# Patient Record
Sex: MALE | Race: WHITE | Employment: FULL TIME | URBAN - METROPOLITAN AREA
[De-identification: names, ages, dates, MRNs, and addresses within clinical notes are randomized per-mention and may not be internally consistent; named-entity substitution may affect disease eponyms.]

---

## 2021-12-30 ENCOUNTER — HOSPITAL ENCOUNTER (EMERGENCY)
Age: 41
Discharge: HOME | End: 2021-12-30
Payer: COMMERCIAL

## 2021-12-30 VITALS
DIASTOLIC BLOOD PRESSURE: 81 MMHG | TEMPERATURE: 97.9 F | WEIGHT: 175 LBS | HEIGHT: 68 IN | OXYGEN SATURATION: 96 % | SYSTOLIC BLOOD PRESSURE: 120 MMHG | HEART RATE: 100 BPM | RESPIRATION RATE: 16 BRPM | BODY MASS INDEX: 26.52 KG/M2

## 2021-12-30 DIAGNOSIS — R22.0 FACIAL SWELLING: Primary | ICD-10-CM

## 2021-12-30 PROCEDURE — 99284 EMERGENCY DEPT VISIT MOD MDM: CPT

## 2021-12-30 RX ORDER — PREDNISONE 20 MG/1
40 TABLET ORAL DAILY
Qty: 10 TABLET | Refills: 0 | Status: SHIPPED | OUTPATIENT
Start: 2021-12-30 | End: 2022-01-04

## 2021-12-30 RX ORDER — AMOXICILLIN AND CLAVULANATE POTASSIUM 875; 125 MG/1; MG/1
1 TABLET, FILM COATED ORAL 2 TIMES DAILY
Qty: 14 TABLET | Refills: 0 | Status: SHIPPED | OUTPATIENT
Start: 2021-12-30 | End: 2022-01-06

## 2021-12-30 ASSESSMENT — ENCOUNTER SYMPTOMS
DIARRHEA: 0
ROS SKIN COMMENTS: +FACIAL SWELLING
CHEST TIGHTNESS: 0
EYE PAIN: 0
SORE THROAT: 0
BACK PAIN: 0
NAUSEA: 0
SHORTNESS OF BREATH: 0

## 2021-12-30 NOTE — ED TRIAGE NOTES
Pt c/o right side facial edema that started this AM, denies trauma, pain and SOB, Pt is A&OX3, calm, ambulatory, afebrile, breathes are equal and unlabored, 3+ edema to right cheek, no redness noted,

## 2021-12-30 NOTE — Clinical Note
Cruz Reina was seen and treated in our emergency department on 12/30/2021. He may return to work on 01/03/2022. If you have any questions or concerns, please don't hesitate to call.       Christine Perkins PA-C

## 2021-12-30 NOTE — ED PROVIDER NOTES
[facility] [facility] ED  eMERGENCYdEPARTMENT eNCOUnter      Pt Name: Eliseo Rodriguez  MRN: [id number]  [place] 1980  Date of evaluation: 12/30/2021  Provider:Saulo Marlow PA-C    CHIEF COMPLAINT           HPI  Eliseo Rodriguez is a 42 y.o. male presenting with right facial swelling. Pt reports 12 hours of worsening, non painful, gradual onset facial swelling. He states this has not happened before, and he does not endorse any tooth pain or history of cavities, although he does not get dental care currently. He denies trouble swallowing, SOB, fever, chills. ROS  Review of Systems   Constitutional: Negative for chills, fatigue and fever. HENT: Negative for ear pain, hearing loss and sore throat. Eyes: Negative for pain and visual disturbance. Respiratory: Negative for chest tightness and shortness of breath. Cardiovascular: Negative for chest pain. Gastrointestinal: Negative for diarrhea and nausea. Endocrine: Negative for cold intolerance. Genitourinary: Negative for hematuria. Musculoskeletal: Negative for back pain. Skin: Negative for rash and wound. +facial swelling   Neurological: Negative for dizziness and headaches. Psychiatric/Behavioral: Negative for behavioral problems and confusion. Except as noted above the remainder of the review of systems was reviewed and negative. PAST MEDICAL HISTORY     Past Medical History:   Diagnosis Date    Hyperlipidemia          SURGICAL HISTORY     History reviewed. No pertinent surgical history. [address]       Discharge Medication List as of 12/30/2021  2:16 PM      CONTINUE these medications which have NOT CHANGED    Details   simvastatin (ZOCOR) 20 MG tablet Take 1 tablet by mouth every evening, Disp-90 tablet, R-3             ALLERGIES     Patient has no known allergies. FAMILY HISTORY       Family History   Problem Relation Age of Onset    Heart Disease Mother     Cancer Father         ? SOCIAL HISTORY       Social History     Socioeconomic History    Marital status: Single     Spouse name: None    Number of children: None    Years of education: None    Highest education level: None   Occupational History    None   Tobacco Use    Smoking status: Current Every Day Smoker    Smokeless tobacco: Never Used   Substance and Sexual Activity    Alcohol use: Yes     Alcohol/week: 0.0 standard drinks     Comment: soc.  Drug use: No    Sexual activity: None   Other Topics Concern    None   Social History Narrative    None     Social Determinants of Health     Financial Resource Strain:     Difficulty of Paying Living Expenses: Not on file   Food Insecurity:     Worried About Running Out of Food in the Last Year: Not on file    Kenzie of Food in the Last Year: Not on file   Transportation Needs:     Lack of Transportation (Medical): Not on file    Lack of Transportation (Non-Medical):  Not on file   Physical Activity:     Days of Exercise per Week: Not on file    Minutes of Exercise per Session: Not on file   Stress:     Feeling of Stress : Not on file   Social Connections:     Frequency of Communication with Friends and Family: Not on file    Frequency of Social Gatherings with Friends and Family: Not on file    Attends Ten[employer] Services: Not on file    Active Member of [address] [place] [address] [employer] or Organizations: Not on file    Attends Club or Organization Meetings: Not on file    Marital Status: Not on file   Intimate Partner Violence:     Fear of Current or Ex-Partner: Not on file    Emotionally Abused: Not on file    Physically Abused: Not on file    Sexually Abused: Not on file   Housing Stability:     Unable to Pay for Housing in the Last Year: Not on file    Number of [place] in the Last Year: Not on file    Unstable Housing in the Last Year: Not on file         PHYSICAL EXAM       ED Triage Vitals [12/30/21 1345]   BP Temp Temp Source Pulse Resp SpO2 Height Weight   120/81 97.9 °F (36.6 °C) Oral 100 16 96 % 5' 8\" (1.727 m) 175 lb (79.4 kg)       Physical Exam  Constitutional:       Appearance: Normal appearance. HENT:      Head: Normocephalic and atraumatic. Nose: Nose normal.      Mouth/Throat:      Mouth: Mucous membranes are moist.      Tongue: Tongue does not deviate from midline. Pharynx: No oropharyngeal exudate or posterior oropharyngeal erythema. Tonsils: No tonsillar abscesses. Comments: Oropharynx is patent  Eyes:      Extraocular Movements: Extraocular movements intact. Conjunctiva/sclera: Conjunctivae normal.      Pupils: Pupils are equal, round, and reactive to light. Cardiovascular:      Rate and Rhythm: Normal rate and regular rhythm. Heart sounds: Normal heart sounds. Pulmonary:      Effort: Pulmonary effort is normal.      Breath sounds: Normal breath sounds. No wheezing or rhonchi. Abdominal:      General: Bowel sounds are normal.      Palpations: Abdomen is soft. Tenderness: There is no abdominal tenderness. There is no guarding. Musculoskeletal:         General: No deformity. Normal range of motion. Cervical back: Normal range of motion and neck supple. Skin:     General: Skin is warm and dry. Coloration: Skin is not jaundiced. Neurological:      General: No focal deficit present. Mental Status: He is alert and oriented to person, place, and time. Psychiatric:         Mood and Affect: Mood normal.         Behavior: Behavior normal.           MDM  This is a 42year old male presenting with cheek swelling. Pt is afebrile and HD stable. Likely buccal infection from tooth decay, no evidence of progression into the oropharynx or auricular areas at this time. the Pt is agreeable to discharge with abx and steroids for swelling. He states he will follow up with a Dentist asap. He will return if symptoms change or worsen. FINAL IMPRESSION      1.  Facial swelling          DISPOSITION/PLAN DISPOSITION Decision To Discharge 12/30/2021 02:06:08 PM        DISCHARGE MEDICATIONS:  [unfilled]         Yang Gomes PA-C(electronically signed)  Attending Emergency Physician           Yang Gomes PA-C  01/01/22 [id number]

## 2024-07-02 ENCOUNTER — HOSPITAL ENCOUNTER (EMERGENCY)
Age: 44
Discharge: HOME | End: 2024-07-02
Attending: EMERGENCY MEDICINE
Payer: COMMERCIAL

## 2024-07-02 VITALS
TEMPERATURE: 97 F | HEART RATE: 99 BPM | RESPIRATION RATE: 15 BRPM | SYSTOLIC BLOOD PRESSURE: 138 MMHG | WEIGHT: 170 LBS | OXYGEN SATURATION: 98 % | HEIGHT: 68 IN | DIASTOLIC BLOOD PRESSURE: 87 MMHG | BODY MASS INDEX: 25.76 KG/M2

## 2024-07-02 DIAGNOSIS — K02.9 DENTAL CARIES: ICD-10-CM

## 2024-07-02 DIAGNOSIS — K08.89 PAIN, DENTAL: Primary | ICD-10-CM

## 2024-07-02 PROCEDURE — 99283 EMERGENCY DEPT VISIT LOW MDM: CPT

## 2024-07-02 RX ORDER — AMOXICILLIN AND CLAVULANATE POTASSIUM 875; 125 MG/1; MG/1
1 TABLET, FILM COATED ORAL 2 TIMES DAILY
Qty: 20 TABLET | Refills: 0 | Status: SHIPPED | OUTPATIENT
Start: 2024-07-02 | End: 2024-07-12

## 2024-07-02 ASSESSMENT — LIFESTYLE VARIABLES
HOW MANY STANDARD DRINKS CONTAINING ALCOHOL DO YOU HAVE ON A TYPICAL DAY: 1 OR 2
HOW OFTEN DO YOU HAVE A DRINK CONTAINING ALCOHOL: MONTHLY OR LESS

## 2024-07-02 ASSESSMENT — PAIN DESCRIPTION - ORIENTATION: ORIENTATION: LEFT;LOWER

## 2024-07-02 ASSESSMENT — PAIN DESCRIPTION - DESCRIPTORS: DESCRIPTORS: ACHING

## 2024-07-02 ASSESSMENT — PAIN - FUNCTIONAL ASSESSMENT
PAIN_FUNCTIONAL_ASSESSMENT: NONE - DENIES PAIN
PAIN_FUNCTIONAL_ASSESSMENT: 0-10

## 2024-07-02 ASSESSMENT — PAIN DESCRIPTION - LOCATION: LOCATION: TEETH

## 2024-07-02 ASSESSMENT — PAIN SCALES - GENERAL: PAINLEVEL_OUTOF10: 5

## 2024-07-02 NOTE — ED PROVIDER NOTES
Physician - none    LABS:  Labs Reviewed - No data to display    All other labs were within normal range or not returned as of this dictation.    EMERGENCY DEPARTMENT COURSE and DIFFERENTIAL DIAGNOSIS/MDM:   Vitals:    Vitals:    07/02/24 1038   BP: 138/87   Pulse: 99   Resp: 15   Temp: 97 °F (36.1 °C)   TempSrc: Tympanic   SpO2: 98%   Weight: 77.1 kg (170 lb)   Height: 1.727 m (5' 8\")           MDM  Given the patient's situation no imaging or blood work needed.  My physical exam shows no sign of abscess at this time.  I will place the patient on antibiotics.  Patient advised to follow-up with soon as possible for dental care.  Patient advised to return for any worsening condition.  All were in agreement with the outpatient management and treatment plan.        CRITICAL CARE TIME   Total Critical Care time was  minutes, excluding separately reportableprocedures.  There was a high probability of clinicallysignificant/life threatening deterioration in the patient's condition which required my urgent intervention.     CONSULTS:  None    PROCEDURES:  Unless otherwise noted below, none     Procedures    FINAL IMPRESSION      1. Pain, dental    2. Dental caries          DISPOSITION/PLAN   DISPOSITION Decision To Discharge 07/02/2024 10:49:16 AM      PATIENT REFERRED TO:  [facility]  [address] [place] [id number] [phone]        [facility], [place]  [address] [id number] [phone]    In 1 day        DISCHARGE MEDICATIONS:  New Prescriptions    AMOXICILLIN-CLAVULANATE (AUGMENTIN) 875-125 MG PER TABLET    Take 1 tablet by mouth 2 times daily for 10 days          (Please note that portions of this note were completed with a voice recognition program.  Efforts were made to edit the dictations but occasionally words are mis-transcribed.)    Maureen Spain MD (electronically signed)  Attending Emergency Physician         Maureen Spain MD  07/02/24 4412

## 2024-07-02 NOTE — ED NOTES
D/C instructions and rx given along with time the next dose is due. Verbalized understanding. Denies any further complaints. Amb out with steady gait in no acute distress.

## 2024-07-02 NOTE — ED TRIAGE NOTES
A & Ox4. Skin pink warm and dry. Pt states he has been having left lower tooth pain on Sunday. States he just wants antibiotics and anti inflammatory medications. No obvious facial edema noted.

## 2024-07-07 ENCOUNTER — HOSPITAL ENCOUNTER (INPATIENT)
Facility: HOSPITAL | Age: 44
LOS: 2 days | Discharge: HOME | End: 2024-07-10
Attending: EMERGENCY MEDICINE | Admitting: DENTIST
Payer: COMMERCIAL

## 2024-07-07 ENCOUNTER — HOSPITAL ENCOUNTER (EMERGENCY)
Facility: HOSPITAL | Age: 44
Discharge: HOME | End: 2024-07-07
Attending: INTERNAL MEDICINE
Payer: COMMERCIAL

## 2024-07-07 ENCOUNTER — APPOINTMENT (OUTPATIENT)
Dept: RADIOLOGY | Facility: HOSPITAL | Age: 44
End: 2024-07-07
Payer: COMMERCIAL

## 2024-07-07 VITALS
BODY MASS INDEX: 25.76 KG/M2 | OXYGEN SATURATION: 100 % | HEIGHT: 68 IN | SYSTOLIC BLOOD PRESSURE: 138 MMHG | DIASTOLIC BLOOD PRESSURE: 95 MMHG | WEIGHT: 170 LBS | HEART RATE: 96 BPM | TEMPERATURE: 97.5 F | RESPIRATION RATE: 16 BRPM

## 2024-07-07 DIAGNOSIS — R25.2 TRISMUS: ICD-10-CM

## 2024-07-07 DIAGNOSIS — K12.2 SUBMANDIBULAR ABSCESS: Primary | ICD-10-CM

## 2024-07-07 DIAGNOSIS — K04.7 DENTAL ABSCESS: Primary | ICD-10-CM

## 2024-07-07 DIAGNOSIS — K12.2 SUBMANDIBULAR SPACE INFECTION: ICD-10-CM

## 2024-07-07 LAB
ANION GAP SERPL CALC-SCNC: 17 MMOL/L (ref 10–20)
APTT PPP: 31 SECONDS (ref 27–38)
BASOPHILS # BLD AUTO: 0.04 X10*3/UL (ref 0–0.1)
BASOPHILS NFR BLD AUTO: 0.3 %
BUN SERPL-MCNC: 11 MG/DL (ref 6–23)
CALCIUM SERPL-MCNC: 10.6 MG/DL (ref 8.6–10.3)
CHLORIDE SERPL-SCNC: 96 MMOL/L (ref 98–107)
CO2 SERPL-SCNC: 22 MMOL/L (ref 21–32)
CREAT SERPL-MCNC: 0.87 MG/DL (ref 0.5–1.3)
CRP SERPL-MCNC: 10.34 MG/DL
EGFRCR SERPLBLD CKD-EPI 2021: >90 ML/MIN/1.73M*2
EOSINOPHIL # BLD AUTO: 0.04 X10*3/UL (ref 0–0.7)
EOSINOPHIL NFR BLD AUTO: 0.3 %
ERYTHROCYTE [DISTWIDTH] IN BLOOD BY AUTOMATED COUNT: 11.8 % (ref 11.5–14.5)
GLUCOSE SERPL-MCNC: 109 MG/DL (ref 74–99)
HCT VFR BLD AUTO: 46.9 % (ref 41–52)
HGB BLD-MCNC: 16.6 G/DL (ref 13.5–17.5)
IMM GRANULOCYTES # BLD AUTO: 0.03 X10*3/UL (ref 0–0.7)
IMM GRANULOCYTES NFR BLD AUTO: 0.2 % (ref 0–0.9)
INR PPP: 1.2 (ref 0.9–1.1)
LACTATE SERPL-SCNC: 1.2 MMOL/L (ref 0.4–2)
LYMPHOCYTES # BLD AUTO: 1.9 X10*3/UL (ref 1.2–4.8)
LYMPHOCYTES NFR BLD AUTO: 14.4 %
MCH RBC QN AUTO: 30.9 PG (ref 26–34)
MCHC RBC AUTO-ENTMCNC: 35.4 G/DL (ref 32–36)
MCV RBC AUTO: 87 FL (ref 80–100)
MONOCYTES # BLD AUTO: 0.84 X10*3/UL (ref 0.1–1)
MONOCYTES NFR BLD AUTO: 6.3 %
NEUTROPHILS # BLD AUTO: 10.39 X10*3/UL (ref 1.2–7.7)
NEUTROPHILS NFR BLD AUTO: 78.5 %
NRBC BLD-RTO: 0 /100 WBCS (ref 0–0)
PLATELET # BLD AUTO: 354 X10*3/UL (ref 150–450)
POTASSIUM SERPL-SCNC: 3.9 MMOL/L (ref 3.5–5.3)
PROTHROMBIN TIME: 13.1 SECONDS (ref 9.8–12.8)
RBC # BLD AUTO: 5.37 X10*6/UL (ref 4.5–5.9)
SODIUM SERPL-SCNC: 131 MMOL/L (ref 136–145)
WBC # BLD AUTO: 13.2 X10*3/UL (ref 4.4–11.3)

## 2024-07-07 PROCEDURE — 96375 TX/PRO/DX INJ NEW DRUG ADDON: CPT | Mod: 59

## 2024-07-07 PROCEDURE — 80048 BASIC METABOLIC PNL TOTAL CA: CPT | Performed by: NURSE PRACTITIONER

## 2024-07-07 PROCEDURE — 2500000004 HC RX 250 GENERAL PHARMACY W/ HCPCS (ALT 636 FOR OP/ED)

## 2024-07-07 PROCEDURE — 2500000004 HC RX 250 GENERAL PHARMACY W/ HCPCS (ALT 636 FOR OP/ED): Performed by: NURSE PRACTITIONER

## 2024-07-07 PROCEDURE — 86140 C-REACTIVE PROTEIN: CPT | Performed by: NURSE PRACTITIONER

## 2024-07-07 PROCEDURE — 87040 BLOOD CULTURE FOR BACTERIA: CPT | Mod: ELYLAB | Performed by: PHYSICIAN ASSISTANT

## 2024-07-07 PROCEDURE — 36415 COLL VENOUS BLD VENIPUNCTURE: CPT | Performed by: NURSE PRACTITIONER

## 2024-07-07 PROCEDURE — 70491 CT SOFT TISSUE NECK W/DYE: CPT

## 2024-07-07 PROCEDURE — 96365 THER/PROPH/DIAG IV INF INIT: CPT

## 2024-07-07 PROCEDURE — 2550000001 HC RX 255 CONTRASTS: Performed by: NURSE PRACTITIONER

## 2024-07-07 PROCEDURE — 99284 EMERGENCY DEPT VISIT MOD MDM: CPT | Mod: 25

## 2024-07-07 PROCEDURE — 70491 CT SOFT TISSUE NECK W/DYE: CPT | Performed by: STUDENT IN AN ORGANIZED HEALTH CARE EDUCATION/TRAINING PROGRAM

## 2024-07-07 PROCEDURE — 86850 RBC ANTIBODY SCREEN: CPT

## 2024-07-07 PROCEDURE — 85025 COMPLETE CBC W/AUTO DIFF WBC: CPT | Performed by: NURSE PRACTITIONER

## 2024-07-07 PROCEDURE — 36415 COLL VENOUS BLD VENIPUNCTURE: CPT | Performed by: PHYSICIAN ASSISTANT

## 2024-07-07 PROCEDURE — 83605 ASSAY OF LACTIC ACID: CPT | Performed by: NURSE PRACTITIONER

## 2024-07-07 PROCEDURE — 99285 EMERGENCY DEPT VISIT HI MDM: CPT | Performed by: EMERGENCY MEDICINE

## 2024-07-07 PROCEDURE — 85730 THROMBOPLASTIN TIME PARTIAL: CPT

## 2024-07-07 PROCEDURE — 99285 EMERGENCY DEPT VISIT HI MDM: CPT

## 2024-07-07 PROCEDURE — 71045 X-RAY EXAM CHEST 1 VIEW: CPT

## 2024-07-07 PROCEDURE — 2500000004 HC RX 250 GENERAL PHARMACY W/ HCPCS (ALT 636 FOR OP/ED): Performed by: PHYSICIAN ASSISTANT

## 2024-07-07 RX ORDER — KETOROLAC TROMETHAMINE 30 MG/ML
15 INJECTION, SOLUTION INTRAMUSCULAR; INTRAVENOUS ONCE
Status: COMPLETED | OUTPATIENT
Start: 2024-07-07 | End: 2024-07-07

## 2024-07-07 RX ORDER — DEXAMETHASONE SODIUM PHOSPHATE 10 MG/ML
10 INJECTION INTRAMUSCULAR; INTRAVENOUS ONCE
Status: COMPLETED | OUTPATIENT
Start: 2024-07-07 | End: 2024-07-07

## 2024-07-07 ASSESSMENT — PAIN SCALES - GENERAL
PAINLEVEL_OUTOF10: 0 - NO PAIN

## 2024-07-07 ASSESSMENT — COLUMBIA-SUICIDE SEVERITY RATING SCALE - C-SSRS
1. IN THE PAST MONTH, HAVE YOU WISHED YOU WERE DEAD OR WISHED YOU COULD GO TO SLEEP AND NOT WAKE UP?: NO
1. IN THE PAST MONTH, HAVE YOU WISHED YOU WERE DEAD OR WISHED YOU COULD GO TO SLEEP AND NOT WAKE UP?: NO
2. HAVE YOU ACTUALLY HAD ANY THOUGHTS OF KILLING YOURSELF?: NO
6. HAVE YOU EVER DONE ANYTHING, STARTED TO DO ANYTHING, OR PREPARED TO DO ANYTHING TO END YOUR LIFE?: NO
6. HAVE YOU EVER DONE ANYTHING, STARTED TO DO ANYTHING, OR PREPARED TO DO ANYTHING TO END YOUR LIFE?: NO
2. HAVE YOU ACTUALLY HAD ANY THOUGHTS OF KILLING YOURSELF?: NO

## 2024-07-07 ASSESSMENT — PAIN DESCRIPTION - LOCATION: LOCATION: TEETH

## 2024-07-07 ASSESSMENT — PAIN DESCRIPTION - ORIENTATION: ORIENTATION: LEFT;LOWER

## 2024-07-07 ASSESSMENT — PAIN DESCRIPTION - DESCRIPTORS: DESCRIPTORS: ACHING

## 2024-07-07 ASSESSMENT — PAIN - FUNCTIONAL ASSESSMENT
PAIN_FUNCTIONAL_ASSESSMENT: 0-10

## 2024-07-07 ASSESSMENT — LIFESTYLE VARIABLES
HAVE PEOPLE ANNOYED YOU BY CRITICIZING YOUR DRINKING: NO
EVER HAD A DRINK FIRST THING IN THE MORNING TO STEADY YOUR NERVES TO GET RID OF A HANGOVER: NO
TOTAL SCORE: 0
HAVE YOU EVER FELT YOU SHOULD CUT DOWN ON YOUR DRINKING: NO
EVER FELT BAD OR GUILTY ABOUT YOUR DRINKING: NO

## 2024-07-07 ASSESSMENT — PAIN DESCRIPTION - FREQUENCY: FREQUENCY: CONSTANT/CONTINUOUS

## 2024-07-07 ASSESSMENT — PAIN DESCRIPTION - PROGRESSION: CLINICAL_PROGRESSION: NOT CHANGED

## 2024-07-07 ASSESSMENT — PAIN DESCRIPTION - ONSET: ONSET: ONGOING

## 2024-07-07 NOTE — ED PROVIDER NOTES
"HPI   Chief Complaint   Patient presents with    Dental Problem     \"Here for dental swelling with no pain.  Has been going on before Tuesday  Went to [facility] and they gave me antibiotic but not getting any better.\".\"       44-year-old male presents emergency department, patient states has been having dental pain and swelling left lower jaw for some time.  States Tuesday he went to [facility] and they placed him on 875 mg of amoxicillin twice daily.  Been taking it as prescribed.  Patient states despite this he is having worsening swelling and pain, now unable to open his jaw more than 1 cm.  Patient denies other medical history, nondiabetic.      History provided by:  Patient   used: No                        [place] Coma Scale Score: 15                     Patient History   History reviewed. No pertinent past medical history.  History reviewed. No pertinent surgical history.  No family history on file.  Social History     Tobacco Use    Smoking status: Every Day     Types: Cigarettes    Smokeless tobacco: Never   Vaping Use    Vaping status: Never Used   Substance Use Topics    Alcohol use: Yes    Drug use: Never       Physical Exam   ED Triage Vitals [07/07/24 1624]   Temperature Heart Rate Respirations BP   36.4 °C (97.5 °F) 92 18 149/89      Pulse Ox Temp Source Heart Rate Source Patient Position   97 % Temporal Monitor Sitting      BP Location FiO2 (%)     Right arm --       Physical Exam  Physical Exam:  Constitutional: Vitals noted, no distress. Afebrile.   EENT: Significant trismus noted.  Visible dentition is poor, diseased.  No significant anterior cervical lymphadenopathy, no evidence of Dusty's angina  Cardiovascular: Regular, rate, rhythm, no murmur.   Pulmonary: Lungs clear bilaterally with good aeration. No adventitious breath sounds.   Gastrointestinal: Soft, nonsurgical. Nontender. No peritoneal signs. Normoactive bowel sounds.   Musculoskeletal: No peripheral edema. Negative Homans " bilaterally, no cords.   Skin: No rash.   Neuro: No focal neurologic deficits, NIH score of 0.    ED Course & MDM   ED Course as of 07/17/24 1937   Alviso Jul 07, 2024 2015 HPI      Patient presented for evaluation of dental infection.  Patient states the start of an dental pain last week.  Patient was evaluated at [facility] and started on amoxicillin.  Patient had worsening pain.  Patient notes difficulty opening his mouth.         Physical Exam     Appearance: Awake and alert, not in distress.  Skin: No rash or lesions, warm and dry  Eyes: Pupils equal and reactive, EOMI  ENT: Diffuse dental caries.  Swelling to the left side of the mandible.  Trismus present.  No nuchal rigidity.  Floor the mouth is soft.  No elevation of the tongue.  Neck: Supple, no meningeal signs.  Pulmonary: CTA bilaterally with good air movement  Cardiac: Normal rate, regular rhythm.  Radial and DP Pulses equal BL  Abdomen: Soft and nontender, nondistended, no mass.  Genitourinary: No flank tenderness.  Musculoskeletal: No signs of trauma, strong pulses.  No tenderness along the venous system.  Neurological: Clear speech, NIH 0. CN II-XII grossly intact, no focal neuro deficit  Psychiatric: Appropriate mood and affect.        Medical Decision Making:     Diffuse dental caries with swelling to the left side of the mandible on exam.  CT scan showing concern for abscess into the musculature.  Patient does have trismus present.  No nuchal rigidity.  No elevation of the tongue and the floor the mouth is soft.  Patient does not have Ludewig's angina clinically at this time.  IV antibiotics started.  Patient transferred to [facility] for on my evaluation.      I personally saw the patient and made/approved the management plan and take responsibility for the patient management.    Disclaimer: This note was dictated by speech recognition. Minor errors in transcription may be present.  Please call if questions.    [JA]      ED Course User Index  [SOFIYA] Bernardino WARD  DO Ottoniel         Diagnoses as of 07/17/24 1937   Dental abscess   Trismus       Medical Decision Making    Patient is already on antibiotics, describes worsening swelling, now significant trismus.    Workup initiated, CBC ordered, does show an elevated white count to 13.2 with normal lactate level, CRP elevated to 10.  Metabolic panel is unremarkable.    CT imaging of the soft tissue neck ordered to evaluate likely dental abscess.    Ultimately signed out to DAE Peraza.  Procedure  Procedures     Shari Shearer, KEYANNA-CNP  07/17/24 1938

## 2024-07-08 ENCOUNTER — ANESTHESIA (OUTPATIENT)
Dept: OPERATING ROOM | Facility: HOSPITAL | Age: 44
End: 2024-07-08
Payer: COMMERCIAL

## 2024-07-08 ENCOUNTER — ANESTHESIA EVENT (OUTPATIENT)
Dept: OPERATING ROOM | Facility: HOSPITAL | Age: 44
End: 2024-07-08
Payer: COMMERCIAL

## 2024-07-08 PROBLEM — K12.2 SUBMANDIBULAR ABSCESS: Status: ACTIVE | Noted: 2024-07-08

## 2024-07-08 PROBLEM — K12.2 SUBMANDIBULAR SPACE INFECTION: Status: ACTIVE | Noted: 2024-07-07

## 2024-07-08 LAB
ABO GROUP (TYPE) IN BLOOD: NORMAL
ANION GAP SERPL CALC-SCNC: 14 MMOL/L (ref 10–20)
ANTIBODY SCREEN: NORMAL
BUN SERPL-MCNC: 9 MG/DL (ref 6–23)
CALCIUM SERPL-MCNC: 8 MG/DL (ref 8.6–10.6)
CHLORIDE SERPL-SCNC: 105 MMOL/L (ref 98–107)
CO2 SERPL-SCNC: 17 MMOL/L (ref 21–32)
CREAT SERPL-MCNC: 0.57 MG/DL (ref 0.5–1.3)
EGFRCR SERPLBLD CKD-EPI 2021: >90 ML/MIN/1.73M*2
ERYTHROCYTE [DISTWIDTH] IN BLOOD BY AUTOMATED COUNT: 11.6 % (ref 11.5–14.5)
GLUCOSE SERPL-MCNC: 134 MG/DL (ref 74–99)
HCT VFR BLD AUTO: 36.4 % (ref 41–52)
HGB BLD-MCNC: 13.2 G/DL (ref 13.5–17.5)
MCH RBC QN AUTO: 30.1 PG (ref 26–34)
MCHC RBC AUTO-ENTMCNC: 36.3 G/DL (ref 32–36)
MCV RBC AUTO: 83 FL (ref 80–100)
NRBC BLD-RTO: 0 /100 WBCS (ref 0–0)
PLATELET # BLD AUTO: 342 X10*3/UL (ref 150–450)
POTASSIUM SERPL-SCNC: 4.2 MMOL/L (ref 3.5–5.3)
RBC # BLD AUTO: 4.38 X10*6/UL (ref 4.5–5.9)
RH FACTOR (ANTIGEN D): NORMAL
SODIUM SERPL-SCNC: 132 MMOL/L (ref 136–145)
WBC # BLD AUTO: 11.3 X10*3/UL (ref 4.4–11.3)

## 2024-07-08 PROCEDURE — 2500000004 HC RX 250 GENERAL PHARMACY W/ HCPCS (ALT 636 FOR OP/ED): Performed by: STUDENT IN AN ORGANIZED HEALTH CARE EDUCATION/TRAINING PROGRAM

## 2024-07-08 PROCEDURE — 1100000001 HC PRIVATE ROOM DAILY

## 2024-07-08 PROCEDURE — 3700000001 HC GENERAL ANESTHESIA TIME - INITIAL BASE CHARGE: Performed by: DENTIST

## 2024-07-08 PROCEDURE — C1729 CATH, DRAINAGE: HCPCS | Performed by: DENTIST

## 2024-07-08 PROCEDURE — 96365 THER/PROPH/DIAG IV INF INIT: CPT

## 2024-07-08 PROCEDURE — 3600000007 HC OR TIME - EACH INCREMENTAL 1 MINUTE - PROCEDURE LEVEL TWO: Performed by: DENTIST

## 2024-07-08 PROCEDURE — 3600000002 HC OR TIME - INITIAL BASE CHARGE - PROCEDURE LEVEL TWO: Performed by: DENTIST

## 2024-07-08 PROCEDURE — 2500000004 HC RX 250 GENERAL PHARMACY W/ HCPCS (ALT 636 FOR OP/ED): Performed by: DENTIST

## 2024-07-08 PROCEDURE — 2500000001 HC RX 250 WO HCPCS SELF ADMINISTERED DRUGS (ALT 637 FOR MEDICARE OP)

## 2024-07-08 PROCEDURE — 2500000005 HC RX 250 GENERAL PHARMACY W/O HCPCS: Performed by: STUDENT IN AN ORGANIZED HEALTH CARE EDUCATION/TRAINING PROGRAM

## 2024-07-08 PROCEDURE — 80048 BASIC METABOLIC PNL TOTAL CA: CPT | Performed by: DENTIST

## 2024-07-08 PROCEDURE — 0W930ZZ DRAINAGE OF ORAL CAVITY AND THROAT, OPEN APPROACH: ICD-10-PCS | Performed by: DENTIST

## 2024-07-08 PROCEDURE — 36415 COLL VENOUS BLD VENIPUNCTURE: CPT | Performed by: DENTIST

## 2024-07-08 PROCEDURE — 7100000001 HC RECOVERY ROOM TIME - INITIAL BASE CHARGE: Performed by: DENTIST

## 2024-07-08 PROCEDURE — 0CDXXZ1 EXTRACTION OF LOWER TOOTH, MULTIPLE, EXTERNAL APPROACH: ICD-10-PCS | Performed by: DENTIST

## 2024-07-08 PROCEDURE — 87075 CULTR BACTERIA EXCEPT BLOOD: CPT | Performed by: EMERGENCY MEDICINE

## 2024-07-08 PROCEDURE — 96375 TX/PRO/DX INJ NEW DRUG ADDON: CPT

## 2024-07-08 PROCEDURE — 87102 FUNGUS ISOLATION CULTURE: CPT | Performed by: EMERGENCY MEDICINE

## 2024-07-08 PROCEDURE — 2720000007 HC OR 272 NO HCPCS: Performed by: DENTIST

## 2024-07-08 PROCEDURE — 7100000002 HC RECOVERY ROOM TIME - EACH INCREMENTAL 1 MINUTE: Performed by: DENTIST

## 2024-07-08 PROCEDURE — 2500000001 HC RX 250 WO HCPCS SELF ADMINISTERED DRUGS (ALT 637 FOR MEDICARE OP): Performed by: DENTIST

## 2024-07-08 PROCEDURE — 3700000002 HC GENERAL ANESTHESIA TIME - EACH INCREMENTAL 1 MINUTE: Performed by: DENTIST

## 2024-07-08 PROCEDURE — 2500000004 HC RX 250 GENERAL PHARMACY W/ HCPCS (ALT 636 FOR OP/ED)

## 2024-07-08 PROCEDURE — 96361 HYDRATE IV INFUSION ADD-ON: CPT

## 2024-07-08 PROCEDURE — 2500000005 HC RX 250 GENERAL PHARMACY W/O HCPCS: Performed by: DENTIST

## 2024-07-08 PROCEDURE — 85027 COMPLETE CBC AUTOMATED: CPT | Performed by: DENTIST

## 2024-07-08 RX ORDER — LIDOCAINE HYDROCHLORIDE AND EPINEPHRINE 10; 10 MG/ML; UG/ML
INJECTION, SOLUTION INFILTRATION; PERINEURAL AS NEEDED
Status: DISCONTINUED | OUTPATIENT
Start: 2024-07-08 | End: 2024-07-08 | Stop reason: HOSPADM

## 2024-07-08 RX ORDER — ACETAMINOPHEN 325 MG/1
650 TABLET ORAL EVERY 4 HOURS PRN
Status: DISCONTINUED | OUTPATIENT
Start: 2024-07-08 | End: 2024-07-08 | Stop reason: HOSPADM

## 2024-07-08 RX ORDER — OXYCODONE HYDROCHLORIDE 5 MG/1
10 TABLET ORAL EVERY 4 HOURS PRN
Status: DISCONTINUED | OUTPATIENT
Start: 2024-07-08 | End: 2024-07-08 | Stop reason: HOSPADM

## 2024-07-08 RX ORDER — LIDOCAINE HYDROCHLORIDE 10 MG/ML
0.1 INJECTION INFILTRATION; PERINEURAL ONCE
Status: DISCONTINUED | OUTPATIENT
Start: 2024-07-08 | End: 2024-07-08 | Stop reason: HOSPADM

## 2024-07-08 RX ORDER — ESMOLOL HYDROCHLORIDE 10 MG/ML
INJECTION INTRAVENOUS AS NEEDED
Status: DISCONTINUED | OUTPATIENT
Start: 2024-07-08 | End: 2024-07-08

## 2024-07-08 RX ORDER — SODIUM CHLORIDE, SODIUM LACTATE, POTASSIUM CHLORIDE, CALCIUM CHLORIDE 600; 310; 30; 20 MG/100ML; MG/100ML; MG/100ML; MG/100ML
100 INJECTION, SOLUTION INTRAVENOUS CONTINUOUS
Status: DISCONTINUED | OUTPATIENT
Start: 2024-07-08 | End: 2024-07-10 | Stop reason: HOSPADM

## 2024-07-08 RX ORDER — SODIUM CHLORIDE, SODIUM LACTATE, POTASSIUM CHLORIDE, CALCIUM CHLORIDE 600; 310; 30; 20 MG/100ML; MG/100ML; MG/100ML; MG/100ML
INJECTION, SOLUTION INTRAVENOUS CONTINUOUS PRN
Status: DISCONTINUED | OUTPATIENT
Start: 2024-07-08 | End: 2024-07-08

## 2024-07-08 RX ORDER — ENOXAPARIN SODIUM 100 MG/ML
40 INJECTION SUBCUTANEOUS DAILY
Status: DISCONTINUED | OUTPATIENT
Start: 2024-07-08 | End: 2024-07-10 | Stop reason: HOSPADM

## 2024-07-08 RX ORDER — ROCURONIUM BROMIDE 10 MG/ML
INJECTION, SOLUTION INTRAVENOUS AS NEEDED
Status: DISCONTINUED | OUTPATIENT
Start: 2024-07-08 | End: 2024-07-08

## 2024-07-08 RX ORDER — SODIUM CHLORIDE, SODIUM LACTATE, POTASSIUM CHLORIDE, CALCIUM CHLORIDE 600; 310; 30; 20 MG/100ML; MG/100ML; MG/100ML; MG/100ML
100 INJECTION, SOLUTION INTRAVENOUS CONTINUOUS
Status: DISCONTINUED | OUTPATIENT
Start: 2024-07-08 | End: 2024-07-08 | Stop reason: HOSPADM

## 2024-07-08 RX ORDER — TRIPROLIDINE/PSEUDOEPHEDRINE 2.5MG-60MG
600 TABLET ORAL 3 TIMES DAILY
Status: DISCONTINUED | OUTPATIENT
Start: 2024-07-08 | End: 2024-07-10 | Stop reason: HOSPADM

## 2024-07-08 RX ORDER — CHLORHEXIDINE GLUCONATE ORAL RINSE 1.2 MG/ML
15 SOLUTION DENTAL 2 TIMES DAILY
Status: DISCONTINUED | OUTPATIENT
Start: 2024-07-08 | End: 2024-07-10 | Stop reason: HOSPADM

## 2024-07-08 RX ORDER — HYDROMORPHONE HYDROCHLORIDE 1 MG/ML
0.5 INJECTION, SOLUTION INTRAMUSCULAR; INTRAVENOUS; SUBCUTANEOUS EVERY 5 MIN PRN
Status: DISCONTINUED | OUTPATIENT
Start: 2024-07-08 | End: 2024-07-08 | Stop reason: HOSPADM

## 2024-07-08 RX ORDER — DEXAMETHASONE SODIUM PHOSPHATE 10 MG/ML
10 INJECTION INTRAMUSCULAR; INTRAVENOUS EVERY 8 HOURS
Status: DISCONTINUED | OUTPATIENT
Start: 2024-07-08 | End: 2024-07-09

## 2024-07-08 RX ORDER — AMPICILLIN AND SULBACTAM 2; 1 G/1; G/1
INJECTION, POWDER, FOR SOLUTION INTRAMUSCULAR; INTRAVENOUS AS NEEDED
Status: DISCONTINUED | OUTPATIENT
Start: 2024-07-08 | End: 2024-07-08

## 2024-07-08 RX ORDER — OXYCODONE HYDROCHLORIDE 5 MG/1
5 TABLET ORAL EVERY 4 HOURS PRN
Status: DISCONTINUED | OUTPATIENT
Start: 2024-07-08 | End: 2024-07-10 | Stop reason: HOSPADM

## 2024-07-08 RX ORDER — OXYCODONE HYDROCHLORIDE 5 MG/1
5 TABLET ORAL EVERY 4 HOURS PRN
Status: DISCONTINUED | OUTPATIENT
Start: 2024-07-08 | End: 2024-07-08 | Stop reason: HOSPADM

## 2024-07-08 RX ORDER — ACETAMINOPHEN 160 MG/5ML
650 SOLUTION ORAL EVERY 6 HOURS
Status: DISCONTINUED | OUTPATIENT
Start: 2024-07-08 | End: 2024-07-10 | Stop reason: HOSPADM

## 2024-07-08 RX ORDER — IBUPROFEN 600 MG/1
600 TABLET ORAL 3 TIMES DAILY
Status: DISCONTINUED | OUTPATIENT
Start: 2024-07-08 | End: 2024-07-10 | Stop reason: HOSPADM

## 2024-07-08 RX ORDER — HYDROMORPHONE HYDROCHLORIDE 1 MG/ML
0.5 INJECTION, SOLUTION INTRAMUSCULAR; INTRAVENOUS; SUBCUTANEOUS EVERY 2 HOUR PRN
Status: DISCONTINUED | OUTPATIENT
Start: 2024-07-08 | End: 2024-07-10 | Stop reason: HOSPADM

## 2024-07-08 RX ORDER — HYDROMORPHONE HYDROCHLORIDE 1 MG/ML
0.2 INJECTION, SOLUTION INTRAMUSCULAR; INTRAVENOUS; SUBCUTANEOUS EVERY 5 MIN PRN
Status: DISCONTINUED | OUTPATIENT
Start: 2024-07-08 | End: 2024-07-08 | Stop reason: HOSPADM

## 2024-07-08 RX ORDER — HYDROMORPHONE HYDROCHLORIDE 1 MG/ML
0.1 INJECTION, SOLUTION INTRAMUSCULAR; INTRAVENOUS; SUBCUTANEOUS EVERY 5 MIN PRN
Status: DISCONTINUED | OUTPATIENT
Start: 2024-07-08 | End: 2024-07-08 | Stop reason: HOSPADM

## 2024-07-08 RX ORDER — HYDROMORPHONE HYDROCHLORIDE 1 MG/ML
INJECTION, SOLUTION INTRAMUSCULAR; INTRAVENOUS; SUBCUTANEOUS AS NEEDED
Status: DISCONTINUED | OUTPATIENT
Start: 2024-07-08 | End: 2024-07-08

## 2024-07-08 RX ORDER — ONDANSETRON HYDROCHLORIDE 2 MG/ML
INJECTION, SOLUTION INTRAVENOUS AS NEEDED
Status: DISCONTINUED | OUTPATIENT
Start: 2024-07-08 | End: 2024-07-08

## 2024-07-08 RX ORDER — LIDOCAINE HCL/PF 100 MG/5ML
SYRINGE (ML) INTRAVENOUS AS NEEDED
Status: DISCONTINUED | OUTPATIENT
Start: 2024-07-08 | End: 2024-07-08

## 2024-07-08 RX ORDER — PROPOFOL 10 MG/ML
INJECTION, EMULSION INTRAVENOUS AS NEEDED
Status: DISCONTINUED | OUTPATIENT
Start: 2024-07-08 | End: 2024-07-08

## 2024-07-08 RX ORDER — FENTANYL CITRATE 50 UG/ML
INJECTION, SOLUTION INTRAMUSCULAR; INTRAVENOUS AS NEEDED
Status: DISCONTINUED | OUTPATIENT
Start: 2024-07-08 | End: 2024-07-08

## 2024-07-08 RX ORDER — ACETAMINOPHEN 325 MG/1
650 TABLET ORAL EVERY 6 HOURS
Status: DISCONTINUED | OUTPATIENT
Start: 2024-07-08 | End: 2024-07-10 | Stop reason: HOSPADM

## 2024-07-08 RX ORDER — MIDAZOLAM HYDROCHLORIDE 1 MG/ML
INJECTION INTRAMUSCULAR; INTRAVENOUS AS NEEDED
Status: DISCONTINUED | OUTPATIENT
Start: 2024-07-08 | End: 2024-07-08

## 2024-07-08 RX ORDER — ONDANSETRON HYDROCHLORIDE 2 MG/ML
4 INJECTION, SOLUTION INTRAVENOUS ONCE AS NEEDED
Status: DISCONTINUED | OUTPATIENT
Start: 2024-07-08 | End: 2024-07-08 | Stop reason: HOSPADM

## 2024-07-08 SDOH — SOCIAL STABILITY: SOCIAL INSECURITY: DO YOU FEEL UNSAFE GOING BACK TO THE PLACE WHERE YOU ARE LIVING?: NO

## 2024-07-08 SDOH — SOCIAL STABILITY: SOCIAL INSECURITY: DOES ANYONE TRY TO KEEP YOU FROM HAVING/CONTACTING OTHER FRIENDS OR DOING THINGS OUTSIDE YOUR HOME?: NO

## 2024-07-08 SDOH — SOCIAL STABILITY: SOCIAL INSECURITY: ABUSE: ADULT

## 2024-07-08 SDOH — SOCIAL STABILITY: SOCIAL INSECURITY: HAVE YOU HAD ANY THOUGHTS OF HARMING ANYONE ELSE?: NO

## 2024-07-08 SDOH — HEALTH STABILITY: MENTAL HEALTH: CURRENT SMOKER: 1

## 2024-07-08 SDOH — SOCIAL STABILITY: SOCIAL INSECURITY: HAS ANYONE EVER THREATENED TO HURT YOUR FAMILY OR YOUR PETS?: NO

## 2024-07-08 SDOH — SOCIAL STABILITY: SOCIAL INSECURITY: ARE YOU OR HAVE YOU BEEN THREATENED OR ABUSED PHYSICALLY, EMOTIONALLY, OR SEXUALLY BY ANYONE?: NO

## 2024-07-08 SDOH — SOCIAL STABILITY: SOCIAL INSECURITY: WERE YOU ABLE TO COMPLETE ALL THE BEHAVIORAL HEALTH SCREENINGS?: YES

## 2024-07-08 SDOH — SOCIAL STABILITY: SOCIAL INSECURITY: ARE THERE ANY APPARENT SIGNS OF INJURIES/BEHAVIORS THAT COULD BE RELATED TO ABUSE/NEGLECT?: NO

## 2024-07-08 SDOH — SOCIAL STABILITY: SOCIAL INSECURITY: DO YOU FEEL ANYONE HAS EXPLOITED OR TAKEN ADVANTAGE OF YOU FINANCIALLY OR OF YOUR PERSONAL PROPERTY?: NO

## 2024-07-08 SDOH — SOCIAL STABILITY: SOCIAL INSECURITY: HAVE YOU HAD THOUGHTS OF HARMING ANYONE ELSE?: NO

## 2024-07-08 ASSESSMENT — COGNITIVE AND FUNCTIONAL STATUS - GENERAL
PATIENT BASELINE BEDBOUND: NO
DAILY ACTIVITIY SCORE: 24
MOBILITY SCORE: 24
DAILY ACTIVITIY SCORE: 24
DAILY ACTIVITIY SCORE: 24
MOBILITY SCORE: 24
MOBILITY SCORE: 24

## 2024-07-08 ASSESSMENT — PAIN SCALES - GENERAL
PAINLEVEL_OUTOF10: 0 - NO PAIN
PAINLEVEL_OUTOF10: 7
PAINLEVEL_OUTOF10: 1
PAINLEVEL_OUTOF10: 3
PAIN_LEVEL: 0
PAINLEVEL_OUTOF10: 0 - NO PAIN
PAINLEVEL_OUTOF10: 2
PAINLEVEL_OUTOF10: 0 - NO PAIN
PAINLEVEL_OUTOF10: 7
PAINLEVEL_OUTOF10: 0 - NO PAIN

## 2024-07-08 ASSESSMENT — COLUMBIA-SUICIDE SEVERITY RATING SCALE - C-SSRS
2. HAVE YOU ACTUALLY HAD ANY THOUGHTS OF KILLING YOURSELF?: NO
1. IN THE PAST MONTH, HAVE YOU WISHED YOU WERE DEAD OR WISHED YOU COULD GO TO SLEEP AND NOT WAKE UP?: NO
6. HAVE YOU EVER DONE ANYTHING, STARTED TO DO ANYTHING, OR PREPARED TO DO ANYTHING TO END YOUR LIFE?: NO

## 2024-07-08 ASSESSMENT — LIFESTYLE VARIABLES
HAVE PEOPLE ANNOYED YOU BY CRITICIZING YOUR DRINKING: NO
TOTAL SCORE: 0
SUBSTANCE_ABUSE_PAST_12_MONTHS: NO
PRESCIPTION_ABUSE_PAST_12_MONTHS: NO
EVER HAD A DRINK FIRST THING IN THE MORNING TO STEADY YOUR NERVES TO GET RID OF A HANGOVER: NO
SKIP TO QUESTIONS 9-10: 1
AUDIT-C TOTAL SCORE: 0
HOW OFTEN DO YOU HAVE 6 OR MORE DRINKS ON ONE OCCASION: NEVER
EVER FELT BAD OR GUILTY ABOUT YOUR DRINKING: NO
HOW MANY STANDARD DRINKS CONTAINING ALCOHOL DO YOU HAVE ON A TYPICAL DAY: PATIENT DOES NOT DRINK
HOW OFTEN DO YOU HAVE A DRINK CONTAINING ALCOHOL: NEVER
AUDIT-C TOTAL SCORE: 0
HAVE YOU EVER FELT YOU SHOULD CUT DOWN ON YOUR DRINKING: NO

## 2024-07-08 ASSESSMENT — ACTIVITIES OF DAILY LIVING (ADL)
PATIENT'S MEMORY ADEQUATE TO SAFELY COMPLETE DAILY ACTIVITIES?: YES
GROOMING: INDEPENDENT
BATHING: INDEPENDENT
LACK_OF_TRANSPORTATION: NO
ADEQUATE_TO_COMPLETE_ADL: YES
TOILETING: INDEPENDENT
HEARING - LEFT EAR: FUNCTIONAL
JUDGMENT_ADEQUATE_SAFELY_COMPLETE_DAILY_ACTIVITIES: YES
FEEDING YOURSELF: INDEPENDENT
WALKS IN HOME: INDEPENDENT
HEARING - RIGHT EAR: FUNCTIONAL
DRESSING YOURSELF: INDEPENDENT

## 2024-07-08 ASSESSMENT — PAIN DESCRIPTION - ORIENTATION
ORIENTATION: LEFT
ORIENTATION: INNER
ORIENTATION: INNER

## 2024-07-08 ASSESSMENT — PAIN DESCRIPTION - LOCATION
LOCATION: MOUTH
LOCATION: MOUTH
LOCATION: JAW

## 2024-07-08 ASSESSMENT — PATIENT HEALTH QUESTIONNAIRE - PHQ9
2. FEELING DOWN, DEPRESSED OR HOPELESS: NOT AT ALL
1. LITTLE INTEREST OR PLEASURE IN DOING THINGS: NOT AT ALL
SUM OF ALL RESPONSES TO PHQ9 QUESTIONS 1 & 2: 0

## 2024-07-08 NOTE — SIGNIFICANT EVENT
Post-op check:      Alpesh Elena is a 44 y.o. male presented to the [facility] ED as a transfer from [facility] due to dental abscess, patient reported that the pain and swelling started on Tuesday where he went to [facility] ED and started on Amoxicillin abx , on Friday patient started having limitation on opening his mouth which progressively got worse so patient went to [place] ED on Sunday and was transferred here afterward. Patient reports his pain is 4/10 , patient also denies difficulty swallowing or breathing. Was taken to OR on 7/8/24 for I&D of a left facial abscess and placement of a penrose drain.      Physical exam:   Occlusion is stable and reproducible  LENIN ~25mm, extraction sites in LL quadrant with minimal bleeding from inside the mouth  Incision on left neck is c/d/I with penrose secured with non-resorbable suture, covered with dressing  CN V and VII intact b/l  Area overlying the drain is indurated, likely from pre-existing infection s/p surgical intervention     OMFS plan:   Feed/Diet - Soft diet, advance as tolerated   Analgesia/sedation/anxiolytic - Scheduled Acetaminophen 650mg q6h, Ibuprofen 600mg q8h. Moderate Pain: Oxycodone 5mg q4h, Severe Pain: Oxycodone 10mg q4h, Breakthrough Pain: Hydromorphone 0.5 mg IV q2h PRN.  Volume - 100ml/hr LR   [facility]. Dressing changes as needed or until soaked. May brush teeth starting first AM after surgery, Peridex 0.12% (Chlorhexidine) TID rinse and spit after meals  Respiratory - encourage ambulation, continuous pulse ox, Page if SpO2 <92%  Infectious disease - Unsayn 3gm IV q6h   Embolic prophylaxis - SCDs and Heparin 5000U SubQ     - Pt has penrose drain in L neck wound with overlying dressing  - Dressing can be changed PRN or when soaked, use 4x4 gauze and tape to secure over drain  - No drinking through a straw, no spitting   - HOB 30 degrees      Agueda Brown DDS, PGY-3

## 2024-07-08 NOTE — H&P
History Of Present Illness  Alpesh Elena is a 44 y.o. male presented to the [facility] ED as a transfer from [facility] due to dental abscess, patient reported that the pain and swelling started on Tuesday where he went to [facility] ED and started on Amoxicillin abx , on Friday patient started having limitation on opening his mouth which progressively got worse so patient went to [place] ED on Sunday and was transferred here afterward.  Patient denies sever pain his pain is 4/10 , patient also denies difficulty swallowing or breathing       Past Medical History  No past medical history on file.    Surgical History  No past surgical history on file.     Social History  He reports that he has been smoking cigarettes. He has never used smokeless tobacco. He reports current alcohol use. He reports that he does not use drugs.    Family History  No family history on file.     Allergies  Patient has no known allergies.      Review of System:  .A 12-point review of systems was performed and noted be negative except for that which was mentioned in the history of present illness       .Physical Exam  Constitutional:       Comments: Resting in bed comfortably    HENT:      Head: Normocephalic and atraumatic.      Right Ear: External ear normal.      Left Ear: External ear normal.      Nose: Nose normal.      Mouth/Throat:      Mouth: Mucous membranes are moist.      Comments: LENIN=10mm  No intra oral swelling noted    Tooth #17 tender to palpation   Occlusion stable and reproducible  Generalized caries   Poor oral hygiene  Patient tolerating his own secretion   Eyes:      Extraocular Movements: Extraocular movements intact.      Conjunctiva/sclera: Conjunctivae normal.   Neck:      Comments: Lower border of mandible palpable b/l    Cardiovascular:      Rate and Rhythm: Tachycardia present.   Pulmonary:      Effort: Pulmonary effort is normal.   Musculoskeletal:         General: Normal range of motion.      Cervical back: Neck supple.  "  Skin:     General: Skin is warm and dry.   Neurological:      Mental Status: He is alert and oriented to person, place, and time.      Comments: CN V and VII intact b/l   Psychiatric:         Mood and Affect: Mood normal.         Behavior: Behavior normal.      Last Recorded Vitals  Blood pressure (!) 136/96, pulse 92, temperature 36.7 °C (98 °F), temperature source Temporal, resp. rate 18, height 1.727 m (5' 8\"), weight 77.1 kg (170 lb), SpO2 98%.    Relevant Results  .  Results for orders placed or performed during the hospital encounter of 07/07/24 (from the past 96 hour(s))   Coagulation Screen   Result Value Ref Range    Protime 13.1 (H) 9.8 - 12.8 seconds    INR 1.2 (H) 0.9 - 1.1    aPTT 31 27 - 38 seconds        XR chest 1 view    Result Date: 7/7/2024  Interpreted By:  Scot Carson, STUDY: XR CHEST 1 VIEW;  7/7/2024 11:06 pm   INDICATION: Signs/Symptoms:pre op.   COMPARISON: None.   ACCESSION NUMBER(S): [id number]   ORDERING CLINICIAN: GAVIOTA WORTHY   FINDINGS: AP radiograph of the chest was provided.   Leads overlie the chest, partially obscuring the field-of-view.   CARDIOMEDIASTINAL SILHOUETTE: Cardiomediastinal silhouette is normal in size and configuration.   LUNGS: Lungs are hyperinflated which may be due to exuberant inspiratory effort, air trapping related to bronchiolitis, or emphysema. No focal consolidation, pleural effusion, or pneumothorax.   ABDOMEN: No remarkable upper abdominal findings.   BONES: No acute osseous changes.       1.  Lungs are hyperinflated which may be due to exuberant inspiratory effort, air trapping related to bronchiolitis, or emphysema. No focal consolidation, pleural effusion, or pneumothorax.       MACRO: None   Signed by: Scot Carson 7/7/2024 11:30 PM Dictation workstation:   [id number]    CT soft tissue neck w IV contrast    Result Date: 7/7/2024  Interpreted By:  Yassine Fraga, STUDY: CT SOFT TISSUE NECK W IV CONTRAST;  7/7/2024 6:28 pm   INDICATION: " Signs/Symptoms:Dental disease, left.   COMPARISON: None.   ACCESSION NUMBER(S): [id number]   ORDERING CLINICIAN: ALTA MUNOZ   TECHNIQUE: Contiguous axial images of the neck were obtained after the intravenous administration of iodinated contrast. Coronal and sagittal reformatted images were reconstructed from the axial data.   FINDINGS: MUCOSAL SPACES, CERVICAL SOFT TISSUES: There is an irregular thick rim enhancing fluid collection marginating the inner cortex of the left mandible at the junction of the mandibular body and ramus consistent with an abscess (3.5 cm x 2.3 cm x 1 cm). This abscess is both subperiosteal in location and involves the left medial pterygoid muscle. The left medial pterygoid muscle is inflamed and enlarged and there is surrounding inflammatory fat stranding within the left submandibular space and  space. There is inflammatory fat stranding within the subcutaneous fat overlying the left mandible, thickening of the left has been a muscle, and these changes extend to the level of the thyroid cartilage. Inflammatory fat stranding and left parapharyngeal spaces also noted.   There is mild left-sided nasopharyngeal, oral pharyngeal, and hypopharyngeal mucosal space thickening and hyperenhancement at the level of the oropharynx that is likely reactive in nature. There is mild deviation of the oropharyngeal airway to the right due to edematous mass-effect.     LARYNX/VOCAL CORDS: No significant abnormality.   PAROTID and SUBMANDIBULAR GLANDS: The left submandibular gland is asymmetrically enlarged which is likely secondary to the aforementioned. The right submandibular gland and parotid glands appear normal.   LYMPH NODES: There are enlarged reactive left level 1B and 2A lymph nodes which measure up to 1.7 cm in the level 1B and 2.1 cm in level 2A.   THYROID GLAND: Within normal limits.   PARANASAL SINUSES and MASTOIDS: No air-fluid levels or hemorrhage in the paranasal sinuses.  The  mastoid air cells are clear.   VASCULATURE: No significant abnormality.   OSSEOUS STRUCTURES: No acute osseous abnormality.   SUBGLOTTIC AIRWAY and LUNG APICES: Patent central airways. The lung apices are clear.   VISUALIZED INTRACRANIAL STRUCTURES and ORBITS: No acute abnormality.   OTHER: There are numerous periapical lucency is that are most pronounced in the mandibular molars bilaterally. The posterior-most left mandibular molar demonstrates periapical lucency in dental caries which is likely the affected to causing the above described infection.       1. Infected posterior-most left mandibular molar (periapical erosion) resulting in a 3.5 cm x 2.2 cm x 1 cm abscess along the deep aspect of the mandibular cortex which is likely both subperiosteal and has a large intramuscular component within the medial pterygoid muscle. There is diffuse inflammation involving the left submandibular space, parapharyngeal space, the  space as well as the overlying subcutaneous tissues of the left lower cheek and left neck to the thyroid cartilage.   2. Reactive enlargement of left level 1 B and 2A lymph nodes.   3. Reactive thickening of the left pharyngeal mucosal space.   MACRO: None.   Signed by: Yassine Fraga 7/7/2024 6:49 PM Dictation workstation:   [id number]       Assessment/Plan   Principal Problem:    Submandibular abscess      Plan:  NEURO/PAIN/SEDATION:   #pain management:   -multimodal pain control:  Acetaminophen 650 q6h   Ibuprofen 600 mg q6h  Oxycodone 5 mg q4h prn for sever pain  Dilaudid 0.5 mg q2h prn for breakthrough pain     RESPIRATORY:   -on RA  -continuous pulse ox    CARDIOVASC:   # mild tachycardia 105-120  -possible related to pain/ infection   Will continuo to monitor     GI:   -no intervention indicated     :   -no intervention indicated     FEN:   -LR @100 ml/hr continuous  -replete electrolyte prn  -NPO for OR on 07/08    HEMATOLOGIC:   #leukocytosis   -CBC   -BMP    ENDOCRINE:   -no  intervention indicated     MUSCULOSKELETAL/SKIN:   -no intervention indicated    INFECTIOUS DISEASE:   #submandibular space infection   -Unasyn 3 g q6h  -decadron 8 mg q8h  -I&D and extraction of the indicated teeth in the OR in the AM     GI PROPHYLAXIS:   -no indication     DVT PROPHYLAXIS:   -Lovenox 40 mg daily  -SCDs    DISPOSITION:   -NEIL Agrawal DMD  Oral and Maxillofacial Surgery resident PGY3  Pager [phone]

## 2024-07-08 NOTE — ED PROVIDER NOTES
HPI   Chief Complaint   Patient presents with    Dental Pain       44-year-old male presents as a transfer from [facility] for evaluation by OMFS.  The patient was previously seen at [facility] due to concerns for left jaw pain and swelling given a dose of amoxicillin and discharge.  Brandt presented to [facility] with trismus and left lower mandibular swelling but protecting his airway.  No fevers but did have some subjective chills.  Covered with Unasyn and given Decadron and fluids.  CT there showed left mandibular soft tissue abscess.  Patient states no other complaints.  No preceding trauma.  Does not state he is having difficulty managing his secretions.  States his voice does sound muffled.  Feels no swelling of the back of his throat.                          [place] Coma Scale Score: 15                     Patient History   No past medical history on file.  No past surgical history on file.  No family history on file.  Social History     Tobacco Use    Smoking status: Every Day     Types: Cigarettes    Smokeless tobacco: Never   Vaping Use    Vaping status: Never Used   Substance Use Topics    Alcohol use: Yes    Drug use: Never       Physical Exam   ED Triage Vitals [07/07/24 2227]   Temperature Heart Rate Respirations BP   36.7 °C (98 °F) (!) 124 18 (!) 159/92      Pulse Ox Temp Source Heart Rate Source Patient Position   99 % Temporal Monitor Sitting      BP Location FiO2 (%)     Right arm --       Physical Exam  Constitutional:       Appearance: He is not ill-appearing.   HENT:      Head: Normocephalic and atraumatic.      Right Ear: External ear normal.      Left Ear: External ear normal.      Nose: Nose normal.      Mouth/Throat:      Mouth: Mucous membranes are moist.      Pharynx: Oropharynx is clear.      Comments: 2 finger trismus, no pooling of secretions no induration of the floor of the mouth, no posterior oropharynx swelling, no tonsillar or uvular hypertrophy/deviation, swallows without difficulty,  states some dysphonia  Eyes:      Extraocular Movements: Extraocular movements intact.      Pupils: Pupils are equal, round, and reactive to light.   Cardiovascular:      Rate and Rhythm: Regular rhythm. Tachycardia present.      Pulses: Normal pulses.      Heart sounds: Normal heart sounds.   Pulmonary:      Effort: Pulmonary effort is normal. No respiratory distress.   Abdominal:      General: Abdomen is flat.      Palpations: Abdomen is soft.      Tenderness: There is no abdominal tenderness.   Musculoskeletal:      Cervical back: Neck supple. No tenderness.      Right lower leg: No edema.      Left lower leg: No edema.   Skin:     General: Skin is warm and dry.      Capillary Refill: Capillary refill takes less than 2 seconds.   Neurological:      General: No focal deficit present.      Mental Status: He is alert and oriented to person, place, and time.         ED Course & MDM   ED Course as of 07/08/24 0611 Mon Jul 08, 2024   0025 ED Attending Documentation: This patient was seen by the resident physician.  I have seen and examined the patient, agree with the workup, evaluation, management and diagnosis. I reviewed and edited the above documentation where necessary. On my evaluation of the patient: 44-year-old male with left-sided facial abscess.  Looks like it is dental in origin.  Causing some trismus.  Otherwise airway is intact, OMFS to see    Sean Orr MD  EM Attending Physician   [RG]      ED Course User Index  [RG] Sean Orr MD         Diagnoses as of 07/08/24 0611   Submandibular abscess       Medical Decision Making  44-year-old tachycardic but otherwise hemodynamically stable male presents to the emergency department for evaluation of left-sided facial abscess with possible dental origin.  Airway is intact other than some trismus.  No concern for Dusty's on my examination.  No concern for posterior oropharynx involvement.  Patient had received Unasyn and Decadron at outside hospital.   These were reordered here.  Patient was evaluated by OMFS and admitted to their service for further evaluation and management.  Patient was reevaluated with no progressive airway concerns during my shift.  The patient was handed off to the oncoming provider in hemodynamically stable condition without a change in his airway status.  The patient was seen and discussed with the attending of record.        Procedure  Procedures     Saulo Llamas MD  Resident  07/08/24 0614

## 2024-07-08 NOTE — ANESTHESIA PREPROCEDURE EVALUATION
Patient: Alpesh Elena    Procedure Information       Anesthesia Start Date/Time: 07/08/24 0742    Procedure: Incision and Drainage Oral Cavity (Left)    Location: [facility] OR  / [facility] OR    Surgeons: Harrison Stahl DMD            Relevant Problems   Anesthesia (within normal limits)      Cardiac (within normal limits)      Pulmonary (within normal limits)      Neuro (within normal limits)      GI (within normal limits)      /Renal (within normal limits)      Liver (within normal limits)      Endocrine (within normal limits)      Hematology (within normal limits)      Musculoskeletal (within normal limits)      HEENT (within normal limits)      ID   (+) Submandibular space infection      Skin (within normal limits)       Clinical information reviewed:   Tobacco  Allergies  Meds   Med Hx  Surg Hx   Fam Hx  Soc Hx        NPO Detail:  NPO/Void Status  Carbohydrate Drink Given Prior to Surgery? : N  Date of Last Liquid: 07/07/24  Time of Last Liquid: 2300  Date of Last Solid: 07/07/24  Time of Last Solid: 2300  Last Intake Type: Clear fluids  Time of Last Void: 0707         Physical Exam    Airway  Mallampati: III  TM distance: >3 FB  Neck ROM: full     Cardiovascular - normal exam     Dental   Comments: Dental abscess in L lower molar; numerous chipped teeth   Pulmonary - normal exam     Abdominal            Anesthesia Plan    History of general anesthesia?: yes  History of complications of general anesthesia?: no    ASA 2     general     The patient is a current smoker.  Patient was previously instructed to abstain from smoking on day of procedure.  Patient did not smoke on day of procedure.    intravenous induction   Postoperative administration of opioids is intended.  Trial extubation is planned.  Anesthetic plan and risks discussed with patient.  Use of blood products discussed with patient who consented to blood products.    Plan discussed with attending.

## 2024-07-08 NOTE — PROGRESS NOTES
"Emergency Medicine Transition of Care Note.    I received Alpesh Elena in signout from Shari Shearer CNP.  Please see the previous ED provider note for all HPI, PE and MDM up to the time of signout at 1900. This is in addition to the primary record.    In brief Alpesh Elena is an 44 y.o. male presenting for   Chief Complaint   Patient presents with    Dental Problem     \"Here for dental swelling with no pain.  Has been going on before Tuesday  Went to [facility] and they gave me antibiotic but not getting any better.\".\"     At the time of signout we were awaiting: callback from transfer center    ED Course as of 07/07/24 2054   Sun Jul 07, 2024   2015 HPI      Patient presented for evaluation of dental infection.  Patient states the start of an dental pain last week.  Patient was evaluated at [facility] and started on amoxicillin.  Patient had worsening pain.  Patient notes difficulty opening his mouth.         Physical Exam     Appearance: Awake and alert, not in distress.  Skin: No rash or lesions, warm and dry  Eyes: Pupils equal and reactive, EOMI  ENT: Diffuse dental caries.  Swelling to the left side of the mandible.  Trismus present.  No nuchal rigidity.  Floor the mouth is soft.  No elevation of the tongue.  Neck: Supple, no meningeal signs.  Pulmonary: CTA bilaterally with good air movement  Cardiac: Normal rate, regular rhythm.  Radial and DP Pulses equal BL  Abdomen: Soft and nontender, nondistended, no mass.  Genitourinary: No flank tenderness.  Musculoskeletal: No signs of trauma, strong pulses.  No tenderness along the venous system.  Neurological: Clear speech, NIH 0. CN II-XII grossly intact, no focal neuro deficit  Psychiatric: Appropriate mood and affect.        Medical Decision Making:     Diffuse dental caries with swelling to the left side of the mandible on exam.  CT scan showing concern for abscess into the musculature.  Patient does have trismus present.  No nuchal rigidity.  No elevation of the " tongue and the floor the mouth is soft.  Patient does not have Ludewig's angina clinically at this time.  IV antibiotics started.  Patient transferred to [facility] for on my evaluation.      I personally saw the patient and made/approved the management plan and take responsibility for the patient management.    Disclaimer: This note was dictated by speech recognition. Minor errors in transcription may be present.  Please call if questions.    [JA]      ED Course User Index  [JA] Bernardino Alcazar, DO         Diagnoses as of 07/07/24 2054   Dental abscess   Trismus       Medical Decision Making    Patient was treated with Unasyn, Decadron, Toradol, IV normal saline.  Cultures were obtained since the patient's heart rate is above 90 and has a leukocytosis of 13.  On reevaluation his trismus is persistent.  He is tolerating secretions in his airway.  There is no induration of the submandibular or neck and there is no overlying warmth or erythema so I have low suspicion for Dusty's at this time.  I discussed the case with Dr. Stahl [facility] who accepted the patient for transfer as he may likely require CDU stay and drainage of the abscess.  Discussed with Dr. Castro ED attending who accepted the patient as well.  Patient is awaiting transport.  This visit was staffed with the attending physician Dr. Alcazar.    Final diagnoses:   [K04.7] Dental abscess   [R25.2] Trismus           Procedure  Procedures    Fuentes Russo PA-C

## 2024-07-08 NOTE — PERIOPERATIVE NURSING NOTE
0845 Pt arrived to PACU, alert and able to answer questions and follow commands. Pt denies pain and discomfort at this time, no distress noted. Will continue to monitor.     1045 Pt stable, resting comfortably.     1400 Pt stable.     1630 Report called to nurse on [facility] tower 9. Pt stable.     1650 [facility] belongings.       Bri Jimenez RN

## 2024-07-08 NOTE — CARE PLAN
The patient's goals for the shift include   patient will remain HDS and have adequate pain control this admission    The clinical goals for the shift include patient will remain HDS and have adequate pain control this admission

## 2024-07-08 NOTE — BRIEF OP NOTE
Date: 2024  OR Location: [facility] OR    Name: Alpesh Elena, : 1980, Age: 44 y.o., MRN: [id number], Sex: male    Diagnosis  Pre-op Diagnosis     * Submandibular abscess [K12.2]     * Submandibular space infection [K12.2] Post-op Diagnosis     * Submandibular abscess [K12.2]     * Submandibular space infection [K12.2]     Procedures  Incision and Drainage Oral Cavity  56226 - GA XTRORAL I&D ABSC CST/HMTMA FLOOR MOUTH SUBMNDB   - Simple extraction of teeth #17 and 18    Surgeons      * Harrison Stahl - Primary    Resident/Fellow/Other Assistant:  Surgeons and Role:     * Reno May DMD, MD - Resident - Assisting    Procedure Summary  Anesthesia: General  ASA: II  Anesthesia Staff: Anesthesiologist: Josette Sparks MD  Anesthesia Resident: Yahaira Lancaster DO  Estimated Blood Loss: <5mL  Intra-op Medications:   Administrations occurring from 0715 to 0810 on 24:   Medication Name Total Dose   lidocaine-epinephrine (Xylocaine W/EPI) 1 %-1:100,000 injection 8 mL              Anesthesia Record               Intraprocedure I/O Totals          Intake    LR infusion 800.00 mL    Total Intake 800 mL          Specimen:   ID Type Source Tests Collected by Time   A : LEFT NECK ABCESS Tissue ABSCESS FUNGAL CULTURE/SMEAR, TISSUE/WOUND CULTURE/SMEAR Harrison Stahl DMD 2024 0820        Staff:   Sethulator: Ney Ortiz Person: Faustino  Circulator: Myah          Findings: Left submandibular and submental space abscess. Class I mobile and grossly carious teeth #17 and 18    Complications:  None; patient tolerated the procedure well.     Disposition: PACU - hemodynamically stable.  Condition: stable  Specimens Collected:   ID Type Source Tests Collected by Time   A : LEFT NECK ABCESS Tissue ABSCESS FUNGAL CULTURE/SMEAR, TISSUE/WOUND CULTURE/SMEAR Harrison Stahl DMD 2024 08     Attending Attestation:     Harrison Stahl  Phone Number: [phone]

## 2024-07-08 NOTE — ANESTHESIA POSTPROCEDURE EVALUATION
Patient: Alpesh Elena    Procedure Summary       Date: 07/08/24 Room / Location: [facility] OR 12 / [facility] OR    Anesthesia Start: 0742 Anesthesia Stop:     Procedure: Incision and Drainage Oral Cavity (Left) Diagnosis:       Submandibular abscess      Submandibular space infection      (Submandibular abscess [K12.2])      (Submandibular space infection [K12.2])    Surgeons: Harrison Stahl DMD Responsible Provider: Josette Sparks MD    Anesthesia Type: general ASA Status: 2            Anesthesia Type: general    Vitals Value Taken Time   /90 07/08/24 0847   Temp 36 07/08/24 0853   Pulse 108 07/08/24 0848   Resp 4 07/08/24 0848   SpO2 99 % 07/08/24 0848   Vitals shown include unfiled device data.    Anesthesia Post Evaluation    Patient location during evaluation: PACU  Patient participation: complete - patient participated  Level of consciousness: awake and alert  Pain score: 0  Pain management: adequate  Multimodal analgesia pain management approach  Airway patency: patent  Two or more strategies used to mitigate risk of obstructive sleep apnea  Cardiovascular status: acceptable  Respiratory status: acceptable  Hydration status: acceptable  Postoperative Nausea and Vomiting: none        No notable events documented.

## 2024-07-08 NOTE — CONSULTS
Reason For Consult  Dental abscess     History Of Present Illness  Alpesh Elena is a 44 y.o. male presented to the [facility] ED as a transfer from [facility] due to dental abscess, patient reported that the pain and swelling started on Tuesday where he went to [facility] ED and started on Amoxicillin abx , on Friday patient started having limitation on opening his mouth which progressively got worse so patient went to [facility] ED on Sunday and was transferred here afterward.  Patient denies sever pain his pain is 4/10 , patient also denies difficulty swallowing or breathing    Past Medical History  He has no past medical history on file.    Surgical History  He has no past surgical history on file.     Social History  He reports that he has been smoking cigarettes. He has never used smokeless tobacco. He reports current alcohol use. He reports that he does not use drugs.    Family History  No family history on file.     Allergies  Patient has no known allergies.         Physical Exam  .Physical Exam  Constitutional:       Comments: Resting in bed comfortably    HENT:      Head: Normocephalic and atraumatic.      Right Ear: External ear normal.      Left Ear: External ear normal.      Nose: Nose normal.      Mouth/Throat:      Mouth: Mucous membranes are moist.      Comments: LENIN=10mm  No intra oral swelling noted    Tooth #17 tender to palpation   Occlusion stable and reproducible  Generalized caries   Poor oral hygiene  Patient tolerating his own secretion       Eyes:      Extraocular Movements: Extraocular movements intact.      Conjunctiva/sclera: Conjunctivae normal.   Neck:      Comments: Lower border of mandible palpable b/l    Cardiovascular:      Rate and Rhythm: Tachycardia present.   Pulmonary:      Effort: Pulmonary effort is normal.   Musculoskeletal:         General: Normal range of motion.      Cervical back: Neck supple.   Skin:     General: Skin is warm and dry.   Neurological:      Mental Status: He is  "alert and oriented to person, place, and time.      Comments: CN V and VII intact b/l   Psychiatric:         Mood and Affect: Mood normal.         Behavior: Behavior normal.           Last Recorded Vitals  Blood pressure (!) 144/98, pulse (!) 109, temperature 36.7 °C (98 °F), temperature source Temporal, resp. rate 16, height 1.727 m (5' 8\"), weight 77.1 kg (170 lb), SpO2 96%.    Relevant Results  CT neck with contrast shows fluid collection along the deep aspect of the left mandible and mild deviation of the airway to the right     Assessment/Plan     44 yom presented to the [facility] for dental infection patient have left submandibular space infection.    Plan:  -Admit to OMFS service  -NPO for I&D and extraction of indicated teeth in the OR in the AM          Jon Agrawal DMD  Oral and Maxillofacial Surgery resident PGY3      "

## 2024-07-08 NOTE — ANESTHESIA PROCEDURE NOTES
Airway  Date/Time: 7/8/2024 7:57 AM  Urgency: elective    Airway not difficult    Staffing  Performed: resident   Authorized by: Josette Sparks MD    Performed by: Yahaira Lancaster DO  Patient location during procedure: OR    Indications and Patient Condition  Indications for airway management: anesthesia and airway protection  Spontaneous Ventilation: absent  Sedation level: deep  Preoxygenated: yes  Patient position: sniffing  Mask difficulty assessment: 2 - vent by mask + OA or adjuvant +/- NMBA    Final Airway Details  Final airway type: endotracheal airway      Successful airway: ETT  Cuffed: yes   Successful intubation technique: video laryngoscopy  Facilitating devices/methods: intubating stylet  Endotracheal tube insertion site: oral  Blade: Mann  Blade size: #4  ETT size (mm): 7.5  Cormack-Lehane Classification: grade IIb - view of arytenoids or posterior of glottis only  Placement verified by: chest auscultation, bronchoscopy and capnometry   Measured from: teeth  ETT to teeth (cm): 21  Number of attempts at approach: 1  Number of other approaches attempted: 0

## 2024-07-08 NOTE — ED TRIAGE NOTES
Left lower tooth abscess for 1 week. Pt went to [facility] and was given antibiotics. The antibiotics are not helping

## 2024-07-09 LAB
ERYTHROCYTE [DISTWIDTH] IN BLOOD BY AUTOMATED COUNT: 11.9 % (ref 11.5–14.5)
HCT VFR BLD AUTO: 37.9 % (ref 41–52)
HGB BLD-MCNC: 12.8 G/DL (ref 13.5–17.5)
MCH RBC QN AUTO: 30.4 PG (ref 26–34)
MCHC RBC AUTO-ENTMCNC: 33.8 G/DL (ref 32–36)
MCV RBC AUTO: 90 FL (ref 80–100)
NRBC BLD-RTO: 0 /100 WBCS (ref 0–0)
PLATELET # BLD AUTO: 334 X10*3/UL (ref 150–450)
RBC # BLD AUTO: 4.21 X10*6/UL (ref 4.5–5.9)
WBC # BLD AUTO: 12.6 X10*3/UL (ref 4.4–11.3)

## 2024-07-09 PROCEDURE — 36415 COLL VENOUS BLD VENIPUNCTURE: CPT

## 2024-07-09 PROCEDURE — 85027 COMPLETE CBC AUTOMATED: CPT

## 2024-07-09 PROCEDURE — 2500000001 HC RX 250 WO HCPCS SELF ADMINISTERED DRUGS (ALT 637 FOR MEDICARE OP): Performed by: DENTIST

## 2024-07-09 PROCEDURE — 1100000001 HC PRIVATE ROOM DAILY

## 2024-07-09 PROCEDURE — 2500000001 HC RX 250 WO HCPCS SELF ADMINISTERED DRUGS (ALT 637 FOR MEDICARE OP)

## 2024-07-09 PROCEDURE — 2500000004 HC RX 250 GENERAL PHARMACY W/ HCPCS (ALT 636 FOR OP/ED): Performed by: DENTIST

## 2024-07-09 RX ORDER — IBUPROFEN 600 MG/1
600 TABLET ORAL EVERY 6 HOURS PRN
Qty: 28 TABLET | Refills: 0 | Status: SHIPPED | OUTPATIENT
Start: 2024-07-09 | End: 2024-07-16

## 2024-07-09 RX ORDER — CHLORHEXIDINE GLUCONATE ORAL RINSE 1.2 MG/ML
15 SOLUTION DENTAL AS NEEDED
Qty: 120 ML | Refills: 0 | Status: SHIPPED | OUTPATIENT
Start: 2024-07-09

## 2024-07-09 RX ORDER — ACETAMINOPHEN 500 MG
500 TABLET ORAL EVERY 6 HOURS PRN
Qty: 28 TABLET | Refills: 0 | Status: SHIPPED | OUTPATIENT
Start: 2024-07-09 | End: 2024-07-16

## 2024-07-09 RX ORDER — AMOXICILLIN AND CLAVULANATE POTASSIUM 875; 125 MG/1; MG/1
875 TABLET, FILM COATED ORAL 2 TIMES DAILY
Qty: 14 TABLET | Refills: 0 | Status: SHIPPED | OUTPATIENT
Start: 2024-07-09 | End: 2024-07-16

## 2024-07-09 ASSESSMENT — COGNITIVE AND FUNCTIONAL STATUS - GENERAL
MOBILITY SCORE: 24
DAILY ACTIVITIY SCORE: 24

## 2024-07-09 ASSESSMENT — PAIN SCALES - PAIN ASSESSMENT IN ADVANCED DEMENTIA (PAINAD): TOTALSCORE: MEDICATION (SEE MAR)

## 2024-07-09 ASSESSMENT — PAIN SCALES - GENERAL
PAINLEVEL_OUTOF10: 0 - NO PAIN
PAINLEVEL_OUTOF10: 3

## 2024-07-09 NOTE — CARE PLAN
The patient's goals for the shift include      The clinical goals for the shift include Patient will remain stable and pain control.    Over the shift, the patient did  make progress toward the following goals.   Problem: Pain  Goal: Takes deep breaths with improved pain control throughout the shift  Outcome: Progressing  Goal: Turns in bed with improved pain control throughout the shift  Outcome: Progressing  Goal: Walks with improved pain control throughout the shift  Outcome: Progressing  Goal: Performs ADL's with improved pain control throughout shift  Outcome: Progressing  Goal: Participates in PT with improved pain control throughout the shift  Outcome: Progressing  Goal: Free from opioid side effects throughout the shift  Outcome: Progressing  Goal: Free from acute confusion related to pain meds throughout the shift  Outcome: Progressing

## 2024-07-09 NOTE — OP NOTE
Incision and Drainage Oral Cavity (L) Operative Note     Date: 2024  OR Location: [facility] OR    Name: Alpesh Elena, : 1980, Age: 44 y.o., MRN: [id number], Sex: male    Diagnosis  Pre-op Diagnosis     * Submandibular abscess [K12.2]     * Submandibular space infection [K12.2] Post-op Diagnosis     * Submandibular abscess [K12.2]     * Submandibular space infection [K12.2]     Procedures  Incision and Drainage Oral Cavity  64605 - IA XTRORAL I&D ABSC CST/HMTMA FLOOR MOUTH SUBMNDB   - Extraction of teeth #17 and 18    Surgeons      * Harrison Stahl - Primary    Resident/Fellow/Other Assistant:  Surgeons and Role:     * Reno May DMD, MD - Resident - Assisting    Procedure Summary  Anesthesia: General  ASA: II  Anesthesia Staff: Anesthesiologist: Josette Sparks MD  Anesthesia Resident: Yahaira Lancaster DO  Estimated Blood Loss: 5mL  Intra-op Medications:   Administrations occurring from 0715 to 0810 on 24:   Medication Name Total Dose   lidocaine-epinephrine (Xylocaine W/EPI) 1 %-1:100,000 injection 8 mL              Anesthesia Record               Intraprocedure I/O Totals          Intake    LR infusion 200.00 mL    Total Intake 200 mL          Specimen:   ID Type Source Tests Collected by Time   A : LEFT NECK ABCESS Tissue ABSCESS FUNGAL CULTURE/SMEAR, TISSUE/WOUND CULTURE/SMEAR Harrison Stahl DMD 2024 0820        Staff:   Circulator: Ney Ortiz Person: Faustino  Circulator: Myah         Drains and/or Catheters: Left neck penrose drain, expected removal in ~24 hours    Findings: Left submandibular space abscess, 5-10cc of purulence drained.    Indications: Alpesh Elena is an 44 y.o. male who is having surgery for incision and drainage of a left submandibular space abscess and extraction of offending teeth. He began having pain and limited mouth opening and pain 4 days ago which slowly progressed. Eventually his symptoms prompted him to present to an outside  "ED where a CT scan was obtained and he was started on antibiotics. Eventually transferred to [facility] for management of submandibular space infection.    The patient was seen in the preoperative area. The risks, benefits, complications, treatment options, non-operative alternatives, expected recovery and outcomes were discussed with the patient. The possibilities of reaction to medication, pulmonary aspiration, injury to surrounding structures, bleeding, recurrent infection, the need for additional procedures, failure to diagnose a condition, and creating a complication requiring transfusion or operation were discussed with the patient. The patient concurred with the proposed plan, giving informed consent.  The site of surgery was properly noted/marked if necessary per policy. The patient has been actively warmed in preoperative area. Preoperative antibiotics have been ordered and given within 1 hours of incision. Venous thrombosis prophylaxis are not indicated.    Procedure Details:   The patient was greeted in the pre-op holding area, where all preoperative risks and complications were reviewed. Later, the patient was brought to the operating room by the anesthesia staff and was placed in the supine position. A \"huddle\" was performed to confirmed patient's identity and the procedure to be performed. The patient was then induced and intubated. An oral endotracheal tube was placed and secured by the surgical team. The patient was prepped and draped in standard oral maxillofacial surgery fashion.    A throat pack was placed deep within the oropharynx. Local anesthesia was administered via inferior alveolar nerve and long buccal nerve blocks, as well as in a subcutaneous plane in the left neck. Teeth #17 and 18 were both found to be class I mobile. A #9 periosteal elevator was used to create a full thickness flap adjacent to #17 and 18, and they were subsequently extracted using straight elevators and forceps. Purulence " "was encountered and a culture swab was used to obtain a sample. The extraction sockets were curretted to remove any granulation tissue and the gingiva was loosely closed with a 3-0 chromic gut suture.    Attention then turned to the left neck. A ~3cm incision was marked 2cm inferior to the inferior border of the mandible. A 15 blade was used to make an incision through skin and subcutaneous tissue. Monopolar electrocautery used to control subcutaneous bleeding vessels. The subcutaneous fat was then bluntly dissected using a 4x4 gauze and a gloved finger to expose the platysma muscle. The platysma was undermined with a mosquito and sharply divided. A Mohound hemostat was then used to bluntly dissect to the inferior border of the mandible and then superiorly along the lingual surface of the mandible. The pocket of purulence was encountered and copious purulence expressed. The tract was widened and blunt dissection continued with finger dissection sweeping anteriorly and posteriorly along the lingual surface of the mandible to break up further loculations. The wound was copiously irrigated with Irrisept and a 1/2\" penrose drain was placed deep into the wound and secured with a 2-0 silk suture in horizontal mattress fashion. Hemostasis was verified.     The oral cavity was suctioned and the throat pack was removed. Care of the patient was transferred back to the anesthesia team who awakened and extubated the patient without complication. The patient was taken to PACU in stable condition.       Dr. Stahl was present for all critical portions of the case.     Complications:  None; patient tolerated the procedure well.    Disposition: PACU - hemodynamically stable.  Condition: stable     Additional Details: N/A    Attending Attestation:     Harrison Stahl  Phone Number: [phone]      "

## 2024-07-09 NOTE — PROGRESS NOTES
"Assessment/Plan   Alpesh Elena is a 44 y.o. male POD1 for I&D of submandibular access drainage. With extraction of lower left molars and placement of a penrose drain     Plan:   #Submandibular Abscess  - Patient is tolerating diet well without issues  - Drain is clean and intact, still minimally draining  - Leukocytosis at 12.6 from 11.3 previous day  > Monitor overnight and plan for discharge tomorrow  > Continue Unasyn for abscess while inpatient  > Continue Lovenox, Peridex    Subjective   Patient did well overnight. Pain was well controlled with ibuprofen. Able to ambulate and tolerate a diet. Drain is not painful.     Temp:  [36.1 °C (97 °F)-37 °C (98.6 °F)] 36.2 °C (97.2 °F)  Heart Rate:  [68-97] 69  Resp:  [15-18] 18  BP: (118-167)/(79-99) 123/79  I/O last 3 completed shifts:  In: 3391.7 (44.1 mL/kg) [I.V.:3191.7 (41.5 mL/kg); IV Piggyback:200]  Out: 1255 (16.3 mL/kg) [Urine:1250 (0.5 mL/kg/hr); Blood:5]  Weight: 76.8 kg   I/O this shift:  In: 458.3 [I.V.:458.3]  Out: -     Physical Exam:  LENIN ~25mm  Occlusion is stable and reproducible   Induration along left neck at the site of the penrose, likely post-op cellulitis   Minimal to no drainage on palpation of the drain site   Intraoral extraction sites are hemostatic   No strikethrough on dressing     .phys    Objective   Objective:  Vital signs (most recent): Blood pressure 123/79, pulse 69, temperature 36.2 °C (97.2 °F), temperature source Temporal, resp. rate 18, height 1.727 m (5' 8\"), weight 76.8 kg (169 lb 6.4 oz), SpO2 93%.    Results for orders placed or performed during the hospital encounter of 07/07/24 (from the past 24 hour(s))   CBC   Result Value Ref Range    WBC 12.6 (H) 4.4 - 11.3 x10*3/uL    nRBC 0.0 0.0 - 0.0 /100 WBCs    RBC 4.21 (L) 4.50 - 5.90 x10*6/uL    Hemoglobin 12.8 (L) 13.5 - 17.5 g/dL    Hematocrit 37.9 (L) 41.0 - 52.0 %    MCV 90 80 - 100 fL    MCH 30.4 26.0 - 34.0 pg    MCHC 33.8 32.0 - 36.0 g/dL    RDW 11.9 11.5 - 14.5 %    " Platelets 334 150 - 450 x10*3/uL       Principal Problem:    Submandibular abscess  Active Problems:    Submandibular space infection    Sahil Box MD MSCI - PGY1  Oral & Maxillofacial Surgery   OMFS pager: [phone]

## 2024-07-09 NOTE — DISCHARGE INSTRUCTIONS
BLEEDING  ° Apply gauze dressing over the wound and secure with tape   ° It is normal to experience light bleeding or oozing for up to 72 hours. If there is severe bleeding follow instructions at the bottom of the form under ``Excessive Bleeding´´  ° Your drain will stay in for another 24-48 hours and be removed at your next follow up with [facility]     HYGIENE  ° Do not brush your teeth, rinse your mouth or spit for the first 24 hours  ° May brush your teeth as normal the next day  ° If prescribed Chlorhexidine rinse, gently rinse and spit the medication three times per day. Do not swallow the medication.    DIET  ° Do not drink through a straw.  ° Start first with clear liquids, and then gradually advance to soup, and soft foods.  ° Avoiding eating foods that are spicy, hot, tough, or chrunchy until the surgical sites have healed.    MEDICATIONS  ° Pain medication should be taken only during the time that you feel significant discomfort. If the pain is severe, the prescription medication can be used. However, if only mild discomfort is experienced, try to use a less potent, over the counter medication such as Advil (ibuprofen and/or Tylenol (Acetaminophen). These can be taken in crushed tablets or in liquid form.  ° Antibiotics: If prescribed please take antibiotics at the appropriate interval as described on the bottle. Be sure not to miss any doses and take the medicine until it is gone.    FIRST DAY AFTER SURGERY  ° Hygiene: Return to your normal brushing routine, being very careful around surgery site(s).  ° Avoid using full-strength mouthwashes for 2 weeks.  ° Begin using a chlorhexidine or warm salt-water rinse (1/4 teaspoon salt in a glass of warm water) after meals for the first week.  ° Pain and swelling is normal and expected, and may last for 10-14 days. Don´t be alarmed if the third day is the worst.  ° Continue eating soft foods. You may begin to gradually return to your normal diet as tolerated. Avoid  spicy foods and drinks for 2 weeks.  ° NO HEAVY LIFTING for at least 1 week after surgery    PLEASE REPORT ANY OF THE FOLLOWING TO OUR TEAM:  ° Sudden or excessive bleeding, swelling, or bruising.  ° Any itching, rash, or adverse reaction to medication.  ° Fever over 100 degrees Fahrenheit.  ° Drainage or discharge from the incision sites (other than blood).  ° Any injury to the face over the next 6 weeks.    If you have any questions or concerns please contact us during regular office hours ([phone]). For any emergency or after hours questions do not hesitate to contact the resident on call. You may call [phone] for the hospital  and ask for the ``Oral Surgeon On Call´´.

## 2024-07-10 ENCOUNTER — PHARMACY VISIT (OUTPATIENT)
Dept: PHARMACY | Facility: CLINIC | Age: 44
End: 2024-07-10
Payer: COMMERCIAL

## 2024-07-10 VITALS
WEIGHT: 169.4 LBS | OXYGEN SATURATION: 95 % | BODY MASS INDEX: 25.67 KG/M2 | RESPIRATION RATE: 18 BRPM | HEART RATE: 72 BPM | SYSTOLIC BLOOD PRESSURE: 158 MMHG | HEIGHT: 68 IN | DIASTOLIC BLOOD PRESSURE: 91 MMHG | TEMPERATURE: 97.5 F

## 2024-07-10 LAB
ERYTHROCYTE [DISTWIDTH] IN BLOOD BY AUTOMATED COUNT: 11.8 % (ref 11.5–14.5)
FUNGUS SPEC CULT: ABNORMAL
FUNGUS SPEC FUNGUS STN: ABNORMAL
HCT VFR BLD AUTO: 35.3 % (ref 41–52)
HGB BLD-MCNC: 12 G/DL (ref 13.5–17.5)
MCH RBC QN AUTO: 29.9 PG (ref 26–34)
MCHC RBC AUTO-ENTMCNC: 34 G/DL (ref 32–36)
MCV RBC AUTO: 88 FL (ref 80–100)
NRBC BLD-RTO: 0 /100 WBCS (ref 0–0)
PLATELET # BLD AUTO: 339 X10*3/UL (ref 150–450)
RBC # BLD AUTO: 4.02 X10*6/UL (ref 4.5–5.9)
WBC # BLD AUTO: 10.2 X10*3/UL (ref 4.4–11.3)

## 2024-07-10 PROCEDURE — 2500000001 HC RX 250 WO HCPCS SELF ADMINISTERED DRUGS (ALT 637 FOR MEDICARE OP): Performed by: DENTIST

## 2024-07-10 PROCEDURE — RXMED WILLOW AMBULATORY MEDICATION CHARGE

## 2024-07-10 PROCEDURE — 2500000004 HC RX 250 GENERAL PHARMACY W/ HCPCS (ALT 636 FOR OP/ED): Performed by: DENTIST

## 2024-07-10 PROCEDURE — 2500000001 HC RX 250 WO HCPCS SELF ADMINISTERED DRUGS (ALT 637 FOR MEDICARE OP)

## 2024-07-10 PROCEDURE — 36415 COLL VENOUS BLD VENIPUNCTURE: CPT | Performed by: STUDENT IN AN ORGANIZED HEALTH CARE EDUCATION/TRAINING PROGRAM

## 2024-07-10 PROCEDURE — 85027 COMPLETE CBC AUTOMATED: CPT | Performed by: STUDENT IN AN ORGANIZED HEALTH CARE EDUCATION/TRAINING PROGRAM

## 2024-07-10 RX ORDER — OXYCODONE HYDROCHLORIDE 5 MG/1
5 TABLET ORAL EVERY 6 HOURS PRN
Qty: 15 TABLET | Refills: 0 | Status: SHIPPED | OUTPATIENT
Start: 2024-07-10

## 2024-07-10 RX ORDER — BACITRACIN ZINC 500 UNIT/G
OINTMENT (GRAM) TOPICAL 2 TIMES DAILY
Qty: 28.35 G | Refills: 0 | Status: SHIPPED | OUTPATIENT
Start: 2024-07-10

## 2024-07-11 LAB
B-LACTAMASE ORGANISM ISLT: POSITIVE
BACTERIA SPEC CULT: ABNORMAL
BACTERIA SPEC CULT: ABNORMAL
GRAM STN SPEC: ABNORMAL
GRAM STN SPEC: ABNORMAL
LABORATORY COMMENT REPORT: ABNORMAL

## 2024-07-12 LAB
BACTERIA BLD CULT: NORMAL
BACTERIA BLD CULT: NORMAL

## 2025-01-01 ENCOUNTER — APPOINTMENT (OUTPATIENT)
Dept: RADIOLOGY | Facility: HOSPITAL | Age: 45
End: 2025-01-01
Payer: COMMERCIAL

## 2025-01-01 ENCOUNTER — APPOINTMENT (OUTPATIENT)
Dept: CARDIOLOGY | Facility: HOSPITAL | Age: 45
End: 2025-01-01
Payer: COMMERCIAL

## 2025-01-01 ENCOUNTER — APPOINTMENT (OUTPATIENT)
Dept: GASTROENTEROLOGY | Facility: HOSPITAL | Age: 45
End: 2025-01-01
Payer: COMMERCIAL

## 2025-01-01 PROCEDURE — 74018 RADEX ABDOMEN 1 VIEW: CPT

## 2025-01-01 PROCEDURE — 43235 EGD DIAGNOSTIC BRUSH WASH: CPT | Performed by: INTERNAL MEDICINE

## 2025-01-01 PROCEDURE — 85347 COAGULATION TIME ACTIVATED: CPT

## 2025-01-01 PROCEDURE — 71045 X-RAY EXAM CHEST 1 VIEW: CPT

## 2025-01-01 PROCEDURE — 93005 ELECTROCARDIOGRAM TRACING: CPT

## 2025-01-01 PROCEDURE — 93321 DOPPLER ECHO F-UP/LMTD STD: CPT | Performed by: INTERNAL MEDICINE

## 2025-01-01 PROCEDURE — 93308 TTE F-UP OR LMTD: CPT | Performed by: INTERNAL MEDICINE

## 2025-01-01 PROCEDURE — 93325 DOPPLER ECHO COLOR FLOW MAPG: CPT | Performed by: INTERNAL MEDICINE

## 2025-01-01 PROCEDURE — 76705 ECHO EXAM OF ABDOMEN: CPT

## 2025-01-01 PROCEDURE — 93325 DOPPLER ECHO COLOR FLOW MAPG: CPT

## 2025-02-18 ENCOUNTER — APPOINTMENT (OUTPATIENT)
Dept: RADIOLOGY | Facility: HOSPITAL | Age: 45
End: 2025-02-18
Payer: COMMERCIAL

## 2025-02-18 ENCOUNTER — HOSPITAL ENCOUNTER (EMERGENCY)
Facility: HOSPITAL | Age: 45
Discharge: HOME | End: 2025-02-18
Payer: COMMERCIAL

## 2025-02-18 VITALS
TEMPERATURE: 98.8 F | SYSTOLIC BLOOD PRESSURE: 132 MMHG | OXYGEN SATURATION: 96 % | WEIGHT: 180 LBS | BODY MASS INDEX: 27.28 KG/M2 | RESPIRATION RATE: 18 BRPM | HEART RATE: 84 BPM | HEIGHT: 68 IN | DIASTOLIC BLOOD PRESSURE: 89 MMHG

## 2025-02-18 DIAGNOSIS — N23 RENAL COLIC ON RIGHT SIDE: Primary | ICD-10-CM

## 2025-02-18 DIAGNOSIS — N20.0 RIGHT KIDNEY STONE: ICD-10-CM

## 2025-02-18 LAB
ALBUMIN SERPL BCP-MCNC: 4.6 G/DL (ref 3.4–5)
ALP SERPL-CCNC: 115 U/L (ref 33–120)
ALT SERPL W P-5'-P-CCNC: 20 U/L (ref 10–52)
ANION GAP SERPL CALC-SCNC: 15 MMOL/L (ref 10–20)
APPEARANCE UR: ABNORMAL
AST SERPL W P-5'-P-CCNC: 15 U/L (ref 9–39)
BACTERIA #/AREA URNS AUTO: ABNORMAL /HPF
BASOPHILS # BLD AUTO: 0.04 X10*3/UL (ref 0–0.1)
BASOPHILS NFR BLD AUTO: 0.3 %
BILIRUB SERPL-MCNC: 0.4 MG/DL (ref 0–1.2)
BILIRUB UR STRIP.AUTO-MCNC: NEGATIVE MG/DL
BUN SERPL-MCNC: 11 MG/DL (ref 6–23)
CALCIUM SERPL-MCNC: 9.6 MG/DL (ref 8.6–10.3)
CAOX CRY #/AREA UR COMP ASSIST: ABNORMAL /HPF
CHLORIDE SERPL-SCNC: 104 MMOL/L (ref 98–107)
CO2 SERPL-SCNC: 20 MMOL/L (ref 21–32)
COLOR UR: YELLOW
CREAT SERPL-MCNC: 0.94 MG/DL (ref 0.5–1.3)
EGFRCR SERPLBLD CKD-EPI 2021: >90 ML/MIN/1.73M*2
EOSINOPHIL # BLD AUTO: 0.19 X10*3/UL (ref 0–0.7)
EOSINOPHIL NFR BLD AUTO: 1.6 %
ERYTHROCYTE [DISTWIDTH] IN BLOOD BY AUTOMATED COUNT: 12.9 % (ref 11.5–14.5)
GLUCOSE SERPL-MCNC: 185 MG/DL (ref 74–99)
GLUCOSE UR STRIP.AUTO-MCNC: ABNORMAL MG/DL
HCT VFR BLD AUTO: 43.7 % (ref 41–52)
HGB BLD-MCNC: 15 G/DL (ref 13.5–17.5)
HOLD SPECIMEN: NORMAL
HYALINE CASTS #/AREA URNS AUTO: ABNORMAL /LPF
IMM GRANULOCYTES # BLD AUTO: 0.04 X10*3/UL (ref 0–0.7)
IMM GRANULOCYTES NFR BLD AUTO: 0.3 % (ref 0–0.9)
KETONES UR STRIP.AUTO-MCNC: ABNORMAL MG/DL
LEUKOCYTE ESTERASE UR QL STRIP.AUTO: NEGATIVE
LYMPHOCYTES # BLD AUTO: 4.65 X10*3/UL (ref 1.2–4.8)
LYMPHOCYTES NFR BLD AUTO: 39 %
MCH RBC QN AUTO: 30.4 PG (ref 26–34)
MCHC RBC AUTO-ENTMCNC: 34.3 G/DL (ref 32–36)
MCV RBC AUTO: 89 FL (ref 80–100)
MONOCYTES # BLD AUTO: 0.78 X10*3/UL (ref 0.1–1)
MONOCYTES NFR BLD AUTO: 6.5 %
MUCOUS THREADS #/AREA URNS AUTO: ABNORMAL /LPF
NEUTROPHILS # BLD AUTO: 6.22 X10*3/UL (ref 1.2–7.7)
NEUTROPHILS NFR BLD AUTO: 52.3 %
NITRITE UR QL STRIP.AUTO: NEGATIVE
NRBC BLD-RTO: 0 /100 WBCS (ref 0–0)
PH UR STRIP.AUTO: 5.5 [PH]
PLATELET # BLD AUTO: 384 X10*3/UL (ref 150–450)
POTASSIUM SERPL-SCNC: 3.4 MMOL/L (ref 3.5–5.3)
PROT SERPL-MCNC: 8.1 G/DL (ref 6.4–8.2)
PROT UR STRIP.AUTO-MCNC: ABNORMAL MG/DL
RBC # BLD AUTO: 4.94 X10*6/UL (ref 4.5–5.9)
RBC # UR STRIP.AUTO: ABNORMAL MG/DL
RBC #/AREA URNS AUTO: ABNORMAL /HPF
SODIUM SERPL-SCNC: 136 MMOL/L (ref 136–145)
SP GR UR STRIP.AUTO: 1.03
SQUAMOUS #/AREA URNS AUTO: ABNORMAL /HPF
UROBILINOGEN UR STRIP.AUTO-MCNC: ABNORMAL MG/DL
WBC # BLD AUTO: 11.9 X10*3/UL (ref 4.4–11.3)
WBC #/AREA URNS AUTO: ABNORMAL /HPF

## 2025-02-18 PROCEDURE — 96375 TX/PRO/DX INJ NEW DRUG ADDON: CPT

## 2025-02-18 PROCEDURE — 99284 EMERGENCY DEPT VISIT MOD MDM: CPT | Mod: 25

## 2025-02-18 PROCEDURE — 80053 COMPREHEN METABOLIC PANEL: CPT | Performed by: PHYSICIAN ASSISTANT

## 2025-02-18 PROCEDURE — 96361 HYDRATE IV INFUSION ADD-ON: CPT

## 2025-02-18 PROCEDURE — 74176 CT ABD & PELVIS W/O CONTRAST: CPT

## 2025-02-18 PROCEDURE — 85025 COMPLETE CBC W/AUTO DIFF WBC: CPT | Performed by: PHYSICIAN ASSISTANT

## 2025-02-18 PROCEDURE — 81001 URINALYSIS AUTO W/SCOPE: CPT | Performed by: PHYSICIAN ASSISTANT

## 2025-02-18 PROCEDURE — 2500000002 HC RX 250 W HCPCS SELF ADMINISTERED DRUGS (ALT 637 FOR MEDICARE OP, ALT 636 FOR OP/ED): Performed by: PHYSICIAN ASSISTANT

## 2025-02-18 PROCEDURE — 2500000004 HC RX 250 GENERAL PHARMACY W/ HCPCS (ALT 636 FOR OP/ED): Performed by: PHYSICIAN ASSISTANT

## 2025-02-18 PROCEDURE — 96374 THER/PROPH/DIAG INJ IV PUSH: CPT

## 2025-02-18 PROCEDURE — 36415 COLL VENOUS BLD VENIPUNCTURE: CPT | Performed by: PHYSICIAN ASSISTANT

## 2025-02-18 PROCEDURE — 74176 CT ABD & PELVIS W/O CONTRAST: CPT | Performed by: SURGERY

## 2025-02-18 RX ORDER — ONDANSETRON 4 MG/1
4 TABLET, ORALLY DISINTEGRATING ORAL EVERY 8 HOURS PRN
Qty: 20 TABLET | Refills: 0 | Status: SHIPPED | OUTPATIENT
Start: 2025-02-18 | End: 2025-02-25

## 2025-02-18 RX ORDER — OXYCODONE AND ACETAMINOPHEN 5; 325 MG/1; MG/1
1 TABLET ORAL EVERY 6 HOURS PRN
Qty: 9 TABLET | Refills: 0 | Status: SHIPPED | OUTPATIENT
Start: 2025-02-18 | End: 2025-02-21

## 2025-02-18 RX ORDER — KETOROLAC TROMETHAMINE 10 MG/1
10 TABLET, FILM COATED ORAL EVERY 6 HOURS PRN
Qty: 20 TABLET | Refills: 0 | Status: SHIPPED | OUTPATIENT
Start: 2025-02-18 | End: 2025-02-23

## 2025-02-18 RX ORDER — ONDANSETRON HYDROCHLORIDE 2 MG/ML
4 INJECTION, SOLUTION INTRAVENOUS ONCE
Status: COMPLETED | OUTPATIENT
Start: 2025-02-18 | End: 2025-02-18

## 2025-02-18 RX ORDER — KETOROLAC TROMETHAMINE 30 MG/ML
15 INJECTION, SOLUTION INTRAMUSCULAR; INTRAVENOUS ONCE
Status: COMPLETED | OUTPATIENT
Start: 2025-02-18 | End: 2025-02-18

## 2025-02-18 RX ORDER — TAMSULOSIN HYDROCHLORIDE 0.4 MG/1
0.4 CAPSULE ORAL ONCE
Status: COMPLETED | OUTPATIENT
Start: 2025-02-18 | End: 2025-02-18

## 2025-02-18 RX ORDER — TAMSULOSIN HYDROCHLORIDE 0.4 MG/1
0.4 CAPSULE ORAL DAILY
Qty: 7 CAPSULE | Refills: 0 | Status: SHIPPED | OUTPATIENT
Start: 2025-02-18 | End: 2025-02-25

## 2025-02-18 RX ORDER — HYDROMORPHONE HYDROCHLORIDE 1 MG/ML
1 INJECTION, SOLUTION INTRAMUSCULAR; INTRAVENOUS; SUBCUTANEOUS ONCE
Status: COMPLETED | OUTPATIENT
Start: 2025-02-18 | End: 2025-02-18

## 2025-02-18 RX ADMIN — KETOROLAC TROMETHAMINE 15 MG: 30 INJECTION, SOLUTION INTRAMUSCULAR at 03:54

## 2025-02-18 RX ADMIN — SODIUM CHLORIDE, POTASSIUM CHLORIDE, SODIUM LACTATE AND CALCIUM CHLORIDE 1000 ML: 600; 310; 30; 20 INJECTION, SOLUTION INTRAVENOUS at 03:55

## 2025-02-18 RX ADMIN — ONDANSETRON 4 MG: 2 INJECTION INTRAMUSCULAR; INTRAVENOUS at 03:54

## 2025-02-18 RX ADMIN — HYDROMORPHONE HYDROCHLORIDE 1 MG: 1 INJECTION, SOLUTION INTRAMUSCULAR; INTRAVENOUS; SUBCUTANEOUS at 03:55

## 2025-02-18 RX ADMIN — TAMSULOSIN HYDROCHLORIDE 0.4 MG: 0.4 CAPSULE ORAL at 04:49

## 2025-02-18 ASSESSMENT — LIFESTYLE VARIABLES
EVER FELT BAD OR GUILTY ABOUT YOUR DRINKING: NO
EVER HAD A DRINK FIRST THING IN THE MORNING TO STEADY YOUR NERVES TO GET RID OF A HANGOVER: NO
HAVE PEOPLE ANNOYED YOU BY CRITICIZING YOUR DRINKING: NO
TOTAL SCORE: 0
HAVE YOU EVER FELT YOU SHOULD CUT DOWN ON YOUR DRINKING: NO

## 2025-02-18 ASSESSMENT — COLUMBIA-SUICIDE SEVERITY RATING SCALE - C-SSRS
6. HAVE YOU EVER DONE ANYTHING, STARTED TO DO ANYTHING, OR PREPARED TO DO ANYTHING TO END YOUR LIFE?: NO
1. IN THE PAST MONTH, HAVE YOU WISHED YOU WERE DEAD OR WISHED YOU COULD GO TO SLEEP AND NOT WAKE UP?: NO
2. HAVE YOU ACTUALLY HAD ANY THOUGHTS OF KILLING YOURSELF?: NO

## 2025-02-18 ASSESSMENT — PAIN - FUNCTIONAL ASSESSMENT: PAIN_FUNCTIONAL_ASSESSMENT: 0-10

## 2025-02-18 ASSESSMENT — PAIN DESCRIPTION - FREQUENCY: FREQUENCY: CONSTANT/CONTINUOUS

## 2025-02-18 ASSESSMENT — PAIN DESCRIPTION - PAIN TYPE: TYPE: ACUTE PAIN

## 2025-02-18 ASSESSMENT — PAIN DESCRIPTION - LOCATION: LOCATION: BACK

## 2025-02-18 ASSESSMENT — PAIN DESCRIPTION - DESCRIPTORS: DESCRIPTORS: SHARP

## 2025-02-18 ASSESSMENT — PAIN SCALES - GENERAL
PAINLEVEL_OUTOF10: 10 - WORST POSSIBLE PAIN
PAINLEVEL_OUTOF10: 10 - WORST POSSIBLE PAIN

## 2025-02-18 NOTE — PROGRESS NOTES
Emergency Medicine Transition of Care Note.    I received Alpesh Elena in signout from  DAE Chacon. Please see the previous ED provider note for all HPI, PE and MDM up to the time of signout at 0600. This is in addition to the primary record.    In brief Alpesh Elena is an 44 y.o. male presenting for   Chief Complaint   Patient presents with    Back Pain     Back pain since tonight. Denies any recent injuries     At the time of signout we were awaiting: UA    Diagnoses as of 02/18/25 0651   Renal colic on right side   Right kidney stone       Medical Decision Making  44-year-old male presented to department complaints of back pain, been medicated for pain, CT imaging did show 3 mm obstructing stone left UVJ.  Laboratory workup was unremarkable.  At signout patient was pending urinalysis.    UA without nitrites or leukocyte esterase, 1-5 white cells noted, they do mention +1 bacteria, although given that there is no additional signs of urinary tract infection I will do not feel antibiotics are necessary at this time, though will send for culture out of caution.    Patient was scheduled with urology for follow-up, medications for pain and Flomax prescribed.  Discussed follow-up, should return with any worsening symptoms or additional concerns.        Final diagnoses:   [N23] Renal colic on right side   [N20.0] Right kidney stone     Labs Reviewed   CBC WITH AUTO DIFFERENTIAL - Abnormal       Result Value    WBC 11.9 (*)     nRBC 0.0      RBC 4.94      Hemoglobin 15.0      Hematocrit 43.7      MCV 89      MCH 30.4      MCHC 34.3      RDW 12.9      Platelets 384      Neutrophils % 52.3      Immature Granulocytes %, Automated 0.3      Lymphocytes % 39.0      Monocytes % 6.5      Eosinophils % 1.6      Basophils % 0.3      Neutrophils Absolute 6.22      Immature Granulocytes Absolute, Automated 0.04      Lymphocytes Absolute 4.65      Monocytes Absolute 0.78      Eosinophils Absolute 0.19      Basophils  Absolute 0.04     COMPREHENSIVE METABOLIC PANEL - Abnormal    Glucose 185 (*)     Sodium 136      Potassium 3.4 (*)     Chloride 104      Bicarbonate 20 (*)     Anion Gap 15      Urea Nitrogen 11      Creatinine 0.94      eGFR >90      Calcium 9.6      Albumin 4.6      Alkaline Phosphatase 115      Total Protein 8.1      AST 15      Bilirubin, Total 0.4      ALT 20     URINALYSIS WITH REFLEX CULTURE AND MICROSCOPIC - Abnormal    Color, Urine Yellow      Appearance, Urine Turbid (*)     Specific Gravity, Urine 1.034      pH, Urine 5.5      Protein, Urine 30 (1+) (*)     Glucose, Urine 300 (3+) (*)     Blood, Urine 0.03 (TRACE) (*)     Ketones, Urine 10 (1+) (*)     Bilirubin, Urine NEGATIVE      Urobilinogen, Urine 2 (1+) (*)     Nitrite, Urine NEGATIVE      Leukocyte Esterase, Urine NEGATIVE     URINALYSIS MICROSCOPIC WITH REFLEX CULTURE - Abnormal    WBC, Urine 1-5      RBC, Urine 1-2      Squamous Epithelial Cells, Urine 1-9 (SPARSE)      Bacteria, Urine 1+ (*)     Mucus, Urine 3+      Hyaline Casts, Urine 1+ (*)     Calcium Oxalate Crystals, Urine 4+ (*)    URINALYSIS WITH REFLEX CULTURE AND MICROSCOPIC    Narrative:     The following orders were created for panel order Urinalysis with Reflex Culture and Microscopic.  Procedure                               Abnormality         Status                     ---------                               -----------         ------                     Urinalysis with Reflex C...[[id number]]  Abnormal            Final result               Extra Urine Gray Tube[[id number]]                            In process                   Please view results for these tests on the individual orders.   EXTRA URINE GRAY TUBE        CT abdomen pelvis wo IV contrast   Final Result   Addendum (preliminary) 1 of 1   Interpreted By:  Scot Carson,    ADDENDUM:   The abnormality described in the findings and impression sections   regarding the kidneys and ureters should state that there is mild    RIGHT pelvicaliectasis and diffuse ureterectasis.        Signed by: Scot Carson 2/18/2025 6:37 AM        -------- ORIGINAL REPORT --------   Dictation workstation:   [id number]      Final   Mild left pelvicaliectasis and diffuse ureterectasis secondary to a 3   mm obstructing calculus just less than 1 cm proximal to the UVJ. Mild   asymmetric right perinephric edema.        Mild aortoiliac atherosclerosis.        Additional findings as discussed above.             MACRO:   None        Signed by: Scot Carson 2/18/2025 4:39 AM   Dictation workstation:   [id number]              Procedure  Procedures    Shari Shearer, APRN-CNP

## 2025-02-18 NOTE — ED PROVIDER NOTES
HPI   Chief Complaint   Patient presents with    Back Pain     Back pain since tonight. Denies any recent injuries       Patient is a pleasant 44-year-old gentleman who presents the emergency room with chief complaint of acute right flank pain.  The patient states the pain woke him up from sleep.  He describes the pain as sharp and stabbing is constant, nothing makes the pain worse or better.  He denies any history of kidney stones, denies any dysuria or urinary frequency.  He was driven to the hospital with family members.  No medications were taken for symptoms prior to arrival from home.      History provided by:  Patient and relative          Patient History   No past medical history on file.  No past surgical history on file.  No family history on file.  Social History     Tobacco Use    Smoking status: Every Day     Types: Cigarettes    Smokeless tobacco: Never   Vaping Use    Vaping status: Never Used   Substance Use Topics    Alcohol use: Yes    Drug use: Never       Physical Exam   ED Triage Vitals [02/18/25 0328]   Temp Heart Rate Respirations BP   -- 69 18 131/85      Pulse Ox Temp src Heart Rate Source Patient Position   98 % -- Monitor --      BP Location FiO2 (%)     -- --       Physical Exam  Vitals and nursing note reviewed.   Constitutional:       General: He is awake. He is in acute distress.      Appearance: Normal appearance. He is well-developed and well-groomed. He is ill-appearing.   HENT:      Head: Normocephalic and atraumatic.      Right Ear: Tympanic membrane, ear canal and external ear normal.      Left Ear: Tympanic membrane, ear canal and external ear normal.      Nose: Nose normal.      Mouth/Throat:      Mouth: Mucous membranes are moist.      Pharynx: Oropharynx is clear.   Eyes:      Extraocular Movements: Extraocular movements intact.      Conjunctiva/sclera: Conjunctivae normal.      Pupils: Pupils are equal, round, and reactive to light.   Cardiovascular:      Rate and Rhythm:  Normal rate and regular rhythm.      Pulses: Normal pulses.      Heart sounds: Normal heart sounds.   Pulmonary:      Effort: Pulmonary effort is normal.      Breath sounds: Normal breath sounds. No wheezing, rhonchi or rales.   Abdominal:      General: Abdomen is flat. Bowel sounds are normal.      Palpations: Abdomen is soft. There is no mass.      Tenderness: There is no abdominal tenderness. There is right CVA tenderness. There is no guarding.          Comments: Right-sided CVA tenderness   Musculoskeletal:         General: No swelling or tenderness. Normal range of motion.      Cervical back: Normal range of motion and neck supple.   Skin:     General: Skin is warm and dry.      Capillary Refill: Capillary refill takes less than 2 seconds.      Findings: No rash.   Neurological:      General: No focal deficit present.      Mental Status: He is alert and oriented to person, place, and time. Mental status is at baseline.   Psychiatric:         Mood and Affect: Mood normal.         Behavior: Behavior normal. Behavior is cooperative.         Thought Content: Thought content normal.         Judgment: Judgment normal.           ED Course & MDM   Diagnoses as of 02/18/25 0502   Renal colic on right side   Right kidney stone                 No data recorded     [place] Coma Scale Score: 15 (02/18/25 0331 : Bárbara Melendez RN)                           Medical Decision Making  Temperature is not listed heart rate 69 respirations 18 blood pressure 131/85, pulse ox was 98% on room air  This is a 44-year-old male who presents from home with acute onset of right flank pain with nausea.  Patient was given a liter of LR, Zofran 4 mg IV push, Toradol 15 mg IV push, Dilaudid 1 mg IV push.  CT report reads mild left pelvic or collecting system diffuse ureterectasis secondary to a 3 mm obstructing Was just less than 1 cm proximal to the UVJ with mild asymmetric right perinephric edema however the image shows that the stone is  actually in the right side.  I did reach out to rad ops to make an addendum.  CBC white count is 11.9, hemoglobin 15 hematocrit 43.7 platelet count was 384 metabolic glucose 195 potassium 3.4 bicarb of 20.  Urinalysis still pending.    Care turned over to Shari Shearer NP        Procedure  Procedures     Almas Lizarraga PA-C  02/18/25 0504

## 2025-02-19 ENCOUNTER — APPOINTMENT (OUTPATIENT)
Dept: UROLOGY | Facility: CLINIC | Age: 45
End: 2025-02-19
Payer: COMMERCIAL

## 2025-03-03 ENCOUNTER — APPOINTMENT (OUTPATIENT)
Dept: UROLOGY | Facility: CLINIC | Age: 45
End: 2025-03-03
Payer: COMMERCIAL

## 2025-03-03 VITALS
BODY MASS INDEX: 26.6 KG/M2 | HEART RATE: 77 BPM | RESPIRATION RATE: 16 BRPM | SYSTOLIC BLOOD PRESSURE: 124 MMHG | WEIGHT: 175.49 LBS | HEIGHT: 68 IN | DIASTOLIC BLOOD PRESSURE: 86 MMHG

## 2025-03-03 DIAGNOSIS — N13.2 HYDRONEPHROSIS CONCURRENT WITH AND DUE TO CALCULI OF KIDNEY AND URETER: Primary | ICD-10-CM

## 2025-03-03 DIAGNOSIS — N23 RENAL COLIC ON RIGHT SIDE: ICD-10-CM

## 2025-03-03 DIAGNOSIS — N20.1 CALCULUS OF URETER: ICD-10-CM

## 2025-03-03 PROCEDURE — 3008F BODY MASS INDEX DOCD: CPT

## 2025-03-03 PROCEDURE — 99203 OFFICE O/P NEW LOW 30 MIN: CPT

## 2025-03-03 ASSESSMENT — ENCOUNTER SYMPTOMS
DIFFICULTY URINATING: 0
FREQUENCY: 0
DYSURIA: 0
HEMATURIA: 0

## 2025-03-03 ASSESSMENT — PAIN SCALES - GENERAL: PAINLEVEL_OUTOF10: 0-NO PAIN

## 2025-03-03 NOTE — PROGRESS NOTES
Patient is here to establish care and treatment for nephrolithiasis referred by DAE Lizarraga with review of recent testing. Patient recently completed Flomax, did not noticed any changes in urine flow. CV     Review of Systems   Genitourinary:  Negative for difficulty urinating, dysuria, enuresis, frequency, hematuria, penile swelling, scrotal swelling, testicular pain and urgency.

## 2025-03-03 NOTE — PROGRESS NOTES
Subjective   Patient ID: Alpesh Elena is a 44 y.o. male who presents for Nephrolithiasis.    Patient is here after an ER visit for a 3mm right UVJ calculus on 02/18/2025.  Creatinine 0.94 and WBC 11.9, he was discharged home for MET.  This was his first kidney stone.  His mother and sister have a hx of stones.  No longer having pain, did not see stone pass but feeling well.  Denies dysuria or hematuria.  No frequency, urgency, incontinence, difficulties voiding.  He has a physical job and states he drinks a lot of water but unsure the amount b/c he drinks from a water fountain.  Otherwise juice or milk.  Occ soda or iced tea.  No coffee and energy drinks.  CT a/p did not show any renal stones.  No FMH of prostate cancer.  He has no other urological concerns at this time.      Review of Systems   All other systems reviewed and are negative.    Objective   Alert and oriented x3  Moist mucous membranes  Breathes easily on room air  No edema  No scleral icterus  No focal neurological deficits  No acute distress    Assessment/Plan   Problem List Items Addressed This Visit    None  Visit Diagnoses         Codes    Hydronephrosis concurrent with and due to calculi of kidney and ureter    -  Primary N13.2    Relevant Orders    US renal complete    Renal colic on right side     N23    Calculus of ureter     N20.1          Patient with a 3mm right UVJ calculus that he successfully passed with medical expulsive therapy.  We discussed stone prevention in detail specifically increasing his water intake and avoiding iced tea and dark kenrick.  We will obtain a renal US in 6-8 wks to ensure the hydronephrosis has resolved and there are no other developing calculi.  Otherwise he will follow-up in 1 year or sooner if he has recurrent stone episodes or a new concern.  Patient agreed with plan and all question answered.    Marianna Roa PA-C 03/03/25 10:54 AM

## 2025-05-18 ENCOUNTER — HOSPITAL ENCOUNTER (INPATIENT)
Facility: HOSPITAL | Age: 45
DRG: 250 | End: 2025-05-18
Attending: STUDENT IN AN ORGANIZED HEALTH CARE EDUCATION/TRAINING PROGRAM | Admitting: STUDENT IN AN ORGANIZED HEALTH CARE EDUCATION/TRAINING PROGRAM
Payer: COMMERCIAL

## 2025-05-18 DIAGNOSIS — I21.3 ST ELEVATION MYOCARDIAL INFARCTION (STEMI), UNSPECIFIED ARTERY (MULTI): Primary | ICD-10-CM

## 2025-05-18 DIAGNOSIS — I21.3 STEMI (ST ELEVATION MYOCARDIAL INFARCTION) (MULTI): ICD-10-CM

## 2025-05-18 DIAGNOSIS — I22.1 SUBSEQUENT ST ELEVATION (STEMI) MYOCARDIAL INFARCTION OF INFERIOR WALL: ICD-10-CM

## 2025-05-18 LAB
APTT PPP: 33 SECONDS (ref 26–36)
BASOPHILS # BLD AUTO: 0.03 X10*3/UL (ref 0–0.1)
BASOPHILS NFR BLD AUTO: 0.1 %
EOSINOPHIL # BLD AUTO: 0.04 X10*3/UL (ref 0–0.7)
EOSINOPHIL NFR BLD AUTO: 0.2 %
ERYTHROCYTE [DISTWIDTH] IN BLOOD BY AUTOMATED COUNT: 12.6 % (ref 11.5–14.5)
HCT VFR BLD AUTO: 46.7 % (ref 41–52)
HGB BLD-MCNC: 16.2 G/DL (ref 13.5–17.5)
IMM GRANULOCYTES # BLD AUTO: 0.09 X10*3/UL (ref 0–0.7)
IMM GRANULOCYTES NFR BLD AUTO: 0.4 % (ref 0–0.9)
INR PPP: 1.1 (ref 0.9–1.1)
LYMPHOCYTES # BLD AUTO: 2.59 X10*3/UL (ref 1.2–4.8)
LYMPHOCYTES NFR BLD AUTO: 12.6 %
MCH RBC QN AUTO: 30.9 PG (ref 26–34)
MCHC RBC AUTO-ENTMCNC: 34.7 G/DL (ref 32–36)
MCV RBC AUTO: 89 FL (ref 80–100)
MONOCYTES # BLD AUTO: 1.54 X10*3/UL (ref 0.1–1)
MONOCYTES NFR BLD AUTO: 7.5 %
NEUTROPHILS # BLD AUTO: 16.2 X10*3/UL (ref 1.2–7.7)
NEUTROPHILS NFR BLD AUTO: 79.2 %
NRBC BLD-RTO: 0 /100 WBCS (ref 0–0)
PLATELET # BLD AUTO: 294 X10*3/UL (ref 150–450)
PROTHROMBIN TIME: 11.6 SECONDS (ref 9.8–12.4)
RBC # BLD AUTO: 5.25 X10*6/UL (ref 4.5–5.9)
WBC # BLD AUTO: 20.5 X10*3/UL (ref 4.4–11.3)

## 2025-05-18 PROCEDURE — 99291 CRITICAL CARE FIRST HOUR: CPT | Performed by: STUDENT IN AN ORGANIZED HEALTH CARE EDUCATION/TRAINING PROGRAM

## 2025-05-18 PROCEDURE — 2500000001 HC RX 250 WO HCPCS SELF ADMINISTERED DRUGS (ALT 637 FOR MEDICARE OP): Performed by: STUDENT IN AN ORGANIZED HEALTH CARE EDUCATION/TRAINING PROGRAM

## 2025-05-18 PROCEDURE — C1887 CATHETER, GUIDING: HCPCS | Performed by: INTERNAL MEDICINE

## 2025-05-18 PROCEDURE — 93458 L HRT ARTERY/VENTRICLE ANGIO: CPT | Mod: 59 | Performed by: INTERNAL MEDICINE

## 2025-05-18 PROCEDURE — C1725 CATH, TRANSLUMIN NON-LASER: HCPCS | Performed by: INTERNAL MEDICINE

## 2025-05-18 PROCEDURE — 2720000007 HC OR 272 NO HCPCS: Performed by: INTERNAL MEDICINE

## 2025-05-18 PROCEDURE — 4A023N7 MEASUREMENT OF CARDIAC SAMPLING AND PRESSURE, LEFT HEART, PERCUTANEOUS APPROACH: ICD-10-PCS | Performed by: INTERNAL MEDICINE

## 2025-05-18 PROCEDURE — 85730 THROMBOPLASTIN TIME PARTIAL: CPT | Performed by: STUDENT IN AN ORGANIZED HEALTH CARE EDUCATION/TRAINING PROGRAM

## 2025-05-18 PROCEDURE — C1760 CLOSURE DEV, VASC: HCPCS | Performed by: INTERNAL MEDICINE

## 2025-05-18 PROCEDURE — 85025 COMPLETE CBC W/AUTO DIFF WBC: CPT | Performed by: STUDENT IN AN ORGANIZED HEALTH CARE EDUCATION/TRAINING PROGRAM

## 2025-05-18 PROCEDURE — 92941 PRQ TRLML REVSC TOT OCCL AMI: CPT | Performed by: INTERNAL MEDICINE

## 2025-05-18 PROCEDURE — C1769 GUIDE WIRE: HCPCS | Performed by: INTERNAL MEDICINE

## 2025-05-18 PROCEDURE — 83880 ASSAY OF NATRIURETIC PEPTIDE: CPT | Performed by: STUDENT IN AN ORGANIZED HEALTH CARE EDUCATION/TRAINING PROGRAM

## 2025-05-18 PROCEDURE — 99152 MOD SED SAME PHYS/QHP 5/>YRS: CPT | Performed by: INTERNAL MEDICINE

## 2025-05-18 PROCEDURE — 85610 PROTHROMBIN TIME: CPT | Performed by: STUDENT IN AN ORGANIZED HEALTH CARE EDUCATION/TRAINING PROGRAM

## 2025-05-18 PROCEDURE — 83718 ASSAY OF LIPOPROTEIN: CPT | Performed by: STUDENT IN AN ORGANIZED HEALTH CARE EDUCATION/TRAINING PROGRAM

## 2025-05-18 PROCEDURE — 2500000004 HC RX 250 GENERAL PHARMACY W/ HCPCS (ALT 636 FOR OP/ED): Performed by: STUDENT IN AN ORGANIZED HEALTH CARE EDUCATION/TRAINING PROGRAM

## 2025-05-18 PROCEDURE — 93458 L HRT ARTERY/VENTRICLE ANGIO: CPT | Performed by: INTERNAL MEDICINE

## 2025-05-18 PROCEDURE — 2500000002 HC RX 250 W HCPCS SELF ADMINISTERED DRUGS (ALT 637 FOR MEDICARE OP, ALT 636 FOR OP/ED): Performed by: STUDENT IN AN ORGANIZED HEALTH CARE EDUCATION/TRAINING PROGRAM

## 2025-05-18 PROCEDURE — 2500000004 HC RX 250 GENERAL PHARMACY W/ HCPCS (ALT 636 FOR OP/ED): Performed by: INTERNAL MEDICINE

## 2025-05-18 PROCEDURE — 2780000003 HC OR 278 NO HCPCS: Performed by: INTERNAL MEDICINE

## 2025-05-18 PROCEDURE — C9606 PERC D-E COR REVASC W AMI S: HCPCS | Performed by: INTERNAL MEDICINE

## 2025-05-18 PROCEDURE — B2111ZZ FLUOROSCOPY OF MULTIPLE CORONARY ARTERIES USING LOW OSMOLAR CONTRAST: ICD-10-PCS | Performed by: INTERNAL MEDICINE

## 2025-05-18 PROCEDURE — C1894 INTRO/SHEATH, NON-LASER: HCPCS | Performed by: INTERNAL MEDICINE

## 2025-05-18 PROCEDURE — B2151ZZ FLUOROSCOPY OF LEFT HEART USING LOW OSMOLAR CONTRAST: ICD-10-PCS | Performed by: INTERNAL MEDICINE

## 2025-05-18 PROCEDURE — 02703ZZ DILATION OF CORONARY ARTERY, ONE ARTERY, PERCUTANEOUS APPROACH: ICD-10-PCS | Performed by: INTERNAL MEDICINE

## 2025-05-18 PROCEDURE — 84484 ASSAY OF TROPONIN QUANT: CPT | Performed by: STUDENT IN AN ORGANIZED HEALTH CARE EDUCATION/TRAINING PROGRAM

## 2025-05-18 PROCEDURE — 2500000005 HC RX 250 GENERAL PHARMACY W/O HCPCS: Performed by: INTERNAL MEDICINE

## 2025-05-18 PROCEDURE — 36415 COLL VENOUS BLD VENIPUNCTURE: CPT | Performed by: STUDENT IN AN ORGANIZED HEALTH CARE EDUCATION/TRAINING PROGRAM

## 2025-05-18 PROCEDURE — 2020000001 HC ICU ROOM DAILY

## 2025-05-18 PROCEDURE — 80053 COMPREHEN METABOLIC PANEL: CPT | Performed by: STUDENT IN AN ORGANIZED HEALTH CARE EDUCATION/TRAINING PROGRAM

## 2025-05-18 PROCEDURE — 83036 HEMOGLOBIN GLYCOSYLATED A1C: CPT | Mod: ELYLAB | Performed by: STUDENT IN AN ORGANIZED HEALTH CARE EDUCATION/TRAINING PROGRAM

## 2025-05-18 PROCEDURE — 2550000001 HC RX 255 CONTRASTS: Performed by: INTERNAL MEDICINE

## 2025-05-18 PROCEDURE — 99153 MOD SED SAME PHYS/QHP EA: CPT | Performed by: INTERNAL MEDICINE

## 2025-05-18 PROCEDURE — 96374 THER/PROPH/DIAG INJ IV PUSH: CPT | Mod: 59

## 2025-05-18 PROCEDURE — 99291 CRITICAL CARE FIRST HOUR: CPT | Mod: 25 | Performed by: STUDENT IN AN ORGANIZED HEALTH CARE EDUCATION/TRAINING PROGRAM

## 2025-05-18 PROCEDURE — 84443 ASSAY THYROID STIM HORMONE: CPT | Performed by: STUDENT IN AN ORGANIZED HEALTH CARE EDUCATION/TRAINING PROGRAM

## 2025-05-18 RX ORDER — LIDOCAINE HYDROCHLORIDE 20 MG/ML
INJECTION, SOLUTION INFILTRATION; PERINEURAL AS NEEDED
Status: DISCONTINUED | OUTPATIENT
Start: 2025-05-18 | End: 2025-05-18 | Stop reason: HOSPADM

## 2025-05-18 RX ORDER — NICARDIPINE HYDROCHLORIDE 2.5 MG/ML
INJECTION INTRAVENOUS AS NEEDED
Status: DISCONTINUED | OUTPATIENT
Start: 2025-05-18 | End: 2025-05-18 | Stop reason: HOSPADM

## 2025-05-18 RX ORDER — TICAGRELOR 90 MG/1
180 TABLET, FILM COATED ORAL ONCE
Status: COMPLETED | OUTPATIENT
Start: 2025-05-18 | End: 2025-05-18

## 2025-05-18 RX ORDER — MIDAZOLAM HYDROCHLORIDE 1 MG/ML
INJECTION, SOLUTION INTRAMUSCULAR; INTRAVENOUS AS NEEDED
Status: DISCONTINUED | OUTPATIENT
Start: 2025-05-18 | End: 2025-05-18 | Stop reason: HOSPADM

## 2025-05-18 RX ORDER — NAPROXEN SODIUM 220 MG/1
324 TABLET, FILM COATED ORAL ONCE
Status: COMPLETED | OUTPATIENT
Start: 2025-05-18 | End: 2025-05-18

## 2025-05-18 RX ORDER — HEPARIN SODIUM 5000 [USP'U]/ML
4000 INJECTION, SOLUTION INTRAVENOUS; SUBCUTANEOUS ONCE
Status: COMPLETED | OUTPATIENT
Start: 2025-05-18 | End: 2025-05-18

## 2025-05-18 RX ORDER — TICAGRELOR 90 MG/1
90 TABLET, FILM COATED ORAL 2 TIMES DAILY
Status: DISCONTINUED | OUTPATIENT
Start: 2025-05-19 | End: 2025-05-20 | Stop reason: HOSPADM

## 2025-05-18 RX ORDER — ATORVASTATIN CALCIUM 20 MG/1
40 TABLET, FILM COATED ORAL NIGHTLY
Status: DISCONTINUED | OUTPATIENT
Start: 2025-05-19 | End: 2025-05-20 | Stop reason: HOSPADM

## 2025-05-18 RX ORDER — ASPIRIN 81 MG/1
81 TABLET ORAL DAILY
Status: DISCONTINUED | OUTPATIENT
Start: 2025-05-19 | End: 2025-05-20 | Stop reason: HOSPADM

## 2025-05-18 RX ORDER — HEPARIN SODIUM 1000 [USP'U]/ML
INJECTION, SOLUTION INTRAVENOUS; SUBCUTANEOUS AS NEEDED
Status: DISCONTINUED | OUTPATIENT
Start: 2025-05-18 | End: 2025-05-18 | Stop reason: HOSPADM

## 2025-05-18 RX ORDER — FENTANYL CITRATE 50 UG/ML
INJECTION, SOLUTION INTRAMUSCULAR; INTRAVENOUS AS NEEDED
Status: DISCONTINUED | OUTPATIENT
Start: 2025-05-18 | End: 2025-05-18 | Stop reason: HOSPADM

## 2025-05-18 RX ORDER — ENOXAPARIN SODIUM 100 MG/ML
40 INJECTION SUBCUTANEOUS EVERY 24 HOURS
Status: DISCONTINUED | OUTPATIENT
Start: 2025-05-19 | End: 2025-05-20 | Stop reason: HOSPADM

## 2025-05-18 RX ADMIN — TICAGRELOR 180 MG: 90 TABLET ORAL at 21:59

## 2025-05-18 RX ADMIN — ASPIRIN 81 MG CHEWABLE TABLET 324 MG: 81 TABLET CHEWABLE at 22:00

## 2025-05-18 RX ADMIN — HEPARIN SODIUM 4000 UNITS: 5000 INJECTION, SOLUTION INTRAVENOUS; SUBCUTANEOUS at 22:00

## 2025-05-18 SDOH — ECONOMIC STABILITY: INCOME INSECURITY: IN THE PAST 12 MONTHS HAS THE ELECTRIC, GAS, OIL, OR WATER COMPANY THREATENED TO SHUT OFF SERVICES IN YOUR HOME?: NO

## 2025-05-18 SDOH — SOCIAL STABILITY: SOCIAL INSECURITY
WITHIN THE LAST YEAR, HAVE YOU BEEN KICKED, HIT, SLAPPED, OR OTHERWISE PHYSICALLY HURT BY YOUR PARTNER OR EX-PARTNER?: NO

## 2025-05-18 SDOH — SOCIAL STABILITY: SOCIAL INSECURITY: DO YOU FEEL UNSAFE GOING BACK TO THE PLACE WHERE YOU ARE LIVING?: NO

## 2025-05-18 SDOH — SOCIAL STABILITY: SOCIAL INSECURITY: HAVE YOU HAD ANY THOUGHTS OF HARMING ANYONE ELSE?: NO

## 2025-05-18 SDOH — SOCIAL STABILITY: SOCIAL INSECURITY: WITHIN THE LAST YEAR, HAVE YOU BEEN AFRAID OF YOUR PARTNER OR EX-PARTNER?: NO

## 2025-05-18 SDOH — SOCIAL STABILITY: SOCIAL INSECURITY: WITHIN THE LAST YEAR, HAVE YOU BEEN HUMILIATED OR EMOTIONALLY ABUSED IN OTHER WAYS BY YOUR PARTNER OR EX-PARTNER?: NO

## 2025-05-18 SDOH — SOCIAL STABILITY: SOCIAL INSECURITY
WITHIN THE LAST YEAR, HAVE YOU BEEN RAPED OR FORCED TO HAVE ANY KIND OF SEXUAL ACTIVITY BY YOUR PARTNER OR EX-PARTNER?: NO

## 2025-05-18 SDOH — ECONOMIC STABILITY: FOOD INSECURITY: WITHIN THE PAST 12 MONTHS, THE FOOD YOU BOUGHT JUST DIDN'T LAST AND YOU DIDN'T HAVE MONEY TO GET MORE.: NEVER TRUE

## 2025-05-18 SDOH — SOCIAL STABILITY: SOCIAL INSECURITY: DO YOU FEEL ANYONE HAS EXPLOITED OR TAKEN ADVANTAGE OF YOU FINANCIALLY OR OF YOUR PERSONAL PROPERTY?: NO

## 2025-05-18 SDOH — ECONOMIC STABILITY: FOOD INSECURITY: WITHIN THE PAST 12 MONTHS, YOU WORRIED THAT YOUR FOOD WOULD RUN OUT BEFORE YOU GOT THE MONEY TO BUY MORE.: NEVER TRUE

## 2025-05-18 SDOH — SOCIAL STABILITY: SOCIAL INSECURITY: DOES ANYONE TRY TO KEEP YOU FROM HAVING/CONTACTING OTHER FRIENDS OR DOING THINGS OUTSIDE YOUR HOME?: NO

## 2025-05-18 SDOH — SOCIAL STABILITY: SOCIAL INSECURITY: ARE THERE ANY APPARENT SIGNS OF INJURIES/BEHAVIORS THAT COULD BE RELATED TO ABUSE/NEGLECT?: NO

## 2025-05-18 SDOH — SOCIAL STABILITY: SOCIAL INSECURITY: ABUSE: ADULT

## 2025-05-18 SDOH — SOCIAL STABILITY: SOCIAL INSECURITY: HAVE YOU HAD THOUGHTS OF HARMING ANYONE ELSE?: NO

## 2025-05-18 SDOH — SOCIAL STABILITY: SOCIAL INSECURITY: WERE YOU ABLE TO COMPLETE ALL THE BEHAVIORAL HEALTH SCREENINGS?: YES

## 2025-05-18 SDOH — SOCIAL STABILITY: SOCIAL INSECURITY: HAS ANYONE EVER THREATENED TO HURT YOUR FAMILY OR YOUR PETS?: NO

## 2025-05-18 SDOH — SOCIAL STABILITY: SOCIAL INSECURITY: ARE YOU OR HAVE YOU BEEN THREATENED OR ABUSED PHYSICALLY, EMOTIONALLY, OR SEXUALLY BY ANYONE?: NO

## 2025-05-18 ASSESSMENT — COGNITIVE AND FUNCTIONAL STATUS - GENERAL
MOBILITY SCORE: 24
PATIENT BASELINE BEDBOUND: NO
DAILY ACTIVITIY SCORE: 24

## 2025-05-18 ASSESSMENT — COLUMBIA-SUICIDE SEVERITY RATING SCALE - C-SSRS
1. IN THE PAST MONTH, HAVE YOU WISHED YOU WERE DEAD OR WISHED YOU COULD GO TO SLEEP AND NOT WAKE UP?: NO
6. HAVE YOU EVER DONE ANYTHING, STARTED TO DO ANYTHING, OR PREPARED TO DO ANYTHING TO END YOUR LIFE?: NO
2. HAVE YOU ACTUALLY HAD ANY THOUGHTS OF KILLING YOURSELF?: NO

## 2025-05-18 ASSESSMENT — ACTIVITIES OF DAILY LIVING (ADL)
JUDGMENT_ADEQUATE_SAFELY_COMPLETE_DAILY_ACTIVITIES: YES
PATIENT'S MEMORY ADEQUATE TO SAFELY COMPLETE DAILY ACTIVITIES?: YES
LACK_OF_TRANSPORTATION: NO
HEARING - LEFT EAR: FUNCTIONAL
ASSISTIVE_DEVICE: EYEGLASSES
WALKS IN HOME: INDEPENDENT
GROOMING: INDEPENDENT
HEARING - RIGHT EAR: FUNCTIONAL
DRESSING YOURSELF: INDEPENDENT
ADEQUATE_TO_COMPLETE_ADL: YES
TOILETING: INDEPENDENT
BATHING: INDEPENDENT
FEEDING YOURSELF: INDEPENDENT

## 2025-05-18 ASSESSMENT — PAIN DESCRIPTION - PAIN TYPE: TYPE: ACUTE PAIN

## 2025-05-18 ASSESSMENT — LIFESTYLE VARIABLES
SKIP TO QUESTIONS 9-10: 1
HAVE YOU EVER FELT YOU SHOULD CUT DOWN ON YOUR DRINKING: NO
HAVE PEOPLE ANNOYED YOU BY CRITICIZING YOUR DRINKING: NO
HOW OFTEN DO YOU HAVE 6 OR MORE DRINKS ON ONE OCCASION: NEVER
EVER FELT BAD OR GUILTY ABOUT YOUR DRINKING: NO
EVER HAD A DRINK FIRST THING IN THE MORNING TO STEADY YOUR NERVES TO GET RID OF A HANGOVER: NO
TOTAL SCORE: 0
AUDIT-C TOTAL SCORE: 0
HOW MANY STANDARD DRINKS CONTAINING ALCOHOL DO YOU HAVE ON A TYPICAL DAY: PATIENT DOES NOT DRINK
AUDIT-C TOTAL SCORE: 0
HOW OFTEN DO YOU HAVE A DRINK CONTAINING ALCOHOL: NEVER

## 2025-05-18 ASSESSMENT — PAIN DESCRIPTION - DESCRIPTORS: DESCRIPTORS: PRESSURE

## 2025-05-18 ASSESSMENT — PAIN DESCRIPTION - PROGRESSION: CLINICAL_PROGRESSION: NOT CHANGED

## 2025-05-18 ASSESSMENT — PAIN DESCRIPTION - ONSET: ONSET: GRADUAL

## 2025-05-18 ASSESSMENT — PAIN DESCRIPTION - LOCATION: LOCATION: CHEST

## 2025-05-18 ASSESSMENT — PAIN DESCRIPTION - ORIENTATION: ORIENTATION: UPPER;MID

## 2025-05-18 ASSESSMENT — PATIENT HEALTH QUESTIONNAIRE - PHQ9
1. LITTLE INTEREST OR PLEASURE IN DOING THINGS: NOT AT ALL
2. FEELING DOWN, DEPRESSED OR HOPELESS: NOT AT ALL
SUM OF ALL RESPONSES TO PHQ9 QUESTIONS 1 & 2: 0

## 2025-05-18 ASSESSMENT — PAIN SCALES - GENERAL
PAINLEVEL_OUTOF10: 0 - NO PAIN
PAINLEVEL_OUTOF10: 2

## 2025-05-18 ASSESSMENT — PAIN DESCRIPTION - FREQUENCY: FREQUENCY: CONSTANT/CONTINUOUS

## 2025-05-18 ASSESSMENT — PAIN - FUNCTIONAL ASSESSMENT
PAIN_FUNCTIONAL_ASSESSMENT: 0-10
PAIN_FUNCTIONAL_ASSESSMENT: 0-10

## 2025-05-18 NOTE — Clinical Note
Angioplasty of the distal right coronary artery lesion. Inflation 1: Pressure = 8 shelby; Duration = 8 sec. Inflation 2: Pressure = 8 shelby; Duration = 4 sec.

## 2025-05-18 NOTE — Clinical Note
Angioplasty of the distal right coronary artery lesion. Inflation 3: Pressure = 6 shelby; Duration = 12 sec.

## 2025-05-18 NOTE — Clinical Note
Vessel: RCA (distal). Guidewire inserted and used as the primary guidewire crossing the lesion. universal

## 2025-05-18 NOTE — Clinical Note
Closure device placed in the right radial artery. Site closed by radial compression system. Deployed By: Echo Loving RT

## 2025-05-19 ENCOUNTER — APPOINTMENT (OUTPATIENT)
Dept: RADIOLOGY | Facility: HOSPITAL | Age: 45
DRG: 250 | End: 2025-05-19
Payer: COMMERCIAL

## 2025-05-19 ENCOUNTER — APPOINTMENT (OUTPATIENT)
Dept: CARDIOLOGY | Facility: HOSPITAL | Age: 45
DRG: 250 | End: 2025-05-19
Payer: COMMERCIAL

## 2025-05-19 VITALS
TEMPERATURE: 97.9 F | BODY MASS INDEX: 25.39 KG/M2 | WEIGHT: 167.55 LBS | HEIGHT: 68 IN | RESPIRATION RATE: 32 BRPM | HEART RATE: 128 BPM | SYSTOLIC BLOOD PRESSURE: 119 MMHG | DIASTOLIC BLOOD PRESSURE: 78 MMHG | OXYGEN SATURATION: 93 %

## 2025-05-19 LAB
ALBUMIN SERPL BCP-MCNC: 4.4 G/DL (ref 3.4–5)
ALBUMIN SERPL BCP-MCNC: 4.5 G/DL (ref 3.4–5)
ALBUMIN SERPL BCP-MCNC: 4.6 G/DL (ref 3.4–5)
ALBUMIN SERPL BCP-MCNC: 4.6 G/DL (ref 3.4–5)
ALBUMIN SERPL BCP-MCNC: 4.7 G/DL (ref 3.4–5)
ALP SERPL-CCNC: 100 U/L (ref 33–120)
ALP SERPL-CCNC: 106 U/L (ref 33–120)
ALP SERPL-CCNC: 108 U/L (ref 33–120)
ALT SERPL W P-5'-P-CCNC: 249 U/L (ref 10–52)
ALT SERPL W P-5'-P-CCNC: 31 U/L (ref 10–52)
ALT SERPL W P-5'-P-CCNC: 34 U/L (ref 10–52)
AMPHETAMINES UR QL SCN: ABNORMAL
ANION GAP BLDA CALCULATED.4IONS-SCNC: 19 MMO/L (ref 10–25)
ANION GAP BLDV CALCULATED.4IONS-SCNC: 16 MMOL/L (ref 10–25)
ANION GAP BLDV CALCULATED.4IONS-SCNC: 17 MMOL/L (ref 10–25)
ANION GAP SERPL CALC-SCNC: 17 MMOL/L (ref 10–20)
ANION GAP SERPL CALC-SCNC: 17 MMOL/L (ref 10–20)
ANION GAP SERPL CALC-SCNC: 20 MMOL/L (ref 10–20)
ANION GAP SERPL CALC-SCNC: 21 MMOL/L (ref 10–20)
AORTIC VALVE MEAN GRADIENT: 3 MMHG
AORTIC VALVE PEAK VELOCITY: 1.16 M/S
ARTERIAL PATENCY WRIST A: ABNORMAL
AST SERPL W P-5'-P-CCNC: 236 U/L (ref 9–39)
AST SERPL W P-5'-P-CCNC: 72 U/L (ref 9–39)
AST SERPL W P-5'-P-CCNC: 88 U/L (ref 9–39)
AV PEAK GRADIENT: 5 MMHG
AVA (PEAK VEL): 2.87 CM2
AVA (VTI): 2.35 CM2
B-OH-BUTYR SERPL-SCNC: 0.16 MMOL/L (ref 0.02–0.27)
B-OH-BUTYR SERPL-SCNC: 0.19 MMOL/L (ref 0.02–0.27)
BARBITURATES UR QL SCN: ABNORMAL
BASE EXCESS BLDA CALC-SCNC: -1.8 MMOL/L (ref -2–3)
BASE EXCESS BLDV CALC-SCNC: -2.4 MMOL/L (ref -2–3)
BASE EXCESS BLDV CALC-SCNC: -3.1 MMOL/L (ref -2–3)
BASOPHILS # BLD AUTO: 0.03 X10*3/UL (ref 0–0.1)
BASOPHILS # BLD AUTO: 0.03 X10*3/UL (ref 0–0.1)
BASOPHILS NFR BLD AUTO: 0.1 %
BASOPHILS NFR BLD AUTO: 0.1 %
BENZODIAZ UR QL SCN: ABNORMAL
BILIRUB DIRECT SERPL-MCNC: 0.3 MG/DL (ref 0–0.3)
BILIRUB SERPL-MCNC: 1.1 MG/DL (ref 0–1.2)
BILIRUB SERPL-MCNC: 1.3 MG/DL (ref 0–1.2)
BILIRUB SERPL-MCNC: 1.5 MG/DL (ref 0–1.2)
BNP SERPL-MCNC: 236 PG/ML (ref 0–99)
BODY TEMPERATURE: ABNORMAL
BUN SERPL-MCNC: 11 MG/DL (ref 6–23)
BUN SERPL-MCNC: 20 MG/DL (ref 6–23)
BUN SERPL-MCNC: 28 MG/DL (ref 6–23)
BUN SERPL-MCNC: 7 MG/DL (ref 6–23)
BZE UR QL SCN: ABNORMAL
CA-I BLDA-SCNC: 1.14 MMOL/L (ref 1.1–1.33)
CA-I BLDV-SCNC: 1.15 MMOL/L (ref 1.1–1.33)
CA-I BLDV-SCNC: 1.19 MMOL/L (ref 1.1–1.33)
CALCIUM SERPL-MCNC: 9.2 MG/DL (ref 8.6–10.3)
CALCIUM SERPL-MCNC: 9.6 MG/DL (ref 8.6–10.3)
CALCIUM SERPL-MCNC: 9.8 MG/DL (ref 8.6–10.3)
CALCIUM SERPL-MCNC: 9.8 MG/DL (ref 8.6–10.3)
CANNABINOIDS UR QL SCN: ABNORMAL
CARDIAC TROPONIN I PNL SERPL HS: ABNORMAL NG/L (ref 0–20)
CHLORIDE BLDA-SCNC: 99 MMOL/L (ref 98–107)
CHLORIDE BLDV-SCNC: 100 MMOL/L (ref 98–107)
CHLORIDE BLDV-SCNC: 100 MMOL/L (ref 98–107)
CHLORIDE SERPL-SCNC: 100 MMOL/L (ref 98–107)
CHLORIDE SERPL-SCNC: 101 MMOL/L (ref 98–107)
CHLORIDE SERPL-SCNC: 99 MMOL/L (ref 98–107)
CHLORIDE SERPL-SCNC: 99 MMOL/L (ref 98–107)
CHOLEST SERPL-MCNC: 220 MG/DL (ref 0–199)
CHOLESTEROL/HDL RATIO: 6.5
CO2 SERPL-SCNC: 18 MMOL/L (ref 21–32)
CO2 SERPL-SCNC: 20 MMOL/L (ref 21–32)
CO2 SERPL-SCNC: 21 MMOL/L (ref 21–32)
CO2 SERPL-SCNC: 21 MMOL/L (ref 21–32)
CREAT SERPL-MCNC: 0.79 MG/DL (ref 0.5–1.3)
CREAT SERPL-MCNC: 0.94 MG/DL (ref 0.5–1.3)
CREAT SERPL-MCNC: 1.61 MG/DL (ref 0.5–1.3)
CREAT SERPL-MCNC: 1.76 MG/DL (ref 0.5–1.3)
EGFRCR SERPLBLD CKD-EPI 2021: 48 ML/MIN/1.73M*2
EGFRCR SERPLBLD CKD-EPI 2021: 53 ML/MIN/1.73M*2
EGFRCR SERPLBLD CKD-EPI 2021: >90 ML/MIN/1.73M*2
EGFRCR SERPLBLD CKD-EPI 2021: >90 ML/MIN/1.73M*2
EJECTION FRACTION APICAL 4 CHAMBER: 54.1
EJECTION FRACTION: 55 %
EOSINOPHIL # BLD AUTO: 0 X10*3/UL (ref 0–0.7)
EOSINOPHIL # BLD AUTO: 0 X10*3/UL (ref 0–0.7)
EOSINOPHIL NFR BLD AUTO: 0 %
EOSINOPHIL NFR BLD AUTO: 0 %
ERYTHROCYTE [DISTWIDTH] IN BLOOD BY AUTOMATED COUNT: 12.6 % (ref 11.5–14.5)
ERYTHROCYTE [DISTWIDTH] IN BLOOD BY AUTOMATED COUNT: 12.8 % (ref 11.5–14.5)
EST. AVERAGE GLUCOSE BLD GHB EST-MCNC: 166 MG/DL
FENTANYL+NORFENTANYL UR QL SCN: ABNORMAL
GLUCOSE BLD MANUAL STRIP-MCNC: 154 MG/DL (ref 74–99)
GLUCOSE BLD MANUAL STRIP-MCNC: 267 MG/DL (ref 74–99)
GLUCOSE BLD MANUAL STRIP-MCNC: 287 MG/DL (ref 74–99)
GLUCOSE BLD MANUAL STRIP-MCNC: 296 MG/DL (ref 74–99)
GLUCOSE BLDA-MCNC: 150 MG/DL (ref 74–99)
GLUCOSE BLDV-MCNC: 143 MG/DL (ref 74–99)
GLUCOSE BLDV-MCNC: 157 MG/DL (ref 74–99)
GLUCOSE SERPL-MCNC: 196 MG/DL (ref 74–99)
GLUCOSE SERPL-MCNC: 219 MG/DL (ref 74–99)
GLUCOSE SERPL-MCNC: 252 MG/DL (ref 74–99)
GLUCOSE SERPL-MCNC: 312 MG/DL (ref 74–99)
HBA1C MFR BLD: 7.4 % (ref ?–5.7)
HCO3 BLDA-SCNC: 18.8 MMOL/L (ref 22–26)
HCO3 BLDV-SCNC: 21.7 MMOL/L (ref 22–26)
HCO3 BLDV-SCNC: 22 MMOL/L (ref 22–26)
HCT VFR BLD AUTO: 44.3 % (ref 41–52)
HCT VFR BLD AUTO: 44.3 % (ref 41–52)
HCT VFR BLD AUTO: 44.5 % (ref 41–52)
HCT VFR BLD AUTO: 45.3 % (ref 41–52)
HCT VFR BLD EST: 48 % (ref 41–52)
HDLC SERPL-MCNC: 33.9 MG/DL
HGB BLD-MCNC: 15.5 G/DL (ref 13.5–17.5)
HGB BLD-MCNC: 15.5 G/DL (ref 13.5–17.5)
HGB BLD-MCNC: 15.9 G/DL (ref 13.5–17.5)
HGB BLD-MCNC: 16 G/DL (ref 13.5–17.5)
HGB BLDA-MCNC: 16.1 G/DL (ref 13.5–17.5)
HGB BLDV-MCNC: 16 G/DL (ref 13.5–17.5)
HGB BLDV-MCNC: 16.1 G/DL (ref 13.5–17.5)
HOLD SPECIMEN: 293
HOLD SPECIMEN: 293
IMM GRANULOCYTES # BLD AUTO: 0.12 X10*3/UL (ref 0–0.7)
IMM GRANULOCYTES # BLD AUTO: 0.18 X10*3/UL (ref 0–0.7)
IMM GRANULOCYTES NFR BLD AUTO: 0.5 % (ref 0–0.9)
IMM GRANULOCYTES NFR BLD AUTO: 0.7 % (ref 0–0.9)
INHALED O2 CONCENTRATION: 21 %
INHALED O2 CONCENTRATION: 50 %
INHALED O2 CONCENTRATION: 50 %
LACTATE BLDA-SCNC: 3.9 MMOL/L (ref 0.4–2)
LACTATE BLDV-SCNC: 2.5 MMOL/L (ref 0.4–2)
LACTATE SERPL-SCNC: 1.8 MMOL/L (ref 0.4–2)
LACTATE SERPL-SCNC: 2.2 MMOL/L (ref 0.4–2)
LACTATE SERPL-SCNC: 3.4 MMOL/L (ref 0.4–2)
LDLC SERPL CALC-MCNC: 175 MG/DL
LEFT ATRIUM VOLUME AREA LENGTH INDEX BSA: 16.1 ML/M2
LEFT VENTRICLE INTERNAL DIMENSION DIASTOLE: 4.02 CM (ref 3.5–6)
LEFT VENTRICULAR OUTFLOW TRACT DIAMETER: 2.03 CM
LV EJECTION FRACTION BIPLANE: 55 %
LYMPHOCYTES # BLD AUTO: 1.35 X10*3/UL (ref 1.2–4.8)
LYMPHOCYTES # BLD AUTO: 1.4 X10*3/UL (ref 1.2–4.8)
LYMPHOCYTES NFR BLD AUTO: 5.5 %
LYMPHOCYTES NFR BLD AUTO: 5.9 %
MAGNESIUM SERPL-MCNC: 1.98 MG/DL (ref 1.6–2.4)
MAGNESIUM SERPL-MCNC: 2.24 MG/DL (ref 1.6–2.4)
MCH RBC QN AUTO: 30.5 PG (ref 26–34)
MCH RBC QN AUTO: 30.9 PG (ref 26–34)
MCHC RBC AUTO-ENTMCNC: 35 G/DL (ref 32–36)
MCHC RBC AUTO-ENTMCNC: 35 G/DL (ref 32–36)
MCHC RBC AUTO-ENTMCNC: 35.3 G/DL (ref 32–36)
MCHC RBC AUTO-ENTMCNC: 35.7 G/DL (ref 32–36)
MCV RBC AUTO: 86 FL (ref 80–100)
MCV RBC AUTO: 87 FL (ref 80–100)
MCV RBC AUTO: 88 FL (ref 80–100)
MCV RBC AUTO: 88 FL (ref 80–100)
METHADONE UR QL SCN: ABNORMAL
MITRAL VALVE E/A RATIO: 1.11
MONOCYTES # BLD AUTO: 2.07 X10*3/UL (ref 0.1–1)
MONOCYTES # BLD AUTO: 2.84 X10*3/UL (ref 0.1–1)
MONOCYTES NFR BLD AUTO: 11.1 %
MONOCYTES NFR BLD AUTO: 9 %
NEUTROPHILS # BLD AUTO: 19.47 X10*3/UL (ref 1.2–7.7)
NEUTROPHILS # BLD AUTO: 21.13 X10*3/UL (ref 1.2–7.7)
NEUTROPHILS NFR BLD AUTO: 82.6 %
NEUTROPHILS NFR BLD AUTO: 84.5 %
NON HDL CHOLESTEROL: 186 MG/DL (ref 0–149)
NRBC BLD-RTO: 0 /100 WBCS (ref 0–0)
OPIATES UR QL SCN: ABNORMAL
OXYCODONE+OXYMORPHONE UR QL SCN: ABNORMAL
OXYHGB MFR BLDA: 88.7 % (ref 94–98)
OXYHGB MFR BLDV: 85.2 % (ref 45–75)
OXYHGB MFR BLDV: 93.1 % (ref 45–75)
PCO2 BLDA: 23 MM HG (ref 38–42)
PCO2 BLDV: 35 MM HG (ref 41–51)
PCO2 BLDV: 39 MM HG (ref 41–51)
PCP UR QL SCN: ABNORMAL
PH BLDA: 7.52 PH (ref 7.38–7.42)
PH BLDV: 7.36 PH (ref 7.33–7.43)
PH BLDV: 7.4 PH (ref 7.33–7.43)
PHOSPHATE SERPL-MCNC: 3.6 MG/DL (ref 2.5–4.9)
PHOSPHATE SERPL-MCNC: 5.2 MG/DL (ref 2.5–4.9)
PLATELET # BLD AUTO: 290 X10*3/UL (ref 150–450)
PLATELET # BLD AUTO: 319 X10*3/UL (ref 150–450)
PLATELET # BLD AUTO: 326 X10*3/UL (ref 150–450)
PLATELET # BLD AUTO: 326 X10*3/UL (ref 150–450)
PO2 BLDA: 59 MM HG (ref 85–95)
PO2 BLDV: 62 MM HG (ref 35–45)
PO2 BLDV: 82 MM HG (ref 35–45)
POTASSIUM BLDA-SCNC: 3.9 MMOL/L (ref 3.5–5.3)
POTASSIUM BLDV-SCNC: 3.7 MMOL/L (ref 3.5–5.3)
POTASSIUM BLDV-SCNC: 3.7 MMOL/L (ref 3.5–5.3)
POTASSIUM SERPL-SCNC: 3.8 MMOL/L (ref 3.5–5.3)
POTASSIUM SERPL-SCNC: 3.9 MMOL/L (ref 3.5–5.3)
POTASSIUM SERPL-SCNC: 4.4 MMOL/L (ref 3.5–5.3)
POTASSIUM SERPL-SCNC: 4.4 MMOL/L (ref 3.5–5.3)
PROT SERPL-MCNC: 7.5 G/DL (ref 6.4–8.2)
PROT SERPL-MCNC: 7.9 G/DL (ref 6.4–8.2)
PROT SERPL-MCNC: 7.9 G/DL (ref 6.4–8.2)
RBC # BLD AUTO: 5.02 X10*6/UL (ref 4.5–5.9)
RBC # BLD AUTO: 5.02 X10*6/UL (ref 4.5–5.9)
RBC # BLD AUTO: 5.15 X10*6/UL (ref 4.5–5.9)
RBC # BLD AUTO: 5.24 X10*6/UL (ref 4.5–5.9)
RIGHT VENTRICLE FREE WALL PEAK S': 13.3 CM/S
RIGHT VENTRICLE PEAK SYSTOLIC PRESSURE: 36.2 MMHG
SAO2 % BLDA: 91 % (ref 94–100)
SAO2 % BLDV: 87 % (ref 45–75)
SAO2 % BLDV: 95 % (ref 45–75)
SITE OF ARTERIAL PUNCTURE: ABNORMAL
SODIUM BLDA-SCNC: 133 MMOL/L (ref 136–145)
SODIUM BLDV-SCNC: 134 MMOL/L (ref 136–145)
SODIUM BLDV-SCNC: 135 MMOL/L (ref 136–145)
SODIUM SERPL-SCNC: 133 MMOL/L (ref 136–145)
SODIUM SERPL-SCNC: 135 MMOL/L (ref 136–145)
TRICUSPID ANNULAR PLANE SYSTOLIC EXCURSION: 2.2 CM
TRIGL SERPL-MCNC: 57 MG/DL (ref 0–149)
TSH SERPL-ACNC: 1.51 MIU/L (ref 0.44–3.98)
VLDL: 11 MG/DL (ref 0–40)
WBC # BLD AUTO: 23 X10*3/UL (ref 4.4–11.3)
WBC # BLD AUTO: 24.9 X10*3/UL (ref 4.4–11.3)
WBC # BLD AUTO: 24.9 X10*3/UL (ref 4.4–11.3)
WBC # BLD AUTO: 30.3 X10*3/UL (ref 4.4–11.3)

## 2025-05-19 PROCEDURE — 84100 ASSAY OF PHOSPHORUS: CPT | Performed by: INTERNAL MEDICINE

## 2025-05-19 PROCEDURE — 36556 INSERT NON-TUNNEL CV CATH: CPT

## 2025-05-19 PROCEDURE — 71045 X-RAY EXAM CHEST 1 VIEW: CPT

## 2025-05-19 PROCEDURE — 93503 INSERT/PLACE HEART CATHETER: CPT

## 2025-05-19 PROCEDURE — 80069 RENAL FUNCTION PANEL: CPT | Mod: CCI

## 2025-05-19 PROCEDURE — 71045 X-RAY EXAM CHEST 1 VIEW: CPT | Performed by: RADIOLOGY

## 2025-05-19 PROCEDURE — 2020000001 HC ICU ROOM DAILY

## 2025-05-19 PROCEDURE — 94002 VENT MGMT INPAT INIT DAY: CPT

## 2025-05-19 PROCEDURE — 93005 ELECTROCARDIOGRAM TRACING: CPT

## 2025-05-19 PROCEDURE — 94660 CPAP INITIATION&MGMT: CPT

## 2025-05-19 PROCEDURE — 37799 UNLISTED PX VASCULAR SURGERY: CPT

## 2025-05-19 PROCEDURE — 2500000004 HC RX 250 GENERAL PHARMACY W/ HCPCS (ALT 636 FOR OP/ED): Mod: JZ | Performed by: NURSE PRACTITIONER

## 2025-05-19 PROCEDURE — 84484 ASSAY OF TROPONIN QUANT: CPT

## 2025-05-19 PROCEDURE — 2500000001 HC RX 250 WO HCPCS SELF ADMINISTERED DRUGS (ALT 637 FOR MEDICARE OP): Performed by: NURSE PRACTITIONER

## 2025-05-19 PROCEDURE — 82947 ASSAY GLUCOSE BLOOD QUANT: CPT

## 2025-05-19 PROCEDURE — 2500000004 HC RX 250 GENERAL PHARMACY W/ HCPCS (ALT 636 FOR OP/ED): Mod: JZ

## 2025-05-19 PROCEDURE — 2500000005 HC RX 250 GENERAL PHARMACY W/O HCPCS

## 2025-05-19 PROCEDURE — 31500 INSERT EMERGENCY AIRWAY: CPT

## 2025-05-19 PROCEDURE — 82010 KETONE BODYS QUAN: CPT | Performed by: NURSE PRACTITIONER

## 2025-05-19 PROCEDURE — 82010 KETONE BODYS QUAN: CPT

## 2025-05-19 PROCEDURE — C8929 TTE W OR WO FOL WCON,DOPPLER: HCPCS

## 2025-05-19 PROCEDURE — 84132 ASSAY OF SERUM POTASSIUM: CPT

## 2025-05-19 PROCEDURE — 36600 WITHDRAWAL OF ARTERIAL BLOOD: CPT

## 2025-05-19 PROCEDURE — 83605 ASSAY OF LACTIC ACID: CPT

## 2025-05-19 PROCEDURE — 85027 COMPLETE CBC AUTOMATED: CPT

## 2025-05-19 PROCEDURE — 2500000001 HC RX 250 WO HCPCS SELF ADMINISTERED DRUGS (ALT 637 FOR MEDICARE OP): Performed by: STUDENT IN AN ORGANIZED HEALTH CARE EDUCATION/TRAINING PROGRAM

## 2025-05-19 PROCEDURE — 2500000004 HC RX 250 GENERAL PHARMACY W/ HCPCS (ALT 636 FOR OP/ED): Performed by: STUDENT IN AN ORGANIZED HEALTH CARE EDUCATION/TRAINING PROGRAM

## 2025-05-19 PROCEDURE — 83735 ASSAY OF MAGNESIUM: CPT | Performed by: INTERNAL MEDICINE

## 2025-05-19 PROCEDURE — P9045 ALBUMIN (HUMAN), 5%, 250 ML: HCPCS | Mod: JZ | Performed by: NURSE PRACTITIONER

## 2025-05-19 PROCEDURE — 2500000002 HC RX 250 W HCPCS SELF ADMINISTERED DRUGS (ALT 637 FOR MEDICARE OP, ALT 636 FOR OP/ED): Performed by: NURSE PRACTITIONER

## 2025-05-19 PROCEDURE — 83605 ASSAY OF LACTIC ACID: CPT | Performed by: STUDENT IN AN ORGANIZED HEALTH CARE EDUCATION/TRAINING PROGRAM

## 2025-05-19 PROCEDURE — 2500000004 HC RX 250 GENERAL PHARMACY W/ HCPCS (ALT 636 FOR OP/ED): Mod: JZ | Performed by: INTERNAL MEDICINE

## 2025-05-19 PROCEDURE — 2550000001 HC RX 255 CONTRASTS: Performed by: STUDENT IN AN ORGANIZED HEALTH CARE EDUCATION/TRAINING PROGRAM

## 2025-05-19 PROCEDURE — 83735 ASSAY OF MAGNESIUM: CPT | Performed by: NURSE PRACTITIONER

## 2025-05-19 PROCEDURE — 85025 COMPLETE CBC W/AUTO DIFF WBC: CPT | Performed by: INTERNAL MEDICINE

## 2025-05-19 PROCEDURE — 80053 COMPREHEN METABOLIC PANEL: CPT | Performed by: NURSE PRACTITIONER

## 2025-05-19 PROCEDURE — 84484 ASSAY OF TROPONIN QUANT: CPT | Performed by: STUDENT IN AN ORGANIZED HEALTH CARE EDUCATION/TRAINING PROGRAM

## 2025-05-19 PROCEDURE — 36620 INSERTION CATHETER ARTERY: CPT

## 2025-05-19 PROCEDURE — 0BH17EZ INSERTION OF ENDOTRACHEAL AIRWAY INTO TRACHEA, VIA NATURAL OR ARTIFICIAL OPENING: ICD-10-PCS

## 2025-05-19 PROCEDURE — 93306 TTE W/DOPPLER COMPLETE: CPT | Performed by: INTERNAL MEDICINE

## 2025-05-19 PROCEDURE — 2500000004 HC RX 250 GENERAL PHARMACY W/ HCPCS (ALT 636 FOR OP/ED): Mod: JZ | Performed by: STUDENT IN AN ORGANIZED HEALTH CARE EDUCATION/TRAINING PROGRAM

## 2025-05-19 PROCEDURE — 93308 TTE F-UP OR LMTD: CPT | Performed by: STUDENT IN AN ORGANIZED HEALTH CARE EDUCATION/TRAINING PROGRAM

## 2025-05-19 PROCEDURE — 80307 DRUG TEST PRSMV CHEM ANLYZR: CPT

## 2025-05-19 PROCEDURE — 87040 BLOOD CULTURE FOR BACTERIA: CPT | Mod: ELYLAB | Performed by: NURSE PRACTITIONER

## 2025-05-19 PROCEDURE — 36415 COLL VENOUS BLD VENIPUNCTURE: CPT | Performed by: STUDENT IN AN ORGANIZED HEALTH CARE EDUCATION/TRAINING PROGRAM

## 2025-05-19 PROCEDURE — 5A1935Z RESPIRATORY VENTILATION, LESS THAN 24 CONSECUTIVE HOURS: ICD-10-PCS | Performed by: INTERNAL MEDICINE

## 2025-05-19 PROCEDURE — 36415 COLL VENOUS BLD VENIPUNCTURE: CPT | Performed by: INTERNAL MEDICINE

## 2025-05-19 PROCEDURE — 71275 CT ANGIOGRAPHY CHEST: CPT

## 2025-05-19 PROCEDURE — 84484 ASSAY OF TROPONIN QUANT: CPT | Performed by: NURSE PRACTITIONER

## 2025-05-19 PROCEDURE — 85027 COMPLETE CBC AUTOMATED: CPT | Performed by: NURSE PRACTITIONER

## 2025-05-19 PROCEDURE — 2500000002 HC RX 250 W HCPCS SELF ADMINISTERED DRUGS (ALT 637 FOR MEDICARE OP, ALT 636 FOR OP/ED): Performed by: STUDENT IN AN ORGANIZED HEALTH CARE EDUCATION/TRAINING PROGRAM

## 2025-05-19 PROCEDURE — 99291 CRITICAL CARE FIRST HOUR: CPT | Performed by: NURSE PRACTITIONER

## 2025-05-19 PROCEDURE — 99223 1ST HOSP IP/OBS HIGH 75: CPT | Performed by: INTERNAL MEDICINE

## 2025-05-19 PROCEDURE — 82040 ASSAY OF SERUM ALBUMIN: CPT

## 2025-05-19 RX ORDER — ALBUMIN HUMAN 50 G/1000ML
12.5 SOLUTION INTRAVENOUS ONCE
Status: COMPLETED | OUTPATIENT
Start: 2025-05-19 | End: 2025-05-19

## 2025-05-19 RX ORDER — DEXTROSE 50 % IN WATER (D50W) INTRAVENOUS SYRINGE
12.5
Status: DISCONTINUED | OUTPATIENT
Start: 2025-05-19 | End: 2025-05-20 | Stop reason: HOSPADM

## 2025-05-19 RX ORDER — METOPROLOL TARTRATE 1 MG/ML
5 INJECTION, SOLUTION INTRAVENOUS ONCE
Status: COMPLETED | OUTPATIENT
Start: 2025-05-19 | End: 2025-05-19

## 2025-05-19 RX ORDER — DOBUTAMINE HYDROCHLORIDE 400 MG/100ML
5 INJECTION INTRAVENOUS CONTINUOUS
Status: DISCONTINUED | OUTPATIENT
Start: 2025-05-19 | End: 2025-05-19

## 2025-05-19 RX ORDER — ROCURONIUM BROMIDE 10 MG/ML
INJECTION, SOLUTION INTRAVENOUS
Status: DISCONTINUED
Start: 2025-05-19 | End: 2025-05-20 | Stop reason: HOSPADM

## 2025-05-19 RX ORDER — INSULIN LISPRO 100 [IU]/ML
0-15 INJECTION, SOLUTION INTRAVENOUS; SUBCUTANEOUS
Status: DISCONTINUED | OUTPATIENT
Start: 2025-05-19 | End: 2025-05-19

## 2025-05-19 RX ORDER — INSULIN LISPRO 100 [IU]/ML
0-5 INJECTION, SOLUTION INTRAVENOUS; SUBCUTANEOUS
Status: DISCONTINUED | OUTPATIENT
Start: 2025-05-19 | End: 2025-05-19

## 2025-05-19 RX ORDER — METOPROLOL TARTRATE 1 MG/ML
5 INJECTION, SOLUTION INTRAVENOUS EVERY 6 HOURS
Status: DISCONTINUED | OUTPATIENT
Start: 2025-05-19 | End: 2025-05-19

## 2025-05-19 RX ORDER — PROPOFOL 10 MG/ML
0-50 INJECTION, EMULSION INTRAVENOUS CONTINUOUS
Status: DISCONTINUED | OUTPATIENT
Start: 2025-05-19 | End: 2025-05-20 | Stop reason: HOSPADM

## 2025-05-19 RX ORDER — DOXYCYCLINE HYCLATE 100 MG
100 TABLET ORAL EVERY 12 HOURS SCHEDULED
Status: DISCONTINUED | OUTPATIENT
Start: 2025-05-19 | End: 2025-05-19

## 2025-05-19 RX ORDER — CEFTRIAXONE 2 G/50ML
2 INJECTION, SOLUTION INTRAVENOUS EVERY 24 HOURS
Status: DISCONTINUED | OUTPATIENT
Start: 2025-05-19 | End: 2025-05-19

## 2025-05-19 RX ORDER — CLOPIDOGREL BISULFATE 75 MG/1
75 TABLET ORAL DAILY
Status: DISCONTINUED | OUTPATIENT
Start: 2025-05-19 | End: 2025-05-19

## 2025-05-19 RX ORDER — INSULIN LISPRO 100 [IU]/ML
0-20 INJECTION, SOLUTION INTRAVENOUS; SUBCUTANEOUS
Status: DISCONTINUED | OUTPATIENT
Start: 2025-05-19 | End: 2025-05-20 | Stop reason: HOSPADM

## 2025-05-19 RX ORDER — FENTANYL CITRATE 50 UG/ML
50 INJECTION, SOLUTION INTRAMUSCULAR; INTRAVENOUS
Status: DISCONTINUED | OUTPATIENT
Start: 2025-05-19 | End: 2025-05-20 | Stop reason: HOSPADM

## 2025-05-19 RX ORDER — DEXTROSE 50 % IN WATER (D50W) INTRAVENOUS SYRINGE
25
Status: DISCONTINUED | OUTPATIENT
Start: 2025-05-19 | End: 2025-05-20 | Stop reason: HOSPADM

## 2025-05-19 RX ORDER — INSULIN LISPRO 100 [IU]/ML
0-10 INJECTION, SOLUTION INTRAVENOUS; SUBCUTANEOUS
Status: DISCONTINUED | OUTPATIENT
Start: 2025-05-19 | End: 2025-05-19

## 2025-05-19 RX ORDER — PANTOPRAZOLE SODIUM 40 MG/10ML
40 INJECTION, POWDER, LYOPHILIZED, FOR SOLUTION INTRAVENOUS DAILY
Status: DISCONTINUED | OUTPATIENT
Start: 2025-05-19 | End: 2025-05-20 | Stop reason: HOSPADM

## 2025-05-19 RX ORDER — METHOCARBAMOL 500 MG/1
1000 TABLET, FILM COATED ORAL EVERY 8 HOURS PRN
Status: DISCONTINUED | OUTPATIENT
Start: 2025-05-19 | End: 2025-05-19

## 2025-05-19 RX ORDER — PROPOFOL 10 MG/ML
INJECTION, EMULSION INTRAVENOUS
Status: DISCONTINUED
Start: 2025-05-19 | End: 2025-05-20 | Stop reason: HOSPADM

## 2025-05-19 RX ORDER — ONDANSETRON HYDROCHLORIDE 2 MG/ML
INJECTION, SOLUTION INTRAVENOUS
Status: COMPLETED
Start: 2025-05-19 | End: 2025-05-19

## 2025-05-19 RX ORDER — NOREPINEPHRINE BITARTRATE/D5W 8 MG/250ML
.01-1 PLASTIC BAG, INJECTION (ML) INTRAVENOUS CONTINUOUS
Status: DISCONTINUED | OUTPATIENT
Start: 2025-05-19 | End: 2025-05-20 | Stop reason: HOSPADM

## 2025-05-19 RX ORDER — PROCHLORPERAZINE EDISYLATE 5 MG/ML
10 INJECTION INTRAMUSCULAR; INTRAVENOUS EVERY 6 HOURS PRN
Status: COMPLETED | OUTPATIENT
Start: 2025-05-19 | End: 2025-05-19

## 2025-05-19 RX ORDER — HYDROMORPHONE HYDROCHLORIDE 0.2 MG/ML
0.2 INJECTION INTRAMUSCULAR; INTRAVENOUS; SUBCUTANEOUS EVERY 4 HOURS PRN
Status: DISCONTINUED | OUTPATIENT
Start: 2025-05-19 | End: 2025-05-19

## 2025-05-19 RX ORDER — ONDANSETRON HYDROCHLORIDE 2 MG/ML
4 INJECTION, SOLUTION INTRAVENOUS ONCE
Status: DISCONTINUED | OUTPATIENT
Start: 2025-05-19 | End: 2025-05-19

## 2025-05-19 RX ORDER — PANTOPRAZOLE SODIUM 40 MG/1
40 TABLET, DELAYED RELEASE ORAL
Status: DISCONTINUED | OUTPATIENT
Start: 2025-05-20 | End: 2025-05-19

## 2025-05-19 RX ORDER — METHOCARBAMOL 100 MG/ML
1000 INJECTION, SOLUTION INTRAMUSCULAR; INTRAVENOUS EVERY 8 HOURS PRN
Status: DISCONTINUED | OUTPATIENT
Start: 2025-05-19 | End: 2025-05-19

## 2025-05-19 RX ORDER — ROCURONIUM BROMIDE 10 MG/ML
100 INJECTION, SOLUTION INTRAVENOUS ONCE
Status: DISCONTINUED | OUTPATIENT
Start: 2025-05-19 | End: 2025-05-20 | Stop reason: HOSPADM

## 2025-05-19 RX ORDER — CLOPIDOGREL BISULFATE 75 MG/1
300 TABLET ORAL ONCE
Status: DISCONTINUED | OUTPATIENT
Start: 2025-05-19 | End: 2025-05-19

## 2025-05-19 RX ORDER — ACETAMINOPHEN 325 MG/1
650 TABLET ORAL EVERY 6 HOURS PRN
Status: DISCONTINUED | OUTPATIENT
Start: 2025-05-19 | End: 2025-05-20 | Stop reason: HOSPADM

## 2025-05-19 RX ORDER — ONDANSETRON 4 MG/1
4 TABLET, FILM COATED ORAL EVERY 8 HOURS PRN
Status: DISCONTINUED | OUTPATIENT
Start: 2025-05-19 | End: 2025-05-19

## 2025-05-19 RX ORDER — METOCLOPRAMIDE HYDROCHLORIDE 5 MG/ML
10 INJECTION INTRAMUSCULAR; INTRAVENOUS EVERY 8 HOURS PRN
Status: DISCONTINUED | OUTPATIENT
Start: 2025-05-19 | End: 2025-05-19

## 2025-05-19 RX ORDER — ONDANSETRON HYDROCHLORIDE 2 MG/ML
4 INJECTION, SOLUTION INTRAVENOUS EVERY 8 HOURS PRN
Status: DISCONTINUED | OUTPATIENT
Start: 2025-05-19 | End: 2025-05-19

## 2025-05-19 RX ORDER — PANTOPRAZOLE SODIUM 40 MG/10ML
40 INJECTION, POWDER, LYOPHILIZED, FOR SOLUTION INTRAVENOUS
Status: DISCONTINUED | OUTPATIENT
Start: 2025-05-20 | End: 2025-05-19

## 2025-05-19 RX ORDER — INSULIN LISPRO 100 [IU]/ML
0-15 INJECTION, SOLUTION INTRAVENOUS; SUBCUTANEOUS EVERY 4 HOURS
Status: DISCONTINUED | OUTPATIENT
Start: 2025-05-19 | End: 2025-05-19

## 2025-05-19 RX ORDER — INSULIN GLARGINE 100 [IU]/ML
15 INJECTION, SOLUTION SUBCUTANEOUS EVERY 24 HOURS
Status: DISCONTINUED | OUTPATIENT
Start: 2025-05-19 | End: 2025-05-20 | Stop reason: HOSPADM

## 2025-05-19 RX ORDER — FUROSEMIDE 10 MG/ML
40 INJECTION INTRAMUSCULAR; INTRAVENOUS ONCE
Status: COMPLETED | OUTPATIENT
Start: 2025-05-19 | End: 2025-05-19

## 2025-05-19 RX ORDER — SODIUM CHLORIDE, SODIUM LACTATE, POTASSIUM CHLORIDE, CALCIUM CHLORIDE 600; 310; 30; 20 MG/100ML; MG/100ML; MG/100ML; MG/100ML
75 INJECTION, SOLUTION INTRAVENOUS CONTINUOUS
Status: DISCONTINUED | OUTPATIENT
Start: 2025-05-19 | End: 2025-05-19

## 2025-05-19 RX ADMIN — PROPOFOL 15 MCG/KG/MIN: 10 INJECTION, EMULSION INTRAVENOUS at 23:15

## 2025-05-19 RX ADMIN — ALBUMIN HUMAN 12.5 G: 0.05 INJECTION, SOLUTION INTRAVENOUS at 09:08

## 2025-05-19 RX ADMIN — FUROSEMIDE 40 MG: 10 INJECTION, SOLUTION INTRAMUSCULAR; INTRAVENOUS at 21:39

## 2025-05-19 RX ADMIN — INSULIN LISPRO 9 UNITS: 100 INJECTION, SOLUTION INTRAVENOUS; SUBCUTANEOUS at 16:25

## 2025-05-19 RX ADMIN — ATORVASTATIN CALCIUM 40 MG: 20 TABLET, FILM COATED ORAL at 20:28

## 2025-05-19 RX ADMIN — ONDANSETRON 4 MG: 2 INJECTION INTRAMUSCULAR; INTRAVENOUS at 01:01

## 2025-05-19 RX ADMIN — METOPROLOL TARTRATE 5 MG: 5 INJECTION INTRAVENOUS at 08:04

## 2025-05-19 RX ADMIN — ONDANSETRON 4 MG: 2 INJECTION, SOLUTION INTRAMUSCULAR; INTRAVENOUS at 17:08

## 2025-05-19 RX ADMIN — ALBUMIN HUMAN 12.5 G: 0.05 INJECTION, SOLUTION INTRAVENOUS at 11:55

## 2025-05-19 RX ADMIN — ONDANSETRON 4 MG: 2 INJECTION, SOLUTION INTRAMUSCULAR; INTRAVENOUS at 11:45

## 2025-05-19 RX ADMIN — PANTOPRAZOLE SODIUM 40 MG: 40 INJECTION, POWDER, FOR SOLUTION INTRAVENOUS at 17:26

## 2025-05-19 RX ADMIN — ALBUMIN HUMAN 12.5 G: 0.05 INJECTION, SOLUTION INTRAVENOUS at 08:05

## 2025-05-19 RX ADMIN — HYDROMORPHONE HYDROCHLORIDE 0.2 MG: 0.2 INJECTION, SOLUTION INTRAMUSCULAR; INTRAVENOUS; SUBCUTANEOUS at 12:36

## 2025-05-19 RX ADMIN — ACETAMINOPHEN 650 MG: 325 TABLET ORAL at 08:14

## 2025-05-19 RX ADMIN — NOREPINEPHRINE BITARTRATE 0.05 MCG/KG/MIN: 8 INJECTION, SOLUTION INTRAVENOUS at 22:45

## 2025-05-19 RX ADMIN — ONDANSETRON 4 MG: 2 INJECTION, SOLUTION INTRAMUSCULAR; INTRAVENOUS at 01:01

## 2025-05-19 RX ADMIN — METOPROLOL TARTRATE 5 MG: 5 INJECTION INTRAVENOUS at 11:59

## 2025-05-19 RX ADMIN — Medication 150 MG: at 23:00

## 2025-05-19 RX ADMIN — ENOXAPARIN SODIUM 40 MG: 40 INJECTION SUBCUTANEOUS at 08:05

## 2025-05-19 RX ADMIN — CEFTRIAXONE 2 G: 2 INJECTION, SOLUTION INTRAVENOUS at 16:25

## 2025-05-19 RX ADMIN — PROCHLORPERAZINE EDISYLATE 10 MG: 5 INJECTION INTRAMUSCULAR; INTRAVENOUS at 18:30

## 2025-05-19 RX ADMIN — SODIUM CHLORIDE, SODIUM LACTATE, POTASSIUM CHLORIDE, AND CALCIUM CHLORIDE 75 ML/HR: .6; .31; .03; .02 INJECTION, SOLUTION INTRAVENOUS at 15:09

## 2025-05-19 RX ADMIN — ASPIRIN 81 MG: 81 TABLET, COATED ORAL at 08:05

## 2025-05-19 RX ADMIN — TICAGRELOR 90 MG: 90 TABLET ORAL at 20:28

## 2025-05-19 RX ADMIN — SODIUM CHLORIDE, POTASSIUM CHLORIDE, SODIUM LACTATE AND CALCIUM CHLORIDE 500 ML: 600; 310; 30; 20 INJECTION, SOLUTION INTRAVENOUS at 01:02

## 2025-05-19 RX ADMIN — METHOCARBAMOL TABLETS 1000 MG: 500 TABLET, COATED ORAL at 11:30

## 2025-05-19 RX ADMIN — INSULIN LISPRO 6 UNITS: 100 INJECTION, SOLUTION INTRAVENOUS; SUBCUTANEOUS at 06:00

## 2025-05-19 RX ADMIN — DOXYCYCLINE HYCLATE 100 MG: 100 TABLET, FILM COATED ORAL at 20:28

## 2025-05-19 RX ADMIN — METOPROLOL TARTRATE 5 MG: 5 INJECTION INTRAVENOUS at 17:26

## 2025-05-19 RX ADMIN — PERFLUTREN 2 ML OF DILUTION: 6.52 INJECTION, SUSPENSION INTRAVENOUS at 08:50

## 2025-05-19 RX ADMIN — ROCURONIUM 100 MG: 50 INJECTION, SOLUTION INTRAVENOUS at 23:05

## 2025-05-19 RX ADMIN — INSULIN GLARGINE 15 UNITS: 100 INJECTION, SOLUTION SUBCUTANEOUS at 18:39

## 2025-05-19 RX ADMIN — ROCURONIUM BROMIDE 100 MG: 10 INJECTION, SOLUTION INTRAVENOUS at 23:05

## 2025-05-19 RX ADMIN — IOHEXOL 100 ML: 350 INJECTION, SOLUTION INTRAVENOUS at 13:27

## 2025-05-19 RX ADMIN — HYDROMORPHONE HYDROCHLORIDE 0.2 MG: 0.2 INJECTION, SOLUTION INTRAMUSCULAR; INTRAVENOUS; SUBCUTANEOUS at 08:23

## 2025-05-19 RX ADMIN — INSULIN LISPRO 6 UNITS: 100 INJECTION, SOLUTION INTRAVENOUS; SUBCUTANEOUS at 11:55

## 2025-05-19 RX ADMIN — ALBUMIN HUMAN 12.5 G: 0.05 INJECTION, SOLUTION INTRAVENOUS at 15:09

## 2025-05-19 ASSESSMENT — COGNITIVE AND FUNCTIONAL STATUS - GENERAL
DAILY ACTIVITIY SCORE: 24
MOBILITY SCORE: 24

## 2025-05-19 ASSESSMENT — PAIN SCALES - GENERAL
PAINLEVEL_OUTOF10: 8
PAINLEVEL_OUTOF10: 8
PAINLEVEL_OUTOF10: 0 - NO PAIN
PAINLEVEL_OUTOF10: 3

## 2025-05-19 ASSESSMENT — PAIN - FUNCTIONAL ASSESSMENT
PAIN_FUNCTIONAL_ASSESSMENT: 0-10

## 2025-05-19 NOTE — PROCEDURES
Point of Care Ultrasound Note:    Indication: Tachycardia possible new HFrEF  (s): Shane Tabor MD   Exam Type(s): Limited Cardiac Point-of-Care Ultrasound    LV Function: Appears very hyperdynamic and EF appears to be 60-65% based on EPSS. VTI of 20. No gross appearance of MR or gross appearance of mitral stenosis. Difficult to interpret but slight appearance of turbulent flow across interventricular septum vs within RVOT but could not further characterize and no gross deformity indicating VSD or other structural abnormality that can explain murmur.   LA: Appears normal in size  RV Function: Appears grossly normal to hyperdynamic. Tase of 2.2-2.56cm. Unable to get TR jet to estimate RVSP   RA: Appears small  IVC:  Small and >50% collapsible   No pericardial effusion    Impression:  Based on Clinical history, Exam, lab evaluation and POCUS the patient likely with STEMI s/p POBA to PDA as possible culprit? CXR with what appears to be some interstitial changes but no gross pulmonary edema. Pt did vomit in room and state he has not eaten or drank anything all day. Also, discussed with cardiologist and there could have been possible coronary vasospasm.     Plan:  Check official Echo  Anti-emetics PRN  Will give 500cc of LR and monitor heart rate as patient clinically appears dry and has not eaten or drank much all day  C/W DAPT and statin  Can discuss with cardiology about Brilinta vs plavix treatment tomorrow but will continue with Brilinta over night for medical treatment  Should consider CT scan to r/o interstitial disease as patient without gross pulmonary vascular congestion or edema but given smoking history should consider other etiologies  Low suspicion for pericarditis given no prior infectious symptoms  Will check Utox to screen for cocaine use as this could have caused coronary vasospasm  Good pulses bilaterally and no back pain or lower extremity weakness or other neuro symptoms and so low  suspicion for dissection at this time.     Shane Tabor MD

## 2025-05-19 NOTE — CARE PLAN
Problem: ACS/CP/NSTEMI/STEMI  Goal: Chest pain managed (free from pain or at acceptable level)  Outcome: Progressing  Goal: Lab values return to normal range  Outcome: Progressing  Goal: Promote self management  Outcome: Progressing  Goal: Verbalize understanding of procedures/devices  Outcome: Progressing     Problem: Cardiac catheterization  Goal: Free from dysrhythmias  Outcome: Progressing  Goal: Free from pain  Outcome: Progressing  Goal: No evidence of post procedure complications  Outcome: Progressing  Goal: Promote self management  Outcome: Progressing  Goal: Verbalize understanding of procedure  Outcome: Progressing   The patient's goals for the shift include      The clinical goals for the shift include Patient will be hemodynamically stbale throughout this shift.

## 2025-05-19 NOTE — PROGRESS NOTES
05/19/25 1131   Discharge Planning   Living Arrangements Spouse/significant other;Children;Family members   Support Systems Spouse/significant other;Parent;Family members   Assistance Needed PTA - reports none at baseline, independent.   Type of Residence Private residence   Number of Stairs to Enter Residence 0  (ramp)   Number of Stairs Within Residence 0   Do you have animals or pets at home? Yes   Home or Post Acute Services None   Expected Discharge Disposition Home   Does the patient need discharge transport arranged? No   Intensity of Service   Intensity of Service 0-30 min     Presented for chest pain, found with STEMI. Taken to [facility] with PCI on 5/18. Ordered to start new Brilinta, ASA, Statin, and low dose BB. Will need RN Navigator consult if planned to dc home on Brilinta. Prefers HOME at dc.

## 2025-05-19 NOTE — SIGNIFICANT EVENT
The patient is post-PCI and continues to exhibit concerning signs and symptoms, namely persistent chest and back pain, and persistent tachycardia despite the administration of pain medication, intravenous (IV) fluid boluses, and beta-blocker therapy. These findings raise a concern for potential aortic dissection or PE. The combination of chest and back pain, along with ongoing tachycardia despite treatment, is atypical post PCI and concerning for a more serious etiology. Aortic dissection is a life-threatening condition with a high mortality rate if not diagnosed and treated promptly. Computed tomography angiography (CTA) is needed to rule out aortic dissection / PE.

## 2025-05-19 NOTE — PROGRESS NOTES
Alpesh Elena is a 45 y.o. male on day 1 of admission presenting with ST elevation myocardial infarction (STEMI), unspecified artery (Multi).    Past Medical History of a known cardiac murmur, Nephrolithiasis, current smoker of 30 pack years, and FMHx of CAD with stents in his mother who p/w chest tightness. Pt states that he woke up around 7am with chest tightness of about a 2-3 out of 10 without radiation, shortness of breath or nausea. It was intermittent all day but then prior to arrival th patient stated that the tightness got more intense and this time it was associated with severe shortness of breath, nausea, diaphoresis and one episode of vomiting. He denies recent fevers, chills, dysuria, abdominal pain, trauma, new Lower extremity edema, recent surgery, travel, history of DVT/PE, lower extremity weakness or numbness, rhinorrhea, and nasal congestion. Pt also denies recent ETOH use and other drug use such as cocaine.      Pt in the ER was afebrile, tachycardiac, and hypertensive by diastolic BP in the 100s, and normoxemic on room air. Pt had initial EKG which showed STEs in II,III,aVF with aVL depression. Code STEMI was called in ER and patient received ASA, Brilinta, and heparin push. Patient went to the cath lab where they found small PDA with flow. Interventionalist also found depressed LV maybe approx 40% on LV gram as well as LVEDP of 22mHg as well. Patient was sent to ICU for closer monitoring.     Subjective   5/18: Admit to ICU s/p STEMI. LMT normal.  LAD mild irregularity, 30% mid.  LCX mild iregularity.  RCA tortuous, dom.  % ostial.  LVEDP 20-25.  LVEF 40-45%, inferior basal AK.  Successful PTA R % GLENNA 0 reduced to 30%, GLENNA 3 after POBA, with no stent placed due to small caliber vessel and minimal runoff, not enough to warrant stenting. Concern for drug use considering patient very tachycardic and LVEDP elevated with very small PDA occlusion.         Objective     Physical  "Exam  HENT:      Mouth/Throat:      Mouth: Mucous membranes are moist.   Eyes:      General: No scleral icterus.     Extraocular Movements: Extraocular movements intact.      Pupils: Pupils are equal, round, and reactive to light.   Cardiovascular:      Rate and Rhythm: Regular rhythm. Tachycardia present.      Pulses: Normal pulses.      Heart sounds: Murmur heard.      Comments: Holosystolic murmur noted best heart at apex at mitral area  Pulmonary:      Effort: Pulmonary effort is normal. No respiratory distress.      Breath sounds: No wheezing or rales.   Abdominal:      General: Abdomen is flat. Bowel sounds are normal. There is no distension.      Palpations: Abdomen is soft.      Tenderness: There is no abdominal tenderness.   Musculoskeletal:      Right lower leg: No edema.      Left lower leg: No edema.   Skin:     General: Skin is warm and dry.      Capillary Refill: Capillary refill takes less than 2 seconds.      Coloration: Skin is not jaundiced.      Findings: No rash.   Neurological:      General: No focal deficit present.      Mental Status: He is alert and oriented to person, place, and time.      Cranial Nerves: No cranial nerve deficit.      Motor: No weakness.   Psychiatric:         Mood and Affect: Mood normal.         Behavior: Behavior normal.     Last Recorded Vitals  Blood pressure 104/70, pulse (!) 132, temperature 36.6 °C (97.9 °F), temperature source Temporal, resp. rate (!) 38, height 1.727 m (5' 8\"), weight 75.4 kg (166 lb 3.6 oz), SpO2 94%.  Intake/Output last 3 Shifts:  I/O last 3 completed shifts:  In: 500 (6.6 mL/kg) [IV Piggyback:500]  Out: 350 (4.6 mL/kg) [Urine:350 (0.1 mL/kg/hr)]  Weight: 75.4 kg     Objective:  I have reviewed all medications, laboratory results, and imaging pertinent for today's encounter     Assessment/Plan:  Pt is a 46 y/o M w/ a PMHx of cardiac murmur, Nephrolithiasis, current smoker of 30 pack years, and FMHx of CAD with stents in his mother who p/w chest " tightness In the Setting of a ST Elevation MI.      Neuro/Psych/Pain Ctrl/Sedation:  - No current active issues  - Pain control as needed with PRN Tylenol and PRN opioids as needed  - CAM-ICU & Delirium Precautions  - PT/OT evaluation once medically more stable      Respiratory/ENT:  #Current Smoker  -No current active issues  -Supplemental Oxygen as needed to maintain an SpO2>90%  -Incentive spirometry and Early mobilization for lung volume expansion   - Counseled on tobacco cessation     Cardiovascular:  #STEMI - Troponin peaked at 9[address]90, down-trending  #Cardiac murmur  #New HFrEF  #Possible new acute decompensated HFrEF  #Tachycardia  - Will contiue with DTaP (ASA / Brilinta) and statin for medical management  - Start low-dose betablocker  - Formal ECHO  - Continuous Telemetry Monitoring  - Appreciate cardiology recs     Renal/Volume Status (Intra & Extravascular):  #History of nephrolithiasis  - No current active issues  - Monitor UOP Q4H and Cr daily  - Avoid nephrotoxic drugs and renally dose all medications   - Replete Electrolytes to K+ of 4, Mg2+ of 2, Phos of 3     GI:  - No active issues  - Cardiac Diet  - No indication for PPI or GI Ppx at this time  - PRN miralax     Infectious Disease:  #Leukocytosis - likely reactive due to STEMI  - No current active infectious issues  - Will continue to monitor off Abx at this time and culture and start broad spectrum as clinically indicated     Heme/Onc:  - No current active issues  - Monitor Hgb Daily and transfuse for Hgb<8 or bleeding with hemodynamic instability  - Lovenox for VTE PPx     Endocrine  - TSH normal  - Glocose 200s  - Add ISS scale 2  - Finger sticks goals 100-180   - Check A1C  - Check Lipid panel     Ethics/Code Status:  - Full code     :  DVT Prophylaxis: Lovenox  GI Prophylaxis: None indicated  Bowel Regimen: PRN Miralax  Diet: Cardiac  CVC: None  Peyton: None  Dugan: None  Restraints: None  Dispo: ICU     Critical Care Time:  35  minutes  Critical Care Activities: Post STEMI care s/p PCI, Continuous close Telemetry monitoring, coordination of consultation services involved within the patient's care, and time spent with patient explaining current diagnosis and treatment plan and answering any questions.       Dwayne Pepe, APRN-CNP, DNP

## 2025-05-19 NOTE — ED PROVIDER NOTES
HPI   Chief Complaint   Patient presents with    Chest Pain       Patient is a 45-year-old male with past medical history of lipidemia and tobacco use who presents to the ED for chest pain.  Patient states this for started yesterday's been on and off since that time.  It is substernal and does not radiate.  He has associated shortness of breath as well as nausea and vomiting.  He has felt diaphoretic throughout the day.              Patient History   Medical History[1]  Surgical History[2]  Family History[3]  Social History[4]    Physical Exam   ED Triage Vitals [05/18/25 2149]   Temperature Heart Rate Respirations BP   36.5 °C (97.7 °F) (!) 116 18 (!) 125/104      Pulse Ox Temp Source Heart Rate Source Patient Position   99 % Temporal Monitor Lying      BP Location FiO2 (%)     Left arm --       Physical Exam  Vitals and nursing note reviewed.   Constitutional:       Appearance: He is ill-appearing and diaphoretic.   HENT:      Head: Normocephalic.   Cardiovascular:      Rate and Rhythm: Regular rhythm. Tachycardia present.      Heart sounds: Murmur heard.   Pulmonary:      Effort: Pulmonary effort is normal.      Breath sounds: Normal breath sounds. No decreased breath sounds or wheezing.   Skin:     General: Skin is warm.   Neurological:      Mental Status: He is alert.           ED Course & MDM   Diagnoses as of 05/18/25 2200   ST elevation myocardial infarction (STEMI), unspecified artery (Multi)                 No data recorded     Craig Coma Scale Score: 15 (05/18/25 2151 : Kirti Tanner, DIEUDONNE)                           Medical Decision Making  EKG is interpreted by me as sinus tachycardia with a rate of 113 bpm.  QTc of 458.  ST elevations in 2 3 and aVF with some reciprocal changes concerning for acute inferior STEMI.    During triage an EKG is performed and brought to me and it is concerning for ST elevations in leads II, III and aVF with reciprocal changes concerning for inferior MI.  Patient was brought  immediately back to the room and a STEMI was paged overhead.  I spoke to the cardiologist Dr. Baxter who agreed to take the patient to the Cath Lab for further evaluation.    Patient was given aspirin, Brilinta, and heparin.        Procedure  Critical Care    Performed by: Ollie Larsen DO  Authorized by: Ollie Larsen DO    Critical care provider statement:     Critical care time (minutes):  31    Critical care time was exclusive of:  Separately billable procedures and treating other patients    Critical care was time spent personally by me on the following activities:  Discussions with consultants and examination of patient    Care discussed with: admitting provider           [1] No past medical history on file.  [2] No past surgical history on file.  [3] No family history on file.  [4]   Social History  Tobacco Use    Smoking status: Every Day     Types: Cigarettes    Smokeless tobacco: Never   Vaping Use    Vaping status: Never Used   Substance Use Topics    Alcohol use: Yes    Drug use: Never        Ollie Larsen DO  05/18/25 2199

## 2025-05-19 NOTE — CONSULTS
Inpatient consult to Cardiology  Consult performed by: Faraz Ortega DO  Consult ordered by: Ben Baxter MD  Reason for consult: chest pain                                                          Date:   5/19/2025  Patient name:  Alpesh Elena  Date of admission:  5/18/2025  9:46 PM  MRN:   [id number]  YOB: 1980  Time of Consult:  9:41 AM    Consulting Cardiologist/SIGIFREDO: Dr. Faraz Barrett, APRN, CNP  Primary Cardiologist:  None  Referring Provider: Dr. Thompson      Admission Diagnosis:     ST elevation myocardial infarction (STEMI), unspecified artery (Multi)      History of Present Illness:      45-year-old gentleman with past medical history of known cardiac murmur, submandibular abscess, nephrolithiasis, nicotine abuse of 30 pack years and family history of coronary artery disease with stents in his mother and presented to [facility] yesterday with complaints of chest pain and shortness of breath.  The patient woke up yesterday morning around 7 AM with chest tightness without radiation, shortness of breath or nausea.  It was intermittent all day long but then prior to arrival the patient stated the tightness got more intense and this time it was associated with severe shortness of breath, nausea, diaphoresis and 1 episode of vomiting.  He states he has had a cough for a few days but denies any fevers or chills.  Denies any recreational drug abuse or EtOH abuse.  In the emergency department he was afebrile, tachycardic and hypertensive with diastolic BPs in the 100s.  He remained on room air with good oxygen saturation.  He does not routinely see a cardiologist.  EKG in the emergency department showed ST elevations in leads II, III and aVF with aVL depression.  Code STEMI was activated in the emergency department and the patient received aspirin, Brilinta and heparin.  Patient underwent left heart catheterization by Dr. Contreras  Donaldo and they found a small PDA with flow.  Interventionalists also found LV depressed at approximately 40% on LV gram as well as LVEDP of 22 mmHg as well.  LMT was normal, LAD with mild irregularity, 30% mid, wraps around the apex.  Left circumflex with mild irregularity.  RCA tortuous but dominant.  PDA is 100% ostial.  LVEDP was 2025.  Successful PTA RPDA 100% GLENNA 0 reduced to 30%, GLENNA-3 after probe with no stent placed due to small caliber vessel minimal runoff, not enough to warrant stenting.  Patient was sent to the ICU for closer monitoring.  This morning the patient still complains of minor chest pressure.  He does report that it hurts more when he coughs.  Lab work this morning shows significantly elevated glucose.  Troponin is 21,000 but trending down.  Lactate was 2.2 but is now normalized.  He does have a significant white count of 23,000 along with neutrophils.  EKG this morning still shows sinus tachycardia with ST elevations in leads II, III and aVF.  Patient resting in the intensive care unit.  General cardiology was consulted due to EKG changes, chest pain and recent PCI.     Previous Cardiac Testing:  I personally reviewed previous cardiac testing and agree with the current findings.              Allergies:     No Known Allergies      Past Medical History:     See above    Past Surgical History:     Submandibular abscess    Family History:     Medical History Relation Name Comments   Cancer Father   ?   Heart Disease Mother         Family History  Relation Name Status Comments   Brother   Alive     Father        Mother   Alive     Sister   Alive         Social History:     Current nicotine abuse  Denies EtOH abuse  Denies recreational drug abuse    CURRENT INPATIENT MEDICATIONS    aspirin, 81 mg, oral, Daily  atorvastatin, 40 mg, oral, Nightly  enoxaparin, 40 mg, subcutaneous, q24h  insulin lispro, 0-10 Units, subcutaneous, Before meals & nightly  metoprolol, 5 mg, intravenous,  q6h  oxygen, , inhalation, Continuous - 02/gases  ticagrelor, 90 mg, oral, BID         No current outpatient medications     Review of Systems:      12 point review of systems was obtained in detail and is negative other than that detailed above.    Vital Signs:     Vitals:    05/19/25 0700 05/19/25 0800 05/19/25 0815 05/19/25 0900   BP: 104/70 106/75 97/67 97/65   Pulse: (!) 132 (!) 130 (!) 122 (!) 128   Resp: (!) 38 (!) 39 (!) 28 (!) 37   Temp:       TempSrc:       SpO2: 94% 94% 94% 93%   Weight:       Height:           Intake/Output Summary (Last 24 hours) at 5/19/2025 0941  Last data filed at 5/19/2025 0815  Gross per 24 hour   Intake 990 ml   Output 350 ml   Net 640 ml       Wt Readings from Last 4 Encounters:   05/19/25 75.4 kg (166 lb 3.6 oz)   03/03/25 79.6 kg (175 lb 7.8 oz)   02/18/25 81.6 kg (180 lb)   07/08/24 76.8 kg (169 lb 6.4 oz)       Physical Examination:     Physical Exam  HENT:      Head: Normocephalic.      Mouth/Throat:      Mouth: Mucous membranes are moist.   Eyes:      Pupils: Pupils are equal, round, and reactive to light.   Cardiovascular:      Rate and Rhythm: Regular rhythm. Tachycardia present.      Heart sounds: Murmur heard.   Pulmonary:      Effort: Pulmonary effort is normal. Tachypnea present.      Breath sounds: Decreased air movement present. Examination of the right-upper field reveals wheezing. Examination of the left-upper field reveals wheezing. Wheezing present.   Abdominal:      General: Bowel sounds are normal.      Palpations: Abdomen is soft.   Musculoskeletal:         General: Normal range of motion.      Right lower leg: No edema.      Left lower leg: No edema.   Skin:     General: Skin is warm and dry.      Capillary Refill: Capillary refill takes less than 2 seconds.   Neurological:      Mental Status: He is alert and oriented to person, place, and time.   Psychiatric:         Mood and Affect: Mood normal.           Lab:     CBC:   Results from last 7 days   Lab  Units 05/19/25  0443 05/18/25  2159   WBC AUTO x10*3/uL 23.0* 20.5*   RBC AUTO x10*6/uL 5.15 5.25   HEMOGLOBIN g/dL 15.9 16.2   HEMATOCRIT % 44.5 46.7   MCV fL 86 89   MCH pg 30.9 30.9   MCHC g/dL 35.7 34.7   RDW % 12.6 12.6   PLATELETS AUTO x10*3/uL 319 294     CMP:    Results from last 7 days   Lab Units 05/19/25  0443 05/18/25  2328   SODIUM mmol/L 135* 133*   POTASSIUM mmol/L 4.4 3.9   CHLORIDE mmol/L 101 99   CO2 mmol/L 21 21   BUN mg/dL 11 7   CREATININE mg/dL 0.94 0.79   GLUCOSE mg/dL 252* 219*   PROTEIN TOTAL g/dL  --  7.9   CALCIUM mg/dL 9.8 9.2   BILIRUBIN TOTAL mg/dL  --  1.1   ALK PHOS U/L  --  106   AST U/L  --  88*   ALT U/L  --  31     BMP:    Results from last 7 days   Lab Units 05/19/25  0443 05/18/25  2328   SODIUM mmol/L 135* 133*   POTASSIUM mmol/L 4.4 3.9   CHLORIDE mmol/L 101 99   CO2 mmol/L 21 21   BUN mg/dL 11 7   CREATININE mg/dL 0.94 0.79   CALCIUM mg/dL 9.8 9.2   GLUCOSE mg/dL 252* 219*     Magnesium:  Results from last 7 days   Lab Units 05/19/25  0443   MAGNESIUM mg/dL 1.98     Troponin:    Results from last 7 days   Lab Units 05/19/25  0443 05/18/25  2328   TROPHS ng/L 21,701* 24,085*     BNP:   Results from last 7 days   Lab Units 05/18/25  2159   BNP pg/mL 236*     Lipid Panel:  Results from last 7 days   Lab Units 05/18/25  2328   HDL mg/dL 33.9   CHOLESTEROL/HDL RATIO  6.5   VLDL mg/dL 11   TRIGLYCERIDES mg/dL 57   NON HDL CHOL. mg/dL 186*        Diagnostic Studies:   Physician Transition of Care Summary  Invasive Cardiovascular Lab     Procedure Date: 5/18/2025  Attending:    * Ben Baxter - Primary  Resident/Fellow/Other Assistant: Surgeons and Role:  * No surgeons found with a matching role *     Indications:   Pre-op Diagnosis      * STEMI (ST elevation myocardial infarction) (Multi) [I21.3]     Post-procedure diagnosis:   Post-op Diagnosis     * STEMI (ST elevation myocardial infarction) (Multi) [I21.3]     Procedure(s):   LHC, No LV  41352 - [place] CATH PLACEMENT & NJX  CORONARY ART ANGIO IMG S&I     PCI  96616 - [place] PRQ TRLUML CORONARY ANGIOPLASTY ONE ART/BRANCH           Procedure Findings:   LMT normal.  LAD mild irregularity, 30% mid, wraps around the apex.  LCX mild iregularity.  RCA tortuous, dom.  PDA is 100% ostial.  LVEDP 20-25.  LVEF 40-45%, inferior basal AK.       Successful PTA R % GLENNA 0 reduced to 30%, GLENNA 3 after POBA, with no stent placed due to small caliber vessel and minimal runoff, not enough to warrant stenting.     Concern for drug use considering patient very tachycardic and LVEDP elevated with very small PDA occlusion.  Discussed with Dr. Tabor, who will check utox and bedside ultrasound.     Description of the Procedure:   R radial 5/6 slender, 3DRC guide, TIG, PIG     Complications:   None     Stents/Implants:   Implants         No implant documentation for this case.                Anticoagulation/Antiplatelet Plan:   ASA/plavix     Estimated Blood Loss:   0 mL     Anesthesia: Moderate Sedation         Anesthesia Staff: No anesthesia staff entered.     Any Specimen(s) Removed:   No specimens collected during this procedure.     Disposition:   To the ICU        Electronically signed by: Margaret Gaspar MD, 5/18/2025 11:13 PM        [place] [address]   Tel [phone] Fax [phone]     TRANSTHORACIC ECHOCARDIOGRAM REPORT     Patient Name:       AVRIL CORONAHERIBERTO Yoder Physician:    [id number] Faraz Ortega DO  Study Date:         5/19/2025            Ordering Provider:    [id number] MARGARET GASPAR  MRN/PID:            [id number]             Fellow:  Accession#:         [id number]         Nurse:  Date of Birth/Age:  1980 / 45 years Sonographer:          Koki Mendez                                                                 MIKE  Gender Assigned at  M                     Additional Staff:  Birth:  Height:             172.72 cm            Admit Date:           5/18/2025  Weight:             75.30 kg             Admission Status:     Inpatient - STAT  BSA / BMI:          1.89 m2 / 25.24      Department Location:  [facility]                      kg/m2  Blood Pressure: 97 /65 mmHg     Study Type:    TRANSTHORACIC ECHO (TTE) COMPLETE  Diagnosis/ICD: ST elevation (STEMI) myocardial infarction of unspecified                 site-I21.3  Indication:    STEMI  CPT Codes:     Echo Complete w Full Doppler-[id number]     Patient History:  PCI and Stent:     PCI performed on 5/18/2025.  Smoker:            Current.  Pertinent History: Chest Pain and Murmur.     Study Detail: The following Echo studies were performed: 2D, M-Mode, Doppler and                color flow. Definity used as a contrast agent for endocardial                border definition. Total contrast used for this procedure was 2 mL                via IV push.        PHYSICIAN INTERPRETATION:  Left Ventricle: The left ventricular systolic function is normal, with a visually estimated ejection fraction of 55%. There is mild concentric left ventricular hypertrophy. Wall motion is abnormal. The left ventricular cavity size is normal. There is mild increased septal and mildly increased posterior left ventricular wall thickness. Spectral Doppler shows a normal pattern of left ventricular diastolic filling.  LV Wall Scoring:  The basal inferoseptal segment, basal inferolateral segment, and basal inferior  segment are hypokinetic. All remaining scored segments are normal.     Left Atrium: The left atrial size is normal.  Right Ventricle: The right ventricle is normal in size. There is normal right ventricular global systolic function.  Right Atrium: The right atrial size is normal.  Aortic Valve: The aortic valve appears structurally normal. There is no evidence of aortic valve stenosis.  The aortic valve dimensionless index is  0.73. There is no evidence of aortic valve regurgitation. The peak instantaneous gradient of the aortic valve is 5 mmHg. The mean gradient of the aortic valve is 3 mmHg.  Mitral Valve: The mitral valve is normal in structure. There is no evidence of mitral valve stenosis. There is normal mitral valve leaflet mobility. There is no evidence of mitral valve regurgitation.  Tricuspid Valve: The tricuspid valve is structurally normal. There is normal tricuspid valve leaflet mobility. There is trace tricuspid regurgitation.  Pulmonic Valve: The pulmonic valve is structurally normal. There is no indication of pulmonic valve regurgitation.  Pericardium: No pericardial effusion noted.  Aorta: The aortic root is normal.  Pulmonary Artery: The main pulmonary artery is normal in size, and position, with normal bifurcation into the left and right pulmonary arteries. The tricuspid regurgitant velocity is 2.88 m/s, and with an estimated right atrial pressure of 3 mmHg, the estimated pulmonary artery pressure is mildly elevated with the RVSP at 36.2 mmHg.  Systemic Veins: The inferior vena cava appears normal in size.        CONCLUSIONS:   1. The left ventricular systolic function is normal, with a visually estimated ejection fraction of 55%.   2. Basal inferoseptal segment, basal inferolateral segment, and basal inferior segment are abnormal.   3. Abnormal wall motion.   4. There is normal right ventricular global systolic function.   5. Normal sized right ventricle.   6. There is no evidence of mitral valve stenosis.   7. No evidence of mitral valve regurgitation.   8. Trace tricuspid regurgitation is visualized.   9. Aortic valve stenosis is not present.  10. The main pulmonary artery is normal in size, and position, with normal bifurcation into the left and right pulmonary arteries.      Radiology:     Transthoracic Echo Complete   Final Result      Point of Care Ultrasound   Final Result      XR chest 1 view   Final Result    No acute cardiopulmonary process.        MACRO:   None        Signed by: Samia Young 5/19/2025 12:48 AM   Dictation workstation:   [id number]      Cardiac Catheterization Procedure    (Results Pending)       Problem List:     Patient Active Problem List   Diagnosis    Submandibular abscess    Submandibular space infection    ST elevation myocardial infarction (STEMI), unspecified artery (Multi)       Assessment:   45-year-old gentleman seen and evaluated bedside in the medical intensive care unit in conjunction with Richard WARD CNP.    Bedside examination evaluation interview performed by me.    Chart reviewed in detail including lab, EKG, x-rays, catheterization and echocardiogram and discussed with the patient and staff.    Impression:  Acute myocardial infarction, acute inferior wall STEMI  CAD as per catheterization note  Ischemic cardiomyopathy with an EF of 40 to 45% per [facility]  Tachycardia  Leukocytosis  Hyperglycemia  Remote history of submandibular abscess  Cardiac murmur      Plan:   Recommendation:  Admitted to ICU post [facility] with PCI.  Currently remains in sinus tachycardia with heart rates between 120 and 130.  EKG does show continued ST elevation in leads II, III and aVF.  Continue monitoring on telemetry  Supplemental O2, keep SpO2 greater than 92%.  Monitor electrolytes, keep potassium greater than 4 and magnesium greater than 2  Would continue with aspirin, brilinta, statin and low-dose beta-blocker for now  LVEDP was 20-25 on left heart catheterization.  Would attempt gentle hydration and monitor strict I's and O's and daily weights.  Echocardiogram pending, preliminarily appears to be relatively normal  Reviewed lipid panel, hemoglobin A1c pending  Significant leukocytosis with neutrophils, would rule out any infectious process  TSH unremarkable  Would consider differential for PE, defer to primary team  Nicotine patch for smoking cessation  Will optimize GDMT as BP and heart rate  allow  Continue monitoring on telemetry for now  Will follow along with you    I have reviewed all medications, laboratory results, and imaging pertinent for today's encounter         Discussion:  Is a 45-year-old gentleman with no prior cardiovascular history who presents to the hospital with acute ST elevation MI in the inferior wall.  He went to the Cath Lab and has small PDA vessel dilated with plain balloon angioplasty and no stent.  LV function currently appears to be overall normal with slight hypokinesis in the inferior wall.  Patient is a bit tachycardic and tachypneic.  He still has some atypical symptoms with pleuritic and positional discomfort lying on his side.  Echo does not show any signs of significant effusion or pericarditis.  At this time continued management will include additional fluid administration and analgesics along with his cardiac medications.  He will remain in the ICU for further observation.  We will continue to follow closely.  Discussed in detail with the intensive care director Dr. Thompson who will be assessing other medical matters at this time.      Faraz Ortega DO,[facility]      [facility]  Adult Gerontology Acute Care Nurse Practitioner  [facility]   [facility]  [phone]      Of note, this documentation is completed using the Dragon Dictation system (voice recognition software). There may be spelling and/or grammatical errors that were not corrected prior to final submission.      Electronically signed by Faraz Ortega DO, on 5/19/2025 at 9:41 AM

## 2025-05-19 NOTE — POST-PROCEDURE NOTE
Form faxed 8/7.    L/m for [facility] w/ info Physician Transition of Care Summary  Invasive Cardiovascular Lab    Procedure Date: 5/18/2025  Attending:    * Ben Baxter - Primary  Resident/Fellow/Other Assistant: Surgeons and Role:  * No surgeons found with a matching role *    Indications:   Pre-op Diagnosis      * STEMI (ST elevation myocardial infarction) (Multi) [I21.3]    Post-procedure diagnosis:   Post-op Diagnosis     * STEMI (ST elevation myocardial infarction) (Multi) [I21.3]    Procedure(s):   LHC, No LV  60019 - MD CATH PLACEMENT & NJX CORONARY ART ANGIO IMG S&I    PCI  41522 - [place] PRQ TRLUML CORONARY ANGIOPLASTY ONE ART/BRANCH        Procedure Findings:   LMT normal.  LAD mild irregularity, 30% mid, wraps around the apex.  LCX mild iregularity.  RCA tortuous, dom.  PDA is 100% ostial.  LVEDP 20-25.  LVEF 40-45%, inferior basal AK.      Successful PTA R % GLENNA 0 reduced to 30%, GLENNA 3 after POBA, with no stent placed due to small caliber vessel and minimal runoff, not enough to warrant stenting.    Concern for drug use considering patient very tachycardic and LVEDP elevated with very small PDA occlusion.  Discussed with Dr. Tabor, who will check utox and bedside ultrasound.    Description of the Procedure:   R radial 5/6 slender, 3DRC guide, TIG, PIG    Complications:   None    Stents/Implants:   Implants       No implant documentation for this case.            Anticoagulation/Antiplatelet Plan:   ASA/plavix    Estimated Blood Loss:   0 mL    Anesthesia: Moderate Sedation Anesthesia Staff: No anesthesia staff entered.    Any Specimen(s) Removed:   No specimens collected during this procedure.    Disposition:   To the ICU      Electronically signed by: Ben Baxter MD, 5/18/2025 11:13 PM

## 2025-05-19 NOTE — CARE PLAN
The patient's goals for the shift include      The clinical goals for the shift include Patient's pain will be maintained at acceptable level      Problem: ACS/CP/NSTEMI/STEMI  Goal: Chest pain managed (free from pain or at acceptable level)  Outcome: Progressing  Goal: Lab values return to normal range  Outcome: Progressing  Goal: Promote self management  Outcome: Progressing  Goal: Verbalize understanding of procedures/devices  Outcome: Progressing     Problem: Cardiac catheterization  Goal: Free from dysrhythmias  Outcome: Progressing  Goal: Free from pain  Outcome: Progressing  Goal: No evidence of post procedure complications  Outcome: Progressing  Goal: Promote self management  Outcome: Progressing  Goal: Verbalize understanding of procedure  Outcome: Progressing     Problem: Pain - Adult  Goal: Verbalizes/displays adequate comfort level or baseline comfort level  Outcome: Progressing     Problem: Safety - Adult  Goal: Free from fall injury  Outcome: Progressing     Problem: Discharge Planning  Goal: Discharge to home or other facility with appropriate resources  Outcome: Progressing     Problem: Chronic Conditions and Co-morbidities  Goal: Patient's chronic conditions and co-morbidity symptoms are monitored and maintained or improved  Outcome: Progressing     Problem: Nutrition  Goal: Nutrient intake appropriate for maintaining nutritional needs  Outcome: Progressing

## 2025-05-19 NOTE — H&P
[facility] Critical Care Medicine History & Physical    Date:  5/18/2025  Patient:  Alpesh Elena  YOB: 1980  MRN:  [id number]   Admit Date:  5/18/2025  ========================================================================================================    Chief Complaint   Patient presents with    Chest Pain     History of Present Illness:  Alpesh Elena is a 45 y.o. year old male patient with Past Medical History of a known cardiac murmur, Nephrolithiasis, current smoker of 30 pack years, and FMHx of CAD with stents in his mother who p/w chest tightness. Pt states that he woke up around 7am with chest tightness of about a 2-3 out of 10 without radiation, shortness of breath or nausea. It was intermittent all day but then prior to arrival th patient stated that the tightness got more intense and this time it was associated with severe shortness of breath, nausea, diaphoresis and one episode of vomiting. He denies recent fevers, chills, dysuria, abdominal pain, trauma, new Lower extremity edema, recent surgery, travel, history of DVT/PE, lower extremity weakness or numbness, rhinorrhea, and nasal congestion. Pt also denies recent ETOH use and other drug use such as cocaine.     Pt in the ER was afebrile, tachycardiac, and hypertensive by diastolic BP in the 100s, and normoxemic on room air. Pt had initial EKG which showed STEs in II,III,aVF with aVL depression. Code STEMI was called in ER and patient received ASA, Brilinta, and heparin push. Patient went to the cath lab where they found small PDA with flow. Interventionalist also found depressed LV maybe approx 40% on LV gram as well as LVEDP of 22mHg as well. Patient was sent to ICU for closer monitoring.     Medical History:  Medical History[1]  Surgical History[2]  Prescriptions Prior to Admission[3]  Patient has no known allergies.  Social History[4]  Family History[5]    Review of Systems:  14 point review of systems was completed and  "negative except for those specially mention in my HPI    Physical Exam:    Heart Rate:  [116-130]   Temperature:  [36.5 °C (97.7 °F)]   Respirations:  [18-19]   BP: (125-139)/()   Height:  [172.7 cm (5' 8\")]   Weight:  [80.7 kg (178 lb)]   Pulse Ox:  [99 %-100 %]     Physical Exam  Constitutional:       General: He is not in acute distress.     Appearance: Normal appearance. He is not ill-appearing or toxic-appearing.      Comments: Sitting in ER bed in no acute distress  No diaphoresis noted   HENT:      Mouth/Throat:      Mouth: Mucous membranes are moist.   Eyes:      General: No scleral icterus.     Extraocular Movements: Extraocular movements intact.      Pupils: Pupils are equal, round, and reactive to light.   Cardiovascular:      Rate and Rhythm: Regular rhythm. Tachycardia present.      Pulses: Normal pulses.      Heart sounds: Murmur heard.      Comments: Holosystolic murmur noted best heart at apex at mitral area  Pulmonary:      Effort: Pulmonary effort is normal. No respiratory distress.      Breath sounds: No wheezing or rales.   Abdominal:      General: Abdomen is flat. Bowel sounds are normal. There is no distension.      Palpations: Abdomen is soft.      Tenderness: There is no abdominal tenderness.   Musculoskeletal:      Right lower leg: No edema.      Left lower leg: No edema.   Skin:     General: Skin is warm and dry.      Capillary Refill: Capillary refill takes less than 2 seconds.      Coloration: Skin is not jaundiced.      Findings: No rash.   Neurological:      General: No focal deficit present.      Mental Status: He is alert and oriented to person, place, and time.      Cranial Nerves: No cranial nerve deficit.      Motor: No weakness.   Psychiatric:         Mood and Affect: Mood normal.         Behavior: Behavior normal.       Objective:  I have reviewed all medications, laboratory results, and imaging pertinent for today's encounter    Assessment/Plan:  Pt is a 44 y/o M w/ a PMHx " of cardiac murmur, Nephrolithiasis, current smoker of 30 pack years, and FMHx of CAD with stents in his mother who p/w chest tightness In the Setting of a ST Elevation MI.     Neuro/Psych/Pain Ctrl/Sedation:  - No current active issues  - Pain control as needed with PRN Tylenol and PRN opioids as needed  - CAM-ICU & Delirium Precautions  - PT/OT evaluation once medically more stable     Respiratory/ENT:  #Current Smoker  -No current active issues  -Supplemental Oxygen as needed to maintain an SpO2>90%  -Incentive spirometry and Early mobilization for lung volume expansion   - Counseled on tobacco cessation    Cardiovascular:  #STEMI  #Cardiac murmur  #New HFrEF  #Possible new acute decompensated HFrEF  - Will contiue with DTaP and statin for medical management  - Will discuss need for Beta-blocker after official Echo done  - Discussed with Interventionalist and will do bedside POCUS tonight and would consider Diuresis  - Continuous Telemetry Monitoring  - Check official TTE   - Appreciate cardiology recs  - Check BNP and trend trops Q8H for 2 more draws  - Check lactic acid level now    Renal/Volume Status (Intra & Extravascular):  #History of nephrolithiasis  - No current active issues  - Consider Diuresis over night   - Monitor UOP Q4H and Cr daily  - Avoid nephrotoxic drugs and renally dose all medications   - Replete Electrolytes to K+ of 4, Mg2+ of 2, Phos of 3    GI:  - No active issues  - Cardiac Diet  - No indication for PPI or GI Ppx at this time  - Will add PRN miralax    Infectious Disease:  #Leukocytosis - likely reactive due to STEMI  - No current active infectious issues  - Will continue to monitor off Abx at this time and culture and start broad spectrum as clinically indicated    Heme/Onc:  - No current active issues  - Monitor Hgb Daily and transfuse for Hgb<8 or bleeding with hemodynamic instability  - Lovenox for VTE PPx    Endocrine  - No current active issues  - No need for finger sticks at this  time as blood glucose <160 on BMP  - Will add finger sticks as needed for blood glucoses >160 on lab work   - Add ISS or dextrose in fluids as needed  - Finger sticks goals 100-180   - Check A1C  - Check Lipid panel  - Check TSH w/ reflex FT4    Ethics/Code Status:  - Full code    :  DVT Prophylaxis: Lovenox  GI Prophylaxis: None indicated  Bowel Regimen: PRN Miralax  Diet: Cardiac  CVC: None  Carmen: None  Dugan: None  Restraints: None  Dispo: ICU    Critical Care Time:  35 minutes  Critical Care Activities: Post STEMI care s/p PCI, Continuous close Telemetry monitoring, coordination of consultation services involved within the patient's care, and time spent with patient explaining current diagnosis and treatment plan and answering any questions.    Shane Tabor MD        [1] No past medical history on file.  [2] No past surgical history on file.  [3] (Not in a hospital admission)  [4]   Social History  Tobacco Use    Smoking status: Every Day     Types: Cigarettes    Smokeless tobacco: Never   Vaping Use    Vaping status: Never Used   Substance Use Topics    Alcohol use: Yes    Drug use: Never   [5] No family history on file.

## 2025-05-19 NOTE — H&P
"History Of Present Illness  Alpesh Elena is a 45 y.o. male presenting with ***.     Past Medical History  Medical History[1]    Surgical History  Surgical History[2]     Social History  He reports that he has been smoking cigarettes. He has never used smokeless tobacco. He reports current alcohol use. He reports that he does not use drugs.    Family History  Family History[3]     Allergies  Patient has no known allergies.    Review of Systems     Physical Exam     Last Recorded Vitals  Blood pressure 121/81, pulse (!) 130, temperature 36.5 °C (97.7 °F), temperature source Temporal, resp. rate 16, height 1.727 m (5' 8\"), weight 80.7 kg (178 lb), SpO2 95%.    Relevant Results  {If you would like to pull in Medications, type .meds     If you would like to pull in Lab results for the last 24 hours, type .[id number]    If you would like to pull in Imaging results, type .imgrslt :99}      ***     Assessment & Plan  ST elevation myocardial infarction (STEMI), unspecified artery (Multi)      ***    I spent *** minutes in the professional and overall care of this patient.      Ben Baxter MD         [1] No past medical history on file.  [2] No past surgical history on file.  [3] No family history on file.    "

## 2025-05-19 NOTE — PRE-SEDATION DOCUMENTATION
Sedation Plan    ASA 3 - emergent     Mallampati class: II.    Risks, benefits, and alternatives discussed with patient.

## 2025-05-20 ENCOUNTER — APPOINTMENT (OUTPATIENT)
Dept: RADIOLOGY | Facility: HOSPITAL | Age: 45
DRG: 250 | End: 2025-05-20
Payer: COMMERCIAL

## 2025-05-20 ENCOUNTER — HOSPITAL ENCOUNTER (INPATIENT)
Facility: HOSPITAL | Age: 45
End: 2025-05-20
Attending: HOSPITALIST | Admitting: HOSPITALIST
Payer: COMMERCIAL

## 2025-05-20 VITALS
DIASTOLIC BLOOD PRESSURE: 71 MMHG | TEMPERATURE: 99.1 F | HEART RATE: 136 BPM | HEIGHT: 68 IN | OXYGEN SATURATION: 99 % | RESPIRATION RATE: 20 BRPM | BODY MASS INDEX: 25.19 KG/M2 | WEIGHT: 166.23 LBS | SYSTOLIC BLOOD PRESSURE: 94 MMHG

## 2025-05-20 DIAGNOSIS — K92.1 MELENA: ICD-10-CM

## 2025-05-20 DIAGNOSIS — D64.9 ANEMIA, UNSPECIFIED TYPE: ICD-10-CM

## 2025-05-20 DIAGNOSIS — I23.2: ICD-10-CM

## 2025-05-20 DIAGNOSIS — Q21.0 VENTRICULAR SEPTAL DEFECT (HHS-HCC): ICD-10-CM

## 2025-05-20 DIAGNOSIS — R57.0 CARDIOGENIC SHOCK (MULTI): ICD-10-CM

## 2025-05-20 DIAGNOSIS — I21.3 ST ELEVATION MYOCARDIAL INFARCTION (STEMI), UNSPECIFIED ARTERY (MULTI): ICD-10-CM

## 2025-05-20 DIAGNOSIS — N17.9 AKI (ACUTE KIDNEY INJURY): ICD-10-CM

## 2025-05-20 DIAGNOSIS — Q21.0 VSD (VENTRICULAR SEPTAL DEFECT) (HHS-HCC): ICD-10-CM

## 2025-05-20 DIAGNOSIS — I21.3 STEMI (ST ELEVATION MYOCARDIAL INFARCTION) (MULTI): Primary | ICD-10-CM

## 2025-05-20 LAB
ABO GROUP (TYPE) IN BLOOD: NORMAL
ACT BLD: 316 SEC (ref 83–199)
ACT BLD: 326 SEC (ref 83–199)
ALBUMIN SERPL BCP-MCNC: 3.4 G/DL (ref 3.4–5)
ALBUMIN SERPL BCP-MCNC: 3.7 G/DL (ref 3.4–5)
ALBUMIN SERPL BCP-MCNC: 4.5 G/DL (ref 3.4–5)
ALP SERPL-CCNC: 116 U/L (ref 33–120)
ALP SERPL-CCNC: 95 U/L (ref 33–120)
ALT SERPL W P-5'-P-CCNC: 549 U/L (ref 10–52)
ALT SERPL W P-5'-P-CCNC: 732 U/L (ref 10–52)
ANION GAP BLDA CALCULATED.4IONS-SCNC: 14 MMO/L (ref 10–25)
ANION GAP BLDA CALCULATED.4IONS-SCNC: 15 MMO/L (ref 10–25)
ANION GAP BLDA CALCULATED.4IONS-SCNC: 15 MMO/L (ref 10–25)
ANION GAP BLDA CALCULATED.4IONS-SCNC: 16 MMO/L (ref 10–25)
ANION GAP BLDA CALCULATED.4IONS-SCNC: 16 MMO/L (ref 10–25)
ANION GAP BLDA CALCULATED.4IONS-SCNC: 17 MMO/L (ref 10–25)
ANION GAP BLDA CALCULATED.4IONS-SCNC: 19 MMO/L (ref 10–25)
ANION GAP BLDMV CALCULATED.4IONS-SCNC: 14 MMO/L (ref 10–25)
ANION GAP BLDMV CALCULATED.4IONS-SCNC: 16 MMO/L (ref 10–25)
ANION GAP BLDMV CALCULATED.4IONS-SCNC: 19 MMO/L (ref 10–25)
ANION GAP BLDMV CALCULATED.4IONS-SCNC: 19 MMO/L (ref 10–25)
ANION GAP BLDMV CALCULATED.4IONS-SCNC: 21 MMO/L (ref 10–25)
ANION GAP BLDMV CALCULATED.4IONS-SCNC: 22 MMO/L (ref 10–25)
ANION GAP BLDV CALCULATED.4IONS-SCNC: 12 MMOL/L (ref 10–25)
ANION GAP BLDV CALCULATED.4IONS-SCNC: 12 MMOL/L (ref 10–25)
ANION GAP BLDV CALCULATED.4IONS-SCNC: 15 MMOL/L (ref 10–25)
ANION GAP BLDV CALCULATED.4IONS-SCNC: 16 MMOL/L (ref 10–25)
ANION GAP BLDV CALCULATED.4IONS-SCNC: 17 MMOL/L (ref 10–25)
ANION GAP SERPL CALC-SCNC: 14 MMOL/L (ref 10–20)
ANION GAP SERPL CALC-SCNC: 22 MMOL/L (ref 10–20)
ANION GAP SERPL CALC-SCNC: 23 MMOL/L (ref 10–20)
ANTIBODY SCREEN: NORMAL
APPEARANCE UR: ABNORMAL
APTT PPP: 28 SECONDS (ref 26–36)
AST SERPL W P-5'-P-CCNC: 451 U/L (ref 9–39)
AST SERPL W P-5'-P-CCNC: 620 U/L (ref 9–39)
ATRIAL RATE: 105 BPM
ATRIAL RATE: 113 BPM
ATRIAL RATE: 123 BPM
ATRIAL RATE: 124 BPM
ATRIAL RATE: 128 BPM
ATRIAL RATE: 131 BPM
BACTERIA #/AREA URNS AUTO: ABNORMAL /HPF
BASE EXCESS BLDA CALC-SCNC: -3 MMOL/L (ref -2–3)
BASE EXCESS BLDA CALC-SCNC: -3.9 MMOL/L (ref -2–3)
BASE EXCESS BLDA CALC-SCNC: -3.9 MMOL/L (ref -2–3)
BASE EXCESS BLDA CALC-SCNC: -4 MMOL/L (ref -2–3)
BASE EXCESS BLDA CALC-SCNC: -5 MMOL/L (ref -2–3)
BASE EXCESS BLDA CALC-SCNC: -5.6 MMOL/L (ref -2–3)
BASE EXCESS BLDA CALC-SCNC: -6.1 MMOL/L (ref -2–3)
BASE EXCESS BLDMV CALC-SCNC: -1.5 MMOL/L (ref -2–3)
BASE EXCESS BLDMV CALC-SCNC: -3.1 MMOL/L (ref -2–3)
BASE EXCESS BLDMV CALC-SCNC: -3.1 MMOL/L (ref -2–3)
BASE EXCESS BLDMV CALC-SCNC: -4.2 MMOL/L (ref -2–3)
BASE EXCESS BLDMV CALC-SCNC: -4.4 MMOL/L (ref -2–3)
BASE EXCESS BLDMV CALC-SCNC: -5.7 MMOL/L (ref -2–3)
BASE EXCESS BLDMV CALC-SCNC: -5.9 MMOL/L (ref -2–3)
BASE EXCESS BLDV CALC-SCNC: -0.6 MMOL/L (ref -2–3)
BASE EXCESS BLDV CALC-SCNC: -2.4 MMOL/L (ref -2–3)
BASE EXCESS BLDV CALC-SCNC: -3.6 MMOL/L (ref -2–3)
BASE EXCESS BLDV CALC-SCNC: -4 MMOL/L (ref -2–3)
BASE EXCESS BLDV CALC-SCNC: -4.8 MMOL/L (ref -2–3)
BASOPHILS # BLD AUTO: 0.06 X10*3/UL (ref 0–0.1)
BASOPHILS # BLD AUTO: 0.07 X10*3/UL (ref 0–0.1)
BASOPHILS NFR BLD AUTO: 0.2 %
BASOPHILS NFR BLD AUTO: 0.2 %
BILIRUB DIRECT SERPL-MCNC: 0.5 MG/DL (ref 0–0.3)
BILIRUB SERPL-MCNC: 1.6 MG/DL (ref 0–1.2)
BILIRUB SERPL-MCNC: 2.2 MG/DL (ref 0–1.2)
BILIRUB UR STRIP.AUTO-MCNC: NEGATIVE MG/DL
BNP SERPL-MCNC: 760 PG/ML (ref 0–99)
BODY TEMPERATURE: 37 DEGREES CELSIUS
BODY TEMPERATURE: ABNORMAL
BUN SERPL-MCNC: 39 MG/DL (ref 6–23)
BUN SERPL-MCNC: 47 MG/DL (ref 6–23)
BUN SERPL-MCNC: 56 MG/DL (ref 6–23)
CA-I BLDA-SCNC: 1 MMOL/L (ref 1.1–1.33)
CA-I BLDA-SCNC: 1.01 MMOL/L (ref 1.1–1.33)
CA-I BLDA-SCNC: 1.02 MMOL/L (ref 1.1–1.33)
CA-I BLDA-SCNC: 1.03 MMOL/L (ref 1.1–1.33)
CA-I BLDA-SCNC: 1.04 MMOL/L (ref 1.1–1.33)
CA-I BLDA-SCNC: 1.05 MMOL/L (ref 1.1–1.33)
CA-I BLDA-SCNC: 1.14 MMOL/L (ref 1.1–1.33)
CA-I BLDMV-SCNC: 1.03 MMOL/L (ref 1.1–1.33)
CA-I BLDMV-SCNC: 1.04 MMOL/L (ref 1.1–1.33)
CA-I BLDMV-SCNC: 1.04 MMOL/L (ref 1.1–1.33)
CA-I BLDMV-SCNC: 1.09 MMOL/L (ref 1.1–1.33)
CA-I BLDMV-SCNC: 1.1 MMOL/L (ref 1.1–1.33)
CA-I BLDMV-SCNC: 1.12 MMOL/L (ref 1.1–1.33)
CA-I BLDV-SCNC: 1.02 MMOL/L (ref 1.1–1.33)
CA-I BLDV-SCNC: 1.02 MMOL/L (ref 1.1–1.33)
CA-I BLDV-SCNC: 1.04 MMOL/L (ref 1.1–1.33)
CA-I BLDV-SCNC: 1.06 MMOL/L (ref 1.1–1.33)
CA-I BLDV-SCNC: 1.07 MMOL/L (ref 1.1–1.33)
CALCIUM SERPL-MCNC: 8 MG/DL (ref 8.6–10.6)
CALCIUM SERPL-MCNC: 8.2 MG/DL (ref 8.6–10.6)
CALCIUM SERPL-MCNC: 9.6 MG/DL (ref 8.6–10.6)
CHLORIDE BLD-SCNC: 101 MMOL/L (ref 98–107)
CHLORIDE BLD-SCNC: 102 MMOL/L (ref 98–107)
CHLORIDE BLD-SCNC: 98 MMOL/L (ref 98–107)
CHLORIDE BLD-SCNC: 98 MMOL/L (ref 98–107)
CHLORIDE BLD-SCNC: 99 MMOL/L (ref 98–107)
CHLORIDE BLD-SCNC: 99 MMOL/L (ref 98–107)
CHLORIDE BLDA-SCNC: 102 MMOL/L (ref 98–107)
CHLORIDE BLDA-SCNC: 103 MMOL/L (ref 98–107)
CHLORIDE BLDA-SCNC: 103 MMOL/L (ref 98–107)
CHLORIDE BLDA-SCNC: 99 MMOL/L (ref 98–107)
CHLORIDE BLDA-SCNC: 99 MMOL/L (ref 98–107)
CHLORIDE BLDV-SCNC: 100 MMOL/L (ref 98–107)
CHLORIDE BLDV-SCNC: 101 MMOL/L (ref 98–107)
CHLORIDE BLDV-SCNC: 101 MMOL/L (ref 98–107)
CHLORIDE BLDV-SCNC: 102 MMOL/L (ref 98–107)
CHLORIDE BLDV-SCNC: 99 MMOL/L (ref 98–107)
CHLORIDE SERPL-SCNC: 103 MMOL/L (ref 98–107)
CHLORIDE SERPL-SCNC: 98 MMOL/L (ref 98–107)
CHLORIDE SERPL-SCNC: 98 MMOL/L (ref 98–107)
CO2 SERPL-SCNC: 20 MMOL/L (ref 21–32)
CO2 SERPL-SCNC: 20 MMOL/L (ref 21–32)
CO2 SERPL-SCNC: 25 MMOL/L (ref 21–32)
COLOR UR: YELLOW
CREAT SERPL-MCNC: 1.85 MG/DL (ref 0.5–1.3)
CREAT SERPL-MCNC: 2.19 MG/DL (ref 0.5–1.3)
CREAT SERPL-MCNC: 2.54 MG/DL (ref 0.5–1.3)
EGFRCR SERPLBLD CKD-EPI 2021: 31 ML/MIN/1.73M*2
EGFRCR SERPLBLD CKD-EPI 2021: 37 ML/MIN/1.73M*2
EGFRCR SERPLBLD CKD-EPI 2021: 45 ML/MIN/1.73M*2
EOSINOPHIL # BLD AUTO: 0.05 X10*3/UL (ref 0–0.7)
EOSINOPHIL # BLD AUTO: 0.14 X10*3/UL (ref 0–0.7)
EOSINOPHIL NFR BLD AUTO: 0.2 %
EOSINOPHIL NFR BLD AUTO: 0.4 %
ERYTHROCYTE [DISTWIDTH] IN BLOOD BY AUTOMATED COUNT: 13 % (ref 11.5–14.5)
ERYTHROCYTE [DISTWIDTH] IN BLOOD BY AUTOMATED COUNT: 13.1 % (ref 11.5–14.5)
FLUAV RNA RESP QL NAA+PROBE: NOT DETECTED
FLUBV RNA RESP QL NAA+PROBE: NOT DETECTED
GLUCOSE BLD MANUAL STRIP-MCNC: 173 MG/DL (ref 74–99)
GLUCOSE BLD MANUAL STRIP-MCNC: 198 MG/DL (ref 74–99)
GLUCOSE BLD MANUAL STRIP-MCNC: 210 MG/DL (ref 74–99)
GLUCOSE BLD MANUAL STRIP-MCNC: 256 MG/DL (ref 74–99)
GLUCOSE BLD-MCNC: 179 MG/DL (ref 74–99)
GLUCOSE BLD-MCNC: 198 MG/DL (ref 74–99)
GLUCOSE BLD-MCNC: 208 MG/DL (ref 74–99)
GLUCOSE BLD-MCNC: 219 MG/DL (ref 74–99)
GLUCOSE BLD-MCNC: 223 MG/DL (ref 74–99)
GLUCOSE BLD-MCNC: 302 MG/DL (ref 74–99)
GLUCOSE BLDA-MCNC: 165 MG/DL (ref 74–99)
GLUCOSE BLDA-MCNC: 175 MG/DL (ref 74–99)
GLUCOSE BLDA-MCNC: 191 MG/DL (ref 74–99)
GLUCOSE BLDA-MCNC: 195 MG/DL (ref 74–99)
GLUCOSE BLDA-MCNC: 210 MG/DL (ref 74–99)
GLUCOSE BLDA-MCNC: 224 MG/DL (ref 74–99)
GLUCOSE BLDA-MCNC: 308 MG/DL (ref 74–99)
GLUCOSE BLDV-MCNC: 172 MG/DL (ref 74–99)
GLUCOSE BLDV-MCNC: 184 MG/DL (ref 74–99)
GLUCOSE BLDV-MCNC: 196 MG/DL (ref 74–99)
GLUCOSE BLDV-MCNC: 221 MG/DL (ref 74–99)
GLUCOSE BLDV-MCNC: 299 MG/DL (ref 74–99)
GLUCOSE SERPL-MCNC: 170 MG/DL (ref 74–99)
GLUCOSE SERPL-MCNC: 176 MG/DL (ref 74–99)
GLUCOSE SERPL-MCNC: 275 MG/DL (ref 74–99)
GLUCOSE UR STRIP.AUTO-MCNC: NORMAL MG/DL
HADV DNA SPEC QL NAA+PROBE: NOT DETECTED
HAPTOGLOB SERPL NEPH-MCNC: 195 MG/DL (ref 30–200)
HCO3 BLDA-SCNC: 18.9 MMOL/L (ref 22–26)
HCO3 BLDA-SCNC: 19.3 MMOL/L (ref 22–26)
HCO3 BLDA-SCNC: 19.6 MMOL/L (ref 22–26)
HCO3 BLDA-SCNC: 19.8 MMOL/L (ref 22–26)
HCO3 BLDA-SCNC: 20 MMOL/L (ref 22–26)
HCO3 BLDA-SCNC: 20.8 MMOL/L (ref 22–26)
HCO3 BLDA-SCNC: 21.1 MMOL/L (ref 22–26)
HCO3 BLDMV-SCNC: 17.3 MMOL/L (ref 22–26)
HCO3 BLDMV-SCNC: 19 MMOL/L (ref 22–26)
HCO3 BLDMV-SCNC: 19.4 MMOL/L (ref 22–26)
HCO3 BLDMV-SCNC: 21 MMOL/L (ref 22–26)
HCO3 BLDMV-SCNC: 22 MMOL/L (ref 22–26)
HCO3 BLDMV-SCNC: 22 MMOL/L (ref 22–26)
HCO3 BLDMV-SCNC: 22.9 MMOL/L (ref 22–26)
HCO3 BLDV-SCNC: 21.6 MMOL/L (ref 22–26)
HCO3 BLDV-SCNC: 22.6 MMOL/L (ref 22–26)
HCO3 BLDV-SCNC: 22.7 MMOL/L (ref 22–26)
HCO3 BLDV-SCNC: 23.7 MMOL/L (ref 22–26)
HCO3 BLDV-SCNC: 24.9 MMOL/L (ref 22–26)
HCT VFR BLD AUTO: 40.3 % (ref 41–52)
HCT VFR BLD AUTO: 47.4 % (ref 41–52)
HCT VFR BLD EST: 37 % (ref 41–52)
HCT VFR BLD EST: 38 % (ref 41–52)
HCT VFR BLD EST: 39 % (ref 41–52)
HCT VFR BLD EST: 40 % (ref 41–52)
HCT VFR BLD EST: 41 % (ref 41–52)
HCT VFR BLD EST: 43 % (ref 41–52)
HCT VFR BLD EST: 44 % (ref 41–52)
HCT VFR BLD EST: 44 % (ref 41–52)
HCT VFR BLD EST: 47 % (ref 41–52)
HCT VFR BLD EST: 47 % (ref 41–52)
HCT VFR BLD EST: 49 % (ref 41–52)
HCT VFR BLD EST: 49 % (ref 41–52)
HGB BLD-MCNC: 14.1 G/DL (ref 13.5–17.5)
HGB BLD-MCNC: 16 G/DL (ref 13.5–17.5)
HGB BLDA-MCNC: 12.3 G/DL (ref 13.5–17.5)
HGB BLDA-MCNC: 12.7 G/DL (ref 13.5–17.5)
HGB BLDA-MCNC: 13.1 G/DL (ref 13.5–17.5)
HGB BLDA-MCNC: 13.2 G/DL (ref 13.5–17.5)
HGB BLDA-MCNC: 13.7 G/DL (ref 13.5–17.5)
HGB BLDA-MCNC: 14.7 G/DL (ref 13.5–17.5)
HGB BLDA-MCNC: 16.4 G/DL (ref 13.5–17.5)
HGB BLDMV-MCNC: 12.5 G/DL (ref 13.5–17.5)
HGB BLDMV-MCNC: 13.5 G/DL (ref 13.5–17.5)
HGB BLDMV-MCNC: 14.5 G/DL (ref 13.5–17.5)
HGB BLDMV-MCNC: 15.7 G/DL (ref 13.5–17.5)
HGB BLDMV-MCNC: 15.8 G/DL (ref 13.5–17.5)
HGB BLDMV-MCNC: 16.2 G/DL (ref 13.5–17.5)
HGB BLDV-MCNC: 12.6 G/DL (ref 13.5–17.5)
HGB BLDV-MCNC: 13.2 G/DL (ref 13.5–17.5)
HGB BLDV-MCNC: 13.3 G/DL (ref 13.5–17.5)
HGB BLDV-MCNC: 13.7 G/DL (ref 13.5–17.5)
HGB BLDV-MCNC: 14.3 G/DL (ref 13.5–17.5)
HMPV RNA SPEC QL NAA+PROBE: NOT DETECTED
HOLD SPECIMEN: 293
HPIV1 RNA SPEC QL NAA+PROBE: NOT DETECTED
HPIV2 RNA SPEC QL NAA+PROBE: NOT DETECTED
HPIV3 RNA SPEC QL NAA+PROBE: NOT DETECTED
HPIV4 RNA SPEC QL NAA+PROBE: NOT DETECTED
IMM GRANULOCYTES # BLD AUTO: 0.44 X10*3/UL (ref 0–0.7)
IMM GRANULOCYTES # BLD AUTO: 0.55 X10*3/UL (ref 0–0.7)
IMM GRANULOCYTES NFR BLD AUTO: 1.4 % (ref 0–0.9)
IMM GRANULOCYTES NFR BLD AUTO: 1.6 % (ref 0–0.9)
INHALED O2 CONCENTRATION: 40 %
INHALED O2 CONCENTRATION: 50 %
INR PPP: 1.4 (ref 0.9–1.1)
KETONES UR STRIP.AUTO-MCNC: ABNORMAL MG/DL
LACTATE BLDA-SCNC: 1.7 MMOL/L (ref 0.4–2)
LACTATE BLDA-SCNC: 1.9 MMOL/L (ref 0.4–2)
LACTATE BLDA-SCNC: 2 MMOL/L (ref 0.4–2)
LACTATE BLDA-SCNC: 2.1 MMOL/L (ref 0.4–2)
LACTATE BLDA-SCNC: 2.6 MMOL/L (ref 0.4–2)
LACTATE BLDA-SCNC: 3.1 MMOL/L (ref 0.4–2)
LACTATE BLDA-SCNC: 3.6 MMOL/L (ref 0.4–2)
LACTATE BLDMV-SCNC: 1.8 MMOL/L (ref 0.4–2)
LACTATE BLDMV-SCNC: 1.8 MMOL/L (ref 0.4–2)
LACTATE BLDMV-SCNC: 3.3 MMOL/L (ref 0.4–2)
LACTATE BLDMV-SCNC: 3.4 MMOL/L (ref 0.4–2)
LACTATE BLDMV-SCNC: 3.6 MMOL/L (ref 0.4–2)
LACTATE BLDMV-SCNC: 3.6 MMOL/L (ref 0.4–2)
LACTATE BLDV-SCNC: 1.8 MMOL/L (ref 0.4–2)
LACTATE BLDV-SCNC: 1.8 MMOL/L (ref 0.4–2)
LACTATE BLDV-SCNC: 1.9 MMOL/L (ref 0.4–2)
LACTATE BLDV-SCNC: 1.9 MMOL/L (ref 0.4–2)
LACTATE BLDV-SCNC: 2.5 MMOL/L (ref 0.4–2)
LACTATE BLDV-SCNC: 2.5 MMOL/L (ref 0.4–2)
LACTATE BLDV-SCNC: 3.4 MMOL/L (ref 0.4–2)
LACTATE BLDV-SCNC: 3.4 MMOL/L (ref 0.4–2)
LACTATE SERPL-SCNC: 2.7 MMOL/L (ref 0.4–2)
LACTATE SERPL-SCNC: 3.8 MMOL/L (ref 0.4–2)
LDH SERPL L TO P-CCNC: 1026 U/L (ref 84–246)
LEGIONELLA AG UR QL: NEGATIVE
LEUKOCYTE ESTERASE UR QL STRIP.AUTO: NEGATIVE
LYMPHOCYTES # BLD AUTO: 2.02 X10*3/UL (ref 1.2–4.8)
LYMPHOCYTES # BLD AUTO: 2.78 X10*3/UL (ref 1.2–4.8)
LYMPHOCYTES NFR BLD AUTO: 6.5 %
LYMPHOCYTES NFR BLD AUTO: 8.2 %
MAGNESIUM SERPL-MCNC: 2.12 MG/DL (ref 1.6–2.4)
MAGNESIUM SERPL-MCNC: 2.16 MG/DL (ref 1.6–2.4)
MAGNESIUM SERPL-MCNC: 2.2 MG/DL (ref 1.6–2.4)
MCH RBC QN AUTO: 30.3 PG (ref 26–34)
MCH RBC QN AUTO: 30.4 PG (ref 26–34)
MCHC RBC AUTO-ENTMCNC: 33.8 G/DL (ref 32–36)
MCHC RBC AUTO-ENTMCNC: 35 G/DL (ref 32–36)
MCV RBC AUTO: 87 FL (ref 80–100)
MCV RBC AUTO: 90 FL (ref 80–100)
MONOCYTES # BLD AUTO: 1.93 X10*3/UL (ref 0.1–1)
MONOCYTES # BLD AUTO: 2.7 X10*3/UL (ref 0.1–1)
MONOCYTES NFR BLD AUTO: 6.2 %
MONOCYTES NFR BLD AUTO: 7.9 %
MRSA DNA SPEC QL NAA+PROBE: NOT DETECTED
MUCOUS THREADS #/AREA URNS AUTO: ABNORMAL /LPF
NEUTROPHILS # BLD AUTO: 26.56 X10*3/UL (ref 1.2–7.7)
NEUTROPHILS # BLD AUTO: 27.85 X10*3/UL (ref 1.2–7.7)
NEUTROPHILS NFR BLD AUTO: 81.7 %
NEUTROPHILS NFR BLD AUTO: 85.5 %
NITRITE UR QL STRIP.AUTO: NEGATIVE
NRBC BLD-RTO: 0 /100 WBCS (ref 0–0)
NRBC BLD-RTO: 0 /100 WBCS (ref 0–0)
OXYHGB MFR BLDA: 94.8 % (ref 94–98)
OXYHGB MFR BLDA: 94.9 % (ref 94–98)
OXYHGB MFR BLDA: 95.4 % (ref 94–98)
OXYHGB MFR BLDA: 95.5 % (ref 94–98)
OXYHGB MFR BLDA: 95.8 % (ref 94–98)
OXYHGB MFR BLDA: 96.1 % (ref 94–98)
OXYHGB MFR BLDA: 96.3 % (ref 94–98)
OXYHGB MFR BLDMV: 57.7 % (ref 45–75)
OXYHGB MFR BLDMV: 77.4 % (ref 45–75)
OXYHGB MFR BLDMV: 85.3 % (ref 45–75)
OXYHGB MFR BLDMV: 87.9 % (ref 45–75)
OXYHGB MFR BLDMV: 88.4 % (ref 45–75)
OXYHGB MFR BLDMV: 89.1 % (ref 45–75)
OXYHGB MFR BLDMV: 90.3 % (ref 45–75)
OXYHGB MFR BLDV: 54.6 % (ref 45–75)
OXYHGB MFR BLDV: 55.9 % (ref 45–75)
OXYHGB MFR BLDV: 57.1 % (ref 45–75)
OXYHGB MFR BLDV: 59.1 % (ref 45–75)
OXYHGB MFR BLDV: 60.1 % (ref 45–75)
P AXIS: 24 DEGREES
P AXIS: 35 DEGREES
P AXIS: 37 DEGREES
P AXIS: 37 DEGREES
P AXIS: 40 DEGREES
P AXIS: 50 DEGREES
P OFFSET: 195 MS
P OFFSET: 200 MS
P OFFSET: 201 MS
P OFFSET: 202 MS
P OFFSET: 203 MS
P OFFSET: 205 MS
P ONSET: 149 MS
P ONSET: 152 MS
P ONSET: 153 MS
P ONSET: 157 MS
P ONSET: 159 MS
P ONSET: 162 MS
PCO2 BLDA: 31 MM HG (ref 38–42)
PCO2 BLDA: 33 MM HG (ref 38–42)
PCO2 BLDA: 34 MM HG (ref 38–42)
PCO2 BLDA: 35 MM HG (ref 38–42)
PCO2 BLDA: 36 MM HG (ref 38–42)
PCO2 BLDMV: 28 MM HG (ref 41–51)
PCO2 BLDMV: 30 MM HG (ref 41–51)
PCO2 BLDMV: 36 MM HG (ref 41–51)
PCO2 BLDMV: 37 MM HG (ref 41–51)
PCO2 BLDMV: 39 MM HG (ref 41–51)
PCO2 BLDV: 43 MM HG (ref 41–51)
PCO2 BLDV: 44 MM HG (ref 41–51)
PCO2 BLDV: 45 MM HG (ref 41–51)
PCO2 BLDV: 45 MM HG (ref 41–51)
PCO2 BLDV: 46 MM HG (ref 41–51)
PH BLDA: 7.34 PH (ref 7.38–7.42)
PH BLDA: 7.35 PH (ref 7.38–7.42)
PH BLDA: 7.36 PH (ref 7.38–7.42)
PH BLDA: 7.37 PH (ref 7.38–7.42)
PH BLDA: 7.39 PH (ref 7.38–7.42)
PH BLDA: 7.4 PH (ref 7.38–7.42)
PH BLDA: 7.41 PH (ref 7.38–7.42)
PH BLDMV: 7.34 PH (ref 7.33–7.43)
PH BLDMV: 7.34 PH (ref 7.33–7.43)
PH BLDMV: 7.36 PH (ref 7.33–7.43)
PH BLDMV: 7.36 PH (ref 7.33–7.43)
PH BLDMV: 7.4 PH (ref 7.33–7.43)
PH BLDMV: 7.4 PH (ref 7.33–7.43)
PH BLDMV: 7.41 PH (ref 7.33–7.43)
PH BLDV: 7.3 PH (ref 7.33–7.43)
PH BLDV: 7.3 PH (ref 7.33–7.43)
PH BLDV: 7.31 PH (ref 7.33–7.43)
PH BLDV: 7.33 PH (ref 7.33–7.43)
PH BLDV: 7.37 PH (ref 7.33–7.43)
PH UR STRIP.AUTO: 5 [PH]
PHOSPHATE SERPL-MCNC: 5.8 MG/DL (ref 2.5–4.9)
PHOSPHATE SERPL-MCNC: 7.3 MG/DL (ref 2.5–4.9)
PHOSPHATE SERPL-MCNC: 8.3 MG/DL (ref 2.5–4.9)
PLATELET # BLD AUTO: 279 X10*3/UL (ref 150–450)
PLATELET # BLD AUTO: 335 X10*3/UL (ref 150–450)
PO2 BLDA: 100 MM HG (ref 85–95)
PO2 BLDA: 102 MM HG (ref 85–95)
PO2 BLDA: 107 MM HG (ref 85–95)
PO2 BLDA: 87 MM HG (ref 85–95)
PO2 BLDA: 92 MM HG (ref 85–95)
PO2 BLDA: 94 MM HG (ref 85–95)
PO2 BLDA: 96 MM HG (ref 85–95)
PO2 BLDMV: 46 MM HG (ref 35–45)
PO2 BLDMV: 55 MM HG (ref 35–45)
PO2 BLDMV: 64 MM HG (ref 35–45)
PO2 BLDMV: 67 MM HG (ref 35–45)
PO2 BLDMV: 68 MM HG (ref 35–45)
PO2 BLDMV: 68 MM HG (ref 35–45)
PO2 BLDMV: 69 MM HG (ref 35–45)
PO2 BLDV: 44 MM HG (ref 35–45)
PO2 BLDV: 45 MM HG (ref 35–45)
PO2 BLDV: 45 MM HG (ref 35–45)
PO2 BLDV: 46 MM HG (ref 35–45)
PO2 BLDV: 48 MM HG (ref 35–45)
POTASSIUM BLDA-SCNC: 3.8 MMOL/L (ref 3.5–5.3)
POTASSIUM BLDA-SCNC: 3.9 MMOL/L (ref 3.5–5.3)
POTASSIUM BLDA-SCNC: 4 MMOL/L (ref 3.5–5.3)
POTASSIUM BLDA-SCNC: 4.1 MMOL/L (ref 3.5–5.3)
POTASSIUM BLDA-SCNC: 4.2 MMOL/L (ref 3.5–5.3)
POTASSIUM BLDA-SCNC: 4.2 MMOL/L (ref 3.5–5.3)
POTASSIUM BLDA-SCNC: 4.5 MMOL/L (ref 3.5–5.3)
POTASSIUM BLDMV-SCNC: 3.9 MMOL/L (ref 3.5–5.3)
POTASSIUM BLDMV-SCNC: 4 MMOL/L (ref 3.5–5.3)
POTASSIUM BLDMV-SCNC: 4.1 MMOL/L (ref 3.5–5.3)
POTASSIUM BLDMV-SCNC: 4.4 MMOL/L (ref 3.5–5.3)
POTASSIUM BLDV-SCNC: 4 MMOL/L (ref 3.5–5.3)
POTASSIUM BLDV-SCNC: 4.1 MMOL/L (ref 3.5–5.3)
POTASSIUM BLDV-SCNC: 4.5 MMOL/L (ref 3.5–5.3)
POTASSIUM SERPL-SCNC: 3.8 MMOL/L (ref 3.5–5.3)
POTASSIUM SERPL-SCNC: 4.1 MMOL/L (ref 3.5–5.3)
POTASSIUM SERPL-SCNC: 4.2 MMOL/L (ref 3.5–5.3)
PR INTERVAL: 118 MS
PR INTERVAL: 124 MS
PR INTERVAL: 126 MS
PR INTERVAL: 128 MS
PR INTERVAL: 130 MS
PR INTERVAL: 130 MS
PROCALCITONIN SERPL-MCNC: 2.92 NG/ML
PROT SERPL-MCNC: 6.2 G/DL (ref 6.4–8.2)
PROT SERPL-MCNC: 7.1 G/DL (ref 6.4–8.2)
PROT UR STRIP.AUTO-MCNC: ABNORMAL MG/DL
PROTHROMBIN TIME: 16 SECONDS (ref 9.8–12.4)
Q ONSET: 214 MS
Q ONSET: 215 MS
Q ONSET: 215 MS
Q ONSET: 216 MS
Q ONSET: 216 MS
Q ONSET: 224 MS
QRS COUNT: 17 BEATS
QRS COUNT: 19 BEATS
QRS COUNT: 21 BEATS
QRS COUNT: 22 BEATS
QRS DURATION: 106 MS
QRS DURATION: 108 MS
QRS DURATION: 86 MS
QRS DURATION: 90 MS
QRS DURATION: 92 MS
QRS DURATION: 94 MS
QT INTERVAL: 316 MS
QT INTERVAL: 316 MS
QT INTERVAL: 330 MS
QT INTERVAL: 334 MS
QT INTERVAL: 340 MS
QT INTERVAL: 396 MS
QTC CALCULATION(BAZETT): 452 MS
QTC CALCULATION(BAZETT): 458 MS
QTC CALCULATION(BAZETT): 466 MS
QTC CALCULATION(BAZETT): 474 MS
QTC CALCULATION(BAZETT): 496 MS
QTC CALCULATION(BAZETT): 523 MS
QTC FREDERICIA: 401 MS
QTC FREDERICIA: 410 MS
QTC FREDERICIA: 412 MS
QTC FREDERICIA: 420 MS
QTC FREDERICIA: 438 MS
QTC FREDERICIA: 477 MS
R AXIS: 70 DEGREES
R AXIS: 70 DEGREES
R AXIS: 81 DEGREES
R AXIS: 85 DEGREES
R AXIS: 90 DEGREES
R AXIS: 95 DEGREES
RBC # BLD AUTO: 4.65 X10*6/UL (ref 4.5–5.9)
RBC # BLD AUTO: 5.27 X10*6/UL (ref 4.5–5.9)
RBC # UR STRIP.AUTO: ABNORMAL MG/DL
RBC #/AREA URNS AUTO: >20 /HPF
RBC MORPH BLD: NORMAL
RH FACTOR (ANTIGEN D): NORMAL
RHINOVIRUS RNA UPPER RESP QL NAA+PROBE: NOT DETECTED
RIGHT VENTRICLE FREE WALL PEAK S': 11.1 CM/S
RSV RNA RESP QL NAA+PROBE: NOT DETECTED
S PNEUM AG UR QL: NEGATIVE
SAO2 % BLDA: 100 % (ref 94–100)
SAO2 % BLDA: 97 % (ref 94–100)
SAO2 % BLDA: 98 % (ref 94–100)
SAO2 % BLDA: 99 % (ref 94–100)
SAO2 % BLDA: 99 % (ref 94–100)
SAO2 % BLDMV: 59 % (ref 45–75)
SAO2 % BLDMV: 80 % (ref 45–75)
SAO2 % BLDMV: 89 % (ref 45–75)
SAO2 % BLDMV: 90 % (ref 45–75)
SAO2 % BLDMV: 91 % (ref 45–75)
SAO2 % BLDMV: 92 % (ref 45–75)
SAO2 % BLDMV: 92 % (ref 45–75)
SAO2 % BLDV: 56 % (ref 45–75)
SAO2 % BLDV: 57 % (ref 45–75)
SAO2 % BLDV: 59 % (ref 45–75)
SAO2 % BLDV: 61 % (ref 45–75)
SAO2 % BLDV: 62 % (ref 45–75)
SARS-COV-2 RNA RESP QL NAA+PROBE: NOT DETECTED
SODIUM BLDA-SCNC: 131 MMOL/L (ref 136–145)
SODIUM BLDA-SCNC: 133 MMOL/L (ref 136–145)
SODIUM BLDA-SCNC: 133 MMOL/L (ref 136–145)
SODIUM BLDA-SCNC: 134 MMOL/L (ref 136–145)
SODIUM BLDA-SCNC: 135 MMOL/L (ref 136–145)
SODIUM BLDMV-SCNC: 133 MMOL/L (ref 136–145)
SODIUM BLDMV-SCNC: 134 MMOL/L (ref 136–145)
SODIUM BLDMV-SCNC: 135 MMOL/L (ref 136–145)
SODIUM BLDV-SCNC: 133 MMOL/L (ref 136–145)
SODIUM BLDV-SCNC: 134 MMOL/L (ref 136–145)
SODIUM SERPL-SCNC: 136 MMOL/L (ref 136–145)
SODIUM SERPL-SCNC: 137 MMOL/L (ref 136–145)
SODIUM SERPL-SCNC: 138 MMOL/L (ref 136–145)
SP GR UR STRIP.AUTO: 1.05
SQUAMOUS #/AREA URNS AUTO: ABNORMAL /HPF
T AXIS: -15 DEGREES
T AXIS: -21 DEGREES
T AXIS: -24 DEGREES
T AXIS: -28 DEGREES
T AXIS: -63 DEGREES
T AXIS: 63 DEGREES
T OFFSET: 373 MS
T OFFSET: 374 MS
T OFFSET: 379 MS
T OFFSET: 382 MS
T OFFSET: 386 MS
T OFFSET: 422 MS
TRICUSPID ANNULAR PLANE SYSTOLIC EXCURSION: 1.1 CM
TSH SERPL-ACNC: 3.5 MIU/L (ref 0.44–3.98)
UFH PPP CHRO-ACNC: 0.2 IU/ML (ref ?–1.1)
UFH PPP CHRO-ACNC: 0.5 IU/ML (ref ?–1.1)
UFH PPP CHRO-ACNC: 0.5 IU/ML (ref ?–1.1)
UROBILINOGEN UR STRIP.AUTO-MCNC: NORMAL MG/DL
VENTRICULAR RATE: 105 BPM
VENTRICULAR RATE: 113 BPM
VENTRICULAR RATE: 123 BPM
VENTRICULAR RATE: 124 BPM
VENTRICULAR RATE: 128 BPM
VENTRICULAR RATE: 131 BPM
WBC # BLD AUTO: 31.1 X10*3/UL (ref 4.4–11.3)
WBC # BLD AUTO: 34.1 X10*3/UL (ref 4.4–11.3)
WBC #/AREA URNS AUTO: ABNORMAL /HPF

## 2025-05-20 PROCEDURE — 93010 ELECTROCARDIOGRAM REPORT: CPT | Performed by: INTERNAL MEDICINE

## 2025-05-20 PROCEDURE — 71045 X-RAY EXAM CHEST 1 VIEW: CPT

## 2025-05-20 PROCEDURE — 2500000005 HC RX 250 GENERAL PHARMACY W/O HCPCS

## 2025-05-20 PROCEDURE — 83615 LACTATE (LD) (LDH) ENZYME: CPT

## 2025-05-20 PROCEDURE — 2780000003 HC OR 278 NO HCPCS: Performed by: INTERNAL MEDICINE

## 2025-05-20 PROCEDURE — 84443 ASSAY THYROID STIM HORMONE: CPT

## 2025-05-20 PROCEDURE — 2500000004 HC RX 250 GENERAL PHARMACY W/ HCPCS (ALT 636 FOR OP/ED): Performed by: INTERNAL MEDICINE

## 2025-05-20 PROCEDURE — 85347 COAGULATION TIME ACTIVATED: CPT | Performed by: INTERNAL MEDICINE

## 2025-05-20 PROCEDURE — 84132 ASSAY OF SERUM POTASSIUM: CPT

## 2025-05-20 PROCEDURE — 85347 COAGULATION TIME ACTIVATED: CPT

## 2025-05-20 PROCEDURE — 83605 ASSAY OF LACTIC ACID: CPT

## 2025-05-20 PROCEDURE — 02HA3RZ INSERTION OF SHORT-TERM EXTERNAL HEART ASSIST SYSTEM INTO HEART, PERCUTANEOUS APPROACH: ICD-10-PCS | Performed by: INTERNAL MEDICINE

## 2025-05-20 PROCEDURE — 5A1955Z RESPIRATORY VENTILATION, GREATER THAN 96 CONSECUTIVE HOURS: ICD-10-PCS | Performed by: HOSPITALIST

## 2025-05-20 PROCEDURE — 84100 ASSAY OF PHOSPHORUS: CPT

## 2025-05-20 PROCEDURE — 85610 PROTHROMBIN TIME: CPT

## 2025-05-20 PROCEDURE — 86901 BLOOD TYPING SEROLOGIC RH(D): CPT

## 2025-05-20 PROCEDURE — 33990 INSJ PERQ VAD L HRT ARTERIAL: CPT | Performed by: INTERNAL MEDICINE

## 2025-05-20 PROCEDURE — 2500000004 HC RX 250 GENERAL PHARMACY W/ HCPCS (ALT 636 FOR OP/ED): Mod: JZ

## 2025-05-20 PROCEDURE — 87631 RESP VIRUS 3-5 TARGETS: CPT

## 2025-05-20 PROCEDURE — 82805 BLOOD GASES W/O2 SATURATION: CPT

## 2025-05-20 PROCEDURE — 82947 ASSAY GLUCOSE BLOOD QUANT: CPT

## 2025-05-20 PROCEDURE — G0269 OCCLUSIVE DEVICE IN VEIN ART: HCPCS | Performed by: INTERNAL MEDICINE

## 2025-05-20 PROCEDURE — 87449 NOS EACH ORGANISM AG IA: CPT

## 2025-05-20 PROCEDURE — 2550000001 HC RX 255 CONTRASTS: Performed by: INTERNAL MEDICINE

## 2025-05-20 PROCEDURE — 85520 HEPARIN ASSAY: CPT

## 2025-05-20 PROCEDURE — C1894 INTRO/SHEATH, NON-LASER: HCPCS | Performed by: INTERNAL MEDICINE

## 2025-05-20 PROCEDURE — C1769 GUIDE WIRE: HCPCS | Performed by: INTERNAL MEDICINE

## 2025-05-20 PROCEDURE — 85025 COMPLETE CBC W/AUTO DIFF WBC: CPT

## 2025-05-20 PROCEDURE — 71045 X-RAY EXAM CHEST 1 VIEW: CPT | Performed by: RADIOLOGY

## 2025-05-20 PROCEDURE — 87637 SARSCOV2&INF A&B&RSV AMP PRB: CPT

## 2025-05-20 PROCEDURE — 2720000007 HC OR 272 NO HCPCS: Performed by: INTERNAL MEDICINE

## 2025-05-20 PROCEDURE — C1889 IMPLANT/INSERT DEVICE, NOC: HCPCS | Performed by: INTERNAL MEDICINE

## 2025-05-20 PROCEDURE — 82248 BILIRUBIN DIRECT: CPT

## 2025-05-20 PROCEDURE — 99291 CRITICAL CARE FIRST HOUR: CPT

## 2025-05-20 PROCEDURE — 2500000001 HC RX 250 WO HCPCS SELF ADMINISTERED DRUGS (ALT 637 FOR MEDICARE OP)

## 2025-05-20 PROCEDURE — 83735 ASSAY OF MAGNESIUM: CPT

## 2025-05-20 PROCEDURE — 37799 UNLISTED PX VASCULAR SURGERY: CPT

## 2025-05-20 PROCEDURE — 87641 MR-STAPH DNA AMP PROBE: CPT

## 2025-05-20 PROCEDURE — C1760 CLOSURE DEV, VASC: HCPCS | Performed by: INTERNAL MEDICINE

## 2025-05-20 PROCEDURE — 86708 HEPATITIS A ANTIBODY: CPT

## 2025-05-20 PROCEDURE — 86704 HEP B CORE ANTIBODY TOTAL: CPT

## 2025-05-20 PROCEDURE — 81001 URINALYSIS AUTO W/SCOPE: CPT

## 2025-05-20 PROCEDURE — 51702 INSERT TEMP BLADDER CATH: CPT

## 2025-05-20 PROCEDURE — 83010 ASSAY OF HAPTOGLOBIN QUANT: CPT

## 2025-05-20 PROCEDURE — 84075 ASSAY ALKALINE PHOSPHATASE: CPT

## 2025-05-20 PROCEDURE — 87340 HEPATITIS B SURFACE AG IA: CPT

## 2025-05-20 PROCEDURE — 94799 UNLISTED PULMONARY SVC/PX: CPT

## 2025-05-20 PROCEDURE — 87899 AGENT NOS ASSAY W/OPTIC: CPT

## 2025-05-20 PROCEDURE — 99223 1ST HOSP IP/OBS HIGH 75: CPT | Performed by: PHYSICIAN ASSISTANT

## 2025-05-20 PROCEDURE — 83880 ASSAY OF NATRIURETIC PEPTIDE: CPT

## 2025-05-20 PROCEDURE — 84145 PROCALCITONIN (PCT): CPT

## 2025-05-20 PROCEDURE — 87070 CULTURE OTHR SPECIMN AEROBIC: CPT

## 2025-05-20 PROCEDURE — 94002 VENT MGMT INPAT INIT DAY: CPT

## 2025-05-20 PROCEDURE — 2500000002 HC RX 250 W HCPCS SELF ADMINISTERED DRUGS (ALT 637 FOR MEDICARE OP, ALT 636 FOR OP/ED)

## 2025-05-20 PROCEDURE — 2020000001 HC ICU ROOM DAILY

## 2025-05-20 PROCEDURE — 75716 ARTERY X-RAYS ARMS/LEGS: CPT | Performed by: INTERNAL MEDICINE

## 2025-05-20 PROCEDURE — 87798 DETECT AGENT NOS DNA AMP: CPT

## 2025-05-20 DEVICE — IMPELLA CP PUMP 371 SET, US ( IMPELLA CP WITH SMART ASSIST)
Type: IMPLANTABLE DEVICE | Site: HEART | Status: FUNCTIONAL
Brand: IMPELLA

## 2025-05-20 RX ORDER — VANCOMYCIN 2 GRAM/500 ML IN 0.9 % SODIUM CHLORIDE INTRAVENOUS
2000 ONCE
Status: DISCONTINUED | OUTPATIENT
Start: 2025-05-20 | End: 2025-05-20

## 2025-05-20 RX ORDER — POLYETHYLENE GLYCOL 3350 17 G/17G
17 POWDER, FOR SOLUTION ORAL DAILY
Status: DISCONTINUED | OUTPATIENT
Start: 2025-05-20 | End: 2025-05-23

## 2025-05-20 RX ORDER — MIDAZOLAM HYDROCHLORIDE 1 MG/ML
.5-2 INJECTION, SOLUTION INTRAVENOUS CONTINUOUS
Status: DISCONTINUED | OUTPATIENT
Start: 2025-05-20 | End: 2025-06-03

## 2025-05-20 RX ORDER — NOREPINEPHRINE BITARTRATE/D5W 8 MG/250ML
.01-1 PLASTIC BAG, INJECTION (ML) INTRAVENOUS CONTINUOUS
Status: DISCONTINUED | OUTPATIENT
Start: 2025-05-20 | End: 2025-05-22

## 2025-05-20 RX ORDER — VANCOMYCIN HYDROCHLORIDE 1 G/20ML
INJECTION, POWDER, LYOPHILIZED, FOR SOLUTION INTRAVENOUS DAILY PRN
Status: DISCONTINUED | OUTPATIENT
Start: 2025-05-20 | End: 2025-05-20

## 2025-05-20 RX ORDER — NAPROXEN SODIUM 220 MG/1
81 TABLET, FILM COATED ORAL DAILY
Status: DISCONTINUED | OUTPATIENT
Start: 2025-05-20 | End: 2025-06-04

## 2025-05-20 RX ORDER — NOREPINEPHRINE BITARTRATE/D5W 8 MG/250ML
.01-1 PLASTIC BAG, INJECTION (ML) INTRAVENOUS CONTINUOUS
Status: DISCONTINUED | OUTPATIENT
Start: 2025-05-20 | End: 2025-05-20

## 2025-05-20 RX ORDER — HEPARIN SODIUM 10000 [USP'U]/100ML
INJECTION, SOLUTION INTRAVENOUS
Status: COMPLETED
Start: 2025-05-20 | End: 2025-05-20

## 2025-05-20 RX ORDER — PHENYLEPHRINE HYDROCHLORIDE 10 MG/ML
INJECTION INTRAVENOUS
Status: COMPLETED
Start: 2025-05-20 | End: 2025-05-20

## 2025-05-20 RX ORDER — ROSUVASTATIN CALCIUM 40 MG/1
40 TABLET, COATED ORAL NIGHTLY
Status: DISCONTINUED | OUTPATIENT
Start: 2025-05-20 | End: 2025-05-21

## 2025-05-20 RX ORDER — INSULIN LISPRO 100 [IU]/ML
0-5 INJECTION, SOLUTION INTRAVENOUS; SUBCUTANEOUS EVERY 4 HOURS
Status: DISPENSED | OUTPATIENT
Start: 2025-05-20 | End: 2025-05-25

## 2025-05-20 RX ORDER — MIDAZOLAM HYDROCHLORIDE 1 MG/ML
.5-2 INJECTION, SOLUTION INTRAVENOUS CONTINUOUS
Status: DISCONTINUED | OUTPATIENT
Start: 2025-05-20 | End: 2025-05-20

## 2025-05-20 RX ORDER — LIDOCAINE HYDROCHLORIDE 10 MG/ML
INJECTION, SOLUTION EPIDURAL; INFILTRATION; INTRACAUDAL; PERINEURAL AS NEEDED
Status: DISCONTINUED | OUTPATIENT
Start: 2025-05-20 | End: 2025-05-20 | Stop reason: HOSPADM

## 2025-05-20 RX ORDER — DEXMEDETOMIDINE HYDROCHLORIDE 4 UG/ML
.1-1.5 INJECTION, SOLUTION INTRAVENOUS CONTINUOUS
Status: DISCONTINUED | OUTPATIENT
Start: 2025-05-20 | End: 2025-05-20

## 2025-05-20 RX ORDER — FENTANYL CITRATE-0.9 % NACL/PF 10 MCG/ML
25-200 PLASTIC BAG, INJECTION (ML) INTRAVENOUS CONTINUOUS
Status: DISCONTINUED | OUTPATIENT
Start: 2025-05-20 | End: 2025-05-20

## 2025-05-20 RX ORDER — PANTOPRAZOLE SODIUM 40 MG/10ML
40 INJECTION, POWDER, LYOPHILIZED, FOR SOLUTION INTRAVENOUS DAILY
Status: DISCONTINUED | OUTPATIENT
Start: 2025-05-20 | End: 2025-05-21

## 2025-05-20 RX ORDER — HEPARIN SODIUM 10000 [USP'U]/100ML
0-4000 INJECTION, SOLUTION INTRAVENOUS CONTINUOUS
Status: DISCONTINUED | OUTPATIENT
Start: 2025-05-20 | End: 2025-05-21

## 2025-05-20 RX ORDER — NOREPINEPHRINE BITARTRATE/D5W 8 MG/250ML
PLASTIC BAG, INJECTION (ML) INTRAVENOUS
Status: COMPLETED
Start: 2025-05-20 | End: 2025-05-20

## 2025-05-20 RX ORDER — HEPARIN SODIUM 1000 [USP'U]/ML
INJECTION, SOLUTION INTRAVENOUS; SUBCUTANEOUS AS NEEDED
Status: DISCONTINUED | OUTPATIENT
Start: 2025-05-20 | End: 2025-05-20 | Stop reason: HOSPADM

## 2025-05-20 RX ORDER — PHENYLEPHRINE HCL IN 0.9% NACL 0.4MG/10ML
SYRINGE (ML) INTRAVENOUS
Status: COMPLETED
Start: 2025-05-20 | End: 2025-05-20

## 2025-05-20 RX ORDER — ROSUVASTATIN CALCIUM 40 MG/1
40 TABLET, COATED ORAL NIGHTLY
Status: DISCONTINUED | OUTPATIENT
Start: 2025-05-20 | End: 2025-05-20

## 2025-05-20 RX ORDER — PHENYLEPHRINE 10 MG/250 ML(40 MCG/ML)IN 0.9 % SOD.CHLORIDE INTRAVENOUS
0-2 CONTINUOUS
Status: DISCONTINUED | OUTPATIENT
Start: 2025-05-20 | End: 2025-05-20

## 2025-05-20 RX ORDER — TICAGRELOR 90 MG/1
90 TABLET, FILM COATED ORAL 2 TIMES DAILY
Status: DISCONTINUED | OUTPATIENT
Start: 2025-05-20 | End: 2025-05-20

## 2025-05-20 RX ORDER — DEXTROSE 50 % IN WATER (D50W) INTRAVENOUS SYRINGE
12.5
Status: DISCONTINUED | OUTPATIENT
Start: 2025-05-20 | End: 2025-06-04

## 2025-05-20 RX ORDER — DEXTROSE 50 % IN WATER (D50W) INTRAVENOUS SYRINGE
25
Status: DISCONTINUED | OUTPATIENT
Start: 2025-05-20 | End: 2025-06-04

## 2025-05-20 RX ORDER — INSULIN LISPRO 100 [IU]/ML
0-5 INJECTION, SOLUTION INTRAVENOUS; SUBCUTANEOUS
Status: DISCONTINUED | OUTPATIENT
Start: 2025-05-20 | End: 2025-05-20

## 2025-05-20 RX ORDER — DEXMEDETOMIDINE HYDROCHLORIDE 4 UG/ML
INJECTION, SOLUTION INTRAVENOUS
Status: COMPLETED
Start: 2025-05-20 | End: 2025-05-20

## 2025-05-20 RX ORDER — FENTANYL CITRATE-0.9 % NACL/PF 10 MCG/ML
PLASTIC BAG, INJECTION (ML) INTRAVENOUS
Status: COMPLETED
Start: 2025-05-20 | End: 2025-05-20

## 2025-05-20 RX ORDER — FENTANYL CITRATE-0.9 % NACL/PF 10 MCG/ML
25-200 PLASTIC BAG, INJECTION (ML) INTRAVENOUS CONTINUOUS
Status: DISCONTINUED | OUTPATIENT
Start: 2025-05-20 | End: 2025-06-03

## 2025-05-20 RX ORDER — MIDAZOLAM HYDROCHLORIDE 1 MG/ML
INJECTION, SOLUTION INTRAVENOUS
Status: COMPLETED
Start: 2025-05-20 | End: 2025-05-20

## 2025-05-20 RX ORDER — ENOXAPARIN SODIUM 100 MG/ML
40 INJECTION SUBCUTANEOUS EVERY 24 HOURS
Status: DISCONTINUED | OUTPATIENT
Start: 2025-05-20 | End: 2025-05-20

## 2025-05-20 RX ADMIN — HYDROCORTISONE SODIUM SUCCINATE 50 MG: 100 INJECTION, POWDER, FOR SOLUTION INTRAMUSCULAR; INTRAVENOUS at 18:32

## 2025-05-20 RX ADMIN — VASOPRESSIN 0.03 UNITS/MIN: 0.2 INJECTION INTRAVENOUS at 11:31

## 2025-05-20 RX ADMIN — HYDROCORTISONE SODIUM SUCCINATE 50 MG: 100 INJECTION, POWDER, FOR SOLUTION INTRAMUSCULAR; INTRAVENOUS at 03:09

## 2025-05-20 RX ADMIN — NOREPINEPHRINE BITARTRATE 0.28 MCG/KG/MIN: 8 INJECTION, SOLUTION INTRAVENOUS at 07:03

## 2025-05-20 RX ADMIN — HUMAN ALBUMIN MICROSPHERES AND PERFLUTREN 0.5 ML: 10; .22 INJECTION, SOLUTION INTRAVENOUS at 10:38

## 2025-05-20 RX ADMIN — Medication 40 PERCENT: at 20:26

## 2025-05-20 RX ADMIN — Medication 150 MCG/HR: at 13:29

## 2025-05-20 RX ADMIN — PHENYLEPHRINE-NACL IV SOLUTION 10 MG/250ML-0.9% 2 MCG/KG/MIN: 10-0.9/25 SOLUTION at 07:46

## 2025-05-20 RX ADMIN — Medication 150 MCG/HR: at 07:02

## 2025-05-20 RX ADMIN — Medication 40 PERCENT: at 10:50

## 2025-05-20 RX ADMIN — PHENYLEPHRINE-NACL IV SOLUTION 10 MG/250ML-0.9% 2 MCG/KG/MIN: 10-0.9/25 SOLUTION at 07:03

## 2025-05-20 RX ADMIN — INSULIN LISPRO 1 UNITS: 100 INJECTION, SOLUTION INTRAVENOUS; SUBCUTANEOUS at 18:04

## 2025-05-20 RX ADMIN — PHENYLEPHRINE-NACL IV SOLUTION 10 MG/250ML-0.9% 1 MCG/KG/MIN: 10-0.9/25 SOLUTION at 02:50

## 2025-05-20 RX ADMIN — PIPERACILLIN SODIUM AND TAZOBACTAM SODIUM 3.38 G: 3; .375 INJECTION, SOLUTION INTRAVENOUS at 00:30

## 2025-05-20 RX ADMIN — HEPARIN SODIUM 10 ML/HR: 10000 INJECTION, SOLUTION INTRAVENOUS; SUBCUTANEOUS at 11:52

## 2025-05-20 RX ADMIN — AZITHROMYCIN 500 MG: 500 INJECTION, POWDER, LYOPHILIZED, FOR SOLUTION INTRAVENOUS at 02:59

## 2025-05-20 RX ADMIN — HEPARIN SODIUM 900 UNITS/HR: 10000 INJECTION, SOLUTION INTRAVENOUS at 13:50

## 2025-05-20 RX ADMIN — HYDROCORTISONE SODIUM SUCCINATE 50 MG: 100 INJECTION, POWDER, FOR SOLUTION INTRAMUSCULAR; INTRAVENOUS at 10:43

## 2025-05-20 RX ADMIN — PIPERACILLIN SODIUM AND TAZOBACTAM SODIUM 4.5 G: 4; .5 INJECTION, SOLUTION INTRAVENOUS at 02:52

## 2025-05-20 RX ADMIN — ROSUVASTATIN CALCIUM 40 MG: 40 TABLET, FILM COATED ORAL at 21:19

## 2025-05-20 RX ADMIN — Medication 75 MCG/HR: at 02:06

## 2025-05-20 RX ADMIN — TICAGRELOR 90 MG: 90 TABLET ORAL at 09:30

## 2025-05-20 RX ADMIN — SODIUM CHLORIDE, POTASSIUM CHLORIDE, SODIUM LACTATE AND CALCIUM CHLORIDE 250 ML: 600; 310; 30; 20 INJECTION, SOLUTION INTRAVENOUS at 11:23

## 2025-05-20 RX ADMIN — NOREPINEPHRINE BITARTRATE 0.12 MCG/KG/MIN: 8 INJECTION, SOLUTION INTRAVENOUS at 21:03

## 2025-05-20 RX ADMIN — Medication 40 PERCENT: at 07:45

## 2025-05-20 RX ADMIN — PHENYLEPHRINE-NACL IV SOLUTION 10 MG/250ML-0.9% 2 MCG/KG/MIN: 10-0.9/25 SOLUTION at 05:38

## 2025-05-20 RX ADMIN — POLYETHYLENE GLYCOL 3350 17 G: 17 POWDER, FOR SOLUTION ORAL at 09:34

## 2025-05-20 RX ADMIN — NOREPINEPHRINE BITARTRATE 0.28 MCG/KG/MIN: 8 INJECTION, SOLUTION INTRAVENOUS at 08:07

## 2025-05-20 RX ADMIN — ASPIRIN 81 MG: 81 TABLET, CHEWABLE ORAL at 09:30

## 2025-05-20 RX ADMIN — INSULIN LISPRO 2 UNITS: 100 INJECTION, SOLUTION INTRAVENOUS; SUBCUTANEOUS at 12:59

## 2025-05-20 RX ADMIN — SODIUM CHLORIDE, POTASSIUM CHLORIDE, SODIUM LACTATE AND CALCIUM CHLORIDE 500 ML: 600; 310; 30; 20 INJECTION, SOLUTION INTRAVENOUS at 13:31

## 2025-05-20 RX ADMIN — VASOPRESSIN 0.03 UNITS/MIN: 0.2 INJECTION INTRAVENOUS at 22:00

## 2025-05-20 RX ADMIN — PANTOPRAZOLE SODIUM 40 MG: 40 INJECTION, POWDER, FOR SOLUTION INTRAVENOUS at 09:11

## 2025-05-20 RX ADMIN — NOREPINEPHRINE BITARTRATE 0.12 MCG/KG/MIN: 8 INJECTION, SOLUTION INTRAVENOUS at 10:01

## 2025-05-20 RX ADMIN — NOREPINEPHRINE BITARTRATE 0.1 MCG/KG/MIN: 8 INJECTION, SOLUTION INTRAVENOUS at 02:08

## 2025-05-20 RX ADMIN — MIDAZOLAM HYDROCHLORIDE 2 MG/HR: 1 INJECTION, SOLUTION INTRAVENOUS at 02:07

## 2025-05-20 RX ADMIN — Medication 100 MCG/HR: at 23:10

## 2025-05-20 RX ADMIN — DEXMEDETOMIDINE HYDROCHLORIDE 0.2 MCG/KG/HR: 4 INJECTION, SOLUTION INTRAVENOUS at 04:14

## 2025-05-20 RX ADMIN — SODIUM CHLORIDE, POTASSIUM CHLORIDE, SODIUM LACTATE AND CALCIUM CHLORIDE 500 ML: 600; 310; 30; 20 INJECTION, SOLUTION INTRAVENOUS at 18:50

## 2025-05-20 RX ADMIN — Medication 40 PERCENT: at 02:06

## 2025-05-20 RX ADMIN — MIDAZOLAM IN SODIUM CHLORIDE 2 MG/HR: 1 INJECTION INTRAVENOUS at 02:07

## 2025-05-20 RX ADMIN — INSULIN LISPRO 1 UNITS: 100 INJECTION, SOLUTION INTRAVENOUS; SUBCUTANEOUS at 20:20

## 2025-05-20 RX ADMIN — PIPERACILLIN SODIUM AND TAZOBACTAM SODIUM 4.5 G: 4; .5 INJECTION, SOLUTION INTRAVENOUS at 08:08

## 2025-05-20 RX ADMIN — Medication 150 MCG/HR: at 04:17

## 2025-05-20 RX ADMIN — ENOXAPARIN SODIUM 40 MG: 100 INJECTION SUBCUTANEOUS at 02:54

## 2025-05-20 RX ADMIN — Medication 0.4 MG: at 02:30

## 2025-05-20 RX ADMIN — VASOPRESSIN 0.03 UNITS/MIN: 0.2 INJECTION INTRAVENOUS at 02:35

## 2025-05-20 RX ADMIN — PHENYLEPHRINE-NACL IV SOLUTION 10 MG/250ML-0.9% 2 MCG/KG/MIN: 10-0.9/25 SOLUTION at 08:57

## 2025-05-20 RX ADMIN — PHENYLEPHRINE HYDROCHLORIDE: 10 INJECTION INTRAVENOUS at 02:45

## 2025-05-20 RX ADMIN — PHENYLEPHRINE HYDROCHLORIDE 1.5 MCG/KG/MIN: 10 INJECTION INTRAVENOUS at 10:18

## 2025-05-20 RX ADMIN — MIDAZOLAM IN SODIUM CHLORIDE 3 MG/HR: 1 INJECTION INTRAVENOUS at 23:10

## 2025-05-20 RX ADMIN — PIPERACILLIN SODIUM AND TAZOBACTAM SODIUM 3.38 G: 3; .375 INJECTION, SOLUTION INTRAVENOUS at 18:51

## 2025-05-20 SDOH — SOCIAL STABILITY: SOCIAL INSECURITY: HAVE YOU HAD ANY THOUGHTS OF HARMING ANYONE ELSE?: UNABLE TO ASSESS

## 2025-05-20 SDOH — SOCIAL STABILITY: SOCIAL INSECURITY: ARE YOU OR HAVE YOU BEEN THREATENED OR ABUSED PHYSICALLY, EMOTIONALLY, OR SEXUALLY BY ANYONE?: UNABLE TO ASSESS

## 2025-05-20 SDOH — SOCIAL STABILITY: SOCIAL INSECURITY: HAS ANYONE EVER THREATENED TO HURT YOUR FAMILY OR YOUR PETS?: UNABLE TO ASSESS

## 2025-05-20 SDOH — HEALTH STABILITY: PHYSICAL HEALTH: ON AVERAGE, HOW MANY MINUTES DO YOU ENGAGE IN EXERCISE AT THIS LEVEL?: PATIENT UNABLE TO ANSWER

## 2025-05-20 SDOH — SOCIAL STABILITY: SOCIAL INSECURITY
WITHIN THE LAST YEAR, HAVE YOU BEEN RAPED OR FORCED TO HAVE ANY KIND OF SEXUAL ACTIVITY BY YOUR PARTNER OR EX-PARTNER?: PATIENT UNABLE TO ANSWER

## 2025-05-20 SDOH — SOCIAL STABILITY: SOCIAL INSECURITY: ABUSE: ADULT

## 2025-05-20 SDOH — SOCIAL STABILITY: SOCIAL INSECURITY: DO YOU FEEL UNSAFE GOING BACK TO THE PLACE WHERE YOU ARE LIVING?: UNABLE TO ASSESS

## 2025-05-20 SDOH — SOCIAL STABILITY: SOCIAL INSECURITY: WITHIN THE LAST YEAR, HAVE YOU BEEN AFRAID OF YOUR PARTNER OR EX-PARTNER?: PATIENT UNABLE TO ANSWER

## 2025-05-20 SDOH — HEALTH STABILITY: PHYSICAL HEALTH
ON AVERAGE, HOW MANY DAYS PER WEEK DO YOU ENGAGE IN MODERATE TO STRENUOUS EXERCISE (LIKE A BRISK WALK)?: PATIENT UNABLE TO ANSWER

## 2025-05-20 SDOH — SOCIAL STABILITY: SOCIAL INSECURITY: HAVE YOU HAD THOUGHTS OF HARMING ANYONE ELSE?: UNABLE TO ASSESS

## 2025-05-20 SDOH — SOCIAL STABILITY: SOCIAL INSECURITY
WITHIN THE LAST YEAR, HAVE YOU BEEN HUMILIATED OR EMOTIONALLY ABUSED IN OTHER WAYS BY YOUR PARTNER OR EX-PARTNER?: PATIENT UNABLE TO ANSWER

## 2025-05-20 SDOH — SOCIAL STABILITY: SOCIAL INSECURITY
WITHIN THE LAST YEAR, HAVE YOU BEEN KICKED, HIT, SLAPPED, OR OTHERWISE PHYSICALLY HURT BY YOUR PARTNER OR EX-PARTNER?: PATIENT UNABLE TO ANSWER

## 2025-05-20 SDOH — SOCIAL STABILITY: SOCIAL INSECURITY: WERE YOU ABLE TO COMPLETE ALL THE BEHAVIORAL HEALTH SCREENINGS?: NO

## 2025-05-20 SDOH — SOCIAL STABILITY: SOCIAL INSECURITY: DOES ANYONE TRY TO KEEP YOU FROM HAVING/CONTACTING OTHER FRIENDS OR DOING THINGS OUTSIDE YOUR HOME?: UNABLE TO ASSESS

## 2025-05-20 SDOH — SOCIAL STABILITY: SOCIAL INSECURITY: ARE THERE ANY APPARENT SIGNS OF INJURIES/BEHAVIORS THAT COULD BE RELATED TO ABUSE/NEGLECT?: UNABLE TO ASSESS

## 2025-05-20 SDOH — SOCIAL STABILITY: SOCIAL INSECURITY: DO YOU FEEL ANYONE HAS EXPLOITED OR TAKEN ADVANTAGE OF YOU FINANCIALLY OR OF YOUR PERSONAL PROPERTY?: UNABLE TO ASSESS

## 2025-05-20 ASSESSMENT — ACTIVITIES OF DAILY LIVING (ADL)
HEARING - RIGHT EAR: UNABLE TO ASSESS
ADEQUATE_TO_COMPLETE_ADL: UNABLE TO ASSESS
GROOMING: UNABLE TO ASSESS
LACK_OF_TRANSPORTATION: PATIENT UNABLE TO ANSWER
DRESSING YOURSELF: UNABLE TO ASSESS
TOILETING: UNABLE TO ASSESS
WALKS IN HOME: UNABLE TO ASSESS
HEARING - LEFT EAR: UNABLE TO ASSESS
BATHING: UNABLE TO ASSESS
PATIENT'S MEMORY ADEQUATE TO SAFELY COMPLETE DAILY ACTIVITIES?: UNABLE TO ASSESS
JUDGMENT_ADEQUATE_SAFELY_COMPLETE_DAILY_ACTIVITIES: UNABLE TO ASSESS
FEEDING YOURSELF: UNABLE TO ASSESS

## 2025-05-20 ASSESSMENT — COLUMBIA-SUICIDE SEVERITY RATING SCALE - C-SSRS
6. HAVE YOU EVER DONE ANYTHING, STARTED TO DO ANYTHING, OR PREPARED TO DO ANYTHING TO END YOUR LIFE?: NO
2. HAVE YOU ACTUALLY HAD ANY THOUGHTS OF KILLING YOURSELF?: NO
1. IN THE PAST MONTH, HAVE YOU WISHED YOU WERE DEAD OR WISHED YOU COULD GO TO SLEEP AND NOT WAKE UP?: NO

## 2025-05-20 ASSESSMENT — LIFESTYLE VARIABLES
HOW OFTEN DO YOU HAVE 6 OR MORE DRINKS ON ONE OCCASION: PATIENT UNABLE TO ANSWER
HOW MANY STANDARD DRINKS CONTAINING ALCOHOL DO YOU HAVE ON A TYPICAL DAY: PATIENT UNABLE TO ANSWER
SKIP TO QUESTIONS 9-10: 0
HOW OFTEN DO YOU HAVE A DRINK CONTAINING ALCOHOL: PATIENT UNABLE TO ANSWER
AUDIT-C TOTAL SCORE: -1
AUDIT-C TOTAL SCORE: -1

## 2025-05-20 ASSESSMENT — COGNITIVE AND FUNCTIONAL STATUS - GENERAL
MOVING TO AND FROM BED TO CHAIR: TOTAL
HELP NEEDED FOR BATHING: TOTAL
PERSONAL GROOMING: TOTAL
EATING MEALS: TOTAL
DRESSING REGULAR UPPER BODY CLOTHING: TOTAL
PATIENT BASELINE BEDBOUND: UNABLE TO ASSESS AT THIS TIME
MOVING FROM LYING ON BACK TO SITTING ON SIDE OF FLAT BED WITH BEDRAILS: TOTAL
DAILY ACTIVITIY SCORE: 6
TOILETING: TOTAL
MOBILITY SCORE: 6
WALKING IN HOSPITAL ROOM: TOTAL
CLIMB 3 TO 5 STEPS WITH RAILING: TOTAL
STANDING UP FROM CHAIR USING ARMS: TOTAL
TURNING FROM BACK TO SIDE WHILE IN FLAT BAD: TOTAL
DRESSING REGULAR LOWER BODY CLOTHING: TOTAL

## 2025-05-20 ASSESSMENT — PAIN - FUNCTIONAL ASSESSMENT
PAIN_FUNCTIONAL_ASSESSMENT: CPOT (CRITICAL CARE PAIN OBSERVATION TOOL)

## 2025-05-20 ASSESSMENT — PAIN SCALES - GENERAL: PAINLEVEL_OUTOF10: 0 - NO PAIN

## 2025-05-20 NOTE — HOSPITAL COURSE
This is a 45 year old male with a past medical history of tobacco abuse (30 pack years), submandibular abscess, and nephrolithiasis who presented to [facility] with a chief complaint of chest pain, found to have an inferior STEMI, now s/p coronary angiogram with balloon angioplasty to the PDA (right dominant system). His course was complicated by hemodynamic instability (tachycardia, hypotension) requiring vasopressor support and laboratory evidnce of end organ dysfunction. Due to concern for cardiogenic vs mixed shock, a shock call was intervened and recommended placement of  PA catheter for adjudication of hemodynamics and transfer to [facility] CICU for a higher level of care. He is now admitted to the CICU for further management.      On arrival to the CICU he is hypotensive with escalating pressor requirements. Bedside examination reveals a holosystolic murmur and serial mixed venous are revealing of a significant step up in SVO2 from the RA to PA concerning for the presence of a ischemic VSD, perhaps secondardy to a late presenting STEMI given troponin elevation to 24K on presentation. Will re-conference with Shock team for consideration of MCS given continued hemodynamic instability and now suspected VSD with cardiogenic shock. Due to concern for mixed shock will start stress dose steroids (as currently on 3 pressors), broaden abx, and send full infectious workup. Continue with CAD/STEMI GDMT. Shock team determined impella CP placement to offload LV given concern for STEMI-induced VSD/ Inferior LV wall rupture.      Patient's lactate has normalized while on Impella CP.  However, still large difference between CVP and PA mixed venous, indicating ongoing shunting.  Infectious disease and nephrology following.  Monitoring the patient will need Impella 5.0.     Starting SLED 5/23 for volume control. Plan to wean sedation for future SBT. GI following for ileus and feeding.       To Do:  [ ] Follow up Nephro and ID   [  ] Monitor Hg for signs of Gi bleed  [ ] Monitor hemolysis/swan numbers daily

## 2025-05-20 NOTE — DISCHARGE SUMMARY
Discharge Diagnosis  Undifferentiated shock, cardiogenic or septic  Lactic acidosis  Metabolic acidosis  Acute kidney injury   Transaminitis  Acute hypoxic respiratory failure requiring mechanical ventilation  STEMI s/p PTA to the PDA  Leukocytosis  Bilateral pulmonary infiltrates, pulmonary edema versus multifocal pneumonia    Issues Requiring Follow-Up  Transfer to [facility] CICU for further management of undifferentiated shock    Test Results Pending At Discharge  Pending Labs       Order Current Status    Blood Culture In process    Blood Culture In process            Hospital Course   Alpesh Elena is a 45 y.o. year old male patient with Past Medical History of a known cardiac murmur, Nephrolithiasis, current smoker of 30 pack years, and FMHx of CAD with stents in his mother.  Patient presented to [facility] emergency department 5/19 with complaints of chest tightness that began around 7 AM, had associated shortness of breath and nausea.  In ED patient was afebrile, tachycardic, hypertensive, normoxic on room air.  EKG showed inferior ST elevations with reciprocal depressions.  He was given aspirin, Brilinta, and IV heparin and was taken emergently to the Cath Lab as a STEMI alert.    LHC 5/19 showed normal left main, LAD mild irregularity, 30% mid LAD occlusion, wraps around apex.  Left circumflex mildly irregular.  RCA torturous.  % ostial occlusion.  LVEDP 2025.  LVEF 40-45.  Inferior basal akinesis.  Patient underwent successful PTA to PDA.  He was admitted to the ICU postintervention for close care and monitoring.    Throughout the day on 5/19 patient was noted to be increasingly tachycardic.  He was given 2 L IV fluid without significant response.  Echocardiogram showed LVEF 55%.  On reassessment of patient at 8 PM he was noted to be in mild distress.  Tachycardic with  (normal sinus rhythm).  EKG continue to show inferior elevations, slightly worsened from prior.  He was mildly hypotensive with a  BP 85/52, MAP 64.  Oxygen saturation 92% on room air, however patient significantly tachypneic with RR 50.  He was having productive cough with pink-tinged sputum.  Warm upper extremities but cool/pale lower extremities.  Labs showed worsening renal function with creatinine 1.76, HCO3 18, anion gap 21.  Transaminitis with , .  Lactate was elevated at 3.9.  ABG showed pH 7.52, pCO2 23, pO2 59, sO2 91.  CXR showed bilateral pulmonary infiltrates concerning for pulmonary edema versus multifocal pneumonia.  He had been being treated with ceftriaxone and doxycycline.    Discussion had with [facility] cardiologist and interventional list.  Given that he had normal LVEF on echocardiogram and distal PDA occlusion, cardiology team did not feel strongly about taking patient back to the Cath Lab, recommended consult with shock team.  After consult with shock team they recommended intubation mechanical ventilation to decrease sympathetic drive as well as PA catheter placement to further assess hemodynamics and differentiate shock.    Patient was intubated without incident and placed on mechanical ventilation 50% FiO2, PEEP 5.  He was sedated with propofol.  He remained on norepinephrine at 0.02 mcg/kg/min.  PA catheter was placed which showed hemodynamics: RA 14, RV 55/18, PA 51/30 (40), PCWP 32, svO2 87, [place] CO 13.9, [place] CI 7.3.  Elevated filling pressures possibly consistent with some LV dysfunction, however high svO2 not indicative of overt cardiogenic shock, possible septic component is considered.  Antibiotics were broadened to Zosyn.    Patient has been accepted to [facility] under Dr. Tariq and will be transferred for further management.      Pertinent Physical Exam At Time of Discharge  Physical Exam    Home Medications     Medication List      You have not been prescribed any medications.       Outpatient Follow-Up  Future Appointments   Date Time Provider Department Center   5/20/2025 12:10 AM [facility] X-RAY  PORT 3 ELLI Perrin, APRN-CNP

## 2025-05-20 NOTE — SIGNIFICANT EVENT
SHOCK CONFERENCE UPDATE.       See prior notes for details.       Patient arrived to [facility] CICU on Levophed 0.1 mcg/kg/min. He continued to deteriorate and have worsening hemodynamics with minimal UOP and raising lactate.   Levophed was increased to 0.2 mcg/kg/min and vasopressin/phenylephrine were added (given -140s). [place] # recheck  RA: 12  PA: 49/24 (32)  SVR: 570  YAAKOV: 2  CHAIM CO: 13.9  CHAIM CI: 7.3  SVO2 sat 92%, Another sat was obtained from proximal PA port was 58%.     Limited TTE showed hyperdynamic LV, mild RV dysfunction, possible VSD.  Additionally, he has holosystolic murmur on exam.      Discussion on escalation to MCS therapies and decision made to proceed with Impella CP.   Discussed with family (son) and he's agreeable.   Pt will be re revaluated following impella insertion and if needed will re conference.

## 2025-05-20 NOTE — PROGRESS NOTES
"Pharmacy Medication History Review    Alpesh Elena is a 45 y.o. male admitted for STEMI (ST elevation myocardial infarction) (Multi). Pharmacy reviewed the patient's [id number] medications and allergies for accuracy.    Medications ADDED:  None  Medications CHANGED:  None  Medications REMOVED:   None     The list below reflects the updated PTA list.   None        The list below reflects the updated allergy list. Please review each documented allergy for additional clarification and justification.  Allergies  Indicated as Unable to Assess by Yobany Lindsay MD on 5/20/2025 (Patient unable to communicate)   No Known Allergies         Patient was unable to be assessed for M2B at discharge.     Sources:   [facility]  Pharmacy dispense history  Child , son, Alpesh (POA) --- Moderate historian  Chart Review  Discharge summary from 7/10/24  Urology note from 3/3  Care Everywhere    Additional Comments:  Son mentions that patient use to be on a cholesterol medication, but that was about 2 years ago  Denies taking any prescription or OTC medication at this time  Son is not sure if pt has any allergies-- with chart review, there is NKDA within the last few years      Ernesto MonsalveD  Transitions of Care Pharmacist  05/20/25     Secure Chat preferred   If no response call [phone] or [phone] the [phone] \"Med Rec\"      "

## 2025-05-20 NOTE — CARE PLAN
The patient's goals for the shift include to remain hemodynamically stable       Over the shift, the patient did not make progress toward the following goals.  Recommendations to address these barriers include   Problem: Safety - Medical Restraint  Goal: Remains free of injury from restraints (Restraint for Interference with Medical Device)  Outcome: Progressing  Goal: Free from restraint(s) (Restraint for Interference with Medical Device)  Outcome: Progressing     Problem: ACS/CP/NSTEMI/STEMI  Goal: Chest pain managed (free from pain or at acceptable level)  Outcome: Progressing  Goal: Lab values return to normal range  Outcome: Progressing  Goal: Promote self management  Outcome: Progressing  Goal: Serial ECG will return to baseline  Outcome: Progressing  Goal: Verbalize understanding of procedures/devices  Outcome: Progressing  Goal: Wean vasopressors/achieve hemodynamic stability  Outcome: Progressing     Problem: Cardiac catheterization  Goal: Free from dysrhythmias  Outcome: Progressing  Goal: Free from pain  Outcome: Progressing  Goal: No evidence of post procedure complications  Outcome: Progressing  Goal: Promote self management  Outcome: Progressing  Goal: Verbalize understanding of procedure  Outcome: Progressing  Goal: Care and maintenance of device (specify)  Outcome: Progressing     Problem: Respiratory  Goal: Clear secretions with interventions this shift  Outcome: Progressing  Goal: Minimize anxiety/maximize coping throughout shift  Outcome: Progressing  Goal: Minimal/no exertional discomfort or dyspnea this shift  Outcome: Progressing  Goal: No signs of respiratory distress (eg. Use of accessory muscles. Peds grunting)  Outcome: Progressing  Goal: Patent airway maintained this shift  Outcome: Progressing  Goal: Tolerate mechanical ventilation evidenced by VS/agitation level this shift  Outcome: Progressing  Goal: Tolerate pulmonary toileting this shift  Outcome: Progressing  Goal: Verbalize  decreased shortness of breath this shift  Outcome: Progressing  Goal: Wean oxygen to maintain O2 saturation per order/standard this shift  Outcome: Progressing  Goal: Increase self care and/or family involvement in next 24 hours  Outcome: Progressing

## 2025-05-20 NOTE — PROCEDURES
Pulmonary Artery Catheter Insertion    Indications: Cardiogenic shock    Catheter: 8Fr, 110cm [place] Pulmonary Artery Catheter placed through the right Internal Jugular 8F Introducer      Procedure  Catheter contamination shield thread over PA catheter and twist-locked in place at ~80 cm. All ports of PA catheter flushed and balloon checked with supplied syringe. Distal port attached to electronic transducer for visualization of pressure waveform, and zeroed accordingly. Balloon inflated and catheter advanced into position by visualization of right atrial, right ventricular, and pulmonary arterial, and pulmonary artery wedge waveforms, in that order. Balloon was then deflated and accurate PA waveform was identified.     PA catheter secured at 50 cm.    Contamination shield advanced over exposed catheter to the level of the introducer and twist-locked in place. Transparent film dressing with CHG applied to introducer insertion site.  Sterility maintained throughout procedure. No complications. Patient tolerated well.    Findings:  1.  Right atrial pressure mean 14 mmHg.  2.  Right ventricular pressure 55/18 mmHg.  3.  Pulmonary artery pressure 51/30 (40) mmHg.  4.  Pulmonary capillary wedge pressure 32 mmHg.  5.  Cardiac output = 20 LPM  6.  Cardiac Index = 10.2 LPM/m2      Complications: None    Chest x-ray:  Catheters in acceptable central venous and pulmonary arterial positions. No PTX. (My read).      Estefania Trujillo, KEYANNA-CNP  Pulmonary & Critical Care Medicine  [facility]

## 2025-05-20 NOTE — PROCEDURES
Percutaneous Sheath Introducer Insertion    Indication: Cardiogenic shock    Catheter: 9Fr, 10cm radiopaque polyurethane    Location: RIJ     Procedure  The patient was prepped and draped in the normal sterile fashion.  The ultrasound probe was draped with a sterile probe cover. Using ultrasound guidance, the right internal jugular vein was identified and cannulated.  There was good back flow through the needle.  A wire was then introduced into the vein and its position was confirmed with ultrasound.  The needle was removed and a skin nick was made using a scalpel.  Using the seldinger technique, the introducer and catheter were advanced over the wire. The rigid introducer was removed and the catheter was left in place. Blood was aspirated from the side port and was flushed with normal saline. The catheter was then secured with sutures.  A sterile CHG dressing was applied.      Complications: None    A post procedure Xray was obtained and the catheter was in good position and no pneumothorax was appreciated.  The patient tolerated the procedure well.    Estefania Trujillo, APRN-CNP  Pulmonary & Critical Care Medicine  [facility]

## 2025-05-20 NOTE — PROGRESS NOTES
"Cardiac Intensive Care - Daily Progress Note   Subjective    Alpesh Elena is a 45 y.o. year old male patient admitted on 5/20/2025 with following ICU needs: Cardiogenic shock, impella, VSD/ Inferior LV wall rupture    Interval History:  Transferred from OSH, s/p PDA STEMI c/b cardiogenic shock and c/f VSD given large difference in PA and CVP SvO2. Impella CP placed 5/20 AM. Repeat lactate still 3.4, but making better urine. Impella CP position adjusted 5/20 Am under US guide. Seeing resting comfortably on Vent and sedation this AM. Then after, Lactate improved to 2.6.     Meds    Scheduled medications  Scheduled Medications[1]  Continuous medications  Continuous Medications[2]  PRN medications  PRN Medications[3]     Objective    Blood pressure (!) 102/92, pulse 100, temperature 36.1 °C (97 °F), temperature source Temporal, resp. rate 16, height 1.727 m (5' 8\"), weight 78.8 kg (173 lb 11.6 oz), SpO2 97%.     Physical Exam  Constitutional:       Comments: Intubated, Sedate   Cardiovascular:      Rate and Rhythm: Regular rhythm. Tachycardia present.      Heart sounds: Murmur heard.      Comments: Holosystolic murmer, best heard at 4th intercostal rib. Very weak vs absent b/l PT and pedal.   Pulmonary:      Effort: Pulmonary effort is normal.      Breath sounds: Normal breath sounds. No wheezing or rales.   Abdominal:      General: Abdomen is flat. Bowel sounds are normal. There is no distension.      Palpations: Abdomen is soft.   Musculoskeletal:      Right lower leg: No edema.      Left lower leg: No edema.   Skin:     General: Skin is warm.   Neurological:      Comments: Sedated             Intake/Output Summary (Last 24 hours) at 5/20/2025 1347  Last data filed at 5/20/2025 1200  Gross per 24 hour   Intake --   Output 295 ml   Net -295 ml     Labs:   Results from last 72 hours   Lab Units 05/20/25  0739 05/20/25  0133 05/19/25  1929   SODIUM mmol/L 136 137 135*   POTASSIUM mmol/L 3.8 4.2 3.8   CHLORIDE mmol/L " 98 98 100   CO2 mmol/L 20* 20* 18*   BUN mg/dL 47* 39* 28*   CREATININE mg/dL 2.19* 1.85* 1.76*   GLUCOSE mg/dL 275* 176* 196*   CALCIUM mg/dL 8.2* 9.6 9.6   ANION GAP mmol/L 22* 23* 21*   EGFR mL/min/1.73m*2 37* 45* 48*   PHOSPHORUS mg/dL 7.3* 8.3* 5.2*      Results from last 72 hours   Lab Units 05/20/25  0739 05/20/25  0133 05/19/25  2106 05/19/25  1211   WBC AUTO x10*3/uL 31.1* 34.1* 30.3* 24.9*  24.9*   HEMOGLOBIN g/dL 14.1 16.0 16.0 15.5  15.5   HEMATOCRIT % 40.3* 47.4 45.3 44.3  44.3   PLATELETS AUTO x10*3/uL 279 335 290 326  326   NEUTROS PCT AUTO % 85.5 81.7  --  82.6   LYMPHS PCT AUTO % 6.5 8.2  --  5.5   MONOS PCT AUTO % 6.2 7.9  --  11.1   EOS PCT AUTO % 0.2 0.4  --  0.0      Results from last 72 hours   Lab Units 05/20/25  1255 05/20/25  1045 05/20/25  0736   POCT PH, ARTERIAL pH 7.41 7.40 7.34*   POCT PCO2, ARTERIAL mm Hg 31* 34* 35*   POCT PO2, ARTERIAL mm Hg 94 87 102*   POCT SO2, ARTERIAL % 98 97 99     Results from last 72 hours   Lab Units 05/20/25  1211 05/20/25  0803 05/19/25  2342   POCT PH, VENOUS pH 7.37 7.30* 7.36   POCT PCO2, VENOUS mm Hg 43 44 39*   POCT PO2, VENOUS mm Hg 44 45 62*        Micro/ID:     Lab Results   Component Value Date    BLOODCULT Loaded on Instrument - Culture in progress 05/19/2025    BLOODCULT Loaded on Instrument - Culture in progress 05/19/2025       EKG Trend:    EKG 5/20: Sinus Tachycardia, T wave inversions inferior leads. Prolonged QTC     Echo Data:  Results for orders placed during the hospital encounter of 05/18/25    Transthoracic Echo Complete    Narrative  [place] [address]  Tel [phone] Fax [phone]    TRANSTHORACIC ECHOCARDIOGRAM REPORT    Patient Name:       AVRIL Yoder Physician:    [id number] Faraz Ortega DO  Study Date:         5/19/2025            Ordering Provider:    [id number] MARGARET GASPAR  MRN/PID:            [id number]             Fellow:  Accession#:         [id number]          Nurse:  Date of Birth/Age:  1980 / 45 years Sonographer:          Koki Mendez  [facility]  Gender Assigned at  M                    Additional Staff:  Birth:  Height:             172.72 cm            Admit Date:           5/18/2025  Weight:             75.30 kg             Admission Status:     Inpatient - STAT  BSA / BMI:          1.89 m2 / 25.24      Department Location:  [facility]  kg/m2  Blood Pressure: 97 /65 mmHg    Study Type:    TRANSTHORACIC ECHO (TTE) COMPLETE  Diagnosis/ICD: ST elevation (STEMI) myocardial infarction of unspecified  site-I21.3  Indication:    STEMI  CPT Codes:     Echo Complete w Full [id number]    Patient History:  PCI and Stent:     PCI performed on 5/18/2025.  Smoker:            Current.  Pertinent History: Chest Pain and Murmur.    Study Detail: The following Echo studies were performed: 2D, M-Mode, Doppler and  color flow. Definity used as a contrast agent for endocardial  border definition. Total contrast used for this procedure was 2 mL  via IV push.      PHYSICIAN INTERPRETATION:  Left Ventricle: The left ventricular systolic function is normal, with a visually estimated ejection fraction of 55%. There is mild concentric left ventricular hypertrophy. Wall motion is abnormal. The left ventricular cavity size is normal. There is mild increased septal and mildly increased posterior left ventricular wall thickness. Spectral Doppler shows a normal pattern of left ventricular diastolic filling.  LV Wall Scoring:  The basal inferoseptal segment, basal inferolateral segment, and basal inferior  segment are hypokinetic. All remaining scored segments are normal.    Left Atrium: The left atrial size is normal.  Right Ventricle: The right ventricle is normal in size. There is normal right ventricular global systolic function.  Right Atrium: The right atrial size is normal.  Aortic Valve: The aortic valve appears structurally normal. There is no evidence of aortic valve  stenosis.  The aortic valve dimensionless index is 0.73. There is no evidence of aortic valve regurgitation. The peak instantaneous gradient of the aortic valve is 5 mmHg. The mean gradient of the aortic valve is 3 mmHg.  Mitral Valve: The mitral valve is normal in structure. There is no evidence of mitral valve stenosis. There is normal mitral valve leaflet mobility. There is no evidence of mitral valve regurgitation.  Tricuspid Valve: The tricuspid valve is structurally normal. There is normal tricuspid valve leaflet mobility. There is trace tricuspid regurgitation.  Pulmonic Valve: The pulmonic valve is structurally normal. There is no indication of pulmonic valve regurgitation.  Pericardium: No pericardial effusion noted.  Aorta: The aortic root is normal.  Pulmonary Artery: The main pulmonary artery is normal in size, and position, with normal bifurcation into the left and right pulmonary arteries. The tricuspid regurgitant velocity is 2.88 m/s, and with an estimated right atrial pressure of 3 mmHg, the estimated pulmonary artery pressure is mildly elevated with the RVSP at 36.2 mmHg.  Systemic Veins: The inferior vena cava appears normal in size.      CONCLUSIONS:  1. The left ventricular systolic function is normal, with a visually estimated ejection fraction of 55%.  2. Basal inferoseptal segment, basal inferolateral segment, and basal inferior segment are abnormal.  3. Abnormal wall motion.  4. There is normal right ventricular global systolic function.  5. Normal sized right ventricle.  6. There is no evidence of mitral valve stenosis.  7. No evidence of mitral valve regurgitation.  8. Trace tricuspid regurgitation is visualized.  9. Aortic valve stenosis is not present.  10. The main pulmonary artery is normal in size, and position, with normal bifurcation into the left and right pulmonary arteries.    QUANTITATIVE DATA SUMMARY:    2D MEASUREMENTS:             Normal Ranges:  Ao Root d:       2.86 cm      (2.0-3.7cm)  LAs:             3.88 cm     (2.7-4.0cm)  IVSd:            1.12 cm     (0.6-1.1cm)  LVPWd:           1.08 cm     (0.6-1.1cm)  LVIDd:           4.02 cm     (3.9-5.9cm)  LVIDs:           2.94 cm  LV Mass Index:   77.7 g/m2  LVEDV Index:     59.87 ml/m2  LV % FS          26.9 %      LEFT ATRIUM:                  Normal Ranges:  LA Vol A4C:        29.5 ml    (22+/-6mL/m2)  LA Vol A2C:        25.4 ml  LA Vol BP:         30.5 ml  LA Vol Index A4C:  15.6ml/m2  LA Vol Index A2C:  13.4 ml/m2  LA Vol Index BP:   16.1 ml/m2  LA Area A4C:       13.8 cm2  LA Area A2C:       11.5 cm2  LA Major Axis A4C: 5.5 cm  LA Major Axis A2C: 4.4 cm  LA Volume Index:   14.9 ml/m2      RIGHT ATRIUM:                 Normal Ranges:  RA Vol A4C:        24.1 ml    (8.3-19.5ml)  RA Vol Index A4C:  12.7 ml/m2  RA Area A4C:       11.1 cm2  RA Major Axis A4C: 4.4 cm      AORTA MEASUREMENTS:         Normal Ranges:  Asc Ao, d:          2.48 cm (2.1-3.4cm)      LV SYSTOLIC FUNCTION:  Normal Ranges:  EF-A4C View:    54 % (>=55%)  EF-A2C View:    54 %  EF-Biplane:     55 %  EF-Visual:      55 %  LV EF Reported: 55 %      LV DIASTOLIC FUNCTION:           Normal Ranges:  MV Peak E:             1.20 m/s  (0.7-1.2 m/s)  MV Peak A:             1.08 m/s  (0.42-0.7 m/s)  E/A Ratio:             1.11      (1.0-2.2)  MV e'                  0.090 m/s (>8.0)  MV lateral e'          0.08 m/s  MV medial e'           0.10 m/s  E/e' Ratio:            13.29     (<8.0)      MITRAL VALVE:          Normal Ranges:  MV DT:        126 msec (150-240msec)      AORTIC VALVE:                     Normal Ranges:  AoV Vmax:                1.16 m/s (<=1.7m/s)  AoV Peak P.4 mmHg (<20mmHg)  AoV Mean PG:             3.0 mmHg (1.7-11.5mmHg)  LVOT Max Aime:            1.03 m/s (<=1.1m/s)  AoV VTI:                 22.30 cm (18-25cm)  LVOT VTI:                16.20 cm  LVOT Diameter:           2.03 cm  (1.8-2.4cm)  AoV Area, VTI:           2.35 cm2 (2.5-5.5cm2)  AoV  Area,Vmax:           2.87 cm2 (2.5-4.5cm2)  AoV Dimensionless Index: 0.73      RIGHT VENTRICLE:  RV Basal 3.49 cm  RV Mid   2.65 cm  RV Major 7.3 cm  TAPSE:   22.4 mm  RV s'    0.13 m/s      TRICUSPID VALVE/RVSP:          Normal Ranges:  Peak TR Velocity:     2.88 m/s  RV Syst Pressure:     36 mmHg  (< 30mmHg)  IVC Diam:             1.74 cm      PULMONIC VALVE:          Normal Ranges:  PV Accel Time:  116 msec (>120ms)  PV Max Aime:     1.5 m/s  (0.6-0.9m/s)  PV Max P.8 mmHg      [id number] Faraz Ortega   Electronically signed on 2025 at 9:34:06 AM      Wall Scoring        ** Final **      Cath Data:  [facility]     LMT normal.  LAD mild irregularity, 30% mid, wraps around the apex.  LCX mild iregularity.  RCA tortuous, dom.  PDA is 100% ostial.  LVEDP 20-25.  LVEF 40-45%, inferior basal AK.       Successful PTA R % GLENNA 0 reduced to 30%, GLENNA 3 after POBA, with no stent placed due to small caliber vessel and minimal runoff, not enough to warrant stenting.     Concern for drug use considering patient very tachycardic and LVEDP elevated with very small PDA occlusion    Summary of key imaging results from the last 24 hours     See above    Assessment and Plan     Assessment: This is a 45 year old male with a past medical history of tobacco abuse (30 pack years), submandibular abscess, and nephrolithiasis who presented to [facility] with a chief complaint of chest pain, found to have an inferior STEMI, now s/p coronary angiogram with balloon angioplasty to the PDA (right dominant system). His course was complicated by hemodynamic instability (tachycardia, hypotension) requiring vasopressor support and laboratory evidnce of end organ dysfunction. Due to concern for cardiogenic vs mixed shock, a shock call was intervened and recommended placement of  PA catheter for adjudication of hemodynamics and transfer to [facility] CICU for a higher level of care. He is now admitted to the CICU for further management.       On arrival to the CICU he is hypotensive with escalating pressor requirements. Bedside examination reveals a holosystolic murmur and serial mixed venous are revealing of a significant step up in SVO2 from the RA to PA concerning for the presence of a ischemic VSD, perhaps secondardy to a late presenting STEMI given troponin elevation to 24K on presentation. Will re-conference with Shock team for consideration of MCS given continued hemodynamic instability and now suspected VSD with cardiogenic shock. Due to concern for mixed shock will start stress dose steroids (as currently on 3 pressors), broaden abx, and send full infectious workup. Continue with CAD/STEMI GDMT. Shock team determined impella CP placement to offload LV given concern for STEMI-induced VSD/ Inferior LV wall rupture.     Will monitor lacate to determine if improvement in shock while continue ABX for possible CAP.     Mechanical Ventilation: < 4 days  Sedation/Analgesia:  Fentanyl, Precedex, and Versed  Restraints: no    Summary for 05/20/25  :  Shock Goal:  MAP 6--65  SBP 80-85  Trend lactate and UO Q2h for signs of Improvement   ABX: c/w Zosyn, DC vanco (MRSA -) and Azithr (QTC > 500)  Impella placement adjusted with guidance of TTE and Cath lab nursing team  C/w CP P-9     NEUROLOGIC  #Sedation  - Vent/Versed/Precedex for RASS -2 - -3      CARDIOVASCULAR  #RPDA STEMI s/p POBA  #C/f Cardiogenic vs mixed shock   #C/f VSD  ::  ECG on admit to OSH with ST elevations in the inferior leads with reciprocal depressions  :: Troponin:  24,085>21,701,19,289,[id number]  :: Lipid panel (05/2025)- cholesterol 220, HDL 33.9, , TG 57  :: TSH 1.51 (05/2025), A1C 7.4  (05/2025)  :: coronary angiogram (05/2025)  which revealed multi vessel CAD [LAD/Lcx/RCA mildly/diffusely diseased,100% RPDA culprit lesion] with PTCA to RPDA with 50% residual stenosis [GLENNA 0->3] (too small to stent)   :: LVEF 45% and LVED 20-25 mmHg via LV Gram   :: TTE  (05/2025) LVEF 55%,  wall motion abnormalities, and no significant valvular disease   :: SVO2 59 from CVP port, 92 from PA port (prior to impella)  :: SVO2 57 from CVP port, 80 from PA port (after impella)  Plan:  - Levo/Vaso/Phenyl/hydrocortisone 50 mg q8H for MAP >60, SBP > 80  - Trend Lactate of blood gas Q2H  - Aspirin 81 mg every day, DC Brilinita given no stent / possible future surgery   - Statin: Crestor 40 mg every day   - Beta blocker: hold given shock requiring MCS  - RAAS Inhibition: hold given shock requiring MCS  - Limited TTE repeat: Compared with the prior exam from 5/19/2025 ([facility]) there has been interval development of a large VSD throuh the inferoseptum with the RV now becoming large with significantly reduced function.      PULMONARY  #AHRF   :: Prior to intubation satting 92% on RA  :: CXR with pulmonary edema vs PNA  :: CTA CAP- mild interstitial pulmonary edema   :: , WBC 34.1  Plan:  - Infectious workup/abx as below   - [place] guided therapy   - Wean Ventilator when more hemodynamically stable      RENAL/  #BENY   :: Baseline Cr 0.8-1.0, 1.61 on admit, 1.85 on transport   Plan:  - Likely hemodynamic mediated , vanco, LV gram   - Follow up UA/Ulytes   - Avoid hypotension, rapid fluctuations in BP  - [place] guided volume resuscitation/ diuresis   - Daily RFP      GASTROINTESTINAL  #GI PPX  - IV PPI every day while intubated  - Miralax every day      ENDOCRINE  #TIIDM   :: A1C 7.4  (05/2025)  Plan:  - NPO   - Q4H POC glucose, SSI while NPO  - Titrate -180  - Will need diabetes education prior to discharge      HEME/ONC  SWAPNIL  - Monitor for signs of hemolysis while on impella      INFECTIOUS DISEASE  #C/f Mixed shock   #Leukocytosis   :: S/p Doxy/CTX 05/19  :: blood cultures sent 05/19 x 2 sets   Work up:  - MRSA probe: -  - Bcx2: pending  - UA: non-infectious   Plan:  - c/w Zosyn, DC vanco (MRSA -) and Azithr (QTC > 500) (5/20-*)  - Levo/Vaso/Phenyl/hydrocortisone 50 mg q8H for MAP >60  -  Follow infectious labs  - narrow abx as able      MSK/DERM  SWAPNIL         ICU Check List       ICU Liberation: Intervention:   Assess, Prevent, Manage Pain 0 - No pain fentanyl gtt   Both SAT and SBT [] SAT  [] SBT 30-60 min [] Extubate to NC  [] Extubate to NIV  [] Discuss Trach   Choice of analgesia and sedation Romero Agitation Sedation Scale (RASS): Deep sedation Fentanyl, Precedex, and Versed  -1 to -2   Delirium: Assess, prevent and manage  CAM ICU Positive Assess Qshift   Early Mobility and Exercise   [] PT /OT consult   Family Engagement and Empowerment [x]Family updated []SW consult     F: PRN for euvolemia; swan guided therapy   E: PRN K>4, Mg>2, P>3   N: NPO  A: PIV, RIJ swan, left radial A line      Oxygen: MV 40%/500/16/5  Abx: Zosyn  Gtts: Levo, Vaso, Phenyl, fent, versed      DVT ppx: Lovenox  GI ppx: IV PPI     Code Status: full (confirmed on admit)  Surrogate Medical Decision-maker:    Ashley Hogan (Mother) [phone]         Introducer 05/19/25 Internal jugular Right (Active)   Placement Date/Time: 05/19/25 2330   Hand Hygiene Completed: Yes  Location: Internal jugular  Orientation: Right  Placed by: CG/AL  Placement Verified: X-ray;Pressure tracing changes;Transduced waveform   Number of days: 0       Arterial Line 05/19/25 Left Radial (Active)   Placement Date/Time: 05/19/25 2200   Orientation: Left  Location: Radial  Local Anesthetic: Injectable  Technique: Anatomical landmarks  Insertion attempts: 1  Securement Method: Sutured  Patient Tolerance: Fair   Number of days: 0       Arterial Sheath 05/20/25 0529 6 Fr. Right Femoral (Active)   Placement Date/Time: 05/20/25 0529   Sheath Size: (c) 6 Fr.  Line Orientation: Right  Sheath Insertion Site: Femoral   Number of days: 0       Arterial Sheath 05/20/25 0532 Other (Comment) Right Femoral (Active)   Placement Date/Time: 05/20/25 0532   Sheath Size: (c) Other (Comment)  Line Orientation: Right  Sheath Insertion Site: Femoral   Number of  days: 0       Arterial Sheath 05/20/25 0559 6 Fr. Left Femoral (Active)   Placement Date/Time: 05/20/25 0559   Sheath Size: 6 Fr.  Line Orientation: Left  Sheath Insertion Site: Femoral   Number of days: 0       Pulmonary Artery Catheter Internal jugular (Active)   Placement Date/Time: 05/19/25 2330   Hand Hygiene Performed Prior to CVC Insertion: Yes  Site Prep: Alcohol;Chlorhexidine ;Usual sterile procedure followed  Site Prep Agent has Completely Dried Before Insertion: Yes  All 5 Sterile Barriers Used (Glove...   Number of days: 0       ETT  7.5 mm (Active)   Placement Date/Time: 05/19/25 2305   Hand Hygiene Completed: Yes  ETT Type: ETT - single  Single Lumen Tube Size: 7.5 mm  Cuffed: Yes  Location: Oral  Airway Insertion Attempts: 1  Placement Verification: Auscultation;Capnometry;Fiberoptic visualizati...   Number of days: 0       Urethral Catheter 16 Fr. (Active)   Placement Date/Time: 05/20/25 0200   Hand Hygiene Completed: Yes  Tube Size (Fr.): 16 Fr.  Catheter Balloon Size: 10 mL  Urine Returned: Yes   Number of days: 0       NG/OG/Feeding Tube OG - Walling sump 16 Fr Center mouth (Active)   Placement Date/Time: 05/19/25 2310   Placed by: MARLENA  Hand Hygiene Completed: Yes  Type of Tube: NG/OG Tube  Tube Length: 55 cm  Tube Type: OG - Walling sump  NG/OG Tube Size: 16 Fr  Tube Location: Center mouth   Number of days: 0       Social:  Code: Full Code    Disposition: NABOR             Kevin Leong DO   05/20/25 at 1:47 PM     Disclaimer: Documentation completed with the information available at the time of input. The times in the chart may not be reflective of actual patient care times, interventions, or procedures. Documentation occurs after the physical care of the patient.           [1] aspirin, 81 mg, oral, Daily  heparin (porcine), , ,   hydrocortisone sodium succinate, 50 mg, intravenous, q8h  insulin lispro, 0-5 Units, subcutaneous, q4h  lactated Ringer's, 500 mL, intravenous, Once  oxygen, ,  inhalation, Continuous - Inhalation  pantoprazole, 40 mg, intravenous, Daily  piperacillin-tazobactam, 4.5 g, intravenous, q6h  polyethylene glycol, 17 g, oral, Daily  rosuvastatin, 40 mg, oral, Nightly     [2] fentaNYL,  mcg/hr, Last Rate: 150 mcg/hr (05/20/25 1329)  heparin, 0-4,000 Units/hr  heparin Impella Purge 25 units/mL in 500 mL D5W, 10 mL/hr, Last Rate: 10 mL/hr (05/20/25 1152)  midazolam, 0.5-20 mg/hr, Last Rate: 4 mg/hr (05/20/25 1300)  norepinephrine, 0.01-1 mcg/kg/min, Last Rate: 0.13 mcg/kg/min (05/20/25 1300)  phenylephrine, 0-2 mcg/kg/min, Last Rate: 0.5 mcg/kg/min (05/20/25 1300)  vasopressin, 0.03 Units/min, Last Rate: 0.03 Units/min (05/20/25 1300)     [3] PRN medications: dextrose, dextrose, glucagon, glucagon, heparin (porcine)

## 2025-05-20 NOTE — PROGRESS NOTES
Vancomycin Dosing by Pharmacy- Cessation of Therapy    Consult to pharmacy for vancomycin dosing has been discontinued by the prescriber, pharmacy will sign off at this time.    Please call pharmacy if there are further questions or re-enter a consult if vancomycin is resumed.     Kwasi Betancur, PharmD

## 2025-05-20 NOTE — CONSULTS
Vancomycin Dosing by Pharmacy- INITIAL    Alpesh Elena is a 45 y.o. year old male who Pharmacy has been consulted for vancomycin dosing for pneumonia. Based on the patient's indication and renal status this patient will be dosed based on a goal trough/random level of 15-20.     Renal function is currently declining, BENY, dose by level.    Visit Vitals  /77   Pulse (!) 130   Temp 37.4 °C (99.3 °F) (Esophageal)   Resp 23        Lab Results   Component Value Date    CREATININE 1.76 (H) 2025    CREATININE 1.61 (H) 2025    CREATININE 0.94 2025    CREATININE 0.79 2025        Patient weight is as follows:   Vitals:    25 0156   Weight: 78.8 kg (173 lb 11.6 oz)       Cultures:  No results found for the encounter in last 14 days.        No intake/output data recorded.  I/O during current shift:  No intake/output data recorded.    Temp (24hrs), Av.5 °C (97.7 °F), Min:35.8 °C (96.4 °F), Max:37.4 °C (99.3 °F)         Assessment/Plan     Patient will be given a loading dose of 2000 mg.  Follow-up level will be ordered on  at AM draw unless clinically indicated sooner.  Will continue to monitor renal function daily while on vancomycin and order serum creatinine at least every 48 hours if not already ordered.  Follow for continued vancomycin needs, clinical response, and signs/symptoms of toxicity.       Pricila Newton, PharmD

## 2025-05-20 NOTE — CONSULTS
Reason for Consult: VSD post STEMI    CARDIAC SURGERY CONSULT NOTE    HISTORY OF PRESENT ILLNESS  Information obtained via chart review as patient is intubated and sedated.     Mr. Elena is a 45 year old male with a past medical history of tobacco abuse (30 pack years), submandibular abscess, and nephrolithiasis who presented to [facility] with a chief complaint of chest pain, found to have an inferior STEMI, now s/p coronary angiogram with balloon angioplasty to the PDA (right dominant system). His course was complicated by hemodynamic instability (tachycardia, hypotension) requiring vasopressor support and laboratory evidnce of end organ dysfunction. Due to concern for cardiogenic vs mixed shock, a shock call was intervened and recommended transfer to [facility] CICU. TTE at [facility] revealed large VSD. He underwent Impella CP placement 5/20.     Cardiac surgery is consulted for a VSD repair post STEMI.     Hospital course:   Per chart review, presented to [facility] 05/18 PM for the evaluation of chest tightness associated with SOB/nausea. On arrival he was afebrile, tachycardic, and hypertensive. ED workup remarkable for initial WBC 20.5, Cr 0.79, troponin ~24K,  and ECG with ST elevations in the inferior leads with reciprocal depressions. He was loaded with aspirin and brilinta and started on heparin gtt. He was taken for emergent coronary angiogram which revealed multi vessel CAD [LAD/Lcx/RCA mildly/diffusely diseased,100% RPDA culprit lesion] with PTCA to RPDA with 50% residual stenosis [GLENNA 0->3] (too small to stent). Notably, LVEF 45% and LVED 20-25 mmHg.     He then developed on 5/19 persistent tachycardia despite IVF and IV metoprolol. TTE with LVEF 55%, wall motion abnormalities, and no significant valvular disease. Due to concern for acute aortic pathology vs PE, he underwent a CTA CAP revealing of mild pulmonary edema. Due to concern for PNA, blood cultures were obtained and he was started on  CTX/Doxycycline. Later that evening was in respiratory distress (tachypneic to RR 50) with hypotension (BP 85/52 ) and satting 92% on RA with labs revealing of end organ dysfunction and CXR with bilateral pulmonary infiltrates [pulmoinary edema vs multifocal PNA]. A shock call was convened and recommended intubation and PA catheter placement for adjudication of hemodynamics. He was intubated without issue and opening [place] numbers were revealing of RA 14, RV 55/18, PA 51/30 (40), PCWP 32, svO2 87, [place] CO 13.9, [place] CI 7.3.  He was then transferred to [facility].     On arrival to the CICU he was hypotensive with escalating pressor requirements. Bedside examination reveals a holosystolic murmur and serial mixed venous are revealing of a significant step up in SVO2 from the RA to PA concerning for the presence of a ischemic VSD, perhaps secondardy to a late presenting STEMI given troponin elevation to 24K on presentation. Shock call was done that recommended an impella CP placement to offload LV given concern for STEMI-induced VSD/ Inferior LV wall rupture.     Cardiac/Pertinent Imaging  [facility]: 5/18/25:  CONCLUSIONS:   1. Left Main Coronary Artery: This artery is normal.   2. Left Anterior Descending Artery: is mildly diseased, is diffusely diseased and is ectatic.   3. Circumflex Coronary Artery: diffusely dieased, mildly diseased and ectatic.   4. Right Coronary Artery: is tortuous, is diffusely diseased and mildly diseased.   5. PDA RCA Lesion: The percent stenosis is 100%.   6. PDA RCA Lesion: PTCA: 50% residual stenosis. RCA: pre-procedure GLENNA flow was 0(no perfusion) and post-procedure GLENNA flow was 3(complete perfusion).   7. The Left Ventricular Ejection Fraction is 45%.   8. Left Ventricular End Diastolic Pressure: 20-25 mm Hg.   9. Aortic Stenosis: None.  10. Left Ventricular End Diastolic Pressure Dysfunction: Moderate.  11. LV: inferior akinesis.  12. LV: mild left ventricular dysfunction with regional wall  motion abnormalities.    Echo: 5/20/25:  CONCLUSIONS:   1. Poorly visualized anatomical structures due to suboptimal image quality.   2. The left ventricle was not well visualized. The left ventricular ejection fraction could not be measured.   3. Limited views of the LV appear show a hyderdynamic LV with a large color flow from LV to RV through the inferoseptum with maximal velocity of 3.3m/sec, Unable to adequatly assess LVEF, though appears to have baal septal and inferior wall motion abnormality.   4. There is reduced right ventricular systolic function.   5. The RV appears dilated with significantly reduced function with reduced TAPSE and fractional area change.   6. Primary service notified of findings at the time of reporting.   7. Compared with the prior exam from 5/19/2025 ([facility]) there has been interval development of a large VSD throuh the inferoseptum with the RV now becoming large with significantly reduced function. Prior echo with reported LVEF of 55% with basal inferospetal and baal inferior hypokinesis in the setting of STEMI. RV size and function were nornal at that time. ( Note that the septum was more thoroughly interrogated with color Doppler today).      Subjective   Medical History[1]  Surgical History[2]  Social History[3]  Family History[4]    Patient has no known allergies.    Prior to Admission medications    Medication Sig Start Date End Date Taking? Authorizing Provider   bacitracin 500 unit/gram ointment Apply topically 2 times a day. 7/10/24 5/18/25  Jordana Montez DDS   chlorhexidine (Peridex) 0.12 % solution Use 15 mL in the mouth or throat if needed for wound care. 7/9/24 5/18/25  Harrison Stahl, GEMA   oxyCODONE (Roxicodone) 5 mg immediate release tablet Take 1 tablet (5 mg) by mouth every 6 hours if needed for severe pain (7 - 10). 7/10/24 5/18/25  Jordana Montez DDS       Review of Systems  Review of Systems   Unable to perform ROS: Intubated        "    Objective   BP (!) 102/92   Pulse 100   Temp 36.1 °C (97 °F) (Temporal)   Resp 16   Ht 1.727 m (5' 8\")   Wt 78.8 kg (173 lb 11.6 oz)   SpO2 97%   BMI 26.41 kg/m²   0-10 (Numeric) Pain Score: 0 - No pain  Critical-Care Pain Observation Score:  [0]    Vitals:    05/20/25 0156   Weight: 78.8 kg (173 lb 11.6 oz)          Intake/Output Summary (Last 24 hours) at 5/20/2025 1352  Last data filed at 5/20/2025 1200  Gross per 24 hour   Intake --   Output 295 ml   Net -295 ml       Physical Exam  Physical Exam  Constitutional:       Appearance: He is ill-appearing.      Comments: Intubated and sedated    HENT:      Mouth/Throat:      Comments: ETT in place  Cardiovascular:      Rate and Rhythm: Regular rhythm. Tachycardia present.      Comments: Impella CP in place  Skin:     General: Skin is warm.   Neurological:      Comments: Intubated and sedated         Medications  Scheduled medications  Scheduled Medications[5]Continuous medications  Continuous Medications[6]PRN medications  PRN Medications[7]    Labs  Results for orders placed or performed during the hospital encounter of 05/20/25 (from the past 24 hours)   Blood Gas Arterial Full Panel   Result Value Ref Range    POCT pH, Arterial 7.39 7.38 - 7.42 pH    POCT pCO2, Arterial 33 (L) 38 - 42 mm Hg    POCT pO2, Arterial 100 (H) 85 - 95 mm Hg    POCT SO2, Arterial 99 94 - 100 %    POCT Oxy Hemoglobin, Arterial 96.3 94.0 - 98.0 %    POCT Hematocrit Calculated, Arterial 49.0 41.0 - 52.0 %    POCT Sodium, Arterial 134 (L) 136 - 145 mmol/L    POCT Potassium, Arterial 4.2 3.5 - 5.3 mmol/L    POCT Chloride, Arterial 99 98 - 107 mmol/L    POCT Ionized Calcium, Arterial 1.14 1.10 - 1.33 mmol/L    POCT Glucose, Arterial 191 (H) 74 - 99 mg/dL    POCT Lactate, Arterial 3.1 (H) 0.4 - 2.0 mmol/L    POCT Base Excess, Arterial -3.9 (L) -2.0 - 3.0 mmol/L    POCT HCO3 Calculated, Arterial 20.0 (L) 22.0 - 26.0 mmol/L    POCT Hemoglobin, Arterial 16.4 13.5 - 17.5 g/dL    POCT " Anion Gap, Arterial 19 10 - 25 mmo/L    Patient Temperature 37.0 degrees Celsius    FiO2 50 %   TSH with reflex to Free T4 if abnormal   Result Value Ref Range    Thyroid Stimulating Hormone 3.50 0.44 - 3.98 mIU/L   Type And Screen   Result Value Ref Range    ABO TYPE A     Rh TYPE POS     ANTIBODY SCREEN NEG    Coagulation Screen   Result Value Ref Range    Protime 16.0 (H) 9.8 - 12.4 seconds    INR 1.4 (H) 0.9 - 1.1    aPTT 28 26 - 36 seconds   Phosphorus   Result Value Ref Range    Phosphorus 8.3 (H) 2.5 - 4.9 mg/dL   Magnesium   Result Value Ref Range    Magnesium 2.20 1.60 - 2.40 mg/dL   Comprehensive Metabolic Panel   Result Value Ref Range    Glucose 176 (H) 74 - 99 mg/dL    Sodium 137 136 - 145 mmol/L    Potassium 4.2 3.5 - 5.3 mmol/L    Chloride 98 98 - 107 mmol/L    Bicarbonate 20 (L) 21 - 32 mmol/L    Anion Gap 23 (H) 10 - 20 mmol/L    Urea Nitrogen 39 (H) 6 - 23 mg/dL    Creatinine 1.85 (H) 0.50 - 1.30 mg/dL    eGFR 45 (L) >60 mL/min/1.73m*2    Calcium 9.6 8.6 - 10.6 mg/dL    Albumin 4.5 3.4 - 5.0 g/dL    Alkaline Phosphatase 116 33 - 120 U/L    Total Protein 7.1 6.4 - 8.2 g/dL     (H) 9 - 39 U/L    Bilirubin, Total 1.6 (H) 0.0 - 1.2 mg/dL     (H) 10 - 52 U/L   CBC and Auto Differential   Result Value Ref Range    WBC 34.1 (H) 4.4 - 11.3 x10*3/uL    nRBC 0.0 0.0 - 0.0 /100 WBCs    RBC 5.27 4.50 - 5.90 x10*6/uL    Hemoglobin 16.0 13.5 - 17.5 g/dL    Hematocrit 47.4 41.0 - 52.0 %    MCV 90 80 - 100 fL    MCH 30.4 26.0 - 34.0 pg    MCHC 33.8 32.0 - 36.0 g/dL    RDW 13.1 11.5 - 14.5 %    Platelets 335 150 - 450 x10*3/uL    Neutrophils % 81.7 40.0 - 80.0 %    Immature Granulocytes %, Automated 1.6 (H) 0.0 - 0.9 %    Lymphocytes % 8.2 13.0 - 44.0 %    Monocytes % 7.9 2.0 - 10.0 %    Eosinophils % 0.4 0.0 - 6.0 %    Basophils % 0.2 0.0 - 2.0 %    Neutrophils Absolute 27.85 (H) 1.20 - 7.70 x10*3/uL    Immature Granulocytes Absolute, Automated 0.55 0.00 - 0.70 x10*3/uL    Lymphocytes Absolute 2.78  1.20 - 4.80 x10*3/uL    Monocytes Absolute 2.70 (H) 0.10 - 1.00 x10*3/uL    Eosinophils Absolute 0.14 0.00 - 0.70 x10*3/uL    Basophils Absolute 0.07 0.00 - 0.10 x10*3/uL   Procalcitonin   Result Value Ref Range    Procalcitonin 2.92 (H) <=0.07 ng/mL   B-Type Natriuretic Peptide   Result Value Ref Range     (H) 0 - 99 pg/mL   Morphology   Result Value Ref Range    RBC Morphology No significant RBC morphology present    Lactate dehydrogenase   Result Value Ref Range    LDH 1,026 (H) 84 - 246 U/L   Haptoglobin   Result Value Ref Range    Haptoglobin 195 30 - 200 mg/dL   Sars-CoV-2 and Influenza A/B PCR   Result Value Ref Range    Flu A Result Not Detected Not Detected    Flu B Result Not Detected Not Detected    Coronavirus 2019, PCR Not Detected Not Detected   Adenovirus PCR Qual For Respiratory Samples   Result Value Ref Range    Adenovirus PCR, Qual Not Detected Not detected   Rhinovirus PCR, Respiratory Specimens   Result Value Ref Range    Rhinovirus PCR, Respiratory Spec Not Detected Not Detected   Parainfluenza PCR   Result Value Ref Range    Parainfluenza 1, PCR Not Detected Not Detected, Invalid    Parainfluenza 2, PCR Not Detected Not Detected, Invalid    Parainfluenza 3, PCR Not Detected Not Detected, Invalid    Parainfluenza 4, PCR Not Detected Not Detected, Invalid   RSV PCR   Result Value Ref Range    RSV PCR Not Detected Not Detected   Metapneumovirus PCR   Result Value Ref Range    Metapneumovirus (Human), PCR Not Detected Not detected   MRSA Surveillance for Vancomycin De-escalation, PCR    Specimen: Anterior Nares; Swab   Result Value Ref Range    MRSA PCR Not Detected Not Detected   Blood Gas Mixed Venous Full Panel   Result Value Ref Range    POCT pH, Mixed 7.41 7.33 - 7.43 pH    POCT pCO2, Mixed 30 (L) 41 - 51 mm Hg    POCT pO2, Mixed 68 (H) 35 - 45 mm Hg    POCT SO2, Mixed 92 (H) 45 - 75 %    POCT Oxy Hemoglobin, Mixed 90.3 (H) 45.0 - 75.0 %    POCT Hematocrit Calculated, Mixed 49.0 41.0 -  52.0 %    POCT Sodium, Mixed 134 (L) 136 - 145 mmol/L    POCT Potassium, Mixed 3.9 3.5 - 5.3 mmol/L    POCT Chloride, Mixed 98 98 - 107 mmol/L    POCT Ionized Calcium, Mixed 1.12 1.10 - 1.33 mmol/L    POCT Glucose, Mixed 198 (H) 74 - 99 mg/dL    POCT Lactate, Mixed 3.6 (H) 0.4 - 2.0 mmol/L    POCT Base Excess, Mixed -4.2 (L) -2.0 - 3.0 mmol/L    POCT HCO3 Calculated, Mixed 19.0 (L) 22.0 - 26.0 mmol/L    POCT Hemoglobin, Mixed 16.2 13.5 - 17.5 g/dL    POCT Anion Gap, Mixed 21 10 - 25 mmo/L    Patient Temperature 37.0 degrees Celsius    FiO2 40 %   Blood Gas Mixed Venous Full Panel   Result Value Ref Range    POCT pH, Mixed 7.36 7.33 - 7.43 pH    POCT pCO2, Mixed 39 (L) 41 - 51 mm Hg    POCT pO2, Mixed 46 (H) 35 - 45 mm Hg    POCT SO2, Mixed 59 45 - 75 %    POCT Oxy Hemoglobin, Mixed 57.7 45.0 - 75.0 %    POCT Hematocrit Calculated, Mixed 47.0 41.0 - 52.0 %    POCT Sodium, Mixed 135 (L) 136 - 145 mmol/L    POCT Potassium, Mixed 4.1 3.5 - 5.3 mmol/L    POCT Chloride, Mixed 98 98 - 107 mmol/L    POCT Ionized Calcium, Mixed 1.10 1.10 - 1.33 mmol/L    POCT Glucose, Mixed 208 (H) 74 - 99 mg/dL    POCT Lactate, Mixed 3.6 (H) 0.4 - 2.0 mmol/L    POCT Base Excess, Mixed -3.1 (L) -2.0 - 3.0 mmol/L    POCT HCO3 Calculated, Mixed 22.0 22.0 - 26.0 mmol/L    POCT Hemoglobin, Mixed 15.8 13.5 - 17.5 g/dL    POCT Anion Gap, Mixed 19 10 - 25 mmo/L    Patient Temperature 37.0 degrees Celsius    FiO2 40 %   BLOOD GAS MIXED VENOUS FULL PANEL   Result Value Ref Range    POCT pH, Mixed 7.40 7.33 - 7.43 pH    POCT pCO2, Mixed 28 (L) 41 - 51 mm Hg    POCT pO2, Mixed 68 (H) 35 - 45 mm Hg    POCT SO2, Mixed 92 (H) 45 - 75 %    POCT Oxy Hemoglobin, Mixed 89.1 (H) 45.0 - 75.0 %    POCT Hematocrit Calculated, Mixed 47.0 41.0 - 52.0 %    POCT Sodium, Mixed 134 (L) 136 - 145 mmol/L    POCT Potassium, Mixed 4.1 3.5 - 5.3 mmol/L    POCT Chloride, Mixed 99 98 - 107 mmol/L    POCT Ionized Calcium, Mixed 1.09 (L) 1.10 - 1.33 mmol/L    POCT Glucose,  Mixed 223 (H) 74 - 99 mg/dL    POCT Lactate, Mixed 3.4 (H) 0.4 - 2.0 mmol/L    POCT Base Excess, Mixed -5.9 (L) -2.0 - 3.0 mmol/L    POCT HCO3 Calculated, Mixed 17.3 (L) 22.0 - 26.0 mmol/L    POCT Hemoglobin, Mixed 15.7 13.5 - 17.5 g/dL    POCT Anion Gap, Mixed 22 10 - 25 mmo/L    Patient Temperature 37.0 degrees Celsius    FiO2 40 %   Respiratory Culture/Smear    Specimen: Tracheal Aspirate; Fluid   Result Value Ref Range    Gram Stain (1+) Rare Polymorphonuclear leukocytes (A)     Gram Stain (1+) Rare Gram positive cocci (A)    Streptococcus pneumoniae Antigen, Urine    Specimen: Urine   Result Value Ref Range    Streptococcus pneumoniae Ag, Urine Negative Negative   Legionella Antigen, Urine    Specimen: Urine   Result Value Ref Range    L. pneumophila Urine Ag Negative Negative   Urinalysis with Reflex Culture and Microscopic   Result Value Ref Range    Color, Urine Yellow Light-Yellow, Yellow, Dark-Yellow    Appearance, Urine Turbid (N) Clear    Specific Gravity, Urine 1.049 (N) 1.005 - 1.035    pH, Urine 5.0 5.0, 5.5, 6.0, 6.5, 7.0, 7.5, 8.0    Protein, Urine 20 (TRACE) NEGATIVE, 10 (TRACE), 20 (TRACE) mg/dL    Glucose, Urine Normal Normal mg/dL    Blood, Urine 0.5 (2+) (A) NEGATIVE mg/dL    Ketones, Urine 10 (1+) (A) NEGATIVE mg/dL    Bilirubin, Urine NEGATIVE NEGATIVE mg/dL    Urobilinogen, Urine Normal Normal mg/dL    Nitrite, Urine NEGATIVE NEGATIVE    Leukocyte Esterase, Urine NEGATIVE NEGATIVE   Extra Urine Gray Tube   Result Value Ref Range    Extra Tube 293    Urinalysis Microscopic   Result Value Ref Range    WBC, Urine 1-5 1-5, NONE /HPF    RBC, Urine >20 (A) NONE, 1-2, 3-5 /HPF    Squamous Epithelial Cells, Urine 1-9 (SPARSE) Reference range not established. /HPF    Bacteria, Urine 1+ (A) NONE SEEN /HPF    Mucus, Urine FEW Reference range not established. /LPF   ACTIVATED CLOTTING TIME LOW   Result Value Ref Range    POCT Activated Clotting Time Low Range 326 (H) 83 - 199 sec   ACTIVATED CLOTTING  TIME LOW   Result Value Ref Range    POCT Activated Clotting Time Low Range 316 (H) 83 - 199 sec   Electrocardiogram, 12-lead PRN ACS symptoms   Result Value Ref Range    Ventricular Rate 105 BPM    Atrial Rate 105 BPM    GA Interval 130 ms    QRS Duration 106 ms    QT Interval 396 ms    QTC Calculation(Bazett) 523 ms    P Axis 35 degrees    R Axis 90 degrees    T Axis -15 degrees    QRS Count 17 beats    Q Onset 224 ms    P Onset 159 ms    P Offset 205 ms    T Offset 422 ms    QTC Fredericia 477 ms   Blood Gas Arterial Full Panel   Result Value Ref Range    POCT pH, Arterial 7.34 (L) 7.38 - 7.42 pH    POCT pCO2, Arterial 35 (L) 38 - 42 mm Hg    POCT pO2, Arterial 102 (H) 85 - 95 mm Hg    POCT SO2, Arterial 99 94 - 100 %    POCT Oxy Hemoglobin, Arterial 96.1 94.0 - 98.0 %    POCT Hematocrit Calculated, Arterial 44.0 41.0 - 52.0 %    POCT Sodium, Arterial 131 (L) 136 - 145 mmol/L    POCT Potassium, Arterial 4.0 3.5 - 5.3 mmol/L    POCT Chloride, Arterial 99 98 - 107 mmol/L    POCT Ionized Calcium, Arterial 1.04 (L) 1.10 - 1.33 mmol/L    POCT Glucose, Arterial 308 (H) 74 - 99 mg/dL    POCT Lactate, Arterial 3.6 (H) 0.4 - 2.0 mmol/L    POCT Base Excess, Arterial -6.1 (L) -2.0 - 3.0 mmol/L    POCT HCO3 Calculated, Arterial 18.9 (L) 22.0 - 26.0 mmol/L    POCT Hemoglobin, Arterial 14.7 13.5 - 17.5 g/dL    POCT Anion Gap, Arterial 17 10 - 25 mmo/L    Patient Temperature 37.0 degrees Celsius    FiO2 50 %   CBC and Auto Differential   Result Value Ref Range    WBC 31.1 (H) 4.4 - 11.3 x10*3/uL    nRBC 0.0 0.0 - 0.0 /100 WBCs    RBC 4.65 4.50 - 5.90 x10*6/uL    Hemoglobin 14.1 13.5 - 17.5 g/dL    Hematocrit 40.3 (L) 41.0 - 52.0 %    MCV 87 80 - 100 fL    MCH 30.3 26.0 - 34.0 pg    MCHC 35.0 32.0 - 36.0 g/dL    RDW 13.0 11.5 - 14.5 %    Platelets 279 150 - 450 x10*3/uL    Neutrophils % 85.5 40.0 - 80.0 %    Immature Granulocytes %, Automated 1.4 (H) 0.0 - 0.9 %    Lymphocytes % 6.5 13.0 - 44.0 %    Monocytes % 6.2 2.0 - 10.0 %     Eosinophils % 0.2 0.0 - 6.0 %    Basophils % 0.2 0.0 - 2.0 %    Neutrophils Absolute 26.56 (H) 1.20 - 7.70 x10*3/uL    Immature Granulocytes Absolute, Automated 0.44 0.00 - 0.70 x10*3/uL    Lymphocytes Absolute 2.02 1.20 - 4.80 x10*3/uL    Monocytes Absolute 1.93 (H) 0.10 - 1.00 x10*3/uL    Eosinophils Absolute 0.05 0.00 - 0.70 x10*3/uL    Basophils Absolute 0.06 0.00 - 0.10 x10*3/uL   Hepatic function panel   Result Value Ref Range    Albumin 3.7 3.4 - 5.0 g/dL    Bilirubin, Total 2.2 (H) 0.0 - 1.2 mg/dL    Bilirubin, Direct 0.5 (H) 0.0 - 0.3 mg/dL    Alkaline Phosphatase 95 33 - 120 U/L     (H) 10 - 52 U/L     (H) 9 - 39 U/L    Total Protein 6.2 (L) 6.4 - 8.2 g/dL   Magnesium   Result Value Ref Range    Magnesium 2.16 1.60 - 2.40 mg/dL   Phosphorus   Result Value Ref Range    Phosphorus 7.3 (H) 2.5 - 4.9 mg/dL   Basic Metabolic Panel   Result Value Ref Range    Glucose 275 (H) 74 - 99 mg/dL    Sodium 136 136 - 145 mmol/L    Potassium 3.8 3.5 - 5.3 mmol/L    Chloride 98 98 - 107 mmol/L    Bicarbonate 20 (L) 21 - 32 mmol/L    Anion Gap 22 (H) 10 - 20 mmol/L    Urea Nitrogen 47 (H) 6 - 23 mg/dL    Creatinine 2.19 (H) 0.50 - 1.30 mg/dL    eGFR 37 (L) >60 mL/min/1.73m*2    Calcium 8.2 (L) 8.6 - 10.6 mg/dL   Lactate   Result Value Ref Range    Lactate 3.8 (H) 0.4 - 2.0 mmol/L   BLOOD GAS MIXED VENOUS FULL PANEL   Result Value Ref Range    POCT pH, Mixed 7.34 7.33 - 7.43 pH    POCT pCO2, Mixed 36 (L) 41 - 51 mm Hg    POCT pO2, Mixed 69 (H) 35 - 45 mm Hg    POCT SO2, Mixed 90 (H) 45 - 75 %    POCT Oxy Hemoglobin, Mixed 88.4 (H) 45.0 - 75.0 %    POCT Hematocrit Calculated, Mixed 44.0 41.0 - 52.0 %    POCT Sodium, Mixed 133 (L) 136 - 145 mmol/L    POCT Potassium, Mixed 4.0 3.5 - 5.3 mmol/L    POCT Chloride, Mixed 99 98 - 107 mmol/L    POCT Ionized Calcium, Mixed 1.03 (L) 1.10 - 1.33 mmol/L    POCT Glucose, Mixed 302 (H) 74 - 99 mg/dL    POCT Lactate, Mixed 3.3 (H) 0.4 - 2.0 mmol/L    POCT Base Excess, Mixed  -5.7 (L) -2.0 - 3.0 mmol/L    POCT HCO3 Calculated, Mixed 19.4 (L) 22.0 - 26.0 mmol/L    POCT Hemoglobin, Mixed 14.5 13.5 - 17.5 g/dL    POCT Anion Gap, Mixed 19 10 - 25 mmo/L    Patient Temperature 37.0 degrees Celsius    FiO2 50 %   POCT GLUCOSE   Result Value Ref Range    POCT Glucose 256 (H) 74 - 99 mg/dL   Blood Gas Lactic Acid, Venous   Result Value Ref Range    POCT Lactate, Venous 3.4 (H) 0.4 - 2.0 mmol/L   Blood Gas Venous Full Panel   Result Value Ref Range    POCT pH, Venous 7.30 (L) 7.33 - 7.43 pH    POCT pCO2, Venous 44 41 - 51 mm Hg    POCT pO2, Venous 45 35 - 45 mm Hg    POCT SO2, Venous 56 45 - 75 %    POCT Oxy Hemoglobin, Venous 54.6 45.0 - 75.0 %    POCT Hematocrit Calculated, Venous 43.0 41.0 - 52.0 %    POCT Sodium, Venous 133 (L) 136 - 145 mmol/L    POCT Potassium, Venous 4.1 3.5 - 5.3 mmol/L    POCT Chloride, Venous 99 98 - 107 mmol/L    POCT Ionized Calicum, Venous 1.02 (L) 1.10 - 1.33 mmol/L    POCT Glucose, Venous 299 (H) 74 - 99 mg/dL    POCT Lactate, Venous 3.4 (H) 0.4 - 2.0 mmol/L    POCT Base Excess, Venous -4.8 (L) -2.0 - 3.0 mmol/L    POCT HCO3 Calculated, Venous 21.6 (L) 22.0 - 26.0 mmol/L    POCT Hemoglobin, Venous 14.3 13.5 - 17.5 g/dL    POCT Anion Gap, Venous 17.0 10.0 - 25.0 mmol/L    Patient Temperature 37.0 degrees Celsius    FiO2 50 %   Blood Gas Arterial Full Panel   Result Value Ref Range    POCT pH, Arterial 7.40 7.38 - 7.42 pH    POCT pCO2, Arterial 34 (L) 38 - 42 mm Hg    POCT pO2, Arterial 87 85 - 95 mm Hg    POCT SO2, Arterial 97 94 - 100 %    POCT Oxy Hemoglobin, Arterial 94.9 94.0 - 98.0 %    POCT Hematocrit Calculated, Arterial 41.0 41.0 - 52.0 %    POCT Sodium, Arterial 134 (L) 136 - 145 mmol/L    POCT Potassium, Arterial 3.8 3.5 - 5.3 mmol/L    POCT Chloride, Arterial 102 98 - 107 mmol/L    POCT Ionized Calcium, Arterial 1.02 (L) 1.10 - 1.33 mmol/L    POCT Glucose, Arterial 224 (H) 74 - 99 mg/dL    POCT Lactate, Arterial 2.6 (H) 0.4 - 2.0 mmol/L    POCT Base  Excess, Arterial -3.0 (L) -2.0 - 3.0 mmol/L    POCT HCO3 Calculated, Arterial 21.1 (L) 22.0 - 26.0 mmol/L    POCT Hemoglobin, Arterial 13.7 13.5 - 17.5 g/dL    POCT Anion Gap, Arterial 15 10 - 25 mmo/L    Patient Temperature 37.0 degrees Celsius    FiO2 40 %   Lactate   Result Value Ref Range    Lactate 2.7 (H) 0.4 - 2.0 mmol/L   Transthoracic Echo (TTE) Limited   Result Value Ref Range    Tricuspid annular plane systolic excursion 1.1 cm    RV free wall pk S' 11.10 cm/s   POCT GLUCOSE   Result Value Ref Range    POCT Glucose 210 (H) 74 - 99 mg/dL   Heparin Assay   Result Value Ref Range    Heparin Unfractionated 0.2 See Comment Below for Therapeutic Ranges IU/mL   Blood Gas Lactic Acid, Venous   Result Value Ref Range    POCT Lactate, Venous 2.5 (H) 0.4 - 2.0 mmol/L   Blood Gas Venous Full Panel   Result Value Ref Range    POCT pH, Venous 7.37 7.33 - 7.43 pH    POCT pCO2, Venous 43 41 - 51 mm Hg    POCT pO2, Venous 44 35 - 45 mm Hg    POCT SO2, Venous 57 45 - 75 %    POCT Oxy Hemoglobin, Venous 55.9 45.0 - 75.0 %    POCT Hematocrit Calculated, Venous 41.0 41.0 - 52.0 %    POCT Sodium, Venous 134 (L) 136 - 145 mmol/L    POCT Potassium, Venous 4.0 3.5 - 5.3 mmol/L    POCT Chloride, Venous 101 98 - 107 mmol/L    POCT Ionized Calicum, Venous 1.04 (L) 1.10 - 1.33 mmol/L    POCT Glucose, Venous 184 (H) 74 - 99 mg/dL    POCT Lactate, Venous 2.5 (H) 0.4 - 2.0 mmol/L    POCT Base Excess, Venous -0.6 -2.0 - 3.0 mmol/L    POCT HCO3 Calculated, Venous 24.9 22.0 - 26.0 mmol/L    POCT Hemoglobin, Venous 13.7 13.5 - 17.5 g/dL    POCT Anion Gap, Venous 12.0 10.0 - 25.0 mmol/L    Patient Temperature 37.0 degrees Celsius    FiO2 40 %   BLOOD GAS MIXED VENOUS   Result Value Ref Range    POCT pH, Mixed 7.40 7.33 - 7.43 pH    POCT pCO2, Mixed 37 (L) 41 - 51 mm Hg    POCT pO2, Mixed 55 (H) 35 - 45 mm Hg    POCT SO2, Mixed 80 (H) 45 - 75 %    POCT Oxy Hemoglobin, Mixed 77.4 (H) 45.0 - 75.0 %    POCT Base Excess, Mixed -1.5 -2.0 - 3.0  mmol/L    POCT HCO3 Calculated, Mixed 22.9 22.0 - 26.0 mmol/L    Patient Temperature      FiO2 40 %   Blood Gas Arterial Full Panel   Result Value Ref Range    POCT pH, Arterial 7.41 7.38 - 7.42 pH    POCT pCO2, Arterial 31 (L) 38 - 42 mm Hg    POCT pO2, Arterial 94 85 - 95 mm Hg    POCT SO2, Arterial 98 94 - 100 %    POCT Oxy Hemoglobin, Arterial 95.5 94.0 - 98.0 %    POCT Hematocrit Calculated, Arterial 40.0 (L) 41.0 - 52.0 %    POCT Sodium, Arterial 135 (L) 136 - 145 mmol/L    POCT Potassium, Arterial 3.9 3.5 - 5.3 mmol/L    POCT Chloride, Arterial 103 98 - 107 mmol/L    POCT Ionized Calcium, Arterial 1.00 (L) 1.10 - 1.33 mmol/L    POCT Glucose, Arterial 165 (H) 74 - 99 mg/dL    POCT Lactate, Arterial 2.0 0.4 - 2.0 mmol/L    POCT Base Excess, Arterial -4.0 (L) -2.0 - 3.0 mmol/L    POCT HCO3 Calculated, Arterial 19.6 (L) 22.0 - 26.0 mmol/L    POCT Hemoglobin, Arterial 13.2 (L) 13.5 - 17.5 g/dL    POCT Anion Gap, Arterial 16 10 - 25 mmo/L    Patient Temperature 37.0 degrees Celsius    FiO2 40 %               ASSESSMENT & PLAN  Mr. Elena is a 45 year old male with a past medical history of tobacco abuse (30 pack years), submandibular abscess, and nephrolithiasis who presented to [facility] with a chief complaint of chest pain, found to have an inferior STEMI, now s/p coronary angiogram with balloon angioplasty to the PDA (right dominant system). His course was complicated by hemodynamic instability (tachycardia, hypotension) requiring vasopressor support and laboratory evidnce of end organ dysfunction. Due to concern for cardiogenic vs mixed shock, a shock call was intervened and recommended transfer to [facility] CICU. TTE at [facility] revealed large VSD. He underwent Impella CP placement 5/20.     Cardiac surgery is consulted for a VSD repair post STEMI.       RECOMMENDATIONS/PLAN  Dr. Omer aware of patient and reviewing case and available imaging.   - Emergent cardiac surgery not indicated at this time.   - Recommend  medical optimization per primary team  - Early surgery for VSD repairs post STEMI have significantly high mortality rates, ideally would prefer to wait a couple weeks to decrease surgical risk. Currently patient is not a surgical candidate.  - Will continue to follow along.     Thank you for the consultation.   Please page the cardiac surgery consult pager [phone] with any questions or changes in patient condition.    Holly Diez PA-C  Cardiac Surgery Consult [facility]  [facility]  Cardiac Surgery Consult Pager [phone]     5/20/2025  1:52 PM             [1] No past medical history on file.  [2]   Past Surgical History:  Procedure Laterality Date    CARDIAC CATHETERIZATION N/A 5/20/2025    Procedure: Impella Insertion;  Surgeon: Fanny Mcduffie MD;  Location: [place] [address] Cardiac Cath Lab;  Service: Cardiovascular;  Laterality: N/A;   [3]   Social History  Tobacco Use    Smoking status: Every Day     Average packs/day: 1 pack/day for 30.0 years (30.0 ttl pk-yrs)     Types: Cigarettes     Start date: 1995    Smokeless tobacco: Never   Vaping Use    Vaping status: Never Used   Substance Use Topics    Alcohol use: Yes    Drug use: Never   [4] No family history on file.  [5] aspirin, 81 mg, oral, Daily  hydrocortisone sodium succinate, 50 mg, intravenous, q8h  insulin lispro, 0-5 Units, subcutaneous, q4h  lactated Ringer's, 500 mL, intravenous, Once  oxygen, , inhalation, Continuous - Inhalation  pantoprazole, 40 mg, intravenous, Daily  piperacillin-tazobactam, 4.5 g, intravenous, q6h  polyethylene glycol, 17 g, oral, Daily  rosuvastatin, 40 mg, oral, Nightly  [6] fentaNYL,  mcg/hr, Last Rate: 150 mcg/hr (05/20/25 1329)  heparin, 0-4,000 Units/hr  heparin Impella Purge 25 units/mL in 500 mL D5W, 10 mL/hr, Last Rate: 10 mL/hr (05/20/25 1152)  midazolam, 0.5-20 mg/hr, Last Rate: 4 mg/hr (05/20/25 1300)  norepinephrine, 0.01-1 mcg/kg/min, Last Rate: 0.13 mcg/kg/min (05/20/25 1300)  phenylephrine, 0-2  mcg/kg/min, Last Rate: 0.5 mcg/kg/min (05/20/25 1300)  vasopressin, 0.03 Units/min, Last Rate: 0.03 Units/min (05/20/25 1300)  [7] PRN medications: dextrose, dextrose, glucagon, glucagon

## 2025-05-20 NOTE — POST-PROCEDURE NOTE
Physician Transition of Care Summary  Invasive Cardiovascular Lab    Procedure Date: 5/20/2025  Attending:    * Fanny Mcduffie - Primary  Resident/Fellow/Other Assistant: Surgeons and Role:     * Froilan Miranda MD - Fellow    Indications:   Pre-op Diagnosis      * Cardiogenic shock (Multi) [R57.0]    Post-procedure diagnosis:   Post-op Diagnosis     * Cardiogenic shock (Multi) [R57.0]    Procedure(s):   Impella Insertion  96760 - FL INSJ PERQ VAD W/RS&I L HRT ARTERIAL ACCESS ONLY        Procedure Findings:   LVEDP 23mmHg  RCFA two precloses, 9F top 14F to 12F sheath. Precloses secured  RSFA 6F sheath  LCFA 6F sheath    Impella placed via preclosed RCFA arteriotomy  Connection between LCFA and RSFA sheaths for reperfusion.      Description of the Procedure:   Impella CP insertion    Complications:   None    Stents/Implants:   Implants          Other Cardiac Implant          Pump Set, Heart, Impella Cp - [id number] - Implanted        Inventory item: PUMP SET, HEART, IMPELLA CP Model/Cat number: [id number]    Serial number: [id number] : [employer]    Lot number: [id number] Device identifier: [id number]      GUDID Information       Request status Successful        Brand name: Branch2ella Version/Model: [id number]    Company name: [employer] MRI safety info as of 5/20/25: Labeling does not contain MRI Safety Information    Contains dry or latex rubber: No      GMDN P.T. name: Intracardiac circulatory assist axial-pump catheter                As of 5/20/2025       Status: Implanted                              Anticoagulation/Antiplatelet Plan:   Heparin gtt per Impella protocol    Estimated Blood Loss:   * No values recorded between 5/20/2025  4:50 AM and 5/20/2025  6:16 AM *    Anesthesia: Moderate Sedation Anesthesia Staff: No anesthesia staff entered.    Any Specimen(s) Removed:   No specimens collected during this procedure.    Disposition:   CICU      Electronically signed by: Fanny Mcduffie MD, 5/20/2025  6:16 AM

## 2025-05-20 NOTE — PROGRESS NOTES
Social Work Progress Note   - ICU TREATMENT PLAN: Patient presented to [facility] with a chief complaint of chest pain, found to have an inferior STEMI. Concern for cardiogenic shock. Transferred to [facility] CICU for higher level of care.    - Payer: [employer]   -Support System: mother, son, sister  - Planned Disposition: Pending medical outcome and rehab recommendations.  - Additional Information: SDOH and Social Work Discharge Planning assessments could not be completed at this time as patient is intubated. SW will complete assessments when medically appropriate.   - Barriers to discharge: None at this time. SW will continue to follow.  MEGAN VERMA

## 2025-05-20 NOTE — CARE PLAN
"The patient's goals for the shift include : \"to breathe easier\".     The clinical goals for the shift include: Patient's SpO2 will remain >92% throughout this shift.      Problem: ACS/CP/NSTEMI/STEMI  Goal: Chest pain managed (free from pain or at acceptable level)  Outcome: Progressing  Goal: Lab values return to normal range  Outcome: Progressing  Goal: Promote self management  Outcome: Progressing  Goal: Verbalize understanding of procedures/devices  Outcome: Progressing     Problem: Cardiac catheterization  Goal: Free from dysrhythmias  Outcome: Progressing  Goal: Free from pain  Outcome: Progressing  Goal: No evidence of post procedure complications  Outcome: Progressing  Goal: Promote self management  Outcome: Progressing  Goal: Verbalize understanding of procedure  Outcome: Progressing     Problem: Pain - Adult  Goal: Verbalizes/displays adequate comfort level or baseline comfort level  Outcome: Progressing     Problem: Safety - Adult  Goal: Free from fall injury  Outcome: Progressing     Problem: Discharge Planning  Goal: Discharge to home or other facility with appropriate resources  Outcome: Progressing     Problem: Chronic Conditions and Co-morbidities  Goal: Patient's chronic conditions and co-morbidity symptoms are monitored and maintained or improved  Outcome: Progressing     Problem: Nutrition  Goal: Nutrient intake appropriate for maintaining nutritional needs  Outcome: Progressing     Problem: Pain  Goal: Takes deep breaths with improved pain control throughout the shift  Outcome: Progressing  Goal: Turns in bed with improved pain control throughout the shift  Outcome: Progressing  Goal: Walks with improved pain control throughout the shift  Outcome: Progressing  Goal: Performs ADL's with improved pain control throughout shift  Outcome: Progressing  Goal: Participates in PT with improved pain control throughout the shift  Outcome: Progressing  Goal: Free from opioid side effects throughout the " shift  Outcome: Progressing  Goal: Free from acute confusion related to pain meds throughout the shift  Outcome: Progressing     Problem: Safety - Medical Restraint  Goal: Remains free of injury from restraints (Restraint for Interference with Medical Device)  Outcome: Progressing  Flowsheets (Taken 5/20/2025 0000)  Remains free of injury from restraints (restraint for interference with medical device):   Determine that other, less restrictive measures have been tried or would not be effective before applying the restraint   Evaluate the patient's condition at the time of restraint application   Inform patient/family regarding the reason for restraint   Every 2 hours: Monitor safety, psychosocial status, comfort, nutrition and hydration  Goal: Free from restraint(s) (Restraint for Interference with Medical Device)  Outcome: Progressing

## 2025-05-20 NOTE — H&P
History of Present Illness:  This is a 45 year old male with a past medical history of tobacco abuse (30 pack years), submandibular abscess, and nephrolithiasis who presented to [facility] with a chief complaint of chest pain, found to have an inferior STEMI, now s/p coronary angiogram with balloon angioplasty to the PDA (right dominant system). His course was complicated by hemodynamic instability (tachycardia, hypotension) requiring vasopressor support and laboratory evidnce of end organ dysfunction. Due to concern for cardiogenic vs mixed shock, a shock call was intervened and recommended placement of  PA catheter for adjudication of hemodynamics and transfer to [facility] CICU for a higher level of care. He is now admitted to the CICU for further management.     History obtained via chart review as patient is intubated and sedated on arrival.     Per chart review, presented to [facility] 05/18 PM for the evaluation of chest tightness associated with SOB/nausea. On arrival he was afebrile, tachycardic, and hypertensive. ED workup remarkable for initial WBC 20.5, Cr 0.79, troponin ~24K,  and ECG with ST elevations in the inferior leads with reciprocal depressions. He was loaded with aspirin and brilinta and started on heparin gtt. He was taken for emergent coronary angiogram which revealed multi vessel CAD [LAD/Lcx/RCA mildly/diffusely diseased,100% RPDA culprit lesion] with PTCA to RPDA with 50% residual stenosis [GLENNA 0->3] (too small to stent). Notably, LVEF 45% and LVED 20-25 mmHg.     He was subsequently admitted to the ICU for post proceudre monitoring. Throughout the day on 05/19 he was noted to be persistently tachycardiac despite IVF and IV metoprolol. He underwent formal echocardiogram revealing of LVEF 55%, wall motion abnormalities, and no significant valvular disease. Due to concern for acute aortic pathology vs PE, he underwent a CTA CAP revealing of mild pulmonary edema. Due to concern for PNA,  blood cultures were obtained and he was started on CTX/Doxycycline. Around 20:00 he was noted to be in respiratory distress (tachypneic to RR 50) with hypotension (BP 85/52 ) and satting 92% on RA with labs revealing of end organ dysfunction [ creatinine 1.76, HCO3 18, anion gap 21.  Transaminitis with , .  Lactate  3.9.  ABG pH 7.52, pCO2 23, pO2 59, sO2 91 ] and CXR with bilateral pulmonary infiltrates [pulmoinary edema vs multifocal PNA]. Due to concern for cardiogenic shock, Cardiology at [place] was re-engaged who felt that given preserved LVEF on echocardiogram and only distal PDA occulusion that his clinical instability was less likely to reflect cardiogenic etiology and recommended consultation with shock team.     Shock call convened and recommended intubation and PA catheter placement for adjudication of hemodynamics. He was intubated without issue and opening Ulman numbers were revealing of RA 14, RV 55/18, PA 51/30 (40), PCWP 32, svO2 87, [place] CO 13.9, [place] CI 7.3.  He was diuresed with IV lasix 40 mg and his Abx were broadened to Zosyn. He was transferred to [facility] for further management.     Upon arrival to the unit:  - Vitals:  T 37.3 C, , /77,  RR 23, SpO2 97% on 40/500/16 PEEP 5 and levo 0.1    - Labs:  CBC: WBC  34.1, Hgb 16, plt 335  BMP: Na 137, K 4.2, Cl 98, HCO3 20, BUN 39, Cr 1.85, glu 176  LFT: Ca 9.6, tprot 7.1, alb 4.5, alkphos 116, , , tbili 1.6  Electrolytes: PO4 8.3, Mg 2.20  Heme: PT 16, INR 1.4, aPTT 28,    AB.39/33/100/99% L- 3.1    - Imaging:  CXR:  IMPRESSION:  1. Increased bilateral perihilar and left lower lung opacities are  favored to represent worsening multifocal pneumonia in the setting of  rising leukocytosis.  2. Similar findings of pulmonary edema with new small left pleural  effusion.  3. Medical devices as detailed above.        - ECG:  Sinus Tachycardia, ST elevations in inferior leads, depression in I, aVL     PMH:   As  above      SurgHx:   Surgical History[1]      Allergies:   RX Allergies[2]    Home Medications:  No current outpatient medications     Objective:    24 Hour Vitals  Temp:  [35.8 °C (96.4 °F)-36 °C (96.8 °F)] 36 °C (96.8 °F)  Heart Rate:  [116-136] 130  Resp:  [20-58] 20  BP: ()/(52-81) 94/71  Arterial Line BP 1: ()/(66-88) 104/78    Temp (24hrs), Av.9 °C (96.7 °F), Min:35.8 °C (96.4 °F), Max:36 °C (96.8 °F)     24 hour Intake/Output    Intake/Output Summary (Last 24 hours) at 2025 0021  Last data filed at 2025 2140  Gross per 24 hour   Intake 1968.75 ml   Output 350 ml   Net 1618.75 ml        Exam:  General: intubated and sedated, diaphoretic, appears older than stated age.   HEENT: Normocephalic and atraumatic. EOMI, sclera non-icteric, MMM, RIJ swan  Cardiovascular: regular, tachy, holosystolic murmur   Pulmonary: mechanical ventilatory sounds appreciated   GI: +BS, soft, non-tender, non-distended  : No suprapubic/ flank pain  Extremities: No LE edema. Legs cool to touch to just above knees   Skin: warm and dry. No rashes or lesions   Neurologic: Intubated and sedated. Withdrawals to pain, intermittently follows commands     Labs:  CBC  Results from last 72 hours   Lab Units 25  1211 25  0443   WBC AUTO x10*3/uL 30.3* 24.9*  24.9* 23.0*   HEMOGLOBIN g/dL 16.0 15.5  15.5 15.9   HEMATOCRIT % 45.3 44.3  44.3 44.5   PLATELETS AUTO x10*3/uL 290 326  326 319        BMP  Results from last 72 hours   Lab Units 25  19225  1211 25  0443   SODIUM mmol/L 135* 135* 135*   POTASSIUM mmol/L 3.8 4.4 4.4   CHLORIDE mmol/L 100 99 101   BUN mg/dL 28* 20 11   CREATININE mg/dL 1.76* 1.61* 0.94   MAGNESIUM mg/dL  --  2.24 1.98   PHOSPHORUS mg/dL 5.2*  --  3.6       Imaging:  [unfilled]    Medications   Scheduled Medications  Scheduled Medications[3] Continuous Medications  Continuous Medications[4] PRN Medications  PRN Medications[5]        Assessment/Plan:  This is a 45 year old male with a past medical history of tobacco abuse (30 pack years), submandibular abscess, and nephrolithiasis who presented to [facility] with a chief complaint of chest pain, found to have an inferior STEMI, now s/p coronary angiogram with balloon angioplasty to the PDA (right dominant system). His course was complicated by hemodynamic instability (tachycardia, hypotension) requiring vasopressor support and laboratory evidnce of end organ dysfunction. Due to concern for cardiogenic vs mixed shock, a shock call was intervened and recommended placement of  PA catheter for adjudication of hemodynamics and transfer to [facility] CICU for a higher level of care. He is now admitted to the CICU for further management.     On arrival to the CICU he is hypotensive with escalating pressor requirements. Bedside examination reveals a holosystolic murmur and serial mixed venous are revealing of a significant step up in SVO2 from the RA to PA concerning for the presence of a ischemic VSD, perhaps secondardy to a late presenting STEMI given troponin elevation to 24K on presentation. Will re-conference with Shock team for consideration of MCS given continued hemodynamic instability and now suspected VSD with cardiogenic shock. Due to concern for mixed shock will start stress dose steroids (as currently on 3 pressors), broaden abx, and send full infectious workup. Continue with CAD/STEMI GDMT and consider MYNOR vs cMR in the AM for further evaluation of possible VSD. Management of chronic issues as outlined below.     NEUROLOGIC  #Sedation  - Vent/Versed/Precedex for RASS -2 - -3     CARDIOVASCULAR  #RPDA STEMI s/p POBA  #C/f Cardiogenic vs mixed shock   #C/f VSD  ::  ECG on admit to OSH with ST elevations in the inferior leads with reciprocal depressions  :: Troponin:  24,085>21,701,19,289,60513  :: Lipid panel (05/2025)- cholesterol 220, HDL 33.9, , TG 57  :: TSH 1.51 (05/2025), A1C  7.4  (05/2025)  :: coronary angiogram (05/2025)  which revealed multi vessel CAD [LAD/Lcx/RCA mildly/diffusely diseased,100% RPDA culprit lesion] with PTCA to RPDA with 50% residual stenosis [GLENNA 0->3] (too small to stent)   :: LVEF 45% and LVED 20-25 mmHg via LV Gram   :: TTE  (05/2025) LVEF 55%, wall motion abnormalities, and no significant valvular disease   :: SVO2 59 from CVP port, 92 from PA port   Plan:  - Levo/Vaso/Phenyl/hydrocortisone 50 mg q8H for MAP >65  - Shock call for consideration of MCS in setting of suspected VSD and cardiogenic shock   - DAPT: Aspirin 81 mg every day, Brilinita 90 mg BID   - Statin: Crestor 40 mg every day   - Beta blocker: hold given shock requiring MCS  - RAAS Inhibition: hold given shock requiring MCS  - Limited TTE ordered, follow up [specifically for VSD evaluation]  - Consider MYNOR vs cMR when more hemodynamically stable     PULMONARY  #AHRF   :: Prior to intubation satting 92% on RA  :: CXR with pulmonary edema vs PNA  :: CTA CAP- mild interstitial pulmonary edema   :: , WBC 34.1  Plan:  - Infectious workup/abx as below   - [place] guided therapy   - Wean Ventilator when more hemodynamically stable     RENAL/  #BENY   :: Baseline Cr 0.8-1.0, 1.61 on admit, 1.85 on transport   Plan:  - Likely hemodynamic mediated   - Follow up UA/Ulytes   - Avoid hypotension, rapid fluctuations in BP  - [place] guided volume resuscitation/ diuresis   - Daily RFP     GASTROINTESTINAL  #GI PPX  - IV PPI every day while intubated  - Miralax every day     ENDOCRINE  #TIIDM   :: A1C 7.4  (05/2025)  Plan:  - NPO   - Q4H POC glucose, SSI while NPO  - Titrate -180  - Will need diabetes education prior to discharge     HEME/ONC  SWAPNIL    INFECTIOUS DISEASE  #C/f Mixed shock   #Leukocytosis   :: S/p Doxy/CTX 05/19  :: blood cultures sent 05/19 x 2 sets   Plan:  - [place] to Vanc/Zosyn/Azithro (05/20-**)  - Levo/Vaso/Phenyl/hydrocortisone 50 mg q8H for MAP >65  - obtain: UA, sputum cx, urine  antigens, extended spectrum respiratory panel, procal, MRSA nares   - follow up blood cultures   - narrow abx as able     MSK/DERM  SWAPNIL    F: PRN for euvolemia; swan guided therapy   E: PRN K>4, Mg>2, P>3   N: NPO  A: PIV, RIJ swan, left radial A line     Oxygen: MV 40%/500/16/5  Abx: Vanc/zosyn/azithro (05/20-**), CTX/Doxy (05/19)  Gtts: Levo 0.2, Vaso 0.03, Phenyl- 1, fent 100, versed 2    DVT ppx: Lovenox  GI ppx: IV PPI     Code Status: full (confirmed on admit)  Surrogate Medical Decision-maker:    Alpesh Elena (son) [phone]         [1] No past surgical history on file.  [2] No Known Allergies  [3] [4] [5]

## 2025-05-20 NOTE — PROCEDURES
ENDOTRACHEAL INTUBATION     Indication: Acute Hypoxic Respiratory Failure    Provider: LINNETTE Alejandre    Assistants: LINNETTE Cai    RSI Medications:  150 mg Ketamine  100 mg Rocuronium    Hemodynamic Medications:  Norepinephrine infusion    Position: Sniffing    Pre-Oxygenation: 100% NIMV    Using a MAC 4 blade with the assistance of videolaryngoscopy, the vocal cords were able to be visualized.  A Grade I view was obtained.  A 7.5mm endotracheal tube was then advanced past the vocal cords without resistance. Sylet removed.  The tube was then connected to EtCO2 monitoring with positive color change.  Fogging of the tube was noted with bagging.  Bilateral breath sounds noted.  The tube was then secured at 24 cm at the lip.  Patient then connected to the ventilator without incident.     Complications: none    Post Intubation CXR:  Appropriate position of ETT.    LINNETTE Alejandre  Pulmonary & Critical Care Medicine  [facility]

## 2025-05-20 NOTE — Clinical Note
04/10/20 0930   Maternal Assessment   Breast Shape Bilateral:;round   Breast Density Bilateral:;soft   Areola Bilateral:;elastic   Nipples Bilateral:;everted   Maternal Infant Feeding   Maternal Emotional State assist needed   Infant Positioning clutch/football;cross-cradle   Signs of Milk Transfer audible swallow;infant jaw motion present   Pain with Feeding yes   Pain Location nipples, bilateral   Pain Description soreness;other (see comments)  (tender, improved with feeding)   Nipple Shape After Feeding, Left compressed tip   Nipple Shape After Feeding, Right compressed tip   Latch Assistance yes   Breast Pumping   Breast Pumping Interventions post-feed pumping encouraged   Breast Pumping other (see comments)  (encouraged to pump 2-4x/day extra stim post feed)   Lactation Referrals   Lactation Referrals other (see comments)  (lactation warmline)   Discharge lactation education reviewed with breastfeeding guide. Assisted with position and latch, baby sleepy with quivering chin, encouraged lots of compression to keep baby active. Baby chompy at first, then improved suck and decrease nipple pain for mom. Encouraged chin support for baby. Discussed pumping 2-4x/day 10min after nursing for extra stim, baby appears to sucking better with more flow. Reviewed first alert questionaire, stressed monitoring output/ weight and when to call ped. Pt has lactation warmline.    The DP pulses are detected w/ doppler bilaterally.

## 2025-05-20 NOTE — PROCEDURES
Arterial Line Insertion    Indication: Shock, Respiratory Failure    Catheter: 20g, 5cm    Location: Left Radial    Procedure  The patient was prepped and draped in the normal sterile fashion. Using palpation, the Left radial artery was identified and cannulated.  There was good pulsatile flow through the needle.  A wire was then introduced into the artery.  The needle was removed and using the seldinger technique, the catheter was advanced over the wire.  The catheter was then secured with sutures and connected to the monitor with a good arterial waveform noted.  A dressing was applied.      Complications: None      The patient tolerated the procedure well.    Ramirez Perrin, APRN-CNP  Pulmonary & Critical Care Medicine  [facility]

## 2025-05-21 ENCOUNTER — APPOINTMENT (OUTPATIENT)
Dept: RADIOLOGY | Facility: HOSPITAL | Age: 45
End: 2025-05-21
Payer: COMMERCIAL

## 2025-05-21 LAB
ACT BLD: 168 SEC (ref 83–199)
ACT BLD: 173 SEC (ref 83–199)
ACT BLD: 182 SEC (ref 83–199)
ACT BLD: 186 SEC (ref 83–199)
ACT BLD: 187 SEC (ref 83–199)
ACT BLD: 190 SEC (ref 83–199)
ACT BLD: 199 SEC (ref 83–199)
ALBUMIN SERPL BCP-MCNC: 2.9 G/DL (ref 3.4–5)
ALBUMIN SERPL BCP-MCNC: 3.2 G/DL (ref 3.4–5)
ALBUMIN SERPL BCP-MCNC: 3.3 G/DL (ref 3.4–5)
ALBUMIN SERPL BCP-MCNC: 3.4 G/DL (ref 3.4–5)
ALP SERPL-CCNC: 75 U/L (ref 33–120)
ALP SERPL-CCNC: 77 U/L (ref 33–120)
ALT SERPL W P-5'-P-CCNC: 1258 U/L (ref 10–52)
ALT SERPL W P-5'-P-CCNC: 1459 U/L (ref 10–52)
ANION GAP BLDA CALCULATED.4IONS-SCNC: 16 MMO/L (ref 10–25)
ANION GAP BLDA CALCULATED.4IONS-SCNC: 16 MMO/L (ref 10–25)
ANION GAP BLDA CALCULATED.4IONS-SCNC: 17 MMO/L (ref 10–25)
ANION GAP BLDMV CALCULATED.4IONS-SCNC: 15 MMO/L (ref 10–25)
ANION GAP BLDMV CALCULATED.4IONS-SCNC: 16 MMO/L (ref 10–25)
ANION GAP BLDMV CALCULATED.4IONS-SCNC: 17 MMO/L (ref 10–25)
ANION GAP BLDMV CALCULATED.4IONS-SCNC: 18 MMO/L (ref 10–25)
ANION GAP BLDV CALCULATED.4IONS-SCNC: 14 MMOL/L (ref 10–25)
ANION GAP BLDV CALCULATED.4IONS-SCNC: 15 MMOL/L (ref 10–25)
ANION GAP BLDV CALCULATED.4IONS-SCNC: 18 MMOL/L (ref 10–25)
ANION GAP SERPL CALC-SCNC: 17 MMOL/L (ref 10–20)
ANION GAP SERPL CALC-SCNC: 20 MMOL/L (ref 10–20)
AST SERPL W P-5'-P-CCNC: 1466 U/L (ref 9–39)
AST SERPL W P-5'-P-CCNC: 1888 U/L (ref 9–39)
BASE EXCESS BLDA CALC-SCNC: -3.2 MMOL/L (ref -2–3)
BASE EXCESS BLDA CALC-SCNC: -4.1 MMOL/L (ref -2–3)
BASE EXCESS BLDA CALC-SCNC: -4.2 MMOL/L (ref -2–3)
BASE EXCESS BLDA CALC-SCNC: -4.2 MMOL/L (ref -2–3)
BASE EXCESS BLDA CALC-SCNC: -4.6 MMOL/L (ref -2–3)
BASE EXCESS BLDA CALC-SCNC: -4.7 MMOL/L (ref -2–3)
BASE EXCESS BLDA CALC-SCNC: -5.1 MMOL/L (ref -2–3)
BASE EXCESS BLDA CALC-SCNC: -5.2 MMOL/L (ref -2–3)
BASE EXCESS BLDMV CALC-SCNC: -4.1 MMOL/L (ref -2–3)
BASE EXCESS BLDMV CALC-SCNC: -4.4 MMOL/L (ref -2–3)
BASE EXCESS BLDMV CALC-SCNC: -4.6 MMOL/L (ref -2–3)
BASE EXCESS BLDMV CALC-SCNC: -4.9 MMOL/L (ref -2–3)
BASE EXCESS BLDV CALC-SCNC: -2.5 MMOL/L (ref -2–3)
BASE EXCESS BLDV CALC-SCNC: -2.5 MMOL/L (ref -2–3)
BASE EXCESS BLDV CALC-SCNC: -3 MMOL/L (ref -2–3)
BASE EXCESS BLDV CALC-SCNC: -3.4 MMOL/L (ref -2–3)
BASE EXCESS BLDV CALC-SCNC: -3.4 MMOL/L (ref -2–3)
BASE EXCESS BLDV CALC-SCNC: -3.5 MMOL/L (ref -2–3)
BASOPHILS # BLD AUTO: 0.03 X10*3/UL (ref 0–0.1)
BASOPHILS # BLD AUTO: 0.03 X10*3/UL (ref 0–0.1)
BASOPHILS NFR BLD AUTO: 0.1 %
BASOPHILS NFR BLD AUTO: 0.1 %
BILIRUB DIRECT SERPL-MCNC: 1.3 MG/DL (ref 0–0.3)
BILIRUB DIRECT SERPL-MCNC: 2.8 MG/DL (ref 0–0.3)
BILIRUB SERPL-MCNC: 3.8 MG/DL (ref 0–1.2)
BILIRUB SERPL-MCNC: 5 MG/DL (ref 0–1.2)
BODY TEMPERATURE: 37 DEGREES CELSIUS
BUN SERPL-MCNC: 102 MG/DL (ref 6–23)
BUN SERPL-MCNC: 77 MG/DL (ref 6–23)
CA-I BLDA-SCNC: 0.93 MMOL/L (ref 1.1–1.33)
CA-I BLDA-SCNC: 0.94 MMOL/L (ref 1.1–1.33)
CA-I BLDA-SCNC: 0.96 MMOL/L (ref 1.1–1.33)
CA-I BLDA-SCNC: 0.99 MMOL/L (ref 1.1–1.33)
CA-I BLDA-SCNC: 1.01 MMOL/L (ref 1.1–1.33)
CA-I BLDA-SCNC: 1.02 MMOL/L (ref 1.1–1.33)
CA-I BLDA-SCNC: 1.03 MMOL/L (ref 1.1–1.33)
CA-I BLDA-SCNC: 1.04 MMOL/L (ref 1.1–1.33)
CA-I BLDMV-SCNC: 0.94 MMOL/L (ref 1.1–1.33)
CA-I BLDMV-SCNC: 0.96 MMOL/L (ref 1.1–1.33)
CA-I BLDMV-SCNC: 0.98 MMOL/L (ref 1.1–1.33)
CA-I BLDMV-SCNC: 0.99 MMOL/L (ref 1.1–1.33)
CA-I BLDMV-SCNC: 1.01 MMOL/L (ref 1.1–1.33)
CA-I BLDMV-SCNC: 1.03 MMOL/L (ref 1.1–1.33)
CA-I BLDV-SCNC: 0.93 MMOL/L (ref 1.1–1.33)
CA-I BLDV-SCNC: 0.96 MMOL/L (ref 1.1–1.33)
CA-I BLDV-SCNC: 0.99 MMOL/L (ref 1.1–1.33)
CA-I BLDV-SCNC: 1.01 MMOL/L (ref 1.1–1.33)
CA-I BLDV-SCNC: 1.02 MMOL/L (ref 1.1–1.33)
CA-I BLDV-SCNC: 1.05 MMOL/L (ref 1.1–1.33)
CALCIUM SERPL-MCNC: 7.3 MG/DL (ref 8.6–10.6)
CALCIUM SERPL-MCNC: 7.9 MG/DL (ref 8.6–10.6)
CHLORIDE BLD-SCNC: 100 MMOL/L (ref 98–107)
CHLORIDE BLD-SCNC: 96 MMOL/L (ref 98–107)
CHLORIDE BLD-SCNC: 98 MMOL/L (ref 98–107)
CHLORIDE BLD-SCNC: 99 MMOL/L (ref 98–107)
CHLORIDE BLDA-SCNC: 100 MMOL/L (ref 98–107)
CHLORIDE BLDA-SCNC: 101 MMOL/L (ref 98–107)
CHLORIDE BLDA-SCNC: 96 MMOL/L (ref 98–107)
CHLORIDE BLDA-SCNC: 98 MMOL/L (ref 98–107)
CHLORIDE BLDA-SCNC: 99 MMOL/L (ref 98–107)
CHLORIDE BLDV-SCNC: 100 MMOL/L (ref 98–107)
CHLORIDE BLDV-SCNC: 97 MMOL/L (ref 98–107)
CHLORIDE BLDV-SCNC: 98 MMOL/L (ref 98–107)
CHLORIDE BLDV-SCNC: 99 MMOL/L (ref 98–107)
CHLORIDE SERPL-SCNC: 100 MMOL/L (ref 98–107)
CHLORIDE SERPL-SCNC: 98 MMOL/L (ref 98–107)
CO2 SERPL-SCNC: 21 MMOL/L (ref 21–32)
CO2 SERPL-SCNC: 25 MMOL/L (ref 21–32)
CREAT SERPL-MCNC: 4.52 MG/DL (ref 0.5–1.3)
CREAT SERPL-MCNC: 6.83 MG/DL (ref 0.5–1.3)
EGFRCR SERPLBLD CKD-EPI 2021: 15 ML/MIN/1.73M*2
EGFRCR SERPLBLD CKD-EPI 2021: 9 ML/MIN/1.73M*2
EOSINOPHIL # BLD AUTO: 0 X10*3/UL (ref 0–0.7)
EOSINOPHIL # BLD AUTO: 0 X10*3/UL (ref 0–0.7)
EOSINOPHIL NFR BLD AUTO: 0 %
EOSINOPHIL NFR BLD AUTO: 0 %
ERYTHROCYTE [DISTWIDTH] IN BLOOD BY AUTOMATED COUNT: 13 % (ref 11.5–14.5)
ERYTHROCYTE [DISTWIDTH] IN BLOOD BY AUTOMATED COUNT: 13 % (ref 11.5–14.5)
GLUCOSE BLD MANUAL STRIP-MCNC: 129 MG/DL (ref 74–99)
GLUCOSE BLD MANUAL STRIP-MCNC: 173 MG/DL (ref 74–99)
GLUCOSE BLD MANUAL STRIP-MCNC: 174 MG/DL (ref 74–99)
GLUCOSE BLD MANUAL STRIP-MCNC: 193 MG/DL (ref 74–99)
GLUCOSE BLD MANUAL STRIP-MCNC: 208 MG/DL (ref 74–99)
GLUCOSE BLD MANUAL STRIP-MCNC: 209 MG/DL (ref 74–99)
GLUCOSE BLD MANUAL STRIP-MCNC: 212 MG/DL (ref 74–99)
GLUCOSE BLD MANUAL STRIP-MCNC: 222 MG/DL (ref 74–99)
GLUCOSE BLD MANUAL STRIP-MCNC: 230 MG/DL (ref 74–99)
GLUCOSE BLD MANUAL STRIP-MCNC: 244 MG/DL (ref 74–99)
GLUCOSE BLD-MCNC: 165 MG/DL (ref 74–99)
GLUCOSE BLD-MCNC: 202 MG/DL (ref 74–99)
GLUCOSE BLD-MCNC: 209 MG/DL (ref 74–99)
GLUCOSE BLD-MCNC: 215 MG/DL (ref 74–99)
GLUCOSE BLD-MCNC: 218 MG/DL (ref 74–99)
GLUCOSE BLD-MCNC: 219 MG/DL (ref 74–99)
GLUCOSE BLDA-MCNC: 162 MG/DL (ref 74–99)
GLUCOSE BLDA-MCNC: 201 MG/DL (ref 74–99)
GLUCOSE BLDA-MCNC: 203 MG/DL (ref 74–99)
GLUCOSE BLDA-MCNC: 206 MG/DL (ref 74–99)
GLUCOSE BLDA-MCNC: 206 MG/DL (ref 74–99)
GLUCOSE BLDA-MCNC: 215 MG/DL (ref 74–99)
GLUCOSE BLDA-MCNC: 216 MG/DL (ref 74–99)
GLUCOSE BLDA-MCNC: 225 MG/DL (ref 74–99)
GLUCOSE BLDV-MCNC: 173 MG/DL (ref 74–99)
GLUCOSE BLDV-MCNC: 202 MG/DL (ref 74–99)
GLUCOSE BLDV-MCNC: 209 MG/DL (ref 74–99)
GLUCOSE BLDV-MCNC: 212 MG/DL (ref 74–99)
GLUCOSE BLDV-MCNC: 227 MG/DL (ref 74–99)
GLUCOSE BLDV-MCNC: 230 MG/DL (ref 74–99)
GLUCOSE SERPL-MCNC: 221 MG/DL (ref 74–99)
GLUCOSE SERPL-MCNC: 232 MG/DL (ref 74–99)
HAPTOGLOB SERPL NEPH-MCNC: <30 MG/DL (ref 30–200)
HAV IGM SER QL: NONREACTIVE
HBV CORE AB SER QL: NONREACTIVE
HBV CORE IGM SER QL: NONREACTIVE
HBV SURFACE AG SERPL QL IA: NONREACTIVE
HCO3 BLDA-SCNC: 20 MMOL/L (ref 22–26)
HCO3 BLDA-SCNC: 20.3 MMOL/L (ref 22–26)
HCO3 BLDA-SCNC: 20.4 MMOL/L (ref 22–26)
HCO3 BLDA-SCNC: 20.6 MMOL/L (ref 22–26)
HCO3 BLDA-SCNC: 20.9 MMOL/L (ref 22–26)
HCO3 BLDA-SCNC: 21 MMOL/L (ref 22–26)
HCO3 BLDA-SCNC: 21 MMOL/L (ref 22–26)
HCO3 BLDA-SCNC: 22 MMOL/L (ref 22–26)
HCO3 BLDMV-SCNC: 20.6 MMOL/L (ref 22–26)
HCO3 BLDMV-SCNC: 21 MMOL/L (ref 22–26)
HCO3 BLDMV-SCNC: 21.1 MMOL/L (ref 22–26)
HCO3 BLDMV-SCNC: 21.6 MMOL/L (ref 22–26)
HCO3 BLDV-SCNC: 22.6 MMOL/L (ref 22–26)
HCO3 BLDV-SCNC: 22.7 MMOL/L (ref 22–26)
HCO3 BLDV-SCNC: 22.7 MMOL/L (ref 22–26)
HCO3 BLDV-SCNC: 23.2 MMOL/L (ref 22–26)
HCO3 BLDV-SCNC: 23.7 MMOL/L (ref 22–26)
HCO3 BLDV-SCNC: 23.7 MMOL/L (ref 22–26)
HCT VFR BLD AUTO: 30.5 % (ref 41–52)
HCT VFR BLD AUTO: 34 % (ref 41–52)
HCT VFR BLD EST: 15 % (ref 41–52)
HCT VFR BLD EST: 22 % (ref 41–52)
HCT VFR BLD EST: 29 % (ref 41–52)
HCT VFR BLD EST: 31 % (ref 41–52)
HCT VFR BLD EST: 32 % (ref 41–52)
HCT VFR BLD EST: 34 % (ref 41–52)
HCT VFR BLD EST: 35 % (ref 41–52)
HCT VFR BLD EST: 36 % (ref 41–52)
HCT VFR BLD EST: 37 % (ref 41–52)
HCT VFR BLD EST: 42 % (ref 41–52)
HCT VFR BLD EST: 67 % (ref 41–52)
HCV AB SER QL: NONREACTIVE
HGB BLD-MCNC: 10 G/DL (ref 13.5–17.5)
HGB BLD-MCNC: 11.5 G/DL (ref 13.5–17.5)
HGB BLDA-MCNC: 10.2 G/DL (ref 13.5–17.5)
HGB BLDA-MCNC: 10.7 G/DL (ref 13.5–17.5)
HGB BLDA-MCNC: 10.8 G/DL (ref 13.5–17.5)
HGB BLDA-MCNC: 11.3 G/DL (ref 13.5–17.5)
HGB BLDA-MCNC: 11.5 G/DL (ref 13.5–17.5)
HGB BLDA-MCNC: 11.8 G/DL (ref 13.5–17.5)
HGB BLDA-MCNC: 12.3 G/DL (ref 13.5–17.5)
HGB BLDA-MCNC: 22.2 G/DL (ref 13.5–17.5)
HGB BLDMV-MCNC: 11.3 G/DL (ref 13.5–17.5)
HGB BLDMV-MCNC: 11.5 G/DL (ref 13.5–17.5)
HGB BLDMV-MCNC: 12 G/DL (ref 13.5–17.5)
HGB BLDMV-MCNC: 14 G/DL (ref 13.5–17.5)
HGB BLDMV-MCNC: 7.3 G/DL (ref 13.5–17.5)
HGB BLDMV-MCNC: 9.8 G/DL (ref 13.5–17.5)
HGB BLDV-MCNC: 10.7 G/DL (ref 13.5–17.5)
HGB BLDV-MCNC: 11.3 G/DL (ref 13.5–17.5)
HGB BLDV-MCNC: 11.4 G/DL (ref 13.5–17.5)
HGB BLDV-MCNC: 12 G/DL (ref 13.5–17.5)
HGB BLDV-MCNC: 12 G/DL (ref 13.5–17.5)
HGB BLDV-MCNC: 4.9 G/DL (ref 13.5–17.5)
HIV 1+2 AB+HIV1 P24 AG SERPL QL IA: NONREACTIVE
IMM GRANULOCYTES # BLD AUTO: 0.29 X10*3/UL (ref 0–0.7)
IMM GRANULOCYTES # BLD AUTO: 0.35 X10*3/UL (ref 0–0.7)
IMM GRANULOCYTES NFR BLD AUTO: 1.2 % (ref 0–0.9)
IMM GRANULOCYTES NFR BLD AUTO: 1.6 % (ref 0–0.9)
INHALED O2 CONCENTRATION: 40 %
LACTATE BLDA-SCNC: 1.1 MMOL/L (ref 0.4–2)
LACTATE BLDA-SCNC: 1.3 MMOL/L (ref 0.4–2)
LACTATE BLDA-SCNC: 1.5 MMOL/L (ref 0.4–2)
LACTATE BLDA-SCNC: 1.7 MMOL/L (ref 0.4–2)
LACTATE BLDA-SCNC: 2 MMOL/L (ref 0.4–2)
LACTATE BLDA-SCNC: 2 MMOL/L (ref 0.4–2)
LACTATE BLDA-SCNC: 2.1 MMOL/L (ref 0.4–2)
LACTATE BLDA-SCNC: 2.4 MMOL/L (ref 0.4–2)
LACTATE BLDMV-SCNC: 1.1 MMOL/L (ref 0.4–2)
LACTATE BLDMV-SCNC: 1.5 MMOL/L (ref 0.4–2)
LACTATE BLDMV-SCNC: 1.6 MMOL/L (ref 0.4–2)
LACTATE BLDMV-SCNC: 1.9 MMOL/L (ref 0.4–2)
LACTATE BLDV-SCNC: 1.2 MMOL/L (ref 0.4–2)
LACTATE BLDV-SCNC: 1.2 MMOL/L (ref 0.4–2)
LACTATE BLDV-SCNC: 1.4 MMOL/L (ref 0.4–2)
LACTATE BLDV-SCNC: 1.4 MMOL/L (ref 0.4–2)
LACTATE BLDV-SCNC: 1.5 MMOL/L (ref 0.4–2)
LACTATE BLDV-SCNC: 1.6 MMOL/L (ref 0.4–2)
LACTATE BLDV-SCNC: 1.8 MMOL/L (ref 0.4–2)
LDH SERPL L TO P-CCNC: 3284 U/L (ref 84–246)
LDH SERPL L TO P-CCNC: 4527 U/L (ref 84–246)
LYMPHOCYTES # BLD AUTO: 1.77 X10*3/UL (ref 1.2–4.8)
LYMPHOCYTES # BLD AUTO: 2.39 X10*3/UL (ref 1.2–4.8)
LYMPHOCYTES NFR BLD AUTO: 10 %
LYMPHOCYTES NFR BLD AUTO: 8.2 %
MAGNESIUM SERPL-MCNC: 2.24 MG/DL (ref 1.6–2.4)
MAGNESIUM SERPL-MCNC: 2.32 MG/DL (ref 1.6–2.4)
MCH RBC QN AUTO: 29.3 PG (ref 26–34)
MCH RBC QN AUTO: 29.7 PG (ref 26–34)
MCHC RBC AUTO-ENTMCNC: 32.8 G/DL (ref 32–36)
MCHC RBC AUTO-ENTMCNC: 33.8 G/DL (ref 32–36)
MCV RBC AUTO: 87 FL (ref 80–100)
MCV RBC AUTO: 91 FL (ref 80–100)
MONOCYTES # BLD AUTO: 1.26 X10*3/UL (ref 0.1–1)
MONOCYTES # BLD AUTO: 1.78 X10*3/UL (ref 0.1–1)
MONOCYTES NFR BLD AUTO: 5.9 %
MONOCYTES NFR BLD AUTO: 7.5 %
MRSA DNA SPEC QL NAA+PROBE: NOT DETECTED
NEUTROPHILS # BLD AUTO: 18.07 X10*3/UL (ref 1.2–7.7)
NEUTROPHILS # BLD AUTO: 19.32 X10*3/UL (ref 1.2–7.7)
NEUTROPHILS NFR BLD AUTO: 81.2 %
NEUTROPHILS NFR BLD AUTO: 84.2 %
NRBC BLD-RTO: 0.1 /100 WBCS (ref 0–0)
NRBC BLD-RTO: 0.2 /100 WBCS (ref 0–0)
OXYHGB MFR BLDA: 92.8 % (ref 94–98)
OXYHGB MFR BLDA: 94.6 % (ref 94–98)
OXYHGB MFR BLDA: 94.7 % (ref 94–98)
OXYHGB MFR BLDA: 94.8 % (ref 94–98)
OXYHGB MFR BLDA: 94.8 % (ref 94–98)
OXYHGB MFR BLDA: 95 % (ref 94–98)
OXYHGB MFR BLDA: 95.5 % (ref 94–98)
OXYHGB MFR BLDA: 96.1 % (ref 94–98)
OXYHGB MFR BLDMV: 83.8 % (ref 45–75)
OXYHGB MFR BLDMV: 84.5 % (ref 45–75)
OXYHGB MFR BLDMV: 84.7 % (ref 45–75)
OXYHGB MFR BLDMV: 85.2 % (ref 45–75)
OXYHGB MFR BLDMV: 86.9 % (ref 45–75)
OXYHGB MFR BLDMV: 88.7 % (ref 45–75)
OXYHGB MFR BLDV: 52.6 % (ref 45–75)
OXYHGB MFR BLDV: 54.8 % (ref 45–75)
OXYHGB MFR BLDV: 55.4 % (ref 45–75)
OXYHGB MFR BLDV: 55.9 % (ref 45–75)
OXYHGB MFR BLDV: 60 % (ref 45–75)
OXYHGB MFR BLDV: 62.2 % (ref 45–75)
PCO2 BLDA: 36 MM HG (ref 38–42)
PCO2 BLDA: 37 MM HG (ref 38–42)
PCO2 BLDA: 38 MM HG (ref 38–42)
PCO2 BLDA: 38 MM HG (ref 38–42)
PCO2 BLDA: 39 MM HG (ref 38–42)
PCO2 BLDA: 40 MM HG (ref 38–42)
PCO2 BLDMV: 39 MM HG (ref 41–51)
PCO2 BLDMV: 41 MM HG (ref 41–51)
PCO2 BLDMV: 41 MM HG (ref 41–51)
PCO2 BLDV: 44 MM HG (ref 41–51)
PCO2 BLDV: 45 MM HG (ref 41–51)
PCO2 BLDV: 45 MM HG (ref 41–51)
PCO2 BLDV: 46 MM HG (ref 41–51)
PH BLDA: 7.32 PH (ref 7.38–7.42)
PH BLDA: 7.34 PH (ref 7.38–7.42)
PH BLDA: 7.35 PH (ref 7.38–7.42)
PH BLDA: 7.36 PH (ref 7.38–7.42)
PH BLDMV: 7.32 PH (ref 7.33–7.43)
PH BLDMV: 7.33 PH (ref 7.33–7.43)
PH BLDMV: 7.34 PH (ref 7.33–7.43)
PH BLDV: 7.3 PH (ref 7.33–7.43)
PH BLDV: 7.31 PH (ref 7.33–7.43)
PH BLDV: 7.32 PH (ref 7.33–7.43)
PHOSPHATE SERPL-MCNC: 7.5 MG/DL (ref 2.5–4.9)
PHOSPHATE SERPL-MCNC: 8 MG/DL (ref 2.5–4.9)
PLATELET # BLD AUTO: 133 X10*3/UL (ref 150–450)
PLATELET # BLD AUTO: 170 X10*3/UL (ref 150–450)
PO2 BLDA: 87 MM HG (ref 85–95)
PO2 BLDA: 87 MM HG (ref 85–95)
PO2 BLDA: 89 MM HG (ref 85–95)
PO2 BLDA: 91 MM HG (ref 85–95)
PO2 BLDA: 91 MM HG (ref 85–95)
PO2 BLDA: 92 MM HG (ref 85–95)
PO2 BLDA: 97 MM HG (ref 85–95)
PO2 BLDA: 97 MM HG (ref 85–95)
PO2 BLDMV: 62 MM HG (ref 35–45)
PO2 BLDMV: 62 MM HG (ref 35–45)
PO2 BLDMV: 63 MM HG (ref 35–45)
PO2 BLDMV: 63 MM HG (ref 35–45)
PO2 BLDMV: 65 MM HG (ref 35–45)
PO2 BLDMV: 67 MM HG (ref 35–45)
PO2 BLDV: 42 MM HG (ref 35–45)
PO2 BLDV: 42 MM HG (ref 35–45)
PO2 BLDV: 43 MM HG (ref 35–45)
PO2 BLDV: 44 MM HG (ref 35–45)
PO2 BLDV: 45 MM HG (ref 35–45)
PO2 BLDV: 45 MM HG (ref 35–45)
POTASSIUM BLDA-SCNC: 3.9 MMOL/L (ref 3.5–5.3)
POTASSIUM BLDA-SCNC: 4.2 MMOL/L (ref 3.5–5.3)
POTASSIUM BLDA-SCNC: 4.4 MMOL/L (ref 3.5–5.3)
POTASSIUM BLDA-SCNC: 4.4 MMOL/L (ref 3.5–5.3)
POTASSIUM BLDA-SCNC: 4.6 MMOL/L (ref 3.5–5.3)
POTASSIUM BLDMV-SCNC: 4.2 MMOL/L (ref 3.5–5.3)
POTASSIUM BLDMV-SCNC: 4.3 MMOL/L (ref 3.5–5.3)
POTASSIUM BLDMV-SCNC: 4.4 MMOL/L (ref 3.5–5.3)
POTASSIUM BLDMV-SCNC: 4.5 MMOL/L (ref 3.5–5.3)
POTASSIUM BLDV-SCNC: 4.3 MMOL/L (ref 3.5–5.3)
POTASSIUM BLDV-SCNC: 4.4 MMOL/L (ref 3.5–5.3)
POTASSIUM BLDV-SCNC: 4.5 MMOL/L (ref 3.5–5.3)
POTASSIUM BLDV-SCNC: 4.6 MMOL/L (ref 3.5–5.3)
POTASSIUM SERPL-SCNC: 4.1 MMOL/L (ref 3.5–5.3)
POTASSIUM SERPL-SCNC: 4.5 MMOL/L (ref 3.5–5.3)
PROT SERPL-MCNC: 5.4 G/DL (ref 6.4–8.2)
PROT SERPL-MCNC: 5.4 G/DL (ref 6.4–8.2)
RBC # BLD AUTO: 3.37 X10*6/UL (ref 4.5–5.9)
RBC # BLD AUTO: 3.92 X10*6/UL (ref 4.5–5.9)
SAO2 % BLDA: 100 % (ref 94–100)
SAO2 % BLDA: 96 % (ref 94–100)
SAO2 % BLDA: 98 % (ref 94–100)
SAO2 % BLDA: 99 % (ref 94–100)
SAO2 % BLDMV: 87 % (ref 45–75)
SAO2 % BLDMV: 88 % (ref 45–75)
SAO2 % BLDMV: 90 % (ref 45–75)
SAO2 % BLDMV: 92 % (ref 45–75)
SAO2 % BLDV: 54 % (ref 45–75)
SAO2 % BLDV: 57 % (ref 45–75)
SAO2 % BLDV: 57 % (ref 45–75)
SAO2 % BLDV: 58 % (ref 45–75)
SAO2 % BLDV: 62 % (ref 45–75)
SAO2 % BLDV: 65 % (ref 45–75)
SODIUM BLDA-SCNC: 129 MMOL/L (ref 136–145)
SODIUM BLDA-SCNC: 131 MMOL/L (ref 136–145)
SODIUM BLDA-SCNC: 132 MMOL/L (ref 136–145)
SODIUM BLDA-SCNC: 133 MMOL/L (ref 136–145)
SODIUM BLDA-SCNC: 134 MMOL/L (ref 136–145)
SODIUM BLDMV-SCNC: 130 MMOL/L (ref 136–145)
SODIUM BLDMV-SCNC: 131 MMOL/L (ref 136–145)
SODIUM BLDMV-SCNC: 131 MMOL/L (ref 136–145)
SODIUM BLDMV-SCNC: 132 MMOL/L (ref 136–145)
SODIUM BLDMV-SCNC: 133 MMOL/L (ref 136–145)
SODIUM BLDMV-SCNC: 134 MMOL/L (ref 136–145)
SODIUM BLDV-SCNC: 131 MMOL/L (ref 136–145)
SODIUM BLDV-SCNC: 131 MMOL/L (ref 136–145)
SODIUM BLDV-SCNC: 132 MMOL/L (ref 136–145)
SODIUM BLDV-SCNC: 133 MMOL/L (ref 136–145)
SODIUM BLDV-SCNC: 133 MMOL/L (ref 136–145)
SODIUM BLDV-SCNC: 135 MMOL/L (ref 136–145)
SODIUM SERPL-SCNC: 135 MMOL/L (ref 136–145)
SODIUM SERPL-SCNC: 137 MMOL/L (ref 136–145)
UFH PPP CHRO-ACNC: 0.6 IU/ML (ref ?–1.1)
UFH PPP CHRO-ACNC: 0.6 IU/ML (ref ?–1.1)
UFH PPP CHRO-ACNC: 0.7 IU/ML (ref ?–1.1)
WBC # BLD AUTO: 21.5 X10*3/UL (ref 4.4–11.3)
WBC # BLD AUTO: 23.8 X10*3/UL (ref 4.4–11.3)

## 2025-05-21 PROCEDURE — 82947 ASSAY GLUCOSE BLOOD QUANT: CPT

## 2025-05-21 PROCEDURE — 85025 COMPLETE CBC W/AUTO DIFF WBC: CPT

## 2025-05-21 PROCEDURE — 84132 ASSAY OF SERUM POTASSIUM: CPT

## 2025-05-21 PROCEDURE — 99222 1ST HOSP IP/OBS MODERATE 55: CPT | Performed by: STUDENT IN AN ORGANIZED HEALTH CARE EDUCATION/TRAINING PROGRAM

## 2025-05-21 PROCEDURE — 37799 UNLISTED PX VASCULAR SURGERY: CPT

## 2025-05-21 PROCEDURE — 2500000004 HC RX 250 GENERAL PHARMACY W/ HCPCS (ALT 636 FOR OP/ED): Mod: JZ

## 2025-05-21 PROCEDURE — 2500000005 HC RX 250 GENERAL PHARMACY W/O HCPCS

## 2025-05-21 PROCEDURE — 83010 ASSAY OF HAPTOGLOBIN QUANT: CPT

## 2025-05-21 PROCEDURE — 87641 MR-STAPH DNA AMP PROBE: CPT

## 2025-05-21 PROCEDURE — 87040 BLOOD CULTURE FOR BACTERIA: CPT

## 2025-05-21 PROCEDURE — 85347 COAGULATION TIME ACTIVATED: CPT

## 2025-05-21 PROCEDURE — 2020000001 HC ICU ROOM DAILY

## 2025-05-21 PROCEDURE — 74018 RADEX ABDOMEN 1 VIEW: CPT | Performed by: RADIOLOGY

## 2025-05-21 PROCEDURE — 71045 X-RAY EXAM CHEST 1 VIEW: CPT

## 2025-05-21 PROCEDURE — 99291 CRITICAL CARE FIRST HOUR: CPT

## 2025-05-21 PROCEDURE — 2500000002 HC RX 250 W HCPCS SELF ADMINISTERED DRUGS (ALT 637 FOR MEDICARE OP, ALT 636 FOR OP/ED)

## 2025-05-21 PROCEDURE — 36415 COLL VENOUS BLD VENIPUNCTURE: CPT

## 2025-05-21 PROCEDURE — 94003 VENT MGMT INPAT SUBQ DAY: CPT

## 2025-05-21 PROCEDURE — 83735 ASSAY OF MAGNESIUM: CPT

## 2025-05-21 PROCEDURE — 2500000001 HC RX 250 WO HCPCS SELF ADMINISTERED DRUGS (ALT 637 FOR MEDICARE OP)

## 2025-05-21 PROCEDURE — 85520 HEPARIN ASSAY: CPT

## 2025-05-21 PROCEDURE — 84075 ASSAY ALKALINE PHOSPHATASE: CPT

## 2025-05-21 PROCEDURE — 87389 HIV-1 AG W/HIV-1&-2 AB AG IA: CPT

## 2025-05-21 PROCEDURE — 99222 1ST HOSP IP/OBS MODERATE 55: CPT | Performed by: INTERNAL MEDICINE

## 2025-05-21 PROCEDURE — 71045 X-RAY EXAM CHEST 1 VIEW: CPT | Performed by: RADIOLOGY

## 2025-05-21 PROCEDURE — 83615 LACTATE (LD) (LDH) ENZYME: CPT

## 2025-05-21 PROCEDURE — 83605 ASSAY OF LACTIC ACID: CPT

## 2025-05-21 RX ORDER — PANTOPRAZOLE SODIUM 40 MG/10ML
40 INJECTION, POWDER, LYOPHILIZED, FOR SOLUTION INTRAVENOUS 2 TIMES DAILY
Status: DISCONTINUED | OUTPATIENT
Start: 2025-05-21 | End: 2025-06-04

## 2025-05-21 RX ORDER — VANCOMYCIN HYDROCHLORIDE 1 G/20ML
INJECTION, POWDER, LYOPHILIZED, FOR SOLUTION INTRAVENOUS DAILY PRN
Status: DISCONTINUED | OUTPATIENT
Start: 2025-05-21 | End: 2025-05-24

## 2025-05-21 RX ORDER — HEPARIN SODIUM 10000 [USP'U]/100ML
0-2000 INJECTION, SOLUTION INTRAVENOUS CONTINUOUS
Status: DISCONTINUED | OUTPATIENT
Start: 2025-05-21 | End: 2025-05-23

## 2025-05-21 RX ORDER — VANCOMYCIN HYDROCHLORIDE 1 G/200ML
2000 INJECTION, SOLUTION INTRAVENOUS ONCE
Status: COMPLETED | OUTPATIENT
Start: 2025-05-21 | End: 2025-05-21

## 2025-05-21 RX ADMIN — PIPERACILLIN SODIUM AND TAZOBACTAM SODIUM 3.38 G: 3; .375 INJECTION, SOLUTION INTRAVENOUS at 14:48

## 2025-05-21 RX ADMIN — PANTOPRAZOLE SODIUM 40 MG: 40 INJECTION, POWDER, FOR SOLUTION INTRAVENOUS at 08:31

## 2025-05-21 RX ADMIN — Medication 100 MCG/HR: at 09:44

## 2025-05-21 RX ADMIN — Medication 40 PERCENT: at 16:16

## 2025-05-21 RX ADMIN — INSULIN LISPRO 2 UNITS: 100 INJECTION, SOLUTION INTRAVENOUS; SUBCUTANEOUS at 04:02

## 2025-05-21 RX ADMIN — Medication 100 MCG/HR: at 19:55

## 2025-05-21 RX ADMIN — INSULIN LISPRO 2 UNITS: 100 INJECTION, SOLUTION INTRAVENOUS; SUBCUTANEOUS at 08:32

## 2025-05-21 RX ADMIN — INSULIN LISPRO 1 UNITS: 100 INJECTION, SOLUTION INTRAVENOUS; SUBCUTANEOUS at 20:58

## 2025-05-21 RX ADMIN — PANTOPRAZOLE SODIUM 40 MG: 40 INJECTION, POWDER, FOR SOLUTION INTRAVENOUS at 21:24

## 2025-05-21 RX ADMIN — INSULIN LISPRO 1 UNITS: 100 INJECTION, SOLUTION INTRAVENOUS; SUBCUTANEOUS at 11:47

## 2025-05-21 RX ADMIN — PIPERACILLIN SODIUM AND TAZOBACTAM SODIUM 3.38 G: 3; .375 INJECTION, SOLUTION INTRAVENOUS at 08:31

## 2025-05-21 RX ADMIN — HEPARIN SODIUM 10 ML/HR: 10000 INJECTION, SOLUTION INTRAVENOUS; SUBCUTANEOUS at 14:57

## 2025-05-21 RX ADMIN — HEPARIN SODIUM 400 UNITS/HR: 10000 INJECTION, SOLUTION INTRAVENOUS at 15:05

## 2025-05-21 RX ADMIN — INSULIN LISPRO 2 UNITS: 100 INJECTION, SOLUTION INTRAVENOUS; SUBCUTANEOUS at 00:12

## 2025-05-21 RX ADMIN — HYDROCORTISONE SODIUM SUCCINATE 50 MG: 100 INJECTION, POWDER, FOR SOLUTION INTRAMUSCULAR; INTRAVENOUS at 11:39

## 2025-05-21 RX ADMIN — HEPARIN SODIUM 10 ML/HR: 10000 INJECTION, SOLUTION INTRAVENOUS; SUBCUTANEOUS at 11:35

## 2025-05-21 RX ADMIN — ASPIRIN 81 MG: 81 TABLET, CHEWABLE ORAL at 08:31

## 2025-05-21 RX ADMIN — PIPERACILLIN SODIUM AND TAZOBACTAM SODIUM 3.38 G: 3; .375 INJECTION, SOLUTION INTRAVENOUS at 19:42

## 2025-05-21 RX ADMIN — Medication 40 PERCENT: at 08:00

## 2025-05-21 RX ADMIN — VANCOMYCIN HYDROCHLORIDE 2000 MG: 1 INJECTION, SOLUTION INTRAVENOUS at 12:34

## 2025-05-21 RX ADMIN — SODIUM CHLORIDE, POTASSIUM CHLORIDE, SODIUM LACTATE AND CALCIUM CHLORIDE 500 ML: 600; 310; 30; 20 INJECTION, SOLUTION INTRAVENOUS at 10:58

## 2025-05-21 RX ADMIN — POLYETHYLENE GLYCOL 3350 17 G: 17 POWDER, FOR SOLUTION ORAL at 08:31

## 2025-05-21 RX ADMIN — Medication 40 L/MIN: at 20:59

## 2025-05-21 RX ADMIN — INSULIN LISPRO 2 UNITS: 100 INJECTION, SOLUTION INTRAVENOUS; SUBCUTANEOUS at 16:04

## 2025-05-21 RX ADMIN — PIPERACILLIN SODIUM AND TAZOBACTAM SODIUM 3.38 G: 3; .375 INJECTION, SOLUTION INTRAVENOUS at 01:32

## 2025-05-21 RX ADMIN — HYDROCORTISONE SODIUM SUCCINATE 50 MG: 100 INJECTION, POWDER, FOR SOLUTION INTRAMUSCULAR; INTRAVENOUS at 03:10

## 2025-05-21 RX ADMIN — VASOPRESSIN 0.03 UNITS/MIN: 0.2 INJECTION INTRAVENOUS at 08:31

## 2025-05-21 SDOH — ECONOMIC STABILITY: HOUSING INSECURITY: AT ANY TIME IN THE PAST 12 MONTHS, WERE YOU HOMELESS OR LIVING IN A SHELTER (INCLUDING NOW)?: NO

## 2025-05-21 SDOH — ECONOMIC STABILITY: HOUSING INSECURITY: IN THE LAST 12 MONTHS, WAS THERE A TIME WHEN YOU WERE NOT ABLE TO PAY THE MORTGAGE OR RENT ON TIME?: NO

## 2025-05-21 SDOH — ECONOMIC STABILITY: FOOD INSECURITY: WITHIN THE PAST 12 MONTHS, THE FOOD YOU BOUGHT JUST DIDN'T LAST AND YOU DIDN'T HAVE MONEY TO GET MORE.: NEVER TRUE

## 2025-05-21 SDOH — ECONOMIC STABILITY: TRANSPORTATION INSECURITY: IN THE PAST 12 MONTHS, HAS LACK OF TRANSPORTATION KEPT YOU FROM MEDICAL APPOINTMENTS OR FROM GETTING MEDICATIONS?: NO

## 2025-05-21 SDOH — ECONOMIC STABILITY: FOOD INSECURITY: HOW HARD IS IT FOR YOU TO PAY FOR THE VERY BASICS LIKE FOOD, HOUSING, MEDICAL CARE, AND HEATING?: NOT VERY HARD

## 2025-05-21 SDOH — ECONOMIC STABILITY: FOOD INSECURITY: WITHIN THE PAST 12 MONTHS, YOU WORRIED THAT YOUR FOOD WOULD RUN OUT BEFORE YOU GOT THE MONEY TO BUY MORE.: NEVER TRUE

## 2025-05-21 SDOH — ECONOMIC STABILITY: INCOME INSECURITY: IN THE PAST 12 MONTHS HAS THE ELECTRIC, GAS, OIL, OR WATER COMPANY THREATENED TO SHUT OFF SERVICES IN YOUR HOME?: NO

## 2025-05-21 SDOH — ECONOMIC STABILITY: HOUSING INSECURITY: IN THE PAST 12 MONTHS, HOW MANY TIMES HAVE YOU MOVED WHERE YOU WERE LIVING?: 0

## 2025-05-21 ASSESSMENT — ACTIVITIES OF DAILY LIVING (ADL)
LACK_OF_TRANSPORTATION: NO
LACK_OF_TRANSPORTATION: NO

## 2025-05-21 ASSESSMENT — PAIN - FUNCTIONAL ASSESSMENT
PAIN_FUNCTIONAL_ASSESSMENT: CPOT (CRITICAL CARE PAIN OBSERVATION TOOL)

## 2025-05-21 NOTE — CONSULTS
Reason For Consult  BENY    History Of Present Illness  Mr. Elena is a 45 year old male with a past medical history of tobacco abuse (30 pack years), submandibular abscess, and nephrolithiasis who presented to [facility] with a chief complaint of chest pain, found to have an inferior STEMI, now s/p coronary angiogram with balloon angioplasty to the PDA (right dominant system). His course was complicated by hemodynamic instability (tachycardia, hypotension) requiring vasopressor support and laboratory evidnce of end organ dysfunction. Due to concern for cardiogenic vs mixed shock, a shock call was intervened and recommended transfer to [facility] CICU. TTE at [facility] revealed large VSD. He underwent Impella CP placement 5/20. Had a VSD post STEMI which is not repairable at this moment.His baseline cr is around 1 and trending up since his admission. Nephrology is taken on board for BENY.     Past Medical History  He has no past medical history on file.    Surgical History  He has a past surgical history that includes Cardiac catheterization (N/A, 5/20/2025); Cardiac catheterization (N/A, 5/18/2025); and Cardiac catheterization (N/A, 5/18/2025).     Social History  He reports that he has been smoking cigarettes. He started smoking about 30 years ago. He has a 30 pack-year smoking history. He has never used smokeless tobacco. He reports current alcohol use. He reports that he does not use drugs.    Family History  Family History[1]     Allergies  Patient has no known allergies.      Physical Exam  Intubated and sedate  BL air entry  2+edema  S1S2            I&O 24HR    Intake/Output Summary (Last 24 hours) at 5/21/2025 1446  Last data filed at 5/21/2025 1445  Gross per 24 hour   Intake 4674.32 ml   Output 766 ml   Net 3908.32 ml       Vitals 24HR  Heart Rate:  [109-127]   Temp:  [35.9 °C (96.6 °F)-38.4 °C (101.1 °F)]   Resp:  [14-19]   Weight:  [83 kg (182 lb 15.7 oz)]   SpO2:  [92 %-99 %]         Relevant Results  Echo  05/20   1. Poorly visualized anatomical structures due to suboptimal image quality.   2. The left ventricle was not well visualized. The left ventricular ejection fraction could not be measured.   3. Limited views of the LV appear show a hyderdynamic LV with a large color flow from LV to RV through the inferoseptum with maximal velocity of 3.3m/sec, Unable to adequatly assess LVEF, though appears to have baal septal and inferior wall motion abnormality.   4. There is reduced right ventricular systolic function.   5. The RV appears dilated with significantly reduced function with reduced TAPSE and fractional area change.   6. Primary service notified of findings at the time of reporting.   7. Compared with the prior exam from 5/19/20[facility] ([facility]) there has been interval development of a large VSD throuh the inferoseptum with the RV now becoming large with significantly reduced function. Prior echo with reported LVEF of 55% with basal inferospetal and baal inferior hypokinesis in the setting of STEMI. RV size and function were nornal at that time. ( Note that the septum was more thoroughly interrogated with color Doppler today).     CT CAP:  IMPRESSION:  No thoracic or abdominal aortic aneurysm or acute aortic pathology.      Findings of mild interstitial pulmonary edema. And bibasilar  atelectasis.     Assessment & Plan  STEMI (ST elevation myocardial infarction) (Multi)    Cardiogenic shock (Multi)    #BENY  -No baseline CKD, baseline cr 0.9-1  -Electrolytes-stable  -Acidbase are acceptable, mild lactic acidosis, NAGMA  -Hemodynamics labile but not on pressors.  -UA- RBCs, 2+ blood, trace proteins  -CT abd- no hydro or obstructions  -UOP-anuric since morning.  -Etio: likely ATN from contrast, septic shock vs cardiogenic shock    #HFrEF  #lactic acidosis-improving  #sepsis - on zosyn and vanco    -Recommendations:  -No RRT indicated at this time, but discussed with son may potentially need in coming  days.  He agrees with the plan.  -Dose all treatment to GFR.  -Avoid hypotension, further contrast and nephrotoxins if possible.  -Strict I/Os.  -Repeat UA, urin lytes, UMACR, UPCR and renal US to rule out any obstruction.  -Will follow.      Hailee Parrish MD  Nephrology Fellow         [1] No family history on file.

## 2025-05-21 NOTE — CONSULTS
"Nutrition Initial Assessment:   Nutrition Assessment    Reason for Assessment: Admission nursing screening    Patient is a 45 y.o. male who was admitted to [facility] on 5/18 for chest pain and found to have an inferior STEMI, now s/p coronary angiogram with balloon angioplasty to the PDA. While admitted, he had tachycardia and hypotension and needed vasopressor support with lab evidence of end organ dysfunction. It was recommended to intubate and place a PA catheter for adjudication of hemodynamics He was transferred to  CICU on 5/20 for a higher level of care.     PMHx of tobacco abuse (30 pack years), submandibular abscess, and nephrolithiasis    5/20: Impella CP placement    Nutrition History:  Energy Intake:  (unknown as pt is intubated and sedated)  Food and Nutrient History: Pt is intubated and sedated with OG to suction. Per chart review, he was sedated with propofol on 5/19 prior to transfer to [facility].       Anthropometrics:  Height: 172.7 cm (5' 8\")   Weight: 83 kg (182 lb 15.7 oz)   BMI (Calculated): 27.83  IBW/kg (Dietitian Calculated): 70.91 kg  Percent of IBW: 117 %    Weight History:   Wt Readings from Last 5 Encounters:   05/21/25 83 kg (182 lb 15.7 oz) *questionable   5/20/25 78.8 kg (173 lb 11.6 oz) *most accurate   05/19/25 75.4 kg (166 lb 3.6 oz)    03/03/25 79.6 kg (175 lb 7.8 oz)   02/18/25 81.6 kg (180 lb)   07/08/24 76.8 kg (169 lb 6.4 oz)     Weight Change %:  Weight History / % Weight Change: Per chart review, pt appears to have gained 8kg x 3 days, however, this is most likely r/t a difference in bedscales between facilities.  Significant Weight Loss: No    Nutrition Focused Physical Exam Findings:  Subcutaneous Fat Loss:   Orbital Fat Pads: Well nourished (slightly bulging fat pads)  Buccal Fat Pads: Defer (ETT facial mask in place)  Triceps: Well nourished (ample fat tissue)  Ribs: Well nourished (chest is full, ribs do not show, slight to no protrusion of the iliac crest)  Muscle " Wasting:  Temporalis: Well nourished (well-defined muscle)  Pectoralis (Clavicular Region): Well nourished (clavicle not visible)  Deltoid/Trapezius: Well nourished (rounded appearance at arm, shoulder, neck)  Interosseous: Well nourished (muscle bulges)  Trapezius/Infraspinatus/Supraspinatus (Scapular Region): Defer (pt sedated and laying supine in bed)  Quadriceps: Well nourished (well developed, well rounded)  Gastrocnemius: Defer (pt with leg cuffs in place)  Edema:   none  Physical Findings:  Hair: Negative  Eyes:  (pt has eyes closed during exam)  Nails: Negative  Skin: Negative  Respiratory : Positive (mechanical vent)    Nutrition Significant Labs:  CBC Trend:   Results from last 7 days   Lab Units 05/21/25  0245 05/20/25  0739 05/20/25  0133 05/19/25  2106   WBC AUTO x10*3/uL 23.8* 31.1* 34.1* 30.3*   RBC AUTO x10*6/uL 3.92* 4.65 5.27 5.24   HEMOGLOBIN g/dL 11.5* 14.1 16.0 16.0   HEMATOCRIT % 34.0* 40.3* 47.4 45.3   MCV fL 87 87 90 87   PLATELETS AUTO x10*3/uL 170 279 335 290    , BMP Trend:   Results from last 7 days   Lab Units 05/21/25  0245 05/20/25  1414 05/20/25  0739 05/20/25  0133   GLUCOSE mg/dL 221* 170* 275* 176*   CALCIUM mg/dL 7.9* 8.0* 8.2* 9.6   SODIUM mmol/L 137 138 136 137   POTASSIUM mmol/L 4.1 4.1 3.8 4.2   CO2 mmol/L 21 25 20* 20*   CHLORIDE mmol/L 100 103 98 98   BUN mg/dL 77* 56* 47* 39*   CREATININE mg/dL 4.52* 2.54* 2.19* 1.85*    , A1C:  Lab Results   Component Value Date    HGBA1C 7.4 (H) 05/18/2025   , BG POCT trend:   Results from last 7 days   Lab Units 05/21/25  1144 05/21/25  0753 05/21/25  0401 05/21/25  0349 05/21/25  0011   POCT GLUCOSE mg/dL 193* 209* 212* 208* 230*     Nutrition Specific Medications:  Scheduled medications  Scheduled Medications[1]  Continuous medications  Continuous Medications[2]  PRN medications  PRN Medications[3]    I/O:   Last BM Date:  (PTA); Stool Appearance: Unable to assess (05/20/25 0800)    Dietary Orders (From admission, onward)       Start      Ordered    05/20/25 0324  May Not Participate in Room Service  ( ROOM SERVICE MAY NOT PARTICIPATE)  Once        Question:  .  Answer:  Yes    05/20/25 0323    05/20/25 0119  NPO Diet; Effective now  Diet effective now         05/20/25 0118                  Estimated Needs:   Total Energy Estimated Needs in 24 hours (kCal): 1681 kCal  Method for Estimating Needs: [facility]  Total Protein Estimated Needs in 24 Hours (g):  (85 - 99)  Method for Estimating 24 Hour Protein Needs: 1.2 - 1.4g protein * Ideal BW (70.9kg)  Total Fluid Estimated Needs in 24 Hours (mL):  (per team)    Nutrition Diagnosis   Malnutrition Diagnosis  Patient has Malnutrition Diagnosis: No    Nutrition Diagnosis  Patient has Nutrition Diagnosis: Yes  Diagnosis Status (1): New  Nutrition Diagnosis 1: Inadequate oral intake  Related to (1): sedated and intubated status  As Evidenced by (1): need for enteral nutrition       Nutrition Interventions/Recommendations   Nutrition prescription for enteral nutrition    Nutrition Recommendations:  Inappropriate to start enteral feeds currently as OG is to suction. When OG is available for enteral feeds, initiate Vital AF 1.2 @ 10mL/hr. Further recommendations on TF goal pending clinical course.    Nutrition Interventions/Goals:   Enteral Intake: Management of delivery rate of enteral nutrition  Goal: When enteral access available, initiate trickle feeds of Vital AF 1.2 @ 10mL/hr.    Nutrition Monitoring and Evaluation   Food/Nutrient Related History Monitoring  Monitoring and Evaluation Plan: Enteral and parenteral nutrition intake determination  Additional Plans: Enteral access cleared for feeds, stable vasopressors    Anthropometric Measurements  Monitoring and Evaluation Plan: Body weight  Body Weight: Body weight - Maintain stable weight    Biochemical Data, Medical Tests and Procedures  Monitoring and Evaluation Plan: Electrolyte/renal panel, Glucose/endocrine profile  Electrolyte and Renal Panel:  Electrolytes within normal limits, Phosphorus, Potassium, Magnesium  Glucose/Endocrine Profile: Glucose within normal limits - ICU (140-180 mg/dL)         Goal Status: New goal(s) identified    Time Spent (min): 60 minutes              [1] aspirin, 81 mg, oral, Daily  hydrocortisone sodium succinate, 50 mg, intravenous, q8h  insulin lispro, 0-5 Units, subcutaneous, q4h  oxygen, , inhalation, Continuous - Inhalation  pantoprazole, 40 mg, intravenous, Daily  piperacillin-tazobactam, 3.375 g, intravenous, q6h  polyethylene glycol, 17 g, oral, Daily  vancomycin, 2,000 mg, intravenous, Once     [2] fentaNYL,  mcg/hr, Last Rate: 100 mcg/hr (05/21/25 1200)  heparin, 0-4,000 Units/hr, Last Rate: 700 Units/hr (05/21/25 1200)  heparin Impella Purge 25 units/mL in 500 mL D5W, 10 mL/hr, Last Rate: 10 mL/hr (05/21/25 1200)  midazolam, 0.5-20 mg/hr, Last Rate: 3 mg/hr (05/21/25 1200)  norepinephrine, 0.01-1 mcg/kg/min, Last Rate: 0.04 mcg/kg/min (05/21/25 1200)  phenylephrine, 0-2 mcg/kg/min, Last Rate: Stopped (05/20/25 2128)  vasopressin, 0.03 Units/min, Last Rate: 0.03 Units/min (05/21/25 1200)     [3] PRN medications: dextrose, dextrose, glucagon, glucagon, vancomycin

## 2025-05-21 NOTE — CONSULTS
Vancomycin Dosing by Pharmacy- INITIAL    Alpesh Elena is a 45 y.o. year old male who Pharmacy has been consulted for vancomycin dosing for pneumonia. Based on the patient's indication and renal status this patient will be dosed based on a goal trough/random level of 15-20.     Renal function is currently declining.    Visit Vitals  BP (!) 102/92   Pulse (!) 120   Temp (!) 38 °C (100.4 °F) (Core)   Resp 14        Lab Results   Component Value Date    CREATININE 4.52 (H) 2025    CREATININE 2.54 (H) 2025    CREATININE 2.19 (H) 2025    CREATININE 1.85 (H) 2025        Patient weight is as follows:   Vitals:    25 0745   Weight: 83 kg (182 lb 15.7 oz)       Cultures:  Susceptibility data for the encounter in last 14 days.  Collected Specimen Info Organism   25 Fluid from Tracheal Aspirate Normal throat margaret         I/O last 3 completed shifts:  In: 3302.1 (39.8 mL/kg) [I.V.:2702.1 (32.6 mL/kg); IV Piggyback:600]  Out: 480 (5.8 mL/kg) [Urine:470 (0.2 mL/kg/hr); Blood:10]  Weight: 83 kg   I/O during current shift:  I/O this shift:  In: 360.7 [I.V.:190.7; NG/GT:120; IV Piggyback:50]  Out: 633 [Urine:33; Emesis/NG output:600]    Temp (24hrs), Av °C (98.6 °F), Min:35.9 °C (96.6 °F), Max:38.4 °C (101.1 °F)         Assessment/Plan     Patient will be given a loading dose of 2000 mg x1 on .   Will initiate vancomycin dose by levels due to poor renal function.  Follow-up level will be ordered on  with AM labs unless clinically indicated sooner.  Will continue to monitor renal function daily while on vancomycin and order serum creatinine at least every 48 hours if not already ordered.  Follow for continued vancomycin needs, clinical response, and signs/symptoms of toxicity.       Kwasi Betancur, PharmD

## 2025-05-21 NOTE — CONSULTS
[facility]   [facility]  INITIAL CONSULT NOTE       Reason For Consult  C/f GIB    SUBJECTIVE     History Of Present Illness  Alpesh Elena is a 45 y.o. male with a past medical history of tobacco use, transferred from [facility] on 5/20/2025 with cardiogenic shock.  The patient presented to [facility] on  5/18/2025 with CP and was diagnosed with a STEMI which was complicated by cardiogenic shock 2/2 VSD/inferior LV wall rupture. He was transferred to [facility] for higher level of care. He is now s/p Impella placement on 5/20/2025. His hospital course has additionally been complicated by ischemic hepatitis, AHRF and anuric BEYN.    GI is consulted for c/f GIB.    Per primary team, the patient was w/o BMs since [facility] presentation and OG was placed for stomach decompression. Output was initially green, then blood tinged, and then dark red. Since presentation to the  CICU he's had a total of ~800-900 ml's stomach output, ~100 ml's concerning for blood. At the time of this note he's still hasn't had a BM.     Review of Systems  12-point ROS has not been reviewed due to patient being sedated and intubated.    Past Medical History:  Medical History[1]    Home Medications  Prescriptions Prior to Admission[2]    Surgical History:  Surgical History[3]    Allergies:  Allergies[4]    Social History:    Social History     Socioeconomic History    Marital status: Single     Spouse name: Not on file    Number of children: Not on file    Years of education: Not on file    Highest education level: Not on file   Occupational History    Not on file   Tobacco Use    Smoking status: Every Day     Average packs/day: 1 pack/day for 30.0 years (30.0 ttl pk-yrs)     Types: Cigarettes     Start date: 1995    Smokeless tobacco: Never   Vaping Use    Vaping status: Never Used   Substance and Sexual Activity    Alcohol use: Yes    Drug use: Never    Sexual activity: Not on file   Other  Topics Concern    Not on file   Social History Narrative    Not on file     Social Drivers of Health     Financial Resource Strain: Low Risk  (5/21/2025)    Overall Financial Resource Strain (CARDIA)     Difficulty of Paying Living Expenses: Not very hard   Food Insecurity: No Food Insecurity (5/21/2025)    Hunger Vital Sign     Worried About Running Out of Food in the Last Year: Never true     Ran Out of Food in the Last Year: Never true   Transportation Needs: No Transportation Needs (5/21/2025)    PRAPARE - Transportation     Lack of Transportation (Medical): No     Lack of Transportation (Non-Medical): No   Physical Activity: Patient Unable To Answer (5/20/2025)    Exercise Vital Sign     Days of Exercise per Week: Patient unable to answer     Minutes of Exercise per Session: Patient unable to answer   Stress: Not on file   Social Connections: Not on file   Intimate Partner Violence: Patient Unable To Answer (5/20/2025)    Humiliation, Afraid, Rape, and Kick questionnaire     Fear of Current or Ex-Partner: Patient unable to answer     Emotionally Abused: Patient unable to answer     Physically Abused: Patient unable to answer     Sexually Abused: Patient unable to answer   Housing Stability: Low Risk  (5/21/2025)    Housing Stability Vital Sign     Unable to Pay for Housing in the Last Year: No     Number of Times Moved in the Last Year: 0     Homeless in the Last Year: No     Family History:  Family History[5]    EXAM     Vitals:    Vitals:    05/21/25 1715 05/21/25 1730 05/21/25 1745 05/21/25 1800   BP:       Pulse: (!) 123 (!) 124 (!) 124 (!) 124   Resp: 14 14 14 14   Temp:       TempSrc:       SpO2: 96% 96% 96% 96%   Weight:       Height:         Failed to redirect to the Timeline version of the vMoboFS SmartLink.    Intake/Output Summary (Last 24 hours) at 5/21/2025 1826  Last data filed at 5/21/2025 1819  Gross per 24 hour   Intake 4890.2 ml   Output 996 ml   Net 3894.2 ml     Physical Exam   GENERAL:  Acutely/critically ill.  NEUROLOGIC: Sedated and intubated.  HEENT: Pupils are equal, round, and reactive to light. Scleral icterus is present.  MOUTH: OG in place.  CARDIAC: S1 + S2, RRR, murmur present.    LUNGS: Mechanical breath sounds BL.  ABDOMEN: Soft, non-tender to palpation. No organomegaly. Hypoactive BS in 4Q. No rebound or guarding.  SKIN: Jaundice.   RECTAL: No external bleeding or masses. Sphincter tone intact. MITCHELL reveals no masses, melena, or hematochezia.  PSYCH: Sedated and intubated.    OBJECTIVE                                                                              Medications     Current Medications[6]                                                                            Labs     Results for orders placed or performed during the hospital encounter of 05/20/25 (from the past 24 hours)   POCT GLUCOSE   Result Value Ref Range    POCT Glucose 198 (H) 74 - 99 mg/dL   Blood Gas Arterial Full Panel   Result Value Ref Range    POCT pH, Arterial 7.36 (L) 7.38 - 7.42 pH    POCT pCO2, Arterial 35 (L) 38 - 42 mm Hg    POCT pO2, Arterial 96 (H) 85 - 95 mm Hg    POCT SO2, Arterial 98 94 - 100 %    POCT Oxy Hemoglobin, Arterial 94.8 94.0 - 98.0 %    POCT Hematocrit Calculated, Arterial 37.0 (L) 41.0 - 52.0 %    POCT Sodium, Arterial 134 (L) 136 - 145 mmol/L    POCT Potassium, Arterial 4.2 3.5 - 5.3 mmol/L    POCT Chloride, Arterial 103 98 - 107 mmol/L    POCT Ionized Calcium, Arterial 1.01 (L) 1.10 - 1.33 mmol/L    POCT Glucose, Arterial 195 (H) 74 - 99 mg/dL    POCT Lactate, Arterial 1.9 0.4 - 2.0 mmol/L    POCT Base Excess, Arterial -5.0 (L) -2.0 - 3.0 mmol/L    POCT HCO3 Calculated, Arterial 19.8 (L) 22.0 - 26.0 mmol/L    POCT Hemoglobin, Arterial 12.3 (L) 13.5 - 17.5 g/dL    POCT Anion Gap, Arterial 15 10 - 25 mmo/L    Patient Temperature 37.0 degrees Celsius    FiO2 40 %   Blood Gas Arterial Full Panel   Result Value Ref Range    POCT pH, Arterial 7.35 (L) 7.38 - 7.42 pH    POCT pCO2, Arterial  35 (L) 38 - 42 mm Hg    POCT pO2, Arterial 107 (H) 85 - 95 mm Hg    POCT SO2, Arterial 100 94 - 100 %    POCT Oxy Hemoglobin, Arterial 95.8 94.0 - 98.0 %    POCT Hematocrit Calculated, Arterial 38.0 (L) 41.0 - 52.0 %    POCT Sodium, Arterial 133 (L) 136 - 145 mmol/L    POCT Potassium, Arterial 4.5 3.5 - 5.3 mmol/L    POCT Chloride, Arterial 102 98 - 107 mmol/L    POCT Ionized Calcium, Arterial 1.03 (L) 1.10 - 1.33 mmol/L    POCT Glucose, Arterial 210 (H) 74 - 99 mg/dL    POCT Lactate, Arterial 2.1 (H) 0.4 - 2.0 mmol/L    POCT Base Excess, Arterial -5.6 (L) -2.0 - 3.0 mmol/L    POCT HCO3 Calculated, Arterial 19.3 (L) 22.0 - 26.0 mmol/L    POCT Hemoglobin, Arterial 12.7 (L) 13.5 - 17.5 g/dL    POCT Anion Gap, Arterial 16 10 - 25 mmo/L    Patient Temperature 37.0 degrees Celsius    FiO2 40 %   Heparin Assay   Result Value Ref Range    Heparin Unfractionated 0.5 See Comment Below for Therapeutic Ranges IU/mL   Blood Gas Mixed Venous Full Panel   Result Value Ref Range    POCT pH, Mixed 7.34 7.33 - 7.43 pH    POCT pCO2, Mixed 39 (L) 41 - 51 mm Hg    POCT pO2, Mixed 67 (H) 35 - 45 mm Hg    POCT SO2, Mixed 91 (H) 45 - 75 %    POCT Oxy Hemoglobin, Mixed 87.9 (H) 45.0 - 75.0 %    POCT Hematocrit Calculated, Mixed 38.0 (L) 41.0 - 52.0 %    POCT Sodium, Mixed 134 (L) 136 - 145 mmol/L    POCT Potassium, Mixed 4.4 3.5 - 5.3 mmol/L    POCT Chloride, Mixed 101 98 - 107 mmol/L    POCT Ionized Calcium, Mixed 1.04 (L) 1.10 - 1.33 mmol/L    POCT Glucose, Mixed 219 (H) 74 - 99 mg/dL    POCT Lactate, Mixed 1.8 0.4 - 2.0 mmol/L    POCT Base Excess, Mixed -4.4 (L) -2.0 - 3.0 mmol/L    POCT HCO3 Calculated, Mixed 21.0 (L) 22.0 - 26.0 mmol/L    POCT Hemoglobin, Mixed 12.5 (L) 13.5 - 17.5 g/dL    POCT Anion Gap, Mixed 16 10 - 25 mmo/L    Patient Temperature 37.0 degrees Celsius    FiO2 40 %   Blood Gas Venous Full Panel   Result Value Ref Range    POCT pH, Venous 7.31 (L) 7.33 - 7.43 pH    POCT pCO2, Venous 45 41 - 51 mm Hg    POCT pO2,  Venous 45 35 - 45 mm Hg    POCT SO2, Venous 59 45 - 75 %    POCT Oxy Hemoglobin, Venous 57.1 45.0 - 75.0 %    POCT Hematocrit Calculated, Venous 38.0 (L) 41.0 - 52.0 %    POCT Sodium, Venous 134 (L) 136 - 145 mmol/L    POCT Potassium, Venous 4.5 3.5 - 5.3 mmol/L    POCT Chloride, Venous 100 98 - 107 mmol/L    POCT Ionized Calicum, Venous 1.07 (L) 1.10 - 1.33 mmol/L    POCT Glucose, Venous 221 (H) 74 - 99 mg/dL    POCT Lactate, Venous 1.9 0.4 - 2.0 mmol/L    POCT Base Excess, Venous -3.6 (L) -2.0 - 3.0 mmol/L    POCT HCO3 Calculated, Venous 22.7 22.0 - 26.0 mmol/L    POCT Hemoglobin, Venous 12.6 (L) 13.5 - 17.5 g/dL    POCT Anion Gap, Venous 16.0 10.0 - 25.0 mmol/L    Patient Temperature 37.0 degrees Celsius    FiO2 40 %   POCT GLUCOSE   Result Value Ref Range    POCT Glucose 222 (H) 74 - 99 mg/dL   Blood Gas Arterial Full Panel   Result Value Ref Range    POCT pH, Arterial 7.36 (L) 7.38 - 7.42 pH    POCT pCO2, Arterial 36 (L) 38 - 42 mm Hg    POCT pO2, Arterial 92 85 - 95 mm Hg    POCT SO2, Arterial 99 94 - 100 %    POCT Oxy Hemoglobin, Arterial 95.0 94.0 - 98.0 %    POCT Hematocrit Calculated, Arterial 37.0 (L) 41.0 - 52.0 %    POCT Sodium, Arterial 134 (L) 136 - 145 mmol/L    POCT Potassium, Arterial 4.4 3.5 - 5.3 mmol/L    POCT Chloride, Arterial 101 98 - 107 mmol/L    POCT Ionized Calcium, Arterial 1.04 (L) 1.10 - 1.33 mmol/L    POCT Glucose, Arterial 225 (H) 74 - 99 mg/dL    POCT Lactate, Arterial 2.0 0.4 - 2.0 mmol/L    POCT Base Excess, Arterial -4.6 (L) -2.0 - 3.0 mmol/L    POCT HCO3 Calculated, Arterial 20.3 (L) 22.0 - 26.0 mmol/L    POCT Hemoglobin, Arterial 12.3 (L) 13.5 - 17.5 g/dL    POCT Anion Gap, Arterial 17 10 - 25 mmo/L    Patient Temperature 37.0 degrees Celsius    FiO2 40 %   POCT GLUCOSE   Result Value Ref Range    POCT Glucose 230 (H) 74 - 99 mg/dL   Blood Gas Arterial Full Panel   Result Value Ref Range    POCT pH, Arterial 7.34 (L) 7.38 - 7.42 pH    POCT pCO2, Arterial 37 (L) 38 - 42 mm Hg     POCT pO2, Arterial 91 85 - 95 mm Hg    POCT SO2, Arterial 98 94 - 100 %    POCT Oxy Hemoglobin, Arterial 94.6 94.0 - 98.0 %    POCT Hematocrit Calculated, Arterial 34.0 (L) 41.0 - 52.0 %    POCT Sodium, Arterial 133 (L) 136 - 145 mmol/L    POCT Potassium, Arterial 4.2 3.5 - 5.3 mmol/L    POCT Chloride, Arterial 100 98 - 107 mmol/L    POCT Ionized Calcium, Arterial 1.02 (L) 1.10 - 1.33 mmol/L    POCT Glucose, Arterial 206 (H) 74 - 99 mg/dL    POCT Lactate, Arterial 2.1 (H) 0.4 - 2.0 mmol/L    POCT Base Excess, Arterial -5.2 (L) -2.0 - 3.0 mmol/L    POCT HCO3 Calculated, Arterial 20.0 (L) 22.0 - 26.0 mmol/L    POCT Hemoglobin, Arterial 11.3 (L) 13.5 - 17.5 g/dL    POCT Anion Gap, Arterial 17 10 - 25 mmo/L    Patient Temperature 37.0 degrees Celsius    FiO2 40 %   Blood Gas Mixed Venous Full Panel   Result Value Ref Range    POCT pH, Mixed 7.33 7.33 - 7.43 pH    POCT pCO2, Mixed 39 (L) 41 - 51 mm Hg    POCT pO2, Mixed 62 (H) 35 - 45 mm Hg    POCT SO2, Mixed 88 (H) 45 - 75 %    POCT Oxy Hemoglobin, Mixed 85.2 (H) 45.0 - 75.0 %    POCT Hematocrit Calculated, Mixed 36.0 (L) 41.0 - 52.0 %    POCT Sodium, Mixed 134 (L) 136 - 145 mmol/L    POCT Potassium, Mixed 4.2 3.5 - 5.3 mmol/L    POCT Chloride, Mixed 100 98 - 107 mmol/L    POCT Ionized Calcium, Mixed 1.03 (L) 1.10 - 1.33 mmol/L    POCT Glucose, Mixed 202 (H) 74 - 99 mg/dL    POCT Lactate, Mixed 1.9 0.4 - 2.0 mmol/L    POCT Base Excess, Mixed -4.9 (L) -2.0 - 3.0 mmol/L    POCT HCO3 Calculated, Mixed 20.6 (L) 22.0 - 26.0 mmol/L    POCT Hemoglobin, Mixed 12.0 (L) 13.5 - 17.5 g/dL    POCT Anion Gap, Mixed 18 10 - 25 mmo/L    Patient Temperature 37.0 degrees Celsius    FiO2 40 %   Blood Gas Venous Full Panel   Result Value Ref Range    POCT pH, Venous 7.32 (L) 7.33 - 7.43 pH    POCT pCO2, Venous 44 41 - 51 mm Hg    POCT pO2, Venous 44 35 - 45 mm Hg    POCT SO2, Venous 57 45 - 75 %    POCT Oxy Hemoglobin, Venous 55.4 45.0 - 75.0 %    POCT Hematocrit Calculated, Venous 36.0  (L) 41.0 - 52.0 %    POCT Sodium, Venous 135 (L) 136 - 145 mmol/L    POCT Potassium, Venous 4.3 3.5 - 5.3 mmol/L    POCT Chloride, Venous 99 98 - 107 mmol/L    POCT Ionized Calicum, Venous 1.05 (L) 1.10 - 1.33 mmol/L    POCT Glucose, Venous 209 (H) 74 - 99 mg/dL    POCT Lactate, Venous 1.8 0.4 - 2.0 mmol/L    POCT Base Excess, Venous -3.4 (L) -2.0 - 3.0 mmol/L    POCT HCO3 Calculated, Venous 22.7 22.0 - 26.0 mmol/L    POCT Hemoglobin, Venous 12.0 (L) 13.5 - 17.5 g/dL    POCT Anion Gap, Venous 18.0 10.0 - 25.0 mmol/L    Patient Temperature 37.0 degrees Celsius    FiO2 40 %   Magnesium   Result Value Ref Range    Magnesium 2.24 1.60 - 2.40 mg/dL   Renal Function Panel   Result Value Ref Range    Glucose 221 (H) 74 - 99 mg/dL    Sodium 137 136 - 145 mmol/L    Potassium 4.1 3.5 - 5.3 mmol/L    Chloride 100 98 - 107 mmol/L    Bicarbonate 21 21 - 32 mmol/L    Anion Gap 20 10 - 20 mmol/L    Urea Nitrogen 77 (H) 6 - 23 mg/dL    Creatinine 4.52 (H) 0.50 - 1.30 mg/dL    eGFR 15 (L) >60 mL/min/1.73m*2    Calcium 7.9 (L) 8.6 - 10.6 mg/dL    Phosphorus 7.5 (H) 2.5 - 4.9 mg/dL    Albumin 3.3 (L) 3.4 - 5.0 g/dL   CBC and Auto Differential   Result Value Ref Range    WBC 23.8 (H) 4.4 - 11.3 x10*3/uL    nRBC 0.1 (H) 0.0 - 0.0 /100 WBCs    RBC 3.92 (L) 4.50 - 5.90 x10*6/uL    Hemoglobin 11.5 (L) 13.5 - 17.5 g/dL    Hematocrit 34.0 (L) 41.0 - 52.0 %    MCV 87 80 - 100 fL    MCH 29.3 26.0 - 34.0 pg    MCHC 33.8 32.0 - 36.0 g/dL    RDW 13.0 11.5 - 14.5 %    Platelets 170 150 - 450 x10*3/uL    Neutrophils % 81.2 40.0 - 80.0 %    Immature Granulocytes %, Automated 1.2 (H) 0.0 - 0.9 %    Lymphocytes % 10.0 13.0 - 44.0 %    Monocytes % 7.5 2.0 - 10.0 %    Eosinophils % 0.0 0.0 - 6.0 %    Basophils % 0.1 0.0 - 2.0 %    Neutrophils Absolute 19.32 (H) 1.20 - 7.70 x10*3/uL    Immature Granulocytes Absolute, Automated 0.29 0.00 - 0.70 x10*3/uL    Lymphocytes Absolute 2.39 1.20 - 4.80 x10*3/uL    Monocytes Absolute 1.78 (H) 0.10 - 1.00 x10*3/uL     Eosinophils Absolute 0.00 0.00 - 0.70 x10*3/uL    Basophils Absolute 0.03 0.00 - 0.10 x10*3/uL   Heparin Assay   Result Value Ref Range    Heparin Unfractionated 0.6 See Comment Below for Therapeutic Ranges IU/mL   POCT GLUCOSE   Result Value Ref Range    POCT Glucose 208 (H) 74 - 99 mg/dL   POCT GLUCOSE   Result Value Ref Range    POCT Glucose 212 (H) 74 - 99 mg/dL   Blood Gas Arterial Full Panel   Result Value Ref Range    POCT pH, Arterial 7.32 (L) 7.38 - 7.42 pH    POCT pCO2, Arterial 40 38 - 42 mm Hg    POCT pO2, Arterial 87 85 - 95 mm Hg    POCT SO2, Arterial 96 94 - 100 %    POCT Oxy Hemoglobin, Arterial 92.8 (L) 94.0 - 98.0 %    POCT Hematocrit Calculated, Arterial 67.0 (H) 41.0 - 52.0 %    POCT Sodium, Arterial 129 (L) 136 - 145 mmol/L    POCT Potassium, Arterial 3.9 3.5 - 5.3 mmol/L    POCT Chloride, Arterial 96 (L) 98 - 107 mmol/L    POCT Ionized Calcium, Arterial 0.94 (L) 1.10 - 1.33 mmol/L    POCT Glucose, Arterial 206 (H) 74 - 99 mg/dL    POCT Lactate, Arterial 2.4 (H) 0.4 - 2.0 mmol/L    POCT Base Excess, Arterial -5.1 (L) -2.0 - 3.0 mmol/L    POCT HCO3 Calculated, Arterial 20.6 (L) 22.0 - 26.0 mmol/L    POCT Hemoglobin, Arterial 22.2 (H) 13.5 - 17.5 g/dL    POCT Anion Gap, Arterial 16 10 - 25 mmo/L    Patient Temperature 37.0 degrees Celsius    FiO2 40 %   Blood Gas Venous Full Panel   Result Value Ref Range    POCT pH, Venous 7.32 (L) 7.33 - 7.43 pH    POCT pCO2, Venous 45 41 - 51 mm Hg    POCT pO2, Venous 42 35 - 45 mm Hg    POCT SO2, Venous 54 45 - 75 %    POCT Oxy Hemoglobin, Venous 52.6 45.0 - 75.0 %    POCT Hematocrit Calculated, Venous 36.0 (L) 41.0 - 52.0 %    POCT Sodium, Venous 133 (L) 136 - 145 mmol/L    POCT Potassium, Venous 4.6 3.5 - 5.3 mmol/L    POCT Chloride, Venous 99 98 - 107 mmol/L    POCT Ionized Calicum, Venous 1.02 (L) 1.10 - 1.33 mmol/L    POCT Glucose, Venous 202 (H) 74 - 99 mg/dL    POCT Lactate, Venous 1.4 0.4 - 2.0 mmol/L    POCT Base Excess, Venous -3.0 (L) -2.0 - 3.0  mmol/L    POCT HCO3 Calculated, Venous 23.2 22.0 - 26.0 mmol/L    POCT Hemoglobin, Venous 12.0 (L) 13.5 - 17.5 g/dL    POCT Anion Gap, Venous 15.0 10.0 - 25.0 mmol/L    Patient Temperature 37.0 degrees Celsius    FiO2 40 %   Blood Gas Mixed Venous Full Panel   Result Value Ref Range    POCT pH, Mixed 7.33 7.33 - 7.43 pH    POCT pCO2, Mixed 41 41 - 51 mm Hg    POCT pO2, Mixed 63 (H) 35 - 45 mm Hg    POCT SO2, Mixed 87 (H) 45 - 75 %    POCT Oxy Hemoglobin, Mixed 83.8 (H) 45.0 - 75.0 %    POCT Hematocrit Calculated, Mixed 42.0 41.0 - 52.0 %    POCT Sodium, Mixed 131 (L) 136 - 145 mmol/L    POCT Potassium, Mixed 4.5 3.5 - 5.3 mmol/L    POCT Chloride, Mixed 99 98 - 107 mmol/L    POCT Ionized Calcium, Mixed 1.01 (L) 1.10 - 1.33 mmol/L    POCT Glucose, Mixed 209 (H) 74 - 99 mg/dL    POCT Lactate, Mixed 1.5 0.4 - 2.0 mmol/L    POCT Base Excess, Mixed -4.1 (L) -2.0 - 3.0 mmol/L    POCT HCO3 Calculated, Mixed 21.6 (L) 22.0 - 26.0 mmol/L    POCT Hemoglobin, Mixed 14.0 13.5 - 17.5 g/dL    POCT Anion Gap, Mixed 15 10 - 25 mmo/L    Patient Temperature 37.0 degrees Celsius    FiO2 40 %   Heparin Assay   Result Value Ref Range    Heparin Unfractionated 0.7 See Comment Below for Therapeutic Ranges IU/mL   Blood Gas Arterial Full Panel   Result Value Ref Range    POCT pH, Arterial 7.35 (L) 7.38 - 7.42 pH    POCT pCO2, Arterial 37 (L) 38 - 42 mm Hg    POCT pO2, Arterial 89 85 - 95 mm Hg    POCT SO2, Arterial 98 94 - 100 %    POCT Oxy Hemoglobin, Arterial 94.7 94.0 - 98.0 %    POCT Hematocrit Calculated, Arterial 35.0 (L) 41.0 - 52.0 %    POCT Sodium, Arterial 133 (L) 136 - 145 mmol/L    POCT Potassium, Arterial 4.6 3.5 - 5.3 mmol/L    POCT Chloride, Arterial 100 98 - 107 mmol/L    POCT Ionized Calcium, Arterial 1.01 (L) 1.10 - 1.33 mmol/L    POCT Glucose, Arterial 201 (H) 74 - 99 mg/dL    POCT Lactate, Arterial 1.5 0.4 - 2.0 mmol/L    POCT Base Excess, Arterial -4.7 (L) -2.0 - 3.0 mmol/L    POCT HCO3 Calculated, Arterial 20.4 (L) 22.0  - 26.0 mmol/L    POCT Hemoglobin, Arterial 11.5 (L) 13.5 - 17.5 g/dL    POCT Anion Gap, Arterial 17 10 - 25 mmo/L    Patient Temperature 37.0 degrees Celsius    FiO2 40 %   Blood Gas Arterial Full Panel   Result Value Ref Range    POCT pH, Arterial 7.35 (L) 7.38 - 7.42 pH    POCT pCO2, Arterial 38 38 - 42 mm Hg    POCT pO2, Arterial 97 (H) 85 - 95 mm Hg    POCT SO2, Arterial 99 94 - 100 %    POCT Oxy Hemoglobin, Arterial 95.5 94.0 - 98.0 %    POCT Hematocrit Calculated, Arterial 35.0 (L) 41.0 - 52.0 %    POCT Sodium, Arterial 133 (L) 136 - 145 mmol/L    POCT Potassium, Arterial 4.6 3.5 - 5.3 mmol/L    POCT Chloride, Arterial 100 98 - 107 mmol/L    POCT Ionized Calcium, Arterial 1.03 (L) 1.10 - 1.33 mmol/L    POCT Glucose, Arterial 215 (H) 74 - 99 mg/dL    POCT Lactate, Arterial 2.0 0.4 - 2.0 mmol/L    POCT Base Excess, Arterial -4.2 (L) -2.0 - 3.0 mmol/L    POCT HCO3 Calculated, Arterial 21.0 (L) 22.0 - 26.0 mmol/L    POCT Hemoglobin, Arterial 11.8 (L) 13.5 - 17.5 g/dL    POCT Anion Gap, Arterial 17 10 - 25 mmo/L    Patient Temperature 37.0 degrees Celsius    FiO2 40 %   POCT GLUCOSE   Result Value Ref Range    POCT Glucose 209 (H) 74 - 99 mg/dL   Haptoglobin   Result Value Ref Range    Haptoglobin <30 (L) 30 - 200 mg/dL   Blood Gas Mixed Venous Full Panel   Result Value Ref Range    POCT pH, Mixed 7.33 7.33 - 7.43 pH    POCT pCO2, Mixed 39 (L) 41 - 51 mm Hg    POCT pO2, Mixed 63 (H) 35 - 45 mm Hg    POCT SO2, Mixed 88 (H) 45 - 75 %    POCT Oxy Hemoglobin, Mixed 84.7 (H) 45.0 - 75.0 %    POCT Hematocrit Calculated, Mixed 35.0 (L) 41.0 - 52.0 %    POCT Sodium, Mixed 133 (L) 136 - 145 mmol/L    POCT Potassium, Mixed 4.3 3.5 - 5.3 mmol/L    POCT Chloride, Mixed 100 98 - 107 mmol/L    POCT Ionized Calcium, Mixed 0.99 (L) 1.10 - 1.33 mmol/L    POCT Glucose, Mixed 215 (H) 74 - 99 mg/dL    POCT Lactate, Mixed 1.6 0.4 - 2.0 mmol/L    POCT Base Excess, Mixed -4.9 (L) -2.0 - 3.0 mmol/L    POCT HCO3 Calculated, Mixed 20.6 (L)  22.0 - 26.0 mmol/L    POCT Hemoglobin, Mixed 11.5 (L) 13.5 - 17.5 g/dL    POCT Anion Gap, Mixed 17 10 - 25 mmo/L    Patient Temperature 37.0 degrees Celsius    FiO2 40 %   Hepatic function panel   Result Value Ref Range    Albumin 3.2 (L) 3.4 - 5.0 g/dL    Bilirubin, Total 5.0 (H) 0.0 - 1.2 mg/dL    Bilirubin, Direct 2.8 (H) 0.0 - 0.3 mg/dL    Alkaline Phosphatase 75 33 - 120 U/L    ALT 1,459 (H) 10 - 52 U/L    AST 1,888 (H) 9 - 39 U/L    Total Protein 5.4 (L) 6.4 - 8.2 g/dL   Lactate dehydrogenase   Result Value Ref Range    LDH 4,527 (H) 84 - 246 U/L   Blood Gas Venous Full Panel   Result Value Ref Range    POCT pH, Venous 7.32 (L) 7.33 - 7.43 pH    POCT pCO2, Venous 46 41 - 51 mm Hg    POCT pO2, Venous 43 35 - 45 mm Hg    POCT SO2, Venous 58 45 - 75 %    POCT Oxy Hemoglobin, Venous 55.9 45.0 - 75.0 %    POCT Hematocrit Calculated, Venous 34.0 (L) 41.0 - 52.0 %    POCT Sodium, Venous 133 (L) 136 - 145 mmol/L    POCT Potassium, Venous 4.6 3.5 - 5.3 mmol/L    POCT Chloride, Venous 100 98 - 107 mmol/L    POCT Ionized Calicum, Venous 1.01 (L) 1.10 - 1.33 mmol/L    POCT Glucose, Venous 227 (H) 74 - 99 mg/dL    POCT Lactate, Venous 1.6 0.4 - 2.0 mmol/L    POCT Base Excess, Venous -2.5 (L) -2.0 - 3.0 mmol/L    POCT HCO3 Calculated, Venous 23.7 22.0 - 26.0 mmol/L    POCT Hemoglobin, Venous 11.3 (L) 13.5 - 17.5 g/dL    POCT Anion Gap, Venous 14.0 10.0 - 25.0 mmol/L    Patient Temperature 37.0 degrees Celsius    FiO2 40 %   HIV 1/2 Antigen/Antibody Screen with Reflex to Confirmation   Result Value Ref Range    HIV 1/2 Antigen/Antibody Screen with Reflex to Confirmation Nonreactive Nonreactive   Heparin Assay   Result Value Ref Range    Heparin Unfractionated 0.6 See Comment Below for Therapeutic Ranges IU/mL   MRSA Surveillance for Vancomycin De-escalation, PCR    Specimen: Anterior Nares; Swab   Result Value Ref Range    MRSA PCR Not Detected Not Detected   Blood Culture    Specimen: Peripheral Venipuncture; Blood culture    Result Value Ref Range    Blood Culture Loaded on Instrument - Culture in progress    Blood Culture    Specimen: Peripheral Venipuncture; Blood culture   Result Value Ref Range    Blood Culture Loaded on Instrument - Culture in progress    POCT GLUCOSE   Result Value Ref Range    POCT Glucose 193 (H) 74 - 99 mg/dL   Blood Gas Arterial Full Panel   Result Value Ref Range    POCT pH, Arterial 7.36 (L) 7.38 - 7.42 pH    POCT pCO2, Arterial 37 (L) 38 - 42 mm Hg    POCT pO2, Arterial 91 85 - 95 mm Hg    POCT SO2, Arterial 99 94 - 100 %    POCT Oxy Hemoglobin, Arterial 94.8 94.0 - 98.0 %    POCT Hematocrit Calculated, Arterial 32.0 (L) 41.0 - 52.0 %    POCT Sodium, Arterial 133 (L) 136 - 145 mmol/L    POCT Potassium, Arterial 4.4 3.5 - 5.3 mmol/L    POCT Chloride, Arterial 100 98 - 107 mmol/L    POCT Ionized Calcium, Arterial 0.99 (L) 1.10 - 1.33 mmol/L    POCT Glucose, Arterial 203 (H) 74 - 99 mg/dL    POCT Lactate, Arterial 1.7 0.4 - 2.0 mmol/L    POCT Base Excess, Arterial -4.1 (L) -2.0 - 3.0 mmol/L    POCT HCO3 Calculated, Arterial 20.9 (L) 22.0 - 26.0 mmol/L    POCT Hemoglobin, Arterial 10.7 (L) 13.5 - 17.5 g/dL    POCT Anion Gap, Arterial 17 10 - 25 mmo/L    Patient Temperature 37.0 degrees Celsius    FiO2 40 %   ACTIVATED CLOTTING TIME LOW   Result Value Ref Range    POCT Activated Clotting Time Low Range 190 83 - 199 sec   Blood Gas Mixed Venous Full Panel   Result Value Ref Range    POCT pH, Mixed 7.33 7.33 - 7.43 pH    POCT pCO2, Mixed 39 (L) 41 - 51 mm Hg    POCT pO2, Mixed 62 (H) 35 - 45 mm Hg    POCT SO2, Mixed 88 (H) 45 - 75 %    POCT Oxy Hemoglobin, Mixed 84.5 (H) 45.0 - 75.0 %    POCT Hematocrit Calculated, Mixed 34.0 (L) 41.0 - 52.0 %    POCT Sodium, Mixed 131 (L) 136 - 145 mmol/L    POCT Potassium, Mixed 4.5 3.5 - 5.3 mmol/L    POCT Chloride, Mixed 98 98 - 107 mmol/L    POCT Ionized Calcium, Mixed 0.98 (L) 1.10 - 1.33 mmol/L    POCT Glucose, Mixed 218 (H) 74 - 99 mg/dL    POCT Lactate, Mixed 1.5 0.4 -  2.0 mmol/L    POCT Base Excess, Mixed -4.9 (L) -2.0 - 3.0 mmol/L    POCT HCO3 Calculated, Mixed 20.6 (L) 22.0 - 26.0 mmol/L    POCT Hemoglobin, Mixed 11.3 (L) 13.5 - 17.5 g/dL    POCT Anion Gap, Mixed 17 10 - 25 mmo/L    Patient Temperature 37.0 degrees Celsius    FiO2 40 %   Blood Gas Venous Full Panel   Result Value Ref Range    POCT pH, Venous 7.31 (L) 7.33 - 7.43 pH    POCT pCO2, Venous 45 41 - 51 mm Hg    POCT pO2, Venous 42 35 - 45 mm Hg    POCT SO2, Venous 57 45 - 75 %    POCT Oxy Hemoglobin, Venous 54.8 45.0 - 75.0 %    POCT Hematocrit Calculated, Venous 32.0 (L) 41.0 - 52.0 %    POCT Sodium, Venous 132 (L) 136 - 145 mmol/L    POCT Potassium, Venous 4.6 3.5 - 5.3 mmol/L    POCT Chloride, Venous 99 98 - 107 mmol/L    POCT Ionized Calicum, Venous 0.99 (L) 1.10 - 1.33 mmol/L    POCT Glucose, Venous 230 (H) 74 - 99 mg/dL    POCT Lactate, Venous 1.5 0.4 - 2.0 mmol/L    POCT Base Excess, Venous -3.5 (L) -2.0 - 3.0 mmol/L    POCT HCO3 Calculated, Venous 22.7 22.0 - 26.0 mmol/L    POCT Hemoglobin, Venous 10.7 (L) 13.5 - 17.5 g/dL    POCT Anion Gap, Venous 15.0 10.0 - 25.0 mmol/L    Patient Temperature 37.0 degrees Celsius    FiO2 40 %   ACTIVATED CLOTTING TIME LOW   Result Value Ref Range    POCT Activated Clotting Time Low Range 199 83 - 199 sec   ACTIVATED CLOTTING TIME LOW   Result Value Ref Range    POCT Activated Clotting Time Low Range 182 83 - 199 sec   POCT GLUCOSE   Result Value Ref Range    POCT Glucose 244 (H) 74 - 99 mg/dL   ACTIVATED CLOTTING TIME LOW   Result Value Ref Range    POCT Activated Clotting Time Low Range 182 83 - 199 sec   CBC and Auto Differential   Result Value Ref Range    WBC 21.5 (H) 4.4 - 11.3 x10*3/uL    nRBC 0.2 (H) 0.0 - 0.0 /100 WBCs    RBC 3.37 (L) 4.50 - 5.90 x10*6/uL    Hemoglobin 10.0 (L) 13.5 - 17.5 g/dL    Hematocrit 30.5 (L) 41.0 - 52.0 %    MCV 91 80 - 100 fL    MCH 29.7 26.0 - 34.0 pg    MCHC 32.8 32.0 - 36.0 g/dL    RDW 13.0 11.5 - 14.5 %    Platelets 133 (L) 150 - 450  x10*3/uL    Neutrophils % 84.2 40.0 - 80.0 %    Immature Granulocytes %, Automated 1.6 (H) 0.0 - 0.9 %    Lymphocytes % 8.2 13.0 - 44.0 %    Monocytes % 5.9 2.0 - 10.0 %    Eosinophils % 0.0 0.0 - 6.0 %    Basophils % 0.1 0.0 - 2.0 %    Neutrophils Absolute 18.07 (H) 1.20 - 7.70 x10*3/uL    Immature Granulocytes Absolute, Automated 0.35 0.00 - 0.70 x10*3/uL    Lymphocytes Absolute 1.77 1.20 - 4.80 x10*3/uL    Monocytes Absolute 1.26 (H) 0.10 - 1.00 x10*3/uL    Eosinophils Absolute 0.00 0.00 - 0.70 x10*3/uL    Basophils Absolute 0.03 0.00 - 0.10 x10*3/uL   Renal function panel   Result Value Ref Range    Glucose 232 (H) 74 - 99 mg/dL    Sodium 135 (L) 136 - 145 mmol/L    Potassium 4.5 3.5 - 5.3 mmol/L    Chloride 98 98 - 107 mmol/L    Bicarbonate 25 21 - 32 mmol/L    Anion Gap 17 10 - 20 mmol/L    Urea Nitrogen 102 (HH) 6 - 23 mg/dL    Creatinine 6.83 (H) 0.50 - 1.30 mg/dL    eGFR 9 (L) >60 mL/min/1.73m*2    Calcium 7.3 (L) 8.6 - 10.6 mg/dL    Phosphorus 8.0 (H) 2.5 - 4.9 mg/dL    Albumin 2.9 (L) 3.4 - 5.0 g/dL   Magnesium   Result Value Ref Range    Magnesium 2.32 1.60 - 2.40 mg/dL   Blood Gas Mixed Venous Full Panel   Result Value Ref Range    POCT pH, Mixed 7.32 (L) 7.33 - 7.43 pH    POCT pCO2, Mixed 41 41 - 51 mm Hg    POCT pO2, Mixed 65 (H) 35 - 45 mm Hg    POCT SO2, Mixed 90 (H) 45 - 75 %    POCT Oxy Hemoglobin, Mixed 86.9 (H) 45.0 - 75.0 %    POCT Hematocrit Calculated, Mixed 22.0 (L) 41.0 - 52.0 %    POCT Sodium, Mixed 130 (L) 136 - 145 mmol/L    POCT Potassium, Mixed 4.4 3.5 - 5.3 mmol/L    POCT Chloride, Mixed 96 (L) 98 - 107 mmol/L    POCT Ionized Calcium, Mixed 0.96 (L) 1.10 - 1.33 mmol/L    POCT Glucose, Mixed 219 (H) 74 - 99 mg/dL    POCT Lactate, Mixed 1.5 0.4 - 2.0 mmol/L    POCT Base Excess, Mixed -4.6 (L) -2.0 - 3.0 mmol/L    POCT HCO3 Calculated, Mixed 21.1 (L) 22.0 - 26.0 mmol/L    POCT Hemoglobin, Mixed 7.3 (L) 13.5 - 17.5 g/dL    POCT Anion Gap, Mixed 17 10 - 25 mmo/L    Patient Temperature 37.0  degrees Celsius    FiO2 40 %   Blood Gas Venous Full Panel   Result Value Ref Range    POCT pH, Venous 7.30 (L) 7.33 - 7.43 pH    POCT pCO2, Venous 46 41 - 51 mm Hg    POCT pO2, Venous 45 35 - 45 mm Hg    POCT SO2, Venous 65 45 - 75 %    POCT Oxy Hemoglobin, Venous 62.2 45.0 - 75.0 %    POCT Hematocrit Calculated, Venous 15.0 (L) 41.0 - 52.0 %    POCT Sodium, Venous 131 (L) 136 - 145 mmol/L    POCT Potassium, Venous 4.4 3.5 - 5.3 mmol/L    POCT Chloride, Venous 98 98 - 107 mmol/L    POCT Ionized Calicum, Venous 0.96 (L) 1.10 - 1.33 mmol/L    POCT Glucose, Venous 212 (H) 74 - 99 mg/dL    POCT Lactate, Venous 1.4 0.4 - 2.0 mmol/L    POCT Base Excess, Venous -3.4 (L) -2.0 - 3.0 mmol/L    POCT HCO3 Calculated, Venous 22.6 22.0 - 26.0 mmol/L    POCT Hemoglobin, Venous 4.9 (LL) 13.5 - 17.5 g/dL    POCT Anion Gap, Venous 15.0 10.0 - 25.0 mmol/L    Patient Temperature 37.0 degrees Celsius    FiO2 40 %   Blood Gas Arterial Full Panel   Result Value Ref Range    POCT pH, Arterial 7.36 (L) 7.38 - 7.42 pH    POCT pCO2, Arterial 39 38 - 42 mm Hg    POCT pO2, Arterial 87 85 - 95 mm Hg    POCT SO2, Arterial 98 94 - 100 %    POCT Oxy Hemoglobin, Arterial 94.8 94.0 - 98.0 %    POCT Hematocrit Calculated, Arterial 32.0 (L) 41.0 - 52.0 %    POCT Sodium, Arterial 131 (L) 136 - 145 mmol/L    POCT Potassium, Arterial 4.6 3.5 - 5.3 mmol/L    POCT Chloride, Arterial 98 98 - 107 mmol/L    POCT Ionized Calcium, Arterial 0.96 (L) 1.10 - 1.33 mmol/L    POCT Glucose, Arterial 216 (H) 74 - 99 mg/dL    POCT Lactate, Arterial 1.3 0.4 - 2.0 mmol/L    POCT Base Excess, Arterial -3.2 (L) -2.0 - 3.0 mmol/L    POCT HCO3 Calculated, Arterial 22.0 22.0 - 26.0 mmol/L    POCT Hemoglobin, Arterial 10.8 (L) 13.5 - 17.5 g/dL    POCT Anion Gap, Arterial 16 10 - 25 mmo/L    Patient Temperature 37.0 degrees Celsius    FiO2 40 %   ACTIVATED CLOTTING TIME LOW   Result Value Ref Range    POCT Activated Clotting Time Low Range 186 83 - 199 sec   ACTIVATED CLOTTING  TIME LOW   Result Value Ref Range    POCT Activated Clotting Time Low Range 187 83 - 199 sec                                                                              Imaging           5/21/2025 KUB:  IMPRESSION:  1. The small bowel and colon pattern is predominantly fluid-filled  2. Abnormal renal function    5/19/2025 CTA CAP:  IMPRESSION:  No thoracic or abdominal aortic aneurysm or acute aortic pathology.      Findings of mild interstitial pulmonary edema. And bibasilar  atelectasis.                                                                         GI Procedures          ASSESSMENT / PLAN                  ASSESSMENT/PLAN:    Alpesh Elena is a 45 y.o. male with a past medical history of tobacco use, transferred from [facility] on 5/20/2025 with cardiogenic shock.  The patient presented to [facility] on  5/18/2025 with CP and was diagnosed with a STEMI which was complicated by cardiogenic shock 2/2 VSD/inferior LV wall rupture. He was transferred to [facility] for higher level of care. He is now s/p Impella placement on 5/20/2025. His hospital course has additionally been complicated by ischemic hepatitis, AHRF and anuric BENY.    GI is consulted for c/f GIB.    Of note, the patient is on a heparin gtt and ASA 81 mg.    The patient is critically ill w/ lab findings suggestive of hemolysis. After OG suction for +24 hours stomach output turned bloody, this is most likely secondary to trauma, although other etiologies like stress ulcer, PUD, hemorrhagic gastritis etc. are on the differential. The patient hasn't had a BM yet and there was no increased vasopressor requirement since the bloody stomach output. At this time supportive care is recommended. If the patient develops s/s of active GIB please reach out to our team (information as below).    Recommendations:  - Continue to trend Hgb (goal > 7), platelets (goal > 50).  - Ensure adequate IV access, ideally 2 large bore IVs.  - Additional supportive care per  primary team.  - Remain NPO.  - Stop NG/OG suction.  - PPI IV BID.  - Consider repeating a KUB tomorrow in the setting of critical illness, no BMs for at least 4 days, and hypoactive bowel sounds.    The above recommendations were communicated to Kevin Leong DO.    Patient was discussed with Dr. Delgadillo (on call attending), patient to be seen by service attending in the morning.    Gastroenterology will continue to follow.  During weekday hours of 7am-5pm please do not hesitate to contact me on "[employer]" Chat or page [phone], if there are any further questions between the weekday hours of 7am-5pm.   After hours, on weekends, and on holidays, please [place] [phone]. Thank you.    Soheila Milligan MD  PGY-5 Gastroenterology and Hepatology Fellow  [facility]          [1] No past medical history on file.  [2]   No medications prior to admission.   [3]   Past Surgical History:  Procedure Laterality Date    CARDIAC CATHETERIZATION N/A 5/20/2025    Procedure: Impella Insertion;  Surgeon: Fanny Mcduffie MD;  Location: [place] [address] Cardiac Cath Lab;  Service: Cardiovascular;  Laterality: N/A;    CARDIAC CATHETERIZATION N/A 5/18/2025    Procedure: LHC, With LV;  Surgeon: Ben Baxter MD;  Location: [facility];  Service: Cardiovascular;  Laterality: N/A;    CARDIAC CATHETERIZATION N/A 5/18/2025    Procedure: PCI;  Surgeon: Ben Baxter MD;  Location: [facility]iac [facility];  Service: Cardiovascular;  Laterality: N/A;   [4] No Known Allergies  [5] No family history on file.  [6]   Current Facility-Administered Medications:     alteplase (Cathflo Activase) injection 2 mg, 2 mg, intra-catheter, PRN, Kevin Leong DO    aspirin chewable tablet 81 mg, 81 mg, oral, Daily, Ney Moreno MD, 81 mg at 05/21/25 0831    dextrose 50 % injection 12.5 g, 12.5 g, intravenous, q15 min PRN, Ney Moreno MD    dextrose 50 % injection 25 g, 25 g, intravenous, q15 min PRN,  Ney Moreno MD    fentanyl (Sublimaze) 10 mcg/mL in sodium chloride 0.9% infusion,  mcg/hr, intravenous, Continuous, Ney Moreno MD, Last Rate: 10 mL/hr at 05/21/25 1800, 100 mcg/hr at 05/21/25 1800    glucagon (Glucagen) injection 1 mg, 1 mg, intramuscular, q15 min PRN, Ney Moreno MD    glucagon (Glucagen) injection 1 mg, 1 mg, intramuscular, q15 min PRN, Ney Moreno MD    heparin 25,000 Units in dextrose 5% 250 mL (100 Units/mL) infusion (premix), 0-2,000 Units/hr, intravenous, Continuous, Kevin Leong DO, Stopped at 05/21/25 1819    heparin Impella Purge 25 units/mL in 500 mL D5W, 10 mL/hr, intra-catheter, Continuous, Kevin Leong DO, Last Rate: 10 mL/hr at 05/21/25 1800, 10 mL/hr at 05/21/25 1800    insulin lispro injection 0-5 Units, 0-5 Units, subcutaneous, q4h, Ney Moreno MD, 2 Units at 05/21/25 1604    midazolam in NS (Versed) 1 mg/mL infusion solution, 0.5-20 mg/hr, intravenous, Continuous, Ney Moreno MD, Last Rate: 2 mL/hr at 05/21/25 1800, 2 mg/hr at 05/21/25 1800    norepinephrine (Levophed) 8 mg in dextrose 5% 250 mL (0.032 mg/mL) infusion (premix), 0.01-1 mcg/kg/min, intravenous, Continuous, Ney Moreno MD, Stopped at 05/21/25 1330    oxygen (O2) therapy, , inhalation, Continuous - Inhalation, Ney Moreno MD, 40 percent at 05/21/25 1616    pantoprazole (Protonix) injection 40 mg, 40 mg, intravenous, BID, Kevin Leong DO    phenylephrine (Saji-Synephrine) 80 mg in sodium chloride 0.9% 250 mL (0.32 mg/mL) infusion, 0-2 mcg/kg/min, intravenous, Continuous, Ney Moreno MD, Stopped at 05/20/25 2128    piperacillin-tazobactam (Zosyn) 3.375 g in dextrose (iso) IV 50 mL, 3.375 g, intravenous, q6h, Kevin Leong DO, Stopped at 05/21/25 1530    polyethylene glycol (Glycolax, Miralax) packet 17 g, 17 g, oral, Daily, Ney Moreno MD, 17 g at 05/21/25 0831    vancomycin (Vancocin) pharmacy to dose - pharmacy monitoring, , miscellaneous, Daily PRN, Dina Joseph,  MD    vasopressin (Vasostrict) 0.2 unit/mL in 5% dextrose 100 mL IV, 0.03 Units/min, intravenous, Continuous, Ney Moreno MD, Stopped at 05/21/25 3195

## 2025-05-21 NOTE — PROGRESS NOTES
05/21/25 1045   Discharge Planning   Living Arrangements Parent;Family members   Support Systems Parent;Children;Family members   Assistance Needed Patient was independent with ADLs prior to admission.   Type of Residence Private residence   Who is requesting discharge planning? Provider   Home or Post Acute Services   (TBD)   Does the patient need discharge transport arranged?   (TBD)   Financial Resource Strain   How hard is it for you to pay for the very basics like food, housing, medical care, and heating? Not very   Housing Stability   In the last 12 months, was there a time when you were not able to pay the mortgage or rent on time? N   In the past 12 months, how many times have you moved where you were living? 0   At any time in the past 12 months, were you homeless or living in a shelter (including now)? N   Transportation Needs   In the past 12 months, has lack of transportation kept you from medical appointments or from getting medications? no   In the past 12 months, has lack of transportation kept you from meetings, work, or from getting things needed for daily living? No     - ICU TREATMENT PLAN: Patient presented to [facility] with a chief complaint of chest pain, found to have an inferior STEMI. Concern for cardiogenic shock. Transferred to [facility] CICU for higher level of care.    - Payer: [employer]   -Support System: son, mother, sister  - Planned Disposition: Pending medical outcome and rehab recommendations.  - Additional Information: SDOH and Social Work Discharge Planning assessments were completed with patient's sister Kelsey. There were no SDOH issues identified.   -ADLs: Patient was independent with ADLs prior to admission.   -Home Care:  n/a  -DME: n/a  -HD: n/a  - Barriers to discharge: None at this time. SW will continue to follow.

## 2025-05-21 NOTE — PROGRESS NOTES
"Cardiac Intensive Care - Daily Progress Note   Subjective    Alpesh Elena is a 45 y.o. year old male patient admitted on 5/20/2025 with following ICU needs: Cardiogenic shock, impella, VSD/ Inferior LV wall rupture    Interval History:  Transferred from OSH, s/p PDA STEMI c/b cardiogenic shock and c/f VSD given large difference in PA and CVP SvO2. Impella CP placed 5/20 AM. Repeat lactate still 3.4, but making better urine. Impella CP position adjusted 5/20 Am under US guide. Seeing resting comfortably on Vent and sedation this AM. Then after, Lactate improved to 2.6--> 1.5.    Patient still on Impella CP.     Meds    Scheduled medications  Scheduled Medications[1]  Continuous medications  Continuous Medications[2]  PRN medications  PRN Medications[3]     Objective    Blood pressure (!) 102/92, pulse (!) 124, temperature 37.8 °C (100 °F), temperature source Core, resp. rate 14, height 1.727 m (5' 8\"), weight 83 kg (182 lb 15.7 oz), SpO2 96%.     Physical Exam  Constitutional:       Comments: Intubated, Sedate   Cardiovascular:      Rate and Rhythm: Regular rhythm. Tachycardia present.      Heart sounds: Murmur heard.      Comments: Holosystolic murmer, best heard at 4th intercostal rib. Very weak vs absent b/l PT and pedal.   Pulmonary:      Effort: Pulmonary effort is normal.      Breath sounds: Normal breath sounds. No wheezing or rales.   Abdominal:      General: Abdomen is flat. Bowel sounds are normal. There is no distension.      Palpations: Abdomen is soft.   Musculoskeletal:      Right lower leg: No edema.      Left lower leg: No edema.   Skin:     General: Skin is warm.   Neurological:      Comments: Sedated             Intake/Output Summary (Last 24 hours) at 5/21/2025 1612  Last data filed at 5/21/2025 1530  Gross per 24 hour   Intake 4783.57 ml   Output 746 ml   Net 4037.57 ml     Labs:   Results from last 72 hours   Lab Units 05/21/25  0245 05/20/25  1414 05/20/25  0739   SODIUM mmol/L 137 138 136 "   POTASSIUM mmol/L 4.1 4.1 3.8   CHLORIDE mmol/L 100 103 98   CO2 mmol/L 21 25 20*   BUN mg/dL 77* 56* 47*   CREATININE mg/dL 4.52* 2.54* 2.19*   GLUCOSE mg/dL 221* 170* 275*   CALCIUM mg/dL 7.9* 8.0* 8.2*   ANION GAP mmol/L 20 14 22*   EGFR mL/min/1.73m*2 15* 31* 37*   PHOSPHORUS mg/dL 7.5* 5.8* 7.3*      Results from last 72 hours   Lab Units 05/21/25  0245 05/20/25  0739 05/20/25  0133   WBC AUTO x10*3/uL 23.8* 31.1* 34.1*   HEMOGLOBIN g/dL 11.5* 14.1 16.0   HEMATOCRIT % 34.0* 40.3* 47.4   PLATELETS AUTO x10*3/uL 170 279 335   NEUTROS PCT AUTO % 81.2 85.5 81.7   LYMPHS PCT AUTO % 10.0 6.5 8.2   MONOS PCT AUTO % 7.5 6.2 7.9   EOS PCT AUTO % 0.0 0.2 0.4      Results from last 72 hours   Lab Units 05/21/25  1145 05/21/25  0751 05/21/25  0609   POCT PH, ARTERIAL pH 7.36* 7.35* 7.35*   POCT PCO2, ARTERIAL mm Hg 37* 38 37*   POCT PO2, ARTERIAL mm Hg 91 97* 89   POCT SO2, ARTERIAL % 99 99 98     Results from last 72 hours   Lab Units 05/21/25  1257 05/21/25  0916 05/21/25  0533   POCT PH, VENOUS pH 7.31* 7.32* 7.32*   POCT PCO2, VENOUS mm Hg 45 46 45   POCT PO2, VENOUS mm Hg 42 43 42        Micro/ID:     Lab Results   Component Value Date    BLOODCULT Loaded on Instrument - Culture in progress 05/21/2025    BLOODCULT Loaded on Instrument - Culture in progress 05/21/2025       EKG Trend:    EKG 5/20: Sinus Tachycardia, T wave inversions inferior leads. Prolonged QTC     Echo Data:  Results for orders placed during the hospital encounter of 05/18/25    Transthoracic Echo Complete    Narrative  [place] [id number]  Tel [phone] Fax [phone]    TRANSTHORACIC ECHOCARDIOGRAM REPORT    Patient Name:       AVRIL Yoder Physician:    [id number] Faraz Ortega DO  Study Date:         5/19/2025            Ordering Provider:    [id number] MARGARET GASPAR  MRN/PID:            [id number]             Fellow:  Accession#:         [id number]         Nurse:  Date of  Birth/Age:  1980 / 45 years Sonographer:          Koki Mendez  [facility]  Gender Assigned at  M                    Additional Staff:  Birth:  Height:             172.72 cm            Admit Date:           5/18/2025  Weight:             75.30 kg             Admission Status:     Inpatient - STAT  BSA / BMI:          1.89 m2 / 25.24      Department Location:  [facility]  kg/m2  Blood Pressure: 97 /65 mmHg    Study Type:    TRANSTHORACIC ECHO (TTE) COMPLETE  Diagnosis/ICD: ST elevation (STEMI) myocardial infarction of unspecified  site-I21.3  Indication:    STEMI  CPT Codes:     Echo Complete w Full [id number]    Patient History:  PCI and Stent:     PCI performed on 5/18/2025.  Smoker:            Current.  Pertinent History: Chest Pain and Murmur.    Study Detail: The following Echo studies were performed: 2D, M-Mode, Doppler and  color flow. Definity used as a contrast agent for endocardial  border definition. Total contrast used for this procedure was 2 mL  via IV push.      PHYSICIAN INTERPRETATION:  Left Ventricle: The left ventricular systolic function is normal, with a visually estimated ejection fraction of 55%. There is mild concentric left ventricular hypertrophy. Wall motion is abnormal. The left ventricular cavity size is normal. There is mild increased septal and mildly increased posterior left ventricular wall thickness. Spectral Doppler shows a normal pattern of left ventricular diastolic filling.  LV Wall Scoring:  The basal inferoseptal segment, basal inferolateral segment, and basal inferior  segment are hypokinetic. All remaining scored segments are normal.    Left Atrium: The left atrial size is normal.  Right Ventricle: The right ventricle is normal in size. There is normal right ventricular global systolic function.  Right Atrium: The right atrial size is normal.  Aortic Valve: The aortic valve appears structurally normal. There is no evidence of aortic valve stenosis.  The aortic valve  dimensionless index is 0.73. There is no evidence of aortic valve regurgitation. The peak instantaneous gradient of the aortic valve is 5 mmHg. The mean gradient of the aortic valve is 3 mmHg.  Mitral Valve: The mitral valve is normal in structure. There is no evidence of mitral valve stenosis. There is normal mitral valve leaflet mobility. There is no evidence of mitral valve regurgitation.  Tricuspid Valve: The tricuspid valve is structurally normal. There is normal tricuspid valve leaflet mobility. There is trace tricuspid regurgitation.  Pulmonic Valve: The pulmonic valve is structurally normal. There is no indication of pulmonic valve regurgitation.  Pericardium: No pericardial effusion noted.  Aorta: The aortic root is normal.  Pulmonary Artery: The main pulmonary artery is normal in size, and position, with normal bifurcation into the left and right pulmonary arteries. The tricuspid regurgitant velocity is 2.88 m/s, and with an estimated right atrial pressure of 3 mmHg, the estimated pulmonary artery pressure is mildly elevated with the RVSP at 36.2 mmHg.  Systemic Veins: The inferior vena cava appears normal in size.      CONCLUSIONS:  1. The left ventricular systolic function is normal, with a visually estimated ejection fraction of 55%.  2. Basal inferoseptal segment, basal inferolateral segment, and basal inferior segment are abnormal.  3. Abnormal wall motion.  4. There is normal right ventricular global systolic function.  5. Normal sized right ventricle.  6. There is no evidence of mitral valve stenosis.  7. No evidence of mitral valve regurgitation.  8. Trace tricuspid regurgitation is visualized.  9. Aortic valve stenosis is not present.  10. The main pulmonary artery is normal in size, and position, with normal bifurcation into the left and right pulmonary arteries.    QUANTITATIVE DATA SUMMARY:    2D MEASUREMENTS:             Normal Ranges:  Ao Root d:       2.86 cm     (2.0-3.7cm)  LAs:              3.88 cm     (2.7-4.0cm)  IVSd:            1.12 cm     (0.6-1.1cm)  LVPWd:           1.08 cm     (0.6-1.1cm)  LVIDd:           4.02 cm     (3.9-5.9cm)  LVIDs:           2.94 cm  LV Mass Index:   77.7 g/m2  LVEDV Index:     59.87 ml/m2  LV % FS          26.9 %      LEFT ATRIUM:                  Normal Ranges:  LA Vol A4C:        29.5 ml    (22+/-6mL/m2)  LA Vol A2C:        25.4 ml  LA Vol BP:         30.5 ml  LA Vol Index A4C:  15.6ml/m2  LA Vol Index A2C:  13.4 ml/m2  LA Vol Index BP:   16.1 ml/m2  LA Area A4C:       13.8 cm2  LA Area A2C:       11.5 cm2  LA Major Axis A4C: 5.5 cm  LA Major Axis A2C: 4.4 cm  LA Volume Index:   14.9 ml/m2      RIGHT ATRIUM:                 Normal Ranges:  RA Vol A4C:        24.1 ml    (8.3-19.5ml)  RA Vol Index A4C:  12.7 ml/m2  RA Area A4C:       11.1 cm2  RA Major Axis A4C: 4.4 cm      AORTA MEASUREMENTS:         Normal Ranges:  Asc Ao, d:          2.48 cm (2.1-3.4cm)      LV SYSTOLIC FUNCTION:  Normal Ranges:  EF-A4C View:    54 % (>=55%)  EF-A2C View:    54 %  EF-Biplane:     55 %  EF-Visual:      55 %  LV EF Reported: 55 %      LV DIASTOLIC FUNCTION:           Normal Ranges:  MV Peak E:             1.20 m/s  (0.7-1.2 m/s)  MV Peak A:             1.08 m/s  (0.42-0.7 m/s)  E/A Ratio:             1.11      (1.0-2.2)  MV e'                  0.090 m/s (>8.0)  MV lateral e'          0.08 m/s  MV medial e'           0.10 m/s  E/e' Ratio:            13.29     (<8.0)      MITRAL VALVE:          Normal Ranges:  MV DT:        126 msec (150-240msec)      AORTIC VALVE:                     Normal Ranges:  AoV Vmax:                1.16 m/s (<=1.7m/s)  AoV Peak P.4 mmHg (<20mmHg)  AoV Mean PG:             3.0 mmHg (1.7-11.5mmHg)  LVOT Max Aime:            1.03 m/s (<=1.1m/s)  AoV VTI:                 22.30 cm (18-25cm)  LVOT VTI:                16.20 cm  LVOT Diameter:           2.03 cm  (1.8-2.4cm)  AoV Area, VTI:           2.35 cm2 (2.5-5.5cm2)  AoV Area,Vmax:           2.87  cm2 (2.5-4.5cm2)  AoV Dimensionless Index: 0.73      RIGHT VENTRICLE:  RV Basal 3.49 cm  RV Mid   2.65 cm  RV Major 7.3 cm  TAPSE:   22.4 mm  RV s'    0.13 m/s      TRICUSPID VALVE/RVSP:          Normal Ranges:  Peak TR Velocity:     2.88 m/s  RV Syst Pressure:     36 mmHg  (< 30mmHg)  IVC Diam:             1.74 cm      PULMONIC VALVE:          Normal Ranges:  PV Accel Time:  116 msec (>120ms)  PV Max Aime:     1.5 m/s  (0.6-0.9m/s)  PV Max P.8 mmHg      [id number] Faraz Ortega DO  Electronically signed on 2025 at 9:34:06 AM      Wall Scoring        ** Final **      Cath Data:  [facility]     LMT normal.  LAD mild irregularity, 30% mid, wraps around the apex.  LCX mild iregularity.  RCA tortuous, dom.  PDA is 100% ostial.  LVEDP 20-25.  LVEF 40-45%, inferior basal AK.       Successful PTA R % GLENNA 0 reduced to 30%, GLENNA 3 after POBA, with no stent placed due to small caliber vessel and minimal runoff, not enough to warrant stenting.     Concern for drug use considering patient very tachycardic and LVEDP elevated with very small PDA occlusion    Summary of key imaging results from the last 24 hours     See above    Assessment and Plan     Assessment: This is a 45 year old male with a past medical history of tobacco abuse (30 pack years), submandibular abscess, and nephrolithiasis who presented to [facility] with a chief complaint of chest pain, found to have an inferior STEMI, now s/p coronary angiogram with balloon angioplasty to the PDA (right dominant system). His course was complicated by hemodynamic instability (tachycardia, hypotension) requiring vasopressor support and laboratory evidnce of end organ dysfunction. Due to concern for cardiogenic vs mixed shock, a shock call was intervened and recommended placement of  PA catheter for adjudication of hemodynamics and transfer to [facility] CICU for a higher level of care. He is now admitted to the CICU for further management.      On arrival to the CICU  he is hypotensive with escalating pressor requirements. Bedside examination reveals a holosystolic murmur and serial mixed venous are revealing of a significant step up in SVO2 from the RA to PA concerning for the presence of a ischemic VSD, perhaps secondardy to a late presenting STEMI given troponin elevation to 24K on presentation. Will re-conference with Shock team for consideration of MCS given continued hemodynamic instability and now suspected VSD with cardiogenic shock. Due to concern for mixed shock will start stress dose steroids (as currently on 3 pressors), broaden abx, and send full infectious workup. Continue with CAD/STEMI GDMT. Shock team determined impella CP placement to offload LV given concern for STEMI-induced VSD/ Inferior LV wall rupture.     Patient's lactate has normalized while on Impella CP.  However, still large difference between CVP and PA mixed venous, indicating ongoing shunting.  Infectious disease and nephrology following.  Monitoring the patient will need Impella 5.0      Mechanical Ventilation: < 4 days  Sedation/Analgesia:  Fentanyl, Precedex, and Versed  Restraints: no    Summary for 05/21/25  :  Shock Goal:  MAP 60-65  SBP 80-85  Trend lactate and UO Q2h for signs of Improvement   Lactate Now < 2   Off all pressors 5/21  Core Fever (> 38C) noticed after measured with Herminie  ABX: c/w Zosyn, Restarted vanco 5/21   ID Follwoing  Impella placement adjusted with guidance of TTE and Cath lab nursing team  C/w CP P-9   Heparin purge and system via ACT protocol   Anuric BENY  Nephrology following   Hemoglobin drop: 16->14->11.5  Trend BID     NEUROLOGIC  #Sedation  - Vent/Versed/Precedex for RASS -2 - -3      CARDIOVASCULAR  #RPDA STEMI s/p POBA  #C/f Cardiogenic vs mixed shock   #C/f VSD  ::  ECG on admit to OSH with ST elevations in the inferior leads with reciprocal depressions  :: Troponin:  24,085>21,701,19,289,49384  :: Lipid panel (05/2025)- cholesterol 220, HDL 33.9, , TG  57  :: TSH 1.51 (05/2025), A1C 7.4  (05/2025)  :: coronary angiogram (05/2025)  which revealed multi vessel CAD [LAD/Lcx/RCA mildly/diffusely diseased,100% RPDA culprit lesion] with PTCA to RPDA with 50% residual stenosis [GLENNA 0->3] (too small to stent)   :: LVEF 45% and LVED 20-25 mmHg via LV Gram   :: TTE  (05/2025) LVEF 55%, wall motion abnormalities, and no significant valvular disease   :: SVO2 59 from CVP port, 92 from PA port (prior to impella)  :: SVO2 57 from CVP port, 80 from PA port (after impella)  :: Off all pressors 5/21  Plan:  - Trend Lactate, CVP mix venous (w/VBG) and PA mix venous Q2H  - Aspirin 81 mg every day, DC Brilinita given no stent / possible future surgery   - Statin: HELD Crestor 40 mg given c/f shock liver  - Beta blocker: hold given shock requiring MCS  - RAAS Inhibition: hold given shock requiring MCS  - Limited TTE repeat: Compared with the prior exam from 5/19/2025 ([facility]) there has been interval development of a large VSD throuh the inferoseptum with the RV now becoming large with significantly reduced function.   - CT surgery following      PULMONARY  #AHRF   :: Prior to intubation satting 92% on RA  :: CXR with pulmonary edema vs PNA  :: CTA CAP- mild interstitial pulmonary edema   :: , WBC 34.1 on admission   Plan:  - Infectious workup/abx as below   - Seymour guided therapy   - Wean Ventilator when more hemodynamically stable      RENAL/  #BENY, Anuric    :: Baseline Cr 0.8-1.0, 1.61 on admit, 1.85 on transport   Plan:  - Likely hemodynamic mediated , vanco, LV gram   - Follow up UA/Ulytes if sample is available   - Avoid hypotension, rapid fluctuations in BP  - Seymour guided volume resuscitation/ diuresis   - Daily RFP   - Nephrology following      GASTROINTESTINAL  #GI PPX  - IV PPI every day while intubated  - Miralax every day      ENDOCRINE  #TIIDM   :: A1C 7.4  (05/2025)  Plan:  - NPO   - Q4H POC glucose, SSI while NPO  - Titrate -180  - Will  need diabetes education prior to discharge      HEME/ONC  SWAPNIL  - Monitor for signs of hemolysis while on impella      INFECTIOUS DISEASE  #C/f Mixed shock   #Leukocytosis  # Fever  Unclear if Fever is from infection or general cardiogenic shock at this time   :: S/p Doxy/CTX 05/19  :: blood cultures sent 05/19 x 2 sets   Work up:  - MRSA probe: -  - Bcx2: pending  - UA: non-infectious   Plan:  - c/w Zosyn, Restarted vanco 5/21  - Follow infectious labs  - narrow abx as able   - ID following      MSK/DERM  SWAPNIL         ICU Check List       ICU Liberation: Intervention:   Assess, Prevent, Manage Pain 0 - No pain fentanyl gtt   Both SAT and SBT [] SAT  [] SBT 30-60 min [] Extubate to NC  [] Extubate to NIV  [] Discuss Trach   Choice of analgesia and sedation Romero Agitation Sedation Scale (RASS): Moderate sedation Fentanyl, Precedex, and Versed  -1 to -2   Delirium: Assess, prevent and manage  CAM ICU Positive Assess Qshift   Early Mobility and Exercise Receiving therapies that restrict mobility, New myocardial ischemia (Active), New or increase in vasopressor dose in last 2 hours [] PT /OT consult   Family Engagement and Empowerment [x]Family updated []SW consult     F: PRN for euvolemia; swan guided therapy   E: PRN K>4, Mg>2, P>3   N: NPO  A: PIV, RIJ swan, left radial A line      Oxygen: MV 40%/500/16/5  Abx: Zosyn  Gtts: Levo, Vaso, Phenyl, fent, versed      DVT ppx: Lovenox  GI ppx: IV PPI     Code Status: full (confirmed on admit)  Surrogate Medical Decision-maker:    Ashley Hogan (Mother) [phone]         Introducer 05/19/25 Internal jugular Right (Active)   Placement Date/Time: 05/19/25 2330   Hand Hygiene Completed: Yes  Location: Internal jugular  Orientation: Right  Placed by: CG/AL  Placement Verified: X-ray;Pressure tracing changes;Transduced waveform   Number of days: 0       Arterial Line 05/19/25 Left Radial (Active)   Placement Date/Time: 05/19/25 2200   Orientation: Left  Location: Radial   Local Anesthetic: Injectable  Technique: Anatomical landmarks  Insertion attempts: 1  Securement Method: Sutured  Patient Tolerance: Fair   Number of days: 0       Arterial Sheath 05/20/25 0529 6 Fr. Right Femoral (Active)   Placement Date/Time: 05/20/25 0529   Sheath Size: (c) 6 Fr.  Line Orientation: Right  Sheath Insertion Site: Femoral   Number of days: 0       Arterial Sheath 05/20/25 0532 Other (Comment) Right Femoral (Active)   Placement Date/Time: 05/20/25 0532   Sheath Size: (c) Other (Comment)  Line Orientation: Right  Sheath Insertion Site: Femoral   Number of days: 0       Arterial Sheath 05/20/25 0559 6 Fr. Left Femoral (Active)   Placement Date/Time: 05/20/25 0559   Sheath Size: 6 Fr.  Line Orientation: Left  Sheath Insertion Site: Femoral   Number of days: 0       Pulmonary Artery Catheter Internal jugular (Active)   Placement Date/Time: 05/19/25 2330   Hand Hygiene Performed Prior to CVC Insertion: Yes  Site Prep: Alcohol;Chlorhexidine ;Usual sterile procedure followed  Site Prep Agent has Completely Dried Before Insertion: Yes  All 5 Sterile Barriers Used (Glove...   Number of days: 0       ETT  7.5 mm (Active)   Placement Date/Time: 05/19/25 2305   Hand Hygiene Completed: Yes  ETT Type: ETT - single  Single Lumen Tube Size: 7.5 mm  Cuffed: Yes  Location: Oral  Airway Insertion Attempts: 1  Placement Verification: Auscultation;Capnometry;Fiberoptic visualizati...   Number of days: 0       Urethral Catheter 16 Fr. (Active)   Placement Date/Time: 05/20/25 0200   Hand Hygiene Completed: Yes  Tube Size (Fr.): 16 Fr.  Catheter Balloon Size: 10 mL  Urine Returned: Yes   Number of days: 0       NG/OG/Feeding Tube OG - [place] sump 16 Fr Center mouth (Active)   Placement Date/Time: 05/19/25 2310   Placed by: DM  Hand Hygiene Completed: Yes  Type of Tube: NG/OG Tube  Tube Length: 55 cm  Tube Type: OG - [place] sump  NG/OG Tube Size: 16 Fr  Tube Location: Center mouth   Number of days: 0       Social:  Code: Full  Code    Disposition: NABOR Leong DO   05/21/25 at 4:12 PM     Disclaimer: Documentation completed with the information available at the time of input. The times in the chart may not be reflective of actual patient care times, interventions, or procedures. Documentation occurs after the physical care of the patient.           [1] aspirin, 81 mg, oral, Daily  hydrocortisone sodium succinate, 50 mg, intravenous, q8h  insulin lispro, 0-5 Units, subcutaneous, q4h  oxygen, , inhalation, Continuous - Inhalation  pantoprazole, 40 mg, intravenous, Daily  piperacillin-tazobactam, 3.375 g, intravenous, q6h  polyethylene glycol, 17 g, oral, Daily     [2] fentaNYL,  mcg/hr, Last Rate: 100 mcg/hr (05/21/25 1200)  heparin, 0-2,000 Units/hr, Last Rate: 300 Units/hr (05/21/25 1607)  heparin Impella Purge 25 units/mL in 500 mL D5W, 10 mL/hr, Last Rate: 10 mL/hr (05/21/25 1457)  midazolam, 0.5-20 mg/hr, Last Rate: 2 mg/hr (05/21/25 1609)  norepinephrine, 0.01-1 mcg/kg/min, Last Rate: Stopped (05/21/25 1330)  phenylephrine, 0-2 mcg/kg/min, Last Rate: Stopped (05/20/25 2128)  vasopressin, 0.03 Units/min, Last Rate: Stopped (05/21/25 1415)     [3] PRN medications: alteplase, dextrose, dextrose, glucagon, glucagon, vancomycin

## 2025-05-21 NOTE — CONSULTS
Inpatient consult to Infectious Diseases  Consult performed by: Christopher Valladares MD  Consult ordered by: Casey Tariq MD        Referred by Dr. Tariq    Primary MD: Scot Guerra DO    Reason For Consult  ABx and inection workup. Fever, septic shock compenent to the cardiogenic shock (with impella) . going back on vanco this AM    History Of Present Illness  Alpesh Elena is a 45 y.o. male Past Medical History of a known cardiac murmur, Nephrolithiasis, current smoker of 30 pack years,  presenting with chest pain. ID consulted for concern for septic shock.     Patient initially presented around 5/18 for chest pain. EKG showed stemi. Patient went for catheterization with small PDA noted and low EF as well. ON 5/19 patient was intubated. Patient was transferred to [facility] on 5/20. On admission here he was noted to have holosystolic murmer and seen to have VSD on echo.     Patient did have a fever today of 101 with high leukocytosis on 23-30s. Blood cultures have been negative and resp cultures showing normal margaret.      Past Medical History  He has no past medical history on file.    Surgical History  He has a past surgical history that includes Cardiac catheterization (N/A, 5/20/2025); Cardiac catheterization (N/A, 5/18/2025); and Cardiac catheterization (N/A, 5/18/2025).     Social History     Occupational History    Not on file   Tobacco Use    Smoking status: Every Day     Average packs/day: 1 pack/day for 30.0 years (30.0 ttl pk-yrs)     Types: Cigarettes     Start date: 1995    Smokeless tobacco: Never   Vaping Use    Vaping status: Never Used   Substance and Sexual Activity    Alcohol use: Yes    Drug use: Never    Sexual activity: Not on file     Travel History   Travel since 04/21/25    No documented travel since 04/21/25            Family History  Family History[1]  Allergies  Patient has no known allergies.       There is no immunization history on file for this patient.  Medications  Home  medications:  Prescriptions Prior to Admission[2]  Current medications:  Scheduled medications  Scheduled Medications[3]  Continuous medications  Continuous Medications[4]  PRN medications  PRN Medications[5]    Review of Systems   Review of systems negative due to intubated status    Objective  Range of Vitals (last 24 hours)  Heart Rate:  [100-127]   Temp:  [35.9 °C (96.6 °F)-38.4 °C (101.1 °F)]   Resp:  [14-19]   Weight:  [83 kg (182 lb 15.7 oz)]   SpO2:  [92 %-97 %]   Daily Weight  05/21/25 : 83 kg (182 lb 15.7 oz)    Body mass index is 27.82 kg/m².     Physical Exam   General: intubated and sedated  HEENT: eyes closed  CVS: RRR. Normal S1 and S2. No m/r/g.   RESP: intubated, vent sounds  Abd:  Soft and lax. ND.   Ext: No swelling of the LE b/l.   Neuro: intubated and sedated  Integumentary: no obvious lesions   Rheumatologic: No joint swelling or edema    Relevant Results    Labs  Results from last 72 hours   Lab Units 05/21/25  0245 05/20/25  0739 05/20/25  0133   WBC AUTO x10*3/uL 23.8* 31.1* 34.1*   HEMOGLOBIN g/dL 11.5* 14.1 16.0   HEMATOCRIT % 34.0* 40.3* 47.4   PLATELETS AUTO x10*3/uL 170 279 335   NEUTROS PCT AUTO % 81.2 85.5 81.7   LYMPHS PCT AUTO % 10.0 6.5 8.2   MONOS PCT AUTO % 7.5 6.2 7.9   EOS PCT AUTO % 0.0 0.2 0.4     Results from last 72 hours   Lab Units 05/21/25  0245 05/20/25  1414 05/20/25  0739   SODIUM mmol/L 137 138 136   POTASSIUM mmol/L 4.1 4.1 3.8   CHLORIDE mmol/L 100 103 98   CO2 mmol/L 21 25 20*   BUN mg/dL 77* 56* 47*   CREATININE mg/dL 4.52* 2.54* 2.19*   GLUCOSE mg/dL 221* 170* 275*   CALCIUM mg/dL 7.9* 8.0* 8.2*   ANION GAP mmol/L 20 14 22*   EGFR mL/min/1.73m*2 15* 31* 37*   PHOSPHORUS mg/dL 7.5* 5.8* 7.3*     Results from last 72 hours   Lab Units 05/21/25  0916 05/21/25  0245 05/20/25  1414 05/20/25  0739   ALK PHOS U/L 75  --  77 95   BILIRUBIN TOTAL mg/dL 5.0*  --  3.8* 2.2*   BILIRUBIN DIRECT mg/dL 2.8*  --  1.3* 0.5*   PROTEIN TOTAL g/dL 5.4*  --  5.4* 6.2*   ALT U/L  "1,459*  --  1,258* 732*   AST U/L 1,888*  --  1,466* 620*   ALBUMIN g/dL 3.2* 3.3* 3.4  3.4 3.7     Estimated Creatinine Clearance: 21.4 mL/min (A) (by C-G formula based on SCr of 4.52 mg/dL (H)).  C-Reactive Protein   Date Value Ref Range Status   07/07/2024 10.34 (H) <1.00 mg/dL Final     No results found for: \"HIV1X2\", \"HIVCONF\", \"ONSNJQ8EI\"  No results found for: \"HEPCABINIT\", \"HEPCAB\", \"HCVPCRQUANT\"  Microbiology  Susceptibility data from last 90 days.  Collected Specimen Info Organism   05/20/25 Fluid from Tracheal Aspirate Normal throat margaret     5/20 Urine strep negative  5/20 urine legionella negative    Imaging    CXR  IMPRESSION:  1. Increased bilateral perihilar congestion with worsening pulmonary  edema.  2. Right internal jugular approach [place] catheter tip projects  over the distal right pulmonary artery on latest radiograph time  stamped 9:11 p.m..  3. Remaining medical devices as detailed above.         Assessment/Plan     #Shock, likely cardiogenic vs septic  #STEMI    Patient with initial presentation for chest pain ultimately dx with STEMI, s/p cath, impella, intubation, and c/b VSD rupture. A large component of patient's shock is likely cardiogenic in nature. Given fevers we will obtain broad work up. Patient does have high LFT likely in the setting of shock but we will obtain acute hepatitis panel and HIV to be sure that this is from shock. UA with negative nitrites or leuks.     -Continue vancomycin (pharm to dose)  -Continue zosyn 3.375 q 6 hours  -Please obtain blood cultures if any more fevers  -Please obtain HIV  -Please obtain hepatitis A ab, B surface ag, B core ab, C ab  -We will continue to follow blood cultures, sputum cx    ID will continue to follow. If any questions regarding this patient please israel Valladares  Infectious Diseases  Team A         [1] No family history on file.  [2]   No medications prior to admission.   [3] aspirin, 81 mg, oral, Daily  hydrocortisone " sodium succinate, 50 mg, intravenous, q8h  insulin lispro, 0-5 Units, subcutaneous, q4h  lactated Ringer's, 500 mL, intravenous, Once  oxygen, , inhalation, Continuous - Inhalation  pantoprazole, 40 mg, intravenous, Daily  piperacillin-tazobactam, 3.375 g, intravenous, q6h  polyethylene glycol, 17 g, oral, Daily  vancomycin, 2,000 mg, intravenous, Once    [4] fentaNYL,  mcg/hr, Last Rate: 100 mcg/hr (05/21/25 1000)  heparin, 0-4,000 Units/hr, Last Rate: 700 Units/hr (05/21/25 1000)  heparin Impella Purge 25 units/mL in 500 mL D5W, 10 mL/hr, Last Rate: 10 mL/hr (05/21/25 1135)  midazolam, 0.5-20 mg/hr, Last Rate: 3 mg/hr (05/21/25 1000)  norepinephrine, 0.01-1 mcg/kg/min, Last Rate: 0.04 mcg/kg/min (05/21/25 1000)  phenylephrine, 0-2 mcg/kg/min, Last Rate: Stopped (05/20/25 2128)  vasopressin, 0.03 Units/min, Last Rate: 0.03 Units/min (05/21/25 1000)    [5] PRN medications: dextrose, dextrose, glucagon, glucagon, vancomycin

## 2025-05-21 NOTE — CARE PLAN
The patient's goals for the shift include  TAYO    The clinical goals for the shift include HDS.    Over the shift, the patient patient remained restrained for protection of vital medical equipment.

## 2025-05-22 ENCOUNTER — APPOINTMENT (OUTPATIENT)
Dept: CARDIOLOGY | Facility: HOSPITAL | Age: 45
End: 2025-05-22
Payer: COMMERCIAL

## 2025-05-22 LAB
ACT BLD: 166 SEC (ref 83–199)
ACT BLD: 168 SEC (ref 83–199)
ACT BLD: 168 SEC (ref 83–199)
ALBUMIN SERPL BCP-MCNC: 2.9 G/DL (ref 3.4–5)
ALBUMIN SERPL BCP-MCNC: 2.9 G/DL (ref 3.4–5)
ALP SERPL-CCNC: 68 U/L (ref 33–120)
ALP SERPL-CCNC: 70 U/L (ref 33–120)
ALT SERPL W P-5'-P-CCNC: 1127 U/L (ref 10–52)
ALT SERPL W P-5'-P-CCNC: 1326 U/L (ref 10–52)
ANION GAP BLDA CALCULATED.4IONS-SCNC: 18 MMO/L (ref 10–25)
ANION GAP BLDA CALCULATED.4IONS-SCNC: 18 MMO/L (ref 10–25)
ANION GAP BLDA CALCULATED.4IONS-SCNC: 19 MMO/L (ref 10–25)
ANION GAP BLDA CALCULATED.4IONS-SCNC: 21 MMO/L (ref 10–25)
ANION GAP BLDA CALCULATED.4IONS-SCNC: 22 MMO/L (ref 10–25)
ANION GAP BLDMV CALCULATED.4IONS-SCNC: 16 MMO/L (ref 10–25)
ANION GAP BLDMV CALCULATED.4IONS-SCNC: 19 MMO/L (ref 10–25)
ANION GAP BLDMV CALCULATED.4IONS-SCNC: 20 MMO/L (ref 10–25)
ANION GAP BLDV CALCULATED.4IONS-SCNC: 17 MMOL/L (ref 10–25)
ANION GAP BLDV CALCULATED.4IONS-SCNC: 18 MMOL/L (ref 10–25)
ANION GAP BLDV CALCULATED.4IONS-SCNC: 18 MMOL/L (ref 10–25)
ANION GAP BLDV CALCULATED.4IONS-SCNC: 19 MMOL/L (ref 10–25)
ANION GAP BLDV CALCULATED.4IONS-SCNC: 20 MMOL/L (ref 10–25)
ANION GAP BLDV CALCULATED.4IONS-SCNC: 20 MMOL/L (ref 10–25)
ANION GAP SERPL CALC-SCNC: 21 MMOL/L (ref 10–20)
ANION GAP SERPL CALC-SCNC: 25 MMOL/L (ref 10–20)
AST SERPL W P-5'-P-CCNC: 627 U/L (ref 9–39)
AST SERPL W P-5'-P-CCNC: 891 U/L (ref 9–39)
BACTERIA SPEC RESP CULT: ABNORMAL
BASE EXCESS BLDA CALC-SCNC: -4.8 MMOL/L (ref -2–3)
BASE EXCESS BLDA CALC-SCNC: -6.3 MMOL/L (ref -2–3)
BASE EXCESS BLDA CALC-SCNC: -7.5 MMOL/L (ref -2–3)
BASE EXCESS BLDA CALC-SCNC: -7.5 MMOL/L (ref -2–3)
BASE EXCESS BLDA CALC-SCNC: -7.6 MMOL/L (ref -2–3)
BASE EXCESS BLDMV CALC-SCNC: -3.9 MMOL/L (ref -2–3)
BASE EXCESS BLDMV CALC-SCNC: -7.2 MMOL/L (ref -2–3)
BASE EXCESS BLDMV CALC-SCNC: -7.4 MMOL/L (ref -2–3)
BASE EXCESS BLDMV CALC-SCNC: -7.6 MMOL/L (ref -2–3)
BASE EXCESS BLDMV CALC-SCNC: -7.6 MMOL/L (ref -2–3)
BASE EXCESS BLDMV CALC-SCNC: -7.7 MMOL/L (ref -2–3)
BASE EXCESS BLDV CALC-SCNC: -3.9 MMOL/L (ref -2–3)
BASE EXCESS BLDV CALC-SCNC: -5.4 MMOL/L (ref -2–3)
BASE EXCESS BLDV CALC-SCNC: -6 MMOL/L (ref -2–3)
BASE EXCESS BLDV CALC-SCNC: -6.2 MMOL/L (ref -2–3)
BASE EXCESS BLDV CALC-SCNC: -6.9 MMOL/L (ref -2–3)
BASE EXCESS BLDV CALC-SCNC: -7 MMOL/L (ref -2–3)
BASOPHILS # BLD AUTO: 0.02 X10*3/UL (ref 0–0.1)
BASOPHILS # BLD AUTO: 0.04 X10*3/UL (ref 0–0.1)
BASOPHILS NFR BLD AUTO: 0.1 %
BASOPHILS NFR BLD AUTO: 0.2 %
BILIRUB DIRECT SERPL-MCNC: 2.8 MG/DL (ref 0–0.3)
BILIRUB SERPL-MCNC: 4.3 MG/DL (ref 0–1.2)
BILIRUB SERPL-MCNC: 4.3 MG/DL (ref 0–1.2)
BODY TEMPERATURE: 37 DEGREES CELSIUS
BUN SERPL-MCNC: 118 MG/DL (ref 6–23)
BUN SERPL-MCNC: 125 MG/DL (ref 6–23)
CA-I BLDA-SCNC: 0.9 MMOL/L (ref 1.1–1.33)
CA-I BLDA-SCNC: 0.92 MMOL/L (ref 1.1–1.33)
CA-I BLDA-SCNC: 0.93 MMOL/L (ref 1.1–1.33)
CA-I BLDMV-SCNC: 0.87 MMOL/L (ref 1.1–1.33)
CA-I BLDMV-SCNC: 0.88 MMOL/L (ref 1.1–1.33)
CA-I BLDMV-SCNC: 0.89 MMOL/L (ref 1.1–1.33)
CA-I BLDMV-SCNC: 0.89 MMOL/L (ref 1.1–1.33)
CA-I BLDMV-SCNC: 0.9 MMOL/L (ref 1.1–1.33)
CA-I BLDMV-SCNC: 0.9 MMOL/L (ref 1.1–1.33)
CA-I BLDV-SCNC: 0.89 MMOL/L (ref 1.1–1.33)
CA-I BLDV-SCNC: 0.9 MMOL/L (ref 1.1–1.33)
CA-I BLDV-SCNC: 0.91 MMOL/L (ref 1.1–1.33)
CA-I BLDV-SCNC: 0.92 MMOL/L (ref 1.1–1.33)
CALCIUM SERPL-MCNC: 7 MG/DL (ref 8.6–10.6)
CALCIUM SERPL-MCNC: 7.1 MG/DL (ref 8.6–10.6)
CHLORIDE BLD-SCNC: 100 MMOL/L (ref 98–107)
CHLORIDE BLD-SCNC: 100 MMOL/L (ref 98–107)
CHLORIDE BLD-SCNC: 98 MMOL/L (ref 98–107)
CHLORIDE BLD-SCNC: 99 MMOL/L (ref 98–107)
CHLORIDE BLDA-SCNC: 100 MMOL/L (ref 98–107)
CHLORIDE BLDA-SCNC: 95 MMOL/L (ref 98–107)
CHLORIDE BLDA-SCNC: 97 MMOL/L (ref 98–107)
CHLORIDE BLDA-SCNC: 98 MMOL/L (ref 98–107)
CHLORIDE BLDA-SCNC: 99 MMOL/L (ref 98–107)
CHLORIDE BLDV-SCNC: 97 MMOL/L (ref 98–107)
CHLORIDE BLDV-SCNC: 97 MMOL/L (ref 98–107)
CHLORIDE BLDV-SCNC: 98 MMOL/L (ref 98–107)
CHLORIDE BLDV-SCNC: 99 MMOL/L (ref 98–107)
CHLORIDE SERPL-SCNC: 96 MMOL/L (ref 98–107)
CHLORIDE SERPL-SCNC: 97 MMOL/L (ref 98–107)
CO2 SERPL-SCNC: 22 MMOL/L (ref 21–32)
CO2 SERPL-SCNC: 23 MMOL/L (ref 21–32)
CREAT SERPL-MCNC: 10.3 MG/DL (ref 0.5–1.3)
CREAT SERPL-MCNC: 8.35 MG/DL (ref 0.5–1.3)
EGFRCR SERPLBLD CKD-EPI 2021: 6 ML/MIN/1.73M*2
EGFRCR SERPLBLD CKD-EPI 2021: 7 ML/MIN/1.73M*2
EOSINOPHIL # BLD AUTO: 0.01 X10*3/UL (ref 0–0.7)
EOSINOPHIL # BLD AUTO: 0.03 X10*3/UL (ref 0–0.7)
EOSINOPHIL NFR BLD AUTO: 0 %
EOSINOPHIL NFR BLD AUTO: 0.2 %
ERYTHROCYTE [DISTWIDTH] IN BLOOD BY AUTOMATED COUNT: 12.9 % (ref 11.5–14.5)
ERYTHROCYTE [DISTWIDTH] IN BLOOD BY AUTOMATED COUNT: 13 % (ref 11.5–14.5)
ERYTHROCYTE [DISTWIDTH] IN BLOOD BY AUTOMATED COUNT: 13.1 % (ref 11.5–14.5)
GLUCOSE BLD MANUAL STRIP-MCNC: 145 MG/DL (ref 74–99)
GLUCOSE BLD MANUAL STRIP-MCNC: 151 MG/DL (ref 74–99)
GLUCOSE BLD MANUAL STRIP-MCNC: 163 MG/DL (ref 74–99)
GLUCOSE BLD MANUAL STRIP-MCNC: 169 MG/DL (ref 74–99)
GLUCOSE BLD MANUAL STRIP-MCNC: 186 MG/DL (ref 74–99)
GLUCOSE BLD MANUAL STRIP-MCNC: 200 MG/DL (ref 74–99)
GLUCOSE BLD MANUAL STRIP-MCNC: 204 MG/DL (ref 74–99)
GLUCOSE BLD-MCNC: 142 MG/DL (ref 74–99)
GLUCOSE BLD-MCNC: 157 MG/DL (ref 74–99)
GLUCOSE BLD-MCNC: 177 MG/DL (ref 74–99)
GLUCOSE BLD-MCNC: 193 MG/DL (ref 74–99)
GLUCOSE BLD-MCNC: 193 MG/DL (ref 74–99)
GLUCOSE BLD-MCNC: 196 MG/DL (ref 74–99)
GLUCOSE BLDA-MCNC: 144 MG/DL (ref 74–99)
GLUCOSE BLDA-MCNC: 168 MG/DL (ref 74–99)
GLUCOSE BLDA-MCNC: 175 MG/DL (ref 74–99)
GLUCOSE BLDA-MCNC: 186 MG/DL (ref 74–99)
GLUCOSE BLDA-MCNC: 199 MG/DL (ref 74–99)
GLUCOSE BLDV-MCNC: 144 MG/DL (ref 74–99)
GLUCOSE BLDV-MCNC: 159 MG/DL (ref 74–99)
GLUCOSE BLDV-MCNC: 183 MG/DL (ref 74–99)
GLUCOSE BLDV-MCNC: 192 MG/DL (ref 74–99)
GLUCOSE BLDV-MCNC: 197 MG/DL (ref 74–99)
GLUCOSE BLDV-MCNC: 201 MG/DL (ref 74–99)
GLUCOSE SERPL-MCNC: 167 MG/DL (ref 74–99)
GLUCOSE SERPL-MCNC: 190 MG/DL (ref 74–99)
GRAM STN SPEC: ABNORMAL
GRAM STN SPEC: ABNORMAL
HAPTOGLOB SERPL NEPH-MCNC: <30 MG/DL (ref 30–200)
HAV AB SER QL IA: NONREACTIVE
HCO3 BLDA-SCNC: 18.1 MMOL/L (ref 22–26)
HCO3 BLDA-SCNC: 18.1 MMOL/L (ref 22–26)
HCO3 BLDA-SCNC: 18.2 MMOL/L (ref 22–26)
HCO3 BLDA-SCNC: 19 MMOL/L (ref 22–26)
HCO3 BLDA-SCNC: 19.5 MMOL/L (ref 22–26)
HCO3 BLDMV-SCNC: 18 MMOL/L (ref 22–26)
HCO3 BLDMV-SCNC: 18.2 MMOL/L (ref 22–26)
HCO3 BLDMV-SCNC: 18.2 MMOL/L (ref 22–26)
HCO3 BLDMV-SCNC: 18.3 MMOL/L (ref 22–26)
HCO3 BLDMV-SCNC: 18.8 MMOL/L (ref 22–26)
HCO3 BLDMV-SCNC: 20.7 MMOL/L (ref 22–26)
HCO3 BLDV-SCNC: 19.7 MMOL/L (ref 22–26)
HCO3 BLDV-SCNC: 19.7 MMOL/L (ref 22–26)
HCO3 BLDV-SCNC: 20.2 MMOL/L (ref 22–26)
HCO3 BLDV-SCNC: 20.2 MMOL/L (ref 22–26)
HCO3 BLDV-SCNC: 20.7 MMOL/L (ref 22–26)
HCO3 BLDV-SCNC: 21.6 MMOL/L (ref 22–26)
HCT VFR BLD AUTO: 26.2 % (ref 41–52)
HCT VFR BLD AUTO: 27.9 % (ref 41–52)
HCT VFR BLD AUTO: 28.8 % (ref 41–52)
HCT VFR BLD EST: 28 % (ref 41–52)
HCT VFR BLD EST: 28 % (ref 41–52)
HCT VFR BLD EST: 29 % (ref 41–52)
HCT VFR BLD EST: 30 % (ref 41–52)
HCT VFR BLD EST: 31 % (ref 41–52)
HCT VFR BLD EST: 36 % (ref 41–52)
HCT VFR BLD EST: 38 % (ref 41–52)
HGB BLD-MCNC: 9.1 G/DL (ref 13.5–17.5)
HGB BLD-MCNC: 9.5 G/DL (ref 13.5–17.5)
HGB BLD-MCNC: 9.5 G/DL (ref 13.5–17.5)
HGB BLDA-MCNC: 10.1 G/DL (ref 13.5–17.5)
HGB BLDA-MCNC: 11.9 G/DL (ref 13.5–17.5)
HGB BLDA-MCNC: 12.8 G/DL (ref 13.5–17.5)
HGB BLDA-MCNC: 9.6 G/DL (ref 13.5–17.5)
HGB BLDA-MCNC: 9.7 G/DL (ref 13.5–17.5)
HGB BLDMV-MCNC: 10 G/DL (ref 13.5–17.5)
HGB BLDMV-MCNC: 10 G/DL (ref 13.5–17.5)
HGB BLDMV-MCNC: 10.1 G/DL (ref 13.5–17.5)
HGB BLDMV-MCNC: 9.3 G/DL (ref 13.5–17.5)
HGB BLDMV-MCNC: 9.5 G/DL (ref 13.5–17.5)
HGB BLDMV-MCNC: 9.7 G/DL (ref 13.5–17.5)
HGB BLDV-MCNC: 10 G/DL (ref 13.5–17.5)
HGB BLDV-MCNC: 10.2 G/DL (ref 13.5–17.5)
HGB BLDV-MCNC: 9.4 G/DL (ref 13.5–17.5)
HGB BLDV-MCNC: 9.5 G/DL (ref 13.5–17.5)
HGB BLDV-MCNC: 9.7 G/DL (ref 13.5–17.5)
HGB BLDV-MCNC: 9.9 G/DL (ref 13.5–17.5)
IMM GRANULOCYTES # BLD AUTO: 0.23 X10*3/UL (ref 0–0.7)
IMM GRANULOCYTES # BLD AUTO: 0.29 X10*3/UL (ref 0–0.7)
IMM GRANULOCYTES NFR BLD AUTO: 1.3 % (ref 0–0.9)
IMM GRANULOCYTES NFR BLD AUTO: 1.4 % (ref 0–0.9)
INHALED O2 CONCENTRATION: 30 %
INHALED O2 CONCENTRATION: 40 %
LACTATE BLDA-SCNC: 1.2 MMOL/L (ref 0.4–2)
LACTATE BLDA-SCNC: 1.3 MMOL/L (ref 0.4–2)
LACTATE BLDA-SCNC: 1.5 MMOL/L (ref 0.4–2)
LACTATE BLDMV-SCNC: 1.2 MMOL/L (ref 0.4–2)
LACTATE BLDMV-SCNC: 1.7 MMOL/L (ref 0.4–2)
LACTATE BLDV-SCNC: 1.1 MMOL/L (ref 0.4–2)
LACTATE BLDV-SCNC: 1.2 MMOL/L (ref 0.4–2)
LACTATE BLDV-SCNC: 1.7 MMOL/L (ref 0.4–2)
LDH SERPL L TO P-CCNC: 3160 U/L (ref 84–246)
LYMPHOCYTES # BLD AUTO: 1.75 X10*3/UL (ref 1.2–4.8)
LYMPHOCYTES # BLD AUTO: 1.76 X10*3/UL (ref 1.2–4.8)
LYMPHOCYTES NFR BLD AUTO: 10 %
LYMPHOCYTES NFR BLD AUTO: 8.2 %
MAGNESIUM SERPL-MCNC: 2.31 MG/DL (ref 1.6–2.4)
MAGNESIUM SERPL-MCNC: 2.61 MG/DL (ref 1.6–2.4)
MCH RBC QN AUTO: 29.9 PG (ref 26–34)
MCH RBC QN AUTO: 29.9 PG (ref 26–34)
MCH RBC QN AUTO: 30.3 PG (ref 26–34)
MCHC RBC AUTO-ENTMCNC: 33 G/DL (ref 32–36)
MCHC RBC AUTO-ENTMCNC: 34.1 G/DL (ref 32–36)
MCHC RBC AUTO-ENTMCNC: 34.7 G/DL (ref 32–36)
MCV RBC AUTO: 86 FL (ref 80–100)
MCV RBC AUTO: 89 FL (ref 80–100)
MCV RBC AUTO: 91 FL (ref 80–100)
MONOCYTES # BLD AUTO: 1.33 X10*3/UL (ref 0.1–1)
MONOCYTES # BLD AUTO: 1.47 X10*3/UL (ref 0.1–1)
MONOCYTES NFR BLD AUTO: 6.9 %
MONOCYTES NFR BLD AUTO: 7.6 %
NEUTROPHILS # BLD AUTO: 14.22 X10*3/UL (ref 1.2–7.7)
NEUTROPHILS # BLD AUTO: 17.73 X10*3/UL (ref 1.2–7.7)
NEUTROPHILS NFR BLD AUTO: 80.8 %
NEUTROPHILS NFR BLD AUTO: 83.3 %
NRBC BLD-RTO: 0.6 /100 WBCS (ref 0–0)
NRBC BLD-RTO: 0.6 /100 WBCS (ref 0–0)
NRBC BLD-RTO: 1 /100 WBCS (ref 0–0)
OXYHGB MFR BLDA: 95.8 % (ref 94–98)
OXYHGB MFR BLDA: 95.9 % (ref 94–98)
OXYHGB MFR BLDA: 96.2 % (ref 94–98)
OXYHGB MFR BLDA: 96.9 % (ref 94–98)
OXYHGB MFR BLDA: 97 % (ref 94–98)
OXYHGB MFR BLDMV: 88.2 % (ref 45–75)
OXYHGB MFR BLDMV: 88.5 % (ref 45–75)
OXYHGB MFR BLDMV: 88.6 % (ref 45–75)
OXYHGB MFR BLDMV: 89.7 % (ref 45–75)
OXYHGB MFR BLDMV: 89.8 % (ref 45–75)
OXYHGB MFR BLDMV: 90.2 % (ref 45–75)
OXYHGB MFR BLDV: 58.4 % (ref 45–75)
OXYHGB MFR BLDV: 59.2 % (ref 45–75)
OXYHGB MFR BLDV: 59.6 % (ref 45–75)
OXYHGB MFR BLDV: 61 % (ref 45–75)
OXYHGB MFR BLDV: 61.4 % (ref 45–75)
OXYHGB MFR BLDV: 61.5 % (ref 45–75)
PCO2 BLDA: 33 MM HG (ref 38–42)
PCO2 BLDA: 36 MM HG (ref 38–42)
PCO2 BLDA: 37 MM HG (ref 38–42)
PCO2 BLDMV: 35 MM HG (ref 41–51)
PCO2 BLDMV: 35 MM HG (ref 41–51)
PCO2 BLDMV: 37 MM HG (ref 41–51)
PCO2 BLDMV: 37 MM HG (ref 41–51)
PCO2 BLDMV: 38 MM HG (ref 41–51)
PCO2 BLDMV: 39 MM HG (ref 41–51)
PCO2 BLDV: 40 MM HG (ref 41–51)
PCO2 BLDV: 42 MM HG (ref 41–51)
PCO2 BLDV: 42 MM HG (ref 41–51)
PCO2 BLDV: 43 MM HG (ref 41–51)
PCO2 BLDV: 43 MM HG (ref 41–51)
PCO2 BLDV: 44 MM HG (ref 41–51)
PH BLDA: 7.3 PH (ref 7.38–7.42)
PH BLDA: 7.31 PH (ref 7.38–7.42)
PH BLDA: 7.31 PH (ref 7.38–7.42)
PH BLDA: 7.33 PH (ref 7.38–7.42)
PH BLDA: 7.38 PH (ref 7.38–7.42)
PH BLDMV: 7.29 PH (ref 7.33–7.43)
PH BLDMV: 7.29 PH (ref 7.33–7.43)
PH BLDMV: 7.3 PH (ref 7.33–7.43)
PH BLDMV: 7.3 PH (ref 7.33–7.43)
PH BLDMV: 7.32 PH (ref 7.33–7.43)
PH BLDMV: 7.38 PH (ref 7.33–7.43)
PH BLDV: 7.26 PH (ref 7.33–7.43)
PH BLDV: 7.27 PH (ref 7.33–7.43)
PH BLDV: 7.28 PH (ref 7.33–7.43)
PH BLDV: 7.29 PH (ref 7.33–7.43)
PH BLDV: 7.3 PH (ref 7.33–7.43)
PH BLDV: 7.34 PH (ref 7.33–7.43)
PHOSPHATE SERPL-MCNC: 8.4 MG/DL (ref 2.5–4.9)
PLATELET # BLD AUTO: 108 X10*3/UL (ref 150–450)
PLATELET # BLD AUTO: 120 X10*3/UL (ref 150–450)
PLATELET # BLD AUTO: 123 X10*3/UL (ref 150–450)
PO2 BLDA: 105 MM HG (ref 85–95)
PO2 BLDA: 122 MM HG (ref 85–95)
PO2 BLDA: 124 MM HG (ref 85–95)
PO2 BLDA: 98 MM HG (ref 85–95)
PO2 BLDA: 99 MM HG (ref 85–95)
PO2 BLDMV: 64 MM HG (ref 35–45)
PO2 BLDMV: 67 MM HG (ref 35–45)
PO2 BLDMV: 68 MM HG (ref 35–45)
PO2 BLDMV: 69 MM HG (ref 35–45)
PO2 BLDMV: 70 MM HG (ref 35–45)
PO2 BLDMV: 71 MM HG (ref 35–45)
PO2 BLDV: 44 MM HG (ref 35–45)
PO2 BLDV: 46 MM HG (ref 35–45)
PO2 BLDV: 47 MM HG (ref 35–45)
POTASSIUM BLDA-SCNC: 4.4 MMOL/L (ref 3.5–5.3)
POTASSIUM BLDA-SCNC: 4.4 MMOL/L (ref 3.5–5.3)
POTASSIUM BLDA-SCNC: 4.5 MMOL/L (ref 3.5–5.3)
POTASSIUM BLDA-SCNC: 4.6 MMOL/L (ref 3.5–5.3)
POTASSIUM BLDA-SCNC: 4.7 MMOL/L (ref 3.5–5.3)
POTASSIUM BLDMV-SCNC: 4.2 MMOL/L (ref 3.5–5.3)
POTASSIUM BLDMV-SCNC: 4.3 MMOL/L (ref 3.5–5.3)
POTASSIUM BLDMV-SCNC: 4.4 MMOL/L (ref 3.5–5.3)
POTASSIUM BLDMV-SCNC: 4.5 MMOL/L (ref 3.5–5.3)
POTASSIUM BLDMV-SCNC: 4.7 MMOL/L (ref 3.5–5.3)
POTASSIUM BLDMV-SCNC: 4.7 MMOL/L (ref 3.5–5.3)
POTASSIUM BLDV-SCNC: 4.3 MMOL/L (ref 3.5–5.3)
POTASSIUM BLDV-SCNC: 4.4 MMOL/L (ref 3.5–5.3)
POTASSIUM BLDV-SCNC: 4.5 MMOL/L (ref 3.5–5.3)
POTASSIUM BLDV-SCNC: 4.7 MMOL/L (ref 3.5–5.3)
POTASSIUM BLDV-SCNC: 4.7 MMOL/L (ref 3.5–5.3)
POTASSIUM BLDV-SCNC: 4.9 MMOL/L (ref 3.5–5.3)
POTASSIUM SERPL-SCNC: 4.3 MMOL/L (ref 3.5–5.3)
POTASSIUM SERPL-SCNC: 4.8 MMOL/L (ref 3.5–5.3)
PROT SERPL-MCNC: 4.8 G/DL (ref 6.4–8.2)
PROT SERPL-MCNC: 5.1 G/DL (ref 6.4–8.2)
RBC # BLD AUTO: 3.04 X10*6/UL (ref 4.5–5.9)
RBC # BLD AUTO: 3.14 X10*6/UL (ref 4.5–5.9)
RBC # BLD AUTO: 3.18 X10*6/UL (ref 4.5–5.9)
SAO2 % BLDA: 100 % (ref 94–100)
SAO2 % BLDA: 100 % (ref 94–100)
SAO2 % BLDA: 99 % (ref 94–100)
SAO2 % BLDMV: 91 % (ref 45–75)
SAO2 % BLDMV: 93 % (ref 45–75)
SAO2 % BLDV: 60 % (ref 45–75)
SAO2 % BLDV: 61 % (ref 45–75)
SAO2 % BLDV: 61 % (ref 45–75)
SAO2 % BLDV: 63 % (ref 45–75)
SODIUM BLDA-SCNC: 130 MMOL/L (ref 136–145)
SODIUM BLDA-SCNC: 131 MMOL/L (ref 136–145)
SODIUM BLDA-SCNC: 132 MMOL/L (ref 136–145)
SODIUM BLDMV-SCNC: 131 MMOL/L (ref 136–145)
SODIUM BLDMV-SCNC: 132 MMOL/L (ref 136–145)
SODIUM BLDMV-SCNC: 133 MMOL/L (ref 136–145)
SODIUM BLDV-SCNC: 132 MMOL/L (ref 136–145)
SODIUM BLDV-SCNC: 133 MMOL/L (ref 136–145)
SODIUM BLDV-SCNC: 133 MMOL/L (ref 136–145)
SODIUM SERPL-SCNC: 137 MMOL/L (ref 136–145)
SODIUM SERPL-SCNC: 138 MMOL/L (ref 136–145)
VANCOMYCIN SERPL-MCNC: 28.5 UG/ML (ref 5–20)
WBC # BLD AUTO: 17.6 X10*3/UL (ref 4.4–11.3)
WBC # BLD AUTO: 21.3 X10*3/UL (ref 4.4–11.3)
WBC # BLD AUTO: 22.5 X10*3/UL (ref 4.4–11.3)

## 2025-05-22 PROCEDURE — 83735 ASSAY OF MAGNESIUM: CPT

## 2025-05-22 PROCEDURE — 84132 ASSAY OF SERUM POTASSIUM: CPT

## 2025-05-22 PROCEDURE — 2020000001 HC ICU ROOM DAILY

## 2025-05-22 PROCEDURE — 80076 HEPATIC FUNCTION PANEL: CPT

## 2025-05-22 PROCEDURE — 74018 RADEX ABDOMEN 1 VIEW: CPT | Performed by: RADIOLOGY

## 2025-05-22 PROCEDURE — 94003 VENT MGMT INPAT SUBQ DAY: CPT

## 2025-05-22 PROCEDURE — 99232 SBSQ HOSP IP/OBS MODERATE 35: CPT | Performed by: STUDENT IN AN ORGANIZED HEALTH CARE EDUCATION/TRAINING PROGRAM

## 2025-05-22 PROCEDURE — 84100 ASSAY OF PHOSPHORUS: CPT

## 2025-05-22 PROCEDURE — 99222 1ST HOSP IP/OBS MODERATE 55: CPT | Performed by: STUDENT IN AN ORGANIZED HEALTH CARE EDUCATION/TRAINING PROGRAM

## 2025-05-22 PROCEDURE — 82248 BILIRUBIN DIRECT: CPT

## 2025-05-22 PROCEDURE — 85027 COMPLETE CBC AUTOMATED: CPT

## 2025-05-22 PROCEDURE — 2500000004 HC RX 250 GENERAL PHARMACY W/ HCPCS (ALT 636 FOR OP/ED)

## 2025-05-22 PROCEDURE — 83615 LACTATE (LD) (LDH) ENZYME: CPT

## 2025-05-22 PROCEDURE — 99291 CRITICAL CARE FIRST HOUR: CPT

## 2025-05-22 PROCEDURE — 2500000004 HC RX 250 GENERAL PHARMACY W/ HCPCS (ALT 636 FOR OP/ED): Mod: TB

## 2025-05-22 PROCEDURE — 83010 ASSAY OF HAPTOGLOBIN QUANT: CPT

## 2025-05-22 PROCEDURE — 71045 X-RAY EXAM CHEST 1 VIEW: CPT | Performed by: RADIOLOGY

## 2025-05-22 PROCEDURE — 84295 ASSAY OF SERUM SODIUM: CPT

## 2025-05-22 PROCEDURE — 2500000004 HC RX 250 GENERAL PHARMACY W/ HCPCS (ALT 636 FOR OP/ED): Mod: JZ

## 2025-05-22 PROCEDURE — 80202 ASSAY OF VANCOMYCIN: CPT

## 2025-05-22 PROCEDURE — 85347 COAGULATION TIME ACTIVATED: CPT

## 2025-05-22 PROCEDURE — 82947 ASSAY GLUCOSE BLOOD QUANT: CPT

## 2025-05-22 PROCEDURE — 85025 COMPLETE CBC W/AUTO DIFF WBC: CPT

## 2025-05-22 PROCEDURE — 93308 TTE F-UP OR LMTD: CPT

## 2025-05-22 PROCEDURE — 2500000002 HC RX 250 W HCPCS SELF ADMINISTERED DRUGS (ALT 637 FOR MEDICARE OP, ALT 636 FOR OP/ED)

## 2025-05-22 PROCEDURE — 2500000005 HC RX 250 GENERAL PHARMACY W/O HCPCS

## 2025-05-22 PROCEDURE — 37799 UNLISTED PX VASCULAR SURGERY: CPT

## 2025-05-22 PROCEDURE — 93308 TTE F-UP OR LMTD: CPT | Performed by: SPECIALIST

## 2025-05-22 PROCEDURE — 2500000001 HC RX 250 WO HCPCS SELF ADMINISTERED DRUGS (ALT 637 FOR MEDICARE OP)

## 2025-05-22 RX ORDER — CHLOROTHIAZIDE SODIUM 500 MG/1
1000 INJECTION INTRAVENOUS ONCE
Status: COMPLETED | OUTPATIENT
Start: 2025-05-22 | End: 2025-05-22

## 2025-05-22 RX ORDER — BUMETANIDE 0.25 MG/ML
8 INJECTION, SOLUTION INTRAMUSCULAR; INTRAVENOUS ONCE
Status: COMPLETED | OUTPATIENT
Start: 2025-05-22 | End: 2025-05-22

## 2025-05-22 RX ORDER — SODIUM BICARBONATE 650 MG/1
650 TABLET ORAL ONCE
Status: COMPLETED | OUTPATIENT
Start: 2025-05-22 | End: 2025-05-22

## 2025-05-22 RX ADMIN — MIDAZOLAM IN SODIUM CHLORIDE 2 MG/HR: 1 INJECTION INTRAVENOUS at 15:55

## 2025-05-22 RX ADMIN — BUMETANIDE 8 MG: 0.25 INJECTION INTRAMUSCULAR; INTRAVENOUS at 22:47

## 2025-05-22 RX ADMIN — PANTOPRAZOLE SODIUM 40 MG: 40 INJECTION, POWDER, FOR SOLUTION INTRAVENOUS at 21:00

## 2025-05-22 RX ADMIN — PIPERACILLIN SODIUM AND TAZOBACTAM SODIUM 2.25 G: 2; .25 INJECTION, SOLUTION INTRAVENOUS at 14:51

## 2025-05-22 RX ADMIN — INSULIN LISPRO 1 UNITS: 100 INJECTION, SOLUTION INTRAVENOUS; SUBCUTANEOUS at 04:48

## 2025-05-22 RX ADMIN — CHLOROTHIAZIDE SODIUM 1000 MG: 500 INJECTION, POWDER, LYOPHILIZED, FOR SOLUTION INTRAVENOUS at 22:07

## 2025-05-22 RX ADMIN — Medication 75 MCG/HR: at 15:56

## 2025-05-22 RX ADMIN — POLYETHYLENE GLYCOL 3350 17 G: 17 POWDER, FOR SOLUTION ORAL at 11:05

## 2025-05-22 RX ADMIN — Medication 30 PERCENT: at 20:53

## 2025-05-22 RX ADMIN — SODIUM BICARBONATE 650 MG: 650 TABLET ORAL at 12:46

## 2025-05-22 RX ADMIN — BUMETANIDE 8 MG: 0.25 INJECTION INTRAMUSCULAR; INTRAVENOUS at 11:10

## 2025-05-22 RX ADMIN — PIPERACILLIN SODIUM AND TAZOBACTAM SODIUM 3.38 G: 3; .375 INJECTION, SOLUTION INTRAVENOUS at 01:26

## 2025-05-22 RX ADMIN — Medication 40 PERCENT: at 08:00

## 2025-05-22 RX ADMIN — Medication 30 PERCENT: at 15:56

## 2025-05-22 RX ADMIN — CHLOROTHIAZIDE SODIUM 1000 MG: 500 INJECTION, POWDER, LYOPHILIZED, FOR SOLUTION INTRAVENOUS at 12:07

## 2025-05-22 RX ADMIN — PIPERACILLIN SODIUM AND TAZOBACTAM SODIUM 3.38 G: 3; .375 INJECTION, SOLUTION INTRAVENOUS at 08:53

## 2025-05-22 RX ADMIN — Medication 100 MCG/HR: at 05:24

## 2025-05-22 RX ADMIN — INSULIN LISPRO 1 UNITS: 100 INJECTION, SOLUTION INTRAVENOUS; SUBCUTANEOUS at 09:15

## 2025-05-22 RX ADMIN — HEPARIN SODIUM 10 ML/HR: 10000 INJECTION, SOLUTION INTRAVENOUS; SUBCUTANEOUS at 12:01

## 2025-05-22 RX ADMIN — INSULIN LISPRO 1 UNITS: 100 INJECTION, SOLUTION INTRAVENOUS; SUBCUTANEOUS at 20:37

## 2025-05-22 RX ADMIN — BUMETANIDE 2 MG/HR: 0.25 INJECTION INTRAMUSCULAR; INTRAVENOUS at 12:50

## 2025-05-22 RX ADMIN — PIPERACILLIN SODIUM AND TAZOBACTAM SODIUM 2.25 G: 2; .25 INJECTION, SOLUTION INTRAVENOUS at 21:00

## 2025-05-22 RX ADMIN — INSULIN LISPRO 1 UNITS: 100 INJECTION, SOLUTION INTRAVENOUS; SUBCUTANEOUS at 17:46

## 2025-05-22 RX ADMIN — PANTOPRAZOLE SODIUM 40 MG: 40 INJECTION, POWDER, FOR SOLUTION INTRAVENOUS at 09:15

## 2025-05-22 RX ADMIN — ASPIRIN 81 MG: 81 TABLET, CHEWABLE ORAL at 11:04

## 2025-05-22 RX ADMIN — INSULIN LISPRO 2 UNITS: 100 INJECTION, SOLUTION INTRAVENOUS; SUBCUTANEOUS at 12:17

## 2025-05-22 ASSESSMENT — PAIN - FUNCTIONAL ASSESSMENT
PAIN_FUNCTIONAL_ASSESSMENT: CPOT (CRITICAL CARE PAIN OBSERVATION TOOL)

## 2025-05-22 ASSESSMENT — PAIN SCALES - GENERAL: PAINLEVEL_OUTOF10: 0 - NO PAIN

## 2025-05-22 NOTE — PROGRESS NOTES
Alpesh Elena is a 45 y.o. male on day 2 of admission presenting with STEMI (ST elevation myocardial infarction) (Multi).    Subjective   Interval History:   Spiking fever 38.4   Wbc 21 (23)(31)  Crea 8.35 (4.52_  Pm zosyn day 4 , vancomycin day 2     5/19 blood c/s NGTD  5/20 sputum c/s gm +Ve cocci  Legionella ,strep -ve  5/21 blood c/s P     HIV -ve  Hepatitis A, B, C -ve        Review of Systems   Unable to perform ROS: Intubated        -Continue vancomycin (pharm to dose)  -Continue zosyn 3.375 q 6 hours  -Please obtain blood cultures if any more fevers  -Please obtain HIV  -Please obtain hepatitis A ab, B surface ag, B core ab, C ab  -We will continue to follow blood cultures, sputum cx  Objective   Range of Vitals (last 24 hours)  Heart Rate:  [118-138]   Temp:  [36.8 °C (98.2 °F)-38.5 °C (101.3 °F)]   Resp:  [13-16]   Weight:  [86.9 kg (191 lb 9.3 oz)]   SpO2:  [94 %-99 %]   Daily Weight  05/22/25 : 86.9 kg (191 lb 9.3 oz)    Body mass index is 29.13 kg/m².    Physical Exam  Constitutional:       Comments: Intubated and sedated   Cardiovascular:      Heart sounds: Murmur heard.   Pulmonary:      Comments: MV sounds  Abdominal:      Palpations: Abdomen is soft.      Tenderness: There is no abdominal tenderness.           Antibiotics  piperacillin-tazobactam - 3.375 gram/50 mL  vancomycin    Relevant Results  Labs  Results from last 72 hours   Lab Units 05/22/25  0438 05/22/25  0022 05/21/25  1646 05/21/25  0245   WBC AUTO x10*3/uL 21.3* 22.5* 21.5* 23.8*   HEMOGLOBIN g/dL 9.5* 9.5* 10.0* 11.5*   HEMATOCRIT % 27.9* 28.8* 30.5* 34.0*   PLATELETS AUTO x10*3/uL 120* 123* 133* 170   NEUTROS PCT AUTO % 83.3  --  84.2 81.2   LYMPHS PCT AUTO % 8.2  --  8.2 10.0   MONOS PCT AUTO % 6.9  --  5.9 7.5   EOS PCT AUTO % 0.0  --  0.0 0.0     Results from last 72 hours   Lab Units 05/22/25  0438 05/21/25  1646 05/21/25  0245   SODIUM mmol/L 137 135* 137   POTASSIUM mmol/L 4.3 4.5 4.1   CHLORIDE mmol/L 97* 98 100   CO2  mmol/L 23 25 21   BUN mg/dL 118* 102* 77*   CREATININE mg/dL 8.35* 6.83* 4.52*   GLUCOSE mg/dL 167* 232* 221*   CALCIUM mg/dL 7.0* 7.3* 7.9*   ANION GAP mmol/L 21* 17 20   EGFR mL/min/1.73m*2 7* 9* 15*   PHOSPHORUS mg/dL 8.4* 8.0* 7.5*     Results from last 72 hours   Lab Units 05/22/25  0438 05/21/25  1646 05/21/25  0916 05/21/25  0245 05/20/25  1414   ALK PHOS U/L 70  --  75  --  77   BILIRUBIN TOTAL mg/dL 4.3*  --  5.0*  --  3.8*   BILIRUBIN DIRECT mg/dL 2.8*  --  2.8*  --  1.3*   PROTEIN TOTAL g/dL 4.8*  --  5.4*  --  5.4*   ALT U/L 1,326*  --  1,459*  --  1,258*   AST U/L 891*  --  1,888*  --  1,466*   ALBUMIN g/dL 2.9* 2.9* 3.2*   < > 3.4  3.4    < > = values in this interval not displayed.     Estimated Creatinine Clearance: 11.9 mL/min (A) (by C-G formula based on SCr of 8.35 mg/dL (H)).  C-Reactive Protein   Date Value Ref Range Status   07/07/2024 10.34 (H) <1.00 mg/dL Final     Microbiology  Susceptibility data from last 14 days.  Collected Specimen Info Organism   05/20/25 Fluid from Tracheal Aspirate Normal throat margaret     Imaging  CXR  Pulmonary edema is slightly improved in comparison to previous study.    zosyn day 4 , vancomycin day 2     5/19 blood c/s NGTD  5/20 sputum c/s gm +Ve cocci  Legionella ,strep -ve  5/21 blood c/s P     HIV -ve  Hepatitis A, B, C -ve        Assessment/Plan   Patient with initial presentation for chest pain ultimately dx with STEMI, s/p cath, impella, intubation, and c/b VSD rupture. A large component of patient's shock is likely cardiogenic in nature.     #Shock, likely cardiogenic vs septic  # STEMI, VSD  # fluid overload vs HAP   # persistent leukocytosis,thrombocytopenia and dropping hb  # deranged LFT , hepatitis -ve. Ischemic hepatitis    Fever and leukocytosis of unclear source. Would do additional respiratory viral panel. Check blood film for schistocyte to r/o TMA.     Recommendations:  - continue with vancomycin and zosyn pending c/s results              Jennifer Islas MD

## 2025-05-22 NOTE — PROGRESS NOTES
"[facility]  [facility]  CONSULT FOLLOW-UP     Reason For Consult  C/f GIB     History Of Present Illness  Alpesh Elena is a 45 y.o. male presenting with  a past medical history of tobacco use, transferred from [facility] on 5/20/2025 with cardiogenic shock.  The patient presented to [facility] on  5/18/2025 with CP and was diagnosed with a STEMI which was complicated by cardiogenic shock 2/2 VSD/inferior LV wall rupture. He was transferred to [facility] for higher level of care. He is now s/p Impella placement on 5/20/2025. His hospital course has additionally been complicated by ischemic hepatitis, AHRF and anuric BENY. GI is consulted for anemia and c/f GIB.     SUBJECTIVE     Patient remains intubated and sedated this AM   OG instilled with 120cc of water and then aspirated back black gastric contents   Hgb this AM 9.5 from 10.0 yesterday afternoon   NG has been off suction overnight     EXAM     Last Recorded Vitals  Blood pressure (!) 102/92, pulse (!) 122, temperature 36.8 °C (98.2 °F), temperature source Core, resp. rate 14, height 1.727 m (5' 8\"), weight 86.9 kg (191 lb 9.3 oz), SpO2 97%.      Intake/Output Summary (Last 24 hours) at 5/22/2025 0914  Last data filed at 5/22/2025 0600  Gross per 24 hour   Intake 1782.07 ml   Output 940 ml   Net 842.07 ml          Physical Exam  General: intubated & sedated   HEENT: scleral icterus   Respiratory: mechanical breath sounds  Cardiovascular: tachycardic   Abdomen: Soft  Neuro: sedated       OBJECTIVE                                                                              Medications           Current Medications[1]                                                                            Labs     CBC RFP   Lab Results   Component Value Date    WBC 21.3 (H) 05/22/2025    HGB 9.5 (L) 05/22/2025    HCT 27.9 (L) 05/22/2025    MCV 89 05/22/2025     (L) 05/22/2025    NEUTROABS 17.73 (H) 05/22/2025    Lab " Results   Component Value Date     05/22/2025    K 4.3 05/22/2025    CL 97 (L) 05/22/2025    CO2 23 05/22/2025     (HH) 05/22/2025    CREATININE 8.35 (H) 05/22/2025     Lab Results   Component Value Date    MG 2.31 05/22/2025    PHOS 8.4 (H) 05/22/2025    CALCIUM 7.0 (L) 05/22/2025    ALBUMIN 2.9 (L) 05/22/2025         Hepatic Function ABG/VBG   Lab Results   Component Value Date    ALT 1,326 (H) 05/22/2025     (H) 05/22/2025    ALKPHOS 70 05/22/2025     Lab Results   Component Value Date    BILIDIR 2.8 (H) 05/22/2025      Lab Results   Component Value Date    PROTIME 16.0 (H) 05/20/2025    APTT 28 05/20/2025    INR 1.4 (H) 05/20/2025    Lab Results   Component Value Date    LACTATE 2.7 (H) 05/20/2025                                                                               Imaging     === 05/18/25 ===    CT ANGIO CHEST ABDOMEN PELVIS    - Impression -  No thoracic or abdominal aortic aneurysm or acute aortic pathology.      === 05/20/25 ===    XR ABDOMEN 1 VIEW    - Impression -  1. The small bowel and colon pattern is predominantly fluid-filled  2. Abnormal renal function                                                                             GI Procedures     None       ASSESSMENT / PLAN     ASSESSMENT/PLAN:  Alpesh Elena is a 45 y.o. male with a past medical history of tobacco use, transferred from [facility] on 5/20/2025 with cardiogenic shock.  The patient presented to [facility] on  5/18/2025 with CP and was diagnosed with a STEMI which was complicated by cardiogenic shock 2/2 VSD/inferior LV wall rupture. He was transferred to [facility] for higher level of care. He is now s/p Impella placement on 5/20/2025. His hospital course has additionally been complicated by ischemic hepatitis, AHRF and anuric BENY.     GI is consulted for c/f GIB.     Of note, the patient is on a heparin gtt and ASA 81 mg.     The patient is critically ill w/ lab findings suggestive of hemolysis. After OG  suction for +24 hours stomach output turned bloody, this is most likely secondary to trauma, although other etiologies like stress ulcer, PUD, hemorrhagic gastritis etc. are on the differential. The patient hasn't had a BM yet and there was no increased vasopressor requirement since the bloody stomach output. At this time supportive care is recommended. If the patient develops s/s of active GIB please reach out to our team (information as below).    Recommendations:  - No plan for EGD at this time will continue to monitor Hgb and for active s/s of GIB  - Anticoagulation per primary team   - Continue to trend Hgb (goal > 7), platelets (goal > 50).  - Ensure adequate IV access, ideally 2 large bore IVs.  - Additional supportive care per primary team.  - Ok for NG/OG to gravity if needed advise against active wall suction  - PPI IV BID.    Patient was seen and discussed with Dr. Hubbard    Thank you for this interesting consult. Gastroenterology will continue to follow.  -During weekday hours of 7am-5pm please do not hesitate to contact me on [employer] Chat or page [phone] if there are any further questions between the weekday hours of 7 AM - 5 PM.   -After hours, on weekends, and on holidays, please page [place] [phone]. Thank you.        Janie Mera MD         [1]   Current Facility-Administered Medications:     alteplase (Cathflo Activase) injection 2 mg, 2 mg, intra-catheter, PRN, Kevin Leong,     aspirin chewable tablet 81 mg, 81 mg, oral, Daily, Ney Moreno MD, 81 mg at 05/21/25 0831    dextrose 50 % injection 12.5 g, 12.5 g, intravenous, q15 min PRN, Ney Moreno MD    dextrose 50 % injection 25 g, 25 g, intravenous, q15 min PRN, Ney Moreno MD    fentanyl (Sublimaze) 10 mcg/mL in sodium chloride 0.9% infusion,  mcg/hr, intravenous, Continuous, Ney Moreno MD, Last Rate: 10 mL/hr at 05/22/25 0600, 100 mcg/hr at 05/22/25 0600    glucagon (Glucagen) injection 1 mg, 1 mg,  intramuscular, q15 min PRN, Ney Moreno MD    glucagon (Glucagen) injection 1 mg, 1 mg, intramuscular, q15 min PRN, Ney Moreno MD    heparin 25,000 Units in dextrose 5% 250 mL (100 Units/mL) infusion (premix), 0-2,000 Units/hr, intravenous, Continuous, Kevin Leong DO, Stopped at 05/21/25 1819    heparin Impella Purge 25 units/mL in 500 mL D5W, 10 mL/hr, intra-catheter, Continuous, Kevin Leong DO, Last Rate: 10 mL/hr at 05/22/25 0600, 10 mL/hr at 05/22/25 0600    insulin lispro injection 0-5 Units, 0-5 Units, subcutaneous, q4h, Ney Moreno MD, 1 Units at 05/22/25 0448    midazolam in NS (Versed) 1 mg/mL infusion solution, 0.5-20 mg/hr, intravenous, Continuous, Ney Moreno MD, Last Rate: 2 mL/hr at 05/22/25 0600, 2 mg/hr at 05/22/25 0600    norepinephrine (Levophed) 8 mg in dextrose 5% 250 mL (0.032 mg/mL) infusion (premix), 0.01-1 mcg/kg/min, intravenous, Continuous, Ney Moreno MD, Stopped at 05/21/25 1330    oxygen (O2) therapy, , inhalation, Continuous - Inhalation, Ney Moreno MD, 40 percent at 05/22/25 0800    pantoprazole (Protonix) injection 40 mg, 40 mg, intravenous, BID, Kevin Leong DO, 40 mg at 05/21/25 2124    phenylephrine (Saji-Synephrine) 80 mg in sodium chloride 0.9% 250 mL (0.32 mg/mL) infusion, 0-2 mcg/kg/min, intravenous, Continuous, Ney Moreno MD, Stopped at 05/20/25 2128    piperacillin-tazobactam (Zosyn) 3.375 g in dextrose (iso) IV 50 mL, 3.375 g, intravenous, q6h, Kevin Leong DO, Last Rate: 0 mL/hr at 05/22/25 0210, 3.375 g at 05/22/25 0853    polyethylene glycol (Glycolax, Miralax) packet 17 g, 17 g, oral, Daily, Ney Moreno MD, 17 g at 05/21/25 0831    vancomycin (Vancocin) pharmacy to dose - pharmacy monitoring, , miscellaneous, Daily PRN, Dina Joseph MD    vasopressin (Vasostrict) 0.2 unit/mL in 5% dextrose 100 mL IV, 0.03 Units/min, intravenous, Continuous, Ney Moreno MD, Stopped at 05/21/25 1034

## 2025-05-22 NOTE — PROGRESS NOTES
"Vancomycin Dosing by Pharmacy    Alpesh Elena is a 45 y.o. year old male who Pharmacy has been consulted for vancomycin dosing for pneumonia. Based on the patient's indication and renal status this patient is being dosed based on a goal trough/random level of 15-20.     Renal function is currently declining.      Current vancomycin dose: 2000 mg x1 dose on 5/21/25  Most recent trough level: 28.5 mcg/mL    Visit Vitals  BP (!) 102/92   Pulse (!) 118   Temp 36.8 °C (98.2 °F) (Core)   Resp 15   Ht 1.727 m (5' 8\")   Wt 86.9 kg (191 lb 9.3 oz)   SpO2 98%   BMI 29.13 kg/m²   Smoking Status Every Day   BSA 2.04 m²        Lab Results   Component Value Date    CREATININE 8.35 (H) 05/22/2025    CREATININE 6.83 (H) 05/21/2025    CREATININE 4.52 (H) 05/21/2025    CREATININE 2.54 (H) 05/20/2025        Lab Results   Component Value Date    VANCORANDOM 28.5 (H) 05/22/2025        I/O last 3 completed shifts:  In: 5254.2 (60.5 mL/kg) [I.V.:3434.2 (39.5 mL/kg); NG/GT:120; IV Piggyback:1700]  Out: 1050 (12.1 mL/kg) [Urine:200 (0.1 mL/kg/hr); Emesis/NG output:850]  Weight: 86.9 kg       Blood Culture   Date/Time Value Ref Range Status   05/21/2025 11:41 AM Loaded on Instrument - Culture in progress  Preliminary   05/21/2025 11:41 AM Loaded on Instrument - Culture in progress  Preliminary     Tissue/Wound Culture/Smear   Date/Time Value Ref Range Status   07/08/2024 08:20 AM (4+) Abundant Mixed Gram-Positive Bacteria  Final   07/08/2024 08:20 AM (4+) Abundant Mixed Anaerobic Bacteria  Final     Gram Stain   Date/Time Value Ref Range Status   05/20/2025 04:19 AM (1+) Rare Polymorphonuclear leukocytes (A)  Preliminary   05/20/2025 04:19 AM (1+) Rare Gram positive cocci (A)  Preliminary          Assessment/Plan    Will hold doses today and recheck level in 24 hours      The next level will be obtained on 5/23/25 at 1st AM labs. May be obtained sooner if clinically indicated.   Will continue to monitor renal function daily while on " vancomycin and order serum creatinine at least every 48 hours if not already ordered.  Follow for continued vancomycin needs, clinical response, and signs/symptoms of toxicity.     Barak Lan, PharmD

## 2025-05-22 NOTE — CARE PLAN
Problem: Safety - Medical Restraint  Goal: Remains free of injury from restraints (Restraint for Interference with Medical Device)  5/22/2025 0617 by Mitul Crain RN  Outcome: Progressing  Flowsheets (Taken 5/22/2025 0617)  Remains free of injury from restraints (restraint for interference with medical device): Every 2 hours: Monitor safety, psychosocial status, comfort, nutrition and hydration  5/22/2025 0616 by Mitul Crain RN  Outcome: Progressing  Flowsheets (Taken 5/22/2025 0616)  Remains free of injury from restraints (restraint for interference with medical device): Every 2 hours: Monitor safety, psychosocial status, comfort, nutrition and hydration  Goal: Free from restraint(s) (Restraint for Interference with Medical Device)  5/22/2025 0617 by Mitul Crain RN  Outcome: Progressing  Flowsheets (Taken 5/22/2025 0617)  Free from restraint(s) (restraint for interference with medical device): ONCE/SHIFT or MINIMUM Every 12 hours: Assess and document the continuing need for restraints  5/22/2025 0616 by Mitul Crain RN  Outcome: Progressing   The patient's goals for the shift include      The clinical goals for the shift include Pt will remain hemodynamically stable throughout shift.

## 2025-05-22 NOTE — PROGRESS NOTES
"Cardiac Intensive Care - Daily Progress Note   Subjective    Alpesh Elena is a 45 y.o. year old male patient admitted on 5/20/2025 with following ICU needs: Cardiogenic shock, impella, VSD/ Inferior LV wall rupture    Interval History:    Overnight, patient continued to spike fevers with Tmax 101.3. He was starting on cooling with arctic sun. He remains on Impella P8 this AM.     Meds    Scheduled medications  Scheduled Medications[1]  Continuous medications  Continuous Medications[2]  PRN medications  PRN Medications[3]     Objective    Blood pressure (!) 102/92, pulse (!) 126, temperature 36.8 °C (98.2 °F), temperature source Core, resp. rate 15, height 1.727 m (5' 8\"), weight 86.9 kg (191 lb 9.3 oz), SpO2 98%.     Physical Exam  Constitutional:       Comments: Intubated, Sedate   Cardiovascular:      Rate and Rhythm: Regular rhythm. Tachycardia present.      Heart sounds: Murmur heard.      Comments: Holosystolic murmer, best heard at 4th intercostal rib. Very weak vs absent b/l PT and pedal.   Pulmonary:      Effort: Pulmonary effort is normal.      Breath sounds: Normal breath sounds. No wheezing or rales.   Abdominal:      General: Abdomen is flat. Bowel sounds are normal. There is no distension.      Palpations: Abdomen is soft.   Musculoskeletal:      Right lower leg: No edema.      Left lower leg: No edema.   Skin:     General: Skin is warm.   Neurological:      Comments: Sedated             Intake/Output Summary (Last 24 hours) at 5/22/2025 0825  Last data filed at 5/22/2025 0600  Gross per 24 hour   Intake 1832.07 ml   Output 945 ml   Net 887.07 ml     Labs:   Results from last 72 hours   Lab Units 05/22/25  0438 05/21/25  1646 05/21/25  0245   SODIUM mmol/L 137 135* 137   POTASSIUM mmol/L 4.3 4.5 4.1   CHLORIDE mmol/L 97* 98 100   CO2 mmol/L 23 25 21   BUN mg/dL 118* 102* 77*   CREATININE mg/dL 8.35* 6.83* 4.52*   GLUCOSE mg/dL 167* 232* 221*   CALCIUM mg/dL 7.0* 7.3* 7.9*   ANION GAP mmol/L 21* 17 " 20   EGFR mL/min/1.73m*2 7* 9* 15*   PHOSPHORUS mg/dL 8.4* 8.0* 7.5*      Results from last 72 hours   Lab Units 05/22/25  0438 05/22/25  0022 05/21/25  1646 05/21/25  0245   WBC AUTO x10*3/uL 21.3* 22.5* 21.5* 23.8*   HEMOGLOBIN g/dL 9.5* 9.5* 10.0* 11.5*   HEMATOCRIT % 27.9* 28.8* 30.5* 34.0*   PLATELETS AUTO x10*3/uL 120* 123* 133* 170   NEUTROS PCT AUTO % 83.3  --  84.2 81.2   LYMPHS PCT AUTO % 8.2  --  8.2 10.0   MONOS PCT AUTO % 6.9  --  5.9 7.5   EOS PCT AUTO % 0.0  --  0.0 0.0      Results from last 72 hours   Lab Units 05/22/25  0438 05/22/25  0019 05/21/25 2023   POCT PH, ARTERIAL pH 7.33* 7.38 7.35*   POCT PCO2, ARTERIAL mm Hg 36* 33* 38   POCT PO2, ARTERIAL mm Hg 124* 99* 97*   POCT SO2, ARTERIAL % 100 99 100     Results from last 72 hours   Lab Units 05/22/25  0438 05/22/25  0020 05/21/25 2022   POCT PH, VENOUS pH 7.30* 7.34 7.32*   POCT PCO2, VENOUS mm Hg 42 40* 46   POCT PO2, VENOUS mm Hg 46* 44 45        Micro/ID:     Lab Results   Component Value Date    BLOODCULT Loaded on Instrument - Culture in progress 05/21/2025    BLOODCULT Loaded on Instrument - Culture in progress 05/21/2025       EKG Trend:    EKG 5/20: Sinus Tachycardia, T wave inversions inferior leads. Prolonged QTC     Echo Data:  Results for orders placed during the hospital encounter of 05/18/25    Transthoracic Echo Complete    [place] [id number]  Tel [phone] Fax [phone]    TRANSTHORACIC ECHOCARDIOGRAM REPORT    Patient Name:       AVRIL Yoder Physician:    [id number] Ortega DO  Study Date:         5/19/2025            Ordering Provider:    [id number] MARGARET GASPAR  MRN/PID:            [id number]             Fellow:  Accession#:         [id number]         Nurse:  Date of Birth/Age:  1980 / 45 years Sonographer:          Koki Mendez  MIKE  Gender Assigned at  M                    Additional Staff:  Birth:  Height:             172.72 cm             Admit Date:           5/18/2025  Weight:             75.30 kg             Admission Status:     Inpatient - STAT  BSA / BMI:          1.89 m2 / 25.24      Department Location:  [facility]  kg/m2  Blood Pressure: 97 /65 mmHg    Study Type:    TRANSTHORACIC ECHO (TTE) COMPLETE  Diagnosis/ICD: ST elevation (STEMI) myocardial infarction of unspecified  site-I21.3  Indication:    STEMI  CPT Codes:     Echo Complete w Full Doppler-[id number]    Patient History:  PCI and Stent:     PCI performed on 5/18/2025.  Smoker:            Current.  Pertinent History: Chest Pain and Murmur.    Study Detail: The following Echo studies were performed: 2D, M-Mode, Doppler and  color flow. Definity used as a contrast agent for endocardial  border definition. Total contrast used for this procedure was 2 mL  via IV push.      PHYSICIAN INTERPRETATION:  Left Ventricle: The left ventricular systolic function is normal, with a visually estimated ejection fraction of 55%. There is mild concentric left ventricular hypertrophy. Wall motion is abnormal. The left ventricular cavity size is normal. There is mild increased septal and mildly increased posterior left ventricular wall thickness. Spectral Doppler shows a normal pattern of left ventricular diastolic filling.  LV Wall Scoring:  The basal inferoseptal segment, basal inferolateral segment, and basal inferior  segment are hypokinetic. All remaining scored segments are normal.    Left Atrium: The left atrial size is normal.  Right Ventricle: The right ventricle is normal in size. There is normal right ventricular global systolic function.  Right Atrium: The right atrial size is normal.  Aortic Valve: The aortic valve appears structurally normal. There is no evidence of aortic valve stenosis.  The aortic valve dimensionless index is 0.73. There is no evidence of aortic valve regurgitation. The peak instantaneous gradient of the aortic valve is 5 mmHg. The mean gradient of the aortic  valve is 3 mmHg.  Mitral Valve: The mitral valve is normal in structure. There is no evidence of mitral valve stenosis. There is normal mitral valve leaflet mobility. There is no evidence of mitral valve regurgitation.  Tricuspid Valve: The tricuspid valve is structurally normal. There is normal tricuspid valve leaflet mobility. There is trace tricuspid regurgitation.  Pulmonic Valve: The pulmonic valve is structurally normal. There is no indication of pulmonic valve regurgitation.  Pericardium: No pericardial effusion noted.  Aorta: The aortic root is normal.  Pulmonary Artery: The main pulmonary artery is normal in size, and position, with normal bifurcation into the left and right pulmonary arteries. The tricuspid regurgitant velocity is 2.88 m/s, and with an estimated right atrial pressure of 3 mmHg, the estimated pulmonary artery pressure is mildly elevated with the RVSP at 36.2 mmHg.  Systemic Veins: The inferior vena cava appears normal in size.      CONCLUSIONS:  1. The left ventricular systolic function is normal, with a visually estimated ejection fraction of 55%.  2. Basal inferoseptal segment, basal inferolateral segment, and basal inferior segment are abnormal.  3. Abnormal wall motion.  4. There is normal right ventricular global systolic function.  5. Normal sized right ventricle.  6. There is no evidence of mitral valve stenosis.  7. No evidence of mitral valve regurgitation.  8. Trace tricuspid regurgitation is visualized.  9. Aortic valve stenosis is not present.  10. The main pulmonary artery is normal in size, and position, with normal bifurcation into the left and right pulmonary arteries.    QUANTITATIVE DATA SUMMARY:    2D MEASUREMENTS:             Normal Ranges:  Ao Root d:       2.86 cm     (2.0-3.7cm)  LAs:             3.88 cm     (2.7-4.0cm)  IVSd:            1.12 cm     (0.6-1.1cm)  LVPWd:           1.08 cm     (0.6-1.1cm)  LVIDd:           4.02 cm     (3.9-5.9cm)  LVIDs:           2.94  cm  LV Mass Index:   77.7 g/m2  LVEDV Index:     59.87 ml/m2  LV % FS          26.9 %      LEFT ATRIUM:                  Normal Ranges:  LA Vol A4C:        29.5 ml    (22+/-6mL/m2)  LA Vol A2C:        25.4 ml  LA Vol BP:         30.5 ml  LA Vol Index A4C:  15.6ml/m2  LA Vol Index A2C:  13.4 ml/m2  LA Vol Index BP:   16.1 ml/m2  LA Area A4C:       13.8 cm2  LA Area A2C:       11.5 cm2  LA Major Axis A4C: 5.5 cm  LA Major Axis A2C: 4.4 cm  LA Volume Index:   14.9 ml/m2      RIGHT ATRIUM:                 Normal Ranges:  RA Vol A4C:        24.1 ml    (8.3-19.5ml)  RA Vol Index A4C:  12.7 ml/m2  RA Area A4C:       11.1 cm2  RA Major Axis A4C: 4.4 cm      AORTA MEASUREMENTS:         Normal Ranges:  Asc Ao, d:          2.48 cm (2.1-3.4cm)      LV SYSTOLIC FUNCTION:  Normal Ranges:  EF-A4C View:    54 % (>=55%)  EF-A2C View:    54 %  EF-Biplane:     55 %  EF-Visual:      55 %  LV EF Reported: 55 %      LV DIASTOLIC FUNCTION:           Normal Ranges:  MV Peak E:             1.20 m/s  (0.7-1.2 m/s)  MV Peak A:             1.08 m/s  (0.42-0.7 m/s)  E/A Ratio:             1.11      (1.0-2.2)  MV e'                  0.090 m/s (>8.0)  MV lateral e'          0.08 m/s  MV medial e'           0.10 m/s  E/e' Ratio:            13.29     (<8.0)      MITRAL VALVE:          Normal Ranges:  MV DT:        126 msec (150-240msec)      AORTIC VALVE:                     Normal Ranges:  AoV Vmax:                1.16 m/s (<=1.7m/s)  AoV Peak P.4 mmHg (<20mmHg)  AoV Mean PG:             3.0 mmHg (1.7-11.5mmHg)  LVOT Max Aime:            1.03 m/s (<=1.1m/s)  AoV VTI:                 22.30 cm (18-25cm)  LVOT VTI:                16.20 cm  LVOT Diameter:           2.03 cm  (1.8-2.4cm)  AoV Area, VTI:           2.35 cm2 (2.5-5.5cm2)  AoV Area,Vmax:           2.87 cm2 (2.5-4.5cm2)  AoV Dimensionless Index: 0.73      RIGHT VENTRICLE:  RV Basal 3.49 cm  RV Mid   2.65 cm  RV Major 7.3 cm  TAPSE:   22.4 mm  RV s'    0.13 m/s      TRICUSPID  VALVE/RVSP:          Normal Ranges:  Peak TR Velocity:     2.88 m/s  RV Syst Pressure:     36 mmHg  (< 30mmHg)  IVC Diam:             1.74 cm      PULMONIC VALVE:          Normal Ranges:  PV Accel Time:  116 msec (>120ms)  PV Max Aime:     1.5 m/s  (0.6-0.9m/s)  PV Max P.8 mmHg      [id number] Faraz Ortega   Electronically signed on 2025 at 9:34:06 AM      Wall Scoring        ** Final **      Cath Data:  [facility]     LMT normal.  LAD mild irregularity, 30% mid, wraps around the apex.  LCX mild iregularity.  RCA tortuous, dom.  PDA is 100% ostial.  LVEDP 20-25.  LVEF 40-45%, inferior basal AK.       Successful PTA R % GLENNA 0 reduced to 30%, GLENNA 3 after POBA, with no stent placed due to small caliber vessel and minimal runoff, not enough to warrant stenting.     Concern for drug use considering patient very tachycardic and LVEDP elevated with very small PDA occlusion    Summary of key imaging results from the last 24 hours     See above    Assessment and Plan     Assessment: This is a 45 year old male with a past medical history of tobacco abuse (30 pack years), submandibular abscess, and nephrolithiasis who presented to [facility] with a chief complaint of chest pain, found to have an inferior STEMI, now s/p coronary angiogram with balloon angioplasty to the PDA (right dominant system). His course was complicated by hemodynamic instability (tachycardia, hypotension) requiring vasopressor support and laboratory evidnce of end organ dysfunction. Due to concern for cardiogenic vs mixed shock, a shock call was intervened and recommended placement of  PA catheter for adjudication of hemodynamics and transfer to [facility] CICU for a higher level of care. He is now admitted to the CICU for further management.      On arrival to the CICU he is hypotensive with escalating pressor requirements. Bedside examination reveals a holosystolic murmur and serial mixed venous are revealing of a significant step up in  SVO2 from the RA to PA concerning for the presence of a ischemic VSD, perhaps secondardy to a late presenting STEMI given troponin elevation to 24K on presentation. Will re-conference with Shock team for consideration of MCS given continued hemodynamic instability and now suspected VSD with cardiogenic shock. Due to concern for mixed shock will start stress dose steroids (as currently on 3 pressors), broaden abx, and send full infectious workup. Continue with CAD/STEMI GDMT. Shock team determined impella CP placement to offload LV given concern for STEMI-induced VSD/ Inferior LV wall rupture.     Patient's lactate has normalized while on Impella CP.  However, still large difference between CVP and PA mixed venous, indicating ongoing shunting.  Infectious disease and nephrology following.  Monitoring the patient will need Impella 5.0      Mechanical Ventilation: < 4 days  Sedation/Analgesia:  Fentanyl, Precedex, and Versed  Restraints: no    Summary for 05/22/25  :  Will attempt diuresis with Bumex 8 mg x 1, bumex gtt at 2, 1 g diuril   Per GI, will defer EGD for now. Bleeding from NG thought to be due to trauma from NGT trauma  Continue heparin gtt and heparin purge solution  If does not respond to diuresis, will consider placing trialysis line for RRT     NEUROLOGIC  #Sedation  - Vent/Versed/Precedex for RASS -2 - -3      CARDIOVASCULAR  #RPDA STEMI s/p POBA  #C/f Cardiogenic vs mixed shock   #C/f VSD  ::  ECG on admit to OSH with ST elevations in the inferior leads with reciprocal depressions  :: Troponin:  24,085>21,701,19,289,24606  :: Lipid panel (05/2025)- cholesterol 220, HDL 33.9, , TG 57  :: TSH 1.51 (05/2025), A1C 7.4  (05/2025)  :: coronary angiogram (05/2025)  which revealed multi vessel CAD [LAD/Lcx/RCA mildly/diffusely diseased,100% RPDA culprit lesion] with PTCA to RPDA with 50% residual stenosis [GLENNA 0->3] (too small to stent)   :: LVEF 45% and LVED 20-25 mmHg via LV Gram   :: TTE  (05/2025)  LVEF 55%, wall motion abnormalities, and no significant valvular disease   :: SVO2 59 from CVP port, 92 from PA port (prior to impella)  :: SVO2 57 from CVP port, 80 from PA port (after impella)  :: Off all pressors 5/21  Plan:  - Trend Lactate, CVP mix venous (w/VBG) and PA mix venous Q2H  - Aspirin 81 mg every day, DC Bril[place]a given no stent / possible future surgery   - Statin: HELD Crestor 40 mg given c/f shock liver  - Beta blocker: hold given shock requiring MCS  - RAAS Inhibition: hold given shock requiring MCS  - Limited TTE repeat: Compared with the prior exam from 5/19/2025 ([facility]) there has been interval development of a large VSD throuh the inferoseptum with the RV now becoming large with significantly reduced function.   - CT surgery following      PULMONARY  #AHRF   :: Prior to intubation satting 92% on RA  :: CXR with pulmonary edema vs PNA  :: CTA CAP- mild interstitial pulmonary edema   :: , WBC 34.1 on admission   Plan:  - Infectious workup/abx as below   - [place] guided therapy   - Wean Ventilator when more hemodynamically stable      RENAL/  #BENY, Anuric    :: Baseline Cr 0.8-1.0, 1.61 on admit, 1.85 on transport   Plan:  - Likely hemodynamic mediated , vanco, LV gram   - Follow up UA/Ulytes if sample is available   - Avoid hypotension, rapid fluctuations in BP  - [place] guided volume resuscitation/ diuresis   - Daily RFP   - Nephrology following      GASTROINTESTINAL  #GI PPX  - IV PPI every day while intubated  - Miralax every day      ENDOCRINE  #TIIDM   :: A1C 7.4  (05/2025)  Plan:  - NPO   - Q4H POC glucose, SSI while NPO  - Titrate -180  - Will need diabetes education prior to discharge      HEME/ONC  SWAPNIL  - Monitor for signs of hemolysis while on impella      INFECTIOUS DISEASE  #C/f Mixed shock   #Leukocytosis  # Fever  Unclear if Fever is from infection or general cardiogenic shock at this time   :: S/p Doxy/CTX 05/19  :: blood cultures sent 05/19 x 2 sets    Work up:  - MRSA probe: -  - Bcx2: pending  - UA: non-infectious   Plan:  - c/w Zosyn, Restarted vanco 5/21  - Follow infectious labs  - narrow abx as able   - ID following      MSK/DERM  SWAPNIL         ICU Check List       ICU Liberation: Intervention:   Assess, Prevent, Manage Pain 0 - No pain fentanyl gtt   Both SAT and SBT [] SAT  [] SBT 30-60 min [] Extubate to NC  [] Extubate to NIV  [] Discuss Trach   Choice of analgesia and sedation Romero Agitation Sedation Scale (RASS): Light sedation Fentanyl, Precedex, and Versed  -1 to -2   Delirium: Assess, prevent and manage  CAM ICU Positive Assess Qshift   Early Mobility and Exercise Receiving therapies that restrict mobility [] PT /OT consult   Family Engagement and Empowerment [x]Family updated []SW consult     F: PRN for euvolemia; swan guided therapy   E: PRN K>4, Mg>2, P>3   N: NPO  A: PIV, RIJ swan, left radial A line      Oxygen: MV 40%/500/16/5  Abx: Zosyn  Gtts: Levo, Vaso, Phenyl, fent, versed      DVT ppx: Lovenox  GI ppx: IV PPI     Code Status: full (confirmed on admit)  Surrogate Medical Decision-maker:    Ashley Hogan (Mother) [phone]         Introducer 05/19/25 Internal jugular Right (Active)   Placement Date/Time: 05/19/25 2330   Hand Hygiene Completed: Yes  Location: Internal jugular  Orientation: Right  Placed by: CG/AL  Placement Verified: X-ray;Pressure tracing changes;Transduced waveform   Number of days: 0       Arterial Line 05/19/25 Left Radial (Active)   Placement Date/Time: 05/19/25 2200   Orientation: Left  Location: Radial  Local Anesthetic: Injectable  Technique: Anatomical landmarks  Insertion attempts: 1  Securement Method: Sutured  Patient Tolerance: Fair   Number of days: 0       Arterial Sheath 05/20/25 0529 6 Fr. Right Femoral (Active)   Placement Date/Time: 05/20/25 0529   Sheath Size: (c) 6 Fr.  Line Orientation: Right  Sheath Insertion Site: Femoral   Number of days: 0       Arterial Sheath 05/20/25 0532 Other  (Comment) Right Femoral (Active)   Placement Date/Time: 05/20/25 0532   Sheath Size: (c) Other (Comment)  Line Orientation: Right  Sheath Insertion Site: Femoral   Number of days: 0       Arterial Sheath 05/20/25 0559 6 Fr. Left Femoral (Active)   Placement Date/Time: 05/20/25 0559   Sheath Size: 6 Fr.  Line Orientation: Left  Sheath Insertion Site: Femoral   Number of days: 0       Pulmonary Artery Catheter Internal jugular (Active)   Placement Date/Time: 05/19/25 2330   Hand Hygiene Performed Prior to CVC Insertion: Yes  Site Prep: Alcohol;Chlorhexidine ;Usual sterile procedure followed  Site Prep Agent has Completely Dried Before Insertion: Yes  All 5 Sterile Barriers Used (Glove...   Number of days: 0       ETT  7.5 mm (Active)   Placement Date/Time: 05/19/25 2305   Hand Hygiene Completed: Yes  ETT Type: ETT - single  Single Lumen Tube Size: 7.5 mm  Cuffed: Yes  Location: Oral  Airway Insertion Attempts: 1  Placement Verification: Auscultation;Capnometry;Fiberoptic visualizati...   Number of days: 0       Urethral Catheter 16 Fr. (Active)   Placement Date/Time: 05/20/25 0200   Hand Hygiene Completed: Yes  Tube Size (Fr.): 16 Fr.  Catheter Balloon Size: 10 mL  Urine Returned: Yes   Number of days: 0       NG/OG/Feeding Tube OG - [place] sump 16 Fr Center mouth (Active)   Placement Date/Time: 05/19/25 2310   Placed by: DM  Hand Hygiene Completed: Yes  Type of Tube: NG/OG Tube  Tube Length: 55 cm  Tube Type: OG - [place] sump  NG/OG Tube Size: 16 Fr  Tube Location: Center mouth   Number of days: 0       Social:  Code: Full Code    Disposition: NABOR Joseph MD   05/22/25 at 8:25 AM     Disclaimer: Documentation completed with the information available at the time of input. The times in the chart may not be reflective of actual patient care times, interventions, or procedures. Documentation occurs after the physical care of the patient.           [1] aspirin, 81 mg, oral, Daily  insulin lispro, 0-5  Units, subcutaneous, q4h  oxygen, , inhalation, Continuous - Inhalation  pantoprazole, 40 mg, intravenous, BID  piperacillin-tazobactam, 3.375 g, intravenous, q6h  polyethylene glycol, 17 g, oral, Daily     [2] fentaNYL,  mcg/hr, Last Rate: 100 mcg/hr (05/22/25 0600)  heparin, 0-2,000 Units/hr, Last Rate: Stopped (05/21/25 1819)  heparin Impella Purge 25 units/mL in 500 mL D5W, 10 mL/hr, Last Rate: 10 mL/hr (05/22/25 0600)  midazolam, 0.5-20 mg/hr, Last Rate: 2 mg/hr (05/22/25 0600)  norepinephrine, 0.01-1 mcg/kg/min, Last Rate: Stopped (05/21/25 1330)  phenylephrine, 0-2 mcg/kg/min, Last Rate: Stopped (05/20/25 2128)  vasopressin, 0.03 Units/min, Last Rate: Stopped (05/21/25 1415)     [3] PRN medications: alteplase, dextrose, dextrose, glucagon, glucagon, vancomycin

## 2025-05-23 ENCOUNTER — APPOINTMENT (OUTPATIENT)
Dept: RADIOLOGY | Facility: HOSPITAL | Age: 45
End: 2025-05-23
Payer: COMMERCIAL

## 2025-05-23 LAB
ACT BLD: 173 SEC (ref 83–199)
ACT BLD: 173 SEC (ref 83–199)
ACT BLD: 176 SEC (ref 83–199)
ACT BLD: 184 SEC (ref 83–199)
ACT BLD: 192 SEC (ref 83–199)
ALBUMIN SERPL BCP-MCNC: 2.8 G/DL (ref 3.4–5)
ALBUMIN SERPL BCP-MCNC: 2.9 G/DL (ref 3.4–5)
ALBUMIN SERPL BCP-MCNC: 3.4 G/DL (ref 3.4–5)
ALP SERPL-CCNC: 71 U/L (ref 33–120)
ALT SERPL W P-5'-P-CCNC: 1048 U/L (ref 10–52)
ANION GAP BLDA CALCULATED.4IONS-SCNC: 18 MMO/L (ref 10–25)
ANION GAP BLDA CALCULATED.4IONS-SCNC: 21 MMO/L (ref 10–25)
ANION GAP BLDA CALCULATED.4IONS-SCNC: 23 MMO/L (ref 10–25)
ANION GAP BLDA CALCULATED.4IONS-SCNC: 24 MMO/L (ref 10–25)
ANION GAP BLDMV CALCULATED.4IONS-SCNC: 17 MMO/L (ref 10–25)
ANION GAP BLDMV CALCULATED.4IONS-SCNC: 22 MMO/L (ref 10–25)
ANION GAP BLDMV CALCULATED.4IONS-SCNC: 23 MMO/L (ref 10–25)
ANION GAP BLDMV CALCULATED.4IONS-SCNC: 24 MMO/L (ref 10–25)
ANION GAP BLDMV CALCULATED.4IONS-SCNC: 24 MMO/L (ref 10–25)
ANION GAP BLDV CALCULATED.4IONS-SCNC: 16 MMOL/L (ref 10–25)
ANION GAP BLDV CALCULATED.4IONS-SCNC: 21 MMOL/L (ref 10–25)
ANION GAP BLDV CALCULATED.4IONS-SCNC: 22 MMOL/L (ref 10–25)
ANION GAP BLDV CALCULATED.4IONS-SCNC: 22 MMOL/L (ref 10–25)
ANION GAP BLDV CALCULATED.4IONS-SCNC: 23 MMOL/L (ref 10–25)
ANION GAP SERPL CALC-SCNC: 21 MMOL/L (ref 10–20)
ANION GAP SERPL CALC-SCNC: 27 MMOL/L (ref 10–20)
ANION GAP SERPL CALC-SCNC: 29 MMOL/L (ref 10–20)
APTT PPP: 34 SECONDS (ref 26–36)
APTT PPP: 35 SECONDS (ref 26–36)
APTT PPP: 36 SECONDS (ref 26–36)
AST SERPL W P-5'-P-CCNC: 459 U/L (ref 9–39)
BASE EXCESS BLDA CALC-SCNC: -2.5 MMOL/L (ref -2–3)
BASE EXCESS BLDA CALC-SCNC: -7.7 MMOL/L (ref -2–3)
BASE EXCESS BLDA CALC-SCNC: -8.9 MMOL/L (ref -2–3)
BASE EXCESS BLDA CALC-SCNC: -9.7 MMOL/L (ref -2–3)
BASE EXCESS BLDMV CALC-SCNC: -2 MMOL/L (ref -2–3)
BASE EXCESS BLDMV CALC-SCNC: -6 MMOL/L (ref -2–3)
BASE EXCESS BLDMV CALC-SCNC: -8.2 MMOL/L (ref -2–3)
BASE EXCESS BLDMV CALC-SCNC: -8.8 MMOL/L (ref -2–3)
BASE EXCESS BLDMV CALC-SCNC: -9.1 MMOL/L (ref -2–3)
BASE EXCESS BLDV CALC-SCNC: -1.5 MMOL/L (ref -2–3)
BASE EXCESS BLDV CALC-SCNC: -6.2 MMOL/L (ref -2–3)
BASE EXCESS BLDV CALC-SCNC: -7.7 MMOL/L (ref -2–3)
BASE EXCESS BLDV CALC-SCNC: -8.2 MMOL/L (ref -2–3)
BASE EXCESS BLDV CALC-SCNC: -8.4 MMOL/L (ref -2–3)
BASOPHILS # BLD AUTO: 0.02 X10*3/UL (ref 0–0.1)
BASOPHILS NFR BLD AUTO: 0.1 %
BILIRUB DIRECT SERPL-MCNC: 3.3 MG/DL (ref 0–0.3)
BILIRUB SERPL-MCNC: 4.7 MG/DL (ref 0–1.2)
BODY TEMPERATURE: 37 DEGREES CELSIUS
BUN SERPL-MCNC: 128 MG/DL (ref 6–23)
BUN SERPL-MCNC: 136 MG/DL (ref 6–23)
BUN SERPL-MCNC: 71 MG/DL (ref 6–23)
CA-I BLDA-SCNC: 0.86 MMOL/L (ref 1.1–1.33)
CA-I BLDA-SCNC: 0.88 MMOL/L (ref 1.1–1.33)
CA-I BLDA-SCNC: 0.91 MMOL/L (ref 1.1–1.33)
CA-I BLDA-SCNC: 0.97 MMOL/L (ref 1.1–1.33)
CA-I BLDMV-SCNC: 0.81 MMOL/L (ref 1.1–1.33)
CA-I BLDMV-SCNC: 0.86 MMOL/L (ref 1.1–1.33)
CA-I BLDMV-SCNC: 0.88 MMOL/L (ref 1.1–1.33)
CA-I BLDMV-SCNC: 0.89 MMOL/L (ref 1.1–1.33)
CA-I BLDMV-SCNC: 0.94 MMOL/L (ref 1.1–1.33)
CA-I BLDV-SCNC: 0.83 MMOL/L (ref 1.1–1.33)
CA-I BLDV-SCNC: 0.86 MMOL/L (ref 1.1–1.33)
CA-I BLDV-SCNC: 0.88 MMOL/L (ref 1.1–1.33)
CA-I BLDV-SCNC: 0.89 MMOL/L (ref 1.1–1.33)
CA-I BLDV-SCNC: 0.96 MMOL/L (ref 1.1–1.33)
CALCIUM SERPL-MCNC: 6.9 MG/DL (ref 8.6–10.6)
CALCIUM SERPL-MCNC: 7 MG/DL (ref 8.6–10.6)
CALCIUM SERPL-MCNC: 7.8 MG/DL (ref 8.6–10.6)
CHLORIDE BLD-SCNC: 94 MMOL/L (ref 98–107)
CHLORIDE BLD-SCNC: 96 MMOL/L (ref 98–107)
CHLORIDE BLD-SCNC: 99 MMOL/L (ref 98–107)
CHLORIDE BLDA-SCNC: 95 MMOL/L (ref 98–107)
CHLORIDE BLDA-SCNC: 96 MMOL/L (ref 98–107)
CHLORIDE BLDA-SCNC: 97 MMOL/L (ref 98–107)
CHLORIDE BLDA-SCNC: 97 MMOL/L (ref 98–107)
CHLORIDE BLDV-SCNC: 96 MMOL/L (ref 98–107)
CHLORIDE BLDV-SCNC: 97 MMOL/L (ref 98–107)
CHLORIDE BLDV-SCNC: 97 MMOL/L (ref 98–107)
CHLORIDE SERPL-SCNC: 94 MMOL/L (ref 98–107)
CHLORIDE SERPL-SCNC: 95 MMOL/L (ref 98–107)
CHLORIDE SERPL-SCNC: 95 MMOL/L (ref 98–107)
CO2 SERPL-SCNC: 18 MMOL/L (ref 21–32)
CO2 SERPL-SCNC: 19 MMOL/L (ref 21–32)
CO2 SERPL-SCNC: 25 MMOL/L (ref 21–32)
CREAT SERPL-MCNC: 10.68 MG/DL (ref 0.5–1.3)
CREAT SERPL-MCNC: 10.71 MG/DL (ref 0.5–1.3)
CREAT SERPL-MCNC: 6.05 MG/DL (ref 0.5–1.3)
EGFRCR SERPLBLD CKD-EPI 2021: 11 ML/MIN/1.73M*2
EGFRCR SERPLBLD CKD-EPI 2021: 5 ML/MIN/1.73M*2
EGFRCR SERPLBLD CKD-EPI 2021: 5 ML/MIN/1.73M*2
EOSINOPHIL # BLD AUTO: 0.08 X10*3/UL (ref 0–0.7)
EOSINOPHIL NFR BLD AUTO: 0.5 %
ERYTHROCYTE [DISTWIDTH] IN BLOOD BY AUTOMATED COUNT: 12.9 % (ref 11.5–14.5)
ERYTHROCYTE [DISTWIDTH] IN BLOOD BY AUTOMATED COUNT: 13.1 % (ref 11.5–14.5)
GLUCOSE BLD MANUAL STRIP-MCNC: 104 MG/DL (ref 74–99)
GLUCOSE BLD MANUAL STRIP-MCNC: 165 MG/DL (ref 74–99)
GLUCOSE BLD MANUAL STRIP-MCNC: 171 MG/DL (ref 74–99)
GLUCOSE BLD MANUAL STRIP-MCNC: 180 MG/DL (ref 74–99)
GLUCOSE BLD MANUAL STRIP-MCNC: 182 MG/DL (ref 74–99)
GLUCOSE BLD-MCNC: 118 MG/DL (ref 74–99)
GLUCOSE BLD-MCNC: 165 MG/DL (ref 74–99)
GLUCOSE BLD-MCNC: 178 MG/DL (ref 74–99)
GLUCOSE BLD-MCNC: 178 MG/DL (ref 74–99)
GLUCOSE BLD-MCNC: 180 MG/DL (ref 74–99)
GLUCOSE BLDA-MCNC: 116 MG/DL (ref 74–99)
GLUCOSE BLDA-MCNC: 173 MG/DL (ref 74–99)
GLUCOSE BLDA-MCNC: 177 MG/DL (ref 74–99)
GLUCOSE BLDA-MCNC: 180 MG/DL (ref 74–99)
GLUCOSE BLDV-MCNC: 116 MG/DL (ref 74–99)
GLUCOSE BLDV-MCNC: 167 MG/DL (ref 74–99)
GLUCOSE BLDV-MCNC: 173 MG/DL (ref 74–99)
GLUCOSE BLDV-MCNC: 178 MG/DL (ref 74–99)
GLUCOSE BLDV-MCNC: 181 MG/DL (ref 74–99)
GLUCOSE SERPL-MCNC: 121 MG/DL (ref 74–99)
GLUCOSE SERPL-MCNC: 174 MG/DL (ref 74–99)
GLUCOSE SERPL-MCNC: 175 MG/DL (ref 74–99)
HAPTOGLOB SERPL NEPH-MCNC: <30 MG/DL (ref 30–200)
HCO3 BLDA-SCNC: 15.6 MMOL/L (ref 22–26)
HCO3 BLDA-SCNC: 16.7 MMOL/L (ref 22–26)
HCO3 BLDA-SCNC: 17.4 MMOL/L (ref 22–26)
HCO3 BLDA-SCNC: 21.6 MMOL/L (ref 22–26)
HCO3 BLDMV-SCNC: 16.6 MMOL/L (ref 22–26)
HCO3 BLDMV-SCNC: 16.9 MMOL/L (ref 22–26)
HCO3 BLDMV-SCNC: 17 MMOL/L (ref 22–26)
HCO3 BLDMV-SCNC: 19.6 MMOL/L (ref 22–26)
HCO3 BLDMV-SCNC: 22.2 MMOL/L (ref 22–26)
HCO3 BLDV-SCNC: 17.9 MMOL/L (ref 22–26)
HCO3 BLDV-SCNC: 17.9 MMOL/L (ref 22–26)
HCO3 BLDV-SCNC: 18.3 MMOL/L (ref 22–26)
HCO3 BLDV-SCNC: 20.2 MMOL/L (ref 22–26)
HCO3 BLDV-SCNC: 23.7 MMOL/L (ref 22–26)
HCT VFR BLD AUTO: 25.6 % (ref 41–52)
HCT VFR BLD AUTO: 26.8 % (ref 41–52)
HCT VFR BLD EST: 25 % (ref 41–52)
HCT VFR BLD EST: 27 % (ref 41–52)
HCT VFR BLD EST: 28 % (ref 41–52)
HCT VFR BLD EST: 29 % (ref 41–52)
HCT VFR BLD EST: 31 % (ref 41–52)
HCT VFR BLD EST: 31 % (ref 41–52)
HCT VFR BLD EST: 32 % (ref 41–52)
HGB BLD-MCNC: 8.9 G/DL (ref 13.5–17.5)
HGB BLD-MCNC: 8.9 G/DL (ref 13.5–17.5)
HGB BLDA-MCNC: 10.4 G/DL (ref 13.5–17.5)
HGB BLDA-MCNC: 9.1 G/DL (ref 13.5–17.5)
HGB BLDA-MCNC: 9.6 G/DL (ref 13.5–17.5)
HGB BLDA-MCNC: 9.6 G/DL (ref 13.5–17.5)
HGB BLDMV-MCNC: 10.3 G/DL (ref 13.5–17.5)
HGB BLDMV-MCNC: 8.2 G/DL (ref 13.5–17.5)
HGB BLDMV-MCNC: 9.4 G/DL (ref 13.5–17.5)
HGB BLDMV-MCNC: 9.7 G/DL (ref 13.5–17.5)
HGB BLDMV-MCNC: 9.7 G/DL (ref 13.5–17.5)
HGB BLDV-MCNC: 10.5 G/DL (ref 13.5–17.5)
HGB BLDV-MCNC: 9.2 G/DL (ref 13.5–17.5)
HGB BLDV-MCNC: 9.2 G/DL (ref 13.5–17.5)
HGB BLDV-MCNC: 9.3 G/DL (ref 13.5–17.5)
HGB BLDV-MCNC: 9.3 G/DL (ref 13.5–17.5)
IMM GRANULOCYTES # BLD AUTO: 0.28 X10*3/UL (ref 0–0.7)
IMM GRANULOCYTES NFR BLD AUTO: 1.7 % (ref 0–0.9)
INHALED O2 CONCENTRATION: 30 %
LACTATE BLDA-SCNC: 1.3 MMOL/L (ref 0.4–2)
LACTATE BLDA-SCNC: 1.4 MMOL/L (ref 0.4–2)
LACTATE BLDMV-SCNC: 1.2 MMOL/L (ref 0.4–2)
LACTATE BLDMV-SCNC: 1.3 MMOL/L (ref 0.4–2)
LACTATE BLDMV-SCNC: 1.3 MMOL/L (ref 0.4–2)
LACTATE BLDMV-SCNC: 1.4 MMOL/L (ref 0.4–2)
LACTATE BLDMV-SCNC: 1.8 MMOL/L (ref 0.4–2)
LACTATE BLDV-SCNC: 1.2 MMOL/L (ref 0.4–2)
LACTATE BLDV-SCNC: 1.2 MMOL/L (ref 0.4–2)
LACTATE BLDV-SCNC: 1.3 MMOL/L (ref 0.4–2)
LACTATE BLDV-SCNC: 1.4 MMOL/L (ref 0.4–2)
LACTATE BLDV-SCNC: 1.7 MMOL/L (ref 0.4–2)
LDH SERPL L TO P-CCNC: 2089 U/L (ref 84–246)
LYMPHOCYTES # BLD AUTO: 1.37 X10*3/UL (ref 1.2–4.8)
LYMPHOCYTES NFR BLD AUTO: 8.2 %
MAGNESIUM SERPL-MCNC: 2.33 MG/DL (ref 1.6–2.4)
MAGNESIUM SERPL-MCNC: 2.62 MG/DL (ref 1.6–2.4)
MAGNESIUM SERPL-MCNC: 2.63 MG/DL (ref 1.6–2.4)
MCH RBC QN AUTO: 29.9 PG (ref 26–34)
MCH RBC QN AUTO: 29.9 PG (ref 26–34)
MCHC RBC AUTO-ENTMCNC: 33.2 G/DL (ref 32–36)
MCHC RBC AUTO-ENTMCNC: 34.8 G/DL (ref 32–36)
MCV RBC AUTO: 86 FL (ref 80–100)
MCV RBC AUTO: 90 FL (ref 80–100)
MONOCYTES # BLD AUTO: 1.43 X10*3/UL (ref 0.1–1)
MONOCYTES NFR BLD AUTO: 8.6 %
NEUTROPHILS # BLD AUTO: 13.46 X10*3/UL (ref 1.2–7.7)
NEUTROPHILS NFR BLD AUTO: 80.9 %
NRBC BLD-RTO: 1.4 /100 WBCS (ref 0–0)
NRBC BLD-RTO: 2.2 /100 WBCS (ref 0–0)
OXYHGB MFR BLDA: 95.3 % (ref 94–98)
OXYHGB MFR BLDA: 95.3 % (ref 94–98)
OXYHGB MFR BLDA: 95.9 % (ref 94–98)
OXYHGB MFR BLDA: 96.9 % (ref 94–98)
OXYHGB MFR BLDMV: 83.9 % (ref 45–75)
OXYHGB MFR BLDMV: 86.6 % (ref 45–75)
OXYHGB MFR BLDMV: 87.3 % (ref 45–75)
OXYHGB MFR BLDMV: 87.7 % (ref 45–75)
OXYHGB MFR BLDMV: 88 % (ref 45–75)
OXYHGB MFR BLDV: 52.8 % (ref 45–75)
OXYHGB MFR BLDV: 55.6 % (ref 45–75)
OXYHGB MFR BLDV: 56.3 % (ref 45–75)
OXYHGB MFR BLDV: 58.7 % (ref 45–75)
OXYHGB MFR BLDV: 63.7 % (ref 45–75)
PCO2 BLDA: 31 MM HG (ref 38–42)
PCO2 BLDA: 33 MM HG (ref 38–42)
PCO2 BLDA: 34 MM HG (ref 38–42)
PCO2 BLDA: 34 MM HG (ref 38–42)
PCO2 BLDMV: 33 MM HG (ref 41–51)
PCO2 BLDMV: 33 MM HG (ref 41–51)
PCO2 BLDMV: 35 MM HG (ref 41–51)
PCO2 BLDMV: 36 MM HG (ref 41–51)
PCO2 BLDMV: 38 MM HG (ref 41–51)
PCO2 BLDV: 38 MM HG (ref 41–51)
PCO2 BLDV: 38 MM HG (ref 41–51)
PCO2 BLDV: 39 MM HG (ref 41–51)
PCO2 BLDV: 41 MM HG (ref 41–51)
PCO2 BLDV: 43 MM HG (ref 41–51)
PH BLDA: 7.3 PH (ref 7.38–7.42)
PH BLDA: 7.31 PH (ref 7.38–7.42)
PH BLDA: 7.33 PH (ref 7.38–7.42)
PH BLDA: 7.41 PH (ref 7.38–7.42)
PH BLDMV: 7.28 PH (ref 7.33–7.43)
PH BLDMV: 7.31 PH (ref 7.33–7.43)
PH BLDMV: 7.32 PH (ref 7.33–7.43)
PH BLDMV: 7.32 PH (ref 7.33–7.43)
PH BLDMV: 7.41 PH (ref 7.33–7.43)
PH BLDV: 7.27 PH (ref 7.33–7.43)
PH BLDV: 7.28 PH (ref 7.33–7.43)
PH BLDV: 7.28 PH (ref 7.33–7.43)
PH BLDV: 7.29 PH (ref 7.33–7.43)
PH BLDV: 7.37 PH (ref 7.33–7.43)
PHOSPHATE SERPL-MCNC: 4.5 MG/DL (ref 2.5–4.9)
PHOSPHATE SERPL-MCNC: 8.9 MG/DL (ref 2.5–4.9)
PHOSPHATE SERPL-MCNC: 9.2 MG/DL (ref 2.5–4.9)
PLATELET # BLD AUTO: 94 X10*3/UL (ref 150–450)
PLATELET # BLD AUTO: 96 X10*3/UL (ref 150–450)
PO2 BLDA: 100 MM HG (ref 85–95)
PO2 BLDA: 126 MM HG (ref 85–95)
PO2 BLDA: 94 MM HG (ref 85–95)
PO2 BLDA: 95 MM HG (ref 85–95)
PO2 BLDMV: 61 MM HG (ref 35–45)
PO2 BLDMV: 62 MM HG (ref 35–45)
PO2 BLDMV: 64 MM HG (ref 35–45)
PO2 BLDMV: 65 MM HG (ref 35–45)
PO2 BLDMV: 66 MM HG (ref 35–45)
PO2 BLDV: 40 MM HG (ref 35–45)
PO2 BLDV: 43 MM HG (ref 35–45)
PO2 BLDV: 44 MM HG (ref 35–45)
PO2 BLDV: 44 MM HG (ref 35–45)
PO2 BLDV: 46 MM HG (ref 35–45)
POTASSIUM BLDA-SCNC: 3.7 MMOL/L (ref 3.5–5.3)
POTASSIUM BLDA-SCNC: 4.8 MMOL/L (ref 3.5–5.3)
POTASSIUM BLDMV-SCNC: 3.7 MMOL/L (ref 3.5–5.3)
POTASSIUM BLDMV-SCNC: 4.7 MMOL/L (ref 3.5–5.3)
POTASSIUM BLDMV-SCNC: 4.7 MMOL/L (ref 3.5–5.3)
POTASSIUM BLDMV-SCNC: 4.8 MMOL/L (ref 3.5–5.3)
POTASSIUM BLDMV-SCNC: 4.8 MMOL/L (ref 3.5–5.3)
POTASSIUM BLDV-SCNC: 3.7 MMOL/L (ref 3.5–5.3)
POTASSIUM BLDV-SCNC: 4.7 MMOL/L (ref 3.5–5.3)
POTASSIUM BLDV-SCNC: 4.8 MMOL/L (ref 3.5–5.3)
POTASSIUM BLDV-SCNC: 4.9 MMOL/L (ref 3.5–5.3)
POTASSIUM BLDV-SCNC: 4.9 MMOL/L (ref 3.5–5.3)
POTASSIUM SERPL-SCNC: 3.7 MMOL/L (ref 3.5–5.3)
POTASSIUM SERPL-SCNC: 4.5 MMOL/L (ref 3.5–5.3)
POTASSIUM SERPL-SCNC: 4.6 MMOL/L (ref 3.5–5.3)
PROT SERPL-MCNC: 4.8 G/DL (ref 6.4–8.2)
RBC # BLD AUTO: 2.98 X10*6/UL (ref 4.5–5.9)
RBC # BLD AUTO: 2.98 X10*6/UL (ref 4.5–5.9)
SAO2 % BLDA: 100 % (ref 94–100)
SAO2 % BLDA: 98 % (ref 94–100)
SAO2 % BLDA: 98 % (ref 94–100)
SAO2 % BLDA: 99 % (ref 94–100)
SAO2 % BLDMV: 87 % (ref 45–75)
SAO2 % BLDMV: 89 % (ref 45–75)
SAO2 % BLDMV: 90 % (ref 45–75)
SAO2 % BLDMV: 90 % (ref 45–75)
SAO2 % BLDMV: 91 % (ref 45–75)
SAO2 % BLDV: 54 % (ref 45–75)
SAO2 % BLDV: 57 % (ref 45–75)
SAO2 % BLDV: 58 % (ref 45–75)
SAO2 % BLDV: 60 % (ref 45–75)
SAO2 % BLDV: 65 % (ref 45–75)
SODIUM BLDA-SCNC: 130 MMOL/L (ref 136–145)
SODIUM BLDA-SCNC: 131 MMOL/L (ref 136–145)
SODIUM BLDA-SCNC: 131 MMOL/L (ref 136–145)
SODIUM BLDA-SCNC: 133 MMOL/L (ref 136–145)
SODIUM BLDMV-SCNC: 131 MMOL/L (ref 136–145)
SODIUM BLDMV-SCNC: 131 MMOL/L (ref 136–145)
SODIUM BLDMV-SCNC: 132 MMOL/L (ref 136–145)
SODIUM BLDMV-SCNC: 132 MMOL/L (ref 136–145)
SODIUM BLDMV-SCNC: 134 MMOL/L (ref 136–145)
SODIUM BLDV-SCNC: 131 MMOL/L (ref 136–145)
SODIUM BLDV-SCNC: 132 MMOL/L (ref 136–145)
SODIUM BLDV-SCNC: 133 MMOL/L (ref 136–145)
SODIUM SERPL-SCNC: 136 MMOL/L (ref 136–145)
SODIUM SERPL-SCNC: 136 MMOL/L (ref 136–145)
SODIUM SERPL-SCNC: 137 MMOL/L (ref 136–145)
VANCOMYCIN SERPL-MCNC: 22.7 UG/ML (ref 5–20)
WBC # BLD AUTO: 16.6 X10*3/UL (ref 4.4–11.3)
WBC # BLD AUTO: 17.3 X10*3/UL (ref 4.4–11.3)

## 2025-05-23 PROCEDURE — 83010 ASSAY OF HAPTOGLOBIN QUANT: CPT

## 2025-05-23 PROCEDURE — 83615 LACTATE (LD) (LDH) ENZYME: CPT

## 2025-05-23 PROCEDURE — 85025 COMPLETE CBC W/AUTO DIFF WBC: CPT

## 2025-05-23 PROCEDURE — 36556 INSERT NON-TUNNEL CV CATH: CPT | Performed by: STUDENT IN AN ORGANIZED HEALTH CARE EDUCATION/TRAINING PROGRAM

## 2025-05-23 PROCEDURE — 82947 ASSAY GLUCOSE BLOOD QUANT: CPT

## 2025-05-23 PROCEDURE — 99233 SBSQ HOSP IP/OBS HIGH 50: CPT | Performed by: STUDENT IN AN ORGANIZED HEALTH CARE EDUCATION/TRAINING PROGRAM

## 2025-05-23 PROCEDURE — 99232 SBSQ HOSP IP/OBS MODERATE 35: CPT | Performed by: INTERNAL MEDICINE

## 2025-05-23 PROCEDURE — 84132 ASSAY OF SERUM POTASSIUM: CPT

## 2025-05-23 PROCEDURE — 83735 ASSAY OF MAGNESIUM: CPT

## 2025-05-23 PROCEDURE — 85027 COMPLETE CBC AUTOMATED: CPT

## 2025-05-23 PROCEDURE — 99232 SBSQ HOSP IP/OBS MODERATE 35: CPT | Performed by: PHYSICIAN ASSISTANT

## 2025-05-23 PROCEDURE — 84100 ASSAY OF PHOSPHORUS: CPT

## 2025-05-23 PROCEDURE — 90937 HEMODIALYSIS REPEATED EVAL: CPT

## 2025-05-23 PROCEDURE — 99291 CRITICAL CARE FIRST HOUR: CPT

## 2025-05-23 PROCEDURE — 2500000002 HC RX 250 W HCPCS SELF ADMINISTERED DRUGS (ALT 637 FOR MEDICARE OP, ALT 636 FOR OP/ED)

## 2025-05-23 PROCEDURE — 2500000004 HC RX 250 GENERAL PHARMACY W/ HCPCS (ALT 636 FOR OP/ED): Mod: TB

## 2025-05-23 PROCEDURE — 02HV33Z INSERTION OF INFUSION DEVICE INTO SUPERIOR VENA CAVA, PERCUTANEOUS APPROACH: ICD-10-PCS | Performed by: HOSPITALIST

## 2025-05-23 PROCEDURE — 82248 BILIRUBIN DIRECT: CPT

## 2025-05-23 PROCEDURE — 94003 VENT MGMT INPAT SUBQ DAY: CPT

## 2025-05-23 PROCEDURE — 80202 ASSAY OF VANCOMYCIN: CPT | Performed by: PSYCHOLOGIST

## 2025-05-23 PROCEDURE — 85347 COAGULATION TIME ACTIVATED: CPT

## 2025-05-23 PROCEDURE — 2500000005 HC RX 250 GENERAL PHARMACY W/O HCPCS

## 2025-05-23 PROCEDURE — 86022 PLATELET ANTIBODIES: CPT

## 2025-05-23 PROCEDURE — 2020000001 HC ICU ROOM DAILY

## 2025-05-23 PROCEDURE — 2500000004 HC RX 250 GENERAL PHARMACY W/ HCPCS (ALT 636 FOR OP/ED)

## 2025-05-23 PROCEDURE — 2500000004 HC RX 250 GENERAL PHARMACY W/ HCPCS (ALT 636 FOR OP/ED): Mod: JZ

## 2025-05-23 PROCEDURE — 37799 UNLISTED PX VASCULAR SURGERY: CPT

## 2025-05-23 PROCEDURE — 71045 X-RAY EXAM CHEST 1 VIEW: CPT | Performed by: RADIOLOGY

## 2025-05-23 PROCEDURE — 2500000001 HC RX 250 WO HCPCS SELF ADMINISTERED DRUGS (ALT 637 FOR MEDICARE OP)

## 2025-05-23 PROCEDURE — 85730 THROMBOPLASTIN TIME PARTIAL: CPT

## 2025-05-23 PROCEDURE — 71045 X-RAY EXAM CHEST 1 VIEW: CPT

## 2025-05-23 RX ORDER — POLYETHYLENE GLYCOL 3350 17 G/17G
17 POWDER, FOR SOLUTION ORAL 2 TIMES DAILY
Status: DISCONTINUED | OUTPATIENT
Start: 2025-05-23 | End: 2025-05-25

## 2025-05-23 RX ORDER — BIVALIRUDIN 250 MG/5ML
0.05 INJECTION, POWDER, LYOPHILIZED, FOR SOLUTION INTRAVENOUS CONTINUOUS
Status: DISCONTINUED | OUTPATIENT
Start: 2025-05-23 | End: 2025-05-23

## 2025-05-23 RX ORDER — SODIUM BICARBONATE 1 MEQ/ML
50 SYRINGE (ML) INTRAVENOUS ONCE
Status: COMPLETED | OUTPATIENT
Start: 2025-05-23 | End: 2025-05-23

## 2025-05-23 RX ORDER — HEPARIN 100 UNIT/ML
1 SYRINGE INTRAVENOUS AS NEEDED
Status: DISCONTINUED | OUTPATIENT
Start: 2025-05-23 | End: 2025-05-24

## 2025-05-23 RX ORDER — SODIUM BICARBONATE 1 MEQ/ML
25 SYRINGE (ML) INTRAVENOUS ONCE
Status: DISCONTINUED | OUTPATIENT
Start: 2025-05-23 | End: 2025-05-23

## 2025-05-23 RX ORDER — HEPARIN SODIUM 1000 [USP'U]/ML
INJECTION, SOLUTION INTRAVENOUS; SUBCUTANEOUS
Status: COMPLETED
Start: 2025-05-23 | End: 2025-05-24

## 2025-05-23 RX ORDER — BIVALIRUDIN 250 MG/5ML
0-.49 INJECTION, POWDER, LYOPHILIZED, FOR SOLUTION INTRAVENOUS CONTINUOUS
Status: DISCONTINUED | OUTPATIENT
Start: 2025-05-23 | End: 2025-05-26

## 2025-05-23 RX ADMIN — Medication 30 PERCENT: at 16:40

## 2025-05-23 RX ADMIN — Medication 30 PERCENT: at 20:00

## 2025-05-23 RX ADMIN — PIPERACILLIN SODIUM AND TAZOBACTAM SODIUM 2.25 G: 2; .25 INJECTION, SOLUTION INTRAVENOUS at 03:20

## 2025-05-23 RX ADMIN — INSULIN LISPRO 1 UNITS: 100 INJECTION, SOLUTION INTRAVENOUS; SUBCUTANEOUS at 08:13

## 2025-05-23 RX ADMIN — INSULIN LISPRO 1 UNITS: 100 INJECTION, SOLUTION INTRAVENOUS; SUBCUTANEOUS at 04:10

## 2025-05-23 RX ADMIN — SODIUM BICARBONATE 50 MEQ: 84 INJECTION INTRAVENOUS at 12:14

## 2025-05-23 RX ADMIN — ASPIRIN 81 MG: 81 TABLET, CHEWABLE ORAL at 08:35

## 2025-05-23 RX ADMIN — BIVALIRUDIN 0.08 MG/KG/HR: 250 INJECTION INTRACAVERNOUS at 18:03

## 2025-05-23 RX ADMIN — PIPERACILLIN SODIUM AND TAZOBACTAM SODIUM 2.25 G: 2; .25 INJECTION, SOLUTION INTRAVENOUS at 16:13

## 2025-05-23 RX ADMIN — PIPERACILLIN SODIUM AND TAZOBACTAM SODIUM 2.25 G: 2; .25 INJECTION, SOLUTION INTRAVENOUS at 08:35

## 2025-05-23 RX ADMIN — PIPERACILLIN SODIUM AND TAZOBACTAM SODIUM 2.25 G: 2; .25 INJECTION, SOLUTION INTRAVENOUS at 21:14

## 2025-05-23 RX ADMIN — SODIUM BICARBONATE 50 MEQ: 84 INJECTION INTRAVENOUS at 12:13

## 2025-05-23 RX ADMIN — PANTOPRAZOLE SODIUM 40 MG: 40 INJECTION, POWDER, FOR SOLUTION INTRAVENOUS at 20:18

## 2025-05-23 RX ADMIN — POLYETHYLENE GLYCOL 3350 17 G: 17 POWDER, FOR SOLUTION ORAL at 20:18

## 2025-05-23 RX ADMIN — BIVALIRUDIN 0.05 MG/KG/HR: 250 INJECTION INTRACAVERNOUS at 14:04

## 2025-05-23 RX ADMIN — Medication 30 PERCENT: at 08:00

## 2025-05-23 RX ADMIN — Medication 75 MCG/HR: at 19:19

## 2025-05-23 RX ADMIN — INSULIN LISPRO 1 UNITS: 100 INJECTION, SOLUTION INTRAVENOUS; SUBCUTANEOUS at 00:36

## 2025-05-23 RX ADMIN — PANTOPRAZOLE SODIUM 40 MG: 40 INJECTION, POWDER, FOR SOLUTION INTRAVENOUS at 08:35

## 2025-05-23 RX ADMIN — BUMETANIDE 2 MG/HR: 0.25 INJECTION INTRAMUSCULAR; INTRAVENOUS at 05:04

## 2025-05-23 RX ADMIN — INSULIN LISPRO 1 UNITS: 100 INJECTION, SOLUTION INTRAVENOUS; SUBCUTANEOUS at 17:19

## 2025-05-23 RX ADMIN — Medication 75 MCG/HR: at 06:11

## 2025-05-23 RX ADMIN — POLYETHYLENE GLYCOL 3350 17 G: 17 POWDER, FOR SOLUTION ORAL at 08:35

## 2025-05-23 RX ADMIN — INSULIN LISPRO 1 UNITS: 100 INJECTION, SOLUTION INTRAVENOUS; SUBCUTANEOUS at 12:12

## 2025-05-23 RX ADMIN — SODIUM BICARBONATE 10 ML/HR: 84 INJECTION, SOLUTION INTRAVENOUS at 14:09

## 2025-05-23 RX ADMIN — BIVALIRUDIN 0.11 MG/KG/HR: 250 INJECTION INTRACAVERNOUS at 18:48

## 2025-05-23 RX ADMIN — HEPARIN SODIUM 10 ML/HR: 10000 INJECTION, SOLUTION INTRAVENOUS; SUBCUTANEOUS at 11:00

## 2025-05-23 ASSESSMENT — PAIN - FUNCTIONAL ASSESSMENT
PAIN_FUNCTIONAL_ASSESSMENT: CPOT (CRITICAL CARE PAIN OBSERVATION TOOL)

## 2025-05-23 ASSESSMENT — PAIN SCALES - GENERAL
PAINLEVEL_OUTOF10: 0 - NO PAIN
PAINLEVEL_OUTOF10: 0 - NO PAIN

## 2025-05-23 NOTE — PROCEDURES
Central Line    Date/Time: 5/23/2025 2:27 PM    Performed by: Vonda Gillis MD  Authorized by: Vonda Gillsi MD    Consent:     Consent obtained:  Written    Consent given by:  Guardian    Risks, benefits, and alternatives were discussed: yes      Risks discussed:  Arterial puncture, incorrect placement, nerve damage, pneumothorax, infection and bleeding  Universal protocol:     Patient identity confirmed:  Arm band  Pre-procedure details:     Indication(s): central venous access      Indication(s) comment:  Dialysis    Hand hygiene: Hand hygiene performed prior to insertion      Sterile barrier technique: All elements of maximal sterile technique followed      Skin preparation:  Chlorhexidine    Skin preparation agent: Skin preparation agent completely dried prior to procedure    Sedation:     Sedation type:  Deep  Anesthesia:     Anesthesia method:  None  Procedure details:     Location:  L internal jugular    Patient position:  Supine    Catheter size:  13 Fr    Ultrasound guidance: yes      Ultrasound guidance timing: real time      Sterile ultrasound techniques: Sterile gel and sterile probe covers were used      Number of attempts:  1    Successful placement: yes    Post-procedure details:     Post-procedure:  Line sutured    Assessment:  Blood return through all ports    Procedure completion:  Tolerated well, no immediate complications  Comments:      A time-out was performed. The patient's right neck region was prepped and draped in sterile fashion using chlorhexidine scrub. Anesthesia was achieved with 3cc 1% lidocaine. The right internal jugular vein was accessed used a micropuncture needle. Venous blood was withdrawn, the syringe was disconnected and the micropuncture wire was advanced through the needle, the needle was removed while maintaining the micropuncture wire within the vessel, then the micropuncture sheath was advanced into the vein over the wire. Venous position was confirmed with ultrasound  visualization of guidewire and manometry. A larger guidewire was advanced through the sheath. A small incision was made with a 10 blade scalpel and the sheath was exchanged for a dilator. Blood return through all the ports. Line was sutured.

## 2025-05-23 NOTE — CARE PLAN
Problem: Safety - Medical Restraint  Goal: Remains free of injury from restraints (Restraint for Interference with Medical Device)  Outcome: Progressing  Flowsheets (Taken 5/23/2025 0623)  Remains free of injury from restraints (restraint for interference with medical device): Every 2 hours: Monitor safety, psychosocial status, comfort, nutrition and hydration  Goal: Free from restraint(s) (Restraint for Interference with Medical Device)  Outcome: Progressing  Flowsheets (Taken 5/23/2025 0623)  Free from restraint(s) (restraint for interference with medical device): ONCE/SHIFT or MINIMUM Every 12 hours: Assess and document the continuing need for restraints     Problem: Skin  Goal: Prevent/manage excess moisture  Outcome: Progressing  Flowsheets (Taken 5/23/2025 0623)  Prevent/manage excess moisture: Moisturize dry skin  Goal: Prevent/minimize sheer/friction injuries  Outcome: Progressing  Flowsheets (Taken 5/23/2025 0623)  Prevent/minimize sheer/friction injuries: Turn/reposition every 2 hours/use positioning/transfer devices   The patient's goals for the shift include      The clinical goals for the shift include Pt will remain hemodynamically stable througout shift.

## 2025-05-23 NOTE — PROGRESS NOTES
Alpesh Elena is a 45 y.o. male on day 3 of admission presenting with STEMI (ST elevation myocardial infarction) (Multi).      Subjective   No acute event overnight.  No sign of renal recovery.  Failed diuretic trial yesterday, had UOP 414ml  x24hr  I/O: net even  CVP 14  No New oxygen requirements.  Renal function pleatu-ing       Objective          Vitals 24HR  Heart Rate:  [119-124]   Temp:  [36.7 °C (98.1 °F)-37.2 °C (99 °F)]   Resp:  [12-18]   Weight:  [88.1 kg (194 lb 3.6 oz)]   SpO2:  [90 %-98 %]         Intake/Output last 3 Shifts:    Intake/Output Summary (Last 24 hours) at 5/23/2025 1250  Last data filed at 5/23/2025 1100  Gross per 24 hour   Intake 753.9 ml   Output 484 ml   Net 269.9 ml     On exam:  Sedated and intubated  CTAB  2+ edema  S1S2, holosystolic murmer       Assessment & Plan  STEMI (ST elevation myocardial infarction) (Multi)    Cardiogenic shock (Multi)    Alpesh Elena is a 45 y.o. year old male patient admitted on 5/20/2025 with following ICU needs: Cardiogenic shock, impella, VSD/ Inferior LV wall rupture . Nephrology following for BENY.    #BENY  -No baseline CKD, baseline cr 0.9-1  -Electrolytes-stable  -Acidbase are acceptable, mild lactic acidosis, NAGMA  -Hemodynamics labile but not on pressors.  -UA- RBCs, 2+ blood, trace proteins  -CT abd- no hydro or obstructions  -UOP-anuric since morning.  -Etio: likely ATN from contrast, septic shock vs cardiogenic shock   #HAGMA and metabolic alkalosis  #HFrEF  #lactic acidosis-improving  #sepsis - on zosyn and vanco     -Recommendations:  -SLED tonight as per submitted orders.  -Dose all treatment to GFR.  -Avoid hypotension, further contrast and nephrotoxins if possible.  -Strict I/Os.  -Daily BMP, please do labs 2 hours after finishing SLED  -Will follow.              Hailee Parrish MD    Nephrology fellow.

## 2025-05-23 NOTE — PROGRESS NOTES
Alpesh Elena is a 45 y.o. male on day 3 of admission presenting with STEMI (ST elevation myocardial infarction) (Multi).    Subjective   Interval History:   Afebrile, last spike 5/21     Wbc 16 (21)(23)  Creati 10 (8), SLED will be started today  ALT 1048    Fred 4.7  Hapto low  LDH 2089  Hb 8 (15 days ago  PLT 94     RVP 5/19 -ve  5/19 Urinalysis wbc 1-5  Echo repeated today    Echo 5/20  1. Poorly visualized anatomical structures due to suboptimal image quality.   2. The left ventricle was not well visualized. The left ventricular ejection fraction could not be measured.   3. Limited views of the LV appear show a hyderdynamic LV with a large color flow from LV to RV through the inferoseptum with maximal velocity of 3.3m/sec, Unable to adequatly assess LVEF, though appears to have baal septal and inferior wall motion abnormality.   4. There is reduced right ventricular systolic function.   5. The RV appears dilated with significantly reduced function with reduced TAPSE and fractional area change.   6. Primary service notified of findings at the time of reporting.   7. Compared with the prior exam from 5/19/2025 ([facility]) there has been interval development of a large VSD throuh the inferoseptum with the RV now becoming large with significantly reduced function. Prior echo with reported LVEF of 55% with basal inferospetal and baal inferior hypokinesis in the setting of STEMI. RV size and function were nornal at that time. ( Note that the septum was more thoroughly interrogated with color Doppler today).    Objective   Range of Vitals (last 24 hours)  Heart Rate:  [119-124]   Temp:  [36.4 °C (97.5 °F)-37.2 °C (99 °F)]   Resp:  [14-18]   Weight:  [88.1 kg (194 lb 3.6 oz)]   SpO2:  [94 %-98 %]   Daily Weight  05/23/25 : 88.1 kg (194 lb 3.6 oz)    Body mass index is 29.53 kg/m².    Physical Exam  Constitutional:       Comments: Intubated and sedated   Cardiovascular:      Heart sounds: Murmur  heard.   Pulmonary:      Comments: MV sounds  Abdominal:      Palpations: Abdomen is soft.      Tenderness: There is no abdominal tenderness.     Antibiotics  piperacillin-tazobactam - 2.25 gram/50 mL  vancomycin    Relevant Results  Labs  Results from last 72 hours   Lab Units 05/23/25 0409 05/23/25 0014 05/22/25 1748 05/22/25  0438   WBC AUTO x10*3/uL 16.6* 17.3* 17.6* 21.3*   HEMOGLOBIN g/dL 8.9* 8.9* 9.1* 9.5*   HEMATOCRIT % 26.8* 25.6* 26.2* 27.9*   PLATELETS AUTO x10*3/uL 94* 96* 108* 120*   NEUTROS PCT AUTO % 80.9  --  80.8 83.3   LYMPHS PCT AUTO % 8.2  --  10.0 8.2   MONOS PCT AUTO % 8.6  --  7.6 6.9   EOS PCT AUTO % 0.5  --  0.2 0.0     Results from last 72 hours   Lab Units 05/23/25 0409 05/23/25 0014 05/22/25 1748 05/22/25  0438   SODIUM mmol/L 137 136 138 137   POTASSIUM mmol/L 4.5 4.6 4.8 4.3   CHLORIDE mmol/L 95* 95* 96* 97*   CO2 mmol/L 18* 19* 22 23   BUN mg/dL 136* 128* 125* 118*   CREATININE mg/dL 10.71* 10.68* 10.30* 8.35*   GLUCOSE mg/dL 175* 174* 190* 167*   CALCIUM mg/dL 6.9* 7.0* 7.1* 7.0*   ANION GAP mmol/L 29* 27* 25* 21*   EGFR mL/min/1.73m*2 5* 5* 6* 7*   PHOSPHORUS mg/dL 8.9* 9.2*  --  8.4*     Results from last 72 hours   Lab Units 05/23/25 0409 05/23/25 0014 05/22/25 1748 05/22/25 0438 05/21/25  1646 05/21/25  0916   ALK PHOS U/L 71  --  68 70  --  75   BILIRUBIN TOTAL mg/dL 4.7*  --  4.3* 4.3*  --  5.0*   BILIRUBIN DIRECT mg/dL 3.3*  --   --  2.8*  --  2.8*   PROTEIN TOTAL g/dL 4.8*  --  5.1* 4.8*  --  5.4*   ALT U/L 1,048*  --  1,127* 1,326*  --  1,459*   AST U/L 459*  --  627* 891*  --  1,888*   ALBUMIN g/dL 2.8* 2.9* 2.9* 2.9*   < > 3.2*    < > = values in this interval not displayed.     Estimated Creatinine Clearance: 9.3 mL/min (A) (by C-G formula based on SCr of 10.71 mg/dL (H)).  C-Reactive Protein   Date Value Ref Range Status   07/07/2024 10.34 (H) <1.00 mg/dL Final     Microbiology  Susceptibility data from last 14 days.  Collected Specimen Info Organism   05/20/25  Fluid from Tracheal Aspirate Normal throat margaret       Imaging  Pulmonary edema is slightly improved in comparison to previous study.     zosyn day 5, vancomycin day 3     5/19 blood c/s NGTD  5/20 sputum c/s gm +Ve cocci  Legionella ,strep -ve  5/21 blood c/s NGTD      HIV -ve  Hepatitis A, B, C -ve     Assessment/Plan   Patient with initial presentation for chest pain ultimately dx with STEMI, s/p cath, impella, intubation, and c/b VSD rupture. A large component of patient's shock is likely cardiogenic in nature.      #Shock, likely cardiogenic vs septic,   # STEMI, VSD  # fluid overload vs HAP   # BENY, SLED started  # persistent leukocytosis,thrombocytopenia and dropping hb, low hapro and high LDH suggestive of hemolysis  # deranged LFT , hepatitis -ve. Ischemic hepatitis   # Fever and leukocytosis of unclear source , afebrile, wbc decreasing, other labs suggestive of hemolysis, would continue same antibiotics pending c/s results    Recommendations:  - continue vancomycin and zosyn, appreciate pharmacy dosing            Jennifer Islas MD

## 2025-05-23 NOTE — PROGRESS NOTES
Vancomycin Dosing by Pharmacy- FOLLOW UP    Alpesh Elena is a 45 y.o. year old male who Pharmacy has been consulted for vancomycin dosing for pneumonia. Based on the patient's indication and renal status this patient is being dosed based on a goal trough/random level of 15-20.      Renal function is currently declining.    Current vancomycin dose: dosing off levels    Most recent random level: 22.7 mcg/mL    Visit Vitals  BP (!) 102/92   Pulse (!) 121   Temp 37 °C (98.6 °F) (Core)   Resp 16        Lab Results   Component Value Date    CREATININE 10.71 (H) 2025    CREATININE 10.68 (H) 2025    CREATININE 10.30 (H) 2025    CREATININE 8.35 (H) 2025        Patient weight is as follows:   Vitals:    25 0600   Weight: 88.1 kg (194 lb 3.6 oz)       Cultures:  Susceptibility data for the encounter in last 14 days.  Collected Specimen Info Organism   25 Fluid from Tracheal Aspirate Normal throat margaret        I/O last 3 completed shifts:  In: 1952.1 (22.5 mL/kg) [I.V.:732.1 (8.4 mL/kg); NG/GT:120; IV Piggyback:1100]  Out: 1120 (12.9 mL/kg) [Urine:270 (0.1 mL/kg/hr); Emesis/NG output:850]  Weight: 86.9 kg   I/O during current shift:  I/O this shift:  In: 753.9 [I.V.:603.9; IV Piggyback:150]  Out: 259 [Urine:259]    Temp (24hrs), Av.8 °C (98.3 °F), Min:36.4 °C (97.5 °F), Max:37 °C (98.6 °F)      Assessment/Plan    Level is still above goal. Dose was held yesterday and will continue to hold today.    The next level will be obtained on  at AM labs. May be obtained sooner if clinically indicated.   Will continue to monitor renal function daily while on vancomycin and order serum creatinine at least every 48 hours if not already ordered.  Follow for continued vancomycin needs, clinical response, and signs/symptoms of toxicity.       Vivienne Oleary, PharmD

## 2025-05-23 NOTE — PROGRESS NOTES
CARDIAC SURGERY CONSULT PROGRESS NOTE    SUBJECTIVE  Mr. Elena is a 45 year old male with a past medical history of tobacco abuse (30 pack years), submandibular abscess, and nephrolithiasis who presented to [facility] with a chief complaint of chest pain, found to have an inferior STEMI, now s/p coronary angiogram with balloon angioplasty to the PDA (right dominant system). His course was complicated by hemodynamic instability (tachycardia, hypotension) requiring vasopressor support and laboratory evidnce of end organ dysfunction. Due to concern for cardiogenic vs mixed shock, a shock call was intervened and recommended transfer to [facility] CICU. TTE at [facility] revealed large VSD. He underwent Impella CP placement 5/20.      Cardiac surgery is consulted for a VSD repair post STEMI.      Hospital course:   Per chart review, presented to [facility] 05/18 PM for the evaluation of chest tightness associated with SOB/nausea. On arrival he was afebrile, tachycardic, and hypertensive. ED workup remarkable for initial WBC 20.5, Cr 0.79, troponin ~24K,  and ECG with ST elevations in the inferior leads with reciprocal depressions. He was loaded with aspirin and brilinta and started on heparin gtt. He was taken for emergent coronary angiogram which revealed multi vessel CAD [LAD/Lcx/RCA mildly/diffusely diseased,100% RPDA culprit lesion] with PTCA to RPDA with 50% residual stenosis [GLENNA 0->3] (too small to stent). Notably, LVEF 45% and LVED 20-25 mmHg.      He then developed on 5/19 persistent tachycardia despite IVF and IV metoprolol. TTE with LVEF 55%, wall motion abnormalities, and no significant valvular disease. Due to concern for acute aortic pathology vs PE, he underwent a CTA CAP revealing of mild pulmonary edema. Due to concern for PNA, blood cultures were obtained and he was started on CTX/Doxycycline. Later that evening was in respiratory distress (tachypneic to RR 50) with hypotension (BP 85/52 ) and satting  92% on RA with labs revealing of end organ dysfunction and CXR with bilateral pulmonary infiltrates [pulmoinary edema vs multifocal PNA]. A shock call was convened and recommended intubation and PA catheter placement for adjudication of hemodynamics. He was intubated without issue and opening [place] numbers were revealing of RA 14, RV 55/18, PA 51/30 (40), PCWP 32, svO2 87, [place] CO 13.9, [place] CI 7.3.  He was then transferred to [facility].      On arrival to the CICU he was hypotensive with escalating pressor requirements. Bedside examination reveals a holosystolic murmur and serial mixed venous are revealing of a significant step up in SVO2 from the RA to PA concerning for the presence of a ischemic VSD, perhaps secondardy to a late presenting STEMI given troponin elevation to 24K on presentation. Shock call was done that recommended an impella CP placement to offload LV given concern for STEMI-induced VSD/ Inferior LV wall rupture.      Cardiac/Pertinent Imaging  C: 5/18/25:  CONCLUSIONS:   1. Left Main Coronary Artery: This artery is normal.   2. Left Anterior Descending Artery: is mildly diseased, is diffusely diseased and is ectatic.   3. Circumflex Coronary Artery: diffusely dieased, mildly diseased and ectatic.   4. Right Coronary Artery: is tortuous, is diffusely diseased and mildly diseased.   5. PDA RCA Lesion: The percent stenosis is 100%.   6. PDA RCA Lesion: PTCA: 50% residual stenosis. RCA: pre-procedure GLENNA flow was 0(no perfusion) and post-procedure GLENNA flow was 3(complete perfusion).   7. The Left Ventricular Ejection Fraction is 45%.   8. Left Ventricular End Diastolic Pressure: 20-25 mm Hg.   9. Aortic Stenosis: None.  10. Left Ventricular End Diastolic Pressure Dysfunction: Moderate.  11. LV: inferior akinesis.  12. LV: mild left ventricular dysfunction with regional wall motion abnormalities.     Echo: 5/20/25:  CONCLUSIONS:   1. Poorly visualized anatomical structures due to suboptimal image  "quality.   2. The left ventricle was not well visualized. The left ventricular ejection fraction could not be measured.   3. Limited views of the LV appear show a hyderdynamic LV with a large color flow from LV to RV through the inferoseptum with maximal velocity of 3.3m/sec, Unable to adequatly assess LVEF, though appears to have baal septal and inferior wall motion abnormality.   4. There is reduced right ventricular systolic function.   5. The RV appears dilated with significantly reduced function with reduced TAPSE and fractional area change.   6. Primary service notified of findings at the time of reporting.   7. Compared with the prior exam from 5/19/2025 ([facility]) there has been interval development of a large VSD throuh the inferoseptum with the RV now becoming large with significantly reduced function. Prior echo with reported LVEF of 55% with basal inferospetal and baal inferior hypokinesis in the setting of STEMI. RV size and function were nornal at that time. ( Note that the septum was more thoroughly interrogated with color Doppler today).    Objective   BP (!) 102/92   Pulse (!) 126   Temp 36.9 °C (98.4 °F) (Core)   Resp 13   Ht 1.727 m (5' 8\")   Wt 88.2 kg (194 lb 8 oz)   SpO2 96%   BMI 29.57 kg/m²   0-10 (Numeric) Pain Score: 0 - No pain  Critical-Care Pain Observation Score:  [0]    Vitals:    05/23/25 1229   Weight: 88.2 kg (194 lb 8 oz)      Physical Exam  Constitutional:       Comments: Intubated and sedated   HENT:      Head: Normocephalic.      Mouth/Throat:      Mouth: Mucous membranes are moist.   Cardiovascular:      Rate and Rhythm: Regular rhythm. Tachycardia present.      Heart sounds: Murmur heard.      Comments: Impella in place  Pulmonary:      Comments: Mechanical breath sounds  Musculoskeletal:         General: Normal range of motion.      Cervical back: Neck supple.      Right lower leg: No edema.      Left lower leg: No edema.   Skin:     General: Skin is warm. "   Neurological:      Comments: Intubated and sedated          Intake/Output Summary (Last 24 hours) at 5/23/2025 1440  Last data filed at 5/23/2025 1300  Gross per 24 hour   Intake 753.9 ml   Output 464 ml   Net 289.9 ml         Medications  Scheduled medications  Scheduled Medications[1]Continuous medications  Continuous Medications[2]PRN medications  PRN Medications[3]    Labs  Results for orders placed or performed during the hospital encounter of 05/20/25 (from the past 24 hours)   Blood Gas Arterial Full Panel   Result Value Ref Range    POCT pH, Arterial 7.31 (L) 7.38 - 7.42 pH    POCT pCO2, Arterial 36 (L) 38 - 42 mm Hg    POCT pO2, Arterial 98 (H) 85 - 95 mm Hg    POCT SO2, Arterial 99 94 - 100 %    POCT Oxy Hemoglobin, Arterial 95.9 94.0 - 98.0 %    POCT Hematocrit Calculated, Arterial 29.0 (L) 41.0 - 52.0 %    POCT Sodium, Arterial 132 (L) 136 - 145 mmol/L    POCT Potassium, Arterial 4.7 3.5 - 5.3 mmol/L    POCT Chloride, Arterial 97 (L) 98 - 107 mmol/L    POCT Ionized Calcium, Arterial 0.90 (L) 1.10 - 1.33 mmol/L    POCT Glucose, Arterial 186 (H) 74 - 99 mg/dL    POCT Lactate, Arterial 1.2 0.4 - 2.0 mmol/L    POCT Base Excess, Arterial -7.5 (L) -2.0 - 3.0 mmol/L    POCT HCO3 Calculated, Arterial 18.1 (L) 22.0 - 26.0 mmol/L    POCT Hemoglobin, Arterial 9.6 (L) 13.5 - 17.5 g/dL    POCT Anion Gap, Arterial 22 10 - 25 mmo/L    Patient Temperature 37.0 degrees Celsius    FiO2 30 %   Blood Gas Mixed Venous Full Panel   Result Value Ref Range    POCT pH, Mixed 7.30 (L) 7.33 - 7.43 pH    POCT pCO2, Mixed 37 (L) 41 - 51 mm Hg    POCT pO2, Mixed 67 (H) 35 - 45 mm Hg    POCT SO2, Mixed 91 (H) 45 - 75 %    POCT Oxy Hemoglobin, Mixed 88.2 (H) 45.0 - 75.0 %    POCT Hematocrit Calculated, Mixed 28.0 (L) 41.0 - 52.0 %    POCT Sodium, Mixed 131 (L) 136 - 145 mmol/L    POCT Potassium, Mixed 4.7 3.5 - 5.3 mmol/L    POCT Chloride, Mixed 99 98 - 107 mmol/L    POCT Ionized Calcium, Mixed 0.88 (L) 1.10 - 1.33 mmol/L    POCT  Glucose, Mixed 193 (H) 74 - 99 mg/dL    POCT Lactate, Mixed 1.2 0.4 - 2.0 mmol/L    POCT Base Excess, Mixed -7.6 (L) -2.0 - 3.0 mmol/L    POCT HCO3 Calculated, Mixed 18.2 (L) 22.0 - 26.0 mmol/L    POCT Hemoglobin, Mixed 9.3 (L) 13.5 - 17.5 g/dL    POCT Anion Gap, Mixed 19 10 - 25 mmo/L    Patient Temperature 37.0 degrees Celsius    FiO2 30 %   Blood Gas Venous Full Panel   Result Value Ref Range    POCT pH, Venous 7.28 (L) 7.33 - 7.43 pH    POCT pCO2, Venous 43 41 - 51 mm Hg    POCT pO2, Venous 46 (H) 35 - 45 mm Hg    POCT SO2, Venous 61 45 - 75 %    POCT Oxy Hemoglobin, Venous 59.2 45.0 - 75.0 %    POCT Hematocrit Calculated, Venous 28.0 (L) 41.0 - 52.0 %    POCT Sodium, Venous 132 (L) 136 - 145 mmol/L    POCT Potassium, Venous 4.7 3.5 - 5.3 mmol/L    POCT Chloride, Venous 97 (L) 98 - 107 mmol/L    POCT Ionized Calicum, Venous 0.89 (L) 1.10 - 1.33 mmol/L    POCT Glucose, Venous 192 (H) 74 - 99 mg/dL    POCT Lactate, Venous 1.2 0.4 - 2.0 mmol/L    POCT Base Excess, Venous -6.2 (L) -2.0 - 3.0 mmol/L    POCT HCO3 Calculated, Venous 20.2 (L) 22.0 - 26.0 mmol/L    POCT Hemoglobin, Venous 9.4 (L) 13.5 - 17.5 g/dL    POCT Anion Gap, Venous 20.0 10.0 - 25.0 mmol/L    Patient Temperature 37.0 degrees Celsius    FiO2 30 %   ACTIVATED CLOTTING TIME LOW   Result Value Ref Range    POCT Activated Clotting Time Low Range 168 83 - 199 sec   POCT GLUCOSE   Result Value Ref Range    POCT Glucose 186 (H) 74 - 99 mg/dL   CBC and Auto Differential   Result Value Ref Range    WBC 17.6 (H) 4.4 - 11.3 x10*3/uL    nRBC 1.0 (H) 0.0 - 0.0 /100 WBCs    RBC 3.04 (L) 4.50 - 5.90 x10*6/uL    Hemoglobin 9.1 (L) 13.5 - 17.5 g/dL    Hematocrit 26.2 (L) 41.0 - 52.0 %    MCV 86 80 - 100 fL    MCH 29.9 26.0 - 34.0 pg    MCHC 34.7 32.0 - 36.0 g/dL    RDW 13.0 11.5 - 14.5 %    Platelets 108 (L) 150 - 450 x10*3/uL    Neutrophils % 80.8 40.0 - 80.0 %    Immature Granulocytes %, Automated 1.3 (H) 0.0 - 0.9 %    Lymphocytes % 10.0 13.0 - 44.0 %     Monocytes % 7.6 2.0 - 10.0 %    Eosinophils % 0.2 0.0 - 6.0 %    Basophils % 0.1 0.0 - 2.0 %    Neutrophils Absolute 14.22 (H) 1.20 - 7.70 x10*3/uL    Immature Granulocytes Absolute, Automated 0.23 0.00 - 0.70 x10*3/uL    Lymphocytes Absolute 1.76 1.20 - 4.80 x10*3/uL    Monocytes Absolute 1.33 (H) 0.10 - 1.00 x10*3/uL    Eosinophils Absolute 0.03 0.00 - 0.70 x10*3/uL    Basophils Absolute 0.02 0.00 - 0.10 x10*3/uL   Comprehensive metabolic panel   Result Value Ref Range    Glucose 190 (H) 74 - 99 mg/dL    Sodium 138 136 - 145 mmol/L    Potassium 4.8 3.5 - 5.3 mmol/L    Chloride 96 (L) 98 - 107 mmol/L    Bicarbonate 22 21 - 32 mmol/L    Anion Gap 25 (H) 10 - 20 mmol/L    Urea Nitrogen 125 (HH) 6 - 23 mg/dL    Creatinine 10.30 (H) 0.50 - 1.30 mg/dL    eGFR 6 (L) >60 mL/min/1.73m*2    Calcium 7.1 (L) 8.6 - 10.6 mg/dL    Albumin 2.9 (L) 3.4 - 5.0 g/dL    Alkaline Phosphatase 68 33 - 120 U/L    Total Protein 5.1 (L) 6.4 - 8.2 g/dL     (H) 9 - 39 U/L    Bilirubin, Total 4.3 (H) 0.0 - 1.2 mg/dL    ALT 1,127 (H) 10 - 52 U/L   Magnesium   Result Value Ref Range    Magnesium 2.61 (H) 1.60 - 2.40 mg/dL   POCT GLUCOSE   Result Value Ref Range    POCT Glucose 200 (H) 74 - 99 mg/dL   Blood Gas Venous Full Panel   Result Value Ref Range    POCT pH, Venous 7.26 (L) 7.33 - 7.43 pH    POCT pCO2, Venous 44 41 - 51 mm Hg    POCT pO2, Venous 46 (H) 35 - 45 mm Hg    POCT SO2, Venous 63 45 - 75 %    POCT Oxy Hemoglobin, Venous 61.5 45.0 - 75.0 %    POCT Hematocrit Calculated, Venous 29.0 (L) 41.0 - 52.0 %    POCT Sodium, Venous 132 (L) 136 - 145 mmol/L    POCT Potassium, Venous 4.9 3.5 - 5.3 mmol/L    POCT Chloride, Venous 97 (L) 98 - 107 mmol/L    POCT Ionized Calicum, Venous 0.90 (L) 1.10 - 1.33 mmol/L    POCT Glucose, Venous 183 (H) 74 - 99 mg/dL    POCT Lactate, Venous 1.1 0.4 - 2.0 mmol/L    POCT Base Excess, Venous -7.0 (L) -2.0 - 3.0 mmol/L    POCT HCO3 Calculated, Venous 19.7 (L) 22.0 - 26.0 mmol/L    POCT Hemoglobin,  Venous 9.5 (L) 13.5 - 17.5 g/dL    POCT Anion Gap, Venous 20.0 10.0 - 25.0 mmol/L    Patient Temperature 37.0 degrees Celsius    FiO2 30 %   Blood Gas Mixed Venous Full Panel   Result Value Ref Range    POCT pH, Mixed 7.29 (L) 7.33 - 7.43 pH    POCT pCO2, Mixed 38 (L) 41 - 51 mm Hg    POCT pO2, Mixed 70 (H) 35 - 45 mm Hg    POCT SO2, Mixed 93 (H) 45 - 75 %    POCT Oxy Hemoglobin, Mixed 89.8 (H) 45.0 - 75.0 %    POCT Hematocrit Calculated, Mixed 29.0 (L) 41.0 - 52.0 %    POCT Sodium, Mixed 132 (L) 136 - 145 mmol/L    POCT Potassium, Mixed 4.7 3.5 - 5.3 mmol/L    POCT Chloride, Mixed 98 98 - 107 mmol/L    POCT Ionized Calcium, Mixed 0.90 (L) 1.10 - 1.33 mmol/L    POCT Glucose, Mixed 177 (H) 74 - 99 mg/dL    POCT Lactate, Mixed 1.2 0.4 - 2.0 mmol/L    POCT Base Excess, Mixed -7.7 (L) -2.0 - 3.0 mmol/L    POCT HCO3 Calculated, Mixed 18.3 (L) 22.0 - 26.0 mmol/L    POCT Hemoglobin, Mixed 9.5 (L) 13.5 - 17.5 g/dL    POCT Anion Gap, Mixed 20 10 - 25 mmo/L    Patient Temperature 37.0 degrees Celsius    FiO2 30 %   Blood Gas Arterial Full Panel   Result Value Ref Range    POCT pH, Arterial 7.30 (L) 7.38 - 7.42 pH    POCT pCO2, Arterial 37 (L) 38 - 42 mm Hg    POCT pO2, Arterial 105 (H) 85 - 95 mm Hg    POCT SO2, Arterial 99 94 - 100 %    POCT Oxy Hemoglobin, Arterial 96.2 94.0 - 98.0 %    POCT Hematocrit Calculated, Arterial 38.0 (L) 41.0 - 52.0 %    POCT Sodium, Arterial 130 (L) 136 - 145 mmol/L    POCT Potassium, Arterial 4.6 3.5 - 5.3 mmol/L    POCT Chloride, Arterial 95 (L) 98 - 107 mmol/L    POCT Ionized Calcium, Arterial 0.90 (L) 1.10 - 1.33 mmol/L    POCT Glucose, Arterial 175 (H) 74 - 99 mg/dL    POCT Lactate, Arterial 1.3 0.4 - 2.0 mmol/L    POCT Base Excess, Arterial -7.6 (L) -2.0 - 3.0 mmol/L    POCT HCO3 Calculated, Arterial 18.2 (L) 22.0 - 26.0 mmol/L    POCT Hemoglobin, Arterial 12.8 (L) 13.5 - 17.5 g/dL    POCT Anion Gap, Arterial 21 10 - 25 mmo/L    Patient Temperature 37.0 degrees Celsius    FiO2 30 %    ACTIVATED CLOTTING TIME LOW   Result Value Ref Range    POCT Activated Clotting Time Low Range 166 83 - 199 sec   POCT GLUCOSE   Result Value Ref Range    POCT Glucose 163 (H) 74 - 99 mg/dL   CBC   Result Value Ref Range    WBC 17.3 (H) 4.4 - 11.3 x10*3/uL    nRBC 1.4 (H) 0.0 - 0.0 /100 WBCs    RBC 2.98 (L) 4.50 - 5.90 x10*6/uL    Hemoglobin 8.9 (L) 13.5 - 17.5 g/dL    Hematocrit 25.6 (L) 41.0 - 52.0 %    MCV 86 80 - 100 fL    MCH 29.9 26.0 - 34.0 pg    MCHC 34.8 32.0 - 36.0 g/dL    RDW 12.9 11.5 - 14.5 %    Platelets 96 (L) 150 - 450 x10*3/uL   Renal function panel   Result Value Ref Range    Glucose 174 (H) 74 - 99 mg/dL    Sodium 136 136 - 145 mmol/L    Potassium 4.6 3.5 - 5.3 mmol/L    Chloride 95 (L) 98 - 107 mmol/L    Bicarbonate 19 (L) 21 - 32 mmol/L    Anion Gap 27 (H) 10 - 20 mmol/L    Urea Nitrogen 128 (HH) 6 - 23 mg/dL    Creatinine 10.68 (H) 0.50 - 1.30 mg/dL    eGFR 5 (L) >60 mL/min/1.73m*2    Calcium 7.0 (L) 8.6 - 10.6 mg/dL    Phosphorus 9.2 (H) 2.5 - 4.9 mg/dL    Albumin 2.9 (L) 3.4 - 5.0 g/dL   Magnesium   Result Value Ref Range    Magnesium 2.63 (H) 1.60 - 2.40 mg/dL   Blood Gas Venous Full Panel   Result Value Ref Range    POCT pH, Venous 7.29 (L) 7.33 - 7.43 pH    POCT pCO2, Venous 38 (L) 41 - 51 mm Hg    POCT pO2, Venous 44 35 - 45 mm Hg    POCT SO2, Venous 58 45 - 75 %    POCT Oxy Hemoglobin, Venous 56.3 45.0 - 75.0 %    POCT Hematocrit Calculated, Venous 28.0 (L) 41.0 - 52.0 %    POCT Sodium, Venous 131 (L) 136 - 145 mmol/L    POCT Potassium, Venous 4.8 3.5 - 5.3 mmol/L    POCT Chloride, Venous 96 (L) 98 - 107 mmol/L    POCT Ionized Calicum, Venous 0.89 (L) 1.10 - 1.33 mmol/L    POCT Glucose, Venous 173 (H) 74 - 99 mg/dL    POCT Lactate, Venous 1.2 0.4 - 2.0 mmol/L    POCT Base Excess, Venous -7.7 (L) -2.0 - 3.0 mmol/L    POCT HCO3 Calculated, Venous 18.3 (L) 22.0 - 26.0 mmol/L    POCT Hemoglobin, Venous 9.3 (L) 13.5 - 17.5 g/dL    POCT Anion Gap, Venous 22.0 10.0 - 25.0 mmol/L    Patient  Temperature 37.0 degrees Celsius    FiO2 30 %   Blood Gas Mixed Venous Full Panel   Result Value Ref Range    POCT pH, Mixed 7.32 (L) 7.33 - 7.43 pH    POCT pCO2, Mixed 33 (L) 41 - 51 mm Hg    POCT pO2, Mixed 65 (H) 35 - 45 mm Hg    POCT SO2, Mixed 90 (H) 45 - 75 %    POCT Oxy Hemoglobin, Mixed 87.3 (H) 45.0 - 75.0 %    POCT Hematocrit Calculated, Mixed 29.0 (L) 41.0 - 52.0 %    POCT Sodium, Mixed 132 (L) 136 - 145 mmol/L    POCT Potassium, Mixed 4.7 3.5 - 5.3 mmol/L    POCT Chloride, Mixed 96 (L) 98 - 107 mmol/L    POCT Ionized Calcium, Mixed 0.88 (L) 1.10 - 1.33 mmol/L    POCT Glucose, Mixed 165 (H) 74 - 99 mg/dL    POCT Lactate, Mixed 1.2 0.4 - 2.0 mmol/L    POCT Base Excess, Mixed -8.2 (L) -2.0 - 3.0 mmol/L    POCT HCO3 Calculated, Mixed 17.0 (L) 22.0 - 26.0 mmol/L    POCT Hemoglobin, Mixed 9.7 (L) 13.5 - 17.5 g/dL    POCT Anion Gap, Mixed 24 10 - 25 mmo/L    Patient Temperature 37.0 degrees Celsius    FiO2 30 %   Blood Gas Arterial Full Panel   Result Value Ref Range    POCT pH, Arterial 7.33 (L) 7.38 - 7.42 pH    POCT pCO2, Arterial 33 (L) 38 - 42 mm Hg    POCT pO2, Arterial 95 85 - 95 mm Hg    POCT SO2, Arterial 98 94 - 100 %    POCT Oxy Hemoglobin, Arterial 95.3 94.0 - 98.0 %    POCT Hematocrit Calculated, Arterial 29.0 (L) 41.0 - 52.0 %    POCT Sodium, Arterial 130 (L) 136 - 145 mmol/L    POCT Potassium, Arterial 4.8 3.5 - 5.3 mmol/L    POCT Chloride, Arterial 96 (L) 98 - 107 mmol/L    POCT Ionized Calcium, Arterial 0.91 (L) 1.10 - 1.33 mmol/L    POCT Glucose, Arterial 180 (H) 74 - 99 mg/dL    POCT Lactate, Arterial 1.3 0.4 - 2.0 mmol/L    POCT Base Excess, Arterial -7.7 (L) -2.0 - 3.0 mmol/L    POCT HCO3 Calculated, Arterial 17.4 (L) 22.0 - 26.0 mmol/L    POCT Hemoglobin, Arterial 9.6 (L) 13.5 - 17.5 g/dL    POCT Anion Gap, Arterial 21 10 - 25 mmo/L    Patient Temperature 37.0 degrees Celsius    FiO2 30 %   ACTIVATED CLOTTING TIME LOW   Result Value Ref Range    POCT Activated Clotting Time Low Range 192  83 - 199 sec   ACTIVATED CLOTTING TIME LOW   Result Value Ref Range    POCT Activated Clotting Time Low Range 173 83 - 199 sec   POCT GLUCOSE   Result Value Ref Range    POCT Glucose 180 (H) 74 - 99 mg/dL   Magnesium   Result Value Ref Range    Magnesium 2.62 (H) 1.60 - 2.40 mg/dL   CBC and Auto Differential   Result Value Ref Range    WBC 16.6 (H) 4.4 - 11.3 x10*3/uL    nRBC 2.2 (H) 0.0 - 0.0 /100 WBCs    RBC 2.98 (L) 4.50 - 5.90 x10*6/uL    Hemoglobin 8.9 (L) 13.5 - 17.5 g/dL    Hematocrit 26.8 (L) 41.0 - 52.0 %    MCV 90 80 - 100 fL    MCH 29.9 26.0 - 34.0 pg    MCHC 33.2 32.0 - 36.0 g/dL    RDW 13.1 11.5 - 14.5 %    Platelets 94 (L) 150 - 450 x10*3/uL    Neutrophils % 80.9 40.0 - 80.0 %    Immature Granulocytes %, Automated 1.7 (H) 0.0 - 0.9 %    Lymphocytes % 8.2 13.0 - 44.0 %    Monocytes % 8.6 2.0 - 10.0 %    Eosinophils % 0.5 0.0 - 6.0 %    Basophils % 0.1 0.0 - 2.0 %    Neutrophils Absolute 13.46 (H) 1.20 - 7.70 x10*3/uL    Immature Granulocytes Absolute, Automated 0.28 0.00 - 0.70 x10*3/uL    Lymphocytes Absolute 1.37 1.20 - 4.80 x10*3/uL    Monocytes Absolute 1.43 (H) 0.10 - 1.00 x10*3/uL    Eosinophils Absolute 0.08 0.00 - 0.70 x10*3/uL    Basophils Absolute 0.02 0.00 - 0.10 x10*3/uL   Hepatic function panel   Result Value Ref Range    Albumin 2.8 (L) 3.4 - 5.0 g/dL    Bilirubin, Total 4.7 (H) 0.0 - 1.2 mg/dL    Bilirubin, Direct 3.3 (H) 0.0 - 0.3 mg/dL    Alkaline Phosphatase 71 33 - 120 U/L    ALT 1,048 (H) 10 - 52 U/L     (H) 9 - 39 U/L    Total Protein 4.8 (L) 6.4 - 8.2 g/dL   Lactate dehydrogenase   Result Value Ref Range    LDH 2,089 (H) 84 - 246 U/L   Haptoglobin   Result Value Ref Range    Haptoglobin <30 (L) 30 - 200 mg/dL   Vancomycin   Result Value Ref Range    Vancomycin 22.7 (H) 5.0 - 20.0 ug/mL   Phosphorus   Result Value Ref Range    Phosphorus 8.9 (H) 2.5 - 4.9 mg/dL   Basic Metabolic Panel   Result Value Ref Range    Glucose 175 (H) 74 - 99 mg/dL    Sodium 137 136 - 145 mmol/L     Potassium 4.5 3.5 - 5.3 mmol/L    Chloride 95 (L) 98 - 107 mmol/L    Bicarbonate 18 (L) 21 - 32 mmol/L    Anion Gap 29 (H) 10 - 20 mmol/L    Urea Nitrogen 136 (HH) 6 - 23 mg/dL    Creatinine 10.71 (H) 0.50 - 1.30 mg/dL    eGFR 5 (L) >60 mL/min/1.73m*2    Calcium 6.9 (L) 8.6 - 10.6 mg/dL   Blood Gas Venous Full Panel   Result Value Ref Range    POCT pH, Venous 7.27 (L) 7.33 - 7.43 pH    POCT pCO2, Venous 39 (L) 41 - 51 mm Hg    POCT pO2, Venous 40 35 - 45 mm Hg    POCT SO2, Venous 60 45 - 75 %    POCT Oxy Hemoglobin, Venous 58.7 45.0 - 75.0 %    POCT Hematocrit Calculated, Venous 28.0 (L) 41.0 - 52.0 %    POCT Sodium, Venous 132 (L) 136 - 145 mmol/L    POCT Potassium, Venous 4.7 3.5 - 5.3 mmol/L    POCT Chloride, Venous 97 (L) 98 - 107 mmol/L    POCT Ionized Calicum, Venous 0.88 (L) 1.10 - 1.33 mmol/L    POCT Glucose, Venous 167 (H) 74 - 99 mg/dL    POCT Lactate, Venous 1.2 0.4 - 2.0 mmol/L    POCT Base Excess, Venous -8.4 (L) -2.0 - 3.0 mmol/L    POCT HCO3 Calculated, Venous 17.9 (L) 22.0 - 26.0 mmol/L    POCT Hemoglobin, Venous 9.3 (L) 13.5 - 17.5 g/dL    POCT Anion Gap, Venous 22.0 10.0 - 25.0 mmol/L    Patient Temperature 37.0 degrees Celsius    FiO2 30 %   Blood Gas Mixed Venous Full Panel   Result Value Ref Range    POCT pH, Mixed 7.28 (L) 7.33 - 7.43 pH    POCT pCO2, Mixed 36 (L) 41 - 51 mm Hg    POCT pO2, Mixed 61 (H) 35 - 45 mm Hg    POCT SO2, Mixed 89 (H) 45 - 75 %    POCT Oxy Hemoglobin, Mixed 86.6 (H) 45.0 - 75.0 %    POCT Hematocrit Calculated, Mixed 29.0 (L) 41.0 - 52.0 %    POCT Sodium, Mixed 132 (L) 136 - 145 mmol/L    POCT Potassium, Mixed 4.7 3.5 - 5.3 mmol/L    POCT Chloride, Mixed 96 (L) 98 - 107 mmol/L    POCT Ionized Calcium, Mixed 0.89 (L) 1.10 - 1.33 mmol/L    POCT Glucose, Mixed 178 (H) 74 - 99 mg/dL    POCT Lactate, Mixed 1.3 0.4 - 2.0 mmol/L    POCT Base Excess, Mixed -9.1 (L) -2.0 - 3.0 mmol/L    POCT HCO3 Calculated, Mixed 16.9 (L) 22.0 - 26.0 mmol/L    POCT Hemoglobin, Mixed 9.7 (L) 13.5  - 17.5 g/dL    POCT Anion Gap, Mixed 24 10 - 25 mmo/L    Patient Temperature 37.0 degrees Celsius    FiO2 30 %   Blood Gas Arterial Full Panel   Result Value Ref Range    POCT pH, Arterial 7.30 (L) 7.38 - 7.42 pH    POCT pCO2, Arterial 34 (L) 38 - 42 mm Hg    POCT pO2, Arterial 94 85 - 95 mm Hg    POCT SO2, Arterial 98 94 - 100 %    POCT Oxy Hemoglobin, Arterial 95.3 94.0 - 98.0 %    POCT Hematocrit Calculated, Arterial 29.0 (L) 41.0 - 52.0 %    POCT Sodium, Arterial 131 (L) 136 - 145 mmol/L    POCT Potassium, Arterial 4.8 3.5 - 5.3 mmol/L    POCT Chloride, Arterial 95 (L) 98 - 107 mmol/L    POCT Ionized Calcium, Arterial 0.88 (L) 1.10 - 1.33 mmol/L    POCT Glucose, Arterial 173 (H) 74 - 99 mg/dL    POCT Lactate, Arterial 1.3 0.4 - 2.0 mmol/L    POCT Base Excess, Arterial -8.9 (L) -2.0 - 3.0 mmol/L    POCT HCO3 Calculated, Arterial 16.7 (L) 22.0 - 26.0 mmol/L    POCT Hemoglobin, Arterial 9.6 (L) 13.5 - 17.5 g/dL    POCT Anion Gap, Arterial 24 10 - 25 mmo/L    Patient Temperature 37.0 degrees Celsius    FiO2 30 %   ACTIVATED CLOTTING TIME LOW   Result Value Ref Range    POCT Activated Clotting Time Low Range 184 83 - 199 sec   ACTIVATED CLOTTING TIME LOW   Result Value Ref Range    POCT Activated Clotting Time Low Range 173 83 - 199 sec   POCT GLUCOSE   Result Value Ref Range    POCT Glucose 165 (H) 74 - 99 mg/dL   Blood Gas Mixed Venous Full Panel   Result Value Ref Range    POCT pH, Mixed 7.31 (L) 7.33 - 7.43 pH    POCT pCO2, Mixed 33 (L) 41 - 51 mm Hg    POCT pO2, Mixed 66 (H) 35 - 45 mm Hg    POCT SO2, Mixed 90 (H) 45 - 75 %    POCT Oxy Hemoglobin, Mixed 88.0 (H) 45.0 - 75.0 %    POCT Hematocrit Calculated, Mixed 28.0 (L) 41.0 - 52.0 %    POCT Sodium, Mixed 131 (L) 136 - 145 mmol/L    POCT Potassium, Mixed 4.8 3.5 - 5.3 mmol/L    POCT Chloride, Mixed 96 (L) 98 - 107 mmol/L    POCT Ionized Calcium, Mixed 0.86 (L) 1.10 - 1.33 mmol/L    POCT Glucose, Mixed 180 (H) 74 - 99 mg/dL    POCT Lactate, Mixed 1.3 0.4 - 2.0  mmol/L    POCT Base Excess, Mixed -8.8 (L) -2.0 - 3.0 mmol/L    POCT HCO3 Calculated, Mixed 16.6 (L) 22.0 - 26.0 mmol/L    POCT Hemoglobin, Mixed 9.4 (L) 13.5 - 17.5 g/dL    POCT Anion Gap, Mixed 23 10 - 25 mmo/L    Patient Temperature 37.0 degrees Celsius    FiO2 30 %   Blood Gas Venous Full Panel   Result Value Ref Range    POCT pH, Venous 7.28 (L) 7.33 - 7.43 pH    POCT pCO2, Venous 38 (L) 41 - 51 mm Hg    POCT pO2, Venous 44 35 - 45 mm Hg    POCT SO2, Venous 57 45 - 75 %    POCT Oxy Hemoglobin, Venous 55.6 45.0 - 75.0 %    POCT Hematocrit Calculated, Venous 28.0 (L) 41.0 - 52.0 %    POCT Sodium, Venous 132 (L) 136 - 145 mmol/L    POCT Potassium, Venous 4.9 3.5 - 5.3 mmol/L    POCT Chloride, Venous 96 (L) 98 - 107 mmol/L    POCT Ionized Calicum, Venous 0.86 (L) 1.10 - 1.33 mmol/L    POCT Glucose, Venous 181 (H) 74 - 99 mg/dL    POCT Lactate, Venous 1.3 0.4 - 2.0 mmol/L    POCT Base Excess, Venous -8.2 (L) -2.0 - 3.0 mmol/L    POCT HCO3 Calculated, Venous 17.9 (L) 22.0 - 26.0 mmol/L    POCT Hemoglobin, Venous 9.2 (L) 13.5 - 17.5 g/dL    POCT Anion Gap, Venous 23.0 10.0 - 25.0 mmol/L    Patient Temperature 37.0 degrees Celsius    FiO2 30 %   ACTIVATED CLOTTING TIME LOW   Result Value Ref Range    POCT Activated Clotting Time Low Range 176 83 - 199 sec   Blood Gas Arterial Full Panel   Result Value Ref Range    POCT pH, Arterial 7.31 (L) 7.38 - 7.42 pH    POCT pCO2, Arterial 31 (L) 38 - 42 mm Hg    POCT pO2, Arterial 100 (H) 85 - 95 mm Hg    POCT SO2, Arterial 99 94 - 100 %    POCT Oxy Hemoglobin, Arterial 95.9 94.0 - 98.0 %    POCT Hematocrit Calculated, Arterial 27.0 (L) 41.0 - 52.0 %    POCT Sodium, Arterial 131 (L) 136 - 145 mmol/L    POCT Potassium, Arterial 4.8 3.5 - 5.3 mmol/L    POCT Chloride, Arterial 97 (L) 98 - 107 mmol/L    POCT Ionized Calcium, Arterial 0.86 (L) 1.10 - 1.33 mmol/L    POCT Glucose, Arterial 177 (H) 74 - 99 mg/dL    POCT Lactate, Arterial 1.3 0.4 - 2.0 mmol/L    POCT Base Excess, Arterial  -9.7 (L) -2.0 - 3.0 mmol/L    POCT HCO3 Calculated, Arterial 15.6 (L) 22.0 - 26.0 mmol/L    POCT Hemoglobin, Arterial 9.1 (L) 13.5 - 17.5 g/dL    POCT Anion Gap, Arterial 23 10 - 25 mmo/L    Patient Temperature 37.0 degrees Celsius    FiO2 30 %   POCT GLUCOSE   Result Value Ref Range    POCT Glucose 171 (H) 74 - 99 mg/dL   Blood Gas Mixed Venous Full Panel   Result Value Ref Range    POCT pH, Mixed 7.32 (L) 7.33 - 7.43 pH    POCT pCO2, Mixed 38 (L) 41 - 51 mm Hg    POCT pO2, Mixed 62 (H) 35 - 45 mm Hg    POCT SO2, Mixed 87 (H) 45 - 75 %    POCT Oxy Hemoglobin, Mixed 83.9 (H) 45.0 - 75.0 %    POCT Hematocrit Calculated, Mixed 25.0 (L) 41.0 - 52.0 %    POCT Sodium, Mixed 131 (L) 136 - 145 mmol/L    POCT Potassium, Mixed 4.8 3.5 - 5.3 mmol/L    POCT Chloride, Mixed 94 (L) 98 - 107 mmol/L    POCT Ionized Calcium, Mixed 0.81 (L) 1.10 - 1.33 mmol/L    POCT Glucose, Mixed 178 (H) 74 - 99 mg/dL    POCT Lactate, Mixed 1.8 0.4 - 2.0 mmol/L    POCT Base Excess, Mixed -6.0 (L) -2.0 - 3.0 mmol/L    POCT HCO3 Calculated, Mixed 19.6 (L) 22.0 - 26.0 mmol/L    POCT Hemoglobin, Mixed 8.2 (L) 13.5 - 17.5 g/dL    POCT Anion Gap, Mixed 22 10 - 25 mmo/L    Patient Temperature 37.0 degrees Celsius    FiO2 30 %   Blood Gas Venous Full Panel   Result Value Ref Range    POCT pH, Venous 7.28 (L) 7.33 - 7.43 pH    POCT pCO2, Venous 43 41 - 51 mm Hg    POCT pO2, Venous 43 35 - 45 mm Hg    POCT SO2, Venous 54 45 - 75 %    POCT Oxy Hemoglobin, Venous 52.8 45.0 - 75.0 %    POCT Hematocrit Calculated, Venous 28.0 (L) 41.0 - 52.0 %    POCT Sodium, Venous 132 (L) 136 - 145 mmol/L    POCT Potassium, Venous 4.9 3.5 - 5.3 mmol/L    POCT Chloride, Venous 96 (L) 98 - 107 mmol/L    POCT Ionized Calicum, Venous 0.83 (L) 1.10 - 1.33 mmol/L    POCT Glucose, Venous 178 (H) 74 - 99 mg/dL    POCT Lactate, Venous 1.7 0.4 - 2.0 mmol/L    POCT Base Excess, Venous -6.2 (L) -2.0 - 3.0 mmol/L    POCT HCO3 Calculated, Venous 20.2 (L) 22.0 - 26.0 mmol/L    POCT  Hemoglobin, Venous 9.2 (L) 13.5 - 17.5 g/dL    POCT Anion Gap, Venous 21.0 10.0 - 25.0 mmol/L    Patient Temperature 37.0 degrees Celsius    FiO2 30 %               ASSESSMENT & PLAN  Mr. Elena is a 45 year old male with a past medical history of tobacco abuse (30 pack years), submandibular abscess, and nephrolithiasis who presented to [facility] with a chief complaint of chest pain, found to have an inferior STEMI, now s/p coronary angiogram with balloon angioplasty to the PDA (right dominant system). His course was complicated by hemodynamic instability (tachycardia, hypotension) requiring vasopressor support and laboratory evidnce of end organ dysfunction. Due to concern for cardiogenic vs mixed shock, a shock call was intervened and recommended transfer to [facility] CICU. TTE at [facility] revealed large VSD. He underwent Impella CP placement 5/20.      Cardiac surgery is consulted for a VSD repair post STEMI.         RECOMMENDATIONS/PLAN  Dr. Omer aware of patient and reviewing case and available imaging.   - Emergent cardiac surgery not indicated at this time.   - Recommend medical optimization per primary team  - Early surgery for VSD repairs post STEMI have significantly high mortality rates, ideally would prefer to wait a couple weeks to decrease surgical risk. Currently patient is not a surgical candidate.  - Will continue to follow along.      Thank you for the consultation.   Please page the cardiac surgery consult pager [phone] with any questions or changes in patient condition.    Holly Diez PA-C  Cardiac Surgery Consult [facility]  [facility]  Cardiac Surgery Consult Pager [phone]     5/23/2025  2:40 PM            [1] aspirin, 81 mg, oral, Daily  insulin lispro, 0-5 Units, subcutaneous, q4h  oxygen, , inhalation, Continuous - Inhalation  pantoprazole, 40 mg, intravenous, BID  piperacillin-tazobactam, 2.25 g, intravenous, q6h  polyethylene glycol, 17 g, oral, BID    [2] bivalirudin, 0.05  mg/kg/hr, Last Rate: 0.05 mg/kg/hr (05/23/25 1404)  fentaNYL,  mcg/hr, Last Rate: 75 mcg/hr (05/23/25 1300)  midazolam, 0.5-20 mg/hr, Last Rate: 2 mg/hr (05/23/25 1300)  sodium bicarbonate infusion Impella Purge 25 mEq/500 mL D5W, 10 mL/hr, Last Rate: 10 mL/hr (05/23/25 1409)    [3] PRN medications: alteplase, dextrose, dextrose, glucagon, glucagon, vancomycin

## 2025-05-23 NOTE — PROGRESS NOTES
"[facility]  [facility] [place]  CONSULT FOLLOW-UP     Reason For Consult  C/f GIB     History Of Present Illness  Alpesh Elena is a 45 y.o. male presenting with  a past medical history of tobacco use, transferred from [facility] on 5/20/2025 with cardiogenic shock.  The patient presented to [facility] on  5/18/2025 with CP and was diagnosed with a STEMI which was complicated by cardiogenic shock 2/2 VSD/inferior LV wall rupture. He was transferred to [facility] for higher level of care. He is now s/p Impella placement on 5/20/2025. His hospital course has additionally been complicated by ischemic hepatitis, AHRF and anuric BENY. GI is consulted for anemia and c/f GIB.     SUBJECTIVE     Patient remains intubated and sedated   No signs or symptoms of GIB at this time   Hgb remains stable     EXAM     Last Recorded Vitals  Blood pressure (!) 102/92, pulse (!) 126, temperature 36.9 °C (98.4 °F), temperature source Core, resp. rate 13, height 1.727 m (5' 8\"), weight 88.2 kg (194 lb 8 oz), SpO2 96%.      Intake/Output Summary (Last 24 hours) at 5/23/2025 1429  Last data filed at 5/23/2025 1300  Gross per 24 hour   Intake 753.9 ml   Output 464 ml   Net 289.9 ml          Physical Exam  General: intubated & sedated   HEENT: scleral icterus   Respiratory: mechanical breath sounds  Cardiovascular: tachycardic   Abdomen: Soft  Neuro: sedated       OBJECTIVE                                                                              Medications           Current Medications[1]                                                                            Labs     CBC RFP   Lab Results   Component Value Date    WBC 16.6 (H) 05/23/2025    HGB 8.9 (L) 05/23/2025    HCT 26.8 (L) 05/23/2025    MCV 90 05/23/2025    PLT 94 (L) 05/23/2025    NEUTROABS 13.46 (H) 05/23/2025    Lab Results   Component Value Date     05/23/2025    K 4.5 05/23/2025    CL 95 (L) 05/23/2025    CO2 18 (L) 05/23/2025 "     (HH) 05/23/2025    CREATININE 10.71 (H) 05/23/2025     Lab Results   Component Value Date    MG 2.62 (H) 05/23/2025    PHOS 8.9 (H) 05/23/2025    CALCIUM 6.9 (L) 05/23/2025    ALBUMIN 2.8 (L) 05/23/2025         Hepatic Function ABG/VBG   Lab Results   Component Value Date    ALT 1,048 (H) 05/23/2025     (H) 05/23/2025    ALKPHOS 71 05/23/2025     Lab Results   Component Value Date    BILIDIR 3.3 (H) 05/23/2025      Lab Results   Component Value Date    PROTIME 16.0 (H) 05/20/2025    APTT 28 05/20/2025    INR 1.4 (H) 05/20/2025    Lab Results   Component Value Date    LACTATE 2.7 (H) 05/20/2025                                                                               Imaging     === 05/18/25 ===    CT ANGIO CHEST ABDOMEN PELVIS    - Impression -  No thoracic or abdominal aortic aneurysm or acute aortic pathology.      === 05/20/25 ===    XR ABDOMEN 1 VIEW    - Impression -  1. The small bowel and colon pattern is predominantly fluid-filled  2. Abnormal renal function                                                                             GI Procedures     None       ASSESSMENT / PLAN     ASSESSMENT/PLAN:  Alpesh Elena is a 45 y.o. male with a past medical history of tobacco use, transferred from [facility] on 5/20/2025 with cardiogenic shock.  The patient presented to [facility] on  5/18/2025 with CP and was diagnosed with a STEMI which was complicated by cardiogenic shock 2/2 VSD/inferior LV wall rupture. He was transferred to [facility] for higher level of care. He is now s/p Impella placement on 5/20/2025. His hospital course has additionally been complicated by ischemic hepatitis, AHRF and anuric BENY.     GI is consulted for c/f GIB.     Of note, the patient is on a heparin gtt and ASA 81 mg.     The patient is critically ill w/ lab findings suggestive of hemolysis. After OG suction for +24 hours stomach output turned bloody, this is most likely secondary to trauma, although other  etiologies like stress ulcer, PUD, hemorrhagic gastritis etc. are on the differential. The patient hasn't had a BM yet and there was no increased vasopressor requirement since the bloody stomach output. At this time supportive care is recommended. If the patient develops s/s of active GIB please reach out to our team (information as below).    Recommendations:  - No plan for EGD at this time will continue to monitor Hgb and for active s/s of GIB  - Anticoagulation per primary team   - Continue to trend Hgb (goal > 7), platelets (goal > 50).  - Ensure adequate IV access, ideally 2 large bore IVs.  - Additional supportive care per primary team.  - Ok to start tube feeds tomorrow from a GI perspective  - PPI IV BID.    Patient was seen and discussed with Dr. Koch    Thank you for this interesting consult. Gastroenterology will SIGN OFF  -During weekday hours of 7am-5pm please do not hesitate to contact me on [employer] Chat or page [phone] if there are any further questions between the weekday hours of 7 AM - 5 PM.   -After hours, on weekends, and on holidays, please page [place] [phone]. Thank you.        Janie Mera MD         [1]   Current Facility-Administered Medications:     alteplase (Cathflo Activase) injection 2 mg, 2 mg, intra-catheter, PRN, Kevin Leong DO    aspirin chewable tablet 81 mg, 81 mg, oral, Daily, Ney Moreno MD, 81 mg at 05/23/25 0835    bivalirudin (Angiomax) infusion, 0.05 mg/kg/hr, intravenous, Continuous, Kevin Leong DO, Last Rate: 0.88 mL/hr at 05/23/25 1404, 0.05 mg/kg/hr at 05/23/25 1404    dextrose 50 % injection 12.5 g, 12.5 g, intravenous, q15 min PRN, Ney Moreno MD    dextrose 50 % injection 25 g, 25 g, intravenous, q15 min PRN, Ney Moreno MD    fentanyl (Sublimaze) 10 mcg/mL in sodium chloride 0.9% infusion,  mcg/hr, intravenous, Continuous, Ney Moreno MD, Last Rate: 7.5 mL/hr at 05/23/25 1300, 75 mcg/hr at 05/23/25 1300    glucagon  (Glucagen) injection 1 mg, 1 mg, intramuscular, q15 min PRN, Ney Moreno MD    glucagon (Glucagen) injection 1 mg, 1 mg, intramuscular, q15 min PRN, Ney Moreno MD    insulin lispro injection 0-5 Units, 0-5 Units, subcutaneous, q4h, Dina Joseph MD, 1 Units at 05/23/25 1212    midazolam in NS (Versed) 1 mg/mL infusion solution, 0.5-20 mg/hr, intravenous, Continuous, Ney Moreno MD, Last Rate: 2 mL/hr at 05/23/25 1300, 2 mg/hr at 05/23/25 1300    oxygen (O2) therapy, , inhalation, Continuous - Inhalation, Ney Moreno MD, 30 percent at 05/23/25 0800    pantoprazole (Protonix) injection 40 mg, 40 mg, intravenous, BID, Kevin Leong DO, 40 mg at 05/23/25 0835    piperacillin-tazobactam (Zosyn) 2.25 g in dextrose (iso) IV 50 mL, 2.25 g, intravenous, q6h, Dina Joseph MD, Stopped at 05/23/25 0904    polyethylene glycol (Glycolax, Miralax) packet 17 g, 17 g, oral, BID, Karena Trevino MD    sodium bicarbonate infusion Impella Purge 25 mEq/500 mL D5W, 10 mL/hr, intra-catheter, Continuous, Kevin Leong DO, Last Rate: 10 mL/hr at 05/23/25 1409, 10 mL/hr at 05/23/25 1409    vancomycin (Vancocin) pharmacy to dose - pharmacy monitoring, , miscellaneous, Daily PRN, Dina Joseph MD

## 2025-05-23 NOTE — PROGRESS NOTES
"Cardiac Intensive Care - Daily Progress Note   Subjective    Alpesh Elena is a 45 y.o. year old male patient admitted on 5/20/2025 with following ICU needs: Cardiogenic shock, impella, VSD/ Inferior LV wall rupture    Interval History:  Attempted aggressive diuresis overnight: Bumex drip, PRN Bumex and diuril, still only 15-20 ml/hr out. Comfortable on Vent this AM     Meds    Scheduled medications  Scheduled Medications[1]  Continuous medications  Continuous Medications[2]  PRN medications  PRN Medications[3]     Objective    Blood pressure (!) 102/92, pulse (!) 121, temperature 36.9 °C (98.4 °F), temperature source Core, resp. rate 15, height 1.727 m (5' 8\"), weight 88.2 kg (194 lb 8 oz), SpO2 91%.     Physical Exam  Constitutional:       Comments: Intubated, Sedate   Cardiovascular:      Rate and Rhythm: Regular rhythm. Tachycardia present.      Heart sounds: Murmur heard.      Comments: Holosystolic murmer, best heard at 4th intercostal rib. Very weak vs absent b/l PT and pedal.   Pulmonary:      Effort: Pulmonary effort is normal.      Breath sounds: Normal breath sounds. No wheezing or rales.   Abdominal:      General: Abdomen is flat. Bowel sounds are normal. There is no distension.      Palpations: Abdomen is soft.   Musculoskeletal:      Right lower leg: No edema.      Left lower leg: No edema.   Skin:     General: Skin is warm.   Neurological:      Comments: Sedated           Intake/Output Summary (Last 24 hours) at 5/23/2025 1254  Last data filed at 5/23/2025 1200  Gross per 24 hour   Intake 753.9 ml   Output 494 ml   Net 259.9 ml     Labs:   Results from last 72 hours   Lab Units 05/23/25  0409 05/23/25  0014 05/22/25  1748 05/22/25  0438   SODIUM mmol/L 137 136 138 137   POTASSIUM mmol/L 4.5 4.6 4.8 4.3   CHLORIDE mmol/L 95* 95* 96* 97*   CO2 mmol/L 18* 19* 22 23   BUN mg/dL 136* 128* 125* 118*   CREATININE mg/dL 10.71* 10.68* 10.30* 8.35*   GLUCOSE mg/dL 175* 174* 190* 167*   CALCIUM mg/dL 6.9* " 7.0* 7.1* 7.0*   ANION GAP mmol/L 29* 27* 25* 21*   EGFR mL/min/1.73m*2 5* 5* 6* 7*   PHOSPHORUS mg/dL 8.9* 9.2*  --  8.4*      Results from last 72 hours   Lab Units 05/23/25  0409 05/23/25  0014 05/22/25  1748 05/22/25  0438   WBC AUTO x10*3/uL 16.6* 17.3* 17.6* 21.3*   HEMOGLOBIN g/dL 8.9* 8.9* 9.1* 9.5*   HEMATOCRIT % 26.8* 25.6* 26.2* 27.9*   PLATELETS AUTO x10*3/uL 94* 96* 108* 120*   NEUTROS PCT AUTO % 80.9  --  80.8 83.3   LYMPHS PCT AUTO % 8.2  --  10.0 8.2   MONOS PCT AUTO % 8.6  --  7.6 6.9   EOS PCT AUTO % 0.5  --  0.2 0.0      Results from last 72 hours   Lab Units 05/23/25  0812 05/23/25  0410 05/23/25  0014   POCT PH, ARTERIAL pH 7.31* 7.30* 7.33*   POCT PCO2, ARTERIAL mm Hg 31* 34* 33*   POCT PO2, ARTERIAL mm Hg 100* 94 95   POCT SO2, ARTERIAL % 99 98 98     Results from last 72 hours   Lab Units 05/23/25  0800 05/23/25  0409 05/23/25  0014   POCT PH, VENOUS pH 7.28* 7.27* 7.29*   POCT PCO2, VENOUS mm Hg 38* 39* 38*   POCT PO2, VENOUS mm Hg 44 40 44        Micro/ID:     Lab Results   Component Value Date    BLOODCULT No growth at 1 day 05/21/2025    BLOODCULT No growth at 1 day 05/21/2025       EKG Trend:    EKG 5/20: Sinus Tachycardia, T wave inversions inferior leads. Prolonged QTC     Echo Data:  Results for orders placed during the hospital encounter of 05/18/25    Transthoracic Echo Complete    Narrative  [place] [id number]  Tel [phone] Fax [phone]    TRANSTHORACIC ECHOCARDIOGRAM REPORT    Patient Name:       AVRIL Yoder Physician:    [id number] Faraz Ortega DO  Study Date:         5/19/2025            Ordering Provider:    [id number] MARGARET GASPAR  MRN/PID:            [id number]             Fellow:  Accession#:         [id number]         Nurse:  Date of Birth/Age:  1980 / 45 years Sonographer:          Koki Mendez RDCS  Gender Assigned at  M                    Additional Staff:  Birth:  Height:             172.72  cm            Admit Date:           5/18/2025  Weight:             75.30 kg             Admission Status:     Inpatient - STAT  BSA / BMI:          1.89 m2 / 25.24      Department Location:  [facility]  kg/m2  Blood Pressure: 97 /65 mmHg    Study Type:    TRANSTHORACIC ECHO (TTE) COMPLETE  Diagnosis/ICD: ST elevation (STEMI) myocardial infarction of unspecified  site-I21.3  Indication:    STEMI  CPT Codes:     Echo Complete w Full Doppler-[id number]    Patient History:  PCI and Stent:     PCI performed on 5/18/2025.  Smoker:            Current.  Pertinent History: Chest Pain and Murmur.    Study Detail: The following Echo studies were performed: 2D, M-Mode, Doppler and  color flow. Definity used as a contrast agent for endocardial  border definition. Total contrast used for this procedure was 2 mL  via IV push.      PHYSICIAN INTERPRETATION:  Left Ventricle: The left ventricular systolic function is normal, with a visually estimated ejection fraction of 55%. There is mild concentric left ventricular hypertrophy. Wall motion is abnormal. The left ventricular cavity size is normal. There is mild increased septal and mildly increased posterior left ventricular wall thickness. Spectral Doppler shows a normal pattern of left ventricular diastolic filling.  LV Wall Scoring:  The basal inferoseptal segment, basal inferolateral segment, and basal inferior  segment are hypokinetic. All remaining scored segments are normal.    Left Atrium: The left atrial size is normal.  Right Ventricle: The right ventricle is normal in size. There is normal right ventricular global systolic function.  Right Atrium: The right atrial size is normal.  Aortic Valve: The aortic valve appears structurally normal. There is no evidence of aortic valve stenosis.  The aortic valve dimensionless index is 0.73. There is no evidence of aortic valve regurgitation. The peak instantaneous gradient of the aortic valve is 5 mmHg. The mean gradient of the aortic  valve is 3 mmHg.  Mitral Valve: The mitral valve is normal in structure. There is no evidence of mitral valve stenosis. There is normal mitral valve leaflet mobility. There is no evidence of mitral valve regurgitation.  Tricuspid Valve: The tricuspid valve is structurally normal. There is normal tricuspid valve leaflet mobility. There is trace tricuspid regurgitation.  Pulmonic Valve: The pulmonic valve is structurally normal. There is no indication of pulmonic valve regurgitation.  Pericardium: No pericardial effusion noted.  Aorta: The aortic root is normal.  Pulmonary Artery: The main pulmonary artery is normal in size, and position, with normal bifurcation into the left and right pulmonary arteries. The tricuspid regurgitant velocity is 2.88 m/s, and with an estimated right atrial pressure of 3 mmHg, the estimated pulmonary artery pressure is mildly elevated with the RVSP at 36.2 mmHg.  Systemic Veins: The inferior vena cava appears normal in size.      CONCLUSIONS:  1. The left ventricular systolic function is normal, with a visually estimated ejection fraction of 55%.  2. Basal inferoseptal segment, basal inferolateral segment, and basal inferior segment are abnormal.  3. Abnormal wall motion.  4. There is normal right ventricular global systolic function.  5. Normal sized right ventricle.  6. There is no evidence of mitral valve stenosis.  7. No evidence of mitral valve regurgitation.  8. Trace tricuspid regurgitation is visualized.  9. Aortic valve stenosis is not present.  10. The main pulmonary artery is normal in size, and position, with normal bifurcation into the left and right pulmonary arteries.    QUANTITATIVE DATA SUMMARY:    2D MEASUREMENTS:             Normal Ranges:  Ao Root d:       2.86 cm     (2.0-3.7cm)  LAs:             3.88 cm     (2.7-4.0cm)  IVSd:            1.12 cm     (0.6-1.1cm)  LVPWd:           1.08 cm     (0.6-1.1cm)  LVIDd:           4.02 cm     (3.9-5.9cm)  LVIDs:           2.94  cm  LV Mass Index:   77.7 g/m2  LVEDV Index:     59.87 ml/m2  LV % FS          26.9 %      LEFT ATRIUM:                  Normal Ranges:  LA Vol A4C:        29.5 ml    (22+/-6mL/m2)  LA Vol A2C:        25.4 ml  LA Vol BP:         30.5 ml  LA Vol Index A4C:  15.6ml/m2  LA Vol Index A2C:  13.4 ml/m2  LA Vol Index BP:   16.1 ml/m2  LA Area A4C:       13.8 cm2  LA Area A2C:       11.5 cm2  LA Major Axis A4C: 5.5 cm  LA Major Axis A2C: 4.4 cm  LA Volume Index:   14.9 ml/m2      RIGHT ATRIUM:                 Normal Ranges:  RA Vol A4C:        24.1 ml    (8.3-19.5ml)  RA Vol Index A4C:  12.7 ml/m2  RA Area A4C:       11.1 cm2  RA Major Axis A4C: 4.4 cm      AORTA MEASUREMENTS:         Normal Ranges:  Asc Ao, d:          2.48 cm (2.1-3.4cm)      LV SYSTOLIC FUNCTION:  Normal Ranges:  EF-A4C View:    54 % (>=55%)  EF-A2C View:    54 %  EF-Biplane:     55 %  EF-Visual:      55 %  LV EF Reported: 55 %      LV DIASTOLIC FUNCTION:           Normal Ranges:  MV Peak E:             1.20 m/s  (0.7-1.2 m/s)  MV Peak A:             1.08 m/s  (0.42-0.7 m/s)  E/A Ratio:             1.11      (1.0-2.2)  MV e'                  0.090 m/s (>8.0)  MV lateral e'          0.08 m/s  MV medial e'           0.10 m/s  E/e' Ratio:            13.29     (<8.0)      MITRAL VALVE:          Normal Ranges:  MV DT:        126 msec (150-240msec)      AORTIC VALVE:                     Normal Ranges:  AoV Vmax:                1.16 m/s (<=1.7m/s)  AoV Peak P.4 mmHg (<20mmHg)  AoV Mean PG:             3.0 mmHg (1.7-11.5mmHg)  LVOT Max Aime:            1.03 m/s (<=1.1m/s)  AoV VTI:                 22.30 cm (18-25cm)  LVOT VTI:                16.20 cm  LVOT Diameter:           2.03 cm  (1.8-2.4cm)  AoV Area, VTI:           2.35 cm2 (2.5-5.5cm2)  AoV Area,Vmax:           2.87 cm2 (2.5-4.5cm2)  AoV Dimensionless Index: 0.73      RIGHT VENTRICLE:  RV Basal 3.49 cm  RV Mid   2.65 cm  RV Major 7.3 cm  TAPSE:   22.4 mm  RV s'    0.13 m/s      TRICUSPID  VALVE/RVSP:          Normal Ranges:  Peak TR Velocity:     2.88 m/s  RV Syst Pressure:     36 mmHg  (< 30mmHg)  IVC Diam:             1.74 cm      PULMONIC VALVE:          Normal Ranges:  PV Accel Time:  116 msec (>120ms)  PV Max Aime:     1.5 m/s  (0.6-0.9m/s)  PV Max P.8 mmHg      [id number] Faraz Ortega   Electronically signed on 2025 at 9:34:06 AM      Wall Scoring        ** Final **      Cath Data:  [facility]     LMT normal.  LAD mild irregularity, 30% mid, wraps around the apex.  LCX mild iregularity.  RCA tortuous, dom.  PDA is 100% ostial.  LVEDP 20-25.  LVEF 40-45%, inferior basal AK.       Successful PTA R % GLENNA 0 reduced to 30%, GLENNA 3 after POBA, with no stent placed due to small caliber vessel and minimal runoff, not enough to warrant stenting.     Concern for drug use considering patient very tachycardic and LVEDP elevated with very small PDA occlusion    Summary of key imaging results from the last 24 hours     See above    Assessment and Plan     Assessment: This is a 45 year old male with a past medical history of tobacco abuse (30 pack years), submandibular abscess, and nephrolithiasis who presented to [facility] with a chief complaint of chest pain, found to have an inferior STEMI, now s/p coronary angiogram with balloon angioplasty to the PDA (right dominant system). His course was complicated by hemodynamic instability (tachycardia, hypotension) requiring vasopressor support and laboratory evidnce of end organ dysfunction. Due to concern for cardiogenic vs mixed shock, a shock call was intervened and recommended placement of  PA catheter for adjudication of hemodynamics and transfer to [facility] CICU for a higher level of care. He is now admitted to the CICU for further management.      On arrival to the CICU he is hypotensive with escalating pressor requirements. Bedside examination reveals a holosystolic murmur and serial mixed venous are revealing of a significant step up in  SVO2 from the RA to PA concerning for the presence of a ischemic VSD, perhaps secondardy to a late presenting STEMI given troponin elevation to 24K on presentation. Will re-conference with Shock team for consideration of MCS given continued hemodynamic instability and now suspected VSD with cardiogenic shock. Due to concern for mixed shock will start stress dose steroids (as currently on 3 pressors), broaden abx, and send full infectious workup. Continue with CAD/STEMI GDMT. Shock team determined impella CP placement to offload LV given concern for STEMI-induced VSD/ Inferior LV wall rupture.     Patient's lactate has normalized while on Impella CP.  However, still large difference between CVP and PA mixed venous, indicating ongoing shunting.  Infectious disease and nephrology following.  Monitoring the patient will need Impella 5.0      Mechanical Ventilation: 4-10 days  Sedation/Analgesia:  Fentanyl, Precedex, and Versed  Restraints: Restraints indicated as alternative therapies have been attempted and have been ineffective.  Restrain with soft wrist restraints and side rails up x4 until medical devices discontinued and/or patient able to participate with plan of care.     Summary for 05/23/25  :  Plan for Trialysis line, the SLED for 1 L out today/tonight   Hbg Stable, BM for signs of bleeding  Cont thrombocytopenia, cannot rule out TOMEKA  Anti-Plt Factor 4 AB / Serotonin release assay sent   Purge changed to sodium Bicarb   Systemic Bivalirudin started  Wean sedation for future SBT     NEUROLOGIC  #Sedation  - Vent/Versed/Precedex for RASS -2 - -3      CARDIOVASCULAR  #RPDA STEMI s/p POBA  #C/f Cardiogenic vs mixed shock   #C/f VSD  ::  ECG on admit to OSH with ST elevations in the inferior leads with reciprocal depressions  :: Troponin:  24,085>21,701,19,289,11179  :: Lipid panel (05/2025)- cholesterol 220, HDL 33.9, , TG 57  :: TSH 1.51 (05/2025), A1C 7.4  (05/2025)  :: coronary angiogram (05/2025)   which revealed multi vessel CAD [LAD/Lcx/RCA mildly/diffusely diseased,100% RPDA culprit lesion] with PTCA to RPDA with 50% residual stenosis [GLENNA 0->3] (too small to stent)   :: LVEF 45% and LVED 20-25 mmHg via LV Gram   :: TTE  (05/2025) LVEF 55%, wall motion abnormalities, and no significant valvular disease   :: SVO2 59 from CVP port, 92 from PA port (prior to impella)  :: SVO2 57 from CVP port, 80 from PA port (after impella)  :: Off all pressors 5/21  Plan:  - Trend Lactate, CVP mix venous (w/VBG) and PA mix venous Q8-12H  - Aspirin 81 mg every day, DC [place] given no stent / possible future surgery   - Statin: HELD Crestor 40 mg given c/f shock liver  - Beta blocker: hold given shock requiring MCS  - RAAS Inhibition: hold given shock requiring MCS  - Limited TTE repeat: Compared with the prior exam from 5/19/2025 ([facility]) there has been interval development of a large VSD throuh the inferoseptum with the RV now becoming large with significantly reduced function.   - CT surgery following for future possible VSD repair      PULMONARY  #AHRF   :: Prior to intubation satting 92% on RA  :: CXR with pulmonary edema vs PNA  :: CTA CAP- mild interstitial pulmonary edema   :: , WBC 34.1 on admission   Plan:  - Infectious workup/abx as below   - [place] guided therapy   - Wean Ventilator when more hemodynamically stable      RENAL/  # BENY, Anuric   # HAGMA   :: Baseline Cr 0.8-1.0, 1.61 on admit, 1.85 on transport - pleatu but Cr still > 10  Plan:  - Likely hemodynamic mediated , vanco, LV gram   - Plan to starting SLED 5/23  - Follow up UA/Ulytes if sample if available   - Avoid hypotension, rapid fluctuations in BP  - [place] guided volume resuscitation/ diuresis   - Daily RFP   - Nephrology following      GASTROINTESTINAL  #GI PPX  - IV PPI every day while intubated  - Miralax every day      ENDOCRINE  #TIIDM   :: A1C 7.4  (05/2025)  Plan:  - NPO   - Q4H POC glucose, SSI while NPO  - Titrate BS  140-180  - Will need diabetes education prior to discharge      HEME/ONC  #thrombocytopenia  # ? HIT  # Anemia    - C/f some degree of hemolysis   - Hepatic function, LHD, hapto daily  - Heparin products DC 5/23   - Impella Purg: Sodium Bicarb    - Systemic: Bival (follow ptt)     INFECTIOUS DISEASE  #C/f Mixed shock   #Leukocytosis  # Fever  Unclear if Fever is from infection or general cardiogenic shock at this time   :: S/p Doxy/CTX 05/19  :: blood cultures sent 05/19 x 2 sets   Work up:  - MRSA probe: -  - Bcx2: NGTD  - UA: non-infectious   Plan:  - c/w Zosyn, Restarted vanco 5/21, DC Vanco 5/22  - Follow infectious labs  - narrow abx as able   - ID following      MSK/DERM  SWAPNIL         ICU Check List       ICU Liberation: Intervention:   Assess, Prevent, Manage Pain 0 - No pain fentanyl gtt   Both SAT and SBT [] SAT  [] SBT 30-60 min [] Extubate to NC  [] Extubate to NIV  [] Discuss Trach   Choice of analgesia and sedation Romero Agitation Sedation Scale (RASS): Light sedation Fentanyl, Precedex, and Versed  -1 to -2   Delirium: Assess, prevent and manage  CAM ICU Positive Assess Qshift   Early Mobility and Exercise Receiving therapies that restrict mobility [] PT /OT consult   Family Engagement and Empowerment [x]Family updated []SW consult     F: PRN for euvolemia; swan guided therapy   E: PRN K>4, Mg>2, P>3   N: NPO  A: PIV, RIJ swan, left radial A line      Oxygen: MV 40%/500/16/5  Abx: Zosyn  Gtts: Levo, Vaso, Phenyl, fent, versed      DVT ppx: Lovenox  GI ppx: IV PPI     Code Status: full (confirmed on admit)  Surrogate Medical Decision-maker:    Ashley Hogan (Mother) [phone]         Introducer 05/19/25 Internal jugular Right (Active)   Placement Date/Time: 05/19/25 2330   Hand Hygiene Completed: Yes  Location: Internal jugular  Orientation: Right  Placed by: CG/AL  Placement Verified: X-ray;Pressure tracing changes;Transduced waveform   Number of days: 0       Arterial Line 05/19/25 Left  Radial (Active)   Placement Date/Time: 05/19/25 2200   Orientation: Left  Location: Radial  Local Anesthetic: Injectable  Technique: Anatomical landmarks  Insertion attempts: 1  Securement Method: Sutured  Patient Tolerance: Fair   Number of days: 0       Arterial Sheath 05/20/25 0529 6 Fr. Right Femoral (Active)   Placement Date/Time: 05/20/25 0529   Sheath Size: (c) 6 Fr.  Line Orientation: Right  Sheath Insertion Site: Femoral   Number of days: 0       Arterial Sheath 05/20/25 0532 Other (Comment) Right Femoral (Active)   Placement Date/Time: 05/20/25 0532   Sheath Size: (c) Other (Comment)  Line Orientation: Right  Sheath Insertion Site: Femoral   Number of days: 0       Arterial Sheath 05/20/25 0559 6 Fr. Left Femoral (Active)   Placement Date/Time: 05/20/25 0559   Sheath Size: 6 Fr.  Line Orientation: Left  Sheath Insertion Site: Femoral   Number of days: 0       Pulmonary Artery Catheter Internal jugular (Active)   Placement Date/Time: 05/19/25 2330   Hand Hygiene Performed Prior to CVC Insertion: Yes  Site Prep: Alcohol;Chlorhexidine ;Usual sterile procedure followed  Site Prep Agent has Completely Dried Before Insertion: Yes  All 5 Sterile Barriers Used (Glove...   Number of days: 0       ETT  7.5 mm (Active)   Placement Date/Time: 05/19/25 2305   Hand Hygiene Completed: Yes  ETT Type: ETT - single  Single Lumen Tube Size: 7.5 mm  Cuffed: Yes  Location: Oral  Airway Insertion Attempts: 1  Placement Verification: Auscultation;Capnometry;Fiberoptic visualizati...   Number of days: 0       Urethral Catheter 16 Fr. (Active)   Placement Date/Time: 05/20/25 0200   Hand Hygiene Completed: Yes  Tube Size (Fr.): 16 Fr.  Catheter Balloon Size: 10 mL  Urine Returned: Yes   Number of days: 0       NG/OG/Feeding Tube OG - [place] sump 16 Fr Center mouth (Active)   Placement Date/Time: 05/19/25 2310   Placed by: DM  Hand Hygiene Completed: Yes  Type of Tube: NG/OG Tube  Tube Length: 55 cm  Tube Type: OG - [place] sump  NG/OG  Tube Size: 16 Fr  Tube Location: Center mouth   Number of days: 0       Social:  Code: Full Code    Disposition: NABOR Leong DO   05/23/25 at 12:54 PM     Disclaimer: Documentation completed with the information available at the time of input. The times in the chart may not be reflective of actual patient care times, interventions, or procedures. Documentation occurs after the physical care of the patient.           [1] aspirin, 81 mg, oral, Daily  insulin lispro, 0-5 Units, subcutaneous, q4h  oxygen, , inhalation, Continuous - Inhalation  pantoprazole, 40 mg, intravenous, BID  piperacillin-tazobactam, 2.25 g, intravenous, q6h  polyethylene glycol, 17 g, oral, BID     [2] bivalirudin, 0.05 mg/kg/hr  bumetanide, 2 mg/hr, Last Rate: 2 mg/hr (05/23/25 1100)  fentaNYL,  mcg/hr, Last Rate: 75 mcg/hr (05/23/25 1100)  midazolam, 0.5-20 mg/hr, Last Rate: 2 mg/hr (05/23/25 1100)  sodium bicarbonate infusion Impella Purge 25 mEq/500 mL D5W, 10 mL/hr     [3] PRN medications: alteplase, dextrose, dextrose, glucagon, glucagon, vancomycin

## 2025-05-24 ENCOUNTER — APPOINTMENT (OUTPATIENT)
Dept: CARDIOLOGY | Facility: HOSPITAL | Age: 45
End: 2025-05-24
Payer: COMMERCIAL

## 2025-05-24 LAB
ALBUMIN SERPL BCP-MCNC: 2.9 G/DL (ref 3.4–5)
ALBUMIN SERPL BCP-MCNC: 3 G/DL (ref 3.4–5)
ALBUMIN SERPL BCP-MCNC: 3.1 G/DL (ref 3.4–5)
ALP SERPL-CCNC: 94 U/L (ref 33–120)
ALT SERPL W P-5'-P-CCNC: 848 U/L (ref 10–52)
ANION GAP BLDA CALCULATED.4IONS-SCNC: 13 MMO/L (ref 10–25)
ANION GAP BLDA CALCULATED.4IONS-SCNC: 17 MMO/L (ref 10–25)
ANION GAP BLDA CALCULATED.4IONS-SCNC: 18 MMO/L (ref 10–25)
ANION GAP BLDA CALCULATED.4IONS-SCNC: 18 MMO/L (ref 10–25)
ANION GAP BLDMV CALCULATED.4IONS-SCNC: 14 MMO/L (ref 10–25)
ANION GAP BLDMV CALCULATED.4IONS-SCNC: 14 MMO/L (ref 10–25)
ANION GAP BLDMV CALCULATED.4IONS-SCNC: 17 MMO/L (ref 10–25)
ANION GAP BLDMV CALCULATED.4IONS-SCNC: 19 MMO/L (ref 10–25)
ANION GAP BLDV CALCULATED.4IONS-SCNC: 12 MMOL/L (ref 10–25)
ANION GAP BLDV CALCULATED.4IONS-SCNC: 17 MMOL/L (ref 10–25)
ANION GAP BLDV CALCULATED.4IONS-SCNC: 17 MMOL/L (ref 10–25)
ANION GAP BLDV CALCULATED.4IONS-SCNC: 18 MMOL/L (ref 10–25)
ANION GAP SERPL CALC-SCNC: 17 MMOL/L (ref 10–20)
ANION GAP SERPL CALC-SCNC: 22 MMOL/L (ref 10–20)
ANION GAP SERPL CALC-SCNC: 23 MMOL/L (ref 10–20)
APTT PPP: 50 SECONDS (ref 26–36)
APTT PPP: 52 SECONDS (ref 26–36)
AST SERPL W P-5'-P-CCNC: 351 U/L (ref 9–39)
BACTERIA BLD CULT: NORMAL
BACTERIA BLD CULT: NORMAL
BASE EXCESS BLDA CALC-SCNC: -0.1 MMOL/L (ref -2–3)
BASE EXCESS BLDA CALC-SCNC: -2 MMOL/L (ref -2–3)
BASE EXCESS BLDA CALC-SCNC: -3.8 MMOL/L (ref -2–3)
BASE EXCESS BLDA CALC-SCNC: -3.8 MMOL/L (ref -2–3)
BASE EXCESS BLDMV CALC-SCNC: -0.3 MMOL/L (ref -2–3)
BASE EXCESS BLDMV CALC-SCNC: -2.2 MMOL/L (ref -2–3)
BASE EXCESS BLDMV CALC-SCNC: -2.9 MMOL/L (ref -2–3)
BASE EXCESS BLDMV CALC-SCNC: -3.9 MMOL/L (ref -2–3)
BASE EXCESS BLDV CALC-SCNC: -2.7 MMOL/L (ref -2–3)
BASE EXCESS BLDV CALC-SCNC: -3.3 MMOL/L (ref -2–3)
BASE EXCESS BLDV CALC-SCNC: -3.9 MMOL/L (ref -2–3)
BASE EXCESS BLDV CALC-SCNC: 0.2 MMOL/L (ref -2–3)
BASOPHILS # BLD AUTO: 0.03 X10*3/UL (ref 0–0.1)
BASOPHILS # BLD AUTO: 0.04 X10*3/UL (ref 0–0.1)
BASOPHILS NFR BLD AUTO: 0.2 %
BASOPHILS NFR BLD AUTO: 0.3 %
BILIRUB DIRECT SERPL-MCNC: 3.9 MG/DL (ref 0–0.3)
BILIRUB SERPL-MCNC: 6 MG/DL (ref 0–1.2)
BODY TEMPERATURE: 37 DEGREES CELSIUS
BUN SERPL-MCNC: 36 MG/DL (ref 6–23)
BUN SERPL-MCNC: 51 MG/DL (ref 6–23)
BUN SERPL-MCNC: 59 MG/DL (ref 6–23)
CA-I BLDA-SCNC: 1 MMOL/L (ref 1.1–1.33)
CA-I BLDA-SCNC: 1.02 MMOL/L (ref 1.1–1.33)
CA-I BLDA-SCNC: 1.03 MMOL/L (ref 1.1–1.33)
CA-I BLDA-SCNC: 1.06 MMOL/L (ref 1.1–1.33)
CA-I BLDMV-SCNC: 0.99 MMOL/L (ref 1.1–1.33)
CA-I BLDMV-SCNC: 1 MMOL/L (ref 1.1–1.33)
CA-I BLDMV-SCNC: 1.03 MMOL/L (ref 1.1–1.33)
CA-I BLDMV-SCNC: 1.04 MMOL/L (ref 1.1–1.33)
CA-I BLDV-SCNC: 0.98 MMOL/L (ref 1.1–1.33)
CA-I BLDV-SCNC: 1 MMOL/L (ref 1.1–1.33)
CA-I BLDV-SCNC: 1.01 MMOL/L (ref 1.1–1.33)
CA-I BLDV-SCNC: 1.06 MMOL/L (ref 1.1–1.33)
CALCIUM SERPL-MCNC: 7.9 MG/DL (ref 8.6–10.6)
CALCIUM SERPL-MCNC: 7.9 MG/DL (ref 8.6–10.6)
CALCIUM SERPL-MCNC: 8.1 MG/DL (ref 8.6–10.6)
CHLORIDE BLD-SCNC: 94 MMOL/L (ref 98–107)
CHLORIDE BLD-SCNC: 95 MMOL/L (ref 98–107)
CHLORIDE BLD-SCNC: 98 MMOL/L (ref 98–107)
CHLORIDE BLD-SCNC: 99 MMOL/L (ref 98–107)
CHLORIDE BLDA-SCNC: 95 MMOL/L (ref 98–107)
CHLORIDE BLDA-SCNC: 98 MMOL/L (ref 98–107)
CHLORIDE BLDV-SCNC: 93 MMOL/L (ref 98–107)
CHLORIDE BLDV-SCNC: 94 MMOL/L (ref 98–107)
CHLORIDE BLDV-SCNC: 94 MMOL/L (ref 98–107)
CHLORIDE BLDV-SCNC: 98 MMOL/L (ref 98–107)
CHLORIDE SERPL-SCNC: 93 MMOL/L (ref 98–107)
CHLORIDE SERPL-SCNC: 93 MMOL/L (ref 98–107)
CHLORIDE SERPL-SCNC: 94 MMOL/L (ref 98–107)
CO2 SERPL-SCNC: 22 MMOL/L (ref 21–32)
CO2 SERPL-SCNC: 23 MMOL/L (ref 21–32)
CO2 SERPL-SCNC: 26 MMOL/L (ref 21–32)
CREAT SERPL-MCNC: 3.78 MG/DL (ref 0.5–1.3)
CREAT SERPL-MCNC: 4.92 MG/DL (ref 0.5–1.3)
CREAT SERPL-MCNC: 5.71 MG/DL (ref 0.5–1.3)
EGFRCR SERPLBLD CKD-EPI 2021: 12 ML/MIN/1.73M*2
EGFRCR SERPLBLD CKD-EPI 2021: 14 ML/MIN/1.73M*2
EGFRCR SERPLBLD CKD-EPI 2021: 19 ML/MIN/1.73M*2
EOSINOPHIL # BLD AUTO: 0.18 X10*3/UL (ref 0–0.7)
EOSINOPHIL # BLD AUTO: 0.21 X10*3/UL (ref 0–0.7)
EOSINOPHIL NFR BLD AUTO: 1.1 %
EOSINOPHIL NFR BLD AUTO: 1.4 %
ERYTHROCYTE [DISTWIDTH] IN BLOOD BY AUTOMATED COUNT: 12.9 % (ref 11.5–14.5)
ERYTHROCYTE [DISTWIDTH] IN BLOOD BY AUTOMATED COUNT: 13.1 % (ref 11.5–14.5)
ERYTHROCYTE [DISTWIDTH] IN BLOOD BY AUTOMATED COUNT: 13.2 % (ref 11.5–14.5)
GLUCOSE BLD MANUAL STRIP-MCNC: 105 MG/DL (ref 74–99)
GLUCOSE BLD MANUAL STRIP-MCNC: 129 MG/DL (ref 74–99)
GLUCOSE BLD MANUAL STRIP-MCNC: 141 MG/DL (ref 74–99)
GLUCOSE BLD MANUAL STRIP-MCNC: 193 MG/DL (ref 74–99)
GLUCOSE BLD MANUAL STRIP-MCNC: 193 MG/DL (ref 74–99)
GLUCOSE BLD MANUAL STRIP-MCNC: 197 MG/DL (ref 74–99)
GLUCOSE BLD MANUAL STRIP-MCNC: 226 MG/DL (ref 74–99)
GLUCOSE BLD-MCNC: 115 MG/DL (ref 74–99)
GLUCOSE BLD-MCNC: 193 MG/DL (ref 74–99)
GLUCOSE BLD-MCNC: 197 MG/DL (ref 74–99)
GLUCOSE BLD-MCNC: 90 MG/DL (ref 74–99)
GLUCOSE BLDA-MCNC: 190 MG/DL (ref 74–99)
GLUCOSE BLDA-MCNC: 192 MG/DL (ref 74–99)
GLUCOSE BLDA-MCNC: 194 MG/DL (ref 74–99)
GLUCOSE BLDA-MCNC: 94 MG/DL (ref 74–99)
GLUCOSE BLDV-MCNC: 182 MG/DL (ref 74–99)
GLUCOSE BLDV-MCNC: 213 MG/DL (ref 74–99)
GLUCOSE BLDV-MCNC: 239 MG/DL (ref 74–99)
GLUCOSE BLDV-MCNC: 95 MG/DL (ref 74–99)
GLUCOSE SERPL-MCNC: 124 MG/DL (ref 74–99)
GLUCOSE SERPL-MCNC: 192 MG/DL (ref 74–99)
GLUCOSE SERPL-MCNC: 203 MG/DL (ref 74–99)
HAPTOGLOB SERPL NEPH-MCNC: <30 MG/DL (ref 30–200)
HCO3 BLDA-SCNC: 20.4 MMOL/L (ref 22–26)
HCO3 BLDA-SCNC: 20.6 MMOL/L (ref 22–26)
HCO3 BLDA-SCNC: 22.1 MMOL/L (ref 22–26)
HCO3 BLDA-SCNC: 23.6 MMOL/L (ref 22–26)
HCO3 BLDMV-SCNC: 20.8 MMOL/L (ref 22–26)
HCO3 BLDMV-SCNC: 21.9 MMOL/L (ref 22–26)
HCO3 BLDMV-SCNC: 22.3 MMOL/L (ref 22–26)
HCO3 BLDMV-SCNC: 23.9 MMOL/L (ref 22–26)
HCO3 BLDV-SCNC: 21.6 MMOL/L (ref 22–26)
HCO3 BLDV-SCNC: 22.1 MMOL/L (ref 22–26)
HCO3 BLDV-SCNC: 22.6 MMOL/L (ref 22–26)
HCO3 BLDV-SCNC: 24.6 MMOL/L (ref 22–26)
HCT VFR BLD AUTO: 24.4 % (ref 41–52)
HCT VFR BLD AUTO: 24.7 % (ref 41–52)
HCT VFR BLD AUTO: 27.6 % (ref 41–52)
HCT VFR BLD EST: 23 % (ref 41–52)
HCT VFR BLD EST: 25 % (ref 41–52)
HCT VFR BLD EST: 26 % (ref 41–52)
HCT VFR BLD EST: 27 % (ref 41–52)
HCT VFR BLD EST: 28 % (ref 41–52)
HCT VFR BLD EST: 28 % (ref 41–52)
HCT VFR BLD EST: 31 % (ref 41–52)
HCT VFR BLD EST: 36 % (ref 41–52)
HCT VFR BLD EST: 41 % (ref 41–52)
HGB BLD-MCNC: 8.3 G/DL (ref 13.5–17.5)
HGB BLD-MCNC: 8.7 G/DL (ref 13.5–17.5)
HGB BLD-MCNC: 9.6 G/DL (ref 13.5–17.5)
HGB BLDA-MCNC: 8.9 G/DL (ref 13.5–17.5)
HGB BLDA-MCNC: 9.1 G/DL (ref 13.5–17.5)
HGB BLDA-MCNC: 9.3 G/DL (ref 13.5–17.5)
HGB BLDA-MCNC: 9.4 G/DL (ref 13.5–17.5)
HGB BLDMV-MCNC: 12 G/DL (ref 13.5–17.5)
HGB BLDMV-MCNC: 7.5 G/DL (ref 13.5–17.5)
HGB BLDMV-MCNC: 8.8 G/DL (ref 13.5–17.5)
HGB BLDMV-MCNC: 9 G/DL (ref 13.5–17.5)
HGB BLDV-MCNC: 10.2 G/DL (ref 13.5–17.5)
HGB BLDV-MCNC: 13.5 G/DL (ref 13.5–17.5)
HGB BLDV-MCNC: 8.4 G/DL (ref 13.5–17.5)
HGB BLDV-MCNC: 9 G/DL (ref 13.5–17.5)
IMM GRANULOCYTES # BLD AUTO: 0.59 X10*3/UL (ref 0–0.7)
IMM GRANULOCYTES # BLD AUTO: 0.73 X10*3/UL (ref 0–0.7)
IMM GRANULOCYTES NFR BLD AUTO: 3.8 % (ref 0–0.9)
IMM GRANULOCYTES NFR BLD AUTO: 4.6 % (ref 0–0.9)
INHALED O2 CONCENTRATION: 30 %
INHALED O2 CONCENTRATION: 40 %
INTERPRETATION FOR ANTI-PLATELET FACTOR 4 ANTIBODY: NEGATIVE
LACTATE BLDA-SCNC: 1.6 MMOL/L (ref 0.4–2)
LACTATE BLDA-SCNC: 2.2 MMOL/L (ref 0.4–2)
LACTATE BLDA-SCNC: 2.2 MMOL/L (ref 0.4–2)
LACTATE BLDA-SCNC: 2.3 MMOL/L (ref 0.4–2)
LACTATE BLDMV-SCNC: 1.4 MMOL/L (ref 0.4–2)
LACTATE BLDMV-SCNC: 1.4 MMOL/L (ref 0.4–2)
LACTATE BLDMV-SCNC: 2.2 MMOL/L (ref 0.4–2)
LACTATE BLDMV-SCNC: 2.3 MMOL/L (ref 0.4–2)
LACTATE BLDV-SCNC: 1.5 MMOL/L (ref 0.4–2)
LACTATE BLDV-SCNC: 2.1 MMOL/L (ref 0.4–2)
LACTATE BLDV-SCNC: 2.3 MMOL/L (ref 0.4–2)
LACTATE BLDV-SCNC: 3.1 MMOL/L (ref 0.4–2)
LDH SERPL L TO P-CCNC: 1891 U/L (ref 84–246)
LYMPHOCYTES # BLD AUTO: 1.35 X10*3/UL (ref 1.2–4.8)
LYMPHOCYTES # BLD AUTO: 1.68 X10*3/UL (ref 1.2–4.8)
LYMPHOCYTES NFR BLD AUTO: 10.8 %
LYMPHOCYTES NFR BLD AUTO: 8.5 %
MAGNESIUM SERPL-MCNC: 2.22 MG/DL (ref 1.6–2.4)
MAGNESIUM SERPL-MCNC: 2.63 MG/DL (ref 1.6–2.4)
MAGNESIUM SERPL-MCNC: 2.86 MG/DL (ref 1.6–2.4)
MCH RBC QN AUTO: 30 PG (ref 26–34)
MCH RBC QN AUTO: 30.2 PG (ref 26–34)
MCH RBC QN AUTO: 30.2 PG (ref 26–34)
MCHC RBC AUTO-ENTMCNC: 34 G/DL (ref 32–36)
MCHC RBC AUTO-ENTMCNC: 34.8 G/DL (ref 32–36)
MCHC RBC AUTO-ENTMCNC: 35.2 G/DL (ref 32–36)
MCV RBC AUTO: 86 FL (ref 80–100)
MCV RBC AUTO: 86 FL (ref 80–100)
MCV RBC AUTO: 89 FL (ref 80–100)
MONOCYTES # BLD AUTO: 1.51 X10*3/UL (ref 0.1–1)
MONOCYTES # BLD AUTO: 1.82 X10*3/UL (ref 0.1–1)
MONOCYTES NFR BLD AUTO: 11.4 %
MONOCYTES NFR BLD AUTO: 9.7 %
NEUTROPHILS # BLD AUTO: 11.49 X10*3/UL (ref 1.2–7.7)
NEUTROPHILS # BLD AUTO: 11.82 X10*3/UL (ref 1.2–7.7)
NEUTROPHILS NFR BLD AUTO: 74 %
NEUTROPHILS NFR BLD AUTO: 74.2 %
NRBC BLD-RTO: 5.3 /100 WBCS (ref 0–0)
NRBC BLD-RTO: 6.9 /100 WBCS (ref 0–0)
NRBC BLD-RTO: 7.5 /100 WBCS (ref 0–0)
OXYHGB MFR BLDA: 95.7 % (ref 94–98)
OXYHGB MFR BLDA: 96.5 % (ref 94–98)
OXYHGB MFR BLDA: 96.6 % (ref 94–98)
OXYHGB MFR BLDA: 96.6 % (ref 94–98)
OXYHGB MFR BLDMV: 85 % (ref 45–75)
OXYHGB MFR BLDMV: 85.3 % (ref 45–75)
OXYHGB MFR BLDMV: 86.5 % (ref 45–75)
OXYHGB MFR BLDMV: 86.5 % (ref 45–75)
OXYHGB MFR BLDV: 49.6 % (ref 45–75)
OXYHGB MFR BLDV: 52.7 % (ref 45–75)
OXYHGB MFR BLDV: 57.4 % (ref 45–75)
OXYHGB MFR BLDV: 58 % (ref 45–75)
PCO2 BLDA: 33 MM HG (ref 38–42)
PCO2 BLDA: 34 MM HG (ref 38–42)
PCO2 BLDMV: 36 MM HG (ref 41–51)
PCO2 BLDMV: 37 MM HG (ref 41–51)
PCO2 BLDV: 38 MM HG (ref 41–51)
PCO2 BLDV: 40 MM HG (ref 41–51)
PH BLDA: 7.39 PH (ref 7.38–7.42)
PH BLDA: 7.4 PH (ref 7.38–7.42)
PH BLDA: 7.42 PH (ref 7.38–7.42)
PH BLDA: 7.45 PH (ref 7.38–7.42)
PH BLDMV: 7.37 PH (ref 7.33–7.43)
PH BLDMV: 7.38 PH (ref 7.33–7.43)
PH BLDMV: 7.4 PH (ref 7.33–7.43)
PH BLDMV: 7.43 PH (ref 7.33–7.43)
PH BLDV: 7.34 PH (ref 7.33–7.43)
PH BLDV: 7.35 PH (ref 7.33–7.43)
PH BLDV: 7.36 PH (ref 7.33–7.43)
PH BLDV: 7.42 PH (ref 7.33–7.43)
PHOSPHATE SERPL-MCNC: 5.6 MG/DL (ref 2.5–4.9)
PHOSPHATE SERPL-MCNC: 7.1 MG/DL (ref 2.5–4.9)
PHOSPHATE SERPL-MCNC: 7.5 MG/DL (ref 2.5–4.9)
PLATELET # BLD AUTO: 85 X10*3/UL (ref 150–450)
PLATELET # BLD AUTO: 90 X10*3/UL (ref 150–450)
PLATELET # BLD AUTO: 91 X10*3/UL (ref 150–450)
PO2 BLDA: 112 MM HG (ref 85–95)
PO2 BLDA: 115 MM HG (ref 85–95)
PO2 BLDA: 122 MM HG (ref 85–95)
PO2 BLDA: 94 MM HG (ref 85–95)
PO2 BLDMV: 60 MM HG (ref 35–45)
PO2 BLDMV: 62 MM HG (ref 35–45)
PO2 BLDMV: 62 MM HG (ref 35–45)
PO2 BLDMV: 65 MM HG (ref 35–45)
PO2 BLDV: 41 MM HG (ref 35–45)
PO2 BLDV: 42 MM HG (ref 35–45)
PO2 BLDV: 44 MM HG (ref 35–45)
PO2 BLDV: 46 MM HG (ref 35–45)
POTASSIUM BLDA-SCNC: 3.8 MMOL/L (ref 3.5–5.3)
POTASSIUM BLDA-SCNC: 4.5 MMOL/L (ref 3.5–5.3)
POTASSIUM BLDA-SCNC: 4.6 MMOL/L (ref 3.5–5.3)
POTASSIUM BLDA-SCNC: 4.7 MMOL/L (ref 3.5–5.3)
POTASSIUM BLDMV-SCNC: 3.6 MMOL/L (ref 3.5–5.3)
POTASSIUM BLDMV-SCNC: 3.9 MMOL/L (ref 3.5–5.3)
POTASSIUM BLDMV-SCNC: 4.4 MMOL/L (ref 3.5–5.3)
POTASSIUM BLDMV-SCNC: 4.5 MMOL/L (ref 3.5–5.3)
POTASSIUM BLDV-SCNC: 3.6 MMOL/L (ref 3.5–5.3)
POTASSIUM BLDV-SCNC: 4.4 MMOL/L (ref 3.5–5.3)
POTASSIUM BLDV-SCNC: 4.5 MMOL/L (ref 3.5–5.3)
POTASSIUM BLDV-SCNC: 4.5 MMOL/L (ref 3.5–5.3)
POTASSIUM SERPL-SCNC: 4 MMOL/L (ref 3.5–5.3)
POTASSIUM SERPL-SCNC: 4.5 MMOL/L (ref 3.5–5.3)
POTASSIUM SERPL-SCNC: 4.8 MMOL/L (ref 3.5–5.3)
PROT SERPL-MCNC: 5 G/DL (ref 6.4–8.2)
RBC # BLD AUTO: 2.75 X10*6/UL (ref 4.5–5.9)
RBC # BLD AUTO: 2.88 X10*6/UL (ref 4.5–5.9)
RBC # BLD AUTO: 3.2 X10*6/UL (ref 4.5–5.9)
SAO2 % BLDA: 100 % (ref 94–100)
SAO2 % BLDA: 99 % (ref 94–100)
SAO2 % BLDMV: 88 % (ref 45–75)
SAO2 % BLDMV: 88 % (ref 45–75)
SAO2 % BLDMV: 89 % (ref 45–75)
SAO2 % BLDMV: 89 % (ref 45–75)
SAO2 % BLDV: 51 % (ref 45–75)
SAO2 % BLDV: 54 % (ref 45–75)
SAO2 % BLDV: 59 % (ref 45–75)
SAO2 % BLDV: 60 % (ref 45–75)
SERUM AND PLATELET FACTOR 4: 0.11 OD UNITS
SODIUM BLDA-SCNC: 129 MMOL/L (ref 136–145)
SODIUM BLDA-SCNC: 131 MMOL/L (ref 136–145)
SODIUM BLDMV-SCNC: 129 MMOL/L (ref 136–145)
SODIUM BLDMV-SCNC: 129 MMOL/L (ref 136–145)
SODIUM BLDMV-SCNC: 131 MMOL/L (ref 136–145)
SODIUM BLDMV-SCNC: 132 MMOL/L (ref 136–145)
SODIUM BLDV-SCNC: 128 MMOL/L (ref 136–145)
SODIUM BLDV-SCNC: 129 MMOL/L (ref 136–145)
SODIUM BLDV-SCNC: 129 MMOL/L (ref 136–145)
SODIUM BLDV-SCNC: 131 MMOL/L (ref 136–145)
SODIUM SERPL-SCNC: 133 MMOL/L (ref 136–145)
VANCOMYCIN SERPL-MCNC: 10.9 UG/ML (ref 5–20)
WBC # BLD AUTO: 14.8 X10*3/UL (ref 4.4–11.3)
WBC # BLD AUTO: 15.5 X10*3/UL (ref 4.4–11.3)
WBC # BLD AUTO: 15.9 X10*3/UL (ref 4.4–11.3)

## 2025-05-24 PROCEDURE — 2500000004 HC RX 250 GENERAL PHARMACY W/ HCPCS (ALT 636 FOR OP/ED): Mod: JZ

## 2025-05-24 PROCEDURE — 99291 CRITICAL CARE FIRST HOUR: CPT

## 2025-05-24 PROCEDURE — 83735 ASSAY OF MAGNESIUM: CPT

## 2025-05-24 PROCEDURE — 84100 ASSAY OF PHOSPHORUS: CPT

## 2025-05-24 PROCEDURE — 85025 COMPLETE CBC W/AUTO DIFF WBC: CPT

## 2025-05-24 PROCEDURE — 2500000002 HC RX 250 W HCPCS SELF ADMINISTERED DRUGS (ALT 637 FOR MEDICARE OP, ALT 636 FOR OP/ED)

## 2025-05-24 PROCEDURE — 82248 BILIRUBIN DIRECT: CPT

## 2025-05-24 PROCEDURE — 83010 ASSAY OF HAPTOGLOBIN QUANT: CPT

## 2025-05-24 PROCEDURE — 2500000001 HC RX 250 WO HCPCS SELF ADMINISTERED DRUGS (ALT 637 FOR MEDICARE OP)

## 2025-05-24 PROCEDURE — 93308 TTE F-UP OR LMTD: CPT | Performed by: STUDENT IN AN ORGANIZED HEALTH CARE EDUCATION/TRAINING PROGRAM

## 2025-05-24 PROCEDURE — 2500000004 HC RX 250 GENERAL PHARMACY W/ HCPCS (ALT 636 FOR OP/ED): Mod: TB

## 2025-05-24 PROCEDURE — 83615 LACTATE (LD) (LDH) ENZYME: CPT

## 2025-05-24 PROCEDURE — 85027 COMPLETE CBC AUTOMATED: CPT

## 2025-05-24 PROCEDURE — 82607 VITAMIN B-12: CPT

## 2025-05-24 PROCEDURE — 2500000005 HC RX 250 GENERAL PHARMACY W/O HCPCS

## 2025-05-24 PROCEDURE — 37799 UNLISTED PX VASCULAR SURGERY: CPT

## 2025-05-24 PROCEDURE — 74018 RADEX ABDOMEN 1 VIEW: CPT | Performed by: RADIOLOGY

## 2025-05-24 PROCEDURE — 99233 SBSQ HOSP IP/OBS HIGH 50: CPT | Performed by: STUDENT IN AN ORGANIZED HEALTH CARE EDUCATION/TRAINING PROGRAM

## 2025-05-24 PROCEDURE — 85730 THROMBOPLASTIN TIME PARTIAL: CPT

## 2025-05-24 PROCEDURE — 93308 TTE F-UP OR LMTD: CPT

## 2025-05-24 PROCEDURE — 2020000001 HC ICU ROOM DAILY

## 2025-05-24 PROCEDURE — 84132 ASSAY OF SERUM POTASSIUM: CPT

## 2025-05-24 PROCEDURE — 82947 ASSAY GLUCOSE BLOOD QUANT: CPT

## 2025-05-24 PROCEDURE — 99232 SBSQ HOSP IP/OBS MODERATE 35: CPT | Performed by: INTERNAL MEDICINE

## 2025-05-24 PROCEDURE — G0545 PR INHERENT VISIT TO INPT: HCPCS | Performed by: STUDENT IN AN ORGANIZED HEALTH CARE EDUCATION/TRAINING PROGRAM

## 2025-05-24 PROCEDURE — 71045 X-RAY EXAM CHEST 1 VIEW: CPT | Performed by: RADIOLOGY

## 2025-05-24 PROCEDURE — 80202 ASSAY OF VANCOMYCIN: CPT | Performed by: HOSPITALIST

## 2025-05-24 PROCEDURE — 94003 VENT MGMT INPAT SUBQ DAY: CPT

## 2025-05-24 RX ORDER — VANCOMYCIN HYDROCHLORIDE 750 MG/150ML
750 INJECTION, SOLUTION INTRAVENOUS ONCE
Status: COMPLETED | OUTPATIENT
Start: 2025-05-24 | End: 2025-05-24

## 2025-05-24 RX ORDER — SENNOSIDES 8.6 MG/1
2 TABLET ORAL 2 TIMES DAILY
Status: DISCONTINUED | OUTPATIENT
Start: 2025-05-24 | End: 2025-06-04

## 2025-05-24 RX ORDER — DEXMEDETOMIDINE HYDROCHLORIDE 4 UG/ML
.1-1.5 INJECTION, SOLUTION INTRAVENOUS CONTINUOUS
Status: DISCONTINUED | OUTPATIENT
Start: 2025-05-24 | End: 2025-06-04

## 2025-05-24 RX ORDER — DEXMEDETOMIDINE HYDROCHLORIDE 4 UG/ML
INJECTION, SOLUTION INTRAVENOUS
Status: COMPLETED
Start: 2025-05-24 | End: 2025-05-24

## 2025-05-24 RX ORDER — BISACODYL 10 MG/1
10 SUPPOSITORY RECTAL DAILY
Status: DISCONTINUED | OUTPATIENT
Start: 2025-05-24 | End: 2025-05-25

## 2025-05-24 RX ADMIN — POLYETHYLENE GLYCOL 3350 17 G: 17 POWDER, FOR SOLUTION ORAL at 20:21

## 2025-05-24 RX ADMIN — HEPARIN SODIUM 2800 UNITS: 1000 INJECTION INTRAVENOUS; SUBCUTANEOUS at 00:40

## 2025-05-24 RX ADMIN — AMIODARONE HYDROCHLORIDE 1 MG/MIN: 1.8 INJECTION, SOLUTION INTRAVENOUS at 20:40

## 2025-05-24 RX ADMIN — PIPERACILLIN SODIUM AND TAZOBACTAM SODIUM 2.25 G: 2; .25 INJECTION, SOLUTION INTRAVENOUS at 14:51

## 2025-05-24 RX ADMIN — DEXMEDETOMIDINE HYDROCHLORIDE 0.2 MCG/KG/HR: 400 INJECTION INTRAVENOUS at 01:06

## 2025-05-24 RX ADMIN — SODIUM CHLORIDE 250 ML: 0.9 INJECTION, SOLUTION INTRAVENOUS at 13:49

## 2025-05-24 RX ADMIN — Medication 25 MCG/HR: at 22:45

## 2025-05-24 RX ADMIN — BISACODYL 10 MG: 10 SUPPOSITORY RECTAL at 12:22

## 2025-05-24 RX ADMIN — Medication 30 PERCENT: at 20:00

## 2025-05-24 RX ADMIN — PANTOPRAZOLE SODIUM 40 MG: 40 INJECTION, POWDER, FOR SOLUTION INTRAVENOUS at 08:30

## 2025-05-24 RX ADMIN — SODIUM CHLORIDE, SODIUM LACTATE, POTASSIUM CHLORIDE, AND CALCIUM CHLORIDE 500 ML: .6; .31; .03; .02 INJECTION, SOLUTION INTRAVENOUS at 18:20

## 2025-05-24 RX ADMIN — VANCOMYCIN HYDROCHLORIDE 750 MG: 750 INJECTION, SOLUTION INTRAVENOUS at 10:29

## 2025-05-24 RX ADMIN — PIPERACILLIN SODIUM AND TAZOBACTAM SODIUM 2.25 G: 2; .25 INJECTION, SOLUTION INTRAVENOUS at 20:21

## 2025-05-24 RX ADMIN — ALTEPLASE 1 MG: 2.2 INJECTION, POWDER, LYOPHILIZED, FOR SOLUTION INTRAVENOUS at 20:24

## 2025-05-24 RX ADMIN — ALTEPLASE 1.4 MG: 2.2 INJECTION, POWDER, LYOPHILIZED, FOR SOLUTION INTRAVENOUS at 02:59

## 2025-05-24 RX ADMIN — SENNOSIDES 17.2 MG: 8.6 TABLET, FILM COATED ORAL at 20:21

## 2025-05-24 RX ADMIN — PANTOPRAZOLE SODIUM 40 MG: 40 INJECTION, POWDER, FOR SOLUTION INTRAVENOUS at 20:21

## 2025-05-24 RX ADMIN — PIPERACILLIN SODIUM AND TAZOBACTAM SODIUM 2.25 G: 2; .25 INJECTION, SOLUTION INTRAVENOUS at 02:59

## 2025-05-24 RX ADMIN — SENNOSIDES 17.2 MG: 8.6 TABLET, FILM COATED ORAL at 12:22

## 2025-05-24 RX ADMIN — INSULIN LISPRO 1 UNITS: 100 INJECTION, SOLUTION INTRAVENOUS; SUBCUTANEOUS at 20:28

## 2025-05-24 RX ADMIN — PIPERACILLIN SODIUM AND TAZOBACTAM SODIUM 2.25 G: 2; .25 INJECTION, SOLUTION INTRAVENOUS at 08:30

## 2025-05-24 RX ADMIN — BIVALIRUDIN 0.14 MG/KG/HR: 250 INJECTION INTRACAVERNOUS at 05:06

## 2025-05-24 RX ADMIN — ASPIRIN 81 MG: 81 TABLET, CHEWABLE ORAL at 08:30

## 2025-05-24 RX ADMIN — INSULIN LISPRO 1 UNITS: 100 INJECTION, SOLUTION INTRAVENOUS; SUBCUTANEOUS at 12:22

## 2025-05-24 RX ADMIN — ALTEPLASE 1.4 MG: 2.2 INJECTION, POWDER, LYOPHILIZED, FOR SOLUTION INTRAVENOUS at 02:58

## 2025-05-24 RX ADMIN — Medication 30 PERCENT: at 08:00

## 2025-05-24 RX ADMIN — SODIUM BICARBONATE 10 ML/HR: 84 INJECTION, SOLUTION INTRAVENOUS at 13:54

## 2025-05-24 RX ADMIN — POLYETHYLENE GLYCOL 3350 17 G: 17 POWDER, FOR SOLUTION ORAL at 08:30

## 2025-05-24 RX ADMIN — AMIODARONE HYDROCHLORIDE 150 MG: 1.5 INJECTION, SOLUTION INTRAVENOUS at 20:30

## 2025-05-24 RX ADMIN — INSULIN LISPRO 1 UNITS: 100 INJECTION, SOLUTION INTRAVENOUS; SUBCUTANEOUS at 16:34

## 2025-05-24 ASSESSMENT — PAIN - FUNCTIONAL ASSESSMENT
PAIN_FUNCTIONAL_ASSESSMENT: CPOT (CRITICAL CARE PAIN OBSERVATION TOOL)

## 2025-05-24 NOTE — CARE PLAN
Problem: Safety - Medical Restraint  Goal: Remains free of injury from restraints (Restraint for Interference with Medical Device)  Outcome: Not Progressing  Flowsheets (Taken 5/23/2025 0623 by Mitul Crain RN)  Remains free of injury from restraints (restraint for interference with medical device): Every 2 hours: Monitor safety, psychosocial status, comfort, nutrition and hydration  Goal: Free from restraint(s) (Restraint for Interference with Medical Device)  Outcome: Not Progressing  Flowsheets (Taken 5/23/2025 0623 by Mitul Crain RN)  Free from restraint(s) (restraint for interference with medical device): ONCE/SHIFT or MINIMUM Every 12 hours: Assess and document the continuing need for restraints   The patient's goals for the shift include patient unable to states his goal for this shift.     The clinical goals for the shift include Pt will remain hemodynamically stable througout shift.

## 2025-05-24 NOTE — PROGRESS NOTES
Pharmacy to Dose Vancomycin:  Alpesh Elena is a 45 y.o. male ordered pharmacy to dose vancomycin for pneumonia. Today is day 4 of therapy.  Goal: Trough 15-20mg/L  Results from last 7 days   Lab Units 05/24/25  0422 05/23/25  1753 05/23/25  0409 05/23/25  0014   BUN mg/dL 36* 71* 136* 128*   CREATININE mg/dL 3.78* 6.05* 10.71* 10.68*   WBC AUTO x10*3/uL 15.5*  --  16.6* 17.3*   VANCOMYCIN RM ug/mL 10.9  --  22.7*  --       Blood Culture   Date/Time Value Ref Range Status   05/21/2025 11:41 AM No growth at 2 days  Preliminary   05/21/2025 11:41 AM No growth at 2 days  Preliminary     Tissue/Wound Culture/Smear   Date/Time Value Ref Range Status   07/08/2024 08:20 AM (4+) Abundant Mixed Gram-Positive Bacteria  Final   07/08/2024 08:20 AM (4+) Abundant Mixed Anaerobic Bacteria  Final     Gram Stain   Date/Time Value Ref Range Status   05/20/2025 04:19 AM (1+) Rare Polymorphonuclear leukocytes (A)  Final   05/20/2025 04:19 AM (1+) Rare Gram positive cocci (A)  Final      Susceptibility data from last 90 days.  Collected Specimen Info Organism   05/20/25 Fluid from Tracheal Aspirate Normal throat margaret     Antibiotics: Zosyn (Day 3).  Pertinent Medications: none.  Renal function is declining.  Patient's level today is 10.9 drawn 48hrs after 2G dose.    Plan:  Give vanco 750mg once.  Follow-up level ordered for 5/25 AM unless clinically indicated sooner.  Pharmacy will continue daily vancomycin monitoring. Please contact the pharmacy with any questions.    Thank you,  Fuentes Sheridan [facility]

## 2025-05-24 NOTE — PROGRESS NOTES
Vancomycin Dosing by Pharmacy- Cessation of Therapy    Consult to pharmacy for vancomycin dosing has been discontinued by the prescriber, pharmacy will sign off at this time.    Please call pharmacy if there are further questions or re-enter a consult if vancomycin is resumed.     Faraz Maurer, PharmD

## 2025-05-24 NOTE — PROGRESS NOTES
Alpesh Elena is a 45 y.o. male on day 4 of admission presenting with STEMI (ST elevation myocardial infarction) (Multi).      Subjective   Possible GI bleeding  Possible Hemolysis  Had SLED, 400 mL UF  320 urine  As CVP was low, UF Reduced         Objective          Vitals 24HR  Heart Rate:  []   Temp:  [36.3 °C (97.3 °F)-37.8 °C (100 °F)]   Resp:  [13-19]   Weight:  [87.3 kg (192 lb 7.4 oz)]   SpO2:  [94 %-99 %]         Intake/Output last 3 Shifts:    Intake/Output Summary (Last 24 hours) at 5/24/2025 1234  Last data filed at 5/24/2025 1141  Gross per 24 hour   Intake 923.54 ml   Output 1378 ml   Net -454.46 ml     On exam:  Sedated and intubated  1+ edema      Assessment & Plan  STEMI (ST elevation myocardial infarction) (Multi)    Cardiogenic shock (Multi)    Alpesh Elena is a 45 y.o. year old male patient admitted on 5/20/2025 with: Cardiogenic shock, impella, VSD/ Inferior LV wall rupture . Nephrology following for BENY.    #BENY-D  - Baseline Cr 0.9-1  - UA- RBCs, 2+ blood, trace proteins  - CT abd- no hydro or obstructions  - Etiology of BENY: likely ATN from contrast, septic shock vs cardiogenic shock      #HAGMA and metabolic alkalosis  #HFrEF    #lactic acidosis-improved  #sepsis    #Possible GI bleeding  #Hemolysis     ** Recommendations:  - No indication for RRT on assessment.  - Dose all treatment to GFR <15.  - Avoid hypotension, further contrast and nephrotoxins if possible.  - Strict I/Os  - Daily BMP, please do labs 2 hours after finishing SLED       Aidee Bedolla MD  Nephrology fellow.

## 2025-05-24 NOTE — PROGRESS NOTES
"Cardiac Intensive Care - Daily Progress Note   Subjective    Alpesh Elena is a 45 y.o. year old male patient admitted on 5/20/2025 with following ICU needs: Cardiogenic shock, impella, VSD/ Inferior LV wall rupture    Interval History:  Patient placed on sled yesterday evening, 400 cc fluid off.  During which CVP dropped to 5.  All sedation was weaned off overnight, patient only arousable to sternal rub with eye openings at this time.  Unlikely candidate for SBT right now.    Meds    Scheduled medications  Scheduled Medications[1]  Continuous medications  Continuous Medications[2]  PRN medications  PRN Medications[3]     Objective    Blood pressure (!) 102/92, pulse (!) 111, temperature 36.8 °C (98.2 °F), temperature source Temporal, resp. rate 16, height 1.727 m (5' 8\"), weight 87.3 kg (192 lb 7.4 oz), SpO2 98%.     Physical Exam  Constitutional:       Comments: Intubated, eye opens to sternal rub    Cardiovascular:      Rate and Rhythm: Regular rhythm. Tachycardia present.      Heart sounds: Murmur heard.      Comments: Holosystolic murmer, best heard at 4th intercostal rib. Very weak vs absent b/l PT and pedal.   Pulmonary:      Effort: Pulmonary effort is normal.      Breath sounds: Normal breath sounds. No wheezing or rales.   Abdominal:      General: Abdomen is flat. Bowel sounds are normal. There is no distension.      Palpations: Abdomen is soft.   Musculoskeletal:      Right lower leg: No edema.      Left lower leg: No edema.   Skin:     General: Skin is warm.   Neurological:      Comments: Off Sedated             Intake/Output Summary (Last 24 hours) at 5/24/2025 0949  Last data filed at 5/24/2025 0922  Gross per 24 hour   Intake 768.6 ml   Output 1410 ml   Net -641.4 ml     Labs:   Results from last 72 hours   Lab Units 05/24/25  0422 05/23/25  1753 05/23/25  0409   SODIUM mmol/L 133* 136 137   POTASSIUM mmol/L 4.0 3.7 4.5   CHLORIDE mmol/L 94* 94* 95*   CO2 mmol/L 26 25 18*   BUN mg/dL 36* 71* 136* "   CREATININE mg/dL 3.78* 6.05* 10.71*   GLUCOSE mg/dL 124* 121* 175*   CALCIUM mg/dL 8.1* 7.8* 6.9*   ANION GAP mmol/L 17 21* 29*   EGFR mL/min/1.73m*2 19* 11* 5*   PHOSPHORUS mg/dL 5.6* 4.5 8.9*      Results from last 72 hours   Lab Units 05/24/25  0422 05/23/25  0409 05/23/25  0014 05/22/25  1748   WBC AUTO x10*3/uL 15.5* 16.6* 17.3* 17.6*   HEMOGLOBIN g/dL 8.3* 8.9* 8.9* 9.1*   HEMATOCRIT % 24.4* 26.8* 25.6* 26.2*   PLATELETS AUTO x10*3/uL 90* 94* 96* 108*   NEUTROS PCT AUTO % 74.0 80.9  --  80.8   LYMPHS PCT AUTO % 10.8 8.2  --  10.0   MONOS PCT AUTO % 9.7 8.6  --  7.6   EOS PCT AUTO % 1.4 0.5  --  0.2      Results from last 72 hours   Lab Units 05/24/25  0015 05/23/25  1741 05/23/25  0812   POCT PH, ARTERIAL pH 7.45* 7.41 7.31*   POCT PCO2, ARTERIAL mm Hg 34* 34* 31*   POCT PO2, ARTERIAL mm Hg 94 126* 100*   POCT SO2, ARTERIAL % 99 100 99     Results from last 72 hours   Lab Units 05/24/25  0019 05/23/25  1742 05/23/25  1242   POCT PH, VENOUS pH 7.42 7.37 7.28*   POCT PCO2, VENOUS mm Hg 38* 41 43   POCT PO2, VENOUS mm Hg 44 46* 43        Micro/ID:     Lab Results   Component Value Date    BLOODCULT No growth at 2 days 05/21/2025    BLOODCULT No growth at 2 days 05/21/2025       EKG Trend:    EKG 5/20: Sinus Tachycardia, T wave inversions inferior leads. Prolonged QTC     Echo Data:  Results for orders placed during the hospital encounter of 05/18/25    Transthoracic Echo Complete    Narrative  [place] [id number]  Tel [phone] Fax [phone]    TRANSTHORACIC ECHOCARDIOGRAM REPORT    Patient Name:       AVRIL REYESAnnette Yoder Physician:    [id number] Faraz Ortega DO  Study Date:         5/19/2025            Ordering Provider:    [id number] MARGARET GASPAR  MRN/PID:            [id number]             Fellow:  Accession#:         [id number]         Nurse:  Date of Birth/Age:  1980 / 45 years Sonographer:          Koki Mendez  [facility]  Gender Assigned at                       Additional Staff:  Birth:  Height:             172.72 cm            Admit Date:           5/18/2025  Weight:             75.30 kg             Admission Status:     Inpatient - STAT  BSA / BMI:          1.89 m2 / 25.24      Department Location:  [facility]  kg/m2  Blood Pressure: 97 /65 mmHg    Study Type:    TRANSTHORACIC ECHO (TTE) COMPLETE  Diagnosis/ICD: ST elevation (STEMI) myocardial infarction of unspecified  site-I21.3  Indication:    STEMI  CPT Codes:     Echo Complete w Full Doppler-[id number]    Patient History:  PCI and Stent:     PCI performed on 5/18/2025.  Smoker:            Current.  Pertinent History: Chest Pain and Murmur.    Study Detail: The following Echo studies were performed: 2D, M-Mode, Doppler and  color flow. Definity used as a contrast agent for endocardial  border definition. Total contrast used for this procedure was 2 mL  via IV push.      PHYSICIAN INTERPRETATION:  Left Ventricle: The left ventricular systolic function is normal, with a visually estimated ejection fraction of 55%. There is mild concentric left ventricular hypertrophy. Wall motion is abnormal. The left ventricular cavity size is normal. There is mild increased septal and mildly increased posterior left ventricular wall thickness. Spectral Doppler shows a normal pattern of left ventricular diastolic filling.  LV Wall Scoring:  The basal inferoseptal segment, basal inferolateral segment, and basal inferior  segment are hypokinetic. All remaining scored segments are normal.    Left Atrium: The left atrial size is normal.  Right Ventricle: The right ventricle is normal in size. There is normal right ventricular global systolic function.  Right Atrium: The right atrial size is normal.  Aortic Valve: The aortic valve appears structurally normal. There is no evidence of aortic valve stenosis.  The aortic valve dimensionless index is 0.73. There is no evidence of aortic valve regurgitation. The peak instantaneous  gradient of the aortic valve is 5 mmHg. The mean gradient of the aortic valve is 3 mmHg.  Mitral Valve: The mitral valve is normal in structure. There is no evidence of mitral valve stenosis. There is normal mitral valve leaflet mobility. There is no evidence of mitral valve regurgitation.  Tricuspid Valve: The tricuspid valve is structurally normal. There is normal tricuspid valve leaflet mobility. There is trace tricuspid regurgitation.  Pulmonic Valve: The pulmonic valve is structurally normal. There is no indication of pulmonic valve regurgitation.  Pericardium: No pericardial effusion noted.  Aorta: The aortic root is normal.  Pulmonary Artery: The main pulmonary artery is normal in size, and position, with normal bifurcation into the left and right pulmonary arteries. The tricuspid regurgitant velocity is 2.88 m/s, and with an estimated right atrial pressure of 3 mmHg, the estimated pulmonary artery pressure is mildly elevated with the RVSP at 36.2 mmHg.  Systemic Veins: The inferior vena cava appears normal in size.      CONCLUSIONS:  1. The left ventricular systolic function is normal, with a visually estimated ejection fraction of 55%.  2. Basal inferoseptal segment, basal inferolateral segment, and basal inferior segment are abnormal.  3. Abnormal wall motion.  4. There is normal right ventricular global systolic function.  5. Normal sized right ventricle.  6. There is no evidence of mitral valve stenosis.  7. No evidence of mitral valve regurgitation.  8. Trace tricuspid regurgitation is visualized.  9. Aortic valve stenosis is not present.  10. The main pulmonary artery is normal in size, and position, with normal bifurcation into the left and right pulmonary arteries.    QUANTITATIVE DATA SUMMARY:    2D MEASUREMENTS:             Normal Ranges:  Ao Root d:       2.86 cm     (2.0-3.7cm)  LAs:             3.88 cm     (2.7-4.0cm)  IVSd:            1.12 cm     (0.6-1.1cm)  LVPWd:           1.08 cm      (0.6-1.1cm)  LVIDd:           4.02 cm     (3.9-5.9cm)  LVIDs:           2.94 cm  LV Mass Index:   77.7 g/m2  LVEDV Index:     59.87 ml/m2  LV % FS          26.9 %      LEFT ATRIUM:                  Normal Ranges:  LA Vol A4C:        29.5 ml    (22+/-6mL/m2)  LA Vol A2C:        25.4 ml  LA Vol BP:         30.5 ml  LA Vol Index A4C:  15.6ml/m2  LA Vol Index A2C:  13.4 ml/m2  LA Vol Index BP:   16.1 ml/m2  LA Area A4C:       13.8 cm2  LA Area A2C:       11.5 cm2  LA Major Axis A4C: 5.5 cm  LA Major Axis A2C: 4.4 cm  LA Volume Index:   14.9 ml/m2      RIGHT ATRIUM:                 Normal Ranges:  RA Vol A4C:        24.1 ml    (8.3-19.5ml)  RA Vol Index A4C:  12.7 ml/m2  RA Area A4C:       11.1 cm2  RA Major Axis A4C: 4.4 cm      AORTA MEASUREMENTS:         Normal Ranges:  Asc Ao, d:          2.48 cm (2.1-3.4cm)      LV SYSTOLIC FUNCTION:  Normal Ranges:  EF-A4C View:    54 % (>=55%)  EF-A2C View:    54 %  EF-Biplane:     55 %  EF-Visual:      55 %  LV EF Reported: 55 %      LV DIASTOLIC FUNCTION:           Normal Ranges:  MV Peak E:             1.20 m/s  (0.7-1.2 m/s)  MV Peak A:             1.08 m/s  (0.42-0.7 m/s)  E/A Ratio:             1.11      (1.0-2.2)  MV e'                  0.090 m/s (>8.0)  MV lateral e'          0.08 m/s  MV medial e'           0.10 m/s  E/e' Ratio:            13.29     (<8.0)      MITRAL VALVE:          Normal Ranges:  MV DT:        126 msec (150-240msec)      AORTIC VALVE:                     Normal Ranges:  AoV Vmax:                1.16 m/s (<=1.7m/s)  AoV Peak P.4 mmHg (<20mmHg)  AoV Mean PG:             3.0 mmHg (1.7-11.5mmHg)  LVOT Max Aime:            1.03 m/s (<=1.1m/s)  AoV VTI:                 22.30 cm (18-25cm)  LVOT VTI:                16.20 cm  LVOT Diameter:           2.03 cm  (1.8-2.4cm)  AoV Area, VTI:           2.35 cm2 (2.5-5.5cm2)  AoV Area,Vmax:           2.87 cm2 (2.5-4.5cm2)  AoV Dimensionless Index: 0.73      RIGHT VENTRICLE:  RV Basal 3.49 cm  RV Mid    2.65 cm  RV Major 7.3 cm  TAPSE:   22.4 mm  RV s'    0.13 m/s      TRICUSPID VALVE/RVSP:          Normal Ranges:  Peak TR Velocity:     2.88 m/s  RV Syst Pressure:     36 mmHg  (< 30mmHg)  IVC Diam:             1.74 cm      PULMONIC VALVE:          Normal Ranges:  PV Accel Time:  116 msec (>120ms)  PV Max Aime:     1.5 m/s  (0.6-0.9m/s)  PV Max P.8 mmHg      [id number] Faraz Ortega   Electronically signed on 2025 at 9:34:06 AM      Wall Scoring        ** Final **      Cath Data:  [facility]     LMT normal.  LAD mild irregularity, 30% mid, wraps around the apex.  LCX mild iregularity.  RCA tortuous, dom.  PDA is 100% ostial.  LVEDP 20-25.  LVEF 40-45%, inferior basal AK.       Successful PTA R % GLENNA 0 reduced to 30%, GLENNA 3 after POBA, with no stent placed due to small caliber vessel and minimal runoff, not enough to warrant stenting.     Concern for drug use considering patient very tachycardic and LVEDP elevated with very small PDA occlusion    Summary of key imaging results from the last 24 hours     See above    Assessment and Plan     Assessment: This is a 45 year old male with a past medical history of tobacco abuse (30 pack years), submandibular abscess, and nephrolithiasis who presented to [facility] with a chief complaint of chest pain, found to have an inferior STEMI, now s/p coronary angiogram with balloon angioplasty to the PDA (right dominant system). His course was complicated by hemodynamic instability (tachycardia, hypotension) requiring vasopressor support and laboratory evidnce of end organ dysfunction. Due to concern for cardiogenic vs mixed shock, a shock call was intervened and recommended placement of  PA catheter for adjudication of hemodynamics and transfer to [facility] CICU for a higher level of care. He is now admitted to the CICU for further management.      On arrival to the CICU he is hypotensive with escalating pressor requirements. Bedside examination reveals a  holosystolic murmur and serial mixed venous are revealing of a significant step up in SVO2 from the RA to PA concerning for the presence of a ischemic VSD, perhaps secondardy to a late presenting STEMI given troponin elevation to 24K on presentation. Will re-conference with Shock team for consideration of MCS given continued hemodynamic instability and now suspected VSD with cardiogenic shock. Due to concern for mixed shock will start stress dose steroids (as currently on 3 pressors), broaden abx, and send full infectious workup. Continue with CAD/STEMI GDMT. Shock team determined impella CP placement to offload LV given concern for STEMI-induced VSD/ Inferior LV wall rupture.     Patient's lactate has normalized while on Impella CP.  However, still large difference between CVP and PA mixed venous, indicating ongoing shunting.  Infectious disease and nephrology following.  Monitoring the patient will need Impella 5.0 Started Sled for volume control. Will look to trail SBT when able.       Mechanical Ventilation: 4-10 days  Sedation/Analgesia:  none  Restraints: Restraints indicated as alternative therapies have been attempted and have been ineffective.  Restrain with soft wrist restraints and side rails up x4 until medical devices discontinued and/or patient able to participate with plan of care.     Summary for 05/24/25  :  SLED 5/23; 400 ml off  Hbg Stable, BM for signs of bleeding  Cont thrombocytopenia, cannot rule out TOMEKA  Anti-Plt Factor 4 AB / Serotonin release assay pending   Off sedation,  future SBT   Aggressive bowel reg: miralx BID, Senna 2 tab BID, 1x bisacodyl suppository 5/24     NEUROLOGIC  #Sedation  - off sedation 5/24, CTM for responsiveness  - RAAS goal: 0      CARDIOVASCULAR  #RPDA STEMI s/p POBA  #C/f Cardiogenic vs mixed shock   #C/f VSD  ::  ECG on admit to OSH with ST elevations in the inferior leads with reciprocal depressions  :: Troponin:  24,085>21,701,19,[id number]  :: Lipid panel  (05/2025)- cholesterol 220, HDL 33.9, , TG 57  :: TSH 1.51 (05/2025), A1C 7.4  (05/2025)  :: coronary angiogram (05/2025)  which revealed multi vessel CAD [LAD/Lcx/RCA mildly/diffusely diseased,100% RPDA culprit lesion] with PTCA to RPDA with 50% residual stenosis [GLENNA 0->3] (too small to stent)   :: LVEF 45% and LVED 20-25 mmHg via LV Gram   :: TTE  (05/2025) LVEF 55%, wall motion abnormalities, and no significant valvular disease   :: SVO2 59 from CVP port, 92 from PA port (prior to impella)  :: SVO2 57 from CVP port, 80 from PA port (after impella)  :: Off all pressors 5/21  Plan:  - Trend Lactate, CVP mix venous (w/VBG) and PA mix venous Q8-12H  - Aspirin 81 mg every day, DC [place]il[place]a given no stent / possible future surgery   - Statin: HELD Crestor 40 mg given c/f shock liver  - Beta blocker: hold given shock requiring MCS  - RAAS Inhibition: hold given shock requiring MCS  - Limited TTE repeat: Compared with the prior exam from 5/19/2025 ([facility]) there has been interval development of a large VSD throuh the inferoseptum with the RV now becoming large with significantly reduced function.   - CT surgery following for future possible VSD repair      PULMONARY  #AHRF   :: Prior to intubation satting 92% on RA  :: CXR with pulmonary edema vs PNA  :: CTA CAP- mild interstitial pulmonary edema   :: , WBC 34.1 on admission   Plan:  - Infectious workup/abx as below   - [employer] guided therapy   - Wean Ventilator when more responsive      RENAL/  # BENY, Anuric   # HAGMA   :: Baseline Cr 0.8-1.0, 1.61 on admit, 1.85 on transport - pleatu but Cr still > 10  Plan:  - Likely hemodynamic mediated , vanco, LV gram   - Started SLED 5/23   - Nephrology following   - Follow up UA/Ulytes if sample if available   - Avoid hypotension, rapid fluctuations in BP  - [employer] guided volume resuscitation/ diuresis   - Daily RFP        GASTROINTESTINAL  #GI PPX  - IV PPI BID every day while intubated  - Aggressive  bowel reg: miralx BID, Senna 2 tab BID, 1x bisacodyl suppository 5/24      ENDOCRINE  #TIIDM   :: A1C 7.4  (05/2025)  Plan:  - NPO   - Q4H POC glucose, SSI while NPO  - Titrate -180  - Will need diabetes education prior to discharge      HEME/ONC  #thrombocytopenia  # C/f HIT  # Anemia    - C/f some degree of hemolysis   - Hepatic function, LHD, hapto daily  - Heparin products DC 5/23   - Impella Purg: Sodium Bicarb    - Systemic: Bival (follow ptt)     INFECTIOUS DISEASE  #C/f Mixed shock   #Leukocytosis  # Fever  Unclear if Fever is from infection or general cardiogenic shock at this time   :: S/p Doxy/CTX 05/19  :: blood cultures sent 05/19 x 2 sets   Work up:  - MRSA probe: -  - Bcx2: NGTD  - UA: non-infectious   Plan:  - c/w Zosyn, Vanco   - Follow infectious labs  - narrow abx as able   - ID following      MSK/DERM  SWAPNIL         ICU Check List       ICU Liberation: Intervention:   Assess, Prevent, Manage Pain 0 - No pain fentanyl gtt   Both SAT and SBT [] SAT  [] SBT 30-60 min [] Extubate to NC  [] Extubate to NIV  [] Discuss Trach   Choice of analgesia and sedation Romero Agitation Sedation Scale (RASS): Moderate sedation Fentanyl, Precedex, and Versed  -1 to -2   Delirium: Assess, prevent and manage  CAM ICU Positive Assess Qshift   Early Mobility and Exercise Receiving therapies that restrict mobility [] PT /OT consult   Family Engagement and Empowerment [x]Family updated []SW consult     F: PRN for euvolemia; swan guided therapy   E: PRN K>4, Mg>2, P>3   N: NPO  A: PIV, RIJ swan, left radial A line      Oxygen: MV 40%/500/16/5  Abx: Zosyn  Gtts: Levo, Vaso, Phenyl, fent, versed      DVT ppx: Lovenox  GI ppx: IV PPI     Code Status: full (confirmed on admit)  Surrogate Medical Decision-maker:    Ashley Hogan (Mother) [phone]         Introducer 05/19/25 Internal jugular Right (Active)   Placement Date/Time: 05/19/25 2520   Hand Hygiene Completed: Yes  Location: Internal jugular  Orientation:  Right  Placed by: CG/AL  Placement Verified: X-ray;Pressure tracing changes;Transduced waveform   Number of days: 0       Arterial Line 05/19/25 Left Radial (Active)   Placement Date/Time: 05/19/25 2200   Orientation: Left  Location: Radial  Local Anesthetic: Injectable  Technique: Anatomical landmarks  Insertion attempts: 1  Securement Method: Sutured  Patient Tolerance: Fair   Number of days: 0       Arterial Sheath 05/20/25 0529 6 Fr. Right Femoral (Active)   Placement Date/Time: 05/20/25 0529   Sheath Size: (c) 6 Fr.  Line Orientation: Right  Sheath Insertion Site: Femoral   Number of days: 0       Arterial Sheath 05/20/25 0532 Other (Comment) Right Femoral (Active)   Placement Date/Time: 05/20/25 0532   Sheath Size: (c) Other (Comment)  Line Orientation: Right  Sheath Insertion Site: Femoral   Number of days: 0       Arterial Sheath 05/20/25 0559 6 Fr. Left Femoral (Active)   Placement Date/Time: 05/20/25 0559   Sheath Size: 6 Fr.  Line Orientation: Left  Sheath Insertion Site: Femoral   Number of days: 0       Pulmonary Artery Catheter Internal jugular (Active)   Placement Date/Time: 05/19/25 2330   Hand Hygiene Performed Prior to CVC Insertion: Yes  Site Prep: Alcohol;Chlorhexidine ;Usual sterile procedure followed  Site Prep Agent has Completely Dried Before Insertion: Yes  All 5 Sterile Barriers Used (Glove...   Number of days: 0       ETT  7.5 mm (Active)   Placement Date/Time: 05/19/25 2305   Hand Hygiene Completed: Yes  ETT Type: ETT - single  Single Lumen Tube Size: 7.5 mm  Cuffed: Yes  Location: Oral  Airway Insertion Attempts: 1  Placement Verification: Auscultation;Capnometry;Fiberoptic visualizati...   Number of days: 0       Urethral Catheter 16 Fr. (Active)   Placement Date/Time: 05/20/25 0200   Hand Hygiene Completed: Yes  Tube Size (Fr.): 16 Fr.  Catheter Balloon Size: 10 mL  Urine Returned: Yes   Number of days: 0       NG/OG/Feeding Tube OG - [place] sump 16 Fr Center mouth (Active)   Placement  Date/Time: 05/19/25 2310   Placed by: MRALENA  Hand Hygiene Completed: Yes  Type of Tube: NG/OG Tube  Tube Length: 55 cm  Tube Type: OG - [place] sump  NG/OG Tube Size: 16 Fr  Tube Location: Center mouth   Number of days: 0       Social:  Code: Full Code    Disposition: NABOR Leong DO   05/24/25 at 9:49 AM     Disclaimer: Documentation completed with the information available at the time of input. The times in the chart may not be reflective of actual patient care times, interventions, or procedures. Documentation occurs after the physical care of the patient.           [1] aspirin, 81 mg, oral, Daily  insulin lispro, 0-5 Units, subcutaneous, q4h  oxygen, , inhalation, Continuous - Inhalation  pantoprazole, 40 mg, intravenous, BID  piperacillin-tazobactam, 2.25 g, intravenous, q6h  polyethylene glycol, 17 g, oral, BID  vancomycin, 750 mg, intravenous, Once     [2] bivalirudin, 0-0.49 mg/kg/hr, Last Rate: 0.14 mg/kg/hr (05/24/25 0800)  dexmedeTOMIDine, 0.1-1.5 mcg/kg/hr (Dosing Weight), Last Rate: Stopped (05/24/25 0126)  fentaNYL,  mcg/hr, Last Rate: Stopped (05/24/25 0057)  midazolam, 0.5-20 mg/hr, Last Rate: Stopped (05/24/25 0100)  sodium bicarbonate infusion Impella Purge 25 mEq/500 mL D5W, 10 mL/hr, Last Rate: 10 mL/hr (05/24/25 0400)     [3] PRN medications: alteplase, dextrose, dextrose, glucagon, glucagon, vancomycin

## 2025-05-24 NOTE — PROGRESS NOTES
Alpesh Elena is a 45 y.o. male on day 4 of admission presenting with STEMI (ST elevation myocardial infarction) (Multi).    Subjective   Interval History: Afebrile overnight, remains off Arctic sun, status post trialysis line getting RRT today    Review of Systems    Objective   Range of Vitals (last 24 hours)  Heart Rate:  []   Temp:  [36.3 °C (97.3 °F)-37.8 °C (100 °F)]   Resp:  [12-19]   Weight:  [87.3 kg (192 lb 7.4 oz)-88.2 kg (194 lb 8 oz)]   SpO2:  [90 %-99 %]   Daily Weight  05/24/25 : 87.3 kg (192 lb 7.4 oz)    Body mass index is 29.26 kg/m².    Physical Exam    Critically ill-appearing, intubated, sedated, not agitated appearing  Lungs clear diminished anteriorly, no wheezes or rhonchi  Regular rate rhythm, S1/S2, systolic murmur  Abdomen soft, bowel sounds quiet  Left femoral reperfusion line, right groin Impella, right Oolitic catheter, left CVC trialysis line    Antibiotics  piperacillin-tazobactam - 2.25 gram/50 mL  vancomycin - 750 mg/150 mL    Relevant Results  Labs  Results from last 72 hours   Lab Units 05/24/25 0422 05/23/25 0409 05/23/25  0014 05/22/25  1748   WBC AUTO x10*3/uL 15.5* 16.6* 17.3* 17.6*   HEMOGLOBIN g/dL 8.3* 8.9* 8.9* 9.1*   HEMATOCRIT % 24.4* 26.8* 25.6* 26.2*   PLATELETS AUTO x10*3/uL 90* 94* 96* 108*   NEUTROS PCT AUTO % 74.0 80.9  --  80.8   LYMPHS PCT AUTO % 10.8 8.2  --  10.0   MONOS PCT AUTO % 9.7 8.6  --  7.6   EOS PCT AUTO % 1.4 0.5  --  0.2     Results from last 72 hours   Lab Units 05/24/25  0422 05/23/25  1753 05/23/25  0409   SODIUM mmol/L 133* 136 137   POTASSIUM mmol/L 4.0 3.7 4.5   CHLORIDE mmol/L 94* 94* 95*   CO2 mmol/L 26 25 18*   BUN mg/dL 36* 71* 136*   CREATININE mg/dL 3.78* 6.05* 10.71*   GLUCOSE mg/dL 124* 121* 175*   CALCIUM mg/dL 8.1* 7.8* 6.9*   ANION GAP mmol/L 17 21* 29*   EGFR mL/min/1.73m*2 19* 11* 5*   PHOSPHORUS mg/dL 5.6* 4.5 8.9*     Results from last 72 hours   Lab Units 05/24/25  0422 05/23/25  1753 05/23/25  0409 05/23/25  0014  05/22/25  1748 05/22/25  0438   ALK PHOS U/L 94  --  71  --  68 70   BILIRUBIN TOTAL mg/dL 6.0*  --  4.7*  --  4.3* 4.3*   BILIRUBIN DIRECT mg/dL 3.9*  --  3.3*  --   --  2.8*   PROTEIN TOTAL g/dL 5.0*  --  4.8*  --  5.1* 4.8*   ALT U/L 848*  --  1,048*  --  1,127* 1,326*   AST U/L 351*  --  459*  --  627* 891*   ALBUMIN g/dL 2.9* 3.4 2.8*   < > 2.9* 2.9*    < > = values in this interval not displayed.     Estimated Creatinine Clearance: 26.2 mL/min (A) (by C-G formula based on SCr of 3.78 mg/dL (H)).  C-Reactive Protein   Date Value Ref Range Status   07/07/2024 10.34 (H) <1.00 mg/dL Final     Microbiology  Susceptibility data from last 14 days.  Collected Specimen Info Organism   05/20/25 Fluid from Tracheal Aspirate Normal throat margaret     5/19 blood cultures 2 sets no growth  5/20 MRSA nares negative  5/20 strep pneumo Legionella urine antigen negative  5/21 MRSA PCR negative  5/21 blood cultures 2 sets no growth      Imaging  Reviewed in EMR     Assessment/Plan     Alpesh Elena is a 45-year-old man past medical history significant for tobacco use, transferred to [facility] on 5/20 after presenting to [place] with chest pain and STEMI, status post balloon angioplasty, developed shock and TTE with large VSD vs inferior LV wall rupture.  He underwent Impella placement on 5/20.  ID has been following for fever, Tmax 101.3 on 5/21 with WBC to 34K, now improved.  He was initially treated for pneumonia, and has been on pip-tazo since 5/19, with intermittent doses of vancomycin.  Blood cultures negative from 5/19 and 5/21.  Strep pneumo and Legionella urine antigen negative.  Sputum culture with rare normal throat margaret.  Off Bumex drip and currently getting RRT, not on pressors.  LFTs improving, T. bili remains elevated.  Remains with low FiO2 30%, PEEP 5, improving congestion on chest x-ray.    #STEMI complicated by cardiogenic shock, VSD, status post Impella  #Fever, resolved       Recommendations:  - Since blood cultures  negative and MRSA PCR negative, can stop vancomycin  - Continue IV pip-tazo, renally dosed, would treat for possible HAP with total 7-10 days, then stop and monitor off antibiotics  - In the setting of his remaining lines and devices, he remains at risk for bloodstream infection, should he develop new fever, please repeat 2 sets of blood cultures    Will sign off, but please reach out with any questions    This is a complex infectious disease issue and the following was performed today (for more details please see the above note):   Management decisions reflecting the added complexity (e.g., changes in antimicrobial therapy, infection control strategies).      Ananya Sanchez, DO

## 2025-05-25 ENCOUNTER — APPOINTMENT (OUTPATIENT)
Dept: CARDIOLOGY | Facility: HOSPITAL | Age: 45
End: 2025-05-25
Payer: COMMERCIAL

## 2025-05-25 VITALS
DIASTOLIC BLOOD PRESSURE: 92 MMHG | TEMPERATURE: 97.3 F | RESPIRATION RATE: 14 BRPM | HEIGHT: 68 IN | OXYGEN SATURATION: 100 % | SYSTOLIC BLOOD PRESSURE: 102 MMHG | HEART RATE: 67 BPM | BODY MASS INDEX: 29.14 KG/M2 | WEIGHT: 192.24 LBS

## 2025-05-25 LAB
ALBUMIN SERPL BCP-MCNC: 2.8 G/DL (ref 3.4–5)
ALBUMIN SERPL BCP-MCNC: 2.9 G/DL (ref 3.4–5)
ALP SERPL-CCNC: 128 U/L (ref 33–120)
ALT SERPL W P-5'-P-CCNC: 629 U/L (ref 10–52)
ANION GAP BLDA CALCULATED.4IONS-SCNC: 15 MMO/L (ref 10–25)
ANION GAP BLDA CALCULATED.4IONS-SCNC: 16 MMO/L (ref 10–25)
ANION GAP BLDA CALCULATED.4IONS-SCNC: 18 MMO/L (ref 10–25)
ANION GAP BLDA CALCULATED.4IONS-SCNC: 19 MMO/L (ref 10–25)
ANION GAP BLDA CALCULATED.4IONS-SCNC: 20 MMO/L (ref 10–25)
ANION GAP BLDA CALCULATED.4IONS-SCNC: 21 MMO/L (ref 10–25)
ANION GAP BLDMV CALCULATED.4IONS-SCNC: 16 MMO/L (ref 10–25)
ANION GAP BLDMV CALCULATED.4IONS-SCNC: 16 MMO/L (ref 10–25)
ANION GAP BLDMV CALCULATED.4IONS-SCNC: 17 MMO/L (ref 10–25)
ANION GAP BLDMV CALCULATED.4IONS-SCNC: 17 MMO/L (ref 10–25)
ANION GAP BLDMV CALCULATED.4IONS-SCNC: 18 MMO/L (ref 10–25)
ANION GAP BLDMV CALCULATED.4IONS-SCNC: 19 MMO/L (ref 10–25)
ANION GAP BLDV CALCULATED.4IONS-SCNC: 14 MMOL/L (ref 10–25)
ANION GAP BLDV CALCULATED.4IONS-SCNC: 16 MMOL/L (ref 10–25)
ANION GAP BLDV CALCULATED.4IONS-SCNC: 17 MMOL/L (ref 10–25)
ANION GAP BLDV CALCULATED.4IONS-SCNC: 18 MMOL/L (ref 10–25)
ANION GAP BLDV CALCULATED.4IONS-SCNC: 19 MMOL/L (ref 10–25)
ANION GAP SERPL CALC-SCNC: 25 MMOL/L (ref 10–20)
ANION GAP SERPL CALC-SCNC: 25 MMOL/L (ref 10–20)
APTT PPP: 62 SECONDS (ref 26–36)
APTT PPP: 69 SECONDS (ref 26–36)
AST SERPL W P-5'-P-CCNC: 241 U/L (ref 9–39)
BACTERIA BLD CULT: NORMAL
BACTERIA BLD CULT: NORMAL
BASE EXCESS BLDA CALC-SCNC: -2 MMOL/L (ref -2–3)
BASE EXCESS BLDA CALC-SCNC: -2.1 MMOL/L (ref -2–3)
BASE EXCESS BLDA CALC-SCNC: -4.7 MMOL/L (ref -2–3)
BASE EXCESS BLDA CALC-SCNC: -4.9 MMOL/L (ref -2–3)
BASE EXCESS BLDA CALC-SCNC: -4.9 MMOL/L (ref -2–3)
BASE EXCESS BLDA CALC-SCNC: -6 MMOL/L (ref -2–3)
BASE EXCESS BLDMV CALC-SCNC: -3.5 MMOL/L (ref -2–3)
BASE EXCESS BLDMV CALC-SCNC: -4.4 MMOL/L (ref -2–3)
BASE EXCESS BLDMV CALC-SCNC: -4.6 MMOL/L (ref -2–3)
BASE EXCESS BLDMV CALC-SCNC: -5.5 MMOL/L (ref -2–3)
BASE EXCESS BLDMV CALC-SCNC: -6.9 MMOL/L (ref -2–3)
BASE EXCESS BLDMV CALC-SCNC: -8.4 MMOL/L (ref -2–3)
BASE EXCESS BLDV CALC-SCNC: -1.8 MMOL/L (ref -2–3)
BASE EXCESS BLDV CALC-SCNC: -3.5 MMOL/L (ref -2–3)
BASE EXCESS BLDV CALC-SCNC: -3.6 MMOL/L (ref -2–3)
BASE EXCESS BLDV CALC-SCNC: -3.8 MMOL/L (ref -2–3)
BASE EXCESS BLDV CALC-SCNC: -4.4 MMOL/L (ref -2–3)
BASOPHILS # BLD AUTO: 0.06 X10*3/UL (ref 0–0.1)
BASOPHILS NFR BLD AUTO: 0.3 %
BILIRUB DIRECT SERPL-MCNC: 5.4 MG/DL (ref 0–0.3)
BILIRUB SERPL-MCNC: 8 MG/DL (ref 0–1.2)
BODY TEMPERATURE: 37 DEGREES CELSIUS
BUN SERPL-MCNC: 74 MG/DL (ref 6–23)
BUN SERPL-MCNC: 97 MG/DL (ref 6–23)
CA-I BLDA-SCNC: 0.96 MMOL/L (ref 1.1–1.33)
CA-I BLDA-SCNC: 0.97 MMOL/L (ref 1.1–1.33)
CA-I BLDA-SCNC: 0.98 MMOL/L (ref 1.1–1.33)
CA-I BLDA-SCNC: 1 MMOL/L (ref 1.1–1.33)
CA-I BLDA-SCNC: 1.01 MMOL/L (ref 1.1–1.33)
CA-I BLDA-SCNC: 1.02 MMOL/L (ref 1.1–1.33)
CA-I BLDMV-SCNC: 0.88 MMOL/L (ref 1.1–1.33)
CA-I BLDMV-SCNC: 0.92 MMOL/L (ref 1.1–1.33)
CA-I BLDMV-SCNC: 0.92 MMOL/L (ref 1.1–1.33)
CA-I BLDMV-SCNC: 0.95 MMOL/L (ref 1.1–1.33)
CA-I BLDMV-SCNC: 0.96 MMOL/L (ref 1.1–1.33)
CA-I BLDMV-SCNC: 0.99 MMOL/L (ref 1.1–1.33)
CA-I BLDV-SCNC: 0.96 MMOL/L (ref 1.1–1.33)
CA-I BLDV-SCNC: 0.97 MMOL/L (ref 1.1–1.33)
CA-I BLDV-SCNC: 0.98 MMOL/L (ref 1.1–1.33)
CA-I BLDV-SCNC: 1 MMOL/L (ref 1.1–1.33)
CA-I BLDV-SCNC: 1.01 MMOL/L (ref 1.1–1.33)
CALCIUM SERPL-MCNC: 7.8 MG/DL (ref 8.6–10.6)
CALCIUM SERPL-MCNC: 7.8 MG/DL (ref 8.6–10.6)
CHLORIDE BLD-SCNC: 100 MMOL/L (ref 98–107)
CHLORIDE BLD-SCNC: 101 MMOL/L (ref 98–107)
CHLORIDE BLD-SCNC: 91 MMOL/L (ref 98–107)
CHLORIDE BLD-SCNC: 92 MMOL/L (ref 98–107)
CHLORIDE BLD-SCNC: 98 MMOL/L (ref 98–107)
CHLORIDE BLD-SCNC: 99 MMOL/L (ref 98–107)
CHLORIDE BLDA-SCNC: 92 MMOL/L (ref 98–107)
CHLORIDE BLDA-SCNC: 94 MMOL/L (ref 98–107)
CHLORIDE BLDA-SCNC: 95 MMOL/L (ref 98–107)
CHLORIDE BLDA-SCNC: 96 MMOL/L (ref 98–107)
CHLORIDE BLDA-SCNC: 97 MMOL/L (ref 98–107)
CHLORIDE BLDA-SCNC: 97 MMOL/L (ref 98–107)
CHLORIDE BLDV-SCNC: 92 MMOL/L (ref 98–107)
CHLORIDE BLDV-SCNC: 95 MMOL/L (ref 98–107)
CHLORIDE BLDV-SCNC: 95 MMOL/L (ref 98–107)
CHLORIDE BLDV-SCNC: 96 MMOL/L (ref 98–107)
CHLORIDE BLDV-SCNC: 97 MMOL/L (ref 98–107)
CHLORIDE SERPL-SCNC: 89 MMOL/L (ref 98–107)
CHLORIDE SERPL-SCNC: 90 MMOL/L (ref 98–107)
CO2 SERPL-SCNC: 21 MMOL/L (ref 21–32)
CO2 SERPL-SCNC: 22 MMOL/L (ref 21–32)
CREAT SERPL-MCNC: 6.35 MG/DL (ref 0.5–1.3)
CREAT SERPL-MCNC: 8.24 MG/DL (ref 0.5–1.3)
EGFRCR SERPLBLD CKD-EPI 2021: 10 ML/MIN/1.73M*2
EGFRCR SERPLBLD CKD-EPI 2021: 7 ML/MIN/1.73M*2
EOSINOPHIL # BLD AUTO: 0.09 X10*3/UL (ref 0–0.7)
EOSINOPHIL NFR BLD AUTO: 0.5 %
ERYTHROCYTE [DISTWIDTH] IN BLOOD BY AUTOMATED COUNT: 13.2 % (ref 11.5–14.5)
ERYTHROCYTE [DISTWIDTH] IN BLOOD BY AUTOMATED COUNT: 13.4 % (ref 11.5–14.5)
FERRITIN SERPL-MCNC: 1099 NG/ML (ref 20–300)
GLUCOSE BLD MANUAL STRIP-MCNC: 190 MG/DL (ref 74–99)
GLUCOSE BLD MANUAL STRIP-MCNC: 198 MG/DL (ref 74–99)
GLUCOSE BLD MANUAL STRIP-MCNC: 211 MG/DL (ref 74–99)
GLUCOSE BLD MANUAL STRIP-MCNC: 226 MG/DL (ref 74–99)
GLUCOSE BLD MANUAL STRIP-MCNC: 234 MG/DL (ref 74–99)
GLUCOSE BLD-MCNC: 161 MG/DL (ref 74–99)
GLUCOSE BLD-MCNC: 164 MG/DL (ref 74–99)
GLUCOSE BLD-MCNC: 201 MG/DL (ref 74–99)
GLUCOSE BLD-MCNC: 214 MG/DL (ref 74–99)
GLUCOSE BLD-MCNC: 236 MG/DL (ref 74–99)
GLUCOSE BLD-MCNC: ABNORMAL MG/DL
GLUCOSE BLDA-MCNC: 176 MG/DL (ref 74–99)
GLUCOSE BLDA-MCNC: 185 MG/DL (ref 74–99)
GLUCOSE BLDA-MCNC: 185 MG/DL (ref 74–99)
GLUCOSE BLDA-MCNC: 221 MG/DL (ref 74–99)
GLUCOSE BLDA-MCNC: 222 MG/DL (ref 74–99)
GLUCOSE BLDA-MCNC: 247 MG/DL (ref 74–99)
GLUCOSE BLDV-MCNC: 183 MG/DL (ref 74–99)
GLUCOSE BLDV-MCNC: 187 MG/DL (ref 74–99)
GLUCOSE BLDV-MCNC: 195 MG/DL (ref 74–99)
GLUCOSE BLDV-MCNC: 201 MG/DL (ref 74–99)
GLUCOSE BLDV-MCNC: 211 MG/DL (ref 74–99)
GLUCOSE SERPL-MCNC: 246 MG/DL (ref 74–99)
GLUCOSE SERPL-MCNC: 248 MG/DL (ref 74–99)
HAPTOGLOB SERPL NEPH-MCNC: <30 MG/DL (ref 30–200)
HCO3 BLDA-SCNC: 18.5 MMOL/L (ref 22–26)
HCO3 BLDA-SCNC: 19.4 MMOL/L (ref 22–26)
HCO3 BLDA-SCNC: 19.4 MMOL/L (ref 22–26)
HCO3 BLDA-SCNC: 19.5 MMOL/L (ref 22–26)
HCO3 BLDA-SCNC: 21.1 MMOL/L (ref 22–26)
HCO3 BLDA-SCNC: 21.5 MMOL/L (ref 22–26)
HCO3 BLDMV-SCNC: 16.4 MMOL/L (ref 22–26)
HCO3 BLDMV-SCNC: 17.3 MMOL/L (ref 22–26)
HCO3 BLDMV-SCNC: 18.9 MMOL/L (ref 22–26)
HCO3 BLDMV-SCNC: 20 MMOL/L (ref 22–26)
HCO3 BLDMV-SCNC: 20.3 MMOL/L (ref 22–26)
HCO3 BLDMV-SCNC: 20.6 MMOL/L (ref 22–26)
HCO3 BLDV-SCNC: 21 MMOL/L (ref 22–26)
HCO3 BLDV-SCNC: 21.4 MMOL/L (ref 22–26)
HCO3 BLDV-SCNC: 21.5 MMOL/L (ref 22–26)
HCO3 BLDV-SCNC: 21.5 MMOL/L (ref 22–26)
HCO3 BLDV-SCNC: 23.1 MMOL/L (ref 22–26)
HCT VFR BLD AUTO: 21.8 % (ref 41–52)
HCT VFR BLD AUTO: 24.4 % (ref 41–52)
HCT VFR BLD EST: 15 % (ref 41–52)
HCT VFR BLD EST: 15 % (ref 41–52)
HCT VFR BLD EST: 18 % (ref 41–52)
HCT VFR BLD EST: 22 % (ref 41–52)
HCT VFR BLD EST: 23 % (ref 41–52)
HCT VFR BLD EST: 23 % (ref 41–52)
HCT VFR BLD EST: 25 % (ref 41–52)
HCT VFR BLD EST: 25 % (ref 41–52)
HCT VFR BLD EST: 26 % (ref 41–52)
HCT VFR BLD EST: 26 % (ref 41–52)
HCT VFR BLD EST: 27 % (ref 41–52)
HCT VFR BLD EST: 27 % (ref 41–52)
HCT VFR BLD EST: 30 % (ref 41–52)
HCT VFR BLD EST: 31 % (ref 41–52)
HCT VFR BLD EST: 32 % (ref 41–52)
HCT VFR BLD EST: 49 % (ref 41–52)
HCT VFR BLD EST: ABNORMAL %
HGB BLD-MCNC: 7.6 G/DL (ref 13.5–17.5)
HGB BLD-MCNC: 8 G/DL (ref 13.5–17.5)
HGB BLDA-MCNC: 10.3 G/DL (ref 13.5–17.5)
HGB BLDA-MCNC: 5 G/DL (ref 13.5–17.5)
HGB BLDA-MCNC: 8.4 G/DL (ref 13.5–17.5)
HGB BLDA-MCNC: 8.5 G/DL (ref 13.5–17.5)
HGB BLDA-MCNC: 8.5 G/DL (ref 13.5–17.5)
HGB BLDA-MCNC: ABNORMAL G/DL
HGB BLDMV-MCNC: 10 G/DL (ref 13.5–17.5)
HGB BLDMV-MCNC: 16.3 G/DL (ref 13.5–17.5)
HGB BLDMV-MCNC: 4.9 G/DL (ref 13.5–17.5)
HGB BLDMV-MCNC: 6.1 G/DL (ref 13.5–17.5)
HGB BLDMV-MCNC: 7.8 G/DL (ref 13.5–17.5)
HGB BLDMV-MCNC: 9.1 G/DL (ref 13.5–17.5)
HGB BLDV-MCNC: 10.8 G/DL (ref 13.5–17.5)
HGB BLDV-MCNC: 7.4 G/DL (ref 13.5–17.5)
HGB BLDV-MCNC: 7.7 G/DL (ref 13.5–17.5)
HGB BLDV-MCNC: 8.4 G/DL (ref 13.5–17.5)
HGB BLDV-MCNC: 8.9 G/DL (ref 13.5–17.5)
IMM GRANULOCYTES # BLD AUTO: 1.34 X10*3/UL (ref 0–0.7)
IMM GRANULOCYTES NFR BLD AUTO: 7.2 % (ref 0–0.9)
INHALED O2 CONCENTRATION: 30 %
INR PPP: 1.9 (ref 0.9–1.1)
IRON SATN MFR SERPL: 53 % (ref 25–45)
IRON SERPL-MCNC: 121 UG/DL (ref 35–150)
LACTATE BLDA-SCNC: 1.4 MMOL/L (ref 0.4–2)
LACTATE BLDA-SCNC: 1.7 MMOL/L (ref 0.4–2)
LACTATE BLDA-SCNC: 1.8 MMOL/L (ref 0.4–2)
LACTATE BLDA-SCNC: 2.6 MMOL/L (ref 0.4–2)
LACTATE BLDA-SCNC: 3.1 MMOL/L (ref 0.4–2)
LACTATE BLDA-SCNC: 3.1 MMOL/L (ref 0.4–2)
LACTATE BLDMV-SCNC: 1.2 MMOL/L (ref 0.4–2)
LACTATE BLDMV-SCNC: 1.8 MMOL/L (ref 0.4–2)
LACTATE BLDMV-SCNC: 1.9 MMOL/L (ref 0.4–2)
LACTATE BLDMV-SCNC: 2.1 MMOL/L (ref 0.4–2)
LACTATE BLDMV-SCNC: 2.6 MMOL/L (ref 0.4–2)
LACTATE BLDMV-SCNC: 2.7 MMOL/L (ref 0.4–2)
LACTATE BLDV-SCNC: 1.3 MMOL/L (ref 0.4–2)
LACTATE BLDV-SCNC: 1.6 MMOL/L (ref 0.4–2)
LACTATE BLDV-SCNC: 1.8 MMOL/L (ref 0.4–2)
LACTATE BLDV-SCNC: 1.8 MMOL/L (ref 0.4–2)
LACTATE BLDV-SCNC: 2.9 MMOL/L (ref 0.4–2)
LACTATE BLDV-SCNC: 2.9 MMOL/L (ref 0.4–2)
LDH SERPL L TO P-CCNC: 1600 U/L (ref 84–246)
LYMPHOCYTES # BLD AUTO: 1.43 X10*3/UL (ref 1.2–4.8)
LYMPHOCYTES NFR BLD AUTO: 7.7 %
MAGNESIUM SERPL-MCNC: 3 MG/DL (ref 1.6–2.4)
MAGNESIUM SERPL-MCNC: 3.12 MG/DL (ref 1.6–2.4)
MCH RBC QN AUTO: 29.9 PG (ref 26–34)
MCH RBC QN AUTO: 30.3 PG (ref 26–34)
MCHC RBC AUTO-ENTMCNC: 32.8 G/DL (ref 32–36)
MCHC RBC AUTO-ENTMCNC: 34.9 G/DL (ref 32–36)
MCV RBC AUTO: 87 FL (ref 80–100)
MCV RBC AUTO: 91 FL (ref 80–100)
MONOCYTES # BLD AUTO: 1.63 X10*3/UL (ref 0.1–1)
MONOCYTES NFR BLD AUTO: 8.7 %
NEUTROPHILS # BLD AUTO: 14.09 X10*3/UL (ref 1.2–7.7)
NEUTROPHILS NFR BLD AUTO: 75.6 %
NRBC BLD-RTO: 6.2 /100 WBCS (ref 0–0)
NRBC BLD-RTO: 6.8 /100 WBCS (ref 0–0)
OXYHGB MFR BLDA: 95.8 % (ref 94–98)
OXYHGB MFR BLDA: 96 % (ref 94–98)
OXYHGB MFR BLDA: 96.1 % (ref 94–98)
OXYHGB MFR BLDA: 96.2 % (ref 94–98)
OXYHGB MFR BLDA: 96.6 % (ref 94–98)
OXYHGB MFR BLDA: ABNORMAL %
OXYHGB MFR BLDMV: 76.1 % (ref 45–75)
OXYHGB MFR BLDMV: 81.7 % (ref 45–75)
OXYHGB MFR BLDMV: 82.2 % (ref 45–75)
OXYHGB MFR BLDMV: 83.9 % (ref 45–75)
OXYHGB MFR BLDMV: 84.4 % (ref 45–75)
OXYHGB MFR BLDMV: 89.4 % (ref 45–75)
OXYHGB MFR BLDV: 39.8 % (ref 45–75)
OXYHGB MFR BLDV: 41.2 % (ref 45–75)
OXYHGB MFR BLDV: 46.8 % (ref 45–75)
OXYHGB MFR BLDV: 48.6 % (ref 45–75)
OXYHGB MFR BLDV: 51.7 % (ref 45–75)
PCO2 BLDA: 30 MM HG (ref 38–42)
PCO2 BLDA: 31 MM HG (ref 38–42)
PCO2 BLDA: 31 MM HG (ref 38–42)
PCO2 BLDA: 32 MM HG (ref 38–42)
PCO2 BLDMV: 28 MM HG (ref 41–51)
PCO2 BLDMV: 29 MM HG (ref 41–51)
PCO2 BLDMV: 32 MM HG (ref 41–51)
PCO2 BLDMV: 33 MM HG (ref 41–51)
PCO2 BLDMV: 34 MM HG (ref 41–51)
PCO2 BLDMV: 36 MM HG (ref 41–51)
PCO2 BLDV: 37 MM HG (ref 41–51)
PCO2 BLDV: 38 MM HG (ref 41–51)
PCO2 BLDV: 39 MM HG (ref 41–51)
PH BLDA: 7.37 PH (ref 7.38–7.42)
PH BLDA: 7.39 PH (ref 7.38–7.42)
PH BLDA: 7.39 PH (ref 7.38–7.42)
PH BLDA: 7.42 PH (ref 7.38–7.42)
PH BLDA: 7.44 PH (ref 7.38–7.42)
PH BLDA: 7.45 PH (ref 7.38–7.42)
PH BLDMV: 7.36 PH (ref 7.33–7.43)
PH BLDMV: 7.36 PH (ref 7.33–7.43)
PH BLDMV: 7.38 PH (ref 7.33–7.43)
PH BLDMV: 7.39 PH (ref 7.33–7.43)
PH BLDMV: 7.39 PH (ref 7.33–7.43)
PH BLDMV: 7.4 PH (ref 7.33–7.43)
PH BLDV: 7.34 PH (ref 7.33–7.43)
PH BLDV: 7.35 PH (ref 7.33–7.43)
PH BLDV: 7.36 PH (ref 7.33–7.43)
PH BLDV: 7.37 PH (ref 7.33–7.43)
PH BLDV: 7.38 PH (ref 7.33–7.43)
PHOSPHATE SERPL-MCNC: 8.6 MG/DL (ref 2.5–4.9)
PHOSPHATE SERPL-MCNC: 9.3 MG/DL (ref 2.5–4.9)
PLATELET # BLD AUTO: 82 X10*3/UL (ref 150–450)
PLATELET # BLD AUTO: 94 X10*3/UL (ref 150–450)
PO2 BLDA: 113 MM HG (ref 85–95)
PO2 BLDA: 116 MM HG (ref 85–95)
PO2 BLDA: 117 MM HG (ref 85–95)
PO2 BLDA: 118 MM HG (ref 85–95)
PO2 BLDA: 122 MM HG (ref 85–95)
PO2 BLDA: 126 MM HG (ref 85–95)
PO2 BLDMV: 55 MM HG (ref 35–45)
PO2 BLDMV: 58 MM HG (ref 35–45)
PO2 BLDMV: 58 MM HG (ref 35–45)
PO2 BLDMV: 59 MM HG (ref 35–45)
PO2 BLDMV: 60 MM HG (ref 35–45)
PO2 BLDMV: 62 MM HG (ref 35–45)
PO2 BLDV: 35 MM HG (ref 35–45)
PO2 BLDV: 35 MM HG (ref 35–45)
PO2 BLDV: 39 MM HG (ref 35–45)
PO2 BLDV: 40 MM HG (ref 35–45)
PO2 BLDV: 40 MM HG (ref 35–45)
POTASSIUM BLDA-SCNC: 4.1 MMOL/L (ref 3.5–5.3)
POTASSIUM BLDA-SCNC: 4.3 MMOL/L (ref 3.5–5.3)
POTASSIUM BLDA-SCNC: 4.5 MMOL/L (ref 3.5–5.3)
POTASSIUM BLDA-SCNC: 4.7 MMOL/L (ref 3.5–5.3)
POTASSIUM BLDMV-SCNC: 3.6 MMOL/L (ref 3.5–5.3)
POTASSIUM BLDMV-SCNC: 3.6 MMOL/L (ref 3.5–5.3)
POTASSIUM BLDMV-SCNC: 3.8 MMOL/L (ref 3.5–5.3)
POTASSIUM BLDMV-SCNC: 4 MMOL/L (ref 3.5–5.3)
POTASSIUM BLDMV-SCNC: 4 MMOL/L (ref 3.5–5.3)
POTASSIUM BLDMV-SCNC: 4.3 MMOL/L (ref 3.5–5.3)
POTASSIUM BLDV-SCNC: 3.9 MMOL/L (ref 3.5–5.3)
POTASSIUM BLDV-SCNC: 4 MMOL/L (ref 3.5–5.3)
POTASSIUM BLDV-SCNC: 4 MMOL/L (ref 3.5–5.3)
POTASSIUM BLDV-SCNC: 4.2 MMOL/L (ref 3.5–5.3)
POTASSIUM BLDV-SCNC: 4.3 MMOL/L (ref 3.5–5.3)
POTASSIUM SERPL-SCNC: 4.1 MMOL/L (ref 3.5–5.3)
POTASSIUM SERPL-SCNC: 4.5 MMOL/L (ref 3.5–5.3)
PROT SERPL-MCNC: 5.1 G/DL (ref 6.4–8.2)
PROTHROMBIN TIME: 21.3 SECONDS (ref 9.8–12.4)
RBC # BLD AUTO: 2.51 X10*6/UL (ref 4.5–5.9)
RBC # BLD AUTO: 2.68 X10*6/UL (ref 4.5–5.9)
RBC MORPH BLD: NORMAL
SAO2 % BLDA: 100 % (ref 94–100)
SAO2 % BLDA: 99 % (ref 94–100)
SAO2 % BLDA: 99 % (ref 94–100)
SAO2 % BLDA: ABNORMAL %
SAO2 % BLDMV: 79 % (ref 45–75)
SAO2 % BLDMV: 84 % (ref 45–75)
SAO2 % BLDMV: 85 % (ref 45–75)
SAO2 % BLDMV: 86 % (ref 45–75)
SAO2 % BLDMV: 87 % (ref 45–75)
SAO2 % BLDMV: 92 % (ref 45–75)
SAO2 % BLDV: 41 % (ref 45–75)
SAO2 % BLDV: 42 % (ref 45–75)
SAO2 % BLDV: 48 % (ref 45–75)
SAO2 % BLDV: 50 % (ref 45–75)
SAO2 % BLDV: 53 % (ref 45–75)
SODIUM BLDA-SCNC: 128 MMOL/L (ref 136–145)
SODIUM BLDA-SCNC: 129 MMOL/L (ref 136–145)
SODIUM BLDA-SCNC: 130 MMOL/L (ref 136–145)
SODIUM BLDA-SCNC: 130 MMOL/L (ref 136–145)
SODIUM BLDMV-SCNC: 126 MMOL/L (ref 136–145)
SODIUM BLDMV-SCNC: 127 MMOL/L (ref 136–145)
SODIUM BLDMV-SCNC: 130 MMOL/L (ref 136–145)
SODIUM BLDMV-SCNC: 132 MMOL/L (ref 136–145)
SODIUM BLDV-SCNC: 128 MMOL/L (ref 136–145)
SODIUM BLDV-SCNC: 129 MMOL/L (ref 136–145)
SODIUM BLDV-SCNC: 130 MMOL/L (ref 136–145)
SODIUM SERPL-SCNC: 131 MMOL/L (ref 136–145)
SODIUM SERPL-SCNC: 132 MMOL/L (ref 136–145)
TIBC SERPL-MCNC: 228 UG/DL (ref 240–445)
TRANSFERRIN SERPL-MCNC: 162 MG/DL (ref 200–360)
UIBC SERPL-MCNC: 107 UG/DL (ref 110–370)
VIT B12 SERPL-MCNC: 766 PG/ML (ref 211–911)
WBC # BLD AUTO: 18.6 X10*3/UL (ref 4.4–11.3)
WBC # BLD AUTO: 21.9 X10*3/UL (ref 4.4–11.3)

## 2025-05-25 PROCEDURE — 83735 ASSAY OF MAGNESIUM: CPT

## 2025-05-25 PROCEDURE — 85610 PROTHROMBIN TIME: CPT

## 2025-05-25 PROCEDURE — 2500000004 HC RX 250 GENERAL PHARMACY W/ HCPCS (ALT 636 FOR OP/ED): Mod: JZ

## 2025-05-25 PROCEDURE — 2020000001 HC ICU ROOM DAILY

## 2025-05-25 PROCEDURE — 99232 SBSQ HOSP IP/OBS MODERATE 35: CPT | Performed by: STUDENT IN AN ORGANIZED HEALTH CARE EDUCATION/TRAINING PROGRAM

## 2025-05-25 PROCEDURE — 84132 ASSAY OF SERUM POTASSIUM: CPT

## 2025-05-25 PROCEDURE — 84100 ASSAY OF PHOSPHORUS: CPT

## 2025-05-25 PROCEDURE — 83010 ASSAY OF HAPTOGLOBIN QUANT: CPT

## 2025-05-25 PROCEDURE — 82248 BILIRUBIN DIRECT: CPT

## 2025-05-25 PROCEDURE — 83615 LACTATE (LD) (LDH) ENZYME: CPT

## 2025-05-25 PROCEDURE — 93308 TTE F-UP OR LMTD: CPT

## 2025-05-25 PROCEDURE — 2500000002 HC RX 250 W HCPCS SELF ADMINISTERED DRUGS (ALT 637 FOR MEDICARE OP, ALT 636 FOR OP/ED)

## 2025-05-25 PROCEDURE — 93308 TTE F-UP OR LMTD: CPT | Performed by: STUDENT IN AN ORGANIZED HEALTH CARE EDUCATION/TRAINING PROGRAM

## 2025-05-25 PROCEDURE — 2500000005 HC RX 250 GENERAL PHARMACY W/O HCPCS

## 2025-05-25 PROCEDURE — 83540 ASSAY OF IRON: CPT

## 2025-05-25 PROCEDURE — 37799 UNLISTED PX VASCULAR SURGERY: CPT

## 2025-05-25 PROCEDURE — 94003 VENT MGMT INPAT SUBQ DAY: CPT

## 2025-05-25 PROCEDURE — 85027 COMPLETE CBC AUTOMATED: CPT

## 2025-05-25 PROCEDURE — 2500000004 HC RX 250 GENERAL PHARMACY W/ HCPCS (ALT 636 FOR OP/ED)

## 2025-05-25 PROCEDURE — 2500000001 HC RX 250 WO HCPCS SELF ADMINISTERED DRUGS (ALT 637 FOR MEDICARE OP)

## 2025-05-25 PROCEDURE — 82728 ASSAY OF FERRITIN: CPT

## 2025-05-25 PROCEDURE — 85730 THROMBOPLASTIN TIME PARTIAL: CPT

## 2025-05-25 PROCEDURE — 99232 SBSQ HOSP IP/OBS MODERATE 35: CPT | Performed by: INTERNAL MEDICINE

## 2025-05-25 PROCEDURE — 82947 ASSAY GLUCOSE BLOOD QUANT: CPT

## 2025-05-25 PROCEDURE — 85025 COMPLETE CBC W/AUTO DIFF WBC: CPT

## 2025-05-25 PROCEDURE — 84466 ASSAY OF TRANSFERRIN: CPT

## 2025-05-25 PROCEDURE — 83605 ASSAY OF LACTIC ACID: CPT

## 2025-05-25 PROCEDURE — 99291 CRITICAL CARE FIRST HOUR: CPT

## 2025-05-25 RX ORDER — BISACODYL 10 MG/1
10 SUPPOSITORY RECTAL 2 TIMES DAILY
Status: DISCONTINUED | OUTPATIENT
Start: 2025-05-25 | End: 2025-05-28

## 2025-05-25 RX ORDER — INSULIN LISPRO 100 [IU]/ML
0-5 INJECTION, SOLUTION INTRAVENOUS; SUBCUTANEOUS EVERY 4 HOURS
Status: DISCONTINUED | OUTPATIENT
Start: 2025-05-25 | End: 2025-06-04

## 2025-05-25 RX ORDER — SODIUM NITROPRUSSIDE IN 0.9% SODIUM CHLORIDE 0.5 MG/ML
.25-5 INJECTION INTRAVENOUS CONTINUOUS
Status: DISCONTINUED | OUTPATIENT
Start: 2025-05-25 | End: 2025-05-29

## 2025-05-25 RX ORDER — POLYETHYLENE GLYCOL 3350 17 G/17G
17 POWDER, FOR SOLUTION ORAL 4 TIMES DAILY
Status: DISCONTINUED | OUTPATIENT
Start: 2025-05-25 | End: 2025-06-03

## 2025-05-25 RX ADMIN — AMIODARONE HYDROCHLORIDE 0.5 MG/MIN: 1.8 INJECTION, SOLUTION INTRAVENOUS at 13:25

## 2025-05-25 RX ADMIN — PIPERACILLIN SODIUM AND TAZOBACTAM SODIUM 2.25 G: 2; .25 INJECTION, SOLUTION INTRAVENOUS at 20:38

## 2025-05-25 RX ADMIN — BIVALIRUDIN 0.14 MG/KG/HR: 250 INJECTION INTRACAVERNOUS at 17:08

## 2025-05-25 RX ADMIN — POLYETHYLENE GLYCOL 3350 17 G: 17 POWDER, FOR SOLUTION ORAL at 17:06

## 2025-05-25 RX ADMIN — PANTOPRAZOLE SODIUM 40 MG: 40 INJECTION, POWDER, FOR SOLUTION INTRAVENOUS at 20:38

## 2025-05-25 RX ADMIN — DEXMEDETOMIDINE HYDROCHLORIDE 0.4 MCG/KG/HR: 400 INJECTION INTRAVENOUS at 17:06

## 2025-05-25 RX ADMIN — INSULIN LISPRO 1 UNITS: 100 INJECTION, SOLUTION INTRAVENOUS; SUBCUTANEOUS at 08:23

## 2025-05-25 RX ADMIN — SENNOSIDES 17.2 MG: 8.6 TABLET, FILM COATED ORAL at 20:38

## 2025-05-25 RX ADMIN — PIPERACILLIN SODIUM AND TAZOBACTAM SODIUM 2.25 G: 2; .25 INJECTION, SOLUTION INTRAVENOUS at 15:30

## 2025-05-25 RX ADMIN — PANTOPRAZOLE SODIUM 40 MG: 40 INJECTION, POWDER, FOR SOLUTION INTRAVENOUS at 08:23

## 2025-05-25 RX ADMIN — INSULIN LISPRO 2 UNITS: 100 INJECTION, SOLUTION INTRAVENOUS; SUBCUTANEOUS at 17:26

## 2025-05-25 RX ADMIN — AMIODARONE HYDROCHLORIDE 1 MG/MIN: 1.8 INJECTION, SOLUTION INTRAVENOUS at 06:16

## 2025-05-25 RX ADMIN — BISACODYL 10 MG: 10 SUPPOSITORY RECTAL at 20:38

## 2025-05-25 RX ADMIN — INSULIN LISPRO 2 UNITS: 100 INJECTION, SOLUTION INTRAVENOUS; SUBCUTANEOUS at 13:26

## 2025-05-25 RX ADMIN — PIPERACILLIN SODIUM AND TAZOBACTAM SODIUM 2.25 G: 2; .25 INJECTION, SOLUTION INTRAVENOUS at 03:12

## 2025-05-25 RX ADMIN — BISACODYL 10 MG: 10 SUPPOSITORY RECTAL at 10:01

## 2025-05-25 RX ADMIN — INSULIN LISPRO 1 UNITS: 100 INJECTION, SOLUTION INTRAVENOUS; SUBCUTANEOUS at 20:38

## 2025-05-25 RX ADMIN — SODIUM CHLORIDE, SODIUM LACTATE, POTASSIUM CHLORIDE, AND CALCIUM CHLORIDE 500 ML: .6; .31; .03; .02 INJECTION, SOLUTION INTRAVENOUS at 22:55

## 2025-05-25 RX ADMIN — PIPERACILLIN SODIUM AND TAZOBACTAM SODIUM 2.25 G: 2; .25 INJECTION, SOLUTION INTRAVENOUS at 08:23

## 2025-05-25 RX ADMIN — POLYETHYLENE GLYCOL 3350 17 G: 17 POWDER, FOR SOLUTION ORAL at 08:23

## 2025-05-25 RX ADMIN — SENNOSIDES 17.2 MG: 8.6 TABLET, FILM COATED ORAL at 08:23

## 2025-05-25 RX ADMIN — ASPIRIN 81 MG: 81 TABLET, CHEWABLE ORAL at 08:23

## 2025-05-25 RX ADMIN — ALTEPLASE 1.4 MG: 2.2 INJECTION, POWDER, LYOPHILIZED, FOR SOLUTION INTRAVENOUS at 20:57

## 2025-05-25 RX ADMIN — Medication 30 PERCENT: at 20:35

## 2025-05-25 RX ADMIN — AMIODARONE HYDROCHLORIDE 1 MG/MIN: 1.8 INJECTION, SOLUTION INTRAVENOUS at 00:12

## 2025-05-25 RX ADMIN — POLYETHYLENE GLYCOL 3350 17 G: 17 POWDER, FOR SOLUTION ORAL at 21:00

## 2025-05-25 RX ADMIN — SODIUM CHLORIDE, SODIUM LACTATE, POTASSIUM CHLORIDE, AND CALCIUM CHLORIDE 500 ML: .6; .31; .03; .02 INJECTION, SOLUTION INTRAVENOUS at 19:10

## 2025-05-25 RX ADMIN — SODIUM NITROPRUSSIDE IN 0.9% SODIUM CHLORIDE 0.25 MCG/KG/MIN: 0.5 INJECTION INTRAVENOUS at 10:01

## 2025-05-25 RX ADMIN — SODIUM BICARBONATE 10 ML/HR: 84 INJECTION, SOLUTION INTRAVENOUS at 16:31

## 2025-05-25 RX ADMIN — Medication 330 PERCENT: at 08:00

## 2025-05-25 RX ADMIN — INSULIN LISPRO 2 UNITS: 100 INJECTION, SOLUTION INTRAVENOUS; SUBCUTANEOUS at 04:19

## 2025-05-25 RX ADMIN — POLYETHYLENE GLYCOL 3350 17 G: 17 POWDER, FOR SOLUTION ORAL at 15:29

## 2025-05-25 RX ADMIN — INSULIN LISPRO 2 UNITS: 100 INJECTION, SOLUTION INTRAVENOUS; SUBCUTANEOUS at 00:03

## 2025-05-25 RX ADMIN — ALTEPLASE 1.4 MG: 2.2 INJECTION, POWDER, LYOPHILIZED, FOR SOLUTION INTRAVENOUS at 20:56

## 2025-05-25 ASSESSMENT — PAIN - FUNCTIONAL ASSESSMENT
PAIN_FUNCTIONAL_ASSESSMENT: CPOT (CRITICAL CARE PAIN OBSERVATION TOOL)

## 2025-05-25 NOTE — PROGRESS NOTES
"Cardiac Intensive Care - Daily Progress Note   Subjective    Alpesh Elena is a 45 y.o. year old male patient admitted on 5/20/2025 with following ICU needs: Cardiogenic shock, impella, VSD/ Inferior LV wall rupture    Interval History:  Overnight lactate started to rise peak at 3.1.  Additionally, patient started developed new NSVT, longest 20 seconds / 54 minutes, in addition to PVCs throughout the night.  IV amiodarone bolus and drip was started. Impella was visualized with bedside POCUS and likely Impella was touching left ventricular wall.  Impella was adjusted under ultrasound.  Afterwards, lactate started to normalize and NSVT burden turned to decrease.  During this time, patient was hyper arousable on the vent, he was placed back on Precedex and fentanyl.  This a.m. patient was not arousable to sternal rub given sedation.    Meds    Scheduled medications  Scheduled Medications[1]  Continuous medications  Continuous Medications[2]  PRN medications  PRN Medications[3]     Objective    Blood pressure (!) 102/92, pulse 81, temperature 37.1 °C (98.8 °F), temperature source Core, resp. rate 15, height 1.727 m (5' 8\"), weight 87.2 kg (192 lb 3.9 oz), SpO2 100%.     Physical Exam  Constitutional:       Comments: Intubated, eye opens to sternal rub    Cardiovascular:      Rate and Rhythm: Regular rhythm. Tachycardia present.      Heart sounds: Murmur heard.      Comments: Holosystolic murmer, best heard at 4th intercostal rib. Very weak vs absent b/l PT and pedal.   Pulmonary:      Effort: Pulmonary effort is normal.      Breath sounds: Normal breath sounds. No wheezing or rales.   Abdominal:      General: Abdomen is flat. Bowel sounds are normal. There is no distension.      Palpations: Abdomen is soft.   Musculoskeletal:      Right lower leg: No edema.      Left lower leg: No edema.   Skin:     General: Skin is warm.   Neurological:      Comments: Off Sedated             Intake/Output Summary (Last 24 hours) at " 5/25/2025 0803  Last data filed at 5/25/2025 0600  Gross per 24 hour   Intake 1745.24 ml   Output 1006 ml   Net 739.24 ml     Labs:   Results from last 72 hours   Lab Units 05/25/25  0032 05/24/25  1755 05/24/25  1214   SODIUM mmol/L 131* 133* 133*   POTASSIUM mmol/L 4.5 4.5 4.8   CHLORIDE mmol/L 90* 93* 93*   CO2 mmol/L 21 22 23   BUN mg/dL 74* 59* 51*   CREATININE mg/dL 6.35* 5.71* 4.92*   GLUCOSE mg/dL 248* 203* 192*   CALCIUM mg/dL 7.8* 7.9* 7.9*   ANION GAP mmol/L 25* 23* 22*   EGFR mL/min/1.73m*2 10* 12* 14*   PHOSPHORUS mg/dL 8.6* 7.5* 7.1*      Results from last 72 hours   Lab Units 05/25/25  0039 05/24/25  1755 05/24/25  1214 05/24/25  0422   WBC AUTO x10*3/uL 18.6* 14.8* 15.9* 15.5*   HEMOGLOBIN g/dL 8.0* 9.6* 8.7* 8.3*   HEMATOCRIT % 24.4* 27.6* 24.7* 24.4*   PLATELETS AUTO x10*3/uL 82* 85* 91* 90*   NEUTROS PCT AUTO % 75.6  --  74.2 74.0   LYMPHS PCT AUTO % 7.7  --  8.5 10.8   MONOS PCT AUTO % 8.7  --  11.4 9.7   EOS PCT AUTO % 0.5  --  1.1 1.4      Results from last 72 hours   Lab Units 05/25/25  0549 05/25/25  0057 05/24/25  2345 05/24/25  1754   POCT PH, ARTERIAL pH 7.44* 7.39 7.37* 7.39   POCT PCO2, ARTERIAL mm Hg 31* 32* 32* 34*   POCT PO2, ARTERIAL mm Hg 122* 116* 126* 112*   POCT SO2, ARTERIAL %  --  99 99 100     Results from last 72 hours   Lab Units 05/25/25  0550 05/25/25  0311 05/24/25  2346   POCT PH, VENOUS pH 7.35 7.36 7.34   POCT PCO2, VENOUS mm Hg 39* 38* 40*   POCT PO2, VENOUS mm Hg 40 40 42        Micro/ID:     Lab Results   Component Value Date    BLOODCULT No growth at 3 days 05/21/2025    BLOODCULT No growth at 3 days 05/21/2025       EKG Trend:    EKG 5/20: Sinus Tachycardia, T wave inversions inferior leads. Prolonged QTC     Echo Data:  Results for orders placed during the hospital encounter of 05/18/25    Transthoracic Echo Complete    [address] [address]  Tel [phone] Fax [phone]    TRANSTHORACIC ECHOCARDIOGRAM  REPORT    Patient Name:       AVRIL FRITZ     [place] Physician:    [id number] Faraz Ortega DO  Study Date:         5/19/2025            Ordering Provider:    [id number] MARGARET GASPAR  MRN/PID:            [id number]             Fellow:  Accession#:         [id number]         Nurse:  Date of Birth/Age:  1980 / 45 years Sonographer:          Koki Mendez  RDCS  Gender Assigned at                      Additional Staff:  Birth:  Height:             172.72 cm            Admit Date:           5/18/2025  Weight:             75.30 kg             Admission Status:     Inpatient - STAT  BSA / BMI:          1.89 m2 / 25.24      Department Location:  [facility]  kg/m2  Blood Pressure: 97 /65 mmHg    Study Type:    TRANSTHORACIC ECHO (TTE) COMPLETE  Diagnosis/ICD: ST elevation (STEMI) myocardial infarction of unspecified  site-I21.3  Indication:    STEMI  CPT Codes:     Echo Complete w Full Doppler-[id number]    Patient History:  PCI and Stent:     PCI performed on 5/18/2025.  Smoker:            Current.  Pertinent History: Chest Pain and Murmur.    Study Detail: The following Echo studies were performed: 2D, M-Mode, Doppler and  color flow. Definity used as a contrast agent for endocardial  border definition. Total contrast used for this procedure was 2 mL  via IV push.      PHYSICIAN INTERPRETATION:  Left Ventricle: The left ventricular systolic function is normal, with a visually estimated ejection fraction of 55%. There is mild concentric left ventricular hypertrophy. Wall motion is abnormal. The left ventricular cavity size is normal. There is mild increased septal and mildly increased posterior left ventricular wall thickness. Spectral Doppler shows a normal pattern of left ventricular diastolic filling.  LV Wall Scoring:  The basal inferoseptal segment, basal inferolateral segment, and basal inferior  segment are hypokinetic. All remaining scored segments are normal.    Left Atrium: The left atrial size is  normal.  Right Ventricle: The right ventricle is normal in size. There is normal right ventricular global systolic function.  Right Atrium: The right atrial size is normal.  Aortic Valve: The aortic valve appears structurally normal. There is no evidence of aortic valve stenosis.  The aortic valve dimensionless index is 0.73. There is no evidence of aortic valve regurgitation. The peak instantaneous gradient of the aortic valve is 5 mmHg. The mean gradient of the aortic valve is 3 mmHg.  Mitral Valve: The mitral valve is normal in structure. There is no evidence of mitral valve stenosis. There is normal mitral valve leaflet mobility. There is no evidence of mitral valve regurgitation.  Tricuspid Valve: The tricuspid valve is structurally normal. There is normal tricuspid valve leaflet mobility. There is trace tricuspid regurgitation.  Pulmonic Valve: The pulmonic valve is structurally normal. There is no indication of pulmonic valve regurgitation.  Pericardium: No pericardial effusion noted.  Aorta: The aortic root is normal.  Pulmonary Artery: The main pulmonary artery is normal in size, and position, with normal bifurcation into the left and right pulmonary arteries. The tricuspid regurgitant velocity is 2.88 m/s, and with an estimated right atrial pressure of 3 mmHg, the estimated pulmonary artery pressure is mildly elevated with the RVSP at 36.2 mmHg.  Systemic Veins: The inferior vena cava appears normal in size.      CONCLUSIONS:  1. The left ventricular systolic function is normal, with a visually estimated ejection fraction of 55%.  2. Basal inferoseptal segment, basal inferolateral segment, and basal inferior segment are abnormal.  3. Abnormal wall motion.  4. There is normal right ventricular global systolic function.  5. Normal sized right ventricle.  6. There is no evidence of mitral valve stenosis.  7. No evidence of mitral valve regurgitation.  8. Trace tricuspid regurgitation is visualized.  9. Aortic  valve stenosis is not present.  10. The main pulmonary artery is normal in size, and position, with normal bifurcation into the left and right pulmonary arteries.    QUANTITATIVE DATA SUMMARY:    2D MEASUREMENTS:             Normal Ranges:  Ao Root d:       2.86 cm     (2.0-3.7cm)  LAs:             3.88 cm     (2.7-4.0cm)  IVSd:            1.12 cm     (0.6-1.1cm)  LVPWd:           1.08 cm     (0.6-1.1cm)  LVIDd:           4.02 cm     (3.9-5.9cm)  LVIDs:           2.94 cm  LV Mass Index:   77.7 g/m2  LVEDV Index:     59.87 ml/m2  LV % FS          26.9 %      LEFT ATRIUM:                  Normal Ranges:  LA Vol A4C:        29.5 ml    (22+/-6mL/m2)  LA Vol A2C:        25.4 ml  LA Vol BP:         30.5 ml  LA Vol Index A4C:  15.6ml/m2  LA Vol Index A2C:  13.4 ml/m2  LA Vol Index BP:   16.1 ml/m2  LA Area A4C:       13.8 cm2  LA Area A2C:       11.5 cm2  LA Major Axis A4C: 5.5 cm  LA Major Axis A2C: 4.4 cm  LA Volume Index:   14.9 ml/m2      RIGHT ATRIUM:                 Normal Ranges:  RA Vol A4C:        24.1 ml    (8.3-19.5ml)  RA Vol Index A4C:  12.7 ml/m2  RA Area A4C:       11.1 cm2  RA Major Axis A4C: 4.4 cm      AORTA MEASUREMENTS:         Normal Ranges:  Asc Ao, d:          2.48 cm (2.1-3.4cm)      LV SYSTOLIC FUNCTION:  Normal Ranges:  EF-A4C View:    54 % (>=55%)  EF-A2C View:    54 %  EF-Biplane:     55 %  EF-Visual:      55 %  LV EF Reported: 55 %      LV DIASTOLIC FUNCTION:           Normal Ranges:  MV Peak E:             1.20 m/s  (0.7-1.2 m/s)  MV Peak A:             1.08 m/s  (0.42-0.7 m/s)  E/A Ratio:             1.11      (1.0-2.2)  MV e'                  0.090 m/s (>8.0)  MV lateral e'          0.08 m/s  MV medial e'           0.10 m/s  E/e' Ratio:            13.29     (<8.0)      MITRAL VALVE:          Normal Ranges:  MV DT:        126 msec (150-240msec)      AORTIC VALVE:                     Normal Ranges:  AoV Vmax:                1.16 m/s (<=1.7m/s)  AoV Peak P.4 mmHg (<20mmHg)  AoV  Mean PG:             3.0 mmHg (1.7-11.5mmHg)  LVOT Max Aime:            1.03 m/s (<=1.1m/s)  AoV VTI:                 22.30 cm (18-25cm)  LVOT VTI:                16.20 cm  LVOT Diameter:           2.03 cm  (1.8-2.4cm)  AoV Area, VTI:           2.35 cm2 (2.5-5.5cm2)  AoV Area,Vmax:           2.87 cm2 (2.5-4.5cm2)  AoV Dimensionless Index: 0.73      RIGHT VENTRICLE:  RV Basal 3.49 cm  RV Mid   2.65 cm  RV Major 7.3 cm  TAPSE:   22.4 mm  RV s'    0.13 m/s      TRICUSPID VALVE/RVSP:          Normal Ranges:  Peak TR Velocity:     2.88 m/s  RV Syst Pressure:     36 mmHg  (< 30mmHg)  IVC Diam:             1.74 cm      PULMONIC VALVE:          Normal Ranges:  PV Accel Time:  116 msec (>120ms)  PV Max Aime:     1.5 m/s  (0.6-0.9m/s)  PV Max P.8 mmHg      [id number] Faraz Ortega DO  Electronically signed on 2025 at 9:34:06 AM      Wall Scoring        ** Final **      Cath Data:  [facility]     LMT normal.  LAD mild irregularity, 30% mid, wraps around the apex.  LCX mild iregularity.  RCA tortuous, dom.  PDA is 100% ostial.  LVEDP 20-25.  LVEF 40-45%, inferior basal AK.       Successful PTA R % GLENNA 0 reduced to 30%, GLENNA 3 after POBA, with no stent placed due to small caliber vessel and minimal runoff, not enough to warrant stenting.     Concern for drug use considering patient very tachycardic and LVEDP elevated with very small PDA occlusion    Summary of key imaging results from the last 24 hours     See above    Assessment and Plan     Assessment: This is a 45 year old male with a past medical history of tobacco abuse (30 pack years), submandibular abscess, and nephrolithiasis who presented to [facility] with a chief complaint of chest pain, found to have an inferior STEMI, now s/p coronary angiogram with balloon angioplasty to the PDA (right dominant system). His course was complicated by hemodynamic instability (tachycardia, hypotension) requiring vasopressor support and laboratory evidnce of end organ  dysfunction. Due to concern for cardiogenic vs mixed shock, a shock call was intervened and recommended placement of  PA catheter for adjudication of hemodynamics and transfer to [facility] CICU for a higher level of care. He is now admitted to the CICU for further management.      On arrival to the CICU he is hypotensive with escalating pressor requirements. Bedside examination reveals a holosystolic murmur and serial mixed venous are revealing of a significant step up in SVO2 from the RA to PA concerning for the presence of a ischemic VSD, perhaps secondardy to a late presenting STEMI given troponin elevation to 24K on presentation. Will re-conference with Shock team for consideration of MCS given continued hemodynamic instability and now suspected VSD with cardiogenic shock. Due to concern for mixed shock will start stress dose steroids (as currently on 3 pressors), broaden abx, and send full infectious workup. Continue with CAD/STEMI GDMT. Shock team determined impella CP placement to offload LV given concern for STEMI-induced VSD/ Inferior LV wall rupture.     Patient's lactate has normalized while on Impella CP.  However, still large difference between CVP and PA mixed venous, indicating ongoing shunting.  Infectious disease and nephrology following.  Monitoring the patient will need Impella 5.0 Started Sled for volume control. Will look to trail SBT when able.       Mechanical Ventilation: 4-10 days  Sedation/Analgesia:  none  Restraints: Restraints indicated as alternative therapies have been attempted and have been ineffective.  Restrain with soft wrist restraints and side rails up x4 until medical devices discontinued and/or patient able to participate with plan of care.     Summary for 05/25/25  :  No BM, OG off suction, largely Hbg Stable, but still with red output.   Reaching out Gi about ileus and best Feeding technique    C/w miralx BID, Senna 2 tab BID  Cont thrombocytopenia,   Anti-Plt Factor 4 AB /  Serotonin release assay negative    Peripheral smear sent   NSVT/PVC mikeley iso impella malposition   Wean sedation , future SBT     NEUROLOGIC  #Sedation  - off sedation 5/24, placed on precedex and fetnyl  5/25 AM given hyperarousal from NSVT/impella adjustments   - Wean sedation, CTM for responsiveness  - RAAS goal: 0      CARDIOVASCULAR  #RPDA STEMI s/p POBA  #C/f Cardiogenic vs mixed shock   #C/f VSD  ::  ECG on admit to OSH with ST elevations in the inferior leads with reciprocal depressions  :: Troponin:  24,085>21,701,19,[id number],[id number]  :: Lipid panel (05/2025)- cholesterol 220, HDL 33.9, , TG 57  :: TSH 1.51 (05/2025), A1C 7.4  (05/2025)  :: coronary angiogram (05/2025)  which revealed multi vessel CAD [LAD/Lcx/RCA mildly/diffusely diseased,100% RPDA culprit lesion] with PTCA to RPDA with 50% residual stenosis [GLENNA 0->3] (too small to stent)   :: LVEF 45% and LVED 20-25 mmHg via LV Gram   :: TTE  (05/2025) LVEF 55%, wall motion abnormalities, and no significant valvular disease   :: SVO2 59 from CVP port, 92 from PA port (prior to impella)  :: SVO2 57 from CVP port, 80 from PA port (after impella)  :: Off all pressors 5/21  Plan:  - Trend Lactate, CVP mix venous (w/VBG) and PA mix venous Q8-12H  - Aspirin 81 mg every day, DC Brilinita given no stent / possible future surgery   - Statin: HELD Crestor 40 mg given c/f shock liver  - Beta blocker: hold given shock requiring MCS  - RAAS Inhibition: hold given shock requiring MCS  - Limited TTE repeat: Compared with the prior exam from 5/19/2025 ([facility]) there has been interval development of a large VSD throuh the inferoseptum with the RV now becoming large with significantly reduced function.   - CT surgery following for future possible VSD repair   - Nipride started 5/25 , titrate SBP 85 (Given SVR ~1100)    # NSVT  # PVC  :: started 5/24 PM, longest 20 sec run  :: likely iso sub-optimal impella position  :: repositioned under US 5/25 AM with  less ectopy burden after   - IV Amio loaded, started on amio gtt 5/25 Am and will continue   - CTM       PULMONARY  #AHRF   :: Prior to intubation satting 92% on RA  :: CXR with pulmonary edema vs PNA  :: CTA CAP- mild interstitial pulmonary edema   :: , WBC 34.1 on admission   Plan:  - Infectious workup/abx as below   - [place] guided therapy   - Wean Ventilator when more responsive      RENAL/  # BENY, Anuric   # HAGMA   # Volume overload iso VSD   :: Baseline Cr 0.8-1.0, 1.61 on admit, 1.85 on transport - pleatu but Cr still > 10  Plan:  - Likely hemodynamic mediated , vanco, LV gram  - SLED 5/23, plan for 5/25   - Nephrology following   - Follow up UA/Ulytes if sample if available   - Avoid hypotension, rapid fluctuations in BP  - [place] guided volume resuscitation/ diuresis   - Daily RFP        GASTROINTESTINAL  #GI PPX  # c/f Traumatic OG placement    # Ileus   - IV PPI BID every day while intubated  - Aggressive bowel reg: miralx BID, Senna 2 tab BID,   - FU GI recs for ileus and feeding      ENDOCRINE  #T2DM   :: A1C 7.4  (05/2025)  Plan:  - NPO   - Q4H POC glucose, SSI while NPO  - Titrate -180  - Will need diabetes education prior to discharge      HEME/ONC  #thrombocytopenia  # C/f HIT  # Anemia    # Hemolysis iso impella use   - C/f some degree of hemolysis   - Hepatic function, LHD, hapto daily  - Heparin products DC 5/23   - Impella Purg: Sodium Bicarb    - Systemic: Bival (follow ptt)  - Anti-Plt Factor 4 AB / Serotonin release assay negative       INFECTIOUS DISEASE  #C/f Mixed shock   #Leukocytosis  # Fever  Unclear if Fever is from infection or general cardiogenic shock at this time   :: S/p Doxy/CTX 05/19  :: blood cultures sent 05/19 x 2 sets   Work up:  - MRSA probe: -  - Bcx2: NGTD  - UA: non-infectious   Plan:  - c/w Zosyn, DC Vanco  5/24  - Plan for 7 to 10 day treatment course  (*5/28 is day 10)  - Follow infectious labs  - narrow abx as able   - ID following      MSK/DERM  SWAPNIL          ICU Check List       ICU Liberation: Intervention:   Assess, Prevent, Manage Pain 0 - No pain fentanyl gtt   Both SAT and SBT [] SAT  [] SBT 30-60 min [] Extubate to NC  [] Extubate to NIV  [] Discuss Trach   Choice of analgesia and sedation Romero Agitation Sedation Scale (RASS): Light sedation Fentanyl, Precedex, and Versed  -1 to -2   Delirium: Assess, prevent and manage  CAM ICU Positive Assess Qshift   Early Mobility and Exercise Receiving therapies that restrict mobility [] PT /OT consult   Family Engagement and Empowerment [x]Family updated []SW consult     F: PRN for euvolemia; swan guided therapy   E: PRN K>4, Mg>2, P>3   N: NPO  A: PIV, RIJ swan, left radial A line      Oxygen: MV 30%/500/14/5  Abx: Zosyn  Gtts: Levo, Vaso, Phenyl, fent, versed      DVT ppx: Lovenox  GI ppx: IV PPI     Code Status: full (confirmed on admit)  Surrogate Medical Decision-maker:    Ashley Hogan (Mother) [phone]         Introducer 05/19/25 Internal jugular Right (Active)   Placement Date/Time: 05/19/25 2330   Hand Hygiene Completed: Yes  Location: Internal jugular  Orientation: Right  Placed by: CG/AL  Placement Verified: X-ray;Pressure tracing changes;Transduced waveform   Number of days: 0       Arterial Line 05/19/25 Left Radial (Active)   Placement Date/Time: 05/19/25 2200   Orientation: Left  Location: Radial  Local Anesthetic: Injectable  Technique: Anatomical landmarks  Insertion attempts: 1  Securement Method: Sutured  Patient Tolerance: Fair   Number of days: 0       Arterial Sheath 05/20/25 0529 6 Fr. Right Femoral (Active)   Placement Date/Time: 05/20/25 0529   Sheath Size: (c) 6 Fr.  Line Orientation: Right  Sheath Insertion Site: Femoral   Number of days: 0       Arterial Sheath 05/20/25 0532 Other (Comment) Right Femoral (Active)   Placement Date/Time: 05/20/25 0532   Sheath Size: (c) Other (Comment)  Line Orientation: Right  Sheath Insertion Site: Femoral   Number of days: 0       Arterial  Sheath 05/20/25 0559 6 Fr. Left Femoral (Active)   Placement Date/Time: 05/20/25 0559   Sheath Size: 6 Fr.  Line Orientation: Left  Sheath Insertion Site: Femoral   Number of days: 0       Pulmonary Artery Catheter Internal jugular (Active)   Placement Date/Time: 05/19/25 2330   Hand Hygiene Performed Prior to CVC Insertion: Yes  Site Prep: Alcohol;Chlorhexidine ;Usual sterile procedure followed  Site Prep Agent has Completely Dried Before Insertion: Yes  All 5 Sterile Barriers Used (Glove...   Number of days: 0       ETT  7.5 mm (Active)   Placement Date/Time: 05/19/25 2305   Hand Hygiene Completed: Yes  ETT Type: ETT - single  Single Lumen Tube Size: 7.5 mm  Cuffed: Yes  Location: Oral  Airway Insertion Attempts: 1  Placement Verification: Auscultation;Capnometry;Fiberoptic visualizati...   Number of days: 0       Urethral Catheter 16 Fr. (Active)   Placement Date/Time: 05/20/25 0200   Hand Hygiene Completed: Yes  Tube Size (Fr.): 16 Fr.  Catheter Balloon Size: 10 mL  Urine Returned: Yes   Number of days: 0       NG/OG/Feeding Tube OG - [place] [employer] [place] sump 16 Fr Center mouth (Active)   Placement Date/Time: 05/19/25 2310   Placed by: MARLENA  Hand Hygiene Completed: Yes  Type of Tube: NG/OG Tube  Tube Length: 55 cm  Tube Type: OG - [place] [employer] [place] sump  NG/OG Tube Size: 16 Fr  Tube Location: Center mouth   Number of days: 0       Social:  Code: Full Code    Disposition: NABOR Leong DO   05/25/25 at 8:03 AM     Disclaimer: Documentation completed with the information available at the time of input. The times in the chart may not be reflective of actual patient care times, interventions, or procedures. Documentation occurs after the physical care of the patient.           [1] aspirin, 81 mg, oral, Daily  bisacodyl, 10 mg, rectal, Daily  insulin lispro, 0-5 Units, subcutaneous, q4h  oxygen, , inhalation, Continuous - Inhalation  pantoprazole, 40 mg, intravenous, BID  piperacillin-tazobactam, 2.25 g, intravenous,  q6h  polyethylene glycol, 17 g, oral, BID  sennosides, 2 tablet, oral, BID     [2] amiodarone, 1 mg/min, Last Rate: 1 mg/min (05/25/25 0616)  bivalirudin, 0-0.49 mg/kg/hr, Last Rate: 0.14 mg/kg/hr (05/25/25 0400)  dexmedeTOMIDine, 0.1-1.5 mcg/kg/hr (Dosing Weight), Last Rate: 0.2 mcg/kg/hr (05/25/25 0400)  fentaNYL,  mcg/hr, Last Rate: 25 mcg/hr (05/25/25 0400)  midazolam, 0.5-20 mg/hr, Last Rate: Stopped (05/24/25 0100)  sodium bicarbonate infusion Impella Purge 25 mEq/500 mL D5W, 10 mL/hr, Last Rate: 10 mL/hr (05/25/25 0400)     [3] PRN medications: alteplase, dextrose, dextrose, glucagon, glucagon

## 2025-05-25 NOTE — PROGRESS NOTES
"[facility]  [facility]  CONSULT FOLLOW-UP     Reason For Consult  C/f GIB     History Of Present Illness  Alpesh Elena is a 45 y.o. male presenting with  a past medical history of tobacco use, transferred from [facility] on 5/20/2025 with cardiogenic shock.  The patient presented to [facility] on  5/18/2025 with CP and was diagnosed with a STEMI which was complicated by cardiogenic shock 2/2 VSD/inferior LV wall rupture. He was transferred to [facility] for higher level of care. He is now s/p Impella placement on 5/20/2025. His hospital course has additionally been complicated by ischemic hepatitis, AHRF and anuric BENY. GI was initially consulted 5/22 for anemia and c/f GIB ultimately thought related to NGT trauma and recommended ongoing supportive care (signed off 5/23). GI is called back for ongoing bloody NGT output and constipation.    SUBJECTIVE     Patient remains intubated and sedated   Hgb stable in 8-9 range though has bloodier NGT output than prior  NGT had been to suction until about 10am this morning  No Bms, reportedly since admission    EXAM     Last Recorded Vitals  Blood pressure (!) 102/92, pulse 80, temperature 37.2 °C (99 °F), temperature source Temporal, resp. rate 15, height 1.727 m (5' 8\"), weight 87.2 kg (192 lb 3.9 oz), SpO2 98%.      Intake/Output Summary (Last 24 hours) at 5/25/2025 1040  Last data filed at 5/25/2025 0904  Gross per 24 hour   Intake 1955.58 ml   Output 1000 ml   Net 955.58 ml          Physical Exam  General: intubated & sedated   HEENT: scleral icterus   Respiratory: mechanical breath sounds  Cardiovascular: tachycardic   Abdomen: Soft  Neuro: sedated       OBJECTIVE                                                                              Medications           Current Medications[1]                                                                            Labs     CBC RFP   Lab Results   Component Value Date    WBC 18.6 " (H) 05/25/2025    HGB 8.0 (L) 05/25/2025    HCT 24.4 (L) 05/25/2025    MCV 91 05/25/2025    PLT 82 (L) 05/25/2025    NEUTROABS 14.09 (H) 05/25/2025    Lab Results   Component Value Date     (L) 05/25/2025    K 4.5 05/25/2025    CL 90 (L) 05/25/2025    CO2 21 05/25/2025    BUN 74 (H) 05/25/2025    CREATININE 6.35 (H) 05/25/2025     Lab Results   Component Value Date    MG 3.00 (H) 05/25/2025    PHOS 8.6 (H) 05/25/2025    CALCIUM 7.8 (L) 05/25/2025    ALBUMIN 2.9 (L) 05/25/2025         Hepatic Function ABG/VBG   Lab Results   Component Value Date     (H) 05/25/2025     (H) 05/25/2025    ALKPHOS 128 (H) 05/25/2025     Lab Results   Component Value Date    BILIDIR 5.4 (H) 05/25/2025      Lab Results   Component Value Date    PROTIME 16.0 (H) 05/20/2025    APTT 62 (H) 05/25/2025    INR 1.4 (H) 05/20/2025    Lab Results   Component Value Date    LACTATE 2.7 (H) 05/20/2025                                                                               Imaging     === 05/18/25 ===    CT ANGIO CHEST ABDOMEN PELVIS    - Impression -  No thoracic or abdominal aortic aneurysm or acute aortic pathology.      === 05/20/25 ===    XR ABDOMEN 1 VIEW    - Impression -  1. The small bowel and colon pattern is predominantly fluid-filled  2. Abnormal renal function                                                                             GI Procedures     None       ASSESSMENT / PLAN     ASSESSMENT/PLAN:  Alpesh Elena is a 45 y.o. male presenting with  a past medical history of tobacco use, transferred from [facility] on 5/20/2025 with cardiogenic shock.    The patient presented to [facility] on  5/18/2025 with CP and was diagnosed with a STEMI which was complicated by cardiogenic shock 2/2 VSD/inferior LV wall rupture. He was transferred to [facility] for higher level of care. He is now s/p Impella placement on 5/20/2025. His hospital course has additionally been complicated by ischemic hepatitis, AHRF and anuric BENY.  GI was initially consulted 5/22 for anemia and c/f GIB ultimately thought related to NGT trauma and recommended ongoing supportive care (signed off 5/23). GI is called back for ongoing bloody NGT output and constipation.    Of note, the patient is on a bivalirudin gtt and ASA 81 mg.     The patient is critically ill w/ lab findings suggestive of hemolysis. OGT has remained to suction since last seen by GI and has bloody output again. This is most likely secondary to OGT trauma, although other etiologies like stress ulcer, PUD, hemorrhagic gastritis etc. are on the differential. The patient hasn't had a BM yet and there was no increased vasopressor requirement since the bloody stomach output. Instead he is actually on nitroprusside for HTN. At this time supportive care is recommended. If the patient develops s/s of active GIB please reach out to our team (information as below).    Regarding his constipation, CT AP and recent KUB rule out obstruction. Uptitrate PO and per rectum bowel regimen until patient has BM.    Recommendations:  - Keep OGT clamped, please do not put it back to suction as there is no indication for LIWS at this time  - Hold TF for now  - No plans for EGD today, we will continue to follow clinical course  - Continue IV PPI BID for now  - Increase Miralax to TID or QUID  - Can increase bisacodyl suppository to BID  - No contraindications to PRN tap water enemas  - Please repeat lactate today  - Anticoagulation per primary team   - Continue to trend Hgb (goal > 7), platelets (goal > 50).  - Ensure adequate IV access, ideally 2 large bore IVs.  - Additional supportive care per primary team    Patient was discussed with Dr. Delgadillo, to be seen by Dr. Koch tomorrow.    Thank you for this interesting consult. Gastroenterology will continue to follow  -During weekday hours of 7am-5pm please do not hesitate to contact me on [employer] Chat or page [phone] if there are any further questions between the weekday  hours of 7 AM - 5 PM.   -After hours, on weekends, and on holidays, please page the [place] [phone]. Thank you.      Reyna Pope MD  PGY5 Gastroenterology Fellow  [facility]           [1]   Current Facility-Administered Medications:     alteplase (Cathflo Activase) injection 2 mg, 2 mg, intra-catheter, PRN, Kevin Leong DO, 1.4 mg at 05/24/25 0259    amiodarone (Nexterone) 360 mg in dextrose,iso-osm 200 mL (1.8 mg/mL) infusion (premix), 0.5 mg/min, intravenous, Continuous, Kevin Leong DO, Last Rate: 16.67 mL/hr at 05/25/25 1020, 0.5 mg/min at 05/25/25 1020    aspirin chewable tablet 81 mg, 81 mg, oral, Daily, Ney Moreno MD, 81 mg at 05/25/25 0823    bisacodyl (Dulcolax) suppository 10 mg, 10 mg, rectal, Daily, Kevin Leong DO, 10 mg at 05/25/25 1001    bivalirudin (Angiomax) infusion, 0-0.49 mg/kg/hr, intravenous, Continuous, Kevin Leong DO, Last Rate: 2.47 mL/hr at 05/25/25 0700, 0.14 mg/kg/hr at 05/25/25 0700    dexmedeTOMIDine (Precedex) 400 mcg in 100 mL (4 mcg/mL) sodium chloride 0.9% infusion, 0.1-1.5 mcg/kg/hr (Dosing Weight), intravenous, Continuous, Yobany Lindsay MD, Last Rate: 4.37 mL/hr at 05/25/25 0700, 0.2 mcg/kg/hr at 05/25/25 0700    dextrose 50 % injection 12.5 g, 12.5 g, intravenous, q15 min PRN, Ney Moreno MD    dextrose 50 % injection 25 g, 25 g, intravenous, q15 min PRN, Ney Moreno MD    fentanyl (Sublimaze) 10 mcg/mL in sodium chloride 0.9% infusion,  mcg/hr, intravenous, Continuous, Ney Moreno MD, Last Rate: 2.5 mL/hr at 05/25/25 0700, 25 mcg/hr at 05/25/25 0700    glucagon (Glucagen) injection 1 mg, 1 mg, intramuscular, q15 min PRN, Ney Moreno MD    glucagon (Glucagen) injection 1 mg, 1 mg, intramuscular, q15 min PRN, Ney Moreno MD    insulin lispro injection 0-5 Units, 0-5 Units, subcutaneous, q4h, Dina Joseph MD, 1 Units at 05/25/25 0823    midazolam in NS (Versed) 1 mg/mL infusion solution, 0.5-20 mg/hr,  intravenous, Continuous, Ney Moreno MD, Stopped at 05/24/25 0100    nitroprusside (Nipride) infusion 500 mcg/mL (100 mL vial) (adult), 0.25-5 mcg/kg/min (Dosing Weight), intravenous, Continuous, Kevin Leong DO, Last Rate: 2.62 mL/hr at 05/25/25 1001, 0.25 mcg/kg/min at 05/25/25 1001    oxygen (O2) therapy, , inhalation, Continuous - Inhalation, Ney Moreno MD, 330 percent at 05/25/25 0800    pantoprazole (Protonix) injection 40 mg, 40 mg, intravenous, BID, Kevin Leong DO, 40 mg at 05/25/25 0823    piperacillin-tazobactam (Zosyn) 2.25 g in dextrose (iso) IV 50 mL, 2.25 g, intravenous, q6h, Dina Joseph MD, Stopped at 05/25/25 0904    polyethylene glycol (Glycolax, Miralax) packet 17 g, 17 g, oral, BID, Karena Trevino MD, 17 g at 05/25/25 0823    sennosides (Senokot) tablet 17.2 mg, 2 tablet, oral, BID, Kevin Leong DO, 17.2 mg at 05/25/25 0823    sodium bicarbonate infusion Impella Purge 25 mEq/500 mL D5W, 10 mL/hr, intra-catheter, Continuous, Kevin Leong DO, Last Rate: 10 mL/hr at 05/25/25 0400, 10 mL/hr at 05/25/25 0400

## 2025-05-25 NOTE — PROGRESS NOTES
Alpesh Elena is a 45 y.o. male on day 5 of admission presenting with STEMI (ST elevation myocardial infarction) (Multi).      Subjective   Sedated  Developed VT, possible due to devices   No major events         Objective          Vitals 24HR  Heart Rate:  []   Temp:  [36.6 °C (97.9 °F)-37.8 °C (100 °F)]   Resp:  [14-20]   Weight:  [87.2 kg (192 lb 3.9 oz)]   SpO2:  [96 %-100 %]         Intake/Output last 3 Shifts:    Intake/Output Summary (Last 24 hours) at 5/25/2025 1411  Last data filed at 5/25/2025 1300  Gross per 24 hour   Intake 1857.9 ml   Output 1120 ml   Net 737.9 ml     On exam:  Sedated and intubated  Impella  Vent 30%  Trace edema      Assessment & Plan  STEMI (ST elevation myocardial infarction) (Multi)    Cardiogenic shock (Multi)    Alpesh Elena is a 45 y.o. year old male patient admitted on 5/20/2025 with: Cardiogenic shock, impella, VSD/ Inferior LV wall rupture . Nephrology following for BENY.    #BENY-D  - Baseline Cr 0.9-1  - UA- RBCs, 2+ blood, trace proteins  - CT abd- no hydro or obstructions  - Etiology of BENY: likely ATN from contrast, septic shock vs cardiogenic shock  - SLED 5/23 & planned 5/25    #HAGMA and metabolic alkalosis  #HFrEF    #lactic acidosis-improved  #sepsis    #Possible GI bleeding  #Hemolysis     ** Recommendations:  - Proceed with SLED as per order.  - Dose all treatment to GFR <15.  - Avoid hypotension, further contrast and nephrotoxins if possible.  - Strict I/Os  - Daily BMP, please do labs 2 hours after finishing SLED       Aidee Bedolla MD  Nephrology fellow.

## 2025-05-26 ENCOUNTER — APPOINTMENT (OUTPATIENT)
Dept: CARDIOLOGY | Facility: HOSPITAL | Age: 45
End: 2025-05-26
Payer: COMMERCIAL

## 2025-05-26 LAB
ABO GROUP (TYPE) IN BLOOD: NORMAL
ACT BLD: 194 SEC (ref 83–199)
ACT BLD: 207 SEC (ref 83–199)
ACT BLD: 210 SEC (ref 83–199)
ACT BLD: 216 SEC (ref 83–199)
ACT BLD: 227 SEC (ref 83–199)
ACT BLD: 232 SEC (ref 83–199)
ACT BLD: 237 SEC (ref 83–199)
ACT BLD: 239 SEC (ref 83–199)
ACT BLD: 250 SEC (ref 83–199)
ALBUMIN SERPL BCP-MCNC: 2.6 G/DL (ref 3.4–5)
ALBUMIN SERPL BCP-MCNC: 2.6 G/DL (ref 3.4–5)
ALP SERPL-CCNC: 223 U/L (ref 33–120)
ALT SERPL W P-5'-P-CCNC: 405 U/L (ref 10–52)
ANION GAP BLDA CALCULATED.4IONS-SCNC: 18 MMO/L (ref 10–25)
ANION GAP BLDA CALCULATED.4IONS-SCNC: 18 MMO/L (ref 10–25)
ANION GAP BLDA CALCULATED.4IONS-SCNC: 20 MMO/L (ref 10–25)
ANION GAP BLDA CALCULATED.4IONS-SCNC: 22 MMO/L (ref 10–25)
ANION GAP BLDMV CALCULATED.4IONS-SCNC: 17 MMO/L (ref 10–25)
ANION GAP BLDMV CALCULATED.4IONS-SCNC: 18 MMO/L (ref 10–25)
ANION GAP BLDMV CALCULATED.4IONS-SCNC: 20 MMO/L (ref 10–25)
ANION GAP BLDMV CALCULATED.4IONS-SCNC: 21 MMO/L (ref 10–25)
ANION GAP BLDV CALCULATED.4IONS-SCNC: 17 MMOL/L (ref 10–25)
ANION GAP BLDV CALCULATED.4IONS-SCNC: 17 MMOL/L (ref 10–25)
ANION GAP SERPL CALC-SCNC: 21 MMOL/L (ref 10–20)
ANION GAP SERPL CALC-SCNC: 23 MMOL/L (ref 10–20)
ANION GAP SERPL CALC-SCNC: 24 MMOL/L (ref 10–20)
ANTIBODY SCREEN: NORMAL
APTT PPP: 70 SECONDS (ref 26–36)
AST SERPL W P-5'-P-CCNC: 194 U/L (ref 9–39)
BASE EXCESS BLDA CALC-SCNC: -2.7 MMOL/L (ref -2–3)
BASE EXCESS BLDA CALC-SCNC: -6.1 MMOL/L (ref -2–3)
BASE EXCESS BLDA CALC-SCNC: -6.3 MMOL/L (ref -2–3)
BASE EXCESS BLDA CALC-SCNC: -8.1 MMOL/L (ref -2–3)
BASE EXCESS BLDMV CALC-SCNC: -5.8 MMOL/L (ref -2–3)
BASE EXCESS BLDMV CALC-SCNC: -5.9 MMOL/L (ref -2–3)
BASE EXCESS BLDMV CALC-SCNC: -7 MMOL/L (ref -2–3)
BASE EXCESS BLDMV CALC-SCNC: -7.4 MMOL/L (ref -2–3)
BASE EXCESS BLDV CALC-SCNC: -4.2 MMOL/L (ref -2–3)
BASE EXCESS BLDV CALC-SCNC: -4.6 MMOL/L (ref -2–3)
BASO STIPL BLD QL SMEAR: PRESENT
BASO STIPL BLD QL SMEAR: PRESENT
BASOPHILS # BLD MANUAL: 0 X10*3/UL (ref 0–0.1)
BASOPHILS # BLD MANUAL: 0 X10*3/UL (ref 0–0.1)
BASOPHILS NFR BLD MANUAL: 0 %
BASOPHILS NFR BLD MANUAL: 0 %
BILIRUB DIRECT SERPL-MCNC: 6.6 MG/DL (ref 0–0.3)
BILIRUB SERPL-MCNC: 10 MG/DL (ref 0–1.2)
BLASTS # BLD MANUAL: 0 X10*3/UL
BLASTS # BLD MANUAL: 0 X10*3/UL
BLASTS NFR BLD MANUAL: 0 %
BLASTS NFR BLD MANUAL: 0 %
BLOOD EXPIRATION DATE: NORMAL
BODY SURFACE AREA: 2.05 M2
BODY SURFACE AREA: 2.05 M2
BODY TEMPERATURE: 37 DEGREES CELSIUS
BUN SERPL-MCNC: 101 MG/DL (ref 6–23)
BUN SERPL-MCNC: 80 MG/DL (ref 6–23)
BUN SERPL-MCNC: 96 MG/DL (ref 6–23)
BURR CELLS BLD QL SMEAR: ABNORMAL
CA-I BLDA-SCNC: 1 MMOL/L (ref 1.1–1.33)
CA-I BLDA-SCNC: 1.03 MMOL/L (ref 1.1–1.33)
CA-I BLDA-SCNC: 1.04 MMOL/L (ref 1.1–1.33)
CA-I BLDA-SCNC: 1.05 MMOL/L (ref 1.1–1.33)
CA-I BLDMV-SCNC: 0.98 MMOL/L (ref 1.1–1.33)
CA-I BLDMV-SCNC: 1.03 MMOL/L (ref 1.1–1.33)
CA-I BLDMV-SCNC: 1.03 MMOL/L (ref 1.1–1.33)
CA-I BLDMV-SCNC: 1.04 MMOL/L (ref 1.1–1.33)
CA-I BLDV-SCNC: 1.01 MMOL/L (ref 1.1–1.33)
CA-I BLDV-SCNC: 1.06 MMOL/L (ref 1.1–1.33)
CALCIUM SERPL-MCNC: 8 MG/DL (ref 8.6–10.6)
CALCIUM SERPL-MCNC: 8.1 MG/DL (ref 8.6–10.6)
CALCIUM SERPL-MCNC: 8.2 MG/DL (ref 8.6–10.6)
CHLORIDE BLD-SCNC: 100 MMOL/L (ref 98–107)
CHLORIDE BLD-SCNC: 93 MMOL/L (ref 98–107)
CHLORIDE BLD-SCNC: 93 MMOL/L (ref 98–107)
CHLORIDE BLD-SCNC: 94 MMOL/L (ref 98–107)
CHLORIDE BLDA-SCNC: 93 MMOL/L (ref 98–107)
CHLORIDE BLDA-SCNC: 93 MMOL/L (ref 98–107)
CHLORIDE BLDA-SCNC: 97 MMOL/L (ref 98–107)
CHLORIDE BLDA-SCNC: 98 MMOL/L (ref 98–107)
CHLORIDE BLDV-SCNC: 95 MMOL/L (ref 98–107)
CHLORIDE BLDV-SCNC: 97 MMOL/L (ref 98–107)
CHLORIDE SERPL-SCNC: 89 MMOL/L (ref 98–107)
CHLORIDE SERPL-SCNC: 90 MMOL/L (ref 98–107)
CHLORIDE SERPL-SCNC: 93 MMOL/L (ref 98–107)
CO2 SERPL-SCNC: 22 MMOL/L (ref 21–32)
CO2 SERPL-SCNC: 23 MMOL/L (ref 21–32)
CO2 SERPL-SCNC: 25 MMOL/L (ref 21–32)
CREAT SERPL-MCNC: 6.81 MG/DL (ref 0.5–1.3)
CREAT SERPL-MCNC: 8.27 MG/DL (ref 0.5–1.3)
CREAT SERPL-MCNC: 8.87 MG/DL (ref 0.5–1.3)
DISPENSE STATUS: NORMAL
EGFRCR SERPLBLD CKD-EPI 2021: 7 ML/MIN/1.73M*2
EGFRCR SERPLBLD CKD-EPI 2021: 7 ML/MIN/1.73M*2
EGFRCR SERPLBLD CKD-EPI 2021: 9 ML/MIN/1.73M*2
EOSINOPHIL # BLD MANUAL: 0.43 X10*3/UL (ref 0–0.7)
EOSINOPHIL # BLD MANUAL: 0.8 X10*3/UL (ref 0–0.7)
EOSINOPHIL NFR BLD MANUAL: 1.7 %
EOSINOPHIL NFR BLD MANUAL: 3.5 %
ERYTHROCYTE [DISTWIDTH] IN BLOOD BY AUTOMATED COUNT: 12.7 % (ref 11.5–14.5)
ERYTHROCYTE [DISTWIDTH] IN BLOOD BY AUTOMATED COUNT: 12.8 % (ref 11.5–14.5)
ERYTHROCYTE [DISTWIDTH] IN BLOOD BY AUTOMATED COUNT: 13.2 % (ref 11.5–14.5)
GLUCOSE BLD MANUAL STRIP-MCNC: 171 MG/DL (ref 74–99)
GLUCOSE BLD MANUAL STRIP-MCNC: 172 MG/DL (ref 74–99)
GLUCOSE BLD MANUAL STRIP-MCNC: 181 MG/DL (ref 74–99)
GLUCOSE BLD MANUAL STRIP-MCNC: 192 MG/DL (ref 74–99)
GLUCOSE BLD MANUAL STRIP-MCNC: 217 MG/DL (ref 74–99)
GLUCOSE BLD MANUAL STRIP-MCNC: 221 MG/DL (ref 74–99)
GLUCOSE BLD MANUAL STRIP-MCNC: 251 MG/DL (ref 74–99)
GLUCOSE BLD-MCNC: 170 MG/DL (ref 74–99)
GLUCOSE BLD-MCNC: 251 MG/DL (ref 74–99)
GLUCOSE BLD-MCNC: 268 MG/DL (ref 74–99)
GLUCOSE BLD-MCNC: 277 MG/DL (ref 74–99)
GLUCOSE BLDA-MCNC: 171 MG/DL (ref 74–99)
GLUCOSE BLDA-MCNC: 183 MG/DL (ref 74–99)
GLUCOSE BLDA-MCNC: 224 MG/DL (ref 74–99)
GLUCOSE BLDA-MCNC: 272 MG/DL (ref 74–99)
GLUCOSE BLDV-MCNC: 167 MG/DL (ref 74–99)
GLUCOSE BLDV-MCNC: 188 MG/DL (ref 74–99)
GLUCOSE SERPL-MCNC: 195 MG/DL (ref 74–99)
GLUCOSE SERPL-MCNC: 216 MG/DL (ref 74–99)
GLUCOSE SERPL-MCNC: 270 MG/DL (ref 74–99)
HAPTOGLOB SERPL NEPH-MCNC: <30 MG/DL (ref 30–200)
HCO3 BLDA-SCNC: 16.4 MMOL/L (ref 22–26)
HCO3 BLDA-SCNC: 18.2 MMOL/L (ref 22–26)
HCO3 BLDA-SCNC: 18.3 MMOL/L (ref 22–26)
HCO3 BLDA-SCNC: 20.4 MMOL/L (ref 22–26)
HCO3 BLDMV-SCNC: 18 MMOL/L (ref 22–26)
HCO3 BLDMV-SCNC: 18.5 MMOL/L (ref 22–26)
HCO3 BLDMV-SCNC: 18.8 MMOL/L (ref 22–26)
HCO3 BLDMV-SCNC: 19.5 MMOL/L (ref 22–26)
HCO3 BLDV-SCNC: 20.2 MMOL/L (ref 22–26)
HCO3 BLDV-SCNC: 21 MMOL/L (ref 22–26)
HCT VFR BLD AUTO: 18.9 % (ref 41–52)
HCT VFR BLD AUTO: 21.7 % (ref 41–52)
HCT VFR BLD AUTO: 22.9 % (ref 41–52)
HCT VFR BLD EST: 12 % (ref 41–52)
HCT VFR BLD EST: 16 % (ref 41–52)
HCT VFR BLD EST: 21 % (ref 41–52)
HCT VFR BLD EST: 23 % (ref 41–52)
HCT VFR BLD EST: 25 % (ref 41–52)
HCT VFR BLD EST: 26 % (ref 41–52)
HCT VFR BLD EST: 26 % (ref 41–52)
HCT VFR BLD EST: ABNORMAL %
HGB BLD-MCNC: 6.8 G/DL (ref 13.5–17.5)
HGB BLD-MCNC: 7.8 G/DL (ref 13.5–17.5)
HGB BLD-MCNC: 8.1 G/DL (ref 13.5–17.5)
HGB BLDA-MCNC: 5.2 G/DL (ref 13.5–17.5)
HGB BLDA-MCNC: 8.5 G/DL (ref 13.5–17.5)
HGB BLDA-MCNC: 8.7 G/DL (ref 13.5–17.5)
HGB BLDA-MCNC: <3 G/DL (ref 13.5–17.5)
HGB BLDMV-MCNC: 4 G/DL (ref 13.5–17.5)
HGB BLDMV-MCNC: 8.3 G/DL (ref 13.5–17.5)
HGB BLDMV-MCNC: 8.4 G/DL (ref 13.5–17.5)
HGB BLDMV-MCNC: 8.4 G/DL (ref 13.5–17.5)
HGB BLDV-MCNC: 7.1 G/DL (ref 13.5–17.5)
HGB BLDV-MCNC: 7.8 G/DL (ref 13.5–17.5)
IMM GRANULOCYTES # BLD AUTO: 2.16 X10*3/UL (ref 0–0.7)
IMM GRANULOCYTES # BLD AUTO: 2.47 X10*3/UL (ref 0–0.7)
IMM GRANULOCYTES NFR BLD AUTO: 9.5 % (ref 0–0.9)
IMM GRANULOCYTES NFR BLD AUTO: 9.8 % (ref 0–0.9)
INHALED O2 CONCENTRATION: 30 %
INR PPP: 1.9 (ref 0.9–1.1)
LACTATE BLDA-SCNC: 1.2 MMOL/L (ref 0.4–2)
LACTATE BLDA-SCNC: 1.5 MMOL/L (ref 0.4–2)
LACTATE BLDA-SCNC: 2.3 MMOL/L (ref 0.4–2)
LACTATE BLDA-SCNC: 2.3 MMOL/L (ref 0.4–2)
LACTATE BLDMV-SCNC: 1.8 MMOL/L (ref 0.4–2)
LACTATE BLDMV-SCNC: 2.1 MMOL/L (ref 0.4–2)
LACTATE BLDMV-SCNC: 2.3 MMOL/L (ref 0.4–2)
LACTATE BLDMV-SCNC: 2.5 MMOL/L (ref 0.4–2)
LACTATE BLDV-SCNC: 1.3 MMOL/L (ref 0.4–2)
LACTATE BLDV-SCNC: 2.3 MMOL/L (ref 0.4–2)
LACTATE BLDV-SCNC: 2.3 MMOL/L (ref 0.4–2)
LDH SERPL L TO P-CCNC: 1627 U/L (ref 84–246)
LYMPHOCYTES # BLD MANUAL: 1.05 X10*3/UL (ref 1.2–4.8)
LYMPHOCYTES # BLD MANUAL: 2.03 X10*3/UL (ref 1.2–4.8)
LYMPHOCYTES NFR BLD MANUAL: 4.2 %
LYMPHOCYTES NFR BLD MANUAL: 8.9 %
MAGNESIUM SERPL-MCNC: 2.82 MG/DL (ref 1.6–2.4)
MAGNESIUM SERPL-MCNC: 3.01 MG/DL (ref 1.6–2.4)
MCH RBC QN AUTO: 30.2 PG (ref 26–34)
MCH RBC QN AUTO: 30.6 PG (ref 26–34)
MCH RBC QN AUTO: 31.1 PG (ref 26–34)
MCHC RBC AUTO-ENTMCNC: 35.4 G/DL (ref 32–36)
MCHC RBC AUTO-ENTMCNC: 35.9 G/DL (ref 32–36)
MCHC RBC AUTO-ENTMCNC: 36 G/DL (ref 32–36)
MCV RBC AUTO: 84 FL (ref 80–100)
MCV RBC AUTO: 86 FL (ref 80–100)
MCV RBC AUTO: 87 FL (ref 80–100)
METAMYELOCYTES # BLD MANUAL: 0 X10*3/UL
METAMYELOCYTES # BLD MANUAL: 0.83 X10*3/UL
METAMYELOCYTES NFR BLD MANUAL: 0 %
METAMYELOCYTES NFR BLD MANUAL: 3.3 %
MONOCYTES # BLD MANUAL: 0.8 X10*3/UL (ref 0.1–1)
MONOCYTES # BLD MANUAL: 0.83 X10*3/UL (ref 0.1–1)
MONOCYTES NFR BLD MANUAL: 3.3 %
MONOCYTES NFR BLD MANUAL: 3.5 %
MYELOCYTES # BLD MANUAL: 0.62 X10*3/UL
MYELOCYTES # BLD MANUAL: 1.05 X10*3/UL
MYELOCYTES NFR BLD MANUAL: 2.7 %
MYELOCYTES NFR BLD MANUAL: 4.2 %
NEUTROPHILS # BLD MANUAL: 18.15 X10*3/UL (ref 1.2–7.7)
NEUTROPHILS # BLD MANUAL: 20.71 X10*3/UL (ref 1.2–7.7)
NEUTS BAND # BLD MANUAL: 0 X10*3/UL (ref 0–0.7)
NEUTS BAND # BLD MANUAL: 0.43 X10*3/UL (ref 0–0.7)
NEUTS BAND NFR BLD MANUAL: 0 %
NEUTS BAND NFR BLD MANUAL: 1.7 %
NEUTS SEG # BLD MANUAL: 18.15 X10*3/UL (ref 1.2–7)
NEUTS SEG # BLD MANUAL: 20.28 X10*3/UL (ref 1.2–7)
NEUTS SEG NFR BLD MANUAL: 79.6 %
NEUTS SEG NFR BLD MANUAL: 80.8 %
NRBC BLD MANUAL-RTO: 0 % (ref 0–0)
NRBC BLD MANUAL-RTO: 0 % (ref 0–0)
NRBC BLD-RTO: 5.5 /100 WBCS (ref 0–0)
NRBC BLD-RTO: 5.9 /100 WBCS (ref 0–0)
NRBC BLD-RTO: 6.8 /100 WBCS (ref 0–0)
OXYHGB MFR BLDA: 95.8 % (ref 94–98)
OXYHGB MFR BLDA: 96 % (ref 94–98)
OXYHGB MFR BLDA: 96.1 % (ref 94–98)
OXYHGB MFR BLDA: ABNORMAL %
OXYHGB MFR BLDMV: 45.6 % (ref 45–75)
OXYHGB MFR BLDMV: 46.3 % (ref 45–75)
OXYHGB MFR BLDMV: 46.5 % (ref 45–75)
OXYHGB MFR BLDMV: 81.6 % (ref 45–75)
OXYHGB MFR BLDV: 40.2 % (ref 45–75)
OXYHGB MFR BLDV: 49.2 % (ref 45–75)
PCO2 BLDA: 29 MM HG (ref 38–42)
PCO2 BLDA: 30 MM HG (ref 38–42)
PCO2 BLDA: 30 MM HG (ref 38–42)
PCO2 BLDA: 31 MM HG (ref 38–42)
PCO2 BLDMV: 31 MM HG (ref 41–51)
PCO2 BLDMV: 35 MM HG (ref 41–51)
PCO2 BLDMV: 36 MM HG (ref 41–51)
PCO2 BLDMV: 37 MM HG (ref 41–51)
PCO2 BLDV: 35 MM HG (ref 41–51)
PCO2 BLDV: 38 MM HG (ref 41–51)
PH BLDA: 7.36 PH (ref 7.38–7.42)
PH BLDA: 7.38 PH (ref 7.38–7.42)
PH BLDA: 7.39 PH (ref 7.38–7.42)
PH BLDA: 7.44 PH (ref 7.38–7.42)
PH BLDMV: 7.32 PH (ref 7.33–7.43)
PH BLDMV: 7.32 PH (ref 7.33–7.43)
PH BLDMV: 7.33 PH (ref 7.33–7.43)
PH BLDMV: 7.39 PH (ref 7.33–7.43)
PH BLDV: 7.35 PH (ref 7.33–7.43)
PH BLDV: 7.37 PH (ref 7.33–7.43)
PHOSPHATE SERPL-MCNC: 7.2 MG/DL (ref 2.5–4.9)
PHOSPHATE SERPL-MCNC: 7.4 MG/DL (ref 2.5–4.9)
PLASMA CELLS # BLD MANUAL: 0 X10*3/UL
PLASMA CELLS # BLD MANUAL: 0.2 X10*3/UL
PLASMA CELLS NFR BLD MANUAL: 0 %
PLASMA CELLS NFR BLD MANUAL: 0.8 %
PLATELET # BLD AUTO: 79 X10*3/UL (ref 150–450)
PLATELET # BLD AUTO: 85 X10*3/UL (ref 150–450)
PLATELET # BLD AUTO: 87 X10*3/UL (ref 150–450)
PO2 BLDA: 104 MM HG (ref 85–95)
PO2 BLDA: 105 MM HG (ref 85–95)
PO2 BLDA: 110 MM HG (ref 85–95)
PO2 BLDA: 116 MM HG (ref 85–95)
PO2 BLDMV: 36 MM HG (ref 35–45)
PO2 BLDMV: 55 MM HG (ref 35–45)
PO2 BLDV: 35 MM HG (ref 35–45)
PO2 BLDV: 36 MM HG (ref 35–45)
POLYCHROMASIA BLD QL SMEAR: ABNORMAL
POLYCHROMASIA BLD QL SMEAR: ABNORMAL
POTASSIUM BLDA-SCNC: 4 MMOL/L (ref 3.5–5.3)
POTASSIUM BLDA-SCNC: 4.1 MMOL/L (ref 3.5–5.3)
POTASSIUM BLDA-SCNC: 4.1 MMOL/L (ref 3.5–5.3)
POTASSIUM BLDA-SCNC: 4.2 MMOL/L (ref 3.5–5.3)
POTASSIUM BLDMV-SCNC: 3.9 MMOL/L (ref 3.5–5.3)
POTASSIUM BLDMV-SCNC: 4.1 MMOL/L (ref 3.5–5.3)
POTASSIUM BLDMV-SCNC: 4.2 MMOL/L (ref 3.5–5.3)
POTASSIUM BLDMV-SCNC: 4.3 MMOL/L (ref 3.5–5.3)
POTASSIUM BLDV-SCNC: 4.1 MMOL/L (ref 3.5–5.3)
POTASSIUM BLDV-SCNC: 4.2 MMOL/L (ref 3.5–5.3)
POTASSIUM SERPL-SCNC: 4.2 MMOL/L (ref 3.5–5.3)
POTASSIUM SERPL-SCNC: 4.3 MMOL/L (ref 3.5–5.3)
POTASSIUM SERPL-SCNC: 4.3 MMOL/L (ref 3.5–5.3)
PRODUCT BLOOD TYPE: 6200
PRODUCT CODE: NORMAL
PROMYELOCYTES # BLD MANUAL: 0 X10*3/UL
PROMYELOCYTES # BLD MANUAL: 0 X10*3/UL
PROMYELOCYTES NFR BLD MANUAL: 0 %
PROMYELOCYTES NFR BLD MANUAL: 0 %
PROT SERPL-MCNC: 4.6 G/DL (ref 6.4–8.2)
PROTHROMBIN TIME: 21.1 SECONDS (ref 9.8–12.4)
RBC # BLD AUTO: 2.25 X10*6/UL (ref 4.5–5.9)
RBC # BLD AUTO: 2.51 X10*6/UL (ref 4.5–5.9)
RBC # BLD AUTO: 2.65 X10*6/UL (ref 4.5–5.9)
RBC MORPH BLD: ABNORMAL
RBC MORPH BLD: ABNORMAL
RH FACTOR (ANTIGEN D): NORMAL
SAO2 % BLDA: 100 % (ref 94–100)
SAO2 % BLDA: 100 % (ref 94–100)
SAO2 % BLDA: 99 % (ref 94–100)
SAO2 % BLDA: ABNORMAL %
SAO2 % BLDMV: 47 % (ref 45–75)
SAO2 % BLDMV: 48 % (ref 45–75)
SAO2 % BLDMV: 48 % (ref 45–75)
SAO2 % BLDMV: 86 % (ref 45–75)
SAO2 % BLDV: 41 % (ref 45–75)
SAO2 % BLDV: 51 % (ref 45–75)
SODIUM BLDA-SCNC: 127 MMOL/L (ref 136–145)
SODIUM BLDA-SCNC: 127 MMOL/L (ref 136–145)
SODIUM BLDA-SCNC: 129 MMOL/L (ref 136–145)
SODIUM BLDA-SCNC: 132 MMOL/L (ref 136–145)
SODIUM BLDMV-SCNC: 127 MMOL/L (ref 136–145)
SODIUM BLDMV-SCNC: 127 MMOL/L (ref 136–145)
SODIUM BLDMV-SCNC: 128 MMOL/L (ref 136–145)
SODIUM BLDMV-SCNC: 132 MMOL/L (ref 136–145)
SODIUM BLDV-SCNC: 129 MMOL/L (ref 136–145)
SODIUM BLDV-SCNC: 130 MMOL/L (ref 136–145)
SODIUM SERPL-SCNC: 132 MMOL/L (ref 136–145)
SODIUM SERPL-SCNC: 133 MMOL/L (ref 136–145)
SODIUM SERPL-SCNC: 133 MMOL/L (ref 136–145)
TOTAL CELLS COUNTED BLD: 113
TOTAL CELLS COUNTED BLD: 120
UNIT ABO: NORMAL
UNIT NUMBER: NORMAL
UNIT RH: NORMAL
UNIT VOLUME: 350
VANCOMYCIN SERPL-MCNC: 15.3 UG/ML (ref 5–20)
VARIANT LYMPHS # BLD MANUAL: 0 X10*3/UL (ref 0–0.5)
VARIANT LYMPHS # BLD MANUAL: 0.41 X10*3/UL (ref 0–0.5)
VARIANT LYMPHS NFR BLD: 0 %
VARIANT LYMPHS NFR BLD: 1.8 %
WBC # BLD AUTO: 22.8 X10*3/UL (ref 4.4–11.3)
WBC # BLD AUTO: 23.2 X10*3/UL (ref 4.4–11.3)
WBC # BLD AUTO: 25.1 X10*3/UL (ref 4.4–11.3)
XM INTEP: NORMAL

## 2025-05-26 PROCEDURE — 2020000001 HC ICU ROOM DAILY

## 2025-05-26 PROCEDURE — 83010 ASSAY OF HAPTOGLOBIN QUANT: CPT

## 2025-05-26 PROCEDURE — 2500000004 HC RX 250 GENERAL PHARMACY W/ HCPCS (ALT 636 FOR OP/ED): Mod: JZ

## 2025-05-26 PROCEDURE — 85027 COMPLETE CBC AUTOMATED: CPT

## 2025-05-26 PROCEDURE — 2500000001 HC RX 250 WO HCPCS SELF ADMINISTERED DRUGS (ALT 637 FOR MEDICARE OP)

## 2025-05-26 PROCEDURE — 84075 ASSAY ALKALINE PHOSPHATASE: CPT

## 2025-05-26 PROCEDURE — 71045 X-RAY EXAM CHEST 1 VIEW: CPT | Performed by: STUDENT IN AN ORGANIZED HEALTH CARE EDUCATION/TRAINING PROGRAM

## 2025-05-26 PROCEDURE — 2500000004 HC RX 250 GENERAL PHARMACY W/ HCPCS (ALT 636 FOR OP/ED)

## 2025-05-26 PROCEDURE — 85730 THROMBOPLASTIN TIME PARTIAL: CPT

## 2025-05-26 PROCEDURE — 36430 TRANSFUSION BLD/BLD COMPNT: CPT

## 2025-05-26 PROCEDURE — P9016 RBC LEUKOCYTES REDUCED: HCPCS

## 2025-05-26 PROCEDURE — 85347 COAGULATION TIME ACTIVATED: CPT

## 2025-05-26 PROCEDURE — 2500000002 HC RX 250 W HCPCS SELF ADMINISTERED DRUGS (ALT 637 FOR MEDICARE OP, ALT 636 FOR OP/ED)

## 2025-05-26 PROCEDURE — 93325 DOPPLER ECHO COLOR FLOW MAPG: CPT | Performed by: INTERNAL MEDICINE

## 2025-05-26 PROCEDURE — 99233 SBSQ HOSP IP/OBS HIGH 50: CPT | Performed by: PHYSICIAN ASSISTANT

## 2025-05-26 PROCEDURE — 85007 BL SMEAR W/DIFF WBC COUNT: CPT

## 2025-05-26 PROCEDURE — 37799 UNLISTED PX VASCULAR SURGERY: CPT

## 2025-05-26 PROCEDURE — 83605 ASSAY OF LACTIC ACID: CPT

## 2025-05-26 PROCEDURE — 99291 CRITICAL CARE FIRST HOUR: CPT

## 2025-05-26 PROCEDURE — 83735 ASSAY OF MAGNESIUM: CPT

## 2025-05-26 PROCEDURE — 99232 SBSQ HOSP IP/OBS MODERATE 35: CPT | Performed by: INTERNAL MEDICINE

## 2025-05-26 PROCEDURE — 84132 ASSAY OF SERUM POTASSIUM: CPT

## 2025-05-26 PROCEDURE — 83615 LACTATE (LD) (LDH) ENZYME: CPT

## 2025-05-26 PROCEDURE — 93321 DOPPLER ECHO F-UP/LMTD STD: CPT | Performed by: INTERNAL MEDICINE

## 2025-05-26 PROCEDURE — 93308 TTE F-UP OR LMTD: CPT | Performed by: INTERNAL MEDICINE

## 2025-05-26 PROCEDURE — 86901 BLOOD TYPING SEROLOGIC RH(D): CPT

## 2025-05-26 PROCEDURE — 2500000005 HC RX 250 GENERAL PHARMACY W/O HCPCS

## 2025-05-26 PROCEDURE — 84100 ASSAY OF PHOSPHORUS: CPT

## 2025-05-26 PROCEDURE — 80202 ASSAY OF VANCOMYCIN: CPT

## 2025-05-26 PROCEDURE — 94003 VENT MGMT INPAT SUBQ DAY: CPT

## 2025-05-26 PROCEDURE — 93325 DOPPLER ECHO COLOR FLOW MAPG: CPT

## 2025-05-26 PROCEDURE — 99233 SBSQ HOSP IP/OBS HIGH 50: CPT | Performed by: STUDENT IN AN ORGANIZED HEALTH CARE EDUCATION/TRAINING PROGRAM

## 2025-05-26 PROCEDURE — 86923 COMPATIBILITY TEST ELECTRIC: CPT

## 2025-05-26 PROCEDURE — G0545 PR INHERENT VISIT TO INPT: HCPCS | Performed by: STUDENT IN AN ORGANIZED HEALTH CARE EDUCATION/TRAINING PROGRAM

## 2025-05-26 PROCEDURE — 85610 PROTHROMBIN TIME: CPT

## 2025-05-26 PROCEDURE — 93308 TTE F-UP OR LMTD: CPT

## 2025-05-26 PROCEDURE — 87040 BLOOD CULTURE FOR BACTERIA: CPT

## 2025-05-26 PROCEDURE — 36415 COLL VENOUS BLD VENIPUNCTURE: CPT

## 2025-05-26 PROCEDURE — 82947 ASSAY GLUCOSE BLOOD QUANT: CPT

## 2025-05-26 RX ORDER — MICAFUNGIN SODIUM 100 MG/5ML
100 INJECTION, POWDER, LYOPHILIZED, FOR SOLUTION INTRAVENOUS EVERY 24 HOURS
Status: DISCONTINUED | OUTPATIENT
Start: 2025-05-26 | End: 2025-05-30

## 2025-05-26 RX ORDER — NOREPINEPHRINE BITARTRATE/D5W 8 MG/250ML
PLASTIC BAG, INJECTION (ML) INTRAVENOUS
Status: COMPLETED
Start: 2025-05-26 | End: 2025-05-26

## 2025-05-26 RX ORDER — MEROPENEM 500 MG/1
500 INJECTION, POWDER, FOR SOLUTION INTRAVENOUS EVERY 24 HOURS
Status: DISCONTINUED | OUTPATIENT
Start: 2025-05-26 | End: 2025-05-29

## 2025-05-26 RX ORDER — CALCIUM GLUCONATE 20 MG/ML
2 INJECTION, SOLUTION INTRAVENOUS ONCE
Status: COMPLETED | OUTPATIENT
Start: 2025-05-26 | End: 2025-05-26

## 2025-05-26 RX ORDER — VANCOMYCIN HYDROCHLORIDE 500 MG/100ML
500 INJECTION, SOLUTION INTRAVENOUS ONCE
Status: COMPLETED | OUTPATIENT
Start: 2025-05-26 | End: 2025-05-26

## 2025-05-26 RX ORDER — NOREPINEPHRINE BITARTRATE/D5W 8 MG/250ML
.01-1 PLASTIC BAG, INJECTION (ML) INTRAVENOUS CONTINUOUS
Status: DISCONTINUED | OUTPATIENT
Start: 2025-05-26 | End: 2025-05-27

## 2025-05-26 RX ORDER — HEPARIN SODIUM 10000 [USP'U]/100ML
0-2000 INJECTION, SOLUTION INTRAVENOUS CONTINUOUS
Status: DISCONTINUED | OUTPATIENT
Start: 2025-05-26 | End: 2025-05-27

## 2025-05-26 RX ORDER — VANCOMYCIN HYDROCHLORIDE 1 G/20ML
INJECTION, POWDER, LYOPHILIZED, FOR SOLUTION INTRAVENOUS DAILY PRN
Status: DISCONTINUED | OUTPATIENT
Start: 2025-05-26 | End: 2025-05-30

## 2025-05-26 RX ADMIN — INSULIN LISPRO 1 UNITS: 100 INJECTION, SOLUTION INTRAVENOUS; SUBCUTANEOUS at 04:09

## 2025-05-26 RX ADMIN — POLYETHYLENE GLYCOL 3350 17 G: 17 POWDER, FOR SOLUTION ORAL at 20:59

## 2025-05-26 RX ADMIN — MICAFUNGIN SODIUM 100 MG: 100 INJECTION, POWDER, LYOPHILIZED, FOR SOLUTION INTRAVENOUS at 12:41

## 2025-05-26 RX ADMIN — PIPERACILLIN SODIUM AND TAZOBACTAM SODIUM 2.25 G: 2; .25 INJECTION, SOLUTION INTRAVENOUS at 02:49

## 2025-05-26 RX ADMIN — PANTOPRAZOLE SODIUM 40 MG: 40 INJECTION, POWDER, FOR SOLUTION INTRAVENOUS at 20:59

## 2025-05-26 RX ADMIN — INSULIN LISPRO 3 UNITS: 100 INJECTION, SOLUTION INTRAVENOUS; SUBCUTANEOUS at 16:35

## 2025-05-26 RX ADMIN — HEPARIN SODIUM 400 UNITS/HR: 10000 INJECTION, SOLUTION INTRAVENOUS at 21:08

## 2025-05-26 RX ADMIN — PANTOPRAZOLE SODIUM 40 MG: 40 INJECTION, POWDER, FOR SOLUTION INTRAVENOUS at 09:09

## 2025-05-26 RX ADMIN — HEPARIN SODIUM 10 ML/HR: 10000 INJECTION, SOLUTION INTRAVENOUS; SUBCUTANEOUS at 14:59

## 2025-05-26 RX ADMIN — INSULIN LISPRO 1 UNITS: 100 INJECTION, SOLUTION INTRAVENOUS; SUBCUTANEOUS at 00:27

## 2025-05-26 RX ADMIN — POLYETHYLENE GLYCOL 3350 17 G: 17 POWDER, FOR SOLUTION ORAL at 12:05

## 2025-05-26 RX ADMIN — NOREPINEPHRINE BITARTRATE 0.04 MCG/KG/MIN: 8 INJECTION, SOLUTION INTRAVENOUS at 13:45

## 2025-05-26 RX ADMIN — Medication 30 PERCENT: at 20:06

## 2025-05-26 RX ADMIN — Medication 30 PERCENT: at 08:00

## 2025-05-26 RX ADMIN — Medication 25 MCG/HR: at 06:08

## 2025-05-26 RX ADMIN — HEPARIN SODIUM 500 UNITS/HR: 10000 INJECTION, SOLUTION INTRAVENOUS at 12:09

## 2025-05-26 RX ADMIN — DEXMEDETOMIDINE HYDROCHLORIDE 0.8 MCG/KG/HR: 400 INJECTION INTRAVENOUS at 01:00

## 2025-05-26 RX ADMIN — MIDAZOLAM IN SODIUM CHLORIDE 1 MG/HR: 1 INJECTION INTRAVENOUS at 01:05

## 2025-05-26 RX ADMIN — VANCOMYCIN HYDROCHLORIDE 500 MG: 500 INJECTION, SOLUTION INTRAVENOUS at 12:05

## 2025-05-26 RX ADMIN — DEXMEDETOMIDINE HYDROCHLORIDE 0.8 MCG/KG/HR: 400 INJECTION INTRAVENOUS at 12:04

## 2025-05-26 RX ADMIN — POLYETHYLENE GLYCOL 3350 17 G: 17 POWDER, FOR SOLUTION ORAL at 16:35

## 2025-05-26 RX ADMIN — POLYETHYLENE GLYCOL 3350 17 G: 17 POWDER, FOR SOLUTION ORAL at 06:08

## 2025-05-26 RX ADMIN — AMIODARONE HYDROCHLORIDE 0.5 MG/MIN: 1.8 INJECTION, SOLUTION INTRAVENOUS at 01:56

## 2025-05-26 RX ADMIN — Medication 0.04 MCG/KG/MIN: at 13:45

## 2025-05-26 RX ADMIN — SENNOSIDES 17.2 MG: 8.6 TABLET, FILM COATED ORAL at 09:09

## 2025-05-26 RX ADMIN — INSULIN LISPRO 2 UNITS: 100 INJECTION, SOLUTION INTRAVENOUS; SUBCUTANEOUS at 20:22

## 2025-05-26 RX ADMIN — CALCIUM GLUCONATE 2 G: 20 INJECTION, SOLUTION INTRAVENOUS at 02:48

## 2025-05-26 RX ADMIN — MEROPENEM 500 MG: 500 INJECTION, POWDER, FOR SOLUTION INTRAVENOUS at 12:41

## 2025-05-26 RX ADMIN — PIPERACILLIN SODIUM AND TAZOBACTAM SODIUM 2.25 G: 2; .25 INJECTION, SOLUTION INTRAVENOUS at 09:09

## 2025-05-26 RX ADMIN — AMIODARONE HYDROCHLORIDE 0.5 MG/MIN: 1.8 INJECTION, SOLUTION INTRAVENOUS at 12:04

## 2025-05-26 RX ADMIN — ASPIRIN 81 MG: 81 TABLET, CHEWABLE ORAL at 09:09

## 2025-05-26 RX ADMIN — BISACODYL 10 MG: 10 SUPPOSITORY RECTAL at 09:09

## 2025-05-26 RX ADMIN — DEXMEDETOMIDINE HYDROCHLORIDE 0.8 MCG/KG/HR: 400 INJECTION INTRAVENOUS at 17:36

## 2025-05-26 RX ADMIN — DEXMEDETOMIDINE HYDROCHLORIDE 0.8 MCG/KG/HR: 400 INJECTION INTRAVENOUS at 06:08

## 2025-05-26 RX ADMIN — BISACODYL 10 MG: 10 SUPPOSITORY RECTAL at 20:59

## 2025-05-26 RX ADMIN — SENNOSIDES 17.2 MG: 8.6 TABLET, FILM COATED ORAL at 20:59

## 2025-05-26 ASSESSMENT — PAIN - FUNCTIONAL ASSESSMENT
PAIN_FUNCTIONAL_ASSESSMENT: CPOT (CRITICAL CARE PAIN OBSERVATION TOOL)

## 2025-05-26 NOTE — PROGRESS NOTES
Alpesh Elena is a 45 y.o. male on day 6 of admission presenting with STEMI (ST elevation myocardial infarction) (Multi).      Subjective   No major events  No sign of renal recovery.  Oliguric., 366 x 24 hr  CVP 11,  No new oxygen requirements.         Objective          Vitals 24HR  Heart Rate:  []   Temp:  [36.3 °C (97.3 °F)-37.4 °C (99.3 °F)]   Resp:  [13-34]   BP: (79-91)/(56-65)   Weight:  [87.7 kg (193 lb 5.5 oz)]   SpO2:  [97 %-100 %]         Intake/Output last 3 Shifts:    Intake/Output Summary (Last 24 hours) at 5/26/2025 1436  Last data filed at 5/26/2025 1200  Gross per 24 hour   Intake 2800.25 ml   Output 449 ml   Net 2351.25 ml     On exam:  Sedated and intubated  Impella  Vent 30%  Trace edema      Assessment & Plan  STEMI (ST elevation myocardial infarction) (Multi)    Cardiogenic shock (Multi)    Alpesh Elena is a 45 y.o. year old male patient admitted on 5/20/2025 with: Cardiogenic shock, impella, VSD/ Inferior LV wall rupture . Nephrology following for BENY.    #BENY-D  - Baseline Cr 0.9-1  - UA- RBCs, 2+ blood, trace proteins  - CT abd- no hydro or obstructions  - Etiology of BENY: likely ATN from contrast, septic shock vs cardiogenic shock  - SLED 5/23 & 5/25    #HAGMA and metabolic alkalosis  #HFrEF    #lactic acidosis-improved  #sepsis    #Possible GI bleeding-EGD tomorrow  #Hemolysis- mechanical likely on impella     ** Recommendations:  - No RRT today, eval daily.  - Dose all treatment to GFR <15.  - Avoid hypotension, further contrast and nephrotoxins if possible.  - Strict I/Os  - Daily BMP.       Hailee Parrish MD  Nephrology fellow.

## 2025-05-26 NOTE — PROGRESS NOTES
"Cardiac Intensive Care - Daily Progress Note   Subjective    Alpesh Elena is a 45 y.o. year old male patient admitted on 5/20/2025 with following ICU needs: Cardiogenic shock, impella, VSD/ Inferior LV wall rupture    Interval History:  Overnight, suction normal Impella was just under ultrasound.  Additionally, hemoglobin dropped to 6.9, transfuse 1 packed red blood cells..  Sled was aborted as well.     Meds    Scheduled medications  Scheduled Medications[1]  Continuous medications  Continuous Medications[2]  PRN medications  PRN Medications[3]     Objective    Blood pressure 91/65, pulse 63, temperature 37 °C (98.6 °F), temperature source Core, resp. rate 16, height 1.727 m (5' 8\"), weight 87.7 kg (193 lb 5.5 oz), SpO2 100%.     Physical Exam  Constitutional:       Comments: Intubated, eye opens to sternal rub    Cardiovascular:      Rate and Rhythm: Regular rhythm. Tachycardia present.      Heart sounds: Murmur heard.      Comments: Holosystolic murmer, best heard at 4th intercostal rib. Very weak vs absent b/l PT and pedal.   Pulmonary:      Effort: Pulmonary effort is normal.      Breath sounds: Normal breath sounds. No wheezing or rales.   Abdominal:      General: Abdomen is flat. Bowel sounds are normal. There is no distension.      Palpations: Abdomen is soft.   Musculoskeletal:      Right lower leg: No edema.      Left lower leg: No edema.   Skin:     General: Skin is warm.   Neurological:      Comments: Off Sedated           Intake/Output Summary (Last 24 hours) at 5/26/2025 1340  Last data filed at 5/26/2025 1200  Gross per 24 hour   Intake 2819.39 ml   Output 459 ml   Net 2360.39 ml     Labs:   Results from last 72 hours   Lab Units 05/26/25  0019 05/25/25  1541 05/25/25  0032   SODIUM mmol/L 133* 132* 131*   POTASSIUM mmol/L 4.3 4.1 4.5   CHLORIDE mmol/L 93* 89* 90*   CO2 mmol/L 23 22 21   BUN mg/dL 80* 97* 74*   CREATININE mg/dL 6.81* 8.24* 6.35*   GLUCOSE mg/dL 195* 246* 248*   CALCIUM mg/dL 8.0* " 7.8* 7.8*   ANION GAP mmol/L 21* 25* 25*   EGFR mL/min/1.73m*2 9* 7* 10*   PHOSPHORUS mg/dL 7.2* 9.3* 8.6*      Results from last 72 hours   Lab Units 05/26/25  0500 05/26/25  0026 05/25/25  1541 05/25/25  0039 05/24/25  1755 05/24/25  1214 05/24/25  0422   WBC AUTO x10*3/uL 23.2* 22.8* 21.9* 18.6*   < > 15.9* 15.5*   HEMOGLOBIN g/dL 7.8* 6.8* 7.6* 8.0*   < > 8.7* 8.3*   HEMATOCRIT % 21.7* 18.9* 21.8* 24.4*   < > 24.7* 24.4*   PLATELETS AUTO x10*3/uL 79* 85* 94* 82*   < > 91* 90*   NEUTROS PCT AUTO %  --   --   --  75.6  --  74.2 74.0   LYMPHO PCT MAN %  --  8.9  --   --   --   --   --    LYMPHS PCT AUTO %  --   --   --  7.7  --  8.5 10.8   MONO PCT MAN %  --  3.5  --   --   --   --   --    MONOS PCT AUTO %  --   --   --  8.7  --  11.4 9.7   EOSINO PCT MAN %  --  3.5  --   --   --   --   --    EOS PCT AUTO %  --   --   --  0.5  --  1.1 1.4    < > = values in this interval not displayed.      Results from last 72 hours   Lab Units 05/26/25  0556 05/26/25  0000 05/25/25 2030 05/25/25 1756   POCT PH, ARTERIAL pH 7.39 7.44* 7.42 7.45*   POCT PCO2, ARTERIAL mm Hg 30* 30* 30* 31*   POCT PO2, ARTERIAL mm Hg 116* 104* 117* 118*   POCT SO2, ARTERIAL % 100  --  100 100     Results from last 72 hours   Lab Units 05/26/25  0556 05/26/25  0002 05/25/25 2030   POCT PH, VENOUS pH 7.35 7.37 7.37   POCT PCO2, VENOUS mm Hg 38* 35* 37*   POCT PO2, VENOUS mm Hg 36 35 35        Micro/ID:     Lab Results   Component Value Date    BLOODCULT Loaded on Instrument - Culture in progress 05/26/2025    BLOODCULT Loaded on Instrument - Culture in progress 05/26/2025       EKG Trend:    EKG 5/20: Sinus Tachycardia, T wave inversions inferior leads. Prolonged QTC     Echo Data:  Results for orders placed during the hospital encounter of 05/18/25    Transthoracic Echo Complete    [place] [id number]  Tel [phone] Fax [phone]    TRANSTHORACIC ECHOCARDIOGRAM REPORT    Patient Name:        AVRIL Yoder Physician:    [id number] Faraz Ortega   Study Date:         5/19/2025            Ordering Provider:    [id number] MARGARET RAMIREZ  CHASITY  MRN/PID:            [id number]             Fellow:  Accession#:         [id number]         Nurse:  Date of Birth/Age:  1980 / 45 years Sonographer:          Koki Mendez  RDCS  Gender Assigned at  M                    Additional Staff:  Birth:  Height:             172.72 cm            Admit Date:           5/18/2025  Weight:             75.30 kg             Admission Status:     Inpatient - STAT  BSA / BMI:          1.89 m2 / 25.24      Department Location:  [facility]  kg/m2  Blood Pressure: 97 /65 mmHg    Study Type:    TRANSTHORACIC ECHO (TTE) COMPLETE  Diagnosis/ICD: ST elevation (STEMI) myocardial infarction of unspecified  site-I21.3  Indication:    STEMI  CPT Codes:     Echo Complete w Full Doppler-[id number]    Patient History:  PCI and Stent:     PCI performed on 5/18/2025.  Smoker:            Current.  Pertinent History: Chest Pain and Murmur.    Study Detail: The following Echo studies were performed: 2D, M-Mode, Doppler and  color flow. Definity used as a contrast agent for endocardial  border definition. Total contrast used for this procedure was 2 mL  via IV push.      PHYSICIAN INTERPRETATION:  Left Ventricle: The left ventricular systolic function is normal, with a visually estimated ejection fraction of 55%. There is mild concentric left ventricular hypertrophy. Wall motion is abnormal. The left ventricular cavity size is normal. There is mild increased septal and mildly increased posterior left ventricular wall thickness. Spectral Doppler shows a normal pattern of left ventricular diastolic filling.  LV Wall Scoring:  The basal inferoseptal segment, basal inferolateral segment, and basal inferior  segment are hypokinetic. All remaining scored segments are normal.    Left Atrium: The left atrial size is normal.  Right Ventricle: The  right ventricle is normal in size. There is normal right ventricular global systolic function.  Right Atrium: The right atrial size is normal.  Aortic Valve: The aortic valve appears structurally normal. There is no evidence of aortic valve stenosis.  The aortic valve dimensionless index is 0.73. There is no evidence of aortic valve regurgitation. The peak instantaneous gradient of the aortic valve is 5 mmHg. The mean gradient of the aortic valve is 3 mmHg.  Mitral Valve: The mitral valve is normal in structure. There is no evidence of mitral valve stenosis. There is normal mitral valve leaflet mobility. There is no evidence of mitral valve regurgitation.  Tricuspid Valve: The tricuspid valve is structurally normal. There is normal tricuspid valve leaflet mobility. There is trace tricuspid regurgitation.  Pulmonic Valve: The pulmonic valve is structurally normal. There is no indication of pulmonic valve regurgitation.  Pericardium: No pericardial effusion noted.  Aorta: The aortic root is normal.  Pulmonary Artery: The main pulmonary artery is normal in size, and position, with normal bifurcation into the left and right pulmonary arteries. The tricuspid regurgitant velocity is 2.88 m/s, and with an estimated right atrial pressure of 3 mmHg, the estimated pulmonary artery pressure is mildly elevated with the RVSP at 36.2 mmHg.  Systemic Veins: The inferior vena cava appears normal in size.      CONCLUSIONS:  1. The left ventricular systolic function is normal, with a visually estimated ejection fraction of 55%.  2. Basal inferoseptal segment, basal inferolateral segment, and basal inferior segment are abnormal.  3. Abnormal wall motion.  4. There is normal right ventricular global systolic function.  5. Normal sized right ventricle.  6. There is no evidence of mitral valve stenosis.  7. No evidence of mitral valve regurgitation.  8. Trace tricuspid regurgitation is visualized.  9. Aortic valve stenosis is not  present.  10. The main pulmonary artery is normal in size, and position, with normal bifurcation into the left and right pulmonary arteries.    QUANTITATIVE DATA SUMMARY:    2D MEASUREMENTS:             Normal Ranges:  Ao Root d:       2.86 cm     (2.0-3.7cm)  LAs:             3.88 cm     (2.7-4.0cm)  IVSd:            1.12 cm     (0.6-1.1cm)  LVPWd:           1.08 cm     (0.6-1.1cm)  LVIDd:           4.02 cm     (3.9-5.9cm)  LVIDs:           2.94 cm  LV Mass Index:   77.7 g/m2  LVEDV Index:     59.87 ml/m2  LV % FS          26.9 %      LEFT ATRIUM:                  Normal Ranges:  LA Vol A4C:        29.5 ml    (22+/-6mL/m2)  LA Vol A2C:        25.4 ml  LA Vol BP:         30.5 ml  LA Vol Index A4C:  15.6ml/m2  LA Vol Index A2C:  13.4 ml/m2  LA Vol Index BP:   16.1 ml/m2  LA Area A4C:       13.8 cm2  LA Area A2C:       11.5 cm2  LA Major Axis A4C: 5.5 cm  LA Major Axis A2C: 4.4 cm  LA Volume Index:   14.9 ml/m2      RIGHT ATRIUM:                 Normal Ranges:  RA Vol A4C:        24.1 ml    (8.3-19.5ml)  RA Vol Index A4C:  12.7 ml/m2  RA Area A4C:       11.1 cm2  RA Major Axis A4C: 4.4 cm      AORTA MEASUREMENTS:         Normal Ranges:  Asc Ao, d:          2.48 cm (2.1-3.4cm)      LV SYSTOLIC FUNCTION:  Normal Ranges:  EF-A4C View:    54 % (>=55%)  EF-A2C View:    54 %  EF-Biplane:     55 %  EF-Visual:      55 %  LV EF Reported: 55 %      LV DIASTOLIC FUNCTION:           Normal Ranges:  MV Peak E:             1.20 m/s  (0.7-1.2 m/s)  MV Peak A:             1.08 m/s  (0.42-0.7 m/s)  E/A Ratio:             1.11      (1.0-2.2)  MV e'                  0.090 m/s (>8.0)  MV lateral e'          0.08 m/s  MV medial e'           0.10 m/s  E/e' Ratio:            13.29     (<8.0)      MITRAL VALVE:          Normal Ranges:  MV DT:        126 msec (150-240msec)      AORTIC VALVE:                     Normal Ranges:  AoV Vmax:                1.16 m/s (<=1.7m/s)  AoV Peak P.4 mmHg (<20mmHg)  AoV Mean PG:              3.0 mmHg (1.7-11.5mmHg)  LVOT Max Aime:            1.03 m/s (<=1.1m/s)  AoV VTI:                 22.30 cm (18-25cm)  LVOT VTI:                16.20 cm  LVOT Diameter:           2.03 cm  (1.8-2.4cm)  AoV Area, VTI:           2.35 cm2 (2.5-5.5cm2)  AoV Area,Vmax:           2.87 cm2 (2.5-4.5cm2)  AoV Dimensionless Index: 0.73      RIGHT VENTRICLE:  RV Basal 3.49 cm  RV Mid   2.65 cm  RV Major 7.3 cm  TAPSE:   22.4 mm  RV s'    0.13 m/s      TRICUSPID VALVE/RVSP:          Normal Ranges:  Peak TR Velocity:     2.88 m/s  RV Syst Pressure:     36 mmHg  (< 30mmHg)  IVC Diam:             1.74 cm      PULMONIC VALVE:          Normal Ranges:  PV Accel Time:  116 msec (>120ms)  PV Max Aime:     1.5 m/s  (0.6-0.9m/s)  PV Max P.8 mmHg      [id number] Faraz Ortega DO  Electronically signed on 2025 at 9:34:06 AM      Wall Scoring        ** Final **      Cath Data:  [facility]     LMT normal.  LAD mild irregularity, 30% mid, wraps around the apex.  LCX mild iregularity.  RCA tortuous, dom.  PDA is 100% ostial.  LVEDP 20-25.  LVEF 40-45%, inferior basal AK.       Successful PTA R % GLENNA 0 reduced to 30%, GLENNA 3 after POBA, with no stent placed due to small caliber vessel and minimal runoff, not enough to warrant stenting.     Concern for drug use considering patient very tachycardic and LVEDP elevated with very small PDA occlusion    Summary of key imaging results from the last 24 hours     See above    Assessment and Plan     Assessment: This is a 45 year old male with a past medical history of tobacco abuse (30 pack years), submandibular abscess, and nephrolithiasis who presented to [facility] with a chief complaint of chest pain, found to have an inferior STEMI, now s/p coronary angiogram with balloon angioplasty to the PDA (right dominant system). His course was complicated by hemodynamic instability (tachycardia, hypotension) requiring vasopressor support and laboratory evidnce of end organ dysfunction. Due to  concern for cardiogenic vs mixed shock, a shock call was intervened and recommended placement of  PA catheter for adjudication of hemodynamics and transfer to [facility] CICU for a higher level of care. He is now admitted to the CICU for further management.      On arrival to the CICU he is hypotensive with escalating pressor requirements. Bedside examination reveals a holosystolic murmur and serial mixed venous are revealing of a significant step up in SVO2 from the RA to PA concerning for the presence of a ischemic VSD, perhaps secondardy to a late presenting STEMI given troponin elevation to 24K on presentation. Will re-conference with Shock team for consideration of MCS given continued hemodynamic instability and now suspected VSD with cardiogenic shock. Due to concern for mixed shock will start stress dose steroids (as currently on 3 pressors), broaden abx, and send full infectious workup. Continue with CAD/STEMI GDMT. Shock team determined impella CP placement to offload LV given concern for STEMI-induced VSD/ Inferior LV wall rupture.     Patient's lactate has normalized while on Impella CP.  However, still large difference between CVP and PA mixed venous, indicating ongoing shunting.  Infectious disease and nephrology following.  Monitoring the patient will need Impella 5.0 Started Sled for volume control. Will look to trail SBT when able.       Mechanical Ventilation: 4-10 days  Sedation/Analgesia:  none  Restraints: Restraints indicated as alternative therapies have been attempted and have been ineffective.  Restrain with soft wrist restraints and side rails up x4 until medical devices discontinued and/or patient able to participate with plan of care.     Summary for 05/26/25  :  Rising WBC, Zosyn DC, started on Vanco, Adolfo, and Danae for possible line infections   Anti-Plt Factor 4 AB negative--> Placed back on heparin systemic/purge   Overall Hgb drop, s/p 1x pRBC transfused. Plan for endoscopy with GI 5/27     Low threshold for shock call given complications of impella CP    NEUROLOGIC  #Sedation  - off sedation 5/24, placed on precedex and fetnyl  5/25 AM given hyperarousal from NSVT/impella adjustments   - Wean sedation, CTM for responsiveness  - RAAS goal: 0      CARDIOVASCULAR  #RPDA STEMI s/p POBA  #C/f Cardiogenic vs mixed shock   #C/f VSD  ::  ECG on admit to OSH with ST elevations in the inferior leads with reciprocal depressions  :: Troponin:  24,085>21,701,19,[id number]  :: Lipid panel (05/2025)- cholesterol 220, HDL 33.9, , TG 57  :: TSH 1.51 (05/2025), A1C 7.4  (05/2025)  :: coronary angiogram (05/2025)  which revealed multi vessel CAD [LAD/Lcx/RCA mildly/diffusely diseased,100% RPDA culprit lesion] with PTCA to RPDA with 50% residual stenosis [GLENNA 0->3] (too small to stent)   :: LVEF 45% and LVED 20-25 mmHg via LV Gram   :: TTE  (05/2025) LVEF 55%, wall motion abnormalities, and no significant valvular disease   :: SVO2 59 from CVP port, 92 from PA port (prior to impella)  :: SVO2 57 from CVP port, 80 from PA port (after impella)  :: Off all pressors 5/21  :: restarted Levo 5/26  Plan:  - Trend Lactate, CVP mix venous (w/VBG) and PA mix venous Q8-12H  - Aspirin 81 mg every day, DC Brilinita given no stent / possible future surgery   - Statin: HELD Crestor 40 mg given c/f shock liver  - Beta blocker: hold given shock requiring MCS  - RAAS Inhibition: hold given shock requiring MCS  - Limited TTE repeat: Compared with the prior exam from 5/19/2025 ([facility]) there has been interval development of a large VSD throuh the inferoseptum with the RV now becoming large with significantly reduced function.   - CT surgery following for future possible VSD repair   - Nipride started 5/25 , titrate SBP 85 (Given SVR ~1100)- DC 5/26 AM    # NSVT  # PVC  :: started 5/24 PM, longest 20 sec run  :: likely iso sub-optimal impella position  :: repositioned under US 5/25 AM with less ectopy burden after   -  IV Amio loaded, started on amio gtt 5/25 Am and will continue   - CTM       PULMONARY  #AHRF   :: Prior to intubation satting 92% on RA  :: CXR with pulmonary edema vs PNA  :: CTA CAP- mild interstitial pulmonary edema   :: , WBC 34.1 on admission   Plan:  - Infectious workup/abx as below   - Kemp guided therapy   - Wean Ventilator when more responsive      RENAL/  # BENY, Anuric   # HAGMA   # Volume overload iso VSD   :: Baseline Cr 0.8-1.0, 1.61 on admit, 1.85 on transport - pleatu but Cr still > 10  Plan:  - Likely hemodynamic mediated , vanco, LV gram  - Brief SLED 5/23, 5/25   - Nephrology following   - Follow up UA/Ulytes if sample if available   - Avoid hypotension, rapid fluctuations in BP  - Kemp guided volume resuscitation/ diuresis   - Daily RFP        GASTROINTESTINAL  #GI PPX  # c/f Traumatic OG placement    # Ileus   - IV PPI BID every day while intubated  - Aggressive bowel reg: miralx BID, Senna 2 tab BID,   - FU GI recs for ileus and feeding   - Plan for Scope with GI on 5/27     ENDOCRINE  #T2DM   :: A1C 7.4  (05/2025)  Plan:  - NPO   - Q4H POC glucose, SSI while NPO  - Titrate -180  - Will need diabetes education prior to discharge      HEME/ONC  #thrombocytopenia  # Anemia    # Hemolysis iso impella use, improving    - C/f some degree of hemolysis   - Hepatic function, LHD, hapto daily  - Heparin products DC 5/23, restarted 5/26   - Impella Purg: Hep/D5W   - Systemic: Heparin (follow ACT)  - Anti-Plt Factor 4 AB / Serotonin release assay negative       INFECTIOUS DISEASE  #C/f Mixed shock   #Leukocytosis  # Fever  Unclear if Fever is from infection or general cardiogenic shock at this time   :: S/p Doxy/CTX 05/19  :: blood cultures sent 05/19 x 2 sets   :: Rising WBC despite 8 days of zosyn   Work up:  - MRSA probe: -  - Bcx2: NGTD  - UA: non-infectious   Plan:  -Dc zosyn; Start Vanco, Adolfo, and micafungin 5/26  - Follow infectious labs  - ID following      MSK/DERM  SWAPNIL         ICU  Check List       ICU Liberation: Intervention:   Assess, Prevent, Manage Pain 0 - No pain fentanyl gtt   Both SAT and SBT [] SAT  [] SBT 30-60 min [] Extubate to NC  [] Extubate to NIV  [] Discuss Trach   Choice of analgesia and sedation Romero Agitation Sedation Scale (RASS): Light sedation Fentanyl, Precedex, and Versed  -1 to -2   Delirium: Assess, prevent and manage  CAM ICU Negative Assess Qshift   Early Mobility and Exercise Receiving therapies that restrict mobility [] PT /OT consult   Family Engagement and Empowerment [x]Family updated []SW consult     F: PRN for euvolemia; swan guided therapy   E: PRN K>4, Mg>2, P>3   N: NPO  A: PIV, RIJ swan, left radial A line      Oxygen: MV 30%/500/14/5  Abx: Vanco, Adolfo, Danae   Gtts: levo, fent, versed      DVT ppx: Lovenox  GI ppx: IV PPI     Code Status: full (confirmed on admit)  Surrogate Medical Decision-maker:    Ashley Hogan (Mother) [phone]         Introducer 05/19/25 Internal jugular Right (Active)   Placement Date/Time: 05/19/25 2330   Hand Hygiene Completed: Yes  Location: Internal jugular  Orientation: Right  Placed by: CG/AL  Placement Verified: X-ray;Pressure tracing changes;Transduced waveform   Number of days: 0       Arterial Line 05/19/25 Left Radial (Active)   Placement Date/Time: 05/19/25 2200   Orientation: Left  Location: Radial  Local Anesthetic: Injectable  Technique: Anatomical landmarks  Insertion attempts: 1  Securement Method: Sutured  Patient Tolerance: Fair   Number of days: 0       Arterial Sheath 05/20/25 0529 6 Fr. Right Femoral (Active)   Placement Date/Time: 05/20/25 0529   Sheath Size: (c) 6 Fr.  Line Orientation: Right  Sheath Insertion Site: Femoral   Number of days: 0       Arterial Sheath 05/20/25 0532 Other (Comment) Right Femoral (Active)   Placement Date/Time: 05/20/25 0532   Sheath Size: (c) Other (Comment)  Line Orientation: Right  Sheath Insertion Site: Femoral   Number of days: 0       Arterial Sheath 05/20/25  0559 6 Fr. Left Femoral (Active)   Placement Date/Time: 05/20/25 0559   Sheath Size: 6 Fr.  Line Orientation: Left  Sheath Insertion Site: Femoral   Number of days: 0       Pulmonary Artery Catheter Internal jugular (Active)   Placement Date/Time: 05/19/25 2330   Hand Hygiene Performed Prior to CVC Insertion: Yes  Site Prep: Alcohol;Chlorhexidine ;Usual sterile procedure followed  Site Prep Agent has Completely Dried Before Insertion: Yes  All 5 Sterile Barriers Used (Glove...   Number of days: 0       ETT  7.5 mm (Active)   Placement Date/Time: 05/19/25 2305   Hand Hygiene Completed: Yes  ETT Type: ETT - single  Single Lumen Tube Size: 7.5 mm  Cuffed: Yes  Location: Oral  Airway Insertion Attempts: 1  Placement Verification: Auscultation;Capnometry;Fiberoptic visualizati...   Number of days: 0       Urethral Catheter 16 Fr. (Active)   Placement Date/Time: 05/20/25 0200   Hand Hygiene Completed: Yes  Tube Size (Fr.): 16 Fr.  Catheter Balloon Size: 10 mL  Urine Returned: Yes   Number of days: 0       NG/OG/Feeding Tube OG - [place] sump 16 Fr Center mouth (Active)   Placement Date/Time: 05/19/25 2310   Placed by: DM  Hand Hygiene Completed: Yes  Type of Tube: NG/OG Tube  Tube Length: 55 cm  Tube Type: OG - [place] sump  NG/OG Tube Size: 16 Fr  Tube Location: Center mouth   Number of days: 0       Social:  Code: Full Code    Disposition: NABOR BROWNLEE DO Salo   05/26/25 at 1:40 PM     Disclaimer: Documentation completed with the information available at the time of input. The times in the chart may not be reflective of actual patient care times, interventions, or procedures. Documentation occurs after the physical care of the patient.           [1] aspirin, 81 mg, oral, Daily  bisacodyl, 10 mg, rectal, BID  insulin lispro, 0-5 Units, subcutaneous, q4h  meropenem, 500 mg, intravenous, q24h  micafungin, 100 mg, intravenous, q24h  oxygen, , inhalation, Continuous - Inhalation  pantoprazole, 40 mg,  intravenous, BID  polyethylene glycol, 17 g, oral, 4x daily  sennosides, 2 tablet, oral, BID     [2] amiodarone, 0.5 mg/min, Last Rate: 0.5 mg/min (05/26/25 1204)  dexmedeTOMIDine, 0.1-1.5 mcg/kg/hr (Dosing Weight), Last Rate: 0.8 mcg/kg/hr (05/26/25 1204)  fentaNYL,  mcg/hr, Last Rate: 25 mcg/hr (05/26/25 0800)  heparin, 0-2,000 Units/hr, Last Rate: 500 Units/hr (05/26/25 1209)  heparin Impella Purge 25 units/mL in 500 mL D5W, 10 mL/hr  midazolam, 0.5-20 mg/hr, Last Rate: 1 mg/hr (05/26/25 0800)  nitroprusside, 0.25-5 mcg/kg/min (Dosing Weight), Last Rate: Stopped (05/25/25 1939)     [3] PRN medications: alteplase, dextrose, dextrose, glucagon, glucagon, vancomycin

## 2025-05-26 NOTE — PROGRESS NOTES
Alpesh Elena is a 45 y.o. male on day 6 of admission presenting with STEMI (ST elevation myocardial infarction) (Multi).    Subjective   Interval History: Reengaged for leukocytosis    Patient remains intubated, sedated.  Back on Arctic sun.  Now on nor epi this afternoon.    Review of Systems    Objective   Range of Vitals (last 24 hours)  Heart Rate:  []   Temp:  [36.3 °C (97.3 °F)-37.4 °C (99.3 °F)]   Resp:  [13-34]   BP: (79-91)/(56-65)   Weight:  [87.7 kg (193 lb 5.5 oz)]   SpO2:  [95 %-100 %]   Daily Weight  05/26/25 : 87.7 kg (193 lb 5.5 oz)    Body mass index is 29.4 kg/m².    Physical Exam    Critically ill-appearing, intubated, sedated, not agitated appearing  Lungs rhonchorous bilaterally, no wheezes   Regular rate rhythm, S1/S2, systolic murmur  Abdomen soft, bowel sounds quiet  Left femoral reperfusion line, right groin Impella, right [place] catheter, left CVC trialysis line  Lower extremities cool to touch    Antibiotics  meropenem - 500 mg/100 mL  micafungin - 100 mg/100 mL  vancomycin    Relevant Results  Labs  Results from last 72 hours   Lab Units 05/26/25  1414 05/26/25  0500 05/26/25  0026 05/25/25  1541 05/25/25  0039 05/24/25  1755 05/24/25  1214 05/24/25  0422   WBC AUTO x10*3/uL 25.1* 23.2* 22.8*   < > 18.6*   < > 15.9* 15.5*   HEMOGLOBIN g/dL 8.1* 7.8* 6.8*   < > 8.0*   < > 8.7* 8.3*   HEMATOCRIT % 22.9* 21.7* 18.9*   < > 24.4*   < > 24.7* 24.4*   PLATELETS AUTO x10*3/uL 87* 79* 85*   < > 82*   < > 91* 90*   NEUTROS PCT AUTO %  --   --   --   --  75.6  --  74.2 74.0   LYMPHO PCT MAN %  --   --  8.9  --   --   --   --   --    LYMPHS PCT AUTO %  --   --   --   --  7.7  --  8.5 10.8   MONO PCT MAN %  --   --  3.5  --   --   --   --   --    MONOS PCT AUTO %  --   --   --   --  8.7  --  11.4 9.7   EOSINO PCT MAN %  --   --  3.5  --   --   --   --   --    EOS PCT AUTO %  --   --   --   --  0.5  --  1.1 1.4    < > = values in this interval not displayed.     Results from last 72 hours   Lab  Units 05/26/25  1414 05/26/25  0019 05/25/25  1541   SODIUM mmol/L 132* 133* 132*   POTASSIUM mmol/L 4.2 4.3 4.1   CHLORIDE mmol/L 90* 93* 89*   CO2 mmol/L 22 23 22   BUN mg/dL 96* 80* 97*   CREATININE mg/dL 8.27* 6.81* 8.24*   GLUCOSE mg/dL 270* 195* 246*   CALCIUM mg/dL 8.1* 8.0* 7.8*   ANION GAP mmol/L 24* 21* 25*   EGFR mL/min/1.73m*2 7* 9* 7*   PHOSPHORUS mg/dL 7.4* 7.2* 9.3*     Results from last 72 hours   Lab Units 05/26/25  1414 05/26/25  0019 05/25/25  1541 05/25/25  0032 05/24/25  1214 05/24/25  0422   ALK PHOS U/L  --  223*  --  128*  --  94   BILIRUBIN TOTAL mg/dL  --  10.0*  --  8.0*  --  6.0*   BILIRUBIN DIRECT mg/dL  --  6.6*  --  5.4*  --  3.9*   PROTEIN TOTAL g/dL  --  4.6*  --  5.1*  --  5.0*   ALT U/L  --  405*  --  629*  --  848*   AST U/L  --  194*  --  241*  --  351*   ALBUMIN g/dL 2.6* 2.6* 2.8* 2.9*   < > 2.9*    < > = values in this interval not displayed.     Estimated Creatinine Clearance: 12 mL/min (A) (by C-G formula based on SCr of 8.27 mg/dL (H)).  C-Reactive Protein   Date Value Ref Range Status   07/07/2024 10.34 (H) <1.00 mg/dL Final     Microbiology  Susceptibility data from last 14 days.  Collected Specimen Info Organism   05/20/25 Fluid from Tracheal Aspirate Normal throat margaret     5/19 blood cultures 2 sets no growth  5/20 MRSA nares negative  5/20 strep pneumo Legionella urine antigen negative  5/21 MRSA PCR negative  5/21 blood cultures 2 sets no growth  5/26 blood cultures pending    Imaging    Last chest x-ray 5/24 with interval improvement of left perihilar edema with increase in right basilar atelectasis and effusions     Assessment/Plan     Alpesh Elena is a 45-year-old man past medical history significant for tobacco use, [place] [facility] [place] presenting to [place] with chest pain and STEMI, status post balloon angioplasty, developed shock and TTE with large VSD vs inferior LV wall rupture.  He underwent Impella placement on 5/20.  ID has been following for  fever, Tmax 101.3 on 5/21 with WBC to 34K, now improved.  He was initially treated for pneumonia, and has been on pip-tazo since 5/19, with intermittent doses of vancomycin.  Blood cultures negative from 5/19 and 5/21.  Strep pneumo and Legionella urine antigen negative.  Sputum culture with rare normal throat margaret.  Started on RRT. LFTs improving, T. bili remains elevated, now at 10.  Remains with low FiO2 30%, PEEP 5.     Called back today 5/26 for worsening leukocytosis.  No documented fever since 5/21, however in the past day or so he has been placed back on Arctic sun cooling device.  WBC now 25K, neutrophil predominant.  His last vancomycin dose was 5/24, and his WBC was elevated by 5/25 at midnight, therefore it was likely rising despite vancomycin treatment.  Last vancomycin level 5/26 is 15.  Is now on pressor support with nor epi 0.06, and in the setting of unclear new source of infection, would expand his coverage with meropenem, empiric micafungin in the setting of critical illness, and add back vancomycin until blood cultures result.  Planning for endoscopy 5/27.     #STEMI complicated by cardiogenic shock, VSD, status post Impella  #Fever, resolved         Recommendations:  -Continue IV vancomycin, dosed with pharmacy  - Change pip-tazo to IV meropenem, renally dosed  - Start IV micafungin 100 mg every 24 hours  - Follow-up 2 sets blood cultures  - Consider CT chest/abdomen/pelvis to assess for new foci of infection, last imaging 5/19    Discussed with primary team, following      This is a complex infectious disease issue and the following was performed today (for more details please see the above note):   Management decisions reflecting the added complexity (e.g., changes in antimicrobial therapy, infection control strategies).        Ananya Sanchez DO

## 2025-05-26 NOTE — PROGRESS NOTES
Vancomycin Dosing by Pharmacy- INITIAL    Alpesh Elena is a 45 y.o. year old male who Pharmacy has been consulted for vancomycin dosing for line infections. Based on the patient's indication and renal status this patient will be dosed based on a goal trough/random level of 15-20.     Patient is being followed for nephrology for BENY. Patient received SLED  from 1700 to 1900.    Most recent vancomycin dose 750 mg x1 on .    Random level ordered  = 15.3 mcg/mL      Visit Vitals  BP 91/65   Pulse 63   Temp 37 °C (98.6 °F) (Core)   Resp 15        Lab Results   Component Value Date    CREATININE 6.81 (H) 2025    CREATININE 8.24 (H) 2025    CREATININE 6.35 (H) 2025    CREATININE 5.71 (H) 2025        Patient weight is as follows:   Vitals:    25 0405   Weight: 87.7 kg (193 lb 5.5 oz)       Cultures:  Susceptibility data for the encounter in last 14 days.  Collected Specimen Info Organism   25 Fluid from Tracheal Aspirate Normal throat margaret         I/O last 3 completed shifts:  In: 4214 (48.1 mL/kg) [I.V.:1687 (19.2 mL/kg); Blood:350; NG/GT:520; IV Piggyback:1657]  Out: 1052 (12 mL/kg) [Urine:542 (0.2 mL/kg/hr); Emesis/NG output:410; Other:100]  Weight: 87.7 kg   I/O during current shift:  I/O this shift:  In: 93.3 [I.V.:93.3]  Out: 5 [Urine:5]    Temp (24hrs), Av.9 °C (98.5 °F), Min:36.3 °C (97.3 °F), Max:37.4 °C (99.3 °F)         Assessment/Plan     Vancomycin level on lower end of therapeutic range.  Will redose with vancomycin 500 mg x1 . Continue to dose by level.  Next level ordered for  at 10 AM.  Will follow nephrology RRT plan.   Will continue to monitor renal function daily while on vancomycin and order serum creatinine at least every 48 hours if not already ordered.  Follow for continued vancomycin needs, clinical response, and signs/symptoms of toxicity.       Kwasi Betancur, PharmD

## 2025-05-26 NOTE — PROGRESS NOTES
Social Work Progress Note  - ICU TREATMENT PLAN: Off sedation 5/24, placed on precedex 5/25   - Payer: [employer]   -Support System: son, mother, sister  - Planned Disposition: Pending medical outcome and rehab recommendations.  - Barriers to discharge: None at this time. SW will continue to follow.  MEGAN VERMA

## 2025-05-26 NOTE — PROGRESS NOTES
CARDIAC SURGERY CONSULT PROGRESS NOTE    SUBJECTIVE  Mr. Elena is a 45 year old male with a past medical history of tobacco abuse (30 pack years), submandibular abscess, and nephrolithiasis who presented to [facility] with a chief complaint of chest pain, found to have an inferior STEMI, now s/p coronary angiogram with balloon angioplasty to the PDA (right dominant system). His course was complicated by hemodynamic instability (tachycardia, hypotension) requiring vasopressor support and laboratory evidnce of end organ dysfunction. Due to concern for cardiogenic vs mixed shock, a shock call was intervened and recommended transfer to [facility] CICU. TTE at [facility] revealed large VSD. He underwent Impella CP placement 5/20.      Cardiac surgery is consulted for a VSD repair post STEMI.      Hospital course:   Per chart review, presented to [facility] 05/18 PM for the evaluation of chest tightness associated with SOB/nausea. On arrival he was afebrile, tachycardic, and hypertensive. ED workup remarkable for initial WBC 20.5, Cr 0.79, troponin ~24K,  and ECG with ST elevations in the inferior leads with reciprocal depressions. He was loaded with aspirin and brilinta and started on heparin gtt. He was taken for emergent coronary angiogram which revealed multi vessel CAD [LAD/Lcx/RCA mildly/diffusely diseased,100% RPDA culprit lesion] with PTCA to RPDA with 50% residual stenosis [GLENNA 0->3] (too small to stent). Notably, LVEF 45% and LVED 20-25 mmHg.      He then developed on 5/19 persistent tachycardia despite IVF and IV metoprolol. TTE with LVEF 55%, wall motion abnormalities, and no significant valvular disease. Due to concern for acute aortic pathology vs PE, he underwent a CTA CAP revealing of mild pulmonary edema. Due to concern for PNA, blood cultures were obtained and he was started on CTX/Doxycycline. Later that evening was in respiratory distress (tachypneic to RR 50) with hypotension (BP 85/52 ) and satting  92% on RA with labs revealing of end organ dysfunction and CXR with bilateral pulmonary infiltrates [pulmoinary edema vs multifocal PNA]. A shock call was convened and recommended intubation and PA catheter placement for adjudication of hemodynamics. He was intubated without issue and opening [place] numbers were revealing of RA 14, RV 55/18, PA 51/30 (40), PCWP 32, svO2 87, [place] CO 13.9, [place] CI 7.3.  He was then transferred to [facility].      On arrival to the CICU he was hypotensive with escalating pressor requirements. Bedside examination reveals a holosystolic murmur and serial mixed venous are revealing of a significant step up in SVO2 from the RA to PA concerning for the presence of a ischemic VSD, perhaps secondardy to a late presenting STEMI given troponin elevation to 24K on presentation. Shock call was done that recommended an impella CP placement to offload LV given concern for STEMI-induced VSD/ Inferior LV wall rupture.      Cardiac/Pertinent Imaging  C: 5/18/25:  CONCLUSIONS:   1. Left Main Coronary Artery: This artery is normal.   2. Left Anterior Descending Artery: is mildly diseased, is diffusely diseased and is ectatic.   3. Circumflex Coronary Artery: diffusely dieased, mildly diseased and ectatic.   4. Right Coronary Artery: is tortuous, is diffusely diseased and mildly diseased.   5. PDA RCA Lesion: The percent stenosis is 100%.   6. PDA RCA Lesion: PTCA: 50% residual stenosis. RCA: pre-procedure GLENNA flow was 0(no perfusion) and post-procedure GLENNA flow was 3(complete perfusion).   7. The Left Ventricular Ejection Fraction is 45%.   8. Left Ventricular End Diastolic Pressure: 20-25 mm Hg.   9. Aortic Stenosis: None.  10. Left Ventricular End Diastolic Pressure Dysfunction: Moderate.  11. LV: inferior akinesis.  12. LV: mild left ventricular dysfunction with regional wall motion abnormalities.     Echo: 5/20/25:  CONCLUSIONS:   1. Poorly visualized anatomical structures due to suboptimal image  "quality.   2. The left ventricle was not well visualized. The left ventricular ejection fraction could not be measured.   3. Limited views of the LV appear show a hyderdynamic LV with a large color flow from LV to RV through the inferoseptum with maximal velocity of 3.3m/sec, Unable to adequatly assess LVEF, though appears to have baal septal and inferior wall motion abnormality.   4. There is reduced right ventricular systolic function.   5. The RV appears dilated with significantly reduced function with reduced TAPSE and fractional area change.   6. Primary service notified of findings at the time of reporting.   7. Compared with the prior exam from 5/19/2025 ([facility]) there has been interval development of a large VSD throuh the inferoseptum with the RV now becoming large with significantly reduced function. Prior echo with reported LVEF of 55% with basal inferospetal and baal inferior hypokinesis in the setting of STEMI. RV size and function were nornal at that time. ( Note that the septum was more thoroughly interrogated with color Doppler today).    Objective   BP 91/65   Pulse 63   Temp 37 °C (98.6 °F) (Core)   Resp 15   Ht 1.727 m (5' 8\")   Wt 87.7 kg (193 lb 5.5 oz)   SpO2 100%   BMI 29.40 kg/m²   Critical-Care Pain Observation Score:  [0-4]    Vitals:    05/26/25 0405   Weight: 87.7 kg (193 lb 5.5 oz)      Physical Exam  Constitutional:       Comments: Intubated and sedated    Water temperature on arctic sun 24 C    HENT:      Head: Normocephalic.      Mouth/Throat:      Mouth: Mucous membranes are moist.   Cardiovascular:      Rate and Rhythm: Regular rhythm. Tachycardia present.      Heart sounds: Murmur heard.      Comments: Impella in place  Pulmonary:      Comments: Mechanical breath sounds  Musculoskeletal:         General: Normal range of motion.      Cervical back: Neck supple.      Right lower leg: No edema.      Left lower leg: No edema.   Skin:     General: Skin is warm. "   Neurological:      Comments: Intubated and sedated          Intake/Output Summary (Last 24 hours) at 5/26/2025 1056  Last data filed at 5/26/2025 0800  Gross per 24 hour   Intake 2872.17 ml   Output 494 ml   Net 2378.17 ml         Medications  Scheduled medications  Scheduled Medications[1]Continuous medications  Continuous Medications[2]PRN medications  PRN Medications[3]    Labs  Results for orders placed or performed during the hospital encounter of 05/20/25 (from the past 24 hours)   POCT GLUCOSE   Result Value Ref Range    POCT Glucose 234 (H) 74 - 99 mg/dL   Blood Gas Mixed Venous Full Panel   Result Value Ref Range    POCT pH, Mixed 7.36 7.33 - 7.43 pH    POCT pCO2, Mixed 36 (L) 41 - 51 mm Hg    POCT pO2, Mixed 55 (H) 35 - 45 mm Hg    POCT SO2, Mixed 79 (H) 45 - 75 %    POCT Oxy Hemoglobin, Mixed 76.1 (H) 45.0 - 75.0 %    POCT Hematocrit Calculated, Mixed 30.0 (L) 41.0 - 52.0 %    POCT Sodium, Mixed 126 (L) 136 - 145 mmol/L    POCT Potassium, Mixed 4.3 3.5 - 5.3 mmol/L    POCT Chloride, Mixed 91 (L) 98 - 107 mmol/L    POCT Ionized Calcium, Mixed 0.99 (L) 1.10 - 1.33 mmol/L    POCT Glucose, Mixed 236 (H) 74 - 99 mg/dL    POCT Lactate, Mixed 1.9 0.4 - 2.0 mmol/L    POCT Base Excess, Mixed -4.6 (L) -2.0 - 3.0 mmol/L    POCT HCO3 Calculated, Mixed 20.3 (L) 22.0 - 26.0 mmol/L    POCT Hemoglobin, Mixed 10.0 (L) 13.5 - 17.5 g/dL    POCT Anion Gap, Mixed 19 10 - 25 mmo/L    Patient Temperature 37.0 degrees Celsius    FiO2 30 %   Blood Gas Venous Full Panel   Result Value Ref Range    POCT pH, Venous 7.34 7.33 - 7.43 pH    POCT pCO2, Venous 39 (L) 41 - 51 mm Hg    POCT pO2, Venous 39 35 - 45 mm Hg    POCT SO2, Venous 48 45 - 75 %    POCT Oxy Hemoglobin, Venous 46.8 45.0 - 75.0 %    POCT Hematocrit Calculated, Venous 32.0 (L) 41.0 - 52.0 %    POCT Sodium, Venous 128 (L) 136 - 145 mmol/L    POCT Potassium, Venous 4.2 3.5 - 5.3 mmol/L    POCT Chloride, Venous 92 (L) 98 - 107 mmol/L    POCT Ionized Calicum, Venous 0.98  (L) 1.10 - 1.33 mmol/L    POCT Glucose, Venous 211 (H) 74 - 99 mg/dL    POCT Lactate, Venous 1.6 0.4 - 2.0 mmol/L    POCT Base Excess, Venous -4.4 (L) -2.0 - 3.0 mmol/L    POCT HCO3 Calculated, Venous 21.0 (L) 22.0 - 26.0 mmol/L    POCT Hemoglobin, Venous 10.8 (L) 13.5 - 17.5 g/dL    POCT Anion Gap, Venous 19.0 10.0 - 25.0 mmol/L    Patient Temperature 37.0 degrees Celsius    FiO2 30 %   Blood Gas Arterial Full Panel   Result Value Ref Range    POCT pH, Arterial 7.39 7.38 - 7.42 pH    POCT pCO2, Arterial 32 (L) 38 - 42 mm Hg    POCT pO2, Arterial 113 (H) 85 - 95 mm Hg    POCT SO2, Arterial 100 94 - 100 %    POCT Oxy Hemoglobin, Arterial 96.1 94.0 - 98.0 %    POCT Hematocrit Calculated, Arterial 25.0 (L) 41.0 - 52.0 %    POCT Sodium, Arterial 128 (L) 136 - 145 mmol/L    POCT Potassium, Arterial 4.5 3.5 - 5.3 mmol/L    POCT Chloride, Arterial 92 (L) 98 - 107 mmol/L    POCT Ionized Calcium, Arterial 1.01 (L) 1.10 - 1.33 mmol/L    POCT Glucose, Arterial 221 (H) 74 - 99 mg/dL    POCT Lactate, Arterial 1.7 0.4 - 2.0 mmol/L    POCT Base Excess, Arterial -4.9 (L) -2.0 - 3.0 mmol/L    POCT HCO3 Calculated, Arterial 19.4 (L) 22.0 - 26.0 mmol/L    POCT Hemoglobin, Arterial 8.4 (L) 13.5 - 17.5 g/dL    POCT Anion Gap, Arterial 21 10 - 25 mmo/L    Patient Temperature 37.0 degrees Celsius    FiO2 30 %   CBC   Result Value Ref Range    WBC 21.9 (H) 4.4 - 11.3 x10*3/uL    nRBC 6.2 (H) 0.0 - 0.0 /100 WBCs    RBC 2.51 (L) 4.50 - 5.90 x10*6/uL    Hemoglobin 7.6 (L) 13.5 - 17.5 g/dL    Hematocrit 21.8 (L) 41.0 - 52.0 %    MCV 87 80 - 100 fL    MCH 30.3 26.0 - 34.0 pg    MCHC 34.9 32.0 - 36.0 g/dL    RDW 13.2 11.5 - 14.5 %    Platelets 94 (L) 150 - 450 x10*3/uL   Renal function panel   Result Value Ref Range    Glucose 246 (H) 74 - 99 mg/dL    Sodium 132 (L) 136 - 145 mmol/L    Potassium 4.1 3.5 - 5.3 mmol/L    Chloride 89 (L) 98 - 107 mmol/L    Bicarbonate 22 21 - 32 mmol/L    Anion Gap 25 (H) 10 - 20 mmol/L    Urea Nitrogen 97 (HH) 6 -  23 mg/dL    Creatinine 8.24 (H) 0.50 - 1.30 mg/dL    eGFR 7 (L) >60 mL/min/1.73m*2    Calcium 7.8 (L) 8.6 - 10.6 mg/dL    Phosphorus 9.3 (H) 2.5 - 4.9 mg/dL    Albumin 2.8 (L) 3.4 - 5.0 g/dL   Magnesium   Result Value Ref Range    Magnesium 3.12 (H) 1.60 - 2.40 mg/dL   Coagulation Screen   Result Value Ref Range    Protime 21.3 (H) 9.8 - 12.4 seconds    INR 1.9 (H) 0.9 - 1.1    aPTT 69 (H) 26 - 36 seconds   POCT GLUCOSE   Result Value Ref Range    POCT Glucose 226 (H) 74 - 99 mg/dL   Blood Gas Mixed Venous Full Panel   Result Value Ref Range    POCT pH, Mixed 7.39 7.33 - 7.43 pH    POCT pCO2, Mixed 34 (L) 41 - 51 mm Hg    POCT pO2, Mixed 59 (H) 35 - 45 mm Hg    POCT SO2, Mixed 84 (H) 45 - 75 %    POCT Oxy Hemoglobin, Mixed 81.7 (H) 45.0 - 75.0 %    POCT Hematocrit Calculated, Mixed 49.0 41.0 - 52.0 %    POCT Sodium, Mixed 127 (L) 136 - 145 mmol/L    POCT Potassium, Mixed 4.0 3.5 - 5.3 mmol/L    POCT Chloride, Mixed 92 (L) 98 - 107 mmol/L    POCT Ionized Calcium, Mixed 0.96 (L) 1.10 - 1.33 mmol/L    POCT Glucose, Mixed 201 (H) 74 - 99 mg/dL    POCT Lactate, Mixed 2.1 (H) 0.4 - 2.0 mmol/L    POCT Base Excess, Mixed -3.5 (L) -2.0 - 3.0 mmol/L    POCT HCO3 Calculated, Mixed 20.6 (L) 22.0 - 26.0 mmol/L    POCT Hemoglobin, Mixed 16.3 13.5 - 17.5 g/dL    POCT Anion Gap, Mixed 18 10 - 25 mmo/L    Patient Temperature 37.0 degrees Celsius    FiO2 30 %   Blood Gas Venous Full Panel   Result Value Ref Range    POCT pH, Venous 7.38 7.33 - 7.43 pH    POCT pCO2, Venous 39 (L) 41 - 51 mm Hg    POCT pO2, Venous 35 35 - 45 mm Hg    POCT SO2, Venous 41 (L) 45 - 75 %    POCT Oxy Hemoglobin, Venous 39.8 (L) 45.0 - 75.0 %    POCT Hematocrit Calculated, Venous 25.0 (L) 41.0 - 52.0 %    POCT Sodium, Venous 130 (L) 136 - 145 mmol/L    POCT Potassium, Venous 4.0 3.5 - 5.3 mmol/L    POCT Chloride, Venous 95 (L) 98 - 107 mmol/L    POCT Ionized Calicum, Venous 1.00 (L) 1.10 - 1.33 mmol/L    POCT Glucose, Venous 183 (H) 74 - 99 mg/dL    POCT  Lactate, Venous 1.8 0.4 - 2.0 mmol/L    POCT Base Excess, Venous -1.8 -2.0 - 3.0 mmol/L    POCT HCO3 Calculated, Venous 23.1 22.0 - 26.0 mmol/L    POCT Hemoglobin, Venous 8.4 (L) 13.5 - 17.5 g/dL    POCT Anion Gap, Venous 16.0 10.0 - 25.0 mmol/L    Patient Temperature 37.0 degrees Celsius    FiO2 30 %   Blood Gas Arterial Full Panel   Result Value Ref Range    POCT pH, Arterial 7.45 (H) 7.38 - 7.42 pH    POCT pCO2, Arterial 31 (L) 38 - 42 mm Hg    POCT pO2, Arterial 118 (H) 85 - 95 mm Hg    POCT SO2, Arterial 100 94 - 100 %    POCT Oxy Hemoglobin, Arterial 96.6 94.0 - 98.0 %    POCT Hematocrit Calculated, Arterial 26.0 (L) 41.0 - 52.0 %    POCT Sodium, Arterial 128 (L) 136 - 145 mmol/L    POCT Potassium, Arterial 4.1 3.5 - 5.3 mmol/L    POCT Chloride, Arterial 96 (L) 98 - 107 mmol/L    POCT Ionized Calcium, Arterial 0.98 (L) 1.10 - 1.33 mmol/L    POCT Glucose, Arterial 176 (H) 74 - 99 mg/dL    POCT Lactate, Arterial 1.8 0.4 - 2.0 mmol/L    POCT Base Excess, Arterial -2.0 -2.0 - 3.0 mmol/L    POCT HCO3 Calculated, Arterial 21.5 (L) 22.0 - 26.0 mmol/L    POCT Hemoglobin, Arterial 8.5 (L) 13.5 - 17.5 g/dL    POCT Anion Gap, Arterial 15 10 - 25 mmo/L    Patient Temperature 37.0 degrees Celsius    FiO2 30 %   POCT GLUCOSE   Result Value Ref Range    POCT Glucose 190 (H) 74 - 99 mg/dL   Blood Gas Lactic Acid, Venous   Result Value Ref Range    POCT Lactate, Venous 2.9 (H) 0.4 - 2.0 mmol/L   Blood Gas Arterial Full Panel   Result Value Ref Range    POCT pH, Arterial 7.42 7.38 - 7.42 pH    POCT pCO2, Arterial 30 (L) 38 - 42 mm Hg    POCT pO2, Arterial 117 (H) 85 - 95 mm Hg    POCT SO2, Arterial 100 94 - 100 %    POCT Oxy Hemoglobin, Arterial 95.8 94.0 - 98.0 %    POCT Hematocrit Calculated, Arterial 15.0 (L) 41.0 - 52.0 %    POCT Sodium, Arterial 130 (L) 136 - 145 mmol/L    POCT Potassium, Arterial 4.3 3.5 - 5.3 mmol/L    POCT Chloride, Arterial 97 (L) 98 - 107 mmol/L    POCT Ionized Calcium, Arterial 1.02 (L) 1.10 - 1.33  mmol/L    POCT Glucose, Arterial 185 (H) 74 - 99 mg/dL    POCT Lactate, Arterial 3.1 (H) 0.4 - 2.0 mmol/L    POCT Base Excess, Arterial -4.7 (L) -2.0 - 3.0 mmol/L    POCT HCO3 Calculated, Arterial 19.5 (L) 22.0 - 26.0 mmol/L    POCT Hemoglobin, Arterial 5.0 (LL) 13.5 - 17.5 g/dL    POCT Anion Gap, Arterial 18 10 - 25 mmo/L    Patient Temperature 37.0 degrees Celsius    FiO2 30 %   Blood Gas Mixed Venous Full Panel   Result Value Ref Range    POCT pH, Mixed 7.40 7.33 - 7.43 pH    POCT pCO2, Mixed 28 (L) 41 - 51 mm Hg    POCT pO2, Mixed 58 (H) 35 - 45 mm Hg    POCT SO2, Mixed 87 (H) 45 - 75 %    POCT Oxy Hemoglobin, Mixed 84.4 (H) 45.0 - 75.0 %    POCT Hematocrit Calculated, Mixed 18.0 (L) 41.0 - 52.0 %    POCT Sodium, Mixed 132 (L) 136 - 145 mmol/L    POCT Potassium, Mixed 3.6 3.5 - 5.3 mmol/L    POCT Chloride, Mixed 101 98 - 107 mmol/L    POCT Ionized Calcium, Mixed 0.92 (L) 1.10 - 1.33 mmol/L    POCT Glucose, Mixed 164 (H) 74 - 99 mg/dL    POCT Lactate, Mixed 2.6 (H) 0.4 - 2.0 mmol/L    POCT Base Excess, Mixed -6.9 (L) -2.0 - 3.0 mmol/L    POCT HCO3 Calculated, Mixed 17.3 (L) 22.0 - 26.0 mmol/L    POCT Hemoglobin, Mixed 6.1 (LL) 13.5 - 17.5 g/dL    POCT Anion Gap, Mixed 17 10 - 25 mmo/L    Patient Temperature 37.0 degrees Celsius    FiO2 30 %   Blood Gas Venous Full Panel   Result Value Ref Range    POCT pH, Venous 7.37 7.33 - 7.43 pH    POCT pCO2, Venous 37 (L) 41 - 51 mm Hg    POCT pO2, Venous 35 35 - 45 mm Hg    POCT SO2, Venous 42 (L) 45 - 75 %    POCT Oxy Hemoglobin, Venous 41.2 (L) 45.0 - 75.0 %    POCT Hematocrit Calculated, Venous 22.0 (L) 41.0 - 52.0 %    POCT Sodium, Venous 130 (L) 136 - 145 mmol/L    POCT Potassium, Venous 4.3 3.5 - 5.3 mmol/L    POCT Chloride, Venous 95 (L) 98 - 107 mmol/L    POCT Ionized Calicum, Venous 1.01 (L) 1.10 - 1.33 mmol/L    POCT Glucose, Venous 195 (H) 74 - 99 mg/dL    POCT Lactate, Venous 2.9 (H) 0.4 - 2.0 mmol/L    POCT Base Excess, Venous -3.5 (L) -2.0 - 3.0 mmol/L    POCT  HCO3 Calculated, Venous 21.4 (L) 22.0 - 26.0 mmol/L    POCT Hemoglobin, Venous 7.4 (L) 13.5 - 17.5 g/dL    POCT Anion Gap, Venous 18.0 10.0 - 25.0 mmol/L    Patient Temperature 37.0 degrees Celsius    FiO2 30 %   Blood Gas Arterial Full Panel   Result Value Ref Range    POCT pH, Arterial 7.44 (H) 7.38 - 7.42 pH    POCT pCO2, Arterial 30 (L) 38 - 42 mm Hg    POCT pO2, Arterial 104 (H) 85 - 95 mm Hg    POCT SO2, Arterial      POCT Oxy Hemoglobin, Arterial      POCT Hematocrit Calculated, Arterial      POCT Sodium, Arterial 132 (L) 136 - 145 mmol/L    POCT Potassium, Arterial 4.2 3.5 - 5.3 mmol/L    POCT Chloride, Arterial 98 98 - 107 mmol/L    POCT Ionized Calcium, Arterial 1.00 (L) 1.10 - 1.33 mmol/L    POCT Glucose, Arterial 183 (H) 74 - 99 mg/dL    POCT Lactate, Arterial 2.3 (H) 0.4 - 2.0 mmol/L    POCT Base Excess, Arterial -2.7 (L) -2.0 - 3.0 mmol/L    POCT HCO3 Calculated, Arterial 20.4 (L) 22.0 - 26.0 mmol/L    POCT Hemoglobin, Arterial <3.0 (LL) 13.5 - 17.5 g/dL    POCT Anion Gap, Arterial 18 10 - 25 mmo/L    Patient Temperature 37.0 degrees Celsius    FiO2 30 %   Blood Gas Lactic Acid, Venous   Result Value Ref Range    POCT Lactate, Venous 2.3 (H) 0.4 - 2.0 mmol/L   Blood Gas Mixed Venous Full Panel   Result Value Ref Range    POCT pH, Mixed 7.39 7.33 - 7.43 pH    POCT pCO2, Mixed 31 (L) 41 - 51 mm Hg    POCT pO2, Mixed 55 (H) 35 - 45 mm Hg    POCT SO2, Mixed 86 (H) 45 - 75 %    POCT Oxy Hemoglobin, Mixed 81.6 (H) 45.0 - 75.0 %    POCT Hematocrit Calculated, Mixed 12.0 (L) 41.0 - 52.0 %    POCT Sodium, Mixed 132 (L) 136 - 145 mmol/L    POCT Potassium, Mixed 3.9 3.5 - 5.3 mmol/L    POCT Chloride, Mixed 100 98 - 107 mmol/L    POCT Ionized Calcium, Mixed 0.98 (L) 1.10 - 1.33 mmol/L    POCT Glucose, Mixed 170 (H) 74 - 99 mg/dL    POCT Lactate, Mixed 2.1 (H) 0.4 - 2.0 mmol/L    POCT Base Excess, Mixed -5.8 (L) -2.0 - 3.0 mmol/L    POCT HCO3 Calculated, Mixed 18.8 (L) 22.0 - 26.0 mmol/L    POCT Hemoglobin, Mixed  4.0 (LL) 13.5 - 17.5 g/dL    POCT Anion Gap, Mixed 17 10 - 25 mmo/L    Patient Temperature 37.0 degrees Celsius    FiO2 30 %   Blood Gas Venous Full Panel   Result Value Ref Range    POCT pH, Venous 7.37 7.33 - 7.43 pH    POCT pCO2, Venous 35 (L) 41 - 51 mm Hg    POCT pO2, Venous 35 35 - 45 mm Hg    POCT SO2, Venous 41 (L) 45 - 75 %    POCT Oxy Hemoglobin, Venous 40.2 (L) 45.0 - 75.0 %    POCT Hematocrit Calculated, Venous 21.0 (L) 41.0 - 52.0 %    POCT Sodium, Venous 130 (L) 136 - 145 mmol/L    POCT Potassium, Venous 4.2 3.5 - 5.3 mmol/L    POCT Chloride, Venous 97 (L) 98 - 107 mmol/L    POCT Ionized Calicum, Venous 1.01 (L) 1.10 - 1.33 mmol/L    POCT Glucose, Venous 188 (H) 74 - 99 mg/dL    POCT Lactate, Venous 2.3 (H) 0.4 - 2.0 mmol/L    POCT Base Excess, Venous -4.6 (L) -2.0 - 3.0 mmol/L    POCT HCO3 Calculated, Venous 20.2 (L) 22.0 - 26.0 mmol/L    POCT Hemoglobin, Venous 7.1 (L) 13.5 - 17.5 g/dL    POCT Anion Gap, Venous 17.0 10.0 - 25.0 mmol/L    Patient Temperature 37.0 degrees Celsius    FiO2 30 %   Magnesium   Result Value Ref Range    Magnesium 2.82 (H) 1.60 - 2.40 mg/dL   Hepatic function panel   Result Value Ref Range    Albumin 2.6 (L) 3.4 - 5.0 g/dL    Bilirubin, Total 10.0 (H) 0.0 - 1.2 mg/dL    Bilirubin, Direct 6.6 (H) 0.0 - 0.3 mg/dL    Alkaline Phosphatase 223 (H) 33 - 120 U/L     (H) 10 - 52 U/L     (H) 9 - 39 U/L    Total Protein 4.6 (L) 6.4 - 8.2 g/dL   Lactate dehydrogenase   Result Value Ref Range    LDH 1,627 (H) 84 - 246 U/L   Haptoglobin   Result Value Ref Range    Haptoglobin <30 (L) 30 - 200 mg/dL   Phosphorus   Result Value Ref Range    Phosphorus 7.2 (H) 2.5 - 4.9 mg/dL   Basic Metabolic Panel   Result Value Ref Range    Glucose 195 (H) 74 - 99 mg/dL    Sodium 133 (L) 136 - 145 mmol/L    Potassium 4.3 3.5 - 5.3 mmol/L    Chloride 93 (L) 98 - 107 mmol/L    Bicarbonate 23 21 - 32 mmol/L    Anion Gap 21 (H) 10 - 20 mmol/L    Urea Nitrogen 80 (H) 6 - 23 mg/dL    Creatinine  6.81 (H) 0.50 - 1.30 mg/dL    eGFR 9 (L) >60 mL/min/1.73m*2    Calcium 8.0 (L) 8.6 - 10.6 mg/dL   POCT GLUCOSE   Result Value Ref Range    POCT Glucose 181 (H) 74 - 99 mg/dL   CBC and Auto Differential   Result Value Ref Range    WBC 22.8 (H) 4.4 - 11.3 x10*3/uL    nRBC 5.5 (H) 0.0 - 0.0 /100 WBCs    RBC 2.25 (L) 4.50 - 5.90 x10*6/uL    Hemoglobin 6.8 (L) 13.5 - 17.5 g/dL    Hematocrit 18.9 (L) 41.0 - 52.0 %    MCV 84 80 - 100 fL    MCH 30.2 26.0 - 34.0 pg    MCHC 36.0 32.0 - 36.0 g/dL    RDW 12.7 11.5 - 14.5 %    Platelets 85 (L) 150 - 450 x10*3/uL    Immature Granulocytes %, Automated 9.5 (H) 0.0 - 0.9 %    Immature Granulocytes Absolute, Automated 2.16 (H) 0.00 - 0.70 x10*3/uL   Type and screen   Result Value Ref Range    ABO TYPE A     Rh TYPE POS     ANTIBODY SCREEN NEG    aPTT   Result Value Ref Range    aPTT 70 (H) 26 - 36 seconds   Manual Differential   Result Value Ref Range    Neutrophils %, Manual 79.6 40.0 - 80.0 %    Bands %, Manual 0.0 0.0 - 5.0 %    Lymphocytes %, Manual 8.9 13.0 - 44.0 %    Monocytes %, Manual 3.5 2.0 - 10.0 %    Eosinophils %, Manual 3.5 0.0 - 6.0 %    Basophils %, Manual 0.0 0.0 - 2.0 %    Atypical Lymphocytes %, Manual 1.8 0.0 - 2.0 %    Metamyelocytes %, Manual 0.0 0.0 - 0.0 %    Myelocytes %, Manual 2.7 0.0 - 0.0 %    Plasma Cells %, Manual 0.0 0.00 - 0.00 %    Promyelocytes %, Manual 0.0 0.0 - 0.0 %    Blasts %, Manual 0.0 0.0 - 0.0 %    Seg Neutrophils Absolute, Manual 18.15 (H) 1.20 - 7.00 x10*3/uL    Bands Absolute, Manual 0.00 0.00 - 0.70 x10*3/uL    Lymphocytes Absolute, Manual 2.03 1.20 - 4.80 x10*3/uL    Monocytes Absolute, Manual 0.80 0.10 - 1.00 x10*3/uL    Eosinophils Absolute, Manual 0.80 (H) 0.00 - 0.70 x10*3/uL    Basophils Absolute, Manual 0.00 0.00 - 0.10 x10*3/uL    Atypical Lymphs Absolute, Manual 0.41 0.00 - 0.50 x10*3/uL    Metamyelocytes Absolute, Manual 0.00 0.00 - 0.00 x10*3/uL    Myelocytes Absolute, Manual 0.62 0.00 - 0.00 x10*3/uL    Plasma Cells  Absolute, Manual 0.00 0.00 - 0.00 x10*3/uL    Promyelocytes Absolute, Manual 0.00 0.00 - 0.00 x10*3/uL    Blasts Absolute, Manual 0.00 0.00 - 0.00 x10*3/uL    Total Cells Counted 113     Neutrophils Absolute, Manual 18.15 (H) 1.20 - 7.70 x10*3/uL    Manual nRBC per 100 Cells 0.0 0.0 - 0.0 %    RBC Morphology See Below     Polychromasia Mild     Basophilic Stippling Present    Protime-INR   Result Value Ref Range    Protime 21.1 (H) 9.8 - 12.4 seconds    INR 1.9 (H) 0.9 - 1.1   Prepare RBC: 1 Units, Leukocytes Reduced (CMV reduced risk)   Result Value Ref Range    PRODUCT CODE R7246I75     Unit Number [id number]     Unit ABO A     Unit RH POS     XM INTEP COMP     Dispense Status TR     Blood Expiration Date 6/4/2025 11:59:00 PM EDT     PRODUCT BLOOD TYPE 6200     UNIT VOLUME 350    POCT GLUCOSE   Result Value Ref Range    POCT Glucose 171 (H) 74 - 99 mg/dL   CBC   Result Value Ref Range    WBC 23.2 (H) 4.4 - 11.3 x10*3/uL    nRBC 5.9 (H) 0.0 - 0.0 /100 WBCs    RBC 2.51 (L) 4.50 - 5.90 x10*6/uL    Hemoglobin 7.8 (L) 13.5 - 17.5 g/dL    Hematocrit 21.7 (L) 41.0 - 52.0 %    MCV 87 80 - 100 fL    MCH 31.1 26.0 - 34.0 pg    MCHC 35.9 32.0 - 36.0 g/dL    RDW 12.8 11.5 - 14.5 %    Platelets 79 (L) 150 - 450 x10*3/uL   Blood Gas Arterial Full Panel   Result Value Ref Range    POCT pH, Arterial 7.39 7.38 - 7.42 pH    POCT pCO2, Arterial 30 (L) 38 - 42 mm Hg    POCT pO2, Arterial 116 (H) 85 - 95 mm Hg    POCT SO2, Arterial 100 94 - 100 %    POCT Oxy Hemoglobin, Arterial 96.0 94.0 - 98.0 %    POCT Hematocrit Calculated, Arterial 16.0 (L) 41.0 - 52.0 %    POCT Sodium, Arterial 129 (L) 136 - 145 mmol/L    POCT Potassium, Arterial 4.0 3.5 - 5.3 mmol/L    POCT Chloride, Arterial 97 (L) 98 - 107 mmol/L    POCT Ionized Calcium, Arterial 1.04 (L) 1.10 - 1.33 mmol/L    POCT Glucose, Arterial 171 (H) 74 - 99 mg/dL    POCT Lactate, Arterial 1.5 0.4 - 2.0 mmol/L    POCT Base Excess, Arterial -6.3 (L) -2.0 - 3.0 mmol/L    POCT HCO3  Calculated, Arterial 18.2 (L) 22.0 - 26.0 mmol/L    POCT Hemoglobin, Arterial 5.2 (LL) 13.5 - 17.5 g/dL    POCT Anion Gap, Arterial 18 10 - 25 mmo/L    Patient Temperature 37.0 degrees Celsius    FiO2 30 %   Blood Gas Venous Full Panel   Result Value Ref Range    POCT pH, Venous 7.35 7.33 - 7.43 pH    POCT pCO2, Venous 38 (L) 41 - 51 mm Hg    POCT pO2, Venous 36 35 - 45 mm Hg    POCT SO2, Venous 51 45 - 75 %    POCT Oxy Hemoglobin, Venous 49.2 45.0 - 75.0 %    POCT Hematocrit Calculated, Venous 23.0 (L) 41.0 - 52.0 %    POCT Sodium, Venous 129 (L) 136 - 145 mmol/L    POCT Potassium, Venous 4.1 3.5 - 5.3 mmol/L    POCT Chloride, Venous 95 (L) 98 - 107 mmol/L    POCT Ionized Calicum, Venous 1.06 (L) 1.10 - 1.33 mmol/L    POCT Glucose, Venous 167 (H) 74 - 99 mg/dL    POCT Lactate, Venous 1.3 0.4 - 2.0 mmol/L    POCT Base Excess, Venous -4.2 (L) -2.0 - 3.0 mmol/L    POCT HCO3 Calculated, Venous 21.0 (L) 22.0 - 26.0 mmol/L    POCT Hemoglobin, Venous 7.8 (L) 13.5 - 17.5 g/dL    POCT Anion Gap, Venous 17.0 10.0 - 25.0 mmol/L    Patient Temperature 37.0 degrees Celsius    FiO2 30 %   POCT GLUCOSE   Result Value Ref Range    POCT Glucose 172 (H) 74 - 99 mg/dL               ASSESSMENT & PLAN  Mr. Elena is a 45 year old male with a past medical history of tobacco abuse (30 pack years), submandibular abscess, and nephrolithiasis who presented to [facility] with a chief complaint of chest pain, found to have an inferior STEMI, now s/p coronary angiogram with balloon angioplasty to the PDA (right dominant system). His course was complicated by hemodynamic instability (tachycardia, hypotension) requiring vasopressor support and laboratory evidnce of end organ dysfunction. Due to concern for cardiogenic vs mixed shock, a shock call was intervened and recommended transfer to [facility] CICU. TTE at [facility] revealed large VSD. He underwent Impella CP placement 5/20.      Cardiac surgery is consulted for a VSD repair post STEMI.          RECOMMENDATIONS/PLAN  Dr. Omer aware of patient and reviewing case and available imaging.   - Emergent cardiac surgery not indicated at this time.   - Recommend medical optimization per primary team  - Early surgery for VSD repairs post STEMI have significantly high mortality rates, ideally would prefer to wait a couple weeks to decrease surgical risk. Currently patient is not a surgical candidate. Will need to make significant improvements prior to consideration for surgical repair.  - Will continue to follow along.      Thank you for the consultation.   Please page the cardiac surgery consult pager [phone] with any questions or changes in patient condition.    Kev Martinez PA-C  Cardiac Surgery Consult [facility]  [facility]  Cardiac Surgery Consult Pager [phone]     5/26/2025  10:56 AM            [1] aspirin, 81 mg, oral, Daily  bisacodyl, 10 mg, rectal, BID  insulin lispro, 0-5 Units, subcutaneous, q4h  meropenem, 500 mg, intravenous, q24h  micafungin, 100 mg, intravenous, q24h  oxygen, , inhalation, Continuous - Inhalation  pantoprazole, 40 mg, intravenous, BID  polyethylene glycol, 17 g, oral, 4x daily  sennosides, 2 tablet, oral, BID     [2] amiodarone, 0.5 mg/min, Last Rate: 0.5 mg/min (05/26/25 0800)  bivalirudin, 0-0.49 mg/kg/hr, Last Rate: 0.14 mg/kg/hr (05/26/25 0400)  dexmedeTOMIDine, 0.1-1.5 mcg/kg/hr (Dosing Weight), Last Rate: 0.8 mcg/kg/hr (05/26/25 0800)  fentaNYL,  mcg/hr, Last Rate: 25 mcg/hr (05/26/25 0800)  midazolam, 0.5-20 mg/hr, Last Rate: 1 mg/hr (05/26/25 0800)  nitroprusside, 0.25-5 mcg/kg/min (Dosing Weight), Last Rate: Stopped (05/25/25 1939)  sodium bicarbonate infusion Impella Purge 25 mEq/500 mL D5W, 10 mL/hr, Last Rate: 10 mL/hr (05/26/25 0400)     [3] PRN medications: alteplase, dextrose, dextrose, glucagon, glucagon, vancomycin

## 2025-05-26 NOTE — CARE PLAN
Problem: Safety - Medical Restraint  Goal: Remains free of injury from restraints (Restraint for Interference with Medical Device)  5/25/2025 2357 by Hyunjeong Yeom, RN  Flowsheets (Taken 5/23/2025 0623 by Mitul Crain RN)  Remains free of injury from restraints (restraint for interference with medical device): Every 2 hours: Monitor safety, psychosocial status, comfort, nutrition and hydration  5/25/2025 2357 by Hyunjeong Yeom, RN  Outcome: Not Progressing  Flowsheets (Taken 5/23/2025 0623 by Mitul Crain RN)  Remains free of injury from restraints (restraint for interference with medical device): Every 2 hours: Monitor safety, psychosocial status, comfort, nutrition and hydration  Goal: Free from restraint(s) (Restraint for Interference with Medical Device)  5/25/2025 2357 by Hyunjeong Yeom, RN  Flowsheets (Taken 5/23/2025 0623 by Mitul Crain RN)  Free from restraint(s) (restraint for interference with medical device): ONCE/SHIFT or MINIMUM Every 12 hours: Assess and document the continuing need for restraints  5/25/2025 2357 by Hyunjeong Yeom, RN  Outcome: Not Progressing  Flowsheets (Taken 5/23/2025 0623 by Mitul Crain RN)  Free from restraint(s) (restraint for interference with medical device): ONCE/SHIFT or MINIMUM Every 12 hours: Assess and document the continuing need for restraints   The patient's goals for the shift include patient unable to states his goal for the shift.     The clinical goals for the shift include patient will remain HDS throughout shift

## 2025-05-26 NOTE — CARE PLAN
The patient's goals for the shift include      The clinical goals for the shift include patient will remain HDS throughout shift    Over the shift, the patient did not make progress toward the following goals. Barriers to progression include ***. Recommendations to address these barriers include ***.    Problem: Safety - Medical Restraint  Goal: Remains free of injury from restraints (Restraint for Interference with Medical Device)  Outcome: Not Progressing  Flowsheets (Taken 5/23/2025 0623 by Mitul Crain RN)  Remains free of injury from restraints (restraint for interference with medical device): Every 2 hours: Monitor safety, psychosocial status, comfort, nutrition and hydration  Goal: Free from restraint(s) (Restraint for Interference with Medical Device)  Outcome: Not Progressing  Flowsheets (Taken 5/23/2025 0623 by Mitul Crian RN)  Free from restraint(s) (restraint for interference with medical device): ONCE/SHIFT or MINIMUM Every 12 hours: Assess and document the continuing need for restraints

## 2025-05-27 ENCOUNTER — APPOINTMENT (OUTPATIENT)
Dept: CARDIOLOGY | Facility: HOSPITAL | Age: 45
End: 2025-05-27
Payer: COMMERCIAL

## 2025-05-27 LAB
ACT BLD: 155 SEC (ref 83–199)
ACT BLD: 155 SEC (ref 83–199)
ACT BLD: 168 SEC (ref 83–199)
ACT BLD: 168 SEC (ref 83–199)
ACT BLD: 170 SEC (ref 83–199)
ACT BLD: 176 SEC (ref 83–199)
ACT BLD: 176 SEC (ref 83–199)
ACT BLD: 178 SEC (ref 83–199)
ACT BLD: 179 SEC (ref 83–199)
ACT BLD: 181 SEC (ref 83–199)
ACT BLD: 182 SEC (ref 83–199)
ACT BLD: 184 SEC (ref 83–199)
ACT BLD: 189 SEC (ref 83–199)
ACT BLD: 189 SEC (ref 83–199)
ACT BLD: 192 SEC (ref 83–199)
ACT BLD: 202 SEC (ref 83–199)
ACT BLD: 202 SEC (ref 83–199)
ACT BLD: 205 SEC (ref 83–199)
ALBUMIN SERPL BCP-MCNC: 2.6 G/DL (ref 3.4–5)
ALBUMIN SERPL BCP-MCNC: 2.8 G/DL (ref 3.4–5)
ALP SERPL-CCNC: 370 U/L (ref 33–120)
ALP SERPL-CCNC: 443 U/L (ref 33–120)
ALT SERPL W P-5'-P-CCNC: 273 U/L (ref 10–52)
ALT SERPL W P-5'-P-CCNC: 287 U/L (ref 10–52)
ANION GAP BLDA CALCULATED.4IONS-SCNC: 13 MMO/L (ref 10–25)
ANION GAP BLDA CALCULATED.4IONS-SCNC: 20 MMO/L (ref 10–25)
ANION GAP BLDA CALCULATED.4IONS-SCNC: 21 MMO/L (ref 10–25)
ANION GAP BLDA CALCULATED.4IONS-SCNC: 22 MMO/L (ref 10–25)
ANION GAP BLDA CALCULATED.4IONS-SCNC: 22 MMO/L (ref 10–25)
ANION GAP BLDA CALCULATED.4IONS-SCNC: 23 MMO/L (ref 10–25)
ANION GAP BLDMV CALCULATED.4IONS-SCNC: 19 MMO/L (ref 10–25)
ANION GAP BLDMV CALCULATED.4IONS-SCNC: 19 MMO/L (ref 10–25)
ANION GAP BLDMV CALCULATED.4IONS-SCNC: 21 MMO/L (ref 10–25)
ANION GAP BLDMV CALCULATED.4IONS-SCNC: 22 MMO/L (ref 10–25)
ANION GAP BLDV CALCULATED.4IONS-SCNC: 13 MMOL/L (ref 10–25)
ANION GAP SERPL CALC-SCNC: 23 MMOL/L (ref 10–20)
ANION GAP SERPL CALC-SCNC: 27 MMOL/L (ref 10–20)
APTT PPP: 33 SECONDS (ref 26–36)
AST SERPL W P-5'-P-CCNC: 232 U/L (ref 9–39)
AST SERPL W P-5'-P-CCNC: 241 U/L (ref 9–39)
BASE EXCESS BLDA CALC-SCNC: -1 MMOL/L (ref -2–3)
BASE EXCESS BLDA CALC-SCNC: -10.9 MMOL/L (ref -2–3)
BASE EXCESS BLDA CALC-SCNC: -5.4 MMOL/L (ref -2–3)
BASE EXCESS BLDA CALC-SCNC: -7 MMOL/L (ref -2–3)
BASE EXCESS BLDA CALC-SCNC: -8.6 MMOL/L (ref -2–3)
BASE EXCESS BLDA CALC-SCNC: -9.1 MMOL/L (ref -2–3)
BASE EXCESS BLDMV CALC-SCNC: -10.8 MMOL/L (ref -2–3)
BASE EXCESS BLDMV CALC-SCNC: -4.9 MMOL/L (ref -2–3)
BASE EXCESS BLDMV CALC-SCNC: -7.6 MMOL/L (ref -2–3)
BASE EXCESS BLDMV CALC-SCNC: -8.9 MMOL/L (ref -2–3)
BASE EXCESS BLDV CALC-SCNC: -1.3 MMOL/L (ref -2–3)
BASOPHILS # BLD MANUAL: 0 X10*3/UL (ref 0–0.1)
BASOPHILS # BLD MANUAL: 0 X10*3/UL (ref 0–0.1)
BASOPHILS NFR BLD MANUAL: 0 %
BASOPHILS NFR BLD MANUAL: 0 %
BILIRUB DIRECT SERPL-MCNC: 11.4 MG/DL (ref 0–0.3)
BILIRUB DIRECT SERPL-MCNC: 15.3 MG/DL (ref 0–0.3)
BILIRUB SERPL-MCNC: 16.6 MG/DL (ref 0–1.2)
BILIRUB SERPL-MCNC: 20.6 MG/DL (ref 0–1.2)
BLASTS # BLD MANUAL: 0 X10*3/UL
BLASTS # BLD MANUAL: 0 X10*3/UL
BLASTS NFR BLD MANUAL: 0 %
BLASTS NFR BLD MANUAL: 0 %
BODY TEMPERATURE: 37 DEGREES CELSIUS
BUN SERPL-MCNC: 111 MG/DL (ref 6–23)
BUN SERPL-MCNC: 126 MG/DL (ref 6–23)
BURR CELLS BLD QL SMEAR: ABNORMAL
BURR CELLS BLD QL SMEAR: ABNORMAL
CA-I BLDA-SCNC: 1.04 MMOL/L (ref 1.1–1.33)
CA-I BLDA-SCNC: 1.04 MMOL/L (ref 1.1–1.33)
CA-I BLDA-SCNC: 1.05 MMOL/L (ref 1.1–1.33)
CA-I BLDA-SCNC: 1.05 MMOL/L (ref 1.1–1.33)
CA-I BLDA-SCNC: 1.07 MMOL/L (ref 1.1–1.33)
CA-I BLDA-SCNC: 1.08 MMOL/L (ref 1.1–1.33)
CA-I BLDMV-SCNC: 1.04 MMOL/L (ref 1.1–1.33)
CA-I BLDMV-SCNC: 1.05 MMOL/L (ref 1.1–1.33)
CA-I BLDMV-SCNC: 1.07 MMOL/L (ref 1.1–1.33)
CA-I BLDMV-SCNC: 1.09 MMOL/L (ref 1.1–1.33)
CA-I BLDV-SCNC: 1.02 MMOL/L (ref 1.1–1.33)
CALCIUM SERPL-MCNC: 8.5 MG/DL (ref 8.6–10.6)
CALCIUM SERPL-MCNC: 8.5 MG/DL (ref 8.6–10.6)
CHLORIDE BLD-SCNC: 90 MMOL/L (ref 98–107)
CHLORIDE BLD-SCNC: 90 MMOL/L (ref 98–107)
CHLORIDE BLD-SCNC: 92 MMOL/L (ref 98–107)
CHLORIDE BLD-SCNC: 93 MMOL/L (ref 98–107)
CHLORIDE BLDA-SCNC: 90 MMOL/L (ref 98–107)
CHLORIDE BLDA-SCNC: 91 MMOL/L (ref 98–107)
CHLORIDE BLDA-SCNC: 93 MMOL/L (ref 98–107)
CHLORIDE BLDA-SCNC: 98 MMOL/L (ref 98–107)
CHLORIDE BLDV-SCNC: 98 MMOL/L (ref 98–107)
CHLORIDE SERPL-SCNC: 86 MMOL/L (ref 98–107)
CHLORIDE SERPL-SCNC: 90 MMOL/L (ref 98–107)
CO2 SERPL-SCNC: 18 MMOL/L (ref 21–32)
CO2 SERPL-SCNC: 24 MMOL/L (ref 21–32)
CREAT SERPL-MCNC: 10.11 MG/DL (ref 0.5–1.3)
CREAT SERPL-MCNC: 9.5 MG/DL (ref 0.5–1.3)
EGFRCR SERPLBLD CKD-EPI 2021: 6 ML/MIN/1.73M*2
EGFRCR SERPLBLD CKD-EPI 2021: 6 ML/MIN/1.73M*2
EOSINOPHIL # BLD MANUAL: 0 X10*3/UL (ref 0–0.7)
EOSINOPHIL # BLD MANUAL: 0.21 X10*3/UL (ref 0–0.7)
EOSINOPHIL NFR BLD MANUAL: 0 %
EOSINOPHIL NFR BLD MANUAL: 0.9 %
ERYTHROCYTE [DISTWIDTH] IN BLOOD BY AUTOMATED COUNT: 15.7 % (ref 11.5–14.5)
ERYTHROCYTE [DISTWIDTH] IN BLOOD BY AUTOMATED COUNT: 16.1 % (ref 11.5–14.5)
GLUCOSE BLD MANUAL STRIP-MCNC: 155 MG/DL (ref 74–99)
GLUCOSE BLD MANUAL STRIP-MCNC: 171 MG/DL (ref 74–99)
GLUCOSE BLD MANUAL STRIP-MCNC: 194 MG/DL (ref 74–99)
GLUCOSE BLD MANUAL STRIP-MCNC: 195 MG/DL (ref 74–99)
GLUCOSE BLD MANUAL STRIP-MCNC: 202 MG/DL (ref 74–99)
GLUCOSE BLD MANUAL STRIP-MCNC: 203 MG/DL (ref 74–99)
GLUCOSE BLD-MCNC: 177 MG/DL (ref 74–99)
GLUCOSE BLD-MCNC: 200 MG/DL (ref 74–99)
GLUCOSE BLD-MCNC: 215 MG/DL (ref 74–99)
GLUCOSE BLD-MCNC: ABNORMAL MG/DL
GLUCOSE BLDA-MCNC: 125 MG/DL (ref 74–99)
GLUCOSE BLDA-MCNC: 178 MG/DL (ref 74–99)
GLUCOSE BLDA-MCNC: 191 MG/DL (ref 74–99)
GLUCOSE BLDA-MCNC: 211 MG/DL (ref 74–99)
GLUCOSE BLDA-MCNC: 233 MG/DL (ref 74–99)
GLUCOSE BLDA-MCNC: 247 MG/DL (ref 74–99)
GLUCOSE BLDV-MCNC: 118 MG/DL (ref 74–99)
GLUCOSE SERPL-MCNC: 190 MG/DL (ref 74–99)
GLUCOSE SERPL-MCNC: 236 MG/DL (ref 74–99)
HAPTOGLOB SERPL NEPH-MCNC: <30 MG/DL (ref 30–200)
HCO3 BLDA-SCNC: 14.3 MMOL/L (ref 22–26)
HCO3 BLDA-SCNC: 15.5 MMOL/L (ref 22–26)
HCO3 BLDA-SCNC: 16 MMOL/L (ref 22–26)
HCO3 BLDA-SCNC: 17.2 MMOL/L (ref 22–26)
HCO3 BLDA-SCNC: 18.8 MMOL/L (ref 22–26)
HCO3 BLDA-SCNC: 23.2 MMOL/L (ref 22–26)
HCO3 BLDMV-SCNC: 15.3 MMOL/L (ref 22–26)
HCO3 BLDMV-SCNC: 16.7 MMOL/L (ref 22–26)
HCO3 BLDMV-SCNC: 18.2 MMOL/L (ref 22–26)
HCO3 BLDMV-SCNC: 20.6 MMOL/L (ref 22–26)
HCO3 BLDV-SCNC: 23.7 MMOL/L (ref 22–26)
HCT VFR BLD AUTO: 22.5 % (ref 41–52)
HCT VFR BLD AUTO: 22.5 % (ref 41–52)
HCT VFR BLD EST: 20 % (ref 41–52)
HCT VFR BLD EST: 24 % (ref 41–52)
HCT VFR BLD EST: 25 % (ref 41–52)
HCT VFR BLD EST: 26 % (ref 41–52)
HCT VFR BLD EST: 27 % (ref 41–52)
HCT VFR BLD EST: 27 % (ref 41–52)
HCT VFR BLD EST: 28 % (ref 41–52)
HCT VFR BLD EST: 29 % (ref 41–52)
HCT VFR BLD EST: 33 % (ref 41–52)
HGB BLD-MCNC: 7.7 G/DL (ref 13.5–17.5)
HGB BLD-MCNC: 8.2 G/DL (ref 13.5–17.5)
HGB BLDA-MCNC: 6.6 G/DL (ref 13.5–17.5)
HGB BLDA-MCNC: 8.4 G/DL (ref 13.5–17.5)
HGB BLDA-MCNC: 8.4 G/DL (ref 13.5–17.5)
HGB BLDA-MCNC: 8.8 G/DL (ref 13.5–17.5)
HGB BLDA-MCNC: 8.9 G/DL (ref 13.5–17.5)
HGB BLDA-MCNC: 9.8 G/DL (ref 13.5–17.5)
HGB BLDMV-MCNC: 11 G/DL (ref 13.5–17.5)
HGB BLDMV-MCNC: 8 G/DL (ref 13.5–17.5)
HGB BLDMV-MCNC: 8.9 G/DL (ref 13.5–17.5)
HGB BLDMV-MCNC: 9.3 G/DL (ref 13.5–17.5)
HGB BLDV-MCNC: 8.4 G/DL (ref 13.5–17.5)
IMM GRANULOCYTES # BLD AUTO: 2.63 X10*3/UL (ref 0–0.7)
IMM GRANULOCYTES # BLD AUTO: 3.16 X10*3/UL (ref 0–0.7)
IMM GRANULOCYTES NFR BLD AUTO: 11.2 % (ref 0–0.9)
IMM GRANULOCYTES NFR BLD AUTO: 12.7 % (ref 0–0.9)
INHALED O2 CONCENTRATION: 30 %
INR PPP: 1.3 (ref 0.9–1.1)
LACTATE BLDA-SCNC: 1.2 MMOL/L (ref 0.4–2)
LACTATE BLDA-SCNC: 1.2 MMOL/L (ref 0.4–2)
LACTATE BLDA-SCNC: 1.3 MMOL/L (ref 0.4–2)
LACTATE BLDA-SCNC: 1.6 MMOL/L (ref 0.4–2)
LACTATE BLDA-SCNC: 1.8 MMOL/L (ref 0.4–2)
LACTATE BLDA-SCNC: 1.8 MMOL/L (ref 0.4–2)
LACTATE BLDMV-SCNC: 1 MMOL/L (ref 0.4–2)
LACTATE BLDMV-SCNC: 1.3 MMOL/L (ref 0.4–2)
LACTATE BLDMV-SCNC: 1.5 MMOL/L (ref 0.4–2)
LACTATE BLDMV-SCNC: 1.6 MMOL/L (ref 0.4–2)
LACTATE BLDV-SCNC: 1.4 MMOL/L (ref 0.4–2)
LACTATE SERPL-SCNC: 1.3 MMOL/L (ref 0.4–2)
LDH SERPL L TO P-CCNC: 1955 U/L (ref 84–246)
LYMPHOCYTES # BLD MANUAL: 1.92 X10*3/UL (ref 1.2–4.8)
LYMPHOCYTES # BLD MANUAL: 3.75 X10*3/UL (ref 1.2–4.8)
LYMPHOCYTES NFR BLD MANUAL: 15.9 %
LYMPHOCYTES NFR BLD MANUAL: 7.7 %
MAGNESIUM SERPL-MCNC: 3.24 MG/DL (ref 1.6–2.4)
MAGNESIUM SERPL-MCNC: 3.25 MG/DL (ref 1.6–2.4)
MCH RBC QN AUTO: 30.4 PG (ref 26–34)
MCH RBC QN AUTO: 31.4 PG (ref 26–34)
MCHC RBC AUTO-ENTMCNC: 34.2 G/DL (ref 32–36)
MCHC RBC AUTO-ENTMCNC: 36.4 G/DL (ref 32–36)
MCV RBC AUTO: 86 FL (ref 80–100)
MCV RBC AUTO: 89 FL (ref 80–100)
METAMYELOCYTES # BLD MANUAL: 0.65 X10*3/UL
METAMYELOCYTES # BLD MANUAL: 0.85 X10*3/UL
METAMYELOCYTES NFR BLD MANUAL: 2.6 %
METAMYELOCYTES NFR BLD MANUAL: 3.6 %
MONOCYTES # BLD MANUAL: 0.65 X10*3/UL (ref 0.1–1)
MONOCYTES # BLD MANUAL: 1.46 X10*3/UL (ref 0.1–1)
MONOCYTES NFR BLD MANUAL: 2.6 %
MONOCYTES NFR BLD MANUAL: 6.2 %
MYELOCYTES # BLD MANUAL: 1.07 X10*3/UL
MYELOCYTES # BLD MANUAL: 2.5 X10*3/UL
MYELOCYTES NFR BLD MANUAL: 10.6 %
MYELOCYTES NFR BLD MANUAL: 4.3 %
NEUTROPHILS # BLD MANUAL: 14.61 X10*3/UL (ref 1.2–7.7)
NEUTROPHILS # BLD MANUAL: 20.4 X10*3/UL (ref 1.2–7.7)
NEUTS BAND # BLD MANUAL: 1.25 X10*3/UL (ref 0–0.7)
NEUTS BAND # BLD MANUAL: 1.72 X10*3/UL (ref 0–0.7)
NEUTS BAND NFR BLD MANUAL: 5.3 %
NEUTS BAND NFR BLD MANUAL: 6.9 %
NEUTS SEG # BLD MANUAL: 13.36 X10*3/UL (ref 1.2–7)
NEUTS SEG # BLD MANUAL: 18.68 X10*3/UL (ref 1.2–7)
NEUTS SEG NFR BLD MANUAL: 56.6 %
NEUTS SEG NFR BLD MANUAL: 75 %
NRBC BLD MANUAL-RTO: 0 % (ref 0–0)
NRBC BLD MANUAL-RTO: 0 % (ref 0–0)
NRBC BLD-RTO: 7.4 /100 WBCS (ref 0–0)
NRBC BLD-RTO: 8.6 /100 WBCS (ref 0–0)
OXYHGB MFR BLDA: 95 % (ref 94–98)
OXYHGB MFR BLDA: 95.2 % (ref 94–98)
OXYHGB MFR BLDA: 95.5 % (ref 94–98)
OXYHGB MFR BLDA: 95.6 % (ref 94–98)
OXYHGB MFR BLDA: 95.6 % (ref 94–98)
OXYHGB MFR BLDA: 96 % (ref 94–98)
OXYHGB MFR BLDMV: 45.9 % (ref 45–75)
OXYHGB MFR BLDMV: 47 % (ref 45–75)
OXYHGB MFR BLDMV: 47.5 % (ref 45–75)
OXYHGB MFR BLDMV: 53.4 % (ref 45–75)
OXYHGB MFR BLDV: 61.5 % (ref 45–75)
PCO2 BLDA: 28 MM HG (ref 38–42)
PCO2 BLDA: 29 MM HG (ref 38–42)
PCO2 BLDA: 31 MM HG (ref 38–42)
PCO2 BLDA: 35 MM HG (ref 38–42)
PCO2 BLDMV: 34 MM HG (ref 41–51)
PCO2 BLDMV: 34 MM HG (ref 41–51)
PCO2 BLDMV: 37 MM HG (ref 41–51)
PCO2 BLDMV: 39 MM HG (ref 41–51)
PCO2 BLDV: 40 MM HG (ref 41–51)
PH BLDA: 7.3 PH (ref 7.38–7.42)
PH BLDA: 7.35 PH (ref 7.38–7.42)
PH BLDA: 7.35 PH (ref 7.38–7.42)
PH BLDA: 7.38 PH (ref 7.38–7.42)
PH BLDA: 7.39 PH (ref 7.38–7.42)
PH BLDA: 7.43 PH (ref 7.38–7.42)
PH BLDMV: 7.26 PH (ref 7.33–7.43)
PH BLDMV: 7.3 PH (ref 7.33–7.43)
PH BLDMV: 7.3 PH (ref 7.33–7.43)
PH BLDMV: 7.33 PH (ref 7.33–7.43)
PH BLDV: 7.38 PH (ref 7.33–7.43)
PHOSPHATE SERPL-MCNC: 7.4 MG/DL (ref 2.5–4.9)
PHOSPHATE SERPL-MCNC: 8.9 MG/DL (ref 2.5–4.9)
PLASMA CELLS # BLD MANUAL: 0 X10*3/UL
PLASMA CELLS # BLD MANUAL: 0 X10*3/UL
PLASMA CELLS NFR BLD MANUAL: 0 %
PLASMA CELLS NFR BLD MANUAL: 0 %
PLATELET # BLD AUTO: 89 X10*3/UL (ref 150–450)
PLATELET # BLD AUTO: 92 X10*3/UL (ref 150–450)
PO2 BLDA: 107 MM HG (ref 85–95)
PO2 BLDA: 108 MM HG (ref 85–95)
PO2 BLDA: 113 MM HG (ref 85–95)
PO2 BLDA: 117 MM HG (ref 85–95)
PO2 BLDA: 123 MM HG (ref 85–95)
PO2 BLDA: 133 MM HG (ref 85–95)
PO2 BLDMV: 28 MM HG (ref 35–45)
PO2 BLDMV: 35 MM HG (ref 35–45)
PO2 BLDMV: 36 MM HG (ref 35–45)
PO2 BLDMV: 39 MM HG (ref 35–45)
PO2 BLDV: 44 MM HG (ref 35–45)
POLYCHROMASIA BLD QL SMEAR: ABNORMAL
POLYCHROMASIA BLD QL SMEAR: ABNORMAL
POTASSIUM BLDA-SCNC: 3.5 MMOL/L (ref 3.5–5.3)
POTASSIUM BLDA-SCNC: 4.1 MMOL/L (ref 3.5–5.3)
POTASSIUM BLDA-SCNC: 4.2 MMOL/L (ref 3.5–5.3)
POTASSIUM BLDA-SCNC: 4.2 MMOL/L (ref 3.5–5.3)
POTASSIUM BLDA-SCNC: 4.5 MMOL/L (ref 3.5–5.3)
POTASSIUM BLDA-SCNC: 4.6 MMOL/L (ref 3.5–5.3)
POTASSIUM BLDMV-SCNC: 3.4 MMOL/L (ref 3.5–5.3)
POTASSIUM BLDMV-SCNC: 4.1 MMOL/L (ref 3.5–5.3)
POTASSIUM BLDMV-SCNC: 4.1 MMOL/L (ref 3.5–5.3)
POTASSIUM BLDMV-SCNC: 4.3 MMOL/L (ref 3.5–5.3)
POTASSIUM BLDV-SCNC: 4 MMOL/L (ref 3.5–5.3)
POTASSIUM SERPL-SCNC: 3.9 MMOL/L (ref 3.5–5.3)
POTASSIUM SERPL-SCNC: 4.4 MMOL/L (ref 3.5–5.3)
PROMYELOCYTES # BLD MANUAL: 0.21 X10*3/UL
PROMYELOCYTES # BLD MANUAL: 0.22 X10*3/UL
PROMYELOCYTES NFR BLD MANUAL: 0.9 %
PROMYELOCYTES NFR BLD MANUAL: 0.9 %
PROT SERPL-MCNC: 4.6 G/DL (ref 6.4–8.2)
PROT SERPL-MCNC: 4.9 G/DL (ref 6.4–8.2)
PROTHROMBIN TIME: 14.6 SECONDS (ref 9.8–12.4)
RBC # BLD AUTO: 2.53 X10*6/UL (ref 4.5–5.9)
RBC # BLD AUTO: 2.61 X10*6/UL (ref 4.5–5.9)
RBC MORPH BLD: ABNORMAL
RBC MORPH BLD: ABNORMAL
SAO2 % BLDA: 100 % (ref 94–100)
SAO2 % BLDA: 99 % (ref 94–100)
SAO2 % BLDA: 99 % (ref 94–100)
SAO2 % BLDMV: 47 % (ref 45–75)
SAO2 % BLDMV: 49 % (ref 45–75)
SAO2 % BLDMV: 49 % (ref 45–75)
SAO2 % BLDMV: 55 % (ref 45–75)
SAO2 % BLDV: 63 % (ref 45–75)
SODIUM BLDA-SCNC: 123 MMOL/L (ref 136–145)
SODIUM BLDA-SCNC: 125 MMOL/L (ref 136–145)
SODIUM BLDA-SCNC: 127 MMOL/L (ref 136–145)
SODIUM BLDA-SCNC: 127 MMOL/L (ref 136–145)
SODIUM BLDA-SCNC: 128 MMOL/L (ref 136–145)
SODIUM BLDA-SCNC: 130 MMOL/L (ref 136–145)
SODIUM BLDMV-SCNC: 124 MMOL/L (ref 136–145)
SODIUM BLDMV-SCNC: 125 MMOL/L (ref 136–145)
SODIUM BLDMV-SCNC: 125 MMOL/L (ref 136–145)
SODIUM BLDMV-SCNC: 127 MMOL/L (ref 136–145)
SODIUM BLDV-SCNC: 131 MMOL/L (ref 136–145)
SODIUM SERPL-SCNC: 127 MMOL/L (ref 136–145)
SODIUM SERPL-SCNC: 133 MMOL/L (ref 136–145)
TOTAL CELLS COUNTED BLD: 113
TOTAL CELLS COUNTED BLD: 116
VANCOMYCIN SERPL-MCNC: 23.6 UG/ML (ref 5–20)
VARIANT LYMPHS # BLD MANUAL: 0 X10*3/UL (ref 0–0.5)
VARIANT LYMPHS # BLD MANUAL: 0 X10*3/UL (ref 0–0.5)
VARIANT LYMPHS NFR BLD: 0 %
VARIANT LYMPHS NFR BLD: 0 %
VENTILATOR MODE: ABNORMAL
WBC # BLD AUTO: 23.6 X10*3/UL (ref 4.4–11.3)
WBC # BLD AUTO: 24.9 X10*3/UL (ref 4.4–11.3)

## 2025-05-27 PROCEDURE — 99232 SBSQ HOSP IP/OBS MODERATE 35: CPT | Performed by: PHYSICIAN ASSISTANT

## 2025-05-27 PROCEDURE — 85007 BL SMEAR W/DIFF WBC COUNT: CPT

## 2025-05-27 PROCEDURE — 85610 PROTHROMBIN TIME: CPT

## 2025-05-27 PROCEDURE — 85027 COMPLETE CBC AUTOMATED: CPT

## 2025-05-27 PROCEDURE — 2500000001 HC RX 250 WO HCPCS SELF ADMINISTERED DRUGS (ALT 637 FOR MEDICARE OP)

## 2025-05-27 PROCEDURE — 93325 DOPPLER ECHO COLOR FLOW MAPG: CPT

## 2025-05-27 PROCEDURE — 93325 DOPPLER ECHO COLOR FLOW MAPG: CPT | Performed by: INTERNAL MEDICINE

## 2025-05-27 PROCEDURE — 2500000005 HC RX 250 GENERAL PHARMACY W/O HCPCS

## 2025-05-27 PROCEDURE — 82947 ASSAY GLUCOSE BLOOD QUANT: CPT

## 2025-05-27 PROCEDURE — 94003 VENT MGMT INPAT SUBQ DAY: CPT

## 2025-05-27 PROCEDURE — 2500000002 HC RX 250 W HCPCS SELF ADMINISTERED DRUGS (ALT 637 FOR MEDICARE OP, ALT 636 FOR OP/ED)

## 2025-05-27 PROCEDURE — 37799 UNLISTED PX VASCULAR SURGERY: CPT

## 2025-05-27 PROCEDURE — 2500000004 HC RX 250 GENERAL PHARMACY W/ HCPCS (ALT 636 FOR OP/ED)

## 2025-05-27 PROCEDURE — 99232 SBSQ HOSP IP/OBS MODERATE 35: CPT | Performed by: INTERNAL MEDICINE

## 2025-05-27 PROCEDURE — G0545 PR INHERENT VISIT TO INPT: HCPCS | Performed by: STUDENT IN AN ORGANIZED HEALTH CARE EDUCATION/TRAINING PROGRAM

## 2025-05-27 PROCEDURE — 99223 1ST HOSP IP/OBS HIGH 75: CPT

## 2025-05-27 PROCEDURE — 85347 COAGULATION TIME ACTIVATED: CPT

## 2025-05-27 PROCEDURE — 82248 BILIRUBIN DIRECT: CPT

## 2025-05-27 PROCEDURE — 84100 ASSAY OF PHOSPHORUS: CPT

## 2025-05-27 PROCEDURE — 84132 ASSAY OF SERUM POTASSIUM: CPT

## 2025-05-27 PROCEDURE — 2500000004 HC RX 250 GENERAL PHARMACY W/ HCPCS (ALT 636 FOR OP/ED): Mod: JZ

## 2025-05-27 PROCEDURE — 80202 ASSAY OF VANCOMYCIN: CPT

## 2025-05-27 PROCEDURE — 83615 LACTATE (LD) (LDH) ENZYME: CPT

## 2025-05-27 PROCEDURE — 80048 BASIC METABOLIC PNL TOTAL CA: CPT

## 2025-05-27 PROCEDURE — 99233 SBSQ HOSP IP/OBS HIGH 50: CPT | Performed by: STUDENT IN AN ORGANIZED HEALTH CARE EDUCATION/TRAINING PROGRAM

## 2025-05-27 PROCEDURE — 93308 TTE F-UP OR LMTD: CPT | Performed by: INTERNAL MEDICINE

## 2025-05-27 PROCEDURE — 99291 CRITICAL CARE FIRST HOUR: CPT

## 2025-05-27 PROCEDURE — 83605 ASSAY OF LACTIC ACID: CPT

## 2025-05-27 PROCEDURE — 74018 RADEX ABDOMEN 1 VIEW: CPT

## 2025-05-27 PROCEDURE — 2020000001 HC ICU ROOM DAILY

## 2025-05-27 PROCEDURE — 90937 HEMODIALYSIS REPEATED EVAL: CPT

## 2025-05-27 PROCEDURE — 36600 WITHDRAWAL OF ARTERIAL BLOOD: CPT

## 2025-05-27 PROCEDURE — 83735 ASSAY OF MAGNESIUM: CPT

## 2025-05-27 PROCEDURE — 83010 ASSAY OF HAPTOGLOBIN QUANT: CPT

## 2025-05-27 PROCEDURE — 0DJ08ZZ INSPECTION OF UPPER INTESTINAL TRACT, VIA NATURAL OR ARTIFICIAL OPENING ENDOSCOPIC: ICD-10-PCS | Performed by: STUDENT IN AN ORGANIZED HEALTH CARE EDUCATION/TRAINING PROGRAM

## 2025-05-27 RX ORDER — NOREPINEPHRINE BITARTRATE/D5W 8 MG/250ML
.01-1 PLASTIC BAG, INJECTION (ML) INTRAVENOUS CONTINUOUS
Status: DISCONTINUED | OUTPATIENT
Start: 2025-05-27 | End: 2025-06-04

## 2025-05-27 RX ADMIN — DEXMEDETOMIDINE HYDROCHLORIDE 0.8 MCG/KG/HR: 400 INJECTION INTRAVENOUS at 22:17

## 2025-05-27 RX ADMIN — DEXMEDETOMIDINE HYDROCHLORIDE 0.8 MCG/KG/HR: 400 INJECTION INTRAVENOUS at 00:21

## 2025-05-27 RX ADMIN — MICAFUNGIN SODIUM 100 MG: 100 INJECTION, POWDER, LYOPHILIZED, FOR SOLUTION INTRAVENOUS at 09:50

## 2025-05-27 RX ADMIN — MEROPENEM 500 MG: 500 INJECTION, POWDER, FOR SOLUTION INTRAVENOUS at 09:50

## 2025-05-27 RX ADMIN — INSULIN LISPRO 2 UNITS: 100 INJECTION, SOLUTION INTRAVENOUS; SUBCUTANEOUS at 20:36

## 2025-05-27 RX ADMIN — INSULIN LISPRO 2 UNITS: 100 INJECTION, SOLUTION INTRAVENOUS; SUBCUTANEOUS at 09:43

## 2025-05-27 RX ADMIN — INSULIN LISPRO 1 UNITS: 100 INJECTION, SOLUTION INTRAVENOUS; SUBCUTANEOUS at 04:03

## 2025-05-27 RX ADMIN — AMIODARONE HYDROCHLORIDE 0.5 MG/MIN: 1.8 INJECTION, SOLUTION INTRAVENOUS at 02:04

## 2025-05-27 RX ADMIN — POLYETHYLENE GLYCOL 3350 17 G: 17 POWDER, FOR SOLUTION ORAL at 20:34

## 2025-05-27 RX ADMIN — INSULIN LISPRO 1 UNITS: 100 INJECTION, SOLUTION INTRAVENOUS; SUBCUTANEOUS at 16:48

## 2025-05-27 RX ADMIN — INSULIN LISPRO 2 UNITS: 100 INJECTION, SOLUTION INTRAVENOUS; SUBCUTANEOUS at 00:07

## 2025-05-27 RX ADMIN — INSULIN LISPRO 1 UNITS: 100 INJECTION, SOLUTION INTRAVENOUS; SUBCUTANEOUS at 11:44

## 2025-05-27 RX ADMIN — HEPARIN SODIUM 10 ML/HR: 10000 INJECTION, SOLUTION INTRAVENOUS; SUBCUTANEOUS at 13:00

## 2025-05-27 RX ADMIN — SENNOSIDES 17.2 MG: 8.6 TABLET, FILM COATED ORAL at 20:34

## 2025-05-27 RX ADMIN — ASPIRIN 81 MG: 81 TABLET, CHEWABLE ORAL at 09:50

## 2025-05-27 RX ADMIN — PANTOPRAZOLE SODIUM 40 MG: 40 INJECTION, POWDER, FOR SOLUTION INTRAVENOUS at 20:34

## 2025-05-27 RX ADMIN — DEXMEDETOMIDINE HYDROCHLORIDE 0.8 MCG/KG/HR: 400 INJECTION INTRAVENOUS at 06:06

## 2025-05-27 RX ADMIN — INSULIN LISPRO 1 UNITS: 100 INJECTION, SOLUTION INTRAVENOUS; SUBCUTANEOUS at 23:59

## 2025-05-27 RX ADMIN — POLYETHYLENE GLYCOL 3350 17 G: 17 POWDER, FOR SOLUTION ORAL at 09:49

## 2025-05-27 RX ADMIN — AMIODARONE HYDROCHLORIDE 0.5 MG/MIN: 1.8 INJECTION, SOLUTION INTRAVENOUS at 14:49

## 2025-05-27 RX ADMIN — Medication 30 PERCENT: at 08:00

## 2025-05-27 RX ADMIN — SENNOSIDES 17.2 MG: 8.6 TABLET, FILM COATED ORAL at 09:50

## 2025-05-27 RX ADMIN — PANTOPRAZOLE SODIUM 40 MG: 40 INJECTION, POWDER, FOR SOLUTION INTRAVENOUS at 09:50

## 2025-05-27 RX ADMIN — NOREPINEPHRINE BITARTRATE 0.04 MCG/KG/MIN: 8 INJECTION, SOLUTION INTRAVENOUS at 11:00

## 2025-05-27 RX ADMIN — AMIODARONE HYDROCHLORIDE 0.5 MG/MIN: 1.8 INJECTION, SOLUTION INTRAVENOUS at 22:17

## 2025-05-27 RX ADMIN — BISACODYL 10 MG: 10 SUPPOSITORY RECTAL at 09:50

## 2025-05-27 RX ADMIN — DEXMEDETOMIDINE HYDROCHLORIDE 0.8 MCG/KG/HR: 400 INJECTION INTRAVENOUS at 12:10

## 2025-05-27 RX ADMIN — Medication 50 MCG/HR: at 14:49

## 2025-05-27 ASSESSMENT — PAIN - FUNCTIONAL ASSESSMENT
PAIN_FUNCTIONAL_ASSESSMENT: CPOT (CRITICAL CARE PAIN OBSERVATION TOOL)

## 2025-05-27 NOTE — PROGRESS NOTES
Alpesh Elena is a 45 y.o. male on day 7 of admission presenting with STEMI (ST elevation myocardial infarction) (Multi).    Subjective   Interval History:       Afebrile, remains on pressor   On meropenem , micafungin day 2   and vancomycin   Wbc 23 (23)(18)(15)  Blood /s 5/26 NGTD  Bilirubin trending up 16 (10)  EGD today with ulcer at GE junction     Objective   Range of Vitals (last 24 hours)  Heart Rate:  [62-68]   Temp:  [36 °C (96.8 °F)-37.2 °C (99 °F)]   Resp:  [13-27]   Weight:  [87.6 kg (193 lb 3.2 oz)]   SpO2:  [94 %-100 %]   Daily Weight  05/27/25 : 87.6 kg (193 lb 3.2 oz)    Body mass index is 29.38 kg/m².    Physical Exam  Constitutional:       Comments: jaundiced   Intubated and sedated   Cardiovascular:      Heart sounds: Murmur heard.   Pulmonary:      Comments: MV sounds  Abdominal:      Palpations: Abdomen is soft.      Tenderness: There is no abdominal tenderness.     Antibiotics  meropenem - 500 mg/100 mL  micafungin - 100 mg/100 mL  vancomycin    Relevant Results  Labs  Results from last 72 hours   Lab Units 05/27/25  0302 05/26/25  1414 05/26/25  0500 05/26/25  0026 05/25/25  1541 05/25/25  0039 05/24/25  1755 05/24/25  1214   WBC AUTO x10*3/uL 23.6* 25.1* 23.2* 22.8*   < > 18.6*   < > 15.9*   HEMOGLOBIN g/dL 7.7* 8.1* 7.8* 6.8*   < > 8.0*   < > 8.7*   HEMATOCRIT % 22.5* 22.9* 21.7* 18.9*   < > 24.4*   < > 24.7*   PLATELETS AUTO x10*3/uL 92* 87* 79* 85*   < > 82*   < > 91*   NEUTROS PCT AUTO %  --   --   --   --   --  75.6  --  74.2   LYMPHO PCT MAN % 15.9 4.2  --  8.9  --   --   --   --    LYMPHS PCT AUTO %  --   --   --   --   --  7.7  --  8.5   MONO PCT MAN % 6.2 3.3  --  3.5  --   --   --   --    MONOS PCT AUTO %  --   --   --   --   --  8.7  --  11.4   EOSINO PCT MAN % 0.9 1.7  --  3.5  --   --   --   --    EOS PCT AUTO %  --   --   --   --   --  0.5  --  1.1    < > = values in this interval not displayed.     Results from last 72 hours   Lab Units 05/27/25  0302 05/26/25 2005  05/26/25  1414 05/26/25  0019   SODIUM mmol/L 133* 133* 132* 133*   POTASSIUM mmol/L 3.9 4.3 4.2 4.3   CHLORIDE mmol/L 90* 89* 90* 93*   CO2 mmol/L 24 25 22 23   BUN mg/dL 111* 101* 96* 80*   CREATININE mg/dL 9.50* 8.87* 8.27* 6.81*   GLUCOSE mg/dL 190* 216* 270* 195*   CALCIUM mg/dL 8.5* 8.2* 8.1* 8.0*   ANION GAP mmol/L 23* 23* 24* 21*   EGFR mL/min/1.73m*2 6* 7* 7* 9*   PHOSPHORUS mg/dL 7.4*  --  7.4* 7.2*     Results from last 72 hours   Lab Units 05/27/25  0302 05/26/25  1414 05/26/25  0019 05/25/25  1541 05/25/25  0032   ALK PHOS U/L 370*  --  223*  --  128*   BILIRUBIN TOTAL mg/dL 16.6*  --  10.0*  --  8.0*   BILIRUBIN DIRECT mg/dL 11.4*  --  6.6*  --  5.4*   PROTEIN TOTAL g/dL 4.6*  --  4.6*  --  5.1*   ALT U/L 287*  --  405*  --  629*   AST U/L 232*  --  194*  --  241*   ALBUMIN g/dL 2.6* 2.6* 2.6*   < > 2.9*    < > = values in this interval not displayed.     Estimated Creatinine Clearance: 10.5 mL/min (A) (by C-G formula based on SCr of 9.5 mg/dL (H)).  C-Reactive Protein   Date Value Ref Range Status   07/07/2024 10.34 (H) <1.00 mg/dL Final     Microbiology  Susceptibility data from last 14 days.  Collected Specimen Info Organism   05/20/25 Fluid from Tracheal Aspirate Normal throat margaret       Imaging      Micro :    5/19 blood c/s NGTD  5/20 sputum c/s gm +Ve cocci  Legionella ,strep -ve  5/21 blood cultures 2 sets no growth  5/26 blood cultures NGTD      HIV -ve  Hepatitis A, B, C -ve    XR CHEST 1 VIEW; 5/26/2025 5:26 pm     2. Overall slight improvement in bilateral lung aeration, likely due  to decreasing edema and atelectasis.     Assessment/Plan   Patient with initial presentation for chest pain ultimately dx with STEMI, s/p cath, impella, intubation, and c/b VSD rupture. A large component of patient's shock is likely cardiogenic in nature. Pt had worsening leukocytosis with unclear source while on vancomycin and zosyn. Antibiotics escalated to meropenem, micafungin in addition to vancomycin       #Shock, likely cardiogenic vs septic  # STEMI, VSD  # BENY on SLED   # fluid overload vs HAP   # persistent leukocytosis,thrombocytopenia and dropping hb , no clear source    Recommendations:  -Continue IV vancomycin, dosed with pharmacy  - continue  IV meropenem, renally dosed  - continue IV micafungin 100 mg every 24 hours               Jennifer Islas MD

## 2025-05-27 NOTE — NURSING NOTE
Enteral feeding tube placement    #12 Fr, small bore feeding tube inserted into R nare with Enteral Access System CORTARK 2 per provider orders,including insertion of nasal bridle and removal of wire stylet; patient tolerated procedure; feeding tube secured at 82 cm; team made aware; bedside nurse notified and educated on appropriate use; no bleeding or adverse reaction noted; KUB activated for placement verification.    Discussed tube placement with Dr Trevino as pt just had EGD performed- ok to place enteral feeding tube- EGD findings documented in GI procedure note.

## 2025-05-27 NOTE — PROGRESS NOTES
Met with pt's son Jacqui with Amy Stratton DNP and Rev. Karley Prater from palliative care.  Introduced palliative care and our role in pt and family's care.  Son very appreciative of the support.  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  Pt lives at home in [place] with his mom, sister, cousin and his family.  Jacqui, pt's son, lives alone close by.  Son is NOK and primary decision maker.  Son has a very good understanding of where pt is medically and what hurdles pt will have to make in order to improve.  Family is hopeful.  Son tried to visit daily in the late afternoons after work.  Pall care team will continue to follow to support pt and family.      TANIKA Louis

## 2025-05-27 NOTE — PROGRESS NOTES
CARDIAC SURGERY CONSULT PROGRESS NOTE    SUBJECTIVE  Mr. Elena is a 45 year old male with a past medical history of tobacco abuse (30 pack years), submandibular abscess, and nephrolithiasis who presented to [facility] with a chief complaint of chest pain, found to have an inferior STEMI, now s/p coronary angiogram with balloon angioplasty to the PDA (right dominant system). His course was complicated by hemodynamic instability (tachycardia, hypotension) requiring vasopressor support and laboratory evidnce of end organ dysfunction. Due to concern for cardiogenic vs mixed shock, a shock call was intervened and recommended transfer to [facility] CICU. TTE at [facility] revealed large VSD. He underwent Impella CP placement 5/20.      Cardiac surgery is consulted for a VSD repair post STEMI.      Hospital course:   Per chart review, presented to [facility] 05/18 PM for the evaluation of chest tightness associated with SOB/nausea. On arrival he was afebrile, tachycardic, and hypertensive. ED workup remarkable for initial WBC 20.5, Cr 0.79, troponin ~24K,  and ECG with ST elevations in the inferior leads with reciprocal depressions. He was loaded with aspirin and brilinta and started on heparin gtt. He was taken for emergent coronary angiogram which revealed multi vessel CAD [LAD/Lcx/RCA mildly/diffusely diseased,100% RPDA culprit lesion] with PTCA to RPDA with 50% residual stenosis [GLENNA 0->3] (too small to stent). Notably, LVEF 45% and LVED 20-25 mmHg.      He then developed on 5/19 persistent tachycardia despite IVF and IV metoprolol. TTE with LVEF 55%, wall motion abnormalities, and no significant valvular disease. Due to concern for acute aortic pathology vs PE, he underwent a CTA CAP revealing of mild pulmonary edema. Due to concern for PNA, blood cultures were obtained and he was started on CTX/Doxycycline. Later that evening was in respiratory distress (tachypneic to RR 50) with hypotension (BP 85/52 ) and satting  92% on RA with labs revealing of end organ dysfunction and CXR with bilateral pulmonary infiltrates [pulmoinary edema vs multifocal PNA]. A shock call was convened and recommended intubation and PA catheter placement for adjudication of hemodynamics. He was intubated without issue and opening [place] numbers were revealing of RA 14, RV 55/18, PA 51/30 (40), PCWP 32, svO2 87, [place] CO 13.9, [place] CI 7.3.  He was then transferred to [facility].      On arrival to the CICU he was hypotensive with escalating pressor requirements. Bedside examination reveals a holosystolic murmur and serial mixed venous are revealing of a significant step up in SVO2 from the RA to PA concerning for the presence of a ischemic VSD, perhaps secondardy to a late presenting STEMI given troponin elevation to 24K on presentation. Shock call was done that recommended an impella CP placement to offload LV given concern for STEMI-induced VSD/ Inferior LV wall rupture.      Cardiac/Pertinent Imaging  C: 5/18/25:  CONCLUSIONS:   1. Left Main Coronary Artery: This artery is normal.   2. Left Anterior Descending Artery: is mildly diseased, is diffusely diseased and is ectatic.   3. Circumflex Coronary Artery: diffusely dieased, mildly diseased and ectatic.   4. Right Coronary Artery: is tortuous, is diffusely diseased and mildly diseased.   5. PDA RCA Lesion: The percent stenosis is 100%.   6. PDA RCA Lesion: PTCA: 50% residual stenosis. RCA: pre-procedure GLENNA flow was 0(no perfusion) and post-procedure GLENNA flow was 3(complete perfusion).   7. The Left Ventricular Ejection Fraction is 45%.   8. Left Ventricular End Diastolic Pressure: 20-25 mm Hg.   9. Aortic Stenosis: None.  10. Left Ventricular End Diastolic Pressure Dysfunction: Moderate.  11. LV: inferior akinesis.  12. LV: mild left ventricular dysfunction with regional wall motion abnormalities.     Echo: 5/20/25:  CONCLUSIONS:   1. Poorly visualized anatomical structures due to suboptimal image  "quality.   2. The left ventricle was not well visualized. The left ventricular ejection fraction could not be measured.   3. Limited views of the LV appear show a hyderdynamic LV with a large color flow from LV to RV through the inferoseptum with maximal velocity of 3.3m/sec, Unable to adequatly assess LVEF, though appears to have baal septal and inferior wall motion abnormality.   4. There is reduced right ventricular systolic function.   5. The RV appears dilated with significantly reduced function with reduced TAPSE and fractional area change.   6. Primary service notified of findings at the time of reporting.   7. Compared with the prior exam from 5/19/2025 ([facility]) there has been interval development of a large VSD throuh the inferoseptum with the RV now becoming large with significantly reduced function. Prior echo with reported LVEF of 55% with basal inferospetal and baal inferior hypokinesis in the setting of STEMI. RV size and function were nornal at that time. ( Note that the septum was more thoroughly interrogated with color Doppler today).    Objective   BP 91/65   Pulse 67   Temp 37.4 °C (99.3 °F) (Core)   Resp 21   Ht 1.727 m (5' 8\")   Wt 87.6 kg (193 lb 2 oz)   SpO2 99%   BMI 29.36 kg/m²   Critical-Care Pain Observation Score:  [0]    Vitals:    05/27/25 0800   Weight: 87.6 kg (193 lb 2 oz)      Physical Exam  Constitutional:       Comments: Intubated and sedated     HENT:      Head: Normocephalic.      Mouth/Throat:      Mouth: Mucous membranes are moist.   Cardiovascular:      Rate and Rhythm: Regular rhythm. Tachycardia present.      Heart sounds: Murmur heard.      Comments: Impella in place  Pulmonary:      Comments: Mechanical breath sounds  Musculoskeletal:         General: Normal range of motion.      Cervical back: Neck supple.      Right lower leg: No edema.      Left lower leg: No edema.   Skin:     General: Skin is warm.   Neurological:      Comments: Intubated and " sedated          Intake/Output Summary (Last 24 hours) at 5/27/2025 1418  Last data filed at 5/27/2025 1300  Gross per 24 hour   Intake 1684.08 ml   Output 79 ml   Net 1605.08 ml         Medications  Scheduled medications  Scheduled Medications[1]Continuous medications  Continuous Medications[2]PRN medications  PRN Medications[3]    Labs  Results for orders placed or performed during the hospital encounter of 05/20/25 (from the past 24 hours)   ACTIVATED CLOTTING TIME LOW   Result Value Ref Range    POCT Activated Clotting Time Low Range 237 (H) 83 - 199 sec   POCT GLUCOSE   Result Value Ref Range    POCT Glucose 251 (H) 74 - 99 mg/dL   ACTIVATED CLOTTING TIME LOW   Result Value Ref Range    POCT Activated Clotting Time Low Range 232 (H) 83 - 199 sec   Blood Gas Mixed Venous Full Panel   Result Value Ref Range    POCT pH, Mixed 7.33 7.33 - 7.43 pH    POCT pCO2, Mixed 37 (L) 41 - 51 mm Hg    POCT pO2, Mixed 36 35 - 45 mm Hg    POCT SO2, Mixed 48 45 - 75 %    POCT Oxy Hemoglobin, Mixed 46.5 45.0 - 75.0 %    POCT Hematocrit Calculated, Mixed 25.0 (L) 41.0 - 52.0 %    POCT Sodium, Mixed 127 (L) 136 - 145 mmol/L    POCT Potassium, Mixed 4.2 3.5 - 5.3 mmol/L    POCT Chloride, Mixed 94 (L) 98 - 107 mmol/L    POCT Ionized Calcium, Mixed 1.03 (L) 1.10 - 1.33 mmol/L    POCT Glucose, Mixed 251 (H) 74 - 99 mg/dL    POCT Lactate, Mixed 1.8 0.4 - 2.0 mmol/L    POCT Base Excess, Mixed -5.9 (L) -2.0 - 3.0 mmol/L    POCT HCO3 Calculated, Mixed 19.5 (L) 22.0 - 26.0 mmol/L    POCT Hemoglobin, Mixed 8.4 (L) 13.5 - 17.5 g/dL    POCT Anion Gap, Mixed 18 10 - 25 mmo/L    Patient Temperature 37.0 degrees Celsius    FiO2 30 %   Blood Gas Arterial Full Panel   Result Value Ref Range    POCT pH, Arterial 7.38 7.38 - 7.42 pH    POCT pCO2, Arterial 29 (L) 38 - 42 mm Hg    POCT pO2, Arterial 117 (H) 85 - 95 mm Hg    POCT SO2, Arterial 100 94 - 100 %    POCT Oxy Hemoglobin, Arterial 96.0 94.0 - 98.0 %    POCT Hematocrit Calculated, Arterial 26.0  (L) 41.0 - 52.0 %    POCT Sodium, Arterial 127 (L) 136 - 145 mmol/L    POCT Potassium, Arterial 4.5 3.5 - 5.3 mmol/L    POCT Chloride, Arterial 93 (L) 98 - 107 mmol/L    POCT Ionized Calcium, Arterial 1.04 (L) 1.10 - 1.33 mmol/L    POCT Glucose, Arterial 247 (H) 74 - 99 mg/dL    POCT Lactate, Arterial 1.8 0.4 - 2.0 mmol/L    POCT Base Excess, Arterial -7.0 (L) -2.0 - 3.0 mmol/L    POCT HCO3 Calculated, Arterial 17.2 (L) 22.0 - 26.0 mmol/L    POCT Hemoglobin, Arterial 8.8 (L) 13.5 - 17.5 g/dL    POCT Anion Gap, Arterial 21 10 - 25 mmo/L    Patient Temperature 37.0 degrees Celsius    FiO2 30 %   ACTIVATED CLOTTING TIME LOW   Result Value Ref Range    POCT Activated Clotting Time Low Range 216 (H) 83 - 199 sec   ACTIVATED CLOTTING TIME LOW   Result Value Ref Range    POCT Activated Clotting Time Low Range 239 (H) 83 - 199 sec   ACTIVATED CLOTTING TIME LOW   Result Value Ref Range    POCT Activated Clotting Time Low Range 227 (H) 83 - 199 sec   Basic metabolic panel   Result Value Ref Range    Glucose 216 (H) 74 - 99 mg/dL    Sodium 133 (L) 136 - 145 mmol/L    Potassium 4.3 3.5 - 5.3 mmol/L    Chloride 89 (L) 98 - 107 mmol/L    Bicarbonate 25 21 - 32 mmol/L    Anion Gap 23 (H) 10 - 20 mmol/L    Urea Nitrogen 101 (HH) 6 - 23 mg/dL    Creatinine 8.87 (H) 0.50 - 1.30 mg/dL    eGFR 7 (L) >60 mL/min/1.73m*2    Calcium 8.2 (L) 8.6 - 10.6 mg/dL   Blood Gas Arterial Full Panel   Result Value Ref Range    POCT pH, Arterial 7.38 7.38 - 7.42 pH    POCT pCO2, Arterial 31 (L) 38 - 42 mm Hg    POCT pO2, Arterial 110 (H) 85 - 95 mm Hg    POCT SO2, Arterial 100 94 - 100 %    POCT Oxy Hemoglobin, Arterial 95.8 94.0 - 98.0 %    POCT Hematocrit Calculated, Arterial 26.0 (L) 41.0 - 52.0 %    POCT Sodium, Arterial 127 (L) 136 - 145 mmol/L    POCT Potassium, Arterial 4.1 3.5 - 5.3 mmol/L    POCT Chloride, Arterial 93 (L) 98 - 107 mmol/L    POCT Ionized Calcium, Arterial 1.05 (L) 1.10 - 1.33 mmol/L    POCT Glucose, Arterial 224 (H) 74 - 99  mg/dL    POCT Lactate, Arterial 1.2 0.4 - 2.0 mmol/L    POCT Base Excess, Arterial -6.1 (L) -2.0 - 3.0 mmol/L    POCT HCO3 Calculated, Arterial 18.3 (L) 22.0 - 26.0 mmol/L    POCT Hemoglobin, Arterial 8.7 (L) 13.5 - 17.5 g/dL    POCT Anion Gap, Arterial 20 10 - 25 mmo/L    Patient Temperature 37.0 degrees Celsius    FiO2 30 %   POCT GLUCOSE   Result Value Ref Range    POCT Glucose 221 (H) 74 - 99 mg/dL   ACTIVATED CLOTTING TIME LOW   Result Value Ref Range    POCT Activated Clotting Time Low Range 194 83 - 199 sec   ACTIVATED CLOTTING TIME LOW   Result Value Ref Range    POCT Activated Clotting Time Low Range 207 (H) 83 - 199 sec   ACTIVATED CLOTTING TIME LOW   Result Value Ref Range    POCT Activated Clotting Time Low Range 210 (H) 83 - 199 sec   POCT GLUCOSE   Result Value Ref Range    POCT Glucose 217 (H) 74 - 99 mg/dL   Blood Gas Arterial Full Panel   Result Value Ref Range    POCT pH, Arterial 7.39 7.38 - 7.42 pH    POCT pCO2, Arterial 31 (L) 38 - 42 mm Hg    POCT pO2, Arterial 107 (H) 85 - 95 mm Hg    POCT SO2, Arterial 99 94 - 100 %    POCT Oxy Hemoglobin, Arterial 95.0 94.0 - 98.0 %    POCT Hematocrit Calculated, Arterial 27.0 (L) 41.0 - 52.0 %    POCT Sodium, Arterial 128 (L) 136 - 145 mmol/L    POCT Potassium, Arterial 4.1 3.5 - 5.3 mmol/L    POCT Chloride, Arterial 93 (L) 98 - 107 mmol/L    POCT Ionized Calcium, Arterial 1.08 (L) 1.10 - 1.33 mmol/L    POCT Glucose, Arterial 211 (H) 74 - 99 mg/dL    POCT Lactate, Arterial 1.2 0.4 - 2.0 mmol/L    POCT Base Excess, Arterial -5.4 (L) -2.0 - 3.0 mmol/L    POCT HCO3 Calculated, Arterial 18.8 (L) 22.0 - 26.0 mmol/L    POCT Hemoglobin, Arterial 8.9 (L) 13.5 - 17.5 g/dL    POCT Anion Gap, Arterial 20 10 - 25 mmo/L    Patient Temperature 37.0 degrees Celsius    FiO2 30 %   Blood Gas Mixed Venous Full Panel   Result Value Ref Range    POCT pH, Mixed 7.33 7.33 - 7.43 pH    POCT pCO2, Mixed 39 (L) 41 - 51 mm Hg    POCT pO2, Mixed 28 (L) 35 - 45 mm Hg    POCT SO2,  Mixed 49 45 - 75 %    POCT Oxy Hemoglobin, Mixed 47.0 45.0 - 75.0 %    POCT Hematocrit Calculated, Mixed 28.0 (L) 41.0 - 52.0 %    POCT Sodium, Mixed 127 (L) 136 - 145 mmol/L    POCT Potassium, Mixed 4.1 3.5 - 5.3 mmol/L    POCT Chloride, Mixed 92 (L) 98 - 107 mmol/L    POCT Ionized Calcium, Mixed 1.07 (L) 1.10 - 1.33 mmol/L    POCT Glucose, Mixed 200 (H) 74 - 99 mg/dL    POCT Lactate, Mixed 1.0 0.4 - 2.0 mmol/L    POCT Base Excess, Mixed -4.9 (L) -2.0 - 3.0 mmol/L    POCT HCO3 Calculated, Mixed 20.6 (L) 22.0 - 26.0 mmol/L    POCT Hemoglobin, Mixed 9.3 (L) 13.5 - 17.5 g/dL    POCT Anion Gap, Mixed 19 10 - 25 mmo/L    Patient Temperature 37.0 degrees Celsius    FiO2 30 %   ACTIVATED CLOTTING TIME LOW   Result Value Ref Range    POCT Activated Clotting Time Low Range 202 (H) 83 - 199 sec   ACTIVATED CLOTTING TIME LOW   Result Value Ref Range    POCT Activated Clotting Time Low Range 205 (H) 83 - 199 sec   ACTIVATED CLOTTING TIME LOW   Result Value Ref Range    POCT Activated Clotting Time Low Range 189 83 - 199 sec   ACTIVATED CLOTTING TIME LOW   Result Value Ref Range    POCT Activated Clotting Time Low Range 202 (H) 83 - 199 sec   Magnesium   Result Value Ref Range    Magnesium 3.24 (H) 1.60 - 2.40 mg/dL   CBC and Auto Differential   Result Value Ref Range    WBC 23.6 (H) 4.4 - 11.3 x10*3/uL    nRBC 7.4 (H) 0.0 - 0.0 /100 WBCs    RBC 2.53 (L) 4.50 - 5.90 x10*6/uL    Hemoglobin 7.7 (L) 13.5 - 17.5 g/dL    Hematocrit 22.5 (L) 41.0 - 52.0 %    MCV 89 80 - 100 fL    MCH 30.4 26.0 - 34.0 pg    MCHC 34.2 32.0 - 36.0 g/dL    RDW 16.1 (H) 11.5 - 14.5 %    Platelets 92 (L) 150 - 450 x10*3/uL    Immature Granulocytes %, Automated 11.2 (H) 0.0 - 0.9 %    Immature Granulocytes Absolute, Automated 2.63 (H) 0.00 - 0.70 x10*3/uL   Hepatic function panel   Result Value Ref Range    Albumin 2.6 (L) 3.4 - 5.0 g/dL    Bilirubin, Total 16.6 (H) 0.0 - 1.2 mg/dL    Bilirubin, Direct 11.4 (H) 0.0 - 0.3 mg/dL    Alkaline Phosphatase 370  (H) 33 - 120 U/L     (H) 10 - 52 U/L     (H) 9 - 39 U/L    Total Protein 4.6 (L) 6.4 - 8.2 g/dL   Lactate dehydrogenase   Result Value Ref Range    LDH 1,955 (H) 84 - 246 U/L   Haptoglobin   Result Value Ref Range    Haptoglobin <30 (L) 30 - 200 mg/dL   Phosphorus   Result Value Ref Range    Phosphorus 7.4 (H) 2.5 - 4.9 mg/dL   Basic Metabolic Panel   Result Value Ref Range    Glucose 190 (H) 74 - 99 mg/dL    Sodium 133 (L) 136 - 145 mmol/L    Potassium 3.9 3.5 - 5.3 mmol/L    Chloride 90 (L) 98 - 107 mmol/L    Bicarbonate 24 21 - 32 mmol/L    Anion Gap 23 (H) 10 - 20 mmol/L    Urea Nitrogen 111 (HH) 6 - 23 mg/dL    Creatinine 9.50 (H) 0.50 - 1.30 mg/dL    eGFR 6 (L) >60 mL/min/1.73m*2    Calcium 8.5 (L) 8.6 - 10.6 mg/dL   Manual Differential   Result Value Ref Range    Neutrophils %, Manual 56.6 40.0 - 80.0 %    Bands %, Manual 5.3 0.0 - 5.0 %    Lymphocytes %, Manual 15.9 13.0 - 44.0 %    Monocytes %, Manual 6.2 2.0 - 10.0 %    Eosinophils %, Manual 0.9 0.0 - 6.0 %    Basophils %, Manual 0.0 0.0 - 2.0 %    Atypical Lymphocytes %, Manual 0.0 0.0 - 2.0 %    Metamyelocytes %, Manual 3.6 0.0 - 0.0 %    Myelocytes %, Manual 10.6 0.0 - 0.0 %    Plasma Cells %, Manual 0.0 0.00 - 0.00 %    Promyelocytes %, Manual 0.9 0.0 - 0.0 %    Blasts %, Manual 0.0 0.0 - 0.0 %    Seg Neutrophils Absolute, Manual 13.36 (H) 1.20 - 7.00 x10*3/uL    Bands Absolute, Manual 1.25 (H) 0.00 - 0.70 x10*3/uL    Lymphocytes Absolute, Manual 3.75 1.20 - 4.80 x10*3/uL    Monocytes Absolute, Manual 1.46 (H) 0.10 - 1.00 x10*3/uL    Eosinophils Absolute, Manual 0.21 0.00 - 0.70 x10*3/uL    Basophils Absolute, Manual 0.00 0.00 - 0.10 x10*3/uL    Atypical Lymphs Absolute, Manual 0.00 0.00 - 0.50 x10*3/uL    Metamyelocytes Absolute, Manual 0.85 0.00 - 0.00 x10*3/uL    Myelocytes Absolute, Manual 2.50 0.00 - 0.00 x10*3/uL    Plasma Cells Absolute, Manual 0.00 0.00 - 0.00 x10*3/uL    Promyelocytes Absolute, Manual 0.21 0.00 - 0.00 x10*3/uL     Blasts Absolute, Manual 0.00 0.00 - 0.00 x10*3/uL    Total Cells Counted 113     Neutrophils Absolute, Manual 14.61 (H) 1.20 - 7.70 x10*3/uL    Manual nRBC per 100 Cells 0.0 0.0 - 0.0 %    RBC Morphology See Below     Polychromasia Marked     Tonny Cells Many    POCT GLUCOSE   Result Value Ref Range    POCT Glucose 171 (H) 74 - 99 mg/dL   ACTIVATED CLOTTING TIME LOW   Result Value Ref Range    POCT Activated Clotting Time Low Range 189 83 - 199 sec   ACTIVATED CLOTTING TIME LOW   Result Value Ref Range    POCT Activated Clotting Time Low Range 192 83 - 199 sec   ACTIVATED CLOTTING TIME LOW   Result Value Ref Range    POCT Activated Clotting Time Low Range 184 83 - 199 sec   Blood Gas Mixed Venous Full Panel   Result Value Ref Range    POCT pH, Mixed 7.30 (L) 7.33 - 7.43 pH    POCT pCO2, Mixed 37 (L) 41 - 51 mm Hg    POCT pO2, Mixed 35 35 - 45 mm Hg    POCT SO2, Mixed 47 45 - 75 %    POCT Oxy Hemoglobin, Mixed 45.9 45.0 - 75.0 %    POCT Hematocrit Calculated, Mixed 33.0 (L) 41.0 - 52.0 %    POCT Sodium, Mixed 124 (L) 136 - 145 mmol/L    POCT Potassium, Mixed 3.4 (L) 3.5 - 5.3 mmol/L    POCT Chloride, Mixed 90 (L) 98 - 107 mmol/L    POCT Ionized Calcium, Mixed 1.09 (L) 1.10 - 1.33 mmol/L    POCT Glucose, Mixed 177 (H) 74 - 99 mg/dL    POCT Lactate, Mixed 1.3 0.4 - 2.0 mmol/L    POCT Base Excess, Mixed -7.6 (L) -2.0 - 3.0 mmol/L    POCT HCO3 Calculated, Mixed 18.2 (L) 22.0 - 26.0 mmol/L    POCT Hemoglobin, Mixed 11.0 (L) 13.5 - 17.5 g/dL    POCT Anion Gap, Mixed 19 10 - 25 mmo/L    Patient Temperature 37.0 degrees Celsius    FiO2 30 %   Blood Gas Arterial Full Panel   Result Value Ref Range    POCT pH, Arterial 7.35 (L) 7.38 - 7.42 pH    POCT pCO2, Arterial 28 (L) 38 - 42 mm Hg    POCT pO2, Arterial 113 (H) 85 - 95 mm Hg    POCT SO2, Arterial 100 94 - 100 %    POCT Oxy Hemoglobin, Arterial 95.5 94.0 - 98.0 %    POCT Hematocrit Calculated, Arterial 25.0 (L) 41.0 - 52.0 %    POCT Sodium, Arterial 127 (L) 136 - 145 mmol/L     POCT Potassium, Arterial 3.5 3.5 - 5.3 mmol/L    POCT Chloride, Arterial 93 (L) 98 - 107 mmol/L    POCT Ionized Calcium, Arterial 1.05 (L) 1.10 - 1.33 mmol/L    POCT Glucose, Arterial 178 (H) 74 - 99 mg/dL    POCT Lactate, Arterial 1.2 0.4 - 2.0 mmol/L    POCT Base Excess, Arterial -9.1 (L) -2.0 - 3.0 mmol/L    POCT HCO3 Calculated, Arterial 15.5 (L) 22.0 - 26.0 mmol/L    POCT Hemoglobin, Arterial 8.4 (L) 13.5 - 17.5 g/dL    POCT Anion Gap, Arterial 22 10 - 25 mmo/L    Patient Temperature 37.0 degrees Celsius    FiO2 30 %   ACTIVATED CLOTTING TIME LOW   Result Value Ref Range    POCT Activated Clotting Time Low Range 181 83 - 199 sec   POCT GLUCOSE   Result Value Ref Range    POCT Glucose 202 (H) 74 - 99 mg/dL   Vancomycin   Result Value Ref Range    Vancomycin 23.6 (H) 5.0 - 20.0 ug/mL   Blood Gas Arterial Full Panel   Result Value Ref Range    POCT pH, Arterial 7.35 (L) 7.38 - 7.42 pH    POCT pCO2, Arterial 29 (L) 38 - 42 mm Hg    POCT pO2, Arterial 108 (H) 85 - 95 mm Hg    POCT SO2, Arterial 99 94 - 100 %    POCT Oxy Hemoglobin, Arterial 95.2 94.0 - 98.0 %    POCT Hematocrit Calculated, Arterial 25.0 (L) 41.0 - 52.0 %    POCT Sodium, Arterial 125 (L) 136 - 145 mmol/L    POCT Potassium, Arterial 4.2 3.5 - 5.3 mmol/L    POCT Chloride, Arterial 91 (L) 98 - 107 mmol/L    POCT Ionized Calcium, Arterial 1.07 (L) 1.10 - 1.33 mmol/L    POCT Glucose, Arterial 191 (H) 74 - 99 mg/dL    POCT Lactate, Arterial 1.3 0.4 - 2.0 mmol/L    POCT Base Excess, Arterial -8.6 (L) -2.0 - 3.0 mmol/L    POCT HCO3 Calculated, Arterial 16.0 (L) 22.0 - 26.0 mmol/L    POCT Hemoglobin, Arterial 8.4 (L) 13.5 - 17.5 g/dL    POCT Anion Gap, Arterial 22 10 - 25 mmo/L    Patient Temperature 37.0 degrees Celsius    FiO2 30 %    Ventilator Mode A/C    ACTIVATED CLOTTING TIME LOW   Result Value Ref Range    POCT Activated Clotting Time Low Range 182 83 - 199 sec   POCT GLUCOSE   Result Value Ref Range    POCT Glucose 195 (H) 74 - 99 mg/dL   Blood  Gas Mixed Venous Full Panel   Result Value Ref Range    POCT pH, Mixed 7.30 (L) 7.33 - 7.43 pH    POCT pCO2, Mixed 34 (L) 41 - 51 mm Hg    POCT pO2, Mixed 36 35 - 45 mm Hg    POCT SO2, Mixed 49 45 - 75 %    POCT Oxy Hemoglobin, Mixed 47.5 45.0 - 75.0 %    POCT Hematocrit Calculated, Mixed 27.0 (L) 41.0 - 52.0 %    POCT Sodium, Mixed 125 (L) 136 - 145 mmol/L    POCT Potassium, Mixed 4.1 3.5 - 5.3 mmol/L    POCT Chloride, Mixed 90 (L) 98 - 107 mmol/L    POCT Ionized Calcium, Mixed 1.05 (L) 1.10 - 1.33 mmol/L    POCT Glucose, Mixed      POCT Lactate, Mixed 1.5 0.4 - 2.0 mmol/L    POCT Base Excess, Mixed -8.9 (L) -2.0 - 3.0 mmol/L    POCT HCO3 Calculated, Mixed 16.7 (L) 22.0 - 26.0 mmol/L    POCT Hemoglobin, Mixed 8.9 (L) 13.5 - 17.5 g/dL    POCT Anion Gap, Mixed 22 10 - 25 mmo/L    Patient Temperature 37.0 degrees Celsius    FiO2 30 %    Ventilator Mode A/C    ACTIVATED CLOTTING TIME LOW   Result Value Ref Range    POCT Activated Clotting Time Low Range 176 83 - 199 sec               ASSESSMENT & PLAN  Mr. Elena is a 45 year old male with a past medical history of tobacco abuse (30 pack years), submandibular abscess, and nephrolithiasis who presented to [facility] with a chief complaint of chest pain, found to have an inferior STEMI, now s/p coronary angiogram with balloon angioplasty to the PDA (right dominant system). His course was complicated by hemodynamic instability (tachycardia, hypotension) requiring vasopressor support and laboratory evidnce of end organ dysfunction. Due to concern for cardiogenic vs mixed shock, a shock call was intervened and recommended transfer to [facility] CICU. TTE at [facility] revealed large VSD. He underwent Impella CP placement 5/20.      Cardiac surgery is consulted for a VSD repair post STEMI.         RECOMMENDATIONS/PLAN  Dr. Omer aware of patient and reviewing case and available imaging.   - Emergent cardiac surgery not indicated at this time.   - Recommend medical optimization per  primary team  - Early surgery for VSD repairs post STEMI have significantly high mortality rates, ideally would prefer to wait a couple weeks to decrease surgical risk. Currently patient is not a surgical candidate. Will need to make significant improvements prior to consideration for surgical repair.  - Primary team reached out 5/27 in regards to an Impella 5.5 placement. Dr. Omer aware and to discuss with ICU attending.   - Will continue to follow along.        Thank you for the consultation.   Please page the cardiac surgery consult pager [phone] with any questions or changes in patient condition.    Holly Diez PA-C  Cardiac Surgery Consult [facility]  [facility]  Cardiac Surgery Consult Pager [phone]     5/27/2025  2:18 PM            [1] aspirin, 81 mg, oral, Daily  bisacodyl, 10 mg, rectal, BID  insulin lispro, 0-5 Units, subcutaneous, q4h  meropenem, 500 mg, intravenous, q24h  micafungin, 100 mg, intravenous, q24h  oxygen, , inhalation, Continuous - Inhalation  pantoprazole, 40 mg, intravenous, BID  perflutren lipid microspheres, 0.5-10 mL of dilution, intravenous, Once in imaging  perflutren protein A microsphere, 0.5 mL, intravenous, Once in imaging  polyethylene glycol, 17 g, oral, 4x daily  sennosides, 2 tablet, oral, BID     [2] amiodarone, 0.5 mg/min, Last Rate: 0.5 mg/min (05/27/25 1300)  dexmedeTOMIDine, 0.1-1.5 mcg/kg/hr (Dosing Weight), Last Rate: 0.8 mcg/kg/hr (05/27/25 1300)  fentaNYL,  mcg/hr, Last Rate: 25 mcg/hr (05/27/25 1300)  heparin Impella Purge 25 units/mL in 500 mL D5W, 10 mL/hr, Last Rate: 10 mL/hr (05/27/25 1300)  midazolam, 0.5-20 mg/hr, Last Rate: 2 mg/hr (05/27/25 1300)  nitroprusside, 0.25-5 mcg/kg/min (Dosing Weight), Last Rate: Stopped (05/25/25 1939)  norepinephrine, 0.01-1 mcg/kg/min (Dosing Weight), Last Rate: 0.02 mcg/kg/min (05/27/25 1300)     [3] PRN medications: alteplase, dextrose, dextrose, glucagon, glucagon, vancomycin

## 2025-05-27 NOTE — CONSULTS
Inpatient consult to Palliative Care  Consult performed by: Amy Stratton, APRN-CNP, DNP  Consult ordered by: Casey Tariq MD          Palliative Medicine Consult  Complex medical decision making, symptom management, patient/family support    History obtained from chart review including ED note, H&P, patient's daily progress notes, review of lab/test results, and discussion with primary team and bedside RN.    Subjective    History of Present Illness  Alpesh Elena is a 45y gentleman with a pmh tobacco use, submandibular abscess, and nephrolithiasis who presented with inferior STEMI, now s/p balloon angioplasty to the PDA. Course c/b suspected VSD with mixed cardiogenic and septic shock. Impella CP placed for LV support given concern for STEMI-induced VSD /inferior LV wall rupture.    EGD planned for 5/27 to evaluate for GIB  Nephrology following for acute renal failure   Acute hypoxic respiratory failure  Ischemic hepatitis   ID following for infection with new unclear source  Possibly pending impella placement    Introduction to Palliative Medicine  Met with patient and son at bedside.     Patient remains altered, does not have capacity to make their own medical decisions at this time. Unable to participate in ROS or goals of care discussion. Surrogate decision maker is bria Betts Jr.    Palliative Medicine was introduced as a specialty service for patients with serious illness to help with symptom management, improve quality of life, assist with goals of care conversations, navigate complex decision making, and provide support to patients and families. Support and empathy was provided throughout the encounter. Provided reflective listening and presence.     Symptoms  ROS limited due to AMS and critical illness    Palliative Medicine Social History:  Patient lives with mother and multiple other family members. He does not live with son who is the surrogate decision maker. Son lives down the street. Patient has been  "working at a factory making radiators. Living an independent life, taking care of house, independent in all ADLs    Objective    Last Recorded Vitals  BP 91/65   Pulse 68   Temp 37.2 °C (99 °F) (Core)   Resp 23   Ht 1.727 m (5' 8\")   Wt 87.6 kg (193 lb 2 oz)   SpO2 100%   BMI 29.36 kg/m²      Physical Exam  Constitutional:       Appearance: He is ill-appearing.   HENT:      Head: Normocephalic.   Pulmonary:      Comments: intubated  Neurological:      Mental Status: He is disoriented.          Relevant Results  Results for orders placed or performed during the hospital encounter of 05/20/25 (from the past 24 hours)   POCT GLUCOSE   Result Value Ref Range    POCT Glucose 192 (H) 74 - 99 mg/dL   Blood Gas Arterial Full Panel   Result Value Ref Range    POCT pH, Arterial 7.36 (L) 7.38 - 7.42 pH    POCT pCO2, Arterial 29 (L) 38 - 42 mm Hg    POCT pO2, Arterial 105 (H) 85 - 95 mm Hg    POCT SO2, Arterial 99 94 - 100 %    POCT Oxy Hemoglobin, Arterial 96.1 94.0 - 98.0 %    POCT Hematocrit Calculated, Arterial 26.0 (L) 41.0 - 52.0 %    POCT Sodium, Arterial 127 (L) 136 - 145 mmol/L    POCT Potassium, Arterial 4.1 3.5 - 5.3 mmol/L    POCT Chloride, Arterial 93 (L) 98 - 107 mmol/L    POCT Ionized Calcium, Arterial 1.03 (L) 1.10 - 1.33 mmol/L    POCT Glucose, Arterial 272 (H) 74 - 99 mg/dL    POCT Lactate, Arterial 2.3 (H) 0.4 - 2.0 mmol/L    POCT Base Excess, Arterial -8.1 (L) -2.0 - 3.0 mmol/L    POCT HCO3 Calculated, Arterial 16.4 (L) 22.0 - 26.0 mmol/L    POCT Hemoglobin, Arterial 8.5 (L) 13.5 - 17.5 g/dL    POCT Anion Gap, Arterial 22 10 - 25 mmo/L    Patient Temperature 37.0 degrees Celsius    FiO2 30 %   ACTIVATED CLOTTING TIME LOW   Result Value Ref Range    POCT Activated Clotting Time Low Range 250 (H) 83 - 199 sec   Blood Gas Mixed Venous Full Panel   Result Value Ref Range    POCT pH, Mixed 7.32 (L) 7.33 - 7.43 pH    POCT pCO2, Mixed 35 (L) 41 - 51 mm Hg    POCT pO2, Mixed 36 35 - 45 mm Hg    POCT SO2, " Mixed 47 45 - 75 %    POCT Oxy Hemoglobin, Mixed 45.6 45.0 - 75.0 %    POCT Hematocrit Calculated, Mixed 25.0 (L) 41.0 - 52.0 %    POCT Sodium, Mixed 127 (L) 136 - 145 mmol/L    POCT Potassium, Mixed 4.3 3.5 - 5.3 mmol/L    POCT Chloride, Mixed 93 (L) 98 - 107 mmol/L    POCT Ionized Calcium, Mixed 1.04 (L) 1.10 - 1.33 mmol/L    POCT Glucose, Mixed 268 (H) 74 - 99 mg/dL    POCT Lactate, Mixed 2.5 (H) 0.4 - 2.0 mmol/L    POCT Base Excess, Mixed -7.4 (L) -2.0 - 3.0 mmol/L    POCT HCO3 Calculated, Mixed 18.0 (L) 22.0 - 26.0 mmol/L    POCT Hemoglobin, Mixed 8.3 (L) 13.5 - 17.5 g/dL    POCT Anion Gap, Mixed 20 10 - 25 mmo/L    Patient Temperature 37.0 degrees Celsius    FiO2 30 %   CBC and Auto Differential   Result Value Ref Range    WBC 25.1 (H) 4.4 - 11.3 x10*3/uL    nRBC 6.8 (H) 0.0 - 0.0 /100 WBCs    RBC 2.65 (L) 4.50 - 5.90 x10*6/uL    Hemoglobin 8.1 (L) 13.5 - 17.5 g/dL    Hematocrit 22.9 (L) 41.0 - 52.0 %    MCV 86 80 - 100 fL    MCH 30.6 26.0 - 34.0 pg    MCHC 35.4 32.0 - 36.0 g/dL    RDW 13.2 11.5 - 14.5 %    Platelets 87 (L) 150 - 450 x10*3/uL    Immature Granulocytes %, Automated 9.8 (H) 0.0 - 0.9 %    Immature Granulocytes Absolute, Automated 2.47 (H) 0.00 - 0.70 x10*3/uL   Renal Function Panel   Result Value Ref Range    Glucose 270 (H) 74 - 99 mg/dL    Sodium 132 (L) 136 - 145 mmol/L    Potassium 4.2 3.5 - 5.3 mmol/L    Chloride 90 (L) 98 - 107 mmol/L    Bicarbonate 22 21 - 32 mmol/L    Anion Gap 24 (H) 10 - 20 mmol/L    Urea Nitrogen 96 (HH) 6 - 23 mg/dL    Creatinine 8.27 (H) 0.50 - 1.30 mg/dL    eGFR 7 (L) >60 mL/min/1.73m*2    Calcium 8.1 (L) 8.6 - 10.6 mg/dL    Phosphorus 7.4 (H) 2.5 - 4.9 mg/dL    Albumin 2.6 (L) 3.4 - 5.0 g/dL   Magnesium   Result Value Ref Range    Magnesium 3.01 (H) 1.60 - 2.40 mg/dL   Manual Differential   Result Value Ref Range    Neutrophils %, Manual 80.8 40.0 - 80.0 %    Bands %, Manual 1.7 0.0 - 5.0 %    Lymphocytes %, Manual 4.2 13.0 - 44.0 %    Monocytes %, Manual 3.3 2.0 -  10.0 %    Eosinophils %, Manual 1.7 0.0 - 6.0 %    Basophils %, Manual 0.0 0.0 - 2.0 %    Atypical Lymphocytes %, Manual 0.0 0.0 - 2.0 %    Metamyelocytes %, Manual 3.3 0.0 - 0.0 %    Myelocytes %, Manual 4.2 0.0 - 0.0 %    Plasma Cells %, Manual 0.8 0.00 - 0.00 %    Promyelocytes %, Manual 0.0 0.0 - 0.0 %    Blasts %, Manual 0.0 0.0 - 0.0 %    Seg Neutrophils Absolute, Manual 20.28 (H) 1.20 - 7.00 x10*3/uL    Bands Absolute, Manual 0.43 0.00 - 0.70 x10*3/uL    Lymphocytes Absolute, Manual 1.05 (L) 1.20 - 4.80 x10*3/uL    Monocytes Absolute, Manual 0.83 0.10 - 1.00 x10*3/uL    Eosinophils Absolute, Manual 0.43 0.00 - 0.70 x10*3/uL    Basophils Absolute, Manual 0.00 0.00 - 0.10 x10*3/uL    Atypical Lymphs Absolute, Manual 0.00 0.00 - 0.50 x10*3/uL    Metamyelocytes Absolute, Manual 0.83 0.00 - 0.00 x10*3/uL    Myelocytes Absolute, Manual 1.05 0.00 - 0.00 x10*3/uL    Plasma Cells Absolute, Manual 0.20 0.00 - 0.00 x10*3/uL    Promyelocytes Absolute, Manual 0.00 0.00 - 0.00 x10*3/uL    Blasts Absolute, Manual 0.00 0.00 - 0.00 x10*3/uL    Total Cells Counted 120     Neutrophils Absolute, Manual 20.71 (H) 1.20 - 7.70 x10*3/uL    Manual nRBC per 100 Cells 0.0 0.0 - 0.0 %    RBC Morphology See Below     Polychromasia Mild     [place] Cells Many     Basophilic Stippling Present    ACTIVATED CLOTTING TIME LOW   Result Value Ref Range    POCT Activated Clotting Time Low Range 237 (H) 83 - 199 sec   POCT GLUCOSE   Result Value Ref Range    POCT Glucose 251 (H) 74 - 99 mg/dL   ACTIVATED CLOTTING TIME LOW   Result Value Ref Range    POCT Activated Clotting Time Low Range 232 (H) 83 - 199 sec   Blood Gas Mixed Venous Full Panel   Result Value Ref Range    POCT pH, Mixed 7.33 7.33 - 7.43 pH    POCT pCO2, Mixed 37 (L) 41 - 51 mm Hg    POCT pO2, Mixed 36 35 - 45 mm Hg    POCT SO2, Mixed 48 45 - 75 %    POCT Oxy Hemoglobin, Mixed 46.5 45.0 - 75.0 %    POCT Hematocrit Calculated, Mixed 25.0 (L) 41.0 - 52.0 %    POCT Sodium, Mixed 127 (L)  136 - 145 mmol/L    POCT Potassium, Mixed 4.2 3.5 - 5.3 mmol/L    POCT Chloride, Mixed 94 (L) 98 - 107 mmol/L    POCT Ionized Calcium, Mixed 1.03 (L) 1.10 - 1.33 mmol/L    POCT Glucose, Mixed 251 (H) 74 - 99 mg/dL    POCT Lactate, Mixed 1.8 0.4 - 2.0 mmol/L    POCT Base Excess, Mixed -5.9 (L) -2.0 - 3.0 mmol/L    POCT HCO3 Calculated, Mixed 19.5 (L) 22.0 - 26.0 mmol/L    POCT Hemoglobin, Mixed 8.4 (L) 13.5 - 17.5 g/dL    POCT Anion Gap, Mixed 18 10 - 25 mmo/L    Patient Temperature 37.0 degrees Celsius    FiO2 30 %   Blood Gas Arterial Full Panel   Result Value Ref Range    POCT pH, Arterial 7.38 7.38 - 7.42 pH    POCT pCO2, Arterial 29 (L) 38 - 42 mm Hg    POCT pO2, Arterial 117 (H) 85 - 95 mm Hg    POCT SO2, Arterial 100 94 - 100 %    POCT Oxy Hemoglobin, Arterial 96.0 94.0 - 98.0 %    POCT Hematocrit Calculated, Arterial 26.0 (L) 41.0 - 52.0 %    POCT Sodium, Arterial 127 (L) 136 - 145 mmol/L    POCT Potassium, Arterial 4.5 3.5 - 5.3 mmol/L    POCT Chloride, Arterial 93 (L) 98 - 107 mmol/L    POCT Ionized Calcium, Arterial 1.04 (L) 1.10 - 1.33 mmol/L    POCT Glucose, Arterial 247 (H) 74 - 99 mg/dL    POCT Lactate, Arterial 1.8 0.4 - 2.0 mmol/L    POCT Base Excess, Arterial -7.0 (L) -2.0 - 3.0 mmol/L    POCT HCO3 Calculated, Arterial 17.2 (L) 22.0 - 26.0 mmol/L    POCT Hemoglobin, Arterial 8.8 (L) 13.5 - 17.5 g/dL    POCT Anion Gap, Arterial 21 10 - 25 mmo/L    Patient Temperature 37.0 degrees Celsius    FiO2 30 %   ACTIVATED CLOTTING TIME LOW   Result Value Ref Range    POCT Activated Clotting Time Low Range 216 (H) 83 - 199 sec   ACTIVATED CLOTTING TIME LOW   Result Value Ref Range    POCT Activated Clotting Time Low Range 239 (H) 83 - 199 sec   ACTIVATED CLOTTING TIME LOW   Result Value Ref Range    POCT Activated Clotting Time Low Range 227 (H) 83 - 199 sec   Basic metabolic panel   Result Value Ref Range    Glucose 216 (H) 74 - 99 mg/dL    Sodium 133 (L) 136 - 145 mmol/L    Potassium 4.3 3.5 - 5.3 mmol/L     Chloride 89 (L) 98 - 107 mmol/L    Bicarbonate 25 21 - 32 mmol/L    Anion Gap 23 (H) 10 - 20 mmol/L    Urea Nitrogen 101 (HH) 6 - 23 mg/dL    Creatinine 8.87 (H) 0.50 - 1.30 mg/dL    eGFR 7 (L) >60 mL/min/1.73m*2    Calcium 8.2 (L) 8.6 - 10.6 mg/dL   Blood Gas Arterial Full Panel   Result Value Ref Range    POCT pH, Arterial 7.38 7.38 - 7.42 pH    POCT pCO2, Arterial 31 (L) 38 - 42 mm Hg    POCT pO2, Arterial 110 (H) 85 - 95 mm Hg    POCT SO2, Arterial 100 94 - 100 %    POCT Oxy Hemoglobin, Arterial 95.8 94.0 - 98.0 %    POCT Hematocrit Calculated, Arterial 26.0 (L) 41.0 - 52.0 %    POCT Sodium, Arterial 127 (L) 136 - 145 mmol/L    POCT Potassium, Arterial 4.1 3.5 - 5.3 mmol/L    POCT Chloride, Arterial 93 (L) 98 - 107 mmol/L    POCT Ionized Calcium, Arterial 1.05 (L) 1.10 - 1.33 mmol/L    POCT Glucose, Arterial 224 (H) 74 - 99 mg/dL    POCT Lactate, Arterial 1.2 0.4 - 2.0 mmol/L    POCT Base Excess, Arterial -6.1 (L) -2.0 - 3.0 mmol/L    POCT HCO3 Calculated, Arterial 18.3 (L) 22.0 - 26.0 mmol/L    POCT Hemoglobin, Arterial 8.7 (L) 13.5 - 17.5 g/dL    POCT Anion Gap, Arterial 20 10 - 25 mmo/L    Patient Temperature 37.0 degrees Celsius    FiO2 30 %   POCT GLUCOSE   Result Value Ref Range    POCT Glucose 221 (H) 74 - 99 mg/dL   ACTIVATED CLOTTING TIME LOW   Result Value Ref Range    POCT Activated Clotting Time Low Range 194 83 - 199 sec   ACTIVATED CLOTTING TIME LOW   Result Value Ref Range    POCT Activated Clotting Time Low Range 207 (H) 83 - 199 sec   ACTIVATED CLOTTING TIME LOW   Result Value Ref Range    POCT Activated Clotting Time Low Range 210 (H) 83 - 199 sec   POCT GLUCOSE   Result Value Ref Range    POCT Glucose 217 (H) 74 - 99 mg/dL   Blood Gas Arterial Full Panel   Result Value Ref Range    POCT pH, Arterial 7.39 7.38 - 7.42 pH    POCT pCO2, Arterial 31 (L) 38 - 42 mm Hg    POCT pO2, Arterial 107 (H) 85 - 95 mm Hg    POCT SO2, Arterial 99 94 - 100 %    POCT Oxy Hemoglobin, Arterial 95.0 94.0 - 98.0 %     POCT Hematocrit Calculated, Arterial 27.0 (L) 41.0 - 52.0 %    POCT Sodium, Arterial 128 (L) 136 - 145 mmol/L    POCT Potassium, Arterial 4.1 3.5 - 5.3 mmol/L    POCT Chloride, Arterial 93 (L) 98 - 107 mmol/L    POCT Ionized Calcium, Arterial 1.08 (L) 1.10 - 1.33 mmol/L    POCT Glucose, Arterial 211 (H) 74 - 99 mg/dL    POCT Lactate, Arterial 1.2 0.4 - 2.0 mmol/L    POCT Base Excess, Arterial -5.4 (L) -2.0 - 3.0 mmol/L    POCT HCO3 Calculated, Arterial 18.8 (L) 22.0 - 26.0 mmol/L    POCT Hemoglobin, Arterial 8.9 (L) 13.5 - 17.5 g/dL    POCT Anion Gap, Arterial 20 10 - 25 mmo/L    Patient Temperature 37.0 degrees Celsius    FiO2 30 %   Blood Gas Mixed Venous Full Panel   Result Value Ref Range    POCT pH, Mixed 7.33 7.33 - 7.43 pH    POCT pCO2, Mixed 39 (L) 41 - 51 mm Hg    POCT pO2, Mixed 28 (L) 35 - 45 mm Hg    POCT SO2, Mixed 49 45 - 75 %    POCT Oxy Hemoglobin, Mixed 47.0 45.0 - 75.0 %    POCT Hematocrit Calculated, Mixed 28.0 (L) 41.0 - 52.0 %    POCT Sodium, Mixed 127 (L) 136 - 145 mmol/L    POCT Potassium, Mixed 4.1 3.5 - 5.3 mmol/L    POCT Chloride, Mixed 92 (L) 98 - 107 mmol/L    POCT Ionized Calcium, Mixed 1.07 (L) 1.10 - 1.33 mmol/L    POCT Glucose, Mixed 200 (H) 74 - 99 mg/dL    POCT Lactate, Mixed 1.0 0.4 - 2.0 mmol/L    POCT Base Excess, Mixed -4.9 (L) -2.0 - 3.0 mmol/L    POCT HCO3 Calculated, Mixed 20.6 (L) 22.0 - 26.0 mmol/L    POCT Hemoglobin, Mixed 9.3 (L) 13.5 - 17.5 g/dL    POCT Anion Gap, Mixed 19 10 - 25 mmo/L    Patient Temperature 37.0 degrees Celsius    FiO2 30 %   ACTIVATED CLOTTING TIME LOW   Result Value Ref Range    POCT Activated Clotting Time Low Range 202 (H) 83 - 199 sec   ACTIVATED CLOTTING TIME LOW   Result Value Ref Range    POCT Activated Clotting Time Low Range 205 (H) 83 - 199 sec   ACTIVATED CLOTTING TIME LOW   Result Value Ref Range    POCT Activated Clotting Time Low Range 189 83 - 199 sec   ACTIVATED CLOTTING TIME LOW   Result Value Ref Range    POCT Activated Clotting  Time Low Range 202 (H) 83 - 199 sec   Magnesium   Result Value Ref Range    Magnesium 3.24 (H) 1.60 - 2.40 mg/dL   CBC and Auto Differential   Result Value Ref Range    WBC 23.6 (H) 4.4 - 11.3 x10*3/uL    nRBC 7.4 (H) 0.0 - 0.0 /100 WBCs    RBC 2.53 (L) 4.50 - 5.90 x10*6/uL    Hemoglobin 7.7 (L) 13.5 - 17.5 g/dL    Hematocrit 22.5 (L) 41.0 - 52.0 %    MCV 89 80 - 100 fL    MCH 30.4 26.0 - 34.0 pg    MCHC 34.2 32.0 - 36.0 g/dL    RDW 16.1 (H) 11.5 - 14.5 %    Platelets 92 (L) 150 - 450 x10*3/uL    Immature Granulocytes %, Automated 11.2 (H) 0.0 - 0.9 %    Immature Granulocytes Absolute, Automated 2.63 (H) 0.00 - 0.70 x10*3/uL   Hepatic function panel   Result Value Ref Range    Albumin 2.6 (L) 3.4 - 5.0 g/dL    Bilirubin, Total 16.6 (H) 0.0 - 1.2 mg/dL    Bilirubin, Direct 11.4 (H) 0.0 - 0.3 mg/dL    Alkaline Phosphatase 370 (H) 33 - 120 U/L     (H) 10 - 52 U/L     (H) 9 - 39 U/L    Total Protein 4.6 (L) 6.4 - 8.2 g/dL   Lactate dehydrogenase   Result Value Ref Range    LDH 1,955 (H) 84 - 246 U/L   Haptoglobin   Result Value Ref Range    Haptoglobin <30 (L) 30 - 200 mg/dL   Phosphorus   Result Value Ref Range    Phosphorus 7.4 (H) 2.5 - 4.9 mg/dL   Basic Metabolic Panel   Result Value Ref Range    Glucose 190 (H) 74 - 99 mg/dL    Sodium 133 (L) 136 - 145 mmol/L    Potassium 3.9 3.5 - 5.3 mmol/L    Chloride 90 (L) 98 - 107 mmol/L    Bicarbonate 24 21 - 32 mmol/L    Anion Gap 23 (H) 10 - 20 mmol/L    Urea Nitrogen 111 (HH) 6 - 23 mg/dL    Creatinine 9.50 (H) 0.50 - 1.30 mg/dL    eGFR 6 (L) >60 mL/min/1.73m*2    Calcium 8.5 (L) 8.6 - 10.6 mg/dL   Manual Differential   Result Value Ref Range    Neutrophils %, Manual 56.6 40.0 - 80.0 %    Bands %, Manual 5.3 0.0 - 5.0 %    Lymphocytes %, Manual 15.9 13.0 - 44.0 %    Monocytes %, Manual 6.2 2.0 - 10.0 %    Eosinophils %, Manual 0.9 0.0 - 6.0 %    Basophils %, Manual 0.0 0.0 - 2.0 %    Atypical Lymphocytes %, Manual 0.0 0.0 - 2.0 %    Metamyelocytes %, Manual  3.6 0.0 - 0.0 %    Myelocytes %, Manual 10.6 0.0 - 0.0 %    Plasma Cells %, Manual 0.0 0.00 - 0.00 %    Promyelocytes %, Manual 0.9 0.0 - 0.0 %    Blasts %, Manual 0.0 0.0 - 0.0 %    Seg Neutrophils Absolute, Manual 13.36 (H) 1.20 - 7.00 x10*3/uL    Bands Absolute, Manual 1.25 (H) 0.00 - 0.70 x10*3/uL    Lymphocytes Absolute, Manual 3.75 1.20 - 4.80 x10*3/uL    Monocytes Absolute, Manual 1.46 (H) 0.10 - 1.00 x10*3/uL    Eosinophils Absolute, Manual 0.21 0.00 - 0.70 x10*3/uL    Basophils Absolute, Manual 0.00 0.00 - 0.10 x10*3/uL    Atypical Lymphs Absolute, Manual 0.00 0.00 - 0.50 x10*3/uL    Metamyelocytes Absolute, Manual 0.85 0.00 - 0.00 x10*3/uL    Myelocytes Absolute, Manual 2.50 0.00 - 0.00 x10*3/uL    Plasma Cells Absolute, Manual 0.00 0.00 - 0.00 x10*3/uL    Promyelocytes Absolute, Manual 0.21 0.00 - 0.00 x10*3/uL    Blasts Absolute, Manual 0.00 0.00 - 0.00 x10*3/uL    Total Cells Counted 113     Neutrophils Absolute, Manual 14.61 (H) 1.20 - 7.70 x10*3/uL    Manual nRBC per 100 Cells 0.0 0.0 - 0.0 %    RBC Morphology See Below     Polychromasia Marked     [place] Cells Many    POCT GLUCOSE   Result Value Ref Range    POCT Glucose 171 (H) 74 - 99 mg/dL   ACTIVATED CLOTTING TIME LOW   Result Value Ref Range    POCT Activated Clotting Time Low Range 189 83 - 199 sec   ACTIVATED CLOTTING TIME LOW   Result Value Ref Range    POCT Activated Clotting Time Low Range 192 83 - 199 sec   ACTIVATED CLOTTING TIME LOW   Result Value Ref Range    POCT Activated Clotting Time Low Range 184 83 - 199 sec   Blood Gas Mixed Venous Full Panel   Result Value Ref Range    POCT pH, Mixed 7.30 (L) 7.33 - 7.43 pH    POCT pCO2, Mixed 37 (L) 41 - 51 mm Hg    POCT pO2, Mixed 35 35 - 45 mm Hg    POCT SO2, Mixed 47 45 - 75 %    POCT Oxy Hemoglobin, Mixed 45.9 45.0 - 75.0 %    POCT Hematocrit Calculated, Mixed 33.0 (L) 41.0 - 52.0 %    POCT Sodium, Mixed 124 (L) 136 - 145 mmol/L    POCT Potassium, Mixed 3.4 (L) 3.5 - 5.3 mmol/L    POCT  Chloride, Mixed 90 (L) 98 - 107 mmol/L    POCT Ionized Calcium, Mixed 1.09 (L) 1.10 - 1.33 mmol/L    POCT Glucose, Mixed 177 (H) 74 - 99 mg/dL    POCT Lactate, Mixed 1.3 0.4 - 2.0 mmol/L    POCT Base Excess, Mixed -7.6 (L) -2.0 - 3.0 mmol/L    POCT HCO3 Calculated, Mixed 18.2 (L) 22.0 - 26.0 mmol/L    POCT Hemoglobin, Mixed 11.0 (L) 13.5 - 17.5 g/dL    POCT Anion Gap, Mixed 19 10 - 25 mmo/L    Patient Temperature 37.0 degrees Celsius    FiO2 30 %   Blood Gas Arterial Full Panel   Result Value Ref Range    POCT pH, Arterial 7.35 (L) 7.38 - 7.42 pH    POCT pCO2, Arterial 28 (L) 38 - 42 mm Hg    POCT pO2, Arterial 113 (H) 85 - 95 mm Hg    POCT SO2, Arterial 100 94 - 100 %    POCT Oxy Hemoglobin, Arterial 95.5 94.0 - 98.0 %    POCT Hematocrit Calculated, Arterial 25.0 (L) 41.0 - 52.0 %    POCT Sodium, Arterial 127 (L) 136 - 145 mmol/L    POCT Potassium, Arterial 3.5 3.5 - 5.3 mmol/L    POCT Chloride, Arterial 93 (L) 98 - 107 mmol/L    POCT Ionized Calcium, Arterial 1.05 (L) 1.10 - 1.33 mmol/L    POCT Glucose, Arterial 178 (H) 74 - 99 mg/dL    POCT Lactate, Arterial 1.2 0.4 - 2.0 mmol/L    POCT Base Excess, Arterial -9.1 (L) -2.0 - 3.0 mmol/L    POCT HCO3 Calculated, Arterial 15.5 (L) 22.0 - 26.0 mmol/L    POCT Hemoglobin, Arterial 8.4 (L) 13.5 - 17.5 g/dL    POCT Anion Gap, Arterial 22 10 - 25 mmo/L    Patient Temperature 37.0 degrees Celsius    FiO2 30 %   ACTIVATED CLOTTING TIME LOW   Result Value Ref Range    POCT Activated Clotting Time Low Range 181 83 - 199 sec   POCT GLUCOSE   Result Value Ref Range    POCT Glucose 202 (H) 74 - 99 mg/dL      Transthoracic Echo (TTE) [facility], [address] [place] [address]                 Tel [phone] and Fax [phone]    TRANSTHORACIC ECHOCARDIOGRAM REPORT       Patient Name:        AVRIL FRITZ     Reading Physician: [id number] Maribel Coughlin MD  Study Date:           5/26/2025            Ordering Provider: [id number] MIMI SARGENT  MRN/PID:             [id number]             Fellow:  Accession#:          [id number]         Nurse:  Date of Birth/Age:   1980 / 45 years Sonographer:       Cardiology Fellow  Gender assigned at   M                    Additional Staff:  Birth:  Height:                                   Admit Date:        5/20/2025  Weight:                                   Admission Status:  Inpatient - Routine  BSA / BMI:           m2 / kg/m2           Encounter#:        [id number]    Study Type:    TRANSTHORACIC ECHO (TTE) LIMITED  Diagnosis/ICD: ST elevation (STEMI) myocardial infarction of unspecified                 site-I21.3  Indication:    VSD, shock  CPT Code:      Echo Limited-36823; Doppler Limited-86697; Color Doppler-09150   Study Detail: The following Echo studies were performed: 2D, Doppler and color                flow.       PHYSICIAN INTERPRETATION:  Left Ventricle: The left ventricle was not well visualized. The left ventricular ejection fraction could not be measured. Left venticular wall motion is abnormal. The left ventricular cavity size was not assessed. Left ventricular diastolic filling was not assessed. Large VSD ( inferoseptal) with left to right shunting noted on color Doppler. Vmax of VSD flow only 3.2m/sec.  Left Atrium: The left atrial size was not assessed.  Right Ventricle: The right ventricle was not well visualized. There is reduced right ventricular systolic function. A device is visualized in the right ventricle. Limited views of RV which appears to be dilated with reduced fx.  Right Atrium: The right atrial size was not assessed. There is a device visualized in the right atrium.  Aortic Valve: The aortic valve was not assessed. Aortic valve regurgitation was not assessed.  Mitral Valve: The mitral valve was not well visualized. There is trace mitral valve  regurgitation.  Tricuspid Valve: The tricuspid valve was not assessed. Tricuspid regurgitation was not assessed.  Pulmonic Valve: The pulmonic valve was not assessed. Pulmonic valve regurgitation was not assessed.  Pericardium: There is no pericardial effusion noted.  Aorta: The aortic root was not assessed.  In comparison to the previous echocardiogram(s): Compared with study dated 5/25/2025, the prior exam was a very limited study only to assess depth of Impella device and did not assess VSD with Doppler. Compared with the exam from 5/20/2025, the prior VSD Vmax was 3.3m/sec, so not significantly changed at this time. Was already a large inferoseptal VSD at that time. Note the Impella device was already present on the prior exam.     LEFT VENTRICULAR ASSIST DEVICE:  LVAD: The patient has a(n) Impella LVAD device present.         CONCLUSIONS:   1. Limited and technically difficult fellow bedside exam.   2. Poorly visualized anatomical structures due to suboptimal image quality.   3. The left ventricle was not well visualized. The left ventricular ejection fraction could not be measured.   4. Abnormal left venticular wall motion.   5. Large VSD ( inferoseptal) with left to right shunting noted on color Doppler. Vmax of VSD flow only 3.2m/sec.   6. There is reduced right ventricular systolic function.   7. Impella device noted within the LV cavity, though exact depth beneath the AV coud not be measured.   8. Compared with study dated 5/25/2025, the prior exam was a very limited study only to assess depth of Impella device and did not assess VSD with Doppler. Compared with the exam from 5/20/2025, the prior VSD Vmax was 3.3m/sec, so not significantly changed at this time. Was already a large inferoseptal VSD at that time. Note the Impella device was already present on the prior exam.    QUANTITATIVE DATA SUMMARY:     [id number] Maribel Coughlin MD  Electronically signed on 5/27/2025 at 8:26:10 AM       ** Final **     Encounter  Date: 05/20/25   Electrocardiogram, 12-lead PRN ACS symptoms   Result Value    Ventricular Rate 105    Atrial Rate 105    VA Interval 130    QRS Duration 106    QT Interval 396    QTC Calculation(Bazett) 523    P Axis 35    R Axis 90    T Axis -15    QRS Count 17    Q Onset 224    P Onset 159    P Offset 205    T Offset 422    QTC Fredericia 477    Narrative    Sinus tachycardia  Possible Right ventricular hypertrophy  Inferior infarct (cited on or before 18-MAY-2025)  Cannot rule out Anterior infarct , age undetermined  Prolonged QT  Abnormal ECG  When compared with ECG of 20-MAY-2025 01:38,  Incomplete right bundle branch block is no longer Present  Serial changes of evolving Inferior infarct Present  Confirmed by Kristofer [place] ([id number]) on 5/20/2025 2:55:36 PM        Allergies  Patient has no known allergies.    Scheduled medications  Scheduled Medications[1]  Continuous medications  Continuous Medications[2]  PRN medications  PRN Medications[3]     Assessment/Plan    Alpesh Elena is a 45y gentleman with a pmh tobacco use, submandibular abscess, and nephrolithiasis who presented with inferior STEMI, now s/p balloon angioplasty to the PDA. Course c/b suspected VSD with mixed cardiogenic and septic shock. Impella CP placed for LV support given concern for STEMI-induced VSD /inferior LV wall rupture.    EGD planned for 5/27 to evaluate for GIB  Nephrology following for acute renal failure   Acute hypoxic respiratory failure  Ischemic hepatitis   ID following for infection with new unclear source    5/27 -   Supportive visit with bria Betts Jr. Introduced palliative care services.  Patient does not have advance directives.   Surrogate decision maker is bria Betts Jr.   Son states that patient never discussed things such as minimal acceptable outcome. He does not know what his wishes would be.    Son feels overwhelmed, He states that he internalizes things, And is thankful for palliative support.   Goals of care remain  treatment focused at this juncture, as son is hopeful that patient can recover.      #Goals of Care  - Code status: full code   - Surrogate decision maker: bria Betts [phone], please make sure son is primary contact and he usually visits daily  - Goals of care remain treatment focused at this time    #Psychosocial Support  - Music Therapy  - Spiritual Care Support  - Art Therapy    Plan of Care discussed with: Updated MD and bedside RN on goals of care decision, medication adjustments, and code status     Medical Decision Making was high level due to high complexity of problems, extensive data review, and high risk of management/treatment.       Thank you for allowing us to participate in the care of this patient. Palliative will continue to follow as needed. Palliative medicine is available Monday-Friday, 8a-6p. Please contact team with any questions or concerns.  Team pager [phone] (weekdays)  Amy Stratton DNP, APRN-CNP         [1] aspirin, 81 mg, oral, Daily  bisacodyl, 10 mg, rectal, BID  insulin lispro, 0-5 Units, subcutaneous, q4h  meropenem, 500 mg, intravenous, q24h  micafungin, 100 mg, intravenous, q24h  oxygen, , inhalation, Continuous - Inhalation  pantoprazole, 40 mg, intravenous, BID  polyethylene glycol, 17 g, oral, 4x daily  sennosides, 2 tablet, oral, BID    [2] amiodarone, 0.5 mg/min, Last Rate: 0.5 mg/min (05/27/25 0800)  dexmedeTOMIDine, 0.1-1.5 mcg/kg/hr (Dosing Weight), Last Rate: 0.8 mcg/kg/hr (05/27/25 0800)  fentaNYL,  mcg/hr, Last Rate: 25 mcg/hr (05/27/25 0800)  heparin, 0-2,000 Units/hr, Last Rate: Stopped (05/26/25 2208)  heparin Impella Purge 25 units/mL in 500 mL D5W, 10 mL/hr, Last Rate: 10 mL/hr (05/27/25 0800)  midazolam, 0.5-20 mg/hr, Last Rate: 2 mg/hr (05/27/25 0800)  nitroprusside, 0.25-5 mcg/kg/min (Dosing Weight), Last Rate: Stopped (05/25/25 1939)  norepinephrine, 0.01-1 mcg/kg/min (Dosing Weight), Last Rate: Stopped (05/26/25 1648)    [3] PRN medications: alteplase,  dextrose, dextrose, glucagon, glucagon, vancomycin

## 2025-05-27 NOTE — PROGRESS NOTES
Spiritual Care Visit  Spiritual Care Request    Reason for Visit:  Routine Visit: Introduction  Continue Visiting: Yes  Crisis Visit: Critical care     Focus of Care:  Visited With: Family     Met with patient's son, Alpesh, along with Palliative CNP and Sw outside of patient's room to discuss patient's medical condition, coping, family and systems of support. Provided non-anxious, supportive presence, reflective listening and affirmation and normalization of experience. Will continue to follow.

## 2025-05-27 NOTE — PROGRESS NOTES
Spiritual Care Visit  Spiritual Care Request    Reason for Visit:  Routine Visit: Introduction  Continue Visiting: Yes  Crisis Visit: Critical care     Focus of Care:  Visited With: Patient not available (Pt not able to engage and no family present at time of this visit.)

## 2025-05-27 NOTE — PROGRESS NOTES
"Cardiac Intensive Care - Daily Progress Note   Subjective    Alpesh Elena is a 45 y.o. year old male patient admitted on 5/20/2025 with following ICU needs: Cardiogenic shock, impella, VSD/ Inferior LV wall rupture    Interval History:  AYADEON. Seen resting comfortably in vent this AM     Meds    Scheduled medications  Scheduled Medications[1]  Continuous medications  Continuous Medications[2]  PRN medications  PRN Medications[3]     Objective    Blood pressure 91/65, pulse 63, temperature 37.2 °C (99 °F), temperature source Core, resp. rate 19, height 1.727 m (5' 8\"), weight 87.6 kg (193 lb 2 oz), SpO2 100%.     Physical Exam  Constitutional:       Comments: Intubated, eye opens to sternal rub    Cardiovascular:      Rate and Rhythm: Normal rate and regular rhythm.      Heart sounds: Murmur heard.      Comments: Holosystolic murmer, best heard at 4th intercostal rib. Very weak vs absent b/l PT and pedal.   Pulmonary:      Effort: Pulmonary effort is normal.      Breath sounds: Normal breath sounds. No wheezing or rales.   Abdominal:      General: Abdomen is flat. Bowel sounds are normal. There is no distension.      Palpations: Abdomen is soft.   Musculoskeletal:      Right lower leg: No edema.      Left lower leg: No edema.   Skin:     General: Skin is warm.   Neurological:      Comments: Sedated           Intake/Output Summary (Last 24 hours) at 5/27/2025 1058  Last data filed at 5/27/2025 1058  Gross per 24 hour   Intake 2116.45 ml   Output 87 ml   Net 2029.45 ml     Labs:   Results from last 72 hours   Lab Units 05/27/25  0302 05/26/25 2005 05/26/25  1414 05/26/25  0019   SODIUM mmol/L 133* 133* 132* 133*   POTASSIUM mmol/L 3.9 4.3 4.2 4.3   CHLORIDE mmol/L 90* 89* 90* 93*   CO2 mmol/L 24 25 22 23   BUN mg/dL 111* 101* 96* 80*   CREATININE mg/dL 9.50* 8.87* 8.27* 6.81*   GLUCOSE mg/dL 190* 216* 270* 195*   CALCIUM mg/dL 8.5* 8.2* 8.1* 8.0*   ANION GAP mmol/L 23* 23* 24* 21*   EGFR mL/min/1.73m*2 6* 7* 7* 9* "   PHOSPHORUS mg/dL 7.4*  --  7.4* 7.2*      Results from last 72 hours   Lab Units 05/27/25  0302 05/26/25  1414 05/26/25  0500 05/26/25  0026 05/25/25  1541 05/25/25  0039 05/24/25  1755 05/24/25  1214   WBC AUTO x10*3/uL 23.6* 25.1* 23.2* 22.8*   < > 18.6*   < > 15.9*   HEMOGLOBIN g/dL 7.7* 8.1* 7.8* 6.8*   < > 8.0*   < > 8.7*   HEMATOCRIT % 22.5* 22.9* 21.7* 18.9*   < > 24.4*   < > 24.7*   PLATELETS AUTO x10*3/uL 92* 87* 79* 85*   < > 82*   < > 91*   NEUTROS PCT AUTO %  --   --   --   --   --  75.6  --  74.2   LYMPHO PCT MAN % 15.9 4.2  --  8.9  --   --   --   --    LYMPHS PCT AUTO %  --   --   --   --   --  7.7  --  8.5   MONO PCT MAN % 6.2 3.3  --  3.5  --   --   --   --    MONOS PCT AUTO %  --   --   --   --   --  8.7  --  11.4   EOSINO PCT MAN % 0.9 1.7  --  3.5  --   --   --   --    EOS PCT AUTO %  --   --   --   --   --  0.5  --  1.1    < > = values in this interval not displayed.      Results from last 72 hours   Lab Units 05/27/25  1007 05/27/25  0605 05/26/25  2356   POCT PH, ARTERIAL pH 7.35* 7.35* 7.39   POCT PCO2, ARTERIAL mm Hg 29* 28* 31*   POCT PO2, ARTERIAL mm Hg 108* 113* 107*   POCT SO2, ARTERIAL % 99 100 99     Results from last 72 hours   Lab Units 05/26/25  0556 05/26/25  0002 05/25/25  2030   POCT PH, VENOUS pH 7.35 7.37 7.37   POCT PCO2, VENOUS mm Hg 38* 35* 37*   POCT PO2, VENOUS mm Hg 36 35 35        Micro/ID:     Lab Results   Component Value Date    BLOODCULT Loaded on Instrument - Culture in progress 05/26/2025    BLOODCULT Loaded on Instrument - Culture in progress 05/26/2025       EKG Trend:    EKG 5/20: Sinus Tachycardia, T wave inversions inferior leads. Prolonged QTC     Echo Data:  Results for orders placed during the hospital encounter of 05/18/25    Transthoracic Echo Complete    [place] [id number]  Tel [phone] Fax [phone]    TRANSTHORACIC ECHOCARDIOGRAM REPORT    Patient Name:       AVRIL Yoder  Physician:    [id number] Faraz Ortega DO  Study Date:         5/19/2025            Ordering Provider:    [id number] MARGARET GASPAR  MRN/PID:            [id number]             Fellow:  Accession#:         [id number]         Nurse:  Date of Birth/Age:  1980 / 45 years Sonographer:          Koki Mendez  RDCS  Gender Assigned at  M                    Additional Staff:  Birth:  Height:             172.72 cm            Admit Date:           5/18/2025  Weight:             75.30 kg             Admission Status:     Inpatient - STAT  BSA / BMI:          1.89 m2 / 25.24      Department Location:  [facility]  kg/m2  Blood Pressure: 97 /65 mmHg    Study Type:    TRANSTHORACIC ECHO (TTE) COMPLETE  Diagnosis/ICD: ST elevation (STEMI) myocardial infarction of unspecified  site-I21.3  Indication:    STEMI  CPT Codes:     Echo Complete w Full [id number]    Patient History:  PCI and Stent:     PCI performed on 5/18/2025.  Smoker:            Current.  Pertinent History: Chest Pain and Murmur.    Study Detail: The following Echo studies were performed: 2D, M-Mode, Doppler and  color flow. Definity used as a contrast agent for endocardial  border definition. Total contrast used for this procedure was 2 mL  via IV push.      PHYSICIAN INTERPRETATION:  Left Ventricle: The left ventricular systolic function is normal, with a visually estimated ejection fraction of 55%. There is mild concentric left ventricular hypertrophy. Wall motion is abnormal. The left ventricular cavity size is normal. There is mild increased septal and mildly increased posterior left ventricular wall thickness. Spectral Doppler shows a normal pattern of left ventricular diastolic filling.  LV Wall Scoring:  The basal inferoseptal segment, basal inferolateral segment, and basal inferior  segment are hypokinetic. All remaining scored segments are normal.    Left Atrium: The left atrial size is normal.  Right Ventricle: The right ventricle is normal in  size. There is normal right ventricular global systolic function.  Right Atrium: The right atrial size is normal.  Aortic Valve: The aortic valve appears structurally normal. There is no evidence of aortic valve stenosis.  The aortic valve dimensionless index is 0.73. There is no evidence of aortic valve regurgitation. The peak instantaneous gradient of the aortic valve is 5 mmHg. The mean gradient of the aortic valve is 3 mmHg.  Mitral Valve: The mitral valve is normal in structure. There is no evidence of mitral valve stenosis. There is normal mitral valve leaflet mobility. There is no evidence of mitral valve regurgitation.  Tricuspid Valve: The tricuspid valve is structurally normal. There is normal tricuspid valve leaflet mobility. There is trace tricuspid regurgitation.  Pulmonic Valve: The pulmonic valve is structurally normal. There is no indication of pulmonic valve regurgitation.  Pericardium: No pericardial effusion noted.  Aorta: The aortic root is normal.  Pulmonary Artery: The main pulmonary artery is normal in size, and position, with normal bifurcation into the left and right pulmonary arteries. The tricuspid regurgitant velocity is 2.88 m/s, and with an estimated right atrial pressure of 3 mmHg, the estimated pulmonary artery pressure is mildly elevated with the RVSP at 36.2 mmHg.  Systemic Veins: The inferior vena cava appears normal in size.      CONCLUSIONS:  1. The left ventricular systolic function is normal, with a visually estimated ejection fraction of 55%.  2. Basal inferoseptal segment, basal inferolateral segment, and basal inferior segment are abnormal.  3. Abnormal wall motion.  4. There is normal right ventricular global systolic function.  5. Normal sized right ventricle.  6. There is no evidence of mitral valve stenosis.  7. No evidence of mitral valve regurgitation.  8. Trace tricuspid regurgitation is visualized.  9. Aortic valve stenosis is not present.  10. The main pulmonary  artery is normal in size, and position, with normal bifurcation into the left and right pulmonary arteries.    QUANTITATIVE DATA SUMMARY:    2D MEASUREMENTS:             Normal Ranges:  Ao Root d:       2.86 cm     (2.0-3.7cm)  LAs:             3.88 cm     (2.7-4.0cm)  IVSd:            1.12 cm     (0.6-1.1cm)  LVPWd:           1.08 cm     (0.6-1.1cm)  LVIDd:           4.02 cm     (3.9-5.9cm)  LVIDs:           2.94 cm  LV Mass Index:   77.7 g/m2  LVEDV Index:     59.87 ml/m2  LV % FS          26.9 %      LEFT ATRIUM:                  Normal Ranges:  LA Vol A4C:        29.5 ml    (22+/-6mL/m2)  LA Vol A2C:        25.4 ml  LA Vol BP:         30.5 ml  LA Vol Index A4C:  15.6ml/m2  LA Vol Index A2C:  13.4 ml/m2  LA Vol Index BP:   16.1 ml/m2  LA Area A4C:       13.8 cm2  LA Area A2C:       11.5 cm2  LA Major Axis A4C: 5.5 cm  LA Major Axis A2C: 4.4 cm  LA Volume Index:   14.9 ml/m2      RIGHT ATRIUM:                 Normal Ranges:  RA Vol A4C:        24.1 ml    (8.3-19.5ml)  RA Vol Index A4C:  12.7 ml/m2  RA Area A4C:       11.1 cm2  RA Major Axis A4C: 4.4 cm      AORTA MEASUREMENTS:         Normal Ranges:  Asc Ao, d:          2.48 cm (2.1-3.4cm)      LV SYSTOLIC FUNCTION:  Normal Ranges:  EF-A4C View:    54 % (>=55%)  EF-A2C View:    54 %  EF-Biplane:     55 %  EF-Visual:      55 %  LV EF Reported: 55 %      LV DIASTOLIC FUNCTION:           Normal Ranges:  MV Peak E:             1.20 m/s  (0.7-1.2 m/s)  MV Peak A:             1.08 m/s  (0.42-0.7 m/s)  E/A Ratio:             1.11      (1.0-2.2)  MV e'                  0.090 m/s (>8.0)  MV lateral e'          0.08 m/s  MV medial e'           0.10 m/s  E/e' Ratio:            13.29     (<8.0)      MITRAL VALVE:          Normal Ranges:  MV DT:        126 msec (150-240msec)      AORTIC VALVE:                     Normal Ranges:  AoV Vmax:                1.16 m/s (<=1.7m/s)  AoV Peak P.4 mmHg (<20mmHg)  AoV Mean PG:             3.0 mmHg (1.7-11.5mmHg)  LVOT Max  Aime:            1.03 m/s (<=1.1m/s)  AoV VTI:                 22.30 cm (18-25cm)  LVOT VTI:                16.20 cm  LVOT Diameter:           2.03 cm  (1.8-2.4cm)  AoV Area, VTI:           2.35 cm2 (2.5-5.5cm2)  AoV Area,Vmax:           2.87 cm2 (2.5-4.5cm2)  AoV Dimensionless Index: 0.73      RIGHT VENTRICLE:  RV Basal 3.49 cm  RV Mid   2.65 cm  RV Major 7.3 cm  TAPSE:   22.4 mm  RV s'    0.13 m/s      TRICUSPID VALVE/RVSP:          Normal Ranges:  Peak TR Velocity:     2.88 m/s  RV Syst Pressure:     36 mmHg  (< 30mmHg)  IVC Diam:             1.74 cm      PULMONIC VALVE:          Normal Ranges:  PV Accel Time:  116 msec (>120ms)  PV Max Aime:     1.5 m/s  (0.6-0.9m/s)  PV Max P.8 mmHg      [id number] Faraz Ortega DO  Electronically signed on 2025 at 9:34:06 AM      Wall Scoring        ** Final **      Cath Data:  [facility]     LMT normal.  LAD mild irregularity, 30% mid, wraps around the apex.  LCX mild iregularity.  RCA tortuous, dom.  PDA is 100% ostial.  LVEDP 20-25.  LVEF 40-45%, inferior basal AK.       Successful PTA R % GLENNA 0 reduced to 30%, GLENNA 3 after POBA, with no stent placed due to small caliber vessel and minimal runoff, not enough to warrant stenting.     Concern for drug use considering patient very tachycardic and LVEDP elevated with very small PDA occlusion    Summary of key imaging results from the last 24 hours     See above    Assessment and Plan     Assessment: This is a 45 year old male with a past medical history of tobacco abuse (30 pack years), submandibular abscess, and nephrolithiasis who presented to [facility] with a chief complaint of chest pain, found to have an inferior STEMI, now s/p coronary angiogram with balloon angioplasty to the PDA (right dominant system). His course was complicated by hemodynamic instability (tachycardia, hypotension) requiring vasopressor support and laboratory evidnce of end organ dysfunction. Due to concern for cardiogenic vs mixed shock,  a shock call was intervened and recommended placement of  PA catheter for adjudication of hemodynamics and transfer to [facility] CICU for a higher level of care. He is now admitted to the CICU for further management.      On arrival to the CICU he is hypotensive with escalating pressor requirements. Bedside examination reveals a holosystolic murmur and serial mixed venous are revealing of a significant step up in SVO2 from the RA to PA concerning for the presence of a ischemic VSD, perhaps secondardy to a late presenting STEMI given troponin elevation to 24K on presentation. Will re-conference with Shock team for consideration of MCS given continued hemodynamic instability and now suspected VSD with cardiogenic shock. Due to concern for mixed shock will start stress dose steroids (as currently on 3 pressors), broaden abx, and send full infectious workup. Continue with CAD/STEMI GDMT. Shock team determined impella CP placement to offload LV given concern for STEMI-induced VSD/ Inferior LV wall rupture.     Patient's lactate has normalized while on Impella CP.  However, still large difference between CVP and PA mixed venous, indicating ongoing shunting.  Infectious disease and nephrology following.  Monitoring the patient will need Impella 5.0 Started Sled for volume control. Will eventually Need VSD repair and possible future transplant evaluation.        Mechanical Ventilation: 4-10 days  Sedation/Analgesia:  none  Restraints: Restraints indicated as alternative therapies have been attempted and have been ineffective.  Restrain with soft wrist restraints and side rails up x4 until medical devices discontinued and/or patient able to participate with plan of care.     Summary for 05/27/25  :  Scope with GI today to asses possible Bleed  C/W Vanco, Adolfo, and Danae for possible line infections   C/w just heparin purge for Impella CP  S/p Shock Call 5/26  Will touch base with CT surg about impella 5.0      NEUROLOGIC  #Sedation  - off sedation 5/24, placed on precedex and fetnyl  5/25 AM given hyperarousal from NSVT/impella adjustments   - Wean sedation, CTM for responsiveness  - RAAS goal: 0      CARDIOVASCULAR  #RPDA STEMI s/p POBA  #C/f Cardiogenic vs mixed shock   #C/f VSD  ::  ECG on admit to OSH with ST elevations in the inferior leads with reciprocal depressions  :: Troponin:  24,085>21,701,19,289,[id number]  :: Lipid panel (05/2025)- cholesterol 220, HDL 33.9, , TG 57  :: TSH 1.51 (05/2025), A1C 7.4  (05/2025)  :: coronary angiogram (05/2025)  which revealed multi vessel CAD [LAD/Lcx/RCA mildly/diffusely diseased,100% RPDA culprit lesion] with PTCA to RPDA with 50% residual stenosis [GLENNA 0->3] (too small to stent)   :: LVEF 45% and LVED 20-25 mmHg via LV Gram   :: TTE  (05/2025) LVEF 55%, wall motion abnormalities, and no significant valvular disease   :: SVO2 59 from CVP port, 92 from PA port (prior to impella)  :: SVO2 57 from CVP port, 80 from PA port (after impella)  :: Off all pressors 5/21  :: restarted Levo 5/26  Plan:  - Trend Lactate, CVP mix venous (w/VBG) and PA mix venous Q8-12H  - Aspirin 81 mg every day, DC Brilinita given no stent / possible future surgery   - Statin: HELD Crestor 40 mg given c/f shock liver  - Beta blocker: hold given shock requiring MCS  - RAAS Inhibition: hold given shock requiring MCS  - Limited TTE repeat: Compared with the prior exam from 5/19/2025 ([facility]) there has been interval development of a large VSD throuh the inferoseptum with the RV now becoming large with significantly reduced function.   - CT surgery following for future possible VSD repair   - Nipride started 5/25 , titrate SBP 85 (Given SVR ~1100)- DC 5/26 AM    # NSVT  # PVC  :: started 5/24 PM, longest 20 sec run  :: likely iso sub-optimal impella position  :: repositioned under US 5/25 AM with less ectopy burden after   - IV Amio loaded, started on amio gtt 5/25 Am and will continue    - CTM       PULMONARY  #AHRF   :: Prior to intubation satting 92% on RA  :: CXR with pulmonary edema vs PNA  :: CTA CAP- mild interstitial pulmonary edema   :: , WBC 34.1 on admission   Plan:  - Infectious workup/abx as below   - [place] guided therapy   - Wean Ventilator when more responsive      RENAL/  # BENY, Anuric   # HAGMA   # Volume overload iso VSD   :: Baseline Cr 0.8-1.0, 1.61 on admit, 1.85 on transport - pleatu but Cr still > 10  Plan:  - Likely hemodynamic mediated , vanco, LV gram  - Brief SLED 5/23, 5/25   - Nephrology following   - Follow up UA/Ulytes if sample if available   - Avoid hypotension, rapid fluctuations in BP  - [place] guided volume resuscitation/ diuresis   - Daily RFP        GASTROINTESTINAL  #GI PPX  # c/f Traumatic OG placement    # Ileus   - IV PPI BID every day while intubated  - Aggressive bowel reg: miralx BID, Senna 2 tab BID,   - FU GI recs for ileus and feeding   - Plan for Scope with GI on 5/27     ENDOCRINE  #T2DM   :: A1C 7.4  (05/2025)  Plan:  - NPO   - Q4H POC glucose, SSI while NPO  - Titrate -180  - Will need diabetes education prior to discharge      HEME/ONC  #thrombocytopenia  # Anemia    # Hemolysis iso impella use, improving    - C/f some degree of hemolysis   - Hepatic function, LHD, hapto daily  - Heparin products DC 5/23, restarted 5/26   - Impella Purg: Hep/D5W   - Systemic: Heparin (follow ACT)  - Anti-Plt Factor 4 AB / Serotonin release assay negative       INFECTIOUS DISEASE  #C/f Mixed shock   #Leukocytosis  # Fever  Unclear if Fever is from infection or general cardiogenic shock at this time   :: S/p Doxy/CTX 05/19  :: blood cultures sent 05/19 x 2 sets   :: Rising WBC despite 8 days of zosyn   Work up:  - MRSA probe: -  - Bcx2: NGTD  - UA: non-infectious   Plan:  -Dc zosyn; Start Vanco, Adolfo, and micafungin 5/26  - Follow infectious labs  - ID following      MSK/DERM  SWAPNIL         ICU Check List       ICU Liberation: Intervention:   Assess,  Prevent, Manage Pain 0 - No pain fentanyl gtt   Both SAT and SBT [] SAT  [] SBT 30-60 min [] Extubate to NC  [] Extubate to NIV  [] Discuss Trach   Choice of analgesia and sedation Romero Agitation Sedation Scale (RASS): Light sedation Fentanyl, Precedex, and Versed  -1 to -2   Delirium: Assess, prevent and manage  CAM ICU Positive Assess Qshift   Early Mobility and Exercise Receiving therapies that restrict mobility [] PT /OT consult   Family Engagement and Empowerment [x]Family updated []SW consult     F: PRN for euvolemia; swan guided therapy   E: PRN K>4, Mg>2, P>3   N: NPO  A: PIV, RIJ swan, left radial A line      Oxygen: MV 30%/500/14/5  Abx: Vanco, Adolfo, Danae   Gtts: levo, fent, versed      DVT ppx: Lovenox  GI ppx: IV PPI     Code Status: full (confirmed on admit)  Surrogate Medical Decision-maker:    Ashley Hogan (Mother) [phone]         Introducer 05/19/25 Internal jugular Right (Active)   Placement Date/Time: 05/19/25 2330   Hand Hygiene Completed: Yes  Location: Internal jugular  Orientation: Right  Placed by: CG/AL  Placement Verified: X-ray;Pressure tracing changes;Transduced waveform   Number of days: 0       Arterial Line 05/19/25 Left Radial (Active)   Placement Date/Time: 05/19/25 2200   Orientation: Left  Location: Radial  Local Anesthetic: Injectable  Technique: Anatomical landmarks  Insertion attempts: 1  Securement Method: Sutured  Patient Tolerance: Fair   Number of days: 0       Arterial Sheath 05/20/25 0529 6 Fr. Right Femoral (Active)   Placement Date/Time: 05/20/25 0529   Sheath Size: (c) 6 Fr.  Line Orientation: Right  Sheath Insertion Site: Femoral   Number of days: 0       Arterial Sheath 05/20/25 0532 Other (Comment) Right Femoral (Active)   Placement Date/Time: 05/20/25 0532   Sheath Size: (c) Other (Comment)  Line Orientation: Right  Sheath Insertion Site: Femoral   Number of days: 0       Arterial Sheath 05/20/25 0559 6 Fr. Left Femoral (Active)   Placement Date/Time:  05/20/25 0559   Sheath Size: 6 Fr.  Line Orientation: Left  Sheath Insertion Site: Femoral   Number of days: 0       Pulmonary Artery Catheter Internal jugular (Active)   Placement Date/Time: 05/19/25 2330   Hand Hygiene Performed Prior to CVC Insertion: Yes  Site Prep: Alcohol;Chlorhexidine ;Usual sterile procedure followed  Site Prep Agent has Completely Dried Before Insertion: Yes  All 5 Sterile Barriers Used (Glove...   Number of days: 0       ETT  7.5 mm (Active)   Placement Date/Time: 05/19/25 2305   Hand Hygiene Completed: Yes  ETT Type: ETT - single  Single Lumen Tube Size: 7.5 mm  Cuffed: Yes  Location: Oral  Airway Insertion Attempts: 1  Placement Verification: Auscultation;Capnometry;Fiberoptic visualizati...   Number of days: 0       Urethral Catheter 16 Fr. (Active)   Placement Date/Time: 05/20/25 0200   Hand Hygiene Completed: Yes  Tube Size (Fr.): 16 Fr.  Catheter Balloon Size: 10 mL  Urine Returned: Yes   Number of days: 0       NG/OG/Feeding Tube OG - Henderson sump 16 Fr Center mouth (Active)   Placement Date/Time: 05/19/25 2310   Placed by: MARLENA  Hand Hygiene Completed: Yes  Type of Tube: NG/OG Tube  Tube Length: 55 cm  Tube Type: OG - Henderson sump  NG/OG Tube Size: 16 Fr  Tube Location: Center mouth   Number of days: 0       Social:  Code: Full Code    Disposition: NABOR BROWNLEE Tungprasanna    05/27/25 at 10:58 AM     Disclaimer: Documentation completed with the information available at the time of input. The times in the chart may not be reflective of actual patient care times, interventions, or procedures. Documentation occurs after the physical care of the patient.           [1] aspirin, 81 mg, oral, Daily  bisacodyl, 10 mg, rectal, BID  insulin lispro, 0-5 Units, subcutaneous, q4h  meropenem, 500 mg, intravenous, q24h  micafungin, 100 mg, intravenous, q24h  oxygen, , inhalation, Continuous - Inhalation  pantoprazole, 40 mg, intravenous, BID  polyethylene glycol, 17 g, oral, 4x  daily  sennosides, 2 tablet, oral, BID     [2] amiodarone, 0.5 mg/min, Last Rate: 0.5 mg/min (05/27/25 1000)  dexmedeTOMIDine, 0.1-1.5 mcg/kg/hr (Dosing Weight), Last Rate: 0.8 mcg/kg/hr (05/27/25 1000)  fentaNYL,  mcg/hr, Last Rate: 25 mcg/hr (05/27/25 1000)  heparin Impella Purge 25 units/mL in 500 mL D5W, 10 mL/hr, Last Rate: 10 mL/hr (05/27/25 1000)  midazolam, 0.5-20 mg/hr, Last Rate: 2 mg/hr (05/27/25 1000)  nitroprusside, 0.25-5 mcg/kg/min (Dosing Weight), Last Rate: Stopped (05/25/25 1939)  norepinephrine, 0.01-1 mcg/kg/min (Dosing Weight), Last Rate: Stopped (05/26/25 1648)     [3] PRN medications: alteplase, dextrose, dextrose, glucagon, glucagon, vancomycin

## 2025-05-27 NOTE — SIGNIFICANT EVENT
S/p EGD 5/27/25  Impression  The upper third of the esophagus, middle third of the esophagus and lower third of the esophagus appeared normal.  Single ulcer in the GE junction with clean base (Mani III) possibly from OGT trauma or suctioning  Irregular Z-line  Ulcerated mucosa with erosion in the body of the stomach and antrum suspect from OGT suctioning and trauma   Erythematous mucosa in the duodenal bulb  The 2nd part of the duodenum appeared normal.      EGD showing likely source of coffee ground contents being suctioned from OGT is from OGT trauma in addition to a small ulcer at the GE junction.    Recommendations   -Recommend BID IV PPI while intubated/critically ill then transition to PO for 2 months for esophageal ulcer  -Advise against further NGT or OGT placement, can consider dobhoff but do not continue suctioning   -Rest of care per CICU team   -Continue PRN tap water enemas per primary team   -Can continue senna & miralax  -No contraindication to enteral feedings ok to start from GI perspective     Thank you for the consultation.  The consulting team will SIGN OFF  Please do not hesitate to contact us again on by paging the consultation team again between the weekday hours of 7 AM - 5 PM.  If there is an urgent concern during the weekend, after-hours, or holidays; then please [place] [phone]. Thank you.

## 2025-05-27 NOTE — PROGRESS NOTES
Alpesh Elena is a 45 y.o. male on day 7 of admission presenting with STEMI (ST elevation myocardial infarction) (Multi).      Subjective   No major events  No sign of renal recovery.  Anuric, 87cc x 24 hr  CVP 13,  No new oxygen requirements.         Objective          Vitals 24HR  Heart Rate:  [62-68]   Temp:  [37 °C (98.6 °F)-37.4 °C (99.3 °F)]   Resp:  [15-27]   Weight:  [87.6 kg (193 lb 2 oz)-87.6 kg (193 lb 3.2 oz)]   SpO2:  [94 %-100 %]         Intake/Output last 3 Shifts:    Intake/Output Summary (Last 24 hours) at 5/27/2025 1416  Last data filed at 5/27/2025 1300  Gross per 24 hour   Intake 1684.08 ml   Output 79 ml   Net 1605.08 ml     On exam:  Sedated and intubated  Impella  Vent 30%  Trace edema      Assessment & Plan  STEMI (ST elevation myocardial infarction) (Multi)    Cardiogenic shock (Multi)    Alpesh Elena is a 45 y.o. year old male patient admitted on 5/20/2025 with: Cardiogenic shock, impella, VSD/ Inferior LV wall rupture . Nephrology following for BENY.    #BENY-D  - Baseline Cr 0.9-1  - UA- RBCs, 2+ blood, trace proteins  - CT abd- no hydro or obstructions  - Etiology of BENY: likely ATN from contrast, septic shock vs cardiogenic shock  - SLED 5/23 & 5/25    #HAGMA and metabolic alkalosis  #HFrEF    #lactic acidosis-improved  #sepsis    #Possible GI bleeding-EGD today  #Hemolysis- mechanical likely on impella     ** Recommendations:  - SLED today as per submitted orders.  - Dose all treatment to GFR <15.  - Avoid hypotension, further contrast and nephrotoxins if possible.  - Strict I/Os  - Daily BMP.       Hailee Parrish MD  Nephrology fellow.

## 2025-05-27 NOTE — PROGRESS NOTES
"Nutrition Follow Up Assessment:   Nutrition Assessment       Pt remains intubated and sedated. S/p shock call 5/26. On Impella. Getting scope with GI today for possible bleed. Getting daily eval for need in RRT. Receiving swan-guided diuresis.     NPO x 7 days. OGT in place to LIWS.     Anthropometrics:  Height: 172.7 cm (5' 8\")   Weight: 87.6 kg (193 lb 2 oz)   BMI (Calculated): 29.37  IBW/kg (Dietitian Calculated): 70.91 kg  Percent of IBW: 117 %     Weight History:   Date/Time Weight Dosing Weight   05/27/25 0800 87.6 kg (193 lb 2 oz) --   05/27/25 0000 87.6 kg (193 lb 3.2 oz) --   05/26/25 0405 87.7 kg (193 lb 5.5 oz) 87.7 kg (193 lb 5.5 oz)   05/25/25 0300 87.2 kg (192 lb 3.9 oz) 87.2 kg (192 lb 3.9 oz)   05/24/25 0000 87.3 kg (192 lb 7.4 oz) 87.3 kg (192 lb 7.4 oz)   05/23/25 1229 88.2 kg (194 lb 8 oz) --   05/23/25 0600 88.1 kg (194 lb 3.6 oz) --   05/22/25 0600 86.9 kg (191 lb 9.3 oz) --   05/21/25 0745 83 kg (182 lb 15.7 oz) --   05/21/25 0600 83 kg (182 lb 15.7 oz) --   05/20/25 0156 78.8 kg (17    Admit Weight: 78.8 kg (173 lb 11.6 oz)     Weight Change %:  Weight History / % Weight Change: Weight trending upwards 2/2 fluid.    Nutrition Focused Physical Exam Findings:  Initial NFPE completed on 5/20/25  Edema:  Edema Location: non-pitting, generalized  Physical Findings:  Skin: Negative    Nutrition Significant Labs:  CBC Trend:   Results from last 7 days   Lab Units 05/27/25  0302 05/26/25  1414 05/26/25  0500 05/26/25  0026   WBC AUTO x10*3/uL 23.6* 25.1* 23.2* 22.8*   RBC AUTO x10*6/uL 2.53* 2.65* 2.51* 2.25*   HEMOGLOBIN g/dL 7.7* 8.1* 7.8* 6.8*   HEMATOCRIT % 22.5* 22.9* 21.7* 18.9*   MCV fL 89 86 87 84   PLATELETS AUTO x10*3/uL 92* 87* 79* 85*    , BMP Trend:   Results from last 7 days   Lab Units 05/27/25  0302 05/26/25  2005 05/26/25  1414 05/26/25  0019   GLUCOSE mg/dL 190* 216* 270* 195*   CALCIUM mg/dL 8.5* 8.2* 8.1* 8.0*   SODIUM mmol/L 133* 133* 132* 133*   POTASSIUM mmol/L 3.9 4.3 4.2 4.3 " "  CO2 mmol/L 24 25 22 23   CHLORIDE mmol/L 90* 89* 90* 93*   BUN mg/dL 111* 101* 96* 80*   CREATININE mg/dL 9.50* 8.87* 8.27* 6.81*    , Renal Lab Trend:   Results from last 7 days   Lab Units 05/27/25  0302 05/26/25 2005 05/26/25  1414 05/26/25  0019   POTASSIUM mmol/L 3.9 4.3 4.2 4.3   PHOSPHORUS mg/dL 7.4*  --  7.4* 7.2*   SODIUM mmol/L 133* 133* 132* 133*   MAGNESIUM mg/dL 3.24*  --  3.01* 2.82*   EGFR mL/min/1.73m*2 6* 7* 7* 9*   BUN mg/dL 111* 101* 96* 80*   CREATININE mg/dL 9.50* 8.87* 8.27* 6.81*    , Vit D: No results found for: \"VITD25\" , Vit B12:   Lab Results   Component Value Date    [id number] 05/24/2025        Nutrition Specific Medications:  Scheduled medications  Scheduled Medications[1]  Continuous medications  Continuous Medications[2]  PRN medications  PRN Medications[3]      I/O:   Last BM Date: 05/27/25; Stool Appearance: Watery (due yo enema) (05/27/25 0400)    Dietary Orders (From admission, onward)       Start     Ordered    05/20/25 0324  May Not Participate in Room Service  ( ROOM SERVICE MAY NOT PARTICIPATE)  Once        Question:  .  Answer:  Yes    05/20/25 0323    05/20/25 0119  NPO Diet; Effective now  Diet effective now         05/20/25 0118                     Estimated Needs:   Total Energy Estimated Needs in 24 hours (kCal): 1833 kCal  Method for Estimating Needs: PSU RMR (MVe 11.5L/min) (MVE fluctuating- RMR @ MVe 7.3= 1700)  Total Protein Estimated Needs in 24 Hours (g):  ()  Method for Estimating 24 Hour Protein Needs: 1.3-1.6g/kg IBW  Total Fluid Estimated Needs in 24 Hours (mL):  (per team)  Method for Estimating 24 Hour Fluid Needs: per team        Nutrition Diagnosis   Malnutrition Diagnosis  Patient has Malnutrition Diagnosis: No (low threshold d/t prolonged NPO status)    Nutrition Diagnosis  Patient has Nutrition Diagnosis: Yes  Diagnosis Status (1): Active  Nutrition Diagnosis 1: Inadequate oral intake  Related to (1): clinical course  As Evidenced by (1): " NPO x 7 days       Nutrition Interventions/Recommendations   Nutrition prescription for enteral nutrition and or parenteral nutrition    Nutrition Recommendations:  Refeeding precautions recommended prior to starting nutrition support d/t prolonged NPO status  Start 100mg IV thiamine daily x 7 days    Monitor RFP+Mg Q12H   Replete electrolytes PRN + ensure lytes are replenished prior to starting nutrition support    Do not start nutrition support if serum potassium </= 3 mEq/L, phosphorus </= 2mg/dL, and/or magnesium </= 1.2mEq/L   If significant drop in electrolytes happen during nutrition support advancement, do not further advance     Tube feeding  Recommend post-pyloric feeding tube once able to start enteral nutrition. Post-pylroic access preferred as pt is unable to keep HOB > 30 degrees and is high risk for aspiration.   Once medically cleared to start TF, recommend Two Manuel HN @ 30ml/hr + 1 Pkt Prostat AWC BID goal   Start @ 10ml/hr. Increase by [id number] until goal of 30ml/hr is met.   Refer to refeeding precautions above  FWF per MD/team discretion     TPN   If unable to start enteral nutrition within the next 48 hours, recommend starting TPN given that the pt already has central access.   Refer to refeeding precautions above   Clinimix E 8/14 @ 55ml/hr goal + 250ml SMOF lipids @ 20.8ml/hr x 12 hours overnight   Start Clinimx E 8/14 @ 30ml/hr on day 1   Increase to 55ml/hr on day 2   PN  monitoring:   Blood Glucose Frequency: Every 6 hours   Weight Frequency: Daily    Labs: Renal panel and magnesium daily; Triglycerides: weekly; LFTs: weekly      Nutrition Interventions/Goals:   Enteral Intake: Management of route of enteral nutrition  Vitamin and Mineral Supplement Therapy: Thiamin supplement therapy  Goal: Prevent refeeding  Additional Interventions: Post-pyloric trickle feeds or PN depending on medical apporpiateness    TF @ goal  + Prostat = 1640kcal, 94gm protein   TPN @ goal + lipids = 1552kcal, 106gm  protein (meets ~85% kcal needs)    Education Documentation  No documentation found.            Nutrition Monitoring and Evaluation   Food/Nutrient Related History Monitoring  Monitoring and Evaluation Plan: Enteral and parenteral nutrition intake determination  Enteral and Parenteral Nutrition Intake Determination: Enteral nutrition intake - Other, Parenteral nutrition intake - Other  Criteria: Start nutrition support within 48 hours    Anthropometric Measurements  Monitoring and Evaluation Plan: Body weight  Body Weight: Body weight - Weight reduction from fluids, as needed    Biochemical Data, Medical Tests and Procedures  Monitoring and Evaluation Plan: Electrolyte/renal panel, Glucose/endocrine profile  Electrolyte and Renal Panel: Electrolytes within normal limits  Glucose/Endocrine Profile: Glucose within normal limits - ICU (140-180 mg/dL)         Goal Status: New goal(s) identified    Time Spent (min): 60 minutes            [1] aspirin, 81 mg, oral, Daily  bisacodyl, 10 mg, rectal, BID  insulin lispro, 0-5 Units, subcutaneous, q4h  meropenem, 500 mg, intravenous, q24h  micafungin, 100 mg, intravenous, q24h  oxygen, , inhalation, Continuous - Inhalation  pantoprazole, 40 mg, intravenous, BID  perflutren lipid microspheres, 0.5-10 mL of dilution, intravenous, Once in imaging  perflutren protein A microsphere, 0.5 mL, intravenous, Once in imaging  polyethylene glycol, 17 g, oral, 4x daily  sennosides, 2 tablet, oral, BID     [2] amiodarone, 0.5 mg/min, Last Rate: 0.5 mg/min (05/27/25 1300)  dexmedeTOMIDine, 0.1-1.5 mcg/kg/hr (Dosing Weight), Last Rate: 0.8 mcg/kg/hr (05/27/25 1300)  fentaNYL,  mcg/hr, Last Rate: 25 mcg/hr (05/27/25 1300)  heparin Impella Purge 25 units/mL in 500 mL D5W, 10 mL/hr, Last Rate: 10 mL/hr (05/27/25 1300)  midazolam, 0.5-20 mg/hr, Last Rate: 2 mg/hr (05/27/25 1300)  nitroprusside, 0.25-5 mcg/kg/min (Dosing Weight), Last Rate: Stopped (05/25/25 1939)  norepinephrine, 0.01-1  mcg/kg/min (Dosing Weight), Last Rate: 0.02 mcg/kg/min (05/27/25 1300)     [3] PRN medications: alteplase, dextrose, dextrose, glucagon, glucagon, vancomycin

## 2025-05-27 NOTE — CARE PLAN
Problem: Safety - Medical Restraint  Goal: Remains free of injury from restraints (Restraint for Interference with Medical Device)  Outcome: Progressing  Flowsheets (Taken 5/27/2025 0925)  Remains free of injury from restraints (restraint for interference with medical device):   Determine that other, less restrictive measures have been tried or would not be effective before applying the restraint   Evaluate the patient's condition at the time of restraint application   Inform patient/family regarding the reason for restraint   Every 2 hours: Monitor safety, psychosocial status, comfort, nutrition and hydration  Goal: Free from restraint(s) (Restraint for Interference with Medical Device)  Outcome: Progressing  Flowsheets (Taken 5/27/2025 0925)  Free from restraint(s) (restraint for interference with medical device): ONCE/SHIFT or MINIMUM Every 12 hours: Assess and document the continuing need for restraints     Problem: ACS/CP/NSTEMI/STEMI  Goal: Chest pain managed (free from pain or at acceptable level)  Outcome: Progressing  Goal: Lab values return to normal range  Outcome: Progressing  Goal: Promote self management  Outcome: Progressing  Goal: Serial ECG will return to baseline  Outcome: Progressing  Goal: Verbalize understanding of procedures/devices  Outcome: Progressing  Goal: Wean vasopressors/achieve hemodynamic stability  Outcome: Progressing     Problem: Cardiac catheterization  Goal: Free from dysrhythmias  Outcome: Progressing  Goal: Free from pain  Outcome: Progressing  Goal: No evidence of post procedure complications  Outcome: Progressing  Goal: Promote self management  Outcome: Progressing  Goal: Verbalize understanding of procedure  Outcome: Progressing  Goal: Care and maintenance of device (specify)  Outcome: Progressing     Problem: Respiratory  Goal: Clear secretions with interventions this shift  Outcome: Progressing  Goal: Minimize anxiety/maximize coping throughout shift  Outcome:  Progressing  Goal: Minimal/no exertional discomfort or dyspnea this shift  Outcome: Progressing  Goal: No signs of respiratory distress (eg. Use of accessory muscles. Peds grunting)  Outcome: Progressing  Goal: Patent airway maintained this shift  Outcome: Progressing  Goal: Tolerate mechanical ventilation evidenced by VS/agitation level this shift  Outcome: Progressing  Goal: Tolerate pulmonary toileting this shift  Outcome: Progressing  Goal: Verbalize decreased shortness of breath this shift  Outcome: Progressing  Goal: Wean oxygen to maintain O2 saturation per order/standard this shift  Outcome: Progressing  Goal: Increase self care and/or family involvement in next 24 hours  Outcome: Progressing     Problem: Skin  Goal: Participates in plan/prevention/treatment measures  Outcome: Progressing  Flowsheets (Taken 5/27/2025 0925)  Participates in plan/prevention/treatment measures: Elevate heels  Goal: Prevent/manage excess moisture  Outcome: Progressing  Flowsheets (Taken 5/27/2025 0925)  Prevent/manage excess moisture:   Cleanse incontinence/protect with barrier cream   Monitor for/manage infection if present   Follow provider orders for dressing changes   Use wicking fabric (obtain order)   Moisturize dry skin  Goal: Prevent/minimize sheer/friction injuries  Outcome: Progressing  Flowsheets (Taken 5/27/2025 0925)  Prevent/minimize sheer/friction injuries:   Use pull sheet   HOB 30 degrees or less   Turn/reposition every 2 hours/use positioning/transfer devices  Goal: Promote/optimize nutrition  Outcome: Progressing  Flowsheets (Taken 5/27/2025 0925)  Promote/optimize nutrition:   Discuss with provider if NPO > 2 days   Reassess MST if dietician not consulted  Goal: Promote skin healing  Outcome: Progressing  Flowsheets (Taken 5/27/2025 0925)  Promote skin healing:   Assess skin/pad under line(s)/device(s)   Protective dressings over bony prominences   Turn/reposition every 2 hours/use positioning/transfer  devices   Ensure correct size (line/device) and apply per  instructions   Rotate device position/do not position patient on device

## 2025-05-28 ENCOUNTER — ANESTHESIA EVENT (OUTPATIENT)
Dept: OPERATING ROOM | Facility: HOSPITAL | Age: 45
End: 2025-05-28
Payer: COMMERCIAL

## 2025-05-28 ENCOUNTER — ANESTHESIA (OUTPATIENT)
Dept: OPERATING ROOM | Facility: HOSPITAL | Age: 45
End: 2025-05-28
Payer: COMMERCIAL

## 2025-05-28 ENCOUNTER — HOSPITAL ENCOUNTER (OUTPATIENT)
Dept: OPERATING ROOM | Facility: HOSPITAL | Age: 45
Discharge: HOME | End: 2025-05-28

## 2025-05-28 PROBLEM — Q21.0 VENTRICULAR SEPTAL DEFECT (HHS-HCC): Status: ACTIVE | Noted: 2025-05-19

## 2025-05-28 LAB
ACT BLD: 120 SEC (ref 82–174)
ACT BLD: 129 SEC (ref 82–174)
ACT BLD: 153 SEC (ref 83–199)
ACT BLD: 160 SEC (ref 83–199)
ACT BLD: 160 SEC (ref 83–199)
ACT BLD: 161 SEC (ref 83–199)
ACT BLD: 163 SEC (ref 83–199)
ACT BLD: 166 SEC (ref 83–199)
ACT BLD: 178 SEC (ref 83–199)
ACT BLD: 200 SEC (ref 83–199)
ACT BLD: 266 SEC (ref 82–174)
ACT BLD: 366 SEC (ref 82–174)
ALBUMIN SERPL BCP-MCNC: 2.7 G/DL (ref 3.4–5)
ALBUMIN SERPL BCP-MCNC: 2.8 G/DL (ref 3.4–5)
ALBUMIN SERPL BCP-MCNC: 3 G/DL (ref 3.4–5)
ALP SERPL-CCNC: 449 U/L (ref 33–120)
ALT SERPL W P-5'-P-CCNC: 273 U/L (ref 10–52)
ANION GAP BLDA CALCULATED.4IONS-SCNC: 15 MMO/L (ref 10–25)
ANION GAP BLDA CALCULATED.4IONS-SCNC: 16 MMO/L (ref 10–25)
ANION GAP BLDA CALCULATED.4IONS-SCNC: 18 MMO/L (ref 10–25)
ANION GAP BLDMV CALCULATED.4IONS-SCNC: 13 MMO/L (ref 10–25)
ANION GAP BLDMV CALCULATED.4IONS-SCNC: 15 MMO/L (ref 10–25)
ANION GAP BLDMV CALCULATED.4IONS-SCNC: 16 MMO/L (ref 10–25)
ANION GAP BLDMV CALCULATED.4IONS-SCNC: 16 MMO/L (ref 10–25)
ANION GAP BLDV CALCULATED.4IONS-SCNC: 15 MMOL/L (ref 10–25)
ANION GAP SERPL CALC-SCNC: 15 MMOL/L (ref 10–20)
ANION GAP SERPL CALC-SCNC: 16 MMOL/L (ref 10–20)
ANION GAP SERPL CALC-SCNC: 21 MMOL/L (ref 10–20)
AST SERPL W P-5'-P-CCNC: 276 U/L (ref 9–39)
ATRIAL RATE: 66 BPM
BASE EXCESS BLDA CALC-SCNC: -6.2 MMOL/L (ref -2–3)
BASE EXCESS BLDA CALC-SCNC: -6.7 MMOL/L (ref -2–3)
BASE EXCESS BLDA CALC-SCNC: -7 MMOL/L (ref -2–3)
BASE EXCESS BLDA CALC-SCNC: -7.4 MMOL/L (ref -2–3)
BASE EXCESS BLDA CALC-SCNC: -7.5 MMOL/L (ref -2–3)
BASE EXCESS BLDMV CALC-SCNC: -1.5 MMOL/L (ref -2–3)
BASE EXCESS BLDMV CALC-SCNC: -2.6 MMOL/L (ref -2–3)
BASE EXCESS BLDMV CALC-SCNC: -4.7 MMOL/L (ref -2–3)
BASE EXCESS BLDMV CALC-SCNC: 0.4 MMOL/L (ref -2–3)
BASE EXCESS BLDV CALC-SCNC: -13 MMOL/L (ref -2–3)
BASOPHILS # BLD AUTO: ABNORMAL 10*3/UL
BASOPHILS NFR BLD AUTO: ABNORMAL %
BILIRUB DIRECT SERPL-MCNC: 19.8 MG/DL (ref 0–0.3)
BILIRUB SERPL-MCNC: 21 MG/DL (ref 0–1.2)
BLOOD EXPIRATION DATE: NORMAL
BODY TEMPERATURE: 37 DEGREES CELSIUS
BUN SERPL-MCNC: 25 MG/DL (ref 6–23)
BUN SERPL-MCNC: 35 MG/DL (ref 6–23)
BUN SERPL-MCNC: 39 MG/DL (ref 6–23)
CA-I BLDA-SCNC: 0.97 MMOL/L (ref 1.1–1.33)
CA-I BLDA-SCNC: 1.03 MMOL/L (ref 1.1–1.33)
CA-I BLDA-SCNC: 1.06 MMOL/L (ref 1.1–1.33)
CA-I BLDA-SCNC: 1.07 MMOL/L (ref 1.1–1.33)
CA-I BLDA-SCNC: 1.07 MMOL/L (ref 1.1–1.33)
CA-I BLDMV-SCNC: 0.96 MMOL/L (ref 1.1–1.33)
CA-I BLDMV-SCNC: 0.97 MMOL/L (ref 1.1–1.33)
CA-I BLDMV-SCNC: 0.98 MMOL/L (ref 1.1–1.33)
CA-I BLDMV-SCNC: 1.09 MMOL/L (ref 1.1–1.33)
CA-I BLDV-SCNC: 0.9 MMOL/L (ref 1.1–1.33)
CALCIUM SERPL-MCNC: 7.6 MG/DL (ref 8.6–10.6)
CALCIUM SERPL-MCNC: 7.7 MG/DL (ref 8.6–10.6)
CALCIUM SERPL-MCNC: 8.3 MG/DL (ref 8.6–10.6)
CHLORIDE BLD-SCNC: 93 MMOL/L (ref 98–107)
CHLORIDE BLD-SCNC: 95 MMOL/L (ref 98–107)
CHLORIDE BLD-SCNC: 96 MMOL/L (ref 98–107)
CHLORIDE BLD-SCNC: 96 MMOL/L (ref 98–107)
CHLORIDE BLDA-SCNC: 92 MMOL/L (ref 98–107)
CHLORIDE BLDA-SCNC: 95 MMOL/L (ref 98–107)
CHLORIDE BLDA-SCNC: 96 MMOL/L (ref 98–107)
CHLORIDE BLDA-SCNC: 96 MMOL/L (ref 98–107)
CHLORIDE BLDA-SCNC: 97 MMOL/L (ref 98–107)
CHLORIDE BLDV-SCNC: 109 MMOL/L (ref 98–107)
CHLORIDE SERPL-SCNC: 90 MMOL/L (ref 98–107)
CHLORIDE SERPL-SCNC: 92 MMOL/L (ref 98–107)
CHLORIDE SERPL-SCNC: 93 MMOL/L (ref 98–107)
CO2 SERPL-SCNC: 23 MMOL/L (ref 21–32)
CO2 SERPL-SCNC: 26 MMOL/L (ref 21–32)
CO2 SERPL-SCNC: 29 MMOL/L (ref 21–32)
COHGB MFR BLDA: 2.5 %
COHGB MFR BLDA: 2.7 %
COHGB MFR BLDA: 2.7 %
COHGB MFR BLDA: 3 %
CREAT SERPL-MCNC: 2.85 MG/DL (ref 0.5–1.3)
CREAT SERPL-MCNC: 3.11 MG/DL (ref 0.5–1.3)
CREAT SERPL-MCNC: 3.85 MG/DL (ref 0.5–1.3)
DISPENSE STATUS: NORMAL
DO-HGB MFR BLDA: 0 % (ref 0–5)
EGFRCR SERPLBLD CKD-EPI 2021: 19 ML/MIN/1.73M*2
EGFRCR SERPLBLD CKD-EPI 2021: 24 ML/MIN/1.73M*2
EGFRCR SERPLBLD CKD-EPI 2021: 27 ML/MIN/1.73M*2
EOSINOPHIL # BLD AUTO: ABNORMAL 10*3/UL
EOSINOPHIL NFR BLD AUTO: ABNORMAL %
ERYTHROCYTE [DISTWIDTH] IN BLOOD BY AUTOMATED COUNT: 16.5 % (ref 11.5–14.5)
GLUCOSE BLD MANUAL STRIP-MCNC: 103 MG/DL (ref 74–99)
GLUCOSE BLD MANUAL STRIP-MCNC: 137 MG/DL (ref 74–99)
GLUCOSE BLD MANUAL STRIP-MCNC: 169 MG/DL (ref 74–99)
GLUCOSE BLD MANUAL STRIP-MCNC: 177 MG/DL (ref 74–99)
GLUCOSE BLD MANUAL STRIP-MCNC: 194 MG/DL (ref 74–99)
GLUCOSE BLD MANUAL STRIP-MCNC: 206 MG/DL (ref 74–99)
GLUCOSE BLD-MCNC: 100 MG/DL (ref 74–99)
GLUCOSE BLD-MCNC: 122 MG/DL (ref 74–99)
GLUCOSE BLD-MCNC: 124 MG/DL (ref 74–99)
GLUCOSE BLD-MCNC: 174 MG/DL (ref 74–99)
GLUCOSE BLDA-MCNC: 184 MG/DL (ref 74–99)
GLUCOSE BLDA-MCNC: 185 MG/DL (ref 74–99)
GLUCOSE BLDA-MCNC: 186 MG/DL (ref 74–99)
GLUCOSE BLDA-MCNC: 187 MG/DL (ref 74–99)
GLUCOSE BLDA-MCNC: 216 MG/DL (ref 74–99)
GLUCOSE BLDV-MCNC: 145 MG/DL (ref 74–99)
GLUCOSE SERPL-MCNC: 111 MG/DL (ref 74–99)
GLUCOSE SERPL-MCNC: 114 MG/DL (ref 74–99)
GLUCOSE SERPL-MCNC: 163 MG/DL (ref 74–99)
HAPTOGLOB SERPL NEPH-MCNC: <30 MG/DL (ref 30–200)
HCO3 BLDA-SCNC: 17.2 MMOL/L (ref 22–26)
HCO3 BLDA-SCNC: 18 MMOL/L (ref 22–26)
HCO3 BLDA-SCNC: 18.1 MMOL/L (ref 22–26)
HCO3 BLDA-SCNC: 18.6 MMOL/L (ref 22–26)
HCO3 BLDA-SCNC: 18.6 MMOL/L (ref 22–26)
HCO3 BLDMV-SCNC: 20.4 MMOL/L (ref 22–26)
HCO3 BLDMV-SCNC: 21.2 MMOL/L (ref 22–26)
HCO3 BLDMV-SCNC: 22.7 MMOL/L (ref 22–26)
HCO3 BLDMV-SCNC: 24.6 MMOL/L (ref 22–26)
HCO3 BLDV-SCNC: 11.6 MMOL/L (ref 22–26)
HCT VFR BLD AUTO: 22.8 % (ref 41–52)
HCT VFR BLD EST: 14 % (ref 41–52)
HCT VFR BLD EST: 23 % (ref 41–52)
HCT VFR BLD EST: 24 % (ref 41–52)
HCT VFR BLD EST: 26 % (ref 41–52)
HCT VFR BLD EST: 26 % (ref 41–52)
HCT VFR BLD EST: 28 % (ref 41–52)
HCT VFR BLD EST: 35 % (ref 41–52)
HCT VFR BLD EST: 37 % (ref 41–52)
HGB BLD-MCNC: 8.2 G/DL (ref 13.5–17.5)
HGB BLDA-MCNC: 12.3 G/DL (ref 13.5–17.5)
HGB BLDA-MCNC: 7.6 G/DL (ref 13.5–17.5)
HGB BLDA-MCNC: 7.6 G/DL (ref 13.5–17.5)
HGB BLDA-MCNC: 7.9 G/DL (ref 13.5–17.5)
HGB BLDA-MCNC: 7.9 G/DL (ref 13.5–17.5)
HGB BLDA-MCNC: 8.1 G/DL (ref 13.5–17.5)
HGB BLDMV-MCNC: 11.8 G/DL (ref 13.5–17.5)
HGB BLDMV-MCNC: 8.6 G/DL (ref 13.5–17.5)
HGB BLDMV-MCNC: 8.7 G/DL (ref 13.5–17.5)
HGB BLDMV-MCNC: 9.3 G/DL (ref 13.5–17.5)
HGB BLDV-MCNC: 4.7 G/DL (ref 13.5–17.5)
IMM GRANULOCYTES # BLD AUTO: 3.38 X10*3/UL (ref 0–0.7)
IMM GRANULOCYTES NFR BLD AUTO: 12.3 % (ref 0–0.9)
INHALED O2 CONCENTRATION: 100 %
INHALED O2 CONCENTRATION: 30 %
LACTATE BLDA-SCNC: 2.1 MMOL/L (ref 0.4–2)
LACTATE BLDA-SCNC: 2.1 MMOL/L (ref 0.4–2)
LACTATE BLDA-SCNC: 2.5 MMOL/L (ref 0.4–2)
LACTATE BLDA-SCNC: 2.5 MMOL/L (ref 0.4–2)
LACTATE BLDA-SCNC: 4.1 MMOL/L (ref 0.4–2)
LACTATE BLDMV-SCNC: 1.3 MMOL/L (ref 0.4–2)
LACTATE BLDMV-SCNC: 1.3 MMOL/L (ref 0.4–2)
LACTATE BLDMV-SCNC: 1.4 MMOL/L (ref 0.4–2)
LACTATE BLDMV-SCNC: 2.8 MMOL/L (ref 0.4–2)
LACTATE BLDV-SCNC: 2.7 MMOL/L (ref 0.4–2)
LACTATE BLDV-SCNC: 2.7 MMOL/L (ref 0.4–2)
LDH SERPL L TO P-CCNC: 2618 U/L (ref 84–246)
LYMPHOCYTES # BLD AUTO: ABNORMAL 10*3/UL
LYMPHOCYTES NFR BLD AUTO: ABNORMAL %
MAGNESIUM SERPL-MCNC: 2.31 MG/DL (ref 1.6–2.4)
MAGNESIUM SERPL-MCNC: 2.99 MG/DL (ref 1.6–2.4)
MAGNESIUM SERPL-MCNC: 3.02 MG/DL (ref 1.6–2.4)
MCH RBC QN AUTO: 30.9 PG (ref 26–34)
MCHC RBC AUTO-ENTMCNC: 36 G/DL (ref 32–36)
MCV RBC AUTO: 86 FL (ref 80–100)
METHGB MFR BLDA: 1.6 % (ref 0–1.5)
METHGB MFR BLDA: 1.7 % (ref 0–1.5)
METHGB MFR BLDA: 1.7 % (ref 0–1.5)
METHGB MFR BLDA: 2.2 % (ref 0–1.5)
MONOCYTES # BLD AUTO: ABNORMAL 10*3/UL
MONOCYTES NFR BLD AUTO: ABNORMAL %
NEUTROPHILS # BLD AUTO: ABNORMAL 10*3/UL
NEUTROPHILS NFR BLD AUTO: ABNORMAL %
NRBC BLD-RTO: 12.9 /100 WBCS (ref 0–0)
OXYHGB MFR BLDA: 92.9 % (ref 94–98)
OXYHGB MFR BLDA: 95.1 % (ref 94–98)
OXYHGB MFR BLDA: 95.1 % (ref 94–98)
OXYHGB MFR BLDA: 95.4 % (ref 94–98)
OXYHGB MFR BLDA: 95.4 % (ref 94–98)
OXYHGB MFR BLDA: 95.6 % (ref 94–98)
OXYHGB MFR BLDA: 95.6 % (ref 94–98)
OXYHGB MFR BLDA: 95.7 % (ref 94–98)
OXYHGB MFR BLDA: 95.7 % (ref 94–98)
OXYHGB MFR BLDMV: 46 % (ref 45–75)
OXYHGB MFR BLDMV: 50.4 % (ref 45–75)
OXYHGB MFR BLDMV: 50.8 % (ref 45–75)
OXYHGB MFR BLDMV: 81.9 % (ref 45–75)
OXYHGB MFR BLDV: 63.7 % (ref 45–75)
P AXIS: 25 DEGREES
P OFFSET: 201 MS
P ONSET: 153 MS
PATH REVIEW-CBC DIFFERENTIAL: NORMAL
PCO2 BLDA: 29 MM HG (ref 38–42)
PCO2 BLDA: 33 MM HG (ref 38–42)
PCO2 BLDA: 35 MM HG (ref 38–42)
PCO2 BLDA: 36 MM HG (ref 38–42)
PCO2 BLDA: 37 MM HG (ref 38–42)
PCO2 BLDMV: 32 MM HG (ref 41–51)
PCO2 BLDMV: 35 MM HG (ref 41–51)
PCO2 BLDMV: 37 MM HG (ref 41–51)
PCO2 BLDMV: 37 MM HG (ref 41–51)
PCO2 BLDV: 21 MM HG (ref 41–51)
PH BLDA: 7.31 PH (ref 7.38–7.42)
PH BLDA: 7.31 PH (ref 7.38–7.42)
PH BLDA: 7.32 PH (ref 7.38–7.42)
PH BLDA: 7.36 PH (ref 7.38–7.42)
PH BLDA: 7.38 PH (ref 7.38–7.42)
PH BLDMV: 7.35 PH (ref 7.33–7.43)
PH BLDMV: 7.42 PH (ref 7.33–7.43)
PH BLDMV: 7.43 PH (ref 7.33–7.43)
PH BLDMV: 7.43 PH (ref 7.33–7.43)
PH BLDV: 7.35 PH (ref 7.33–7.43)
PHOSPHATE SERPL-MCNC: 2.5 MG/DL (ref 2.5–4.9)
PHOSPHATE SERPL-MCNC: 3.1 MG/DL (ref 2.5–4.9)
PHOSPHATE SERPL-MCNC: 5.5 MG/DL (ref 2.5–4.9)
PLATELET # BLD AUTO: 114 X10*3/UL (ref 150–450)
PO2 BLDA: 338 MM HG (ref 85–95)
PO2 BLDA: 351 MM HG (ref 85–95)
PO2 BLDA: 362 MM HG (ref 85–95)
PO2 BLDA: 417 MM HG (ref 85–95)
PO2 BLDA: 87 MM HG (ref 85–95)
PO2 BLDMV: 35 MM HG (ref 35–45)
PO2 BLDMV: 38 MM HG (ref 35–45)
PO2 BLDMV: 39 MM HG (ref 35–45)
PO2 BLDMV: 55 MM HG (ref 35–45)
PO2 BLDV: 45 MM HG (ref 35–45)
POTASSIUM BLDA-SCNC: 4.6 MMOL/L (ref 3.5–5.3)
POTASSIUM BLDA-SCNC: 5 MMOL/L (ref 3.5–5.3)
POTASSIUM BLDA-SCNC: 5.2 MMOL/L (ref 3.5–5.3)
POTASSIUM BLDA-SCNC: 5.2 MMOL/L (ref 3.5–5.3)
POTASSIUM BLDA-SCNC: 6.2 MMOL/L (ref 3.5–5.3)
POTASSIUM BLDMV-SCNC: 3.3 MMOL/L (ref 3.5–5.3)
POTASSIUM BLDMV-SCNC: 3.5 MMOL/L (ref 3.5–5.3)
POTASSIUM BLDMV-SCNC: 4 MMOL/L (ref 3.5–5.3)
POTASSIUM BLDMV-SCNC: 4.5 MMOL/L (ref 3.5–5.3)
POTASSIUM BLDV-SCNC: 3 MMOL/L (ref 3.5–5.3)
POTASSIUM SERPL-SCNC: 3.1 MMOL/L (ref 3.5–5.3)
POTASSIUM SERPL-SCNC: 4 MMOL/L (ref 3.5–5.3)
POTASSIUM SERPL-SCNC: 4.3 MMOL/L (ref 3.5–5.3)
PR INTERVAL: 124 MS
PRODUCT BLOOD TYPE: 6200
PRODUCT CODE: NORMAL
PROT SERPL-MCNC: 5.2 G/DL (ref 6.4–8.2)
Q ONSET: 215 MS
QRS COUNT: 11 BEATS
QRS DURATION: 102 MS
QT INTERVAL: 526 MS
QTC CALCULATION(BAZETT): 551 MS
QTC FREDERICIA: 543 MS
R AXIS: 81 DEGREES
RBC # BLD AUTO: 2.65 X10*6/UL (ref 4.5–5.9)
SAO2 % BLDA: 100 % (ref 94–100)
SAO2 % BLDA: 97 % (ref 94–100)
SAO2 % BLDMV: 48 % (ref 45–75)
SAO2 % BLDMV: 53 % (ref 45–75)
SAO2 % BLDMV: 53 % (ref 45–75)
SAO2 % BLDMV: 86 % (ref 45–75)
SAO2 % BLDV: 67 % (ref 45–75)
SODIUM BLDA-SCNC: 123 MMOL/L (ref 136–145)
SODIUM BLDA-SCNC: 124 MMOL/L (ref 136–145)
SODIUM BLDA-SCNC: 125 MMOL/L (ref 136–145)
SODIUM BLDMV-SCNC: 125 MMOL/L (ref 136–145)
SODIUM BLDMV-SCNC: 129 MMOL/L (ref 136–145)
SODIUM BLDMV-SCNC: 130 MMOL/L (ref 136–145)
SODIUM BLDMV-SCNC: 130 MMOL/L (ref 136–145)
SODIUM BLDV-SCNC: 133 MMOL/L (ref 136–145)
SODIUM SERPL-SCNC: 130 MMOL/L (ref 136–145)
SODIUM SERPL-SCNC: 131 MMOL/L (ref 136–145)
SODIUM SERPL-SCNC: 133 MMOL/L (ref 136–145)
T AXIS: 67 DEGREES
T OFFSET: 478 MS
UNIT ABO: NORMAL
UNIT NUMBER: NORMAL
UNIT RH: NORMAL
UNIT VOLUME: 223
UNIT VOLUME: 229
UNIT VOLUME: 306
UNIT VOLUME: 314
VANCOMYCIN SERPL-MCNC: 8.6 UG/ML (ref 5–20)
VENTRICULAR RATE: 66 BPM
WBC # BLD AUTO: 27.5 X10*3/UL (ref 4.4–11.3)

## 2025-05-28 PROCEDURE — C1887 CATHETER, GUIDING: HCPCS | Performed by: THORACIC SURGERY (CARDIOTHORACIC VASCULAR SURGERY)

## 2025-05-28 PROCEDURE — 2500000005 HC RX 250 GENERAL PHARMACY W/O HCPCS

## 2025-05-28 PROCEDURE — 84132 ASSAY OF SERUM POTASSIUM: CPT

## 2025-05-28 PROCEDURE — 02PA3RZ REMOVAL OF SHORT-TERM EXTERNAL HEART ASSIST SYSTEM FROM HEART, PERCUTANEOUS APPROACH: ICD-10-PCS | Performed by: THORACIC SURGERY (CARDIOTHORACIC VASCULAR SURGERY)

## 2025-05-28 PROCEDURE — G0545 PR INHERENT VISIT TO INPT: HCPCS | Performed by: STUDENT IN AN ORGANIZED HEALTH CARE EDUCATION/TRAINING PROGRAM

## 2025-05-28 PROCEDURE — 99291 CRITICAL CARE FIRST HOUR: CPT

## 2025-05-28 PROCEDURE — 2500000004 HC RX 250 GENERAL PHARMACY W/ HCPCS (ALT 636 FOR OP/ED)

## 2025-05-28 PROCEDURE — 2720000007 HC OR 272 NO HCPCS: Performed by: THORACIC SURGERY (CARDIOTHORACIC VASCULAR SURGERY)

## 2025-05-28 PROCEDURE — 93750 INTERROGATION VAD IN PERSON: CPT

## 2025-05-28 PROCEDURE — 2500000004 HC RX 250 GENERAL PHARMACY W/ HCPCS (ALT 636 FOR OP/ED): Mod: JZ

## 2025-05-28 PROCEDURE — 2500000002 HC RX 250 W HCPCS SELF ADMINISTERED DRUGS (ALT 637 FOR MEDICARE OP, ALT 636 FOR OP/ED)

## 2025-05-28 PROCEDURE — 83605 ASSAY OF LACTIC ACID: CPT

## 2025-05-28 PROCEDURE — 83735 ASSAY OF MAGNESIUM: CPT

## 2025-05-28 PROCEDURE — 2020000001 HC ICU ROOM DAILY

## 2025-05-28 PROCEDURE — 3600000017 HC OR TIME - EACH INCREMENTAL 1 MINUTE - PROCEDURE LEVEL SIX: Performed by: THORACIC SURGERY (CARDIOTHORACIC VASCULAR SURGERY)

## 2025-05-28 PROCEDURE — 71045 X-RAY EXAM CHEST 1 VIEW: CPT

## 2025-05-28 PROCEDURE — 93325 DOPPLER ECHO COLOR FLOW MAPG: CPT | Performed by: STUDENT IN AN ORGANIZED HEALTH CARE EDUCATION/TRAINING PROGRAM

## 2025-05-28 PROCEDURE — 83615 LACTATE (LD) (LDH) ENZYME: CPT

## 2025-05-28 PROCEDURE — 80202 ASSAY OF VANCOMYCIN: CPT

## 2025-05-28 PROCEDURE — 2500000004 HC RX 250 GENERAL PHARMACY W/ HCPCS (ALT 636 FOR OP/ED): Mod: JZ | Performed by: STUDENT IN AN ORGANIZED HEALTH CARE EDUCATION/TRAINING PROGRAM

## 2025-05-28 PROCEDURE — 93312 ECHO TRANSESOPHAGEAL: CPT | Performed by: STUDENT IN AN ORGANIZED HEALTH CARE EDUCATION/TRAINING PROGRAM

## 2025-05-28 PROCEDURE — 2500000001 HC RX 250 WO HCPCS SELF ADMINISTERED DRUGS (ALT 637 FOR MEDICARE OP)

## 2025-05-28 PROCEDURE — 82248 BILIRUBIN DIRECT: CPT

## 2025-05-28 PROCEDURE — 93325 DOPPLER ECHO COLOR FLOW MAPG: CPT

## 2025-05-28 PROCEDURE — 93321 DOPPLER ECHO F-UP/LMTD STD: CPT | Performed by: STUDENT IN AN ORGANIZED HEALTH CARE EDUCATION/TRAINING PROGRAM

## 2025-05-28 PROCEDURE — C1769 GUIDE WIRE: HCPCS | Performed by: THORACIC SURGERY (CARDIOTHORACIC VASCULAR SURGERY)

## 2025-05-28 PROCEDURE — 99233 SBSQ HOSP IP/OBS HIGH 50: CPT

## 2025-05-28 PROCEDURE — 34716 OPN AX/SUBCLA ART EXPOS CNDT: CPT | Performed by: THORACIC SURGERY (CARDIOTHORACIC VASCULAR SURGERY)

## 2025-05-28 PROCEDURE — 85347 COAGULATION TIME ACTIVATED: CPT

## 2025-05-28 PROCEDURE — 2780000003 HC OR 278 NO HCPCS: Performed by: THORACIC SURGERY (CARDIOTHORACIC VASCULAR SURGERY)

## 2025-05-28 PROCEDURE — C1889 IMPLANT/INSERT DEVICE, NOC: HCPCS | Performed by: THORACIC SURGERY (CARDIOTHORACIC VASCULAR SURGERY)

## 2025-05-28 PROCEDURE — 99233 SBSQ HOSP IP/OBS HIGH 50: CPT | Performed by: STUDENT IN AN ORGANIZED HEALTH CARE EDUCATION/TRAINING PROGRAM

## 2025-05-28 PROCEDURE — 94003 VENT MGMT INPAT SUBQ DAY: CPT

## 2025-05-28 PROCEDURE — 82947 ASSAY GLUCOSE BLOOD QUANT: CPT

## 2025-05-28 PROCEDURE — 37799 UNLISTED PX VASCULAR SURGERY: CPT

## 2025-05-28 PROCEDURE — 99232 SBSQ HOSP IP/OBS MODERATE 35: CPT | Performed by: PHYSICIAN ASSISTANT

## 2025-05-28 PROCEDURE — 3700000002 HC GENERAL ANESTHESIA TIME - EACH INCREMENTAL 1 MINUTE: Performed by: THORACIC SURGERY (CARDIOTHORACIC VASCULAR SURGERY)

## 2025-05-28 PROCEDURE — 33990 INSJ PERQ VAD L HRT ARTERIAL: CPT | Performed by: THORACIC SURGERY (CARDIOTHORACIC VASCULAR SURGERY)

## 2025-05-28 PROCEDURE — 3700000001 HC GENERAL ANESTHESIA TIME - INITIAL BASE CHARGE: Performed by: THORACIC SURGERY (CARDIOTHORACIC VASCULAR SURGERY)

## 2025-05-28 PROCEDURE — 3600000012 HC PERFUSION TIME - EACH INCREMENTAL 1 MINUTE: Performed by: THORACIC SURGERY (CARDIOTHORACIC VASCULAR SURGERY)

## 2025-05-28 PROCEDURE — 84100 ASSAY OF PHOSPHORUS: CPT

## 2025-05-28 PROCEDURE — 2500000004 HC RX 250 GENERAL PHARMACY W/ HCPCS (ALT 636 FOR OP/ED): Mod: JZ | Performed by: THORACIC SURGERY (CARDIOTHORACIC VASCULAR SURGERY)

## 2025-05-28 PROCEDURE — 82375 ASSAY CARBOXYHB QUANT: CPT

## 2025-05-28 PROCEDURE — 5A0221D ASSISTANCE WITH CARDIAC OUTPUT USING IMPELLER PUMP, CONTINUOUS: ICD-10-PCS | Performed by: THORACIC SURGERY (CARDIOTHORACIC VASCULAR SURGERY)

## 2025-05-28 PROCEDURE — 90937 HEMODIALYSIS REPEATED EVAL: CPT

## 2025-05-28 PROCEDURE — 76705 ECHO EXAM OF ABDOMEN: CPT | Performed by: RADIOLOGY

## 2025-05-28 PROCEDURE — C1768 GRAFT, VASCULAR: HCPCS | Performed by: THORACIC SURGERY (CARDIOTHORACIC VASCULAR SURGERY)

## 2025-05-28 PROCEDURE — 51702 INSERT TEMP BLADDER CATH: CPT

## 2025-05-28 PROCEDURE — 3600000018 HC OR TIME - INITIAL BASE CHARGE - PROCEDURE LEVEL SIX: Performed by: THORACIC SURGERY (CARDIOTHORACIC VASCULAR SURGERY)

## 2025-05-28 PROCEDURE — C1760 CLOSURE DEV, VASC: HCPCS | Performed by: THORACIC SURGERY (CARDIOTHORACIC VASCULAR SURGERY)

## 2025-05-28 PROCEDURE — 99232 SBSQ HOSP IP/OBS MODERATE 35: CPT | Performed by: INTERNAL MEDICINE

## 2025-05-28 PROCEDURE — 85060 BLOOD SMEAR INTERPRETATION: CPT

## 2025-05-28 PROCEDURE — 83010 ASSAY OF HAPTOGLOBIN QUANT: CPT

## 2025-05-28 PROCEDURE — X2HL0F9 INSERTION OF CONDUIT TO SHORT-TERM EXTERNAL HEART ASSIST SYSTEM INTO RIGHT AXILLARY ARTERY, OPEN APPROACH, NEW TECHNOLOGY GROUP 9: ICD-10-PCS | Performed by: THORACIC SURGERY (CARDIOTHORACIC VASCULAR SURGERY)

## 2025-05-28 PROCEDURE — 3600000011 HC PERFUSION TIME - INITIAL BASE CHARGE: Performed by: THORACIC SURGERY (CARDIOTHORACIC VASCULAR SURGERY)

## 2025-05-28 PROCEDURE — 85027 COMPLETE CBC AUTOMATED: CPT

## 2025-05-28 PROCEDURE — A4312 CATH W/O BAG 2-WAY SILICONE: HCPCS | Performed by: THORACIC SURGERY (CARDIOTHORACIC VASCULAR SURGERY)

## 2025-05-28 DEVICE — GELWEAVE GELATIN IMPREGNATED WOVEN VASCULAR PROSTHESIS STRAIGHT
Type: IMPLANTABLE DEVICE | Site: ARTERIAL | Status: FUNCTIONAL
Brand: GELWEAVE™

## 2025-05-28 DEVICE — DEVICE, IMPELLA 5.5 S2, W/SMARTASSIST SET: Type: IMPLANTABLE DEVICE | Site: CHEST | Status: FUNCTIONAL

## 2025-05-28 RX ORDER — POTASSIUM CHLORIDE 1.5 G/1.58G
40 POWDER, FOR SOLUTION ORAL ONCE
Status: COMPLETED | OUTPATIENT
Start: 2025-05-28 | End: 2025-05-28

## 2025-05-28 RX ORDER — BISACODYL 10 MG/1
10 SUPPOSITORY RECTAL DAILY PRN
Status: DISCONTINUED | OUTPATIENT
Start: 2025-05-28 | End: 2025-06-04

## 2025-05-28 RX ORDER — HEPARIN SODIUM,PORCINE/PF 10 UNIT/ML
3 SYRINGE (ML) INTRAVENOUS EVERY 8 HOURS SCHEDULED
Status: DISCONTINUED | OUTPATIENT
Start: 2025-05-28 | End: 2025-05-28

## 2025-05-28 RX ORDER — VANCOMYCIN HYDROCHLORIDE 750 MG/150ML
750 INJECTION, SOLUTION INTRAVENOUS EVERY 12 HOURS
Status: DISCONTINUED | OUTPATIENT
Start: 2025-05-29 | End: 2025-05-28

## 2025-05-28 RX ORDER — HEPARIN SODIUM,PORCINE/PF 10 UNIT/ML
10 SYRINGE (ML) INTRAVENOUS AS NEEDED
Status: DISCONTINUED | OUTPATIENT
Start: 2025-05-28 | End: 2025-05-28

## 2025-05-28 RX ORDER — PROPOFOL 10 MG/ML
INJECTION, EMULSION INTRAVENOUS AS NEEDED
Status: DISCONTINUED | OUTPATIENT
Start: 2025-05-28 | End: 2025-05-28

## 2025-05-28 RX ORDER — MEROPENEM AND SODIUM CHLORIDE 1 G/50ML
1 INJECTION, SOLUTION INTRAVENOUS ONCE
Status: DISCONTINUED | OUTPATIENT
Start: 2025-05-28 | End: 2025-05-29 | Stop reason: DRUGHIGH

## 2025-05-28 RX ORDER — ROCURONIUM BROMIDE 10 MG/ML
INJECTION, SOLUTION INTRAVENOUS AS NEEDED
Status: DISCONTINUED | OUTPATIENT
Start: 2025-05-28 | End: 2025-05-28

## 2025-05-28 RX ORDER — FENTANYL CITRATE 50 UG/ML
INJECTION, SOLUTION INTRAMUSCULAR; INTRAVENOUS CONTINUOUS PRN
Status: DISCONTINUED | OUTPATIENT
Start: 2025-05-28 | End: 2025-05-28

## 2025-05-28 RX ORDER — HEPARIN SODIUM 1000 [USP'U]/ML
INJECTION, SOLUTION INTRAVENOUS; SUBCUTANEOUS
Status: COMPLETED
Start: 2025-05-28 | End: 2025-05-28

## 2025-05-28 RX ORDER — SODIUM CHLORIDE, SODIUM GLUCONATE, SODIUM ACETATE, POTASSIUM CHLORIDE AND MAGNESIUM CHLORIDE 30; 37; 368; 526; 502 MG/100ML; MG/100ML; MG/100ML; MG/100ML; MG/100ML
INJECTION, SOLUTION INTRAVENOUS CONTINUOUS PRN
Status: DISCONTINUED | OUTPATIENT
Start: 2025-05-28 | End: 2025-05-28

## 2025-05-28 RX ORDER — VANCOMYCIN HYDROCHLORIDE 750 MG/150ML
750 INJECTION, SOLUTION INTRAVENOUS EVERY 12 HOURS
Status: DISCONTINUED | OUTPATIENT
Start: 2025-05-29 | End: 2025-05-30

## 2025-05-28 RX ORDER — HEPARIN SODIUM,PORCINE/PF 10 UNIT/ML
3 SYRINGE (ML) INTRAVENOUS AS NEEDED
Status: DISCONTINUED | OUTPATIENT
Start: 2025-05-28 | End: 2025-05-28

## 2025-05-28 RX ORDER — MAGNESIUM SULFATE HEPTAHYDRATE 40 MG/ML
2 INJECTION, SOLUTION INTRAVENOUS ONCE
Status: COMPLETED | OUTPATIENT
Start: 2025-05-28 | End: 2025-05-28

## 2025-05-28 RX ORDER — PROTAMINE SULFATE 10 MG/ML
INJECTION, SOLUTION INTRAVENOUS AS NEEDED
Status: DISCONTINUED | OUTPATIENT
Start: 2025-05-28 | End: 2025-05-28

## 2025-05-28 RX ADMIN — SODIUM CHLORIDE 500 ML: 9 INJECTION, SOLUTION INTRAVENOUS at 06:01

## 2025-05-28 RX ADMIN — PROTAMINE SULFATE 45 MG: 10 INJECTION, SOLUTION INTRAVENOUS at 18:27

## 2025-05-28 RX ADMIN — DEXMEDETOMIDINE HYDROCHLORIDE 0.8 MCG/KG/HR: 400 INJECTION INTRAVENOUS at 03:48

## 2025-05-28 RX ADMIN — PROTAMINE SULFATE 5 MG: 10 INJECTION, SOLUTION INTRAVENOUS at 18:25

## 2025-05-28 RX ADMIN — ROCURONIUM BROMIDE 60 MG: 10 INJECTION, SOLUTION INTRAVENOUS at 16:47

## 2025-05-28 RX ADMIN — ROCURONIUM BROMIDE 20 MG: 10 INJECTION, SOLUTION INTRAVENOUS at 18:18

## 2025-05-28 RX ADMIN — PANTOPRAZOLE SODIUM 40 MG: 40 INJECTION, POWDER, FOR SOLUTION INTRAVENOUS at 09:09

## 2025-05-28 RX ADMIN — MICAFUNGIN SODIUM 100 MG: 100 INJECTION, POWDER, LYOPHILIZED, FOR SOLUTION INTRAVENOUS at 10:21

## 2025-05-28 RX ADMIN — POLYETHYLENE GLYCOL 3350 17 G: 17 POWDER, FOR SOLUTION ORAL at 14:56

## 2025-05-28 RX ADMIN — POTASSIUM CHLORIDE 40 MEQ: 1.5 POWDER, FOR SOLUTION ORAL at 04:28

## 2025-05-28 RX ADMIN — DEXMEDETOMIDINE HYDROCHLORIDE 0.6 MCG/KG/HR: 400 INJECTION INTRAVENOUS at 14:58

## 2025-05-28 RX ADMIN — DEXMEDETOMIDINE HYDROCHLORIDE 0.8 MCG/KG/HR: 400 INJECTION INTRAVENOUS at 09:09

## 2025-05-28 RX ADMIN — SENNOSIDES 17.2 MG: 8.6 TABLET, FILM COATED ORAL at 09:09

## 2025-05-28 RX ADMIN — HEPARIN SODIUM 2800 UNITS: 1000 INJECTION INTRAVENOUS; SUBCUTANEOUS at 06:30

## 2025-05-28 RX ADMIN — AMIODARONE HYDROCHLORIDE 0.5 MG/MIN: 1.8 INJECTION, SOLUTION INTRAVENOUS at 21:32

## 2025-05-28 RX ADMIN — DEXMEDETOMIDINE HYDROCHLORIDE 0.6 MCG/KG/HR: 400 INJECTION INTRAVENOUS at 21:32

## 2025-05-28 RX ADMIN — CALCIUM CHLORIDE, MAGNESIUM CHLORIDE, DEXTROSE MONOHYDRATE, LACTIC ACID, SODIUM CHLORIDE, SODIUM BICARBONATE AND POTASSIUM CHLORIDE 24 ML/KG/HR: 3.68; 3.05; 22; 5.4; 6.46; 3.09; .314 INJECTION INTRAVENOUS at 22:37

## 2025-05-28 RX ADMIN — PROPOFOL 25 MCG/KG/MIN: 10 INJECTION, EMULSION INTRAVENOUS at 18:42

## 2025-05-28 RX ADMIN — ROCURONIUM BROMIDE 10 MG: 10 INJECTION, SOLUTION INTRAVENOUS at 17:07

## 2025-05-28 RX ADMIN — Medication 30 PERCENT: at 04:04

## 2025-05-28 RX ADMIN — Medication 50 MCG/HR: at 00:25

## 2025-05-28 RX ADMIN — PROPOFOL 40 MG: 10 INJECTION, EMULSION INTRAVENOUS at 16:18

## 2025-05-28 RX ADMIN — VANCOMYCIN HYDROCHLORIDE 1250 MG: 1.25 INJECTION, POWDER, LYOPHILIZED, FOR SOLUTION INTRAVENOUS at 16:08

## 2025-05-28 RX ADMIN — Medication 50 MCG/HR: at 21:42

## 2025-05-28 RX ADMIN — SODIUM CHLORIDE, SODIUM GLUCONATE, SODIUM ACETATE, POTASSIUM CHLORIDE AND MAGNESIUM CHLORIDE: 526; 502; 368; 37; 30 INJECTION, SOLUTION INTRAVENOUS at 16:22

## 2025-05-28 RX ADMIN — POLYETHYLENE GLYCOL 3350 17 G: 17 POWDER, FOR SOLUTION ORAL at 09:09

## 2025-05-28 RX ADMIN — ROCURONIUM BROMIDE 20 MG: 10 INJECTION, SOLUTION INTRAVENOUS at 17:48

## 2025-05-28 RX ADMIN — INSULIN LISPRO 1 UNITS: 100 INJECTION, SOLUTION INTRAVENOUS; SUBCUTANEOUS at 23:28

## 2025-05-28 RX ADMIN — SUGAMMADEX 200 MG: 100 INJECTION, SOLUTION INTRAVENOUS at 20:00

## 2025-05-28 RX ADMIN — HEPARIN SODIUM 10 ML/HR: 10000 INJECTION, SOLUTION INTRAVENOUS; SUBCUTANEOUS at 16:02

## 2025-05-28 RX ADMIN — ASPIRIN 81 MG: 81 TABLET, CHEWABLE ORAL at 09:10

## 2025-05-28 RX ADMIN — AMIODARONE HYDROCHLORIDE 0.5 MG/MIN: 1.8 INJECTION, SOLUTION INTRAVENOUS at 09:09

## 2025-05-28 RX ADMIN — INSULIN LISPRO 1 UNITS: 100 INJECTION, SOLUTION INTRAVENOUS; SUBCUTANEOUS at 12:32

## 2025-05-28 RX ADMIN — MEROPENEM 500 MG: 500 INJECTION, POWDER, FOR SOLUTION INTRAVENOUS at 09:34

## 2025-05-28 RX ADMIN — Medication 30 PERCENT: at 08:02

## 2025-05-28 RX ADMIN — MAGNESIUM SULFATE HEPTAHYDRATE 2 G: 40 INJECTION, SOLUTION INTRAVENOUS at 04:28

## 2025-05-28 ASSESSMENT — PAIN - FUNCTIONAL ASSESSMENT
PAIN_FUNCTIONAL_ASSESSMENT: CPOT (CRITICAL CARE PAIN OBSERVATION TOOL)

## 2025-05-28 ASSESSMENT — PAIN SCALES - GENERAL: PAINLEVEL_OUTOF10: 0 - NO PAIN

## 2025-05-28 NOTE — PROGRESS NOTES
CARDIAC SURGERY CONSULT PROGRESS NOTE    SUBJECTIVE  Mr. Elena is a 45 year old male with a past medical history of tobacco abuse (30 pack years), submandibular abscess, and nephrolithiasis who presented to [facility] with a chief complaint of chest pain, found to have an inferior STEMI, now s/p coronary angiogram with balloon angioplasty to the PDA (right dominant system). His course was complicated by hemodynamic instability (tachycardia, hypotension) requiring vasopressor support and laboratory evidnce of end organ dysfunction. Due to concern for cardiogenic vs mixed shock, a shock call was intervened and recommended transfer to [facility] CICU. TTE at [facility] revealed large VSD. He underwent Impella CP placement 5/20.      Cardiac surgery is consulted for a VSD repair post STEMI.      Hospital course:   Per chart review, presented to [facility] 05/18 PM for the evaluation of chest tightness associated with SOB/nausea. On arrival he was afebrile, tachycardic, and hypertensive. ED workup remarkable for initial WBC 20.5, Cr 0.79, troponin ~24K,  and ECG with ST elevations in the inferior leads with reciprocal depressions. He was loaded with aspirin and brilinta and started on heparin gtt. He was taken for emergent coronary angiogram which revealed multi vessel CAD [LAD/Lcx/RCA mildly/diffusely diseased,100% RPDA culprit lesion] with PTCA to RPDA with 50% residual stenosis [GLENNA 0->3] (too small to stent). Notably, LVEF 45% and LVED 20-25 mmHg.      He then developed on 5/19 persistent tachycardia despite IVF and IV metoprolol. TTE with LVEF 55%, wall motion abnormalities, and no significant valvular disease. Due to concern for acute aortic pathology vs PE, he underwent a CTA CAP revealing of mild pulmonary edema. Due to concern for PNA, blood cultures were obtained and he was started on CTX/Doxycycline. Later that evening was in respiratory distress (tachypneic to RR 50) with hypotension (BP 85/52 ) and satting  92% on RA with labs revealing of end organ dysfunction and CXR with bilateral pulmonary infiltrates [pulmoinary edema vs multifocal PNA]. A shock call was convened and recommended intubation and PA catheter placement for adjudication of hemodynamics. He was intubated without issue and opening Hamlin numbers were revealing of RA 14, RV 55/18, PA 51/30 (40), PCWP 32, svO2 87, [place] CO 13.9, [place] CI 7.3.  He was then transferred to [facility].      On arrival to the CICU he was hypotensive with escalating pressor requirements. Bedside examination reveals a holosystolic murmur and serial mixed venous are revealing of a significant step up in SVO2 from the RA to PA concerning for the presence of a ischemic VSD, perhaps secondardy to a late presenting STEMI given troponin elevation to 24K on presentation. Shock call was done that recommended an impella CP placement to offload LV given concern for STEMI-induced VSD/ Inferior LV wall rupture.      Cardiac/Pertinent Imaging  C: 5/18/25:  CONCLUSIONS:   1. Left Main Coronary Artery: This artery is normal.   2. Left Anterior Descending Artery: is mildly diseased, is diffusely diseased and is ectatic.   3. Circumflex Coronary Artery: diffusely dieased, mildly diseased and ectatic.   4. Right Coronary Artery: is tortuous, is diffusely diseased and mildly diseased.   5. PDA RCA Lesion: The percent stenosis is 100%.   6. PDA RCA Lesion: PTCA: 50% residual stenosis. RCA: pre-procedure GLENNA flow was 0(no perfusion) and post-procedure GLENNA flow was 3(complete perfusion).   7. The Left Ventricular Ejection Fraction is 45%.   8. Left Ventricular End Diastolic Pressure: 20-25 mm Hg.   9. Aortic Stenosis: None.  10. Left Ventricular End Diastolic Pressure Dysfunction: Moderate.  11. LV: inferior akinesis.  12. LV: mild left ventricular dysfunction with regional wall motion abnormalities.     Echo: 5/20/25:  CONCLUSIONS:   1. Poorly visualized anatomical structures due to suboptimal image  "quality.   2. The left ventricle was not well visualized. The left ventricular ejection fraction could not be measured.   3. Limited views of the LV appear show a hyderdynamic LV with a large color flow from LV to RV through the inferoseptum with maximal velocity of 3.3m/sec, Unable to adequatly assess LVEF, though appears to have baal septal and inferior wall motion abnormality.   4. There is reduced right ventricular systolic function.   5. The RV appears dilated with significantly reduced function with reduced TAPSE and fractional area change.   6. Primary service notified of findings at the time of reporting.   7. Compared with the prior exam from 5/19/2025 ([facility]) there has been interval development of a large VSD throuh the inferoseptum with the RV now becoming large with significantly reduced function. Prior echo with reported LVEF of 55% with basal inferospetal and baal inferior hypokinesis in the setting of STEMI. RV size and function were nornal at that time. ( Note that the septum was more thoroughly interrogated with color Doppler today).    Objective   BP 91/65   Pulse 63   Temp 37.1 °C (98.8 °F) (Temporal)   Resp 11   Ht 1.727 m (5' 8\")   Wt 91.2 kg (201 lb)   SpO2 100%   BMI 30.56 kg/m²   Critical-Care Pain Observation Score:  [0]    Vitals:    05/28/25 0603   Weight: 91.2 kg (201 lb)           Intake/Output Summary (Last 24 hours) at 5/28/2025 1255  Last data filed at 5/28/2025 1200  Gross per 24 hour   Intake 2507.7 ml   Output 1632 ml   Net 875.7 ml         Medications  Scheduled medications  Scheduled Medications[1]Continuous medications  Continuous Medications[2]PRN medications  PRN Medications[3]    Labs  Results for orders placed or performed during the hospital encounter of 05/20/25 (from the past 24 hours)   ACTIVATED CLOTTING TIME LOW   Result Value Ref Range    POCT Activated Clotting Time Low Range 179 83 - 199 sec   POCT GLUCOSE   Result Value Ref Range    POCT " Glucose 194 (H) 74 - 99 mg/dL   ACTIVATED CLOTTING TIME LOW   Result Value Ref Range    POCT Activated Clotting Time Low Range 168 83 - 199 sec   CBC and Auto Differential   Result Value Ref Range    WBC 24.9 (H) 4.4 - 11.3 x10*3/uL    nRBC 8.6 (H) 0.0 - 0.0 /100 WBCs    RBC 2.61 (L) 4.50 - 5.90 x10*6/uL    Hemoglobin 8.2 (L) 13.5 - 17.5 g/dL    Hematocrit 22.5 (L) 41.0 - 52.0 %    MCV 86 80 - 100 fL    MCH 31.4 26.0 - 34.0 pg    MCHC 36.4 (H) 32.0 - 36.0 g/dL    RDW 15.7 (H) 11.5 - 14.5 %    Platelets 89 (L) 150 - 450 x10*3/uL    Immature Granulocytes %, Automated 12.7 (H) 0.0 - 0.9 %    Immature Granulocytes Absolute, Automated 3.16 (H) 0.00 - 0.70 x10*3/uL   Hepatic function panel   Result Value Ref Range    Albumin 2.8 (L) 3.4 - 5.0 g/dL    Bilirubin, Total 20.6 (H) 0.0 - 1.2 mg/dL    Bilirubin, Direct 15.3 (H) 0.0 - 0.3 mg/dL    Alkaline Phosphatase 443 (H) 33 - 120 U/L     (H) 10 - 52 U/L     (H) 9 - 39 U/L    Total Protein 4.9 (L) 6.4 - 8.2 g/dL   Magnesium   Result Value Ref Range    Magnesium 3.25 (H) 1.60 - 2.40 mg/dL   Coagulation Screen   Result Value Ref Range    Protime 14.6 (H) 9.8 - 12.4 seconds    INR 1.3 (H) 0.9 - 1.1    aPTT 33 26 - 36 seconds   Lactate   Result Value Ref Range    Lactate 1.3 0.4 - 2.0 mmol/L   Phosphorus   Result Value Ref Range    Phosphorus 8.9 (H) 2.5 - 4.9 mg/dL   Basic Metabolic Panel   Result Value Ref Range    Glucose 236 (H) 74 - 99 mg/dL    Sodium 127 (L) 136 - 145 mmol/L    Potassium 4.4 3.5 - 5.3 mmol/L    Chloride 86 (L) 98 - 107 mmol/L    Bicarbonate 18 (L) 21 - 32 mmol/L    Anion Gap 27 (H) 10 - 20 mmol/L    Urea Nitrogen 126 (HH) 6 - 23 mg/dL    Creatinine 10.11 (H) 0.50 - 1.30 mg/dL    eGFR 6 (L) >60 mL/min/1.73m*2    Calcium 8.5 (L) 8.6 - 10.6 mg/dL   Manual Differential   Result Value Ref Range    Neutrophils %, Manual 75.0 40.0 - 80.0 %    Bands %, Manual 6.9 0.0 - 5.0 %    Lymphocytes %, Manual 7.7 13.0 - 44.0 %    Monocytes %, Manual 2.6 2.0 -  10.0 %    Eosinophils %, Manual 0.0 0.0 - 6.0 %    Basophils %, Manual 0.0 0.0 - 2.0 %    Atypical Lymphocytes %, Manual 0.0 0.0 - 2.0 %    Metamyelocytes %, Manual 2.6 0.0 - 0.0 %    Myelocytes %, Manual 4.3 0.0 - 0.0 %    Plasma Cells %, Manual 0.0 0.00 - 0.00 %    Promyelocytes %, Manual 0.9 0.0 - 0.0 %    Blasts %, Manual 0.0 0.0 - 0.0 %    Seg Neutrophils Absolute, Manual 18.68 (H) 1.20 - 7.00 x10*3/uL    Bands Absolute, Manual 1.72 (H) 0.00 - 0.70 x10*3/uL    Lymphocytes Absolute, Manual 1.92 1.20 - 4.80 x10*3/uL    Monocytes Absolute, Manual 0.65 0.10 - 1.00 x10*3/uL    Eosinophils Absolute, Manual 0.00 0.00 - 0.70 x10*3/uL    Basophils Absolute, Manual 0.00 0.00 - 0.10 x10*3/uL    Atypical Lymphs Absolute, Manual 0.00 0.00 - 0.50 x10*3/uL    Metamyelocytes Absolute, Manual 0.65 0.00 - 0.00 x10*3/uL    Myelocytes Absolute, Manual 1.07 0.00 - 0.00 x10*3/uL    Plasma Cells Absolute, Manual 0.00 0.00 - 0.00 x10*3/uL    Promyelocytes Absolute, Manual 0.22 0.00 - 0.00 x10*3/uL    Blasts Absolute, Manual 0.00 0.00 - 0.00 x10*3/uL    Total Cells Counted 116     Neutrophils Absolute, Manual 20.40 (H) 1.20 - 7.70 x10*3/uL    Manual nRBC per 100 Cells 0.0 0.0 - 0.0 %    RBC Morphology See Below     Polychromasia Mild     Tonny Cells Few    Blood Gas Arterial Full Panel   Result Value Ref Range    POCT pH, Arterial 7.30 (L) 7.38 - 7.42 pH    POCT pCO2, Arterial 29 (L) 38 - 42 mm Hg    POCT pO2, Arterial 133 (H) 85 - 95 mm Hg    POCT SO2, Arterial 100 94 - 100 %    POCT Oxy Hemoglobin, Arterial 95.6 94.0 - 98.0 %    POCT Hematocrit Calculated, Arterial 29.0 (L) 41.0 - 52.0 %    POCT Sodium, Arterial 123 (L) 136 - 145 mmol/L    POCT Potassium, Arterial 4.6 3.5 - 5.3 mmol/L    POCT Chloride, Arterial 90 (L) 98 - 107 mmol/L    POCT Ionized Calcium, Arterial 1.05 (L) 1.10 - 1.33 mmol/L    POCT Glucose, Arterial 233 (H) 74 - 99 mg/dL    POCT Lactate, Arterial 1.8 0.4 - 2.0 mmol/L    POCT Base Excess, Arterial -10.9 (L) -2.0 -  3.0 mmol/L    POCT HCO3 Calculated, Arterial 14.3 (L) 22.0 - 26.0 mmol/L    POCT Hemoglobin, Arterial 9.8 (L) 13.5 - 17.5 g/dL    POCT Anion Gap, Arterial 23 10 - 25 mmo/L    Patient Temperature 37.0 degrees Celsius    FiO2 30 %    Ventilator Mode A/C    Blood Gas Mixed Venous Full Panel   Result Value Ref Range    POCT pH, Mixed 7.26 (L) 7.33 - 7.43 pH    POCT pCO2, Mixed 34 (L) 41 - 51 mm Hg    POCT pO2, Mixed 39 35 - 45 mm Hg    POCT SO2, Mixed 55 45 - 75 %    POCT Oxy Hemoglobin, Mixed 53.4 45.0 - 75.0 %    POCT Hematocrit Calculated, Mixed 24.0 (L) 41.0 - 52.0 %    POCT Sodium, Mixed 125 (L) 136 - 145 mmol/L    POCT Potassium, Mixed 4.3 3.5 - 5.3 mmol/L    POCT Chloride, Mixed 93 (L) 98 - 107 mmol/L    POCT Ionized Calcium, Mixed 1.04 (L) 1.10 - 1.33 mmol/L    POCT Glucose, Mixed 215 (H) 74 - 99 mg/dL    POCT Lactate, Mixed 1.6 0.4 - 2.0 mmol/L    POCT Base Excess, Mixed -10.8 (L) -2.0 - 3.0 mmol/L    POCT HCO3 Calculated, Mixed 15.3 (L) 22.0 - 26.0 mmol/L    POCT Hemoglobin, Mixed 8.0 (L) 13.5 - 17.5 g/dL    POCT Anion Gap, Mixed 21 10 - 25 mmo/L    Patient Temperature 37.0 degrees Celsius    FiO2 30 %    Ventilator Mode A/C    ACTIVATED CLOTTING TIME LOW   Result Value Ref Range    POCT Activated Clotting Time Low Range 170 83 - 199 sec   ACTIVATED CLOTTING TIME LOW   Result Value Ref Range    POCT Activated Clotting Time Low Range 155 83 - 199 sec   POCT GLUCOSE   Result Value Ref Range    POCT Glucose 203 (H) 74 - 99 mg/dL   ACTIVATED CLOTTING TIME LOW   Result Value Ref Range    POCT Activated Clotting Time Low Range 178 83 - 199 sec   ACTIVATED CLOTTING TIME LOW   Result Value Ref Range    POCT Activated Clotting Time Low Range 176 83 - 199 sec   ACTIVATED CLOTTING TIME LOW   Result Value Ref Range    POCT Activated Clotting Time Low Range 168 83 - 199 sec   POCT GLUCOSE   Result Value Ref Range    POCT Glucose 155 (H) 74 - 99 mg/dL   Blood Gas Mixed Venous Full Panel   Result Value Ref Range    POCT pH,  Mixed 7.43 7.33 - 7.43 pH    POCT pCO2, Mixed 32 (L) 41 - 51 mm Hg    POCT pO2, Mixed 55 (H) 35 - 45 mm Hg    POCT SO2, Mixed 86 (H) 45 - 75 %    POCT Oxy Hemoglobin, Mixed 81.9 (H) 45.0 - 75.0 %    POCT Hematocrit Calculated, Mixed 28.0 (L) 41.0 - 52.0 %    POCT Sodium, Mixed 129 (L) 136 - 145 mmol/L    POCT Potassium, Mixed 3.5 3.5 - 5.3 mmol/L    POCT Chloride, Mixed 95 (L) 98 - 107 mmol/L    POCT Ionized Calcium, Mixed 0.98 (L) 1.10 - 1.33 mmol/L    POCT Glucose, Mixed 124 (H) 74 - 99 mg/dL    POCT Lactate, Mixed 1.3 0.4 - 2.0 mmol/L    POCT Base Excess, Mixed -2.6 (L) -2.0 - 3.0 mmol/L    POCT HCO3 Calculated, Mixed 21.2 (L) 22.0 - 26.0 mmol/L    POCT Hemoglobin, Mixed 9.3 (L) 13.5 - 17.5 g/dL    POCT Anion Gap, Mixed 16 10 - 25 mmo/L    Patient Temperature 37.0 degrees Celsius    FiO2 30 %   ACTIVATED CLOTTING TIME LOW   Result Value Ref Range    POCT Activated Clotting Time Low Range 155 83 - 199 sec   Blood Gas Mixed Venous Full Panel   Result Value Ref Range    POCT pH, Mixed 7.42 7.33 - 7.43 pH    POCT pCO2, Mixed 35 (L) 41 - 51 mm Hg    POCT pO2, Mixed 35 35 - 45 mm Hg    POCT SO2, Mixed 53 45 - 75 %    POCT Oxy Hemoglobin, Mixed 50.4 45.0 - 75.0 %    POCT Hematocrit Calculated, Mixed 26.0 (L) 41.0 - 52.0 %    POCT Sodium, Mixed 130 (L) 136 - 145 mmol/L    POCT Potassium, Mixed 3.3 (L) 3.5 - 5.3 mmol/L    POCT Chloride, Mixed 96 (L) 98 - 107 mmol/L    POCT Ionized Calcium, Mixed 0.96 (L) 1.10 - 1.33 mmol/L    POCT Glucose, Mixed 122 (H) 74 - 99 mg/dL    POCT Lactate, Mixed 1.3 0.4 - 2.0 mmol/L    POCT Base Excess, Mixed -1.5 -2.0 - 3.0 mmol/L    POCT HCO3 Calculated, Mixed 22.7 22.0 - 26.0 mmol/L    POCT Hemoglobin, Mixed 8.7 (L) 13.5 - 17.5 g/dL    POCT Anion Gap, Mixed 15 10 - 25 mmo/L    Patient Temperature 37.0 degrees Celsius    FiO2 30 %   ACTIVATED CLOTTING TIME LOW   Result Value Ref Range    POCT Activated Clotting Time Low Range 200 (H) 83 - 199 sec   ACTIVATED CLOTTING TIME LOW   Result Value  Ref Range    POCT Activated Clotting Time Low Range 163 83 - 199 sec   Magnesium   Result Value Ref Range    Magnesium 2.31 1.60 - 2.40 mg/dL   CBC and Auto Differential   Result Value Ref Range    WBC 27.5 (H) 4.4 - 11.3 x10*3/uL    nRBC 12.9 (H) 0.0 - 0.0 /100 WBCs    RBC 2.65 (L) 4.50 - 5.90 x10*6/uL    Hemoglobin 8.2 (L) 13.5 - 17.5 g/dL    Hematocrit 22.8 (L) 41.0 - 52.0 %    MCV 86 80 - 100 fL    MCH 30.9 26.0 - 34.0 pg    MCHC 36.0 32.0 - 36.0 g/dL    RDW 16.5 (H) 11.5 - 14.5 %    Platelets 114 (L) 150 - 450 x10*3/uL    Neutrophils %      Immature Granulocytes %, Automated 12.3 (H) 0.0 - 0.9 %    Lymphocytes %      Monocytes %      Eosinophils %      Basophils %      Neutrophils Absolute      Immature Granulocytes Absolute, Automated 3.38 (H) 0.00 - 0.70 x10*3/uL    Lymphocytes Absolute      Monocytes Absolute      Eosinophils Absolute      Basophils Absolute     Hepatic function panel   Result Value Ref Range    Albumin 3.0 (L) 3.4 - 5.0 g/dL    Bilirubin, Total 21.0 (H) 0.0 - 1.2 mg/dL    Bilirubin, Direct 19.8 (H) 0.0 - 0.3 mg/dL    Alkaline Phosphatase 449 (H) 33 - 120 U/L     (H) 10 - 52 U/L     (H) 9 - 39 U/L    Total Protein 5.2 (L) 6.4 - 8.2 g/dL   Lactate dehydrogenase   Result Value Ref Range    LDH 2,618 (H) 84 - 246 U/L   Haptoglobin   Result Value Ref Range    Haptoglobin <30 (L) 30 - 200 mg/dL   Phosphorus   Result Value Ref Range    Phosphorus 2.5 2.5 - 4.9 mg/dL   Basic Metabolic Panel   Result Value Ref Range    Glucose 114 (H) 74 - 99 mg/dL    Sodium 133 (L) 136 - 145 mmol/L    Potassium 3.1 (L) 3.5 - 5.3 mmol/L    Chloride 92 (L) 98 - 107 mmol/L    Bicarbonate 29 21 - 32 mmol/L    Anion Gap 15 10 - 20 mmol/L    Urea Nitrogen 35 (H) 6 - 23 mg/dL    Creatinine 3.11 (H) 0.50 - 1.30 mg/dL    eGFR 24 (L) >60 mL/min/1.73m*2    Calcium 7.6 (L) 8.6 - 10.6 mg/dL   Pathologist Review-CBC Differential   Result Value Ref Range    Pathologist Review-CBC Differential       Leukocytosis with  neutrophilia with left shift. Normocytic anemia with polychromasia, anisocytosis, and frequent nRBCs. Thrombocytopenia with no evidence of platelet clumps.   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 Leukoerythroblastic anemia can be seen in the setting of severe infection, rebound following bone marrow failure / suppression, bone marrow infiltration, myeloid neoplasms, granulomatous disease, storage diseases, hemolysis, bone marrow infarction, autoimmune diseases, among other conditions. Clinical correlation suggested.   ACTIVATED CLOTTING TIME LOW   Result Value Ref Range    POCT Activated Clotting Time Low Range 178 83 - 199 sec   ACTIVATED CLOTTING TIME LOW   Result Value Ref Range    POCT Activated Clotting Time Low Range 161 83 - 199 sec   POCT GLUCOSE   Result Value Ref Range    POCT Glucose 103 (H) 74 - 99 mg/dL   ACTIVATED CLOTTING TIME LOW   Result Value Ref Range    POCT Activated Clotting Time Low Range 166 83 - 199 sec   Blood Gas Mixed Venous Full Panel   Result Value Ref Range    POCT pH, Mixed 7.43 7.33 - 7.43 pH    POCT pCO2, Mixed 37 (L) 41 - 51 mm Hg    POCT pO2, Mixed 38 35 - 45 mm Hg    POCT SO2, Mixed 53 45 - 75 %    POCT Oxy Hemoglobin, Mixed 50.8 45.0 - 75.0 %    POCT Hematocrit Calculated, Mixed 26.0 (L) 41.0 - 52.0 %    POCT Sodium, Mixed 130 (L) 136 - 145 mmol/L    POCT Potassium, Mixed 4.0 3.5 - 5.3 mmol/L    POCT Chloride, Mixed 96 (L) 98 - 107 mmol/L    POCT Ionized Calcium, Mixed 0.97 (L) 1.10 - 1.33 mmol/L    POCT Glucose, Mixed 100 (H) 74 - 99 mg/dL    POCT Lactate, Mixed 1.4 0.4 - 2.0 mmol/L    POCT Base Excess, Mixed 0.4 -2.0 - 3.0 mmol/L    POCT HCO3 Calculated, Mixed 24.6 22.0 - 26.0 mmol/L    POCT Hemoglobin, Mixed 8.6 (L) 13.5 - 17.5 g/dL    POCT Anion Gap, Mixed 13 10 - 25 mmo/L    Patient Temperature 37.0 degrees Celsius    FiO2 30 %   ACTIVATED CLOTTING TIME LOW   Result Value Ref Range    POCT Activated Clotting Time Low Range 153 83 - 199 sec    Renal function panel   Result Value Ref Range    Glucose 111 (H) 74 - 99 mg/dL    Sodium 131 (L) 136 - 145 mmol/L    Potassium 4.0 3.5 - 5.3 mmol/L    Chloride 93 (L) 98 - 107 mmol/L    Bicarbonate 26 21 - 32 mmol/L    Anion Gap 16 10 - 20 mmol/L    Urea Nitrogen 25 (H) 6 - 23 mg/dL    Creatinine 2.85 (H) 0.50 - 1.30 mg/dL    eGFR 27 (L) >60 mL/min/1.73m*2    Calcium 7.7 (L) 8.6 - 10.6 mg/dL    Phosphorus 3.1 2.5 - 4.9 mg/dL    Albumin 2.8 (L) 3.4 - 5.0 g/dL   Magnesium   Result Value Ref Range    Magnesium 3.02 (H) 1.60 - 2.40 mg/dL   POCT GLUCOSE   Result Value Ref Range    POCT Glucose 137 (H) 74 - 99 mg/dL   ACTIVATED CLOTTING TIME LOW   Result Value Ref Range    POCT Activated Clotting Time Low Range 160 83 - 199 sec   Prepare RBC: 4 Units   Result Value Ref Range    PRODUCT CODE D3419X71     Unit Number [id number]     Unit ABO A     Unit RH POS     XM INTEP COMP     Dispense Status XM     Blood Expiration Date 6/4/2025 11:59:00 PM EDT     PRODUCT BLOOD TYPE 6200     UNIT VOLUME 350     PRODUCT CODE B7787C31     Unit Number [id number]     Unit ABO A     Unit RH POS     XM INTEP COMP     Dispense Status XM     Blood Expiration Date 6/4/2025 11:59:00 PM EDT     PRODUCT BLOOD TYPE 6200     UNIT VOLUME 350     PRODUCT CODE P7912N16     Unit Number [id number]     Unit ABO A     Unit RH POS     XM INTEP COMP     Dispense Status XM     Blood Expiration Date 6/5/2025 11:59:00 PM EDT     PRODUCT BLOOD TYPE 6200     UNIT VOLUME 350     PRODUCT CODE H3700G76     Unit Number [id number]     Unit ABO A     Unit RH POS     XM INTEP COMP     Dispense Status XM     Blood Expiration Date 6/6/2025 11:59:00 PM EDT     PRODUCT BLOOD TYPE 6200     UNIT VOLUME 350    POCT GLUCOSE   Result Value Ref Range    POCT Glucose 169 (H) 74 - 99 mg/dL   ACTIVATED CLOTTING TIME LOW   Result Value Ref Range    POCT Activated Clotting Time Low Range 160 83 - 199 sec   Blood Gas Mixed Venous Full Panel   Result Value Ref  Range    POCT pH, Mixed 7.35 7.33 - 7.43 pH    POCT pCO2, Mixed 37 (L) 41 - 51 mm Hg    POCT pO2, Mixed 39 35 - 45 mm Hg    POCT SO2, Mixed 48 45 - 75 %    POCT Oxy Hemoglobin, Mixed 46.0 45.0 - 75.0 %    POCT Hematocrit Calculated, Mixed 35.0 (L) 41.0 - 52.0 %    POCT Sodium, Mixed 125 (L) 136 - 145 mmol/L    POCT Potassium, Mixed 4.5 3.5 - 5.3 mmol/L    POCT Chloride, Mixed 93 (L) 98 - 107 mmol/L    POCT Ionized Calcium, Mixed 1.09 (L) 1.10 - 1.33 mmol/L    POCT Glucose, Mixed 174 (H) 74 - 99 mg/dL    POCT Lactate, Mixed 2.8 (H) 0.4 - 2.0 mmol/L    POCT Base Excess, Mixed -4.7 (L) -2.0 - 3.0 mmol/L    POCT HCO3 Calculated, Mixed 20.4 (L) 22.0 - 26.0 mmol/L    POCT Hemoglobin, Mixed 11.8 (L) 13.5 - 17.5 g/dL    POCT Anion Gap, Mixed 16 10 - 25 mmo/L    Patient Temperature 37.0 degrees Celsius    FiO2 30 %               ASSESSMENT & PLAN  Mr. Elena is a 45 year old male with a past medical history of tobacco abuse (30 pack years), submandibular abscess, and nephrolithiasis who presented to [facility] with a chief complaint of chest pain, found to have an inferior STEMI, now s/p coronary angiogram with balloon angioplasty to the PDA (right dominant system). His course was complicated by hemodynamic instability (tachycardia, hypotension) requiring vasopressor support and laboratory evidnce of end organ dysfunction. Due to concern for cardiogenic vs mixed shock, a shock call was intervened and recommended transfer to [facility] CICU. TTE at [facility] revealed large VSD. He underwent Impella CP placement 5/20.      Cardiac surgery is consulted for a VSD repair post STEMI.         RECOMMENDATIONS/PLAN  Dr. Omer aware of patient and reviewed case and available imaging.   - Emergent surgery for patient's VSD not indicated at this time.   - Recommend medical optimization per primary team  - Early surgery for VSD repairs post STEMI have significantly high mortality rates, ideally would prefer to wait a couple weeks to decrease  surgical risk. Currently patient is not a surgical candidate. Will need to make significant improvements prior to consideration for surgical repair.  - Primary team reached out 5/27 in regards to an Impella 5.5 placement. Plan for OR 5/28 for Impella 5.5 placement with Dr. Omer.       Will continue to follow along.   Thank you for the consultation.   Please page the cardiac surgery consult pager [phone] with any questions or changes in patient condition.    Holly Diez PA-C  Cardiac Surgery Consult [facility]  [facility]  Cardiac Surgery Consult Pager [phone]     5/28/2025  12:55 PM            [1] aspirin, 81 mg, oral, Daily  insulin lispro, 0-5 Units, subcutaneous, q4h  meropenem, 500 mg, intravenous, q24h  micafungin, 100 mg, intravenous, q24h  oxygen, , inhalation, Continuous - Inhalation  pantoprazole, 40 mg, intravenous, BID  perflutren lipid microspheres, 0.5-10 mL of dilution, intravenous, Once in imaging  perflutren protein A microsphere, 0.5 mL, intravenous, Once in imaging  polyethylene glycol, 17 g, oral, 4x daily  sennosides, 2 tablet, oral, BID     [2] amiodarone, 0.5 mg/min, Last Rate: 0.5 mg/min (05/28/25 0909)  dexmedeTOMIDine, 0.1-1.5 mcg/kg/hr (Dosing Weight), Last Rate: 0.8 mcg/kg/hr (05/28/25 0909)  fentaNYL,  mcg/hr, Last Rate: 50 mcg/hr (05/28/25 0945)  heparin Impella Purge 25 units/mL in 500 mL D5W, 10 mL/hr, Last Rate: 10 mL/hr (05/28/25 0800)  midazolam, 0.5-20 mg/hr, Last Rate: 1 mg/hr (05/28/25 0945)  nitroprusside, 0.25-5 mcg/kg/min (Dosing Weight), Last Rate: Stopped (05/25/25 1939)  norepinephrine, 0.01-1 mcg/kg/min (Dosing Weight), Last Rate: 0.05 mcg/kg/min (05/28/25 1130)     [3] PRN medications: alteplase, bisacodyl, dextrose, dextrose, glucagon, glucagon, vancomycin

## 2025-05-28 NOTE — ANESTHESIA POSTPROCEDURE EVALUATION
Patient: Alpesh Elena    Procedure Summary       Date: 05/28/25 Room / Location: [facility] OR 18 / Virtual [facility] OR    Anesthesia Start: 1622 Anesthesia Stop: 1959    Procedure: IMPELLA 5.5 INSERTION; RIGHT AXILLARY CUTDOWN (Right: Chest) Diagnosis:       Cardiogenic shock (Multi)      Ventricular septal defect (HHS-HCC)      (Cardiogenic shock (Multi) [R57.0])      (Ventricular septal defect (HHS-HCC) [Q21.0])    Surgeons: Mateo Omer MD Responsible Provider: Lake Noble MD    Anesthesia Type: general ASA Status: 3            Anesthesia Type: general    Vitals Value Taken Time   /81 05/28/25 19:59   Temp 35.5 05/28/25 19:59   Pulse 66 05/28/25 19:59   Resp 22 05/28/25 19:59   SpO2 95 % 05/28/25 19:59   Vitals shown include unfiled device data.    Anesthesia Post Evaluation    Patient location during evaluation: ICU  Patient participation: complete - patient cannot participate  Level of consciousness: sedated  Pain management: adequate  Airway patency: patent  Two or more strategies used to mitigate risk of obstructive sleep apnea  Cardiovascular status: acceptable and hemodynamically stable  Respiratory status: acceptable and ETT  Hydration status: acceptable  Postoperative Nausea and Vomiting: none        There were no known notable events for this encounter.

## 2025-05-28 NOTE — CARE PLAN
Problem: Safety - Medical Restraint  Goal: Remains free of injury from restraints (Restraint for Interference with Medical Device)  Outcome: Progressing  Flowsheets (Taken 5/27/2025 0925 by Kristin Melton RN)  Remains free of injury from restraints (restraint for interference with medical device):   Determine that other, less restrictive measures have been tried or would not be effective before applying the restraint   Evaluate the patient's condition at the time of restraint application   Inform patient/family regarding the reason for restraint   Every 2 hours: Monitor safety, psychosocial status, comfort, nutrition and hydration  Goal: Free from restraint(s) (Restraint for Interference with Medical Device)  Outcome: Progressing     Problem: ACS/CP/NSTEMI/STEMI  Goal: Chest pain managed (free from pain or at acceptable level)  Outcome: Progressing  Goal: Lab values return to normal range  Outcome: Progressing  Goal: Promote self management  Outcome: Progressing  Goal: Serial ECG will return to baseline  Outcome: Progressing  Goal: Verbalize understanding of procedures/devices  Outcome: Progressing  Goal: Wean vasopressors/achieve hemodynamic stability  Outcome: Progressing     Problem: Respiratory  Goal: Clear secretions with interventions this shift  Outcome: Progressing  Goal: Minimize anxiety/maximize coping throughout shift  Outcome: Progressing     Problem: Skin  Goal: Participates in plan/prevention/treatment measures  Outcome: Progressing  Goal: Prevent/manage excess moisture  Outcome: Progressing  Goal: Prevent/minimize sheer/friction injuries  Outcome: Progressing  Goal: Promote/optimize nutrition  Outcome: Progressing

## 2025-05-28 NOTE — BRIEF OP NOTE
Date: 2025 - 2025  OR Location: [facility] OR    Name: Alpesh Elena, : 1980, Age: 45 y.o., MRN: [id number], Sex: male    Diagnosis  Pre-op Diagnosis      * Cardiogenic shock (Multi) [R57.0]     * Ventricular septal defect (HHS-HCC) [Q21.0] Post-op Diagnosis     * Cardiogenic shock (Multi) [R57.0]     * Ventricular septal defect (HHS-HCC) [Q21.0]     Procedures  IMPELLA 5.5 INSERTION; RIGHT AXILLARY CUTDOWN  [id number] - IN INSJ PERQ VAD W/RS&I L HRT ARTERIAL ACCESS ONLY  Right Axillary 5.5 Impella insertion with Fluoro and MYNOR guidance  Removal of CP impella, and bilateral femoral reperfusion sheaths, with endovascular closure of each.     Surgeons      * Mateo Omer - Primary    Resident/Fellow/Other Assistant:  Surgeons and Role:     * Elena Parker MD - Assisting     * James Goldman MD - Assisting  DOUGLAS Cheema  Staff:   Circulator: Cass  Circulator: Lenore  Circulator: Reyna  Surgical Assistant: Faraz  Surgical Assistant: Ronny Gusman Scrub: Lizy Gusman Circulator: Cole    Anesthesia Staff: Anesthesiologist: Lake Noble MD  Anesthesia Resident: Jennifer Hanson MD  Perfusionist: Kika Sow  Frontline Breaker: KEYANNA Coburn-CRNA    Procedure Summary  Anesthesia: General  ASA: III  Estimated Blood Loss: 25 mL  Intra-op Medications:   Administrations occurring from  to  on 25:   Medication Name Total Dose   heparin (porcine) 5,000 Units in sodium chloride 0.9% 500 mL irrigation 5,000 Units   alteplase (Cathflo Activase) injection 2 mg Cannot be calculated   aspirin chewable tablet 81 mg Cannot be calculated   bisacodyl (Dulcolax) suppository 10 mg Cannot be calculated   dextrose 50 % injection 12.5 g Cannot be calculated   dextrose 50 % injection 25 g Cannot be calculated   electrolyte-A (Plasmalyte-A) Cannot be calculated   glucagon (Glucagen) injection 1 mg Cannot be calculated   glucagon (Glucagen) injection 1 mg  Cannot be calculated   heparin Impella Purge 25 units/mL in 500 mL D5W 43.83 mL   insulin lispro injection 0-5 Units Cannot be calculated   meropenem (Merrem) 500 mg in sodium chloride 0.9%  mL Cannot be calculated   micafungin (Mycamine) 100 mg in dextrose 5%  mL Cannot be calculated   norepinephrine (Levophed) 8 mg in dextrose 5% 250 mL (0.032 mg/mL) infusion (premix) 0.38 mg   pantoprazole (Protonix) injection 40 mg Cannot be calculated   perflutren lipid microspheres (Definity) injection 0.5-10 mL of dilution Cannot be calculated   perflutren protein A microsphere (Optison) injection 0.5 mL Cannot be calculated   polyethylene glycol (Glycolax, Miralax) packet 17 g Cannot be calculated   propofol (Diprivan) injection 10 mg/mL 265.83 mg   protamine injection 50 mg   rocuronium (ZeMuron) 50 mg/5 mL injection 110 mg   sennosides (Senokot) tablet 17.2 mg Cannot be calculated   vancomycin (Vancocin) pharmacy to dose - pharmacy monitoring Cannot be calculated   vancomycin (Vancocin) 1,250 mg in sodium chloride 0.9%  mL 250 mL              Anesthesia Record               Intraprocedure I/O Totals          Intake    Dexmedetomidine 0.00 mL    The total shown is the total volume documented since Anesthesia Start was filed.    Norepinephrine Drip 0.00 mL    The total shown is the total volume documented since Anesthesia Start was filed.    Heparin for Device 19.67 mL    The total shown is the total volume documented since Anesthesia Start was filed.    Amiodarone Drip 33.34 mL    The total shown is the total volume documented since Anesthesia Start was filed.    vancomycin (Vancocin) 1,250 mg in sodium chloride 0.9%  mL 250.00 mL    Total Intake 303.01 mL          Specimen: No specimens collected               Findings: Successful R axillary 5.5 Impella inserted via a 10mm gel weave graft. Positioned under MYNOR/Fluoro.     Complications:  None; patient tolerated the procedure well.     Disposition:  ICU - intubated and hemodynamically stable.  Condition: stable  Specimens Collected: No specimens collected  Attending Attestation:     Mateo Omer  Phone Number: [phone]

## 2025-05-28 NOTE — PROGRESS NOTES
"Participated in a phone conversation with pt's mom Nayla and sister Kelsey with Amy Stratton DNP from palliative care.  Introduced role of palliative care as a supportive care team for pt's that are critically ill.  Family has been very happy with the care pt has received.  Mom describes pt as very loving, that he may come off \"as a grizzly bear, but he really is a karsten bear\".  She is hopeful that pt can continue to improve.  She states she is able to visit every other day secondary to her work and her heart condition.  Pall care team will continue to support.  Pt's son Alpesh also called SW as he had received a letter stating pt's hospital stay has been denied by insurance.  SW reached out to UR team to investigate, since pt qualifies for ICU care.  Await response.  Will meet with son when he arrives so we can further discuss.    1610 Met with Alpesh this afternoon when Dr. Omer met with him to get consents signed for impella placement.  Son has good understanding of pt's guarded health condition, but understands this is the best chance of helping pt improve.  Alpesh is keeping all others including family and friends informed of pt's condition.  Pall care team will continue to support.    TANIKA Louis    "

## 2025-05-28 NOTE — PROGRESS NOTES
LSW intended to complete the Social Determinants of Health and assessment; however, the patient is currently intubated and unable to participate. LSW attempted to contact the patient's mother and sister to obtain collateral information, but was only able to leave a HIPAA-compliant voicemail message for each. LSW will continue to follow.

## 2025-05-28 NOTE — PROGRESS NOTES
Vancomycin Dosing by Pharmacy- FOLLOW UP    Alpesh Elena is a 45 y.o. year old male who Pharmacy has been consulted for vancomycin dosing for Line Infection. Based on the patient's indication and renal status this patient is being dosed based on a goal trough/random level of 15-20.     BENY. Nephro following for RRT. SLED 5/23, 5/25, and 5/27.     Current vancomycin dose: dose by levels    Most recent trough level: 8.6 mcg/mL    Estimated Creatinine Clearance: 26.3 mL/min (A) (by C-G formula based on SCr of 3.85 mg/dL (H)).     Results from last 7 days   Lab Units 05/28/25  1235 05/28/25  0631 05/28/25  0237 05/27/25  1814 05/27/25  1007 05/27/25  0302   BUN mg/dL 39* 25* 35* 126*  --  111*   CREATININE mg/dL 3.85* 2.85* 3.11* 10.11*  --  9.50*   WBC AUTO x10*3/uL  --   --  27.5* 24.9*  --  23.6*   VANCOMYCIN RM ug/mL 8.6  --   --   --    < >  --     < > = values in this interval not displayed.        Blood Culture   Date/Time Value Ref Range Status   05/26/2025 09:57 AM No growth at 2 days  Preliminary   05/26/2025 09:57 AM No growth at 2 days  Preliminary     Tissue/Wound Culture/Smear   Date/Time Value Ref Range Status   07/08/2024 08:20 AM (4+) Abundant Mixed Gram-Positive Bacteria  Final   07/08/2024 08:20 AM (4+) Abundant Mixed Anaerobic Bacteria  Final     Gram Stain   Date/Time Value Ref Range Status   05/20/2025 04:19 AM (1+) Rare Polymorphonuclear leukocytes (A)  Final   05/20/2025 04:19 AM (1+) Rare Gram positive cocci (A)  Final        Susceptibility data from last 90 days.  Collected Specimen Info Organism   05/20/25 Fluid from Tracheal Aspirate Normal throat margaret       Visit Vitals  BP 91/65   Pulse 66   Temp 37 °C (98.6 °F)   Resp 17        Assessment/Plan    No indication for RRT today per Nephro's note on 5/28 @1305. --> updated 5/28: CVVH ordered @8PM  Patient received Vacomycin 500mg x1 on 5/26 @1200.   Last SLED on 5/27 @8027-2087. 5/27 pre-SLED level @1007 = 23.6. 5/28 6hr post-SLED level  @1235 = 8.6.     Below goal random/trough level. Orders placed for vancomycin  1250mg x1, scheduled for 5/28 @1530   Will start Vancomycin 750mg every 12 hours after CVVH initiated (dose tentatively scheduled for 5/29 @0600)  The next level will be on 5/29 @1700. May be obtained sooner if clinically indicated.   Will continue to monitor renal function daily while on vancomycin and order serum creatinine at least every 48 hours if not already ordered.  Follow for continued vancomycin needs, clinical response, and signs/symptoms of toxicity.       Lida Adams, PharmD

## 2025-05-28 NOTE — PROGRESS NOTES
"Cardiac Intensive Care - Daily Progress Note   Subjective    Alpesh Elena is a 45 y.o. year old male patient admitted on 5/20/2025 with following ICU needs: Cardiogenic shock, impella, VSD/ Inferior LV wall rupture    Interval History:  Ran SLED ON, dropped CVP to 2. Likely causes Impella CP movement which then resulted in NSVT/PVC. 500 ml bolus given with athymia improvement.     Meds    Scheduled medications  Scheduled Medications[1]  Continuous medications  Continuous Medications[2]  PRN medications  PRN Medications[3]     Objective    Blood pressure 91/65, pulse 66, temperature 37.5 °C (99.5 °F), temperature source Temporal, resp. rate 22, height 1.727 m (5' 8\"), weight 91.2 kg (201 lb), SpO2 100%.     Physical Exam  Constitutional:       Comments: Intubated, eye opens to sternal rub    Cardiovascular:      Rate and Rhythm: Normal rate and regular rhythm.      Heart sounds: Murmur heard.      Comments: Holosystolic murmer, best heard at 4th intercostal rib. Very weak vs absent b/l PT and pedal.   Pulmonary:      Effort: Pulmonary effort is normal.      Breath sounds: Normal breath sounds. No wheezing or rales.   Abdominal:      General: Abdomen is flat. Bowel sounds are normal. There is no distension.      Palpations: Abdomen is soft.   Musculoskeletal:      Right lower leg: No edema.      Left lower leg: No edema.   Skin:     General: Skin is warm.   Neurological:      Comments: Sedated             Intake/Output Summary (Last 24 hours) at 5/28/2025 1041  Last data filed at 5/28/2025 0643  Gross per 24 hour   Intake 2104.45 ml   Output 1629 ml   Net 475.45 ml     Labs:   Results from last 72 hours   Lab Units 05/28/25  0631 05/28/25  0237 05/27/25  1814   SODIUM mmol/L 131* 133* 127*   POTASSIUM mmol/L 4.0 3.1* 4.4   CHLORIDE mmol/L 93* 92* 86*   CO2 mmol/L 26 29 18*   BUN mg/dL 25* 35* 126*   CREATININE mg/dL 2.85* 3.11* 10.11*   GLUCOSE mg/dL 111* 114* 236*   CALCIUM mg/dL 7.7* 7.6* 8.5*   ANION GAP mmol/L " 16 15 27*   EGFR mL/min/1.73m*2 27* 24* 6*   PHOSPHORUS mg/dL 3.1 2.5 8.9*      Results from last 72 hours   Lab Units 05/28/25  0237 05/27/25  1814 05/27/25  0302 05/26/25  1414   WBC AUTO x10*3/uL 27.5* 24.9* 23.6* 25.1*   HEMOGLOBIN g/dL 8.2* 8.2* 7.7* 8.1*   HEMATOCRIT % 22.8* 22.5* 22.5* 22.9*   PLATELETS AUTO x10*3/uL 114* 89* 92* 87*   LYMPHO PCT MAN %  --  7.7 15.9 4.2   MONO PCT MAN %  --  2.6 6.2 3.3   EOSINO PCT MAN %  --  0.0 0.9 1.7      Results from last 72 hours   Lab Units 05/27/25  1815 05/27/25  1007 05/27/25  0605   POCT PH, ARTERIAL pH 7.30* 7.35* 7.35*   POCT PCO2, ARTERIAL mm Hg 29* 29* 28*   POCT PO2, ARTERIAL mm Hg 133* 108* 113*   POCT SO2, ARTERIAL % 100 99 100     Results from last 72 hours   Lab Units 05/26/25  0556 05/26/25  0002 05/25/25  2030   POCT PH, VENOUS pH 7.35 7.37 7.37   POCT PCO2, VENOUS mm Hg 38* 35* 37*   POCT PO2, VENOUS mm Hg 36 35 35        Micro/ID:     Lab Results   Component Value Date    BLOODCULT No growth at 1 day 05/26/2025    BLOODCULT No growth at 1 day 05/26/2025       EKG Trend:    EKG 5/20: Sinus Tachycardia, T wave inversions inferior leads. Prolonged QTC     Echo Data:  Results for orders placed during the hospital encounter of 05/18/25    Transthoracic Echo Complete    [place] [id number]  Tel [phone] Fax [phone]    TRANSTHORACIC ECHOCARDIOGRAM REPORT    Patient Name:       AVRIL Yoder Physician:    [id number] Faraz Ortega DO  Study Date:         5/19/2025            Ordering Provider:    [id number] MARGARET GASPAR  MRN/PID:            [id number]             Fellow:  Accession#:         [id number]         Nurse:  Date of Birth/Age:  1980 / 45 years Sonographer:          Koki Mendez  [facility]  Gender Assigned at  M                    Additional Staff:  Birth:  Height:             172.72 cm            Admit Date:           5/18/2025  Weight:             75.30 kg              Admission Status:     Inpatient - STAT  BSA / BMI:          1.89 m2 / 25.24      Department Location:  [facility]  kg/m2  Blood Pressure: 97 /65 mmHg    Study Type:    TRANSTHORACIC ECHO (TTE) COMPLETE  Diagnosis/ICD: ST elevation (STEMI) myocardial infarction of unspecified  site-I21.3  Indication:    STEMI  CPT Codes:     Echo Complete w Full Doppler-[id number]    Patient History:  PCI and Stent:     PCI performed on 5/18/2025.  Smoker:            Current.  Pertinent History: Chest Pain and Murmur.    Study Detail: The following Echo studies were performed: 2D, M-Mode, Doppler and  color flow. Definity used as a contrast agent for endocardial  border definition. Total contrast used for this procedure was 2 mL  via IV push.      PHYSICIAN INTERPRETATION:  Left Ventricle: The left ventricular systolic function is normal, with a visually estimated ejection fraction of 55%. There is mild concentric left ventricular hypertrophy. Wall motion is abnormal. The left ventricular cavity size is normal. There is mild increased septal and mildly increased posterior left ventricular wall thickness. Spectral Doppler shows a normal pattern of left ventricular diastolic filling.  LV Wall Scoring:  The basal inferoseptal segment, basal inferolateral segment, and basal inferior  segment are hypokinetic. All remaining scored segments are normal.    Left Atrium: The left atrial size is normal.  Right Ventricle: The right ventricle is normal in size. There is normal right ventricular global systolic function.  Right Atrium: The right atrial size is normal.  Aortic Valve: The aortic valve appears structurally normal. There is no evidence of aortic valve stenosis.  The aortic valve dimensionless index is 0.73. There is no evidence of aortic valve regurgitation. The peak instantaneous gradient of the aortic valve is 5 mmHg. The mean gradient of the aortic valve is 3 mmHg.  Mitral Valve: The mitral valve is normal in structure. There is no  evidence of mitral valve stenosis. There is normal mitral valve leaflet mobility. There is no evidence of mitral valve regurgitation.  Tricuspid Valve: The tricuspid valve is structurally normal. There is normal tricuspid valve leaflet mobility. There is trace tricuspid regurgitation.  Pulmonic Valve: The pulmonic valve is structurally normal. There is no indication of pulmonic valve regurgitation.  Pericardium: No pericardial effusion noted.  Aorta: The aortic root is normal.  Pulmonary Artery: The main pulmonary artery is normal in size, and position, with normal bifurcation into the left and right pulmonary arteries. The tricuspid regurgitant velocity is 2.88 m/s, and with an estimated right atrial pressure of 3 mmHg, the estimated pulmonary artery pressure is mildly elevated with the RVSP at 36.2 mmHg.  Systemic Veins: The inferior vena cava appears normal in size.      CONCLUSIONS:  1. The left ventricular systolic function is normal, with a visually estimated ejection fraction of 55%.  2. Basal inferoseptal segment, basal inferolateral segment, and basal inferior segment are abnormal.  3. Abnormal wall motion.  4. There is normal right ventricular global systolic function.  5. Normal sized right ventricle.  6. There is no evidence of mitral valve stenosis.  7. No evidence of mitral valve regurgitation.  8. Trace tricuspid regurgitation is visualized.  9. Aortic valve stenosis is not present.  10. The main pulmonary artery is normal in size, and position, with normal bifurcation into the left and right pulmonary arteries.    QUANTITATIVE DATA SUMMARY:    2D MEASUREMENTS:             Normal Ranges:  Ao Root d:       2.86 cm     (2.0-3.7cm)  LAs:             3.88 cm     (2.7-4.0cm)  IVSd:            1.12 cm     (0.6-1.1cm)  LVPWd:           1.08 cm     (0.6-1.1cm)  LVIDd:           4.02 cm     (3.9-5.9cm)  LVIDs:           2.94 cm  LV Mass Index:   77.7 g/m2  LVEDV Index:     59.87 ml/m2  LV % FS          26.9  %      LEFT ATRIUM:                  Normal Ranges:  LA Vol A4C:        29.5 ml    (22+/-6mL/m2)  LA Vol A2C:        25.4 ml  LA Vol BP:         30.5 ml  LA Vol Index A4C:  15.6ml/m2  LA Vol Index A2C:  13.4 ml/m2  LA Vol Index BP:   16.1 ml/m2  LA Area A4C:       13.8 cm2  LA Area A2C:       11.5 cm2  LA Major Axis A4C: 5.5 cm  LA Major Axis A2C: 4.4 cm  LA Volume Index:   14.9 ml/m2      RIGHT ATRIUM:                 Normal Ranges:  RA Vol A4C:        24.1 ml    (8.3-19.5ml)  RA Vol Index A4C:  12.7 ml/m2  RA Area A4C:       11.1 cm2  RA Major Axis A4C: 4.4 cm      AORTA MEASUREMENTS:         Normal Ranges:  Asc Ao, d:          2.48 cm (2.1-3.4cm)      LV SYSTOLIC FUNCTION:  Normal Ranges:  EF-A4C View:    54 % (>=55%)  EF-A2C View:    54 %  EF-Biplane:     55 %  EF-Visual:      55 %  LV EF Reported: 55 %      LV DIASTOLIC FUNCTION:           Normal Ranges:  MV Peak E:             1.20 m/s  (0.7-1.2 m/s)  MV Peak A:             1.08 m/s  (0.42-0.7 m/s)  E/A Ratio:             1.11      (1.0-2.2)  MV e'                  0.090 m/s (>8.0)  MV lateral e'          0.08 m/s  MV medial e'           0.10 m/s  E/e' Ratio:            13.29     (<8.0)      MITRAL VALVE:          Normal Ranges:  MV DT:        126 msec (150-240msec)      AORTIC VALVE:                     Normal Ranges:  AoV Vmax:                1.16 m/s (<=1.7m/s)  AoV Peak P.4 mmHg (<20mmHg)  AoV Mean PG:             3.0 mmHg (1.7-11.5mmHg)  LVOT Max Aime:            1.03 m/s (<=1.1m/s)  AoV VTI:                 22.30 cm (18-25cm)  LVOT VTI:                16.20 cm  LVOT Diameter:           2.03 cm  (1.8-2.4cm)  AoV Area, VTI:           2.35 cm2 (2.5-5.5cm2)  AoV Area,Vmax:           2.87 cm2 (2.5-4.5cm2)  AoV Dimensionless Index: 0.73      RIGHT VENTRICLE:  RV Basal 3.49 cm  RV Mid   2.65 cm  RV Major 7.3 cm  TAPSE:   22.4 mm  RV s'    0.13 m/s      TRICUSPID VALVE/RVSP:          Normal Ranges:  Peak TR Velocity:     2.88 m/s  RV Syst  Pressure:     36 mmHg  (< 30mmHg)  IVC Diam:             1.74 cm      PULMONIC VALVE:          Normal Ranges:  PV Accel Time:  116 msec (>120ms)  PV Max Aime:     1.5 m/s  (0.6-0.9m/s)  PV Max P.8 mmHg      [id number] Faraz Ortega DO  Electronically signed on 2025 at 9:34:06 AM      Wall Scoring        ** Final **      Cath Data:  [facility]     LMT normal.  LAD mild irregularity, 30% mid, wraps around the apex.  LCX mild iregularity.  RCA tortuous, dom.  PDA is 100% ostial.  LVEDP 20-25.  LVEF 40-45%, inferior basal AK.       Successful PTA R % GLENNA 0 reduced to 30%, GLENNA 3 after POBA, with no stent placed due to small caliber vessel and minimal runoff, not enough to warrant stenting.     Concern for drug use considering patient very tachycardic and LVEDP elevated with very small PDA occlusion    Summary of key imaging results from the last 24 hours     See above    Assessment and Plan     Assessment: This is a 45 year old male with a past medical history of tobacco abuse (30 pack years), submandibular abscess, and nephrolithiasis who presented to [facility] with a chief complaint of chest pain, found to have an inferior STEMI, now s/p coronary angiogram with balloon angioplasty to the PDA (right dominant system). His course was complicated by hemodynamic instability (tachycardia, hypotension) requiring vasopressor support and laboratory evidnce of end organ dysfunction. Due to concern for cardiogenic vs mixed shock, a shock call was intervened and recommended placement of  PA catheter for adjudication of hemodynamics and transfer to [facility] CICU for a higher level of care. He is now admitted to the CICU for further management.      On arrival to the CICU he is hypotensive with escalating pressor requirements. Bedside examination reveals a holosystolic murmur and serial mixed venous are revealing of a significant step up in SVO2 from the RA to PA concerning for the presence of a ischemic VSD, perhaps  secondardy to a late presenting STEMI given troponin elevation to 24K on presentation. Will re-conference with Shock team for consideration of MCS given continued hemodynamic instability and now suspected VSD with cardiogenic shock. Due to concern for mixed shock will start stress dose steroids (as currently on 3 pressors), broaden abx, and send full infectious workup. Continue with CAD/STEMI GDMT. Shock team determined impella CP placement to offload LV given concern for STEMI-induced VSD/ Inferior LV wall rupture.     Patient's lactate has normalized while on Impella CP.  However, still large difference between CVP and PA mixed venous, indicating ongoing shunting.  Infectious disease and nephrology following.  Monitoring the patient will need Impella 5.5 Started Sled for volume control. Will eventually Need VSD repair and possible future transplant evaluation.        Mechanical Ventilation: 4-10 days  Sedation/Analgesia:  none  Restraints: Restraints indicated as alternative therapies have been attempted and have been ineffective.  Restrain with soft wrist restraints and side rails up x4 until medical devices discontinued and/or patient able to participate with plan of care.     Summary for 05/28/25  :  EGD (5/27) with small ulcer + OG trauma   C/w BID PPI   C/W Vanco, Adolfo, and Danae for possible line infections  RUQ today to assess for cholecystitis    C/w just heparin purge for Impella CP  Plan for OR 5/28 for Impella Cp -> impella 5.5 with Ct surgery  May require 24 hr in CTICU after      NEUROLOGIC  #Sedation  - fent, versed, and precedex   - Wean sedation as able, CTM for responsiveness  - RAAS goal: 0 after OR     CARDIOVASCULAR  #RPDA STEMI s/p POBA  #C/f Cardiogenic vs mixed shock   #C/f VSD  ::  ECG on admit to OSH with ST elevations in the inferior leads with reciprocal depressions  :: Troponin:  24,085>21,701,19,289,83139  :: Lipid panel (05/2025)- cholesterol 220, HDL 33.9, , TG 57  :: TSH 1.51  (05/2025), A1C 7.4  (05/2025)  :: coronary angiogram (05/2025)  which revealed multi vessel CAD [LAD/Lcx/RCA mildly/diffusely diseased,100% RPDA culprit lesion] with PTCA to RPDA with 50% residual stenosis [GLENNA 0->3] (too small to stent)   :: LVEF 45% and LVED 20-25 mmHg via LV Gram   :: TTE  (05/2025) LVEF 55%, wall motion abnormalities, and no significant valvular disease   :: SVO2 59 from CVP port, 92 from PA port (prior to impella)  :: SVO2 57 from CVP port, 80 from PA port (after impella)  :: Off all pressors 5/21  :: restarted Levo 5/26  Plan:  - Trend Lactate, CVP mix venous (w/VBG) and PA mix venous Q8-12H  - Aspirin 81 mg every day, DC Brilinita given no stent / possible future surgery   - Statin: HELD Crestor 40 mg given c/f shock liver  - Beta blocker: hold given shock requiring MCS  - RAAS Inhibition: hold given shock requiring MCS  - Limited TTE repeat: Compared with the prior exam from 5/19/2025 ([facility]) there has been interval development of a large VSD throuh the inferoseptum with the RV now becoming large with significantly reduced function.   - CT surgery following for future possible VSD repair   - OR with Ct surg on 5/28 for Impella 5.5 placement     # NSVT  # PVC  :: started 5/24 PM, longest 20 sec run  :: likely iso sub-optimal impella position  :: repositioned under US 5/25 AM with less ectopy burden after   - IV Amio loaded, started on amio gtt 5/25 Am and will continue   - CTM       PULMONARY  #AHRF   :: Prior to intubation satting 92% on RA  :: CXR with pulmonary edema vs PNA  :: CTA CAP- mild interstitial pulmonary edema   :: , WBC 34.1 on admission   Plan:  - Infectious workup/abx as below   - [place] guided therapy   - Wean Ventilator when more responsive      RENAL/  # BENY, Anuric   # HAGMA   # Volume overload iso VSD   :: Baseline Cr 0.8-1.0, 1.61 on admit, 1.85 on transport - pleatu but Cr still > 10  Plan:  - Likely hemodynamic mediated , vanco, LV gram  - Brief  SLED 5/23, 5/25, 5/27   - Nephrology following   - Follow up UA/Ulytes if sample if available   - Avoid hypotension, rapid fluctuations in BP  - [place] guided volume resuscitation/ diuresis   - Daily RFP        GASTROINTESTINAL  #GI PPX  # Traumatic OG placement , small superficial ulcer  # Ileus   # jaundice, Fever  # hyperbilirubinemia, both direct and indirect   - IV PPI BID for 2 months for esophageal ulcer   - Aggressive bowel reg: miralx QID, Senna 2 tab BID, Prn enema and suppository   - S/p EGD w/ GI 5/7 w/o concern for overtly large Gib   - RUQ ordered 5/38 to assess for cholecystis      ENDOCRINE  #T2DM   :: A1C 7.4  (05/2025)  Plan:  - NPO   - Q4H POC glucose, SSI while NPO  - Titrate -180  - Will need diabetes education prior to discharge      HEME/ONC  #thrombocytopenia  # Anemia    # Hemolysis iso impella use, improving    - C/f some degree of hemolysis   - Hepatic function, LHD, hapto daily  - Heparin products DC 5/23, restarted 5/26   - Impella Purg: Hep/D5W   - Systemic: Heparin (follow ACT)  - Anti-Plt Factor 4 AB / Serotonin release assay negative       INFECTIOUS DISEASE  #C/f Mixed shock   #Leukocytosis  # Fever  Unclear if Fever is from infection or general cardiogenic shock at this time   :: S/p Doxy/CTX 05/19  :: blood cultures sent 05/19 x 2 sets   :: Rising WBC despite 8 days of zosyn   Work up:  - MRSA probe: -  - Bcx2: NGTD  - UA: non-infectious   Plan:  -Dc zosyn; Start Vanco, Adolfo, and micafungin 5/26  - Follow infectious labs  - ID following      MSK/DERM  SWAPNIL         ICU Check List       ICU Liberation: Intervention:   Assess, Prevent, Manage Pain 0 - No pain fentanyl gtt   Both SAT and SBT [] SAT  [] SBT 30-60 min [] Extubate to NC  [] Extubate to NIV  [] Discuss Trach   Choice of analgesia and sedation Romero Agitation Sedation Scale (RASS): Alert and calm Fentanyl, Precedex, and Versed  -1 to -2   Delirium: Assess, prevent and manage  CAM ICU Negative Assess Qshift   Early  Mobility and Exercise Receiving therapies that restrict mobility [] PT /OT consult   Family Engagement and Empowerment [x]Family updated []SW consult     F: PRN for euvolemia; swan guided therapy   E: PRN K>4, Mg>2, P>3   N: NPO  A: PIV, RIJ swan, left radial A line      Oxygen: MV 30%/500/14/5  Abx: Vanco, Adolfo, Danae   Gtts: levo, fent, versed      DVT ppx: Lovenox  GI ppx: IV PPI     Code Status: full (confirmed on admit)  Surrogate Medical Decision-maker:    Ashley Hgoan (Mother) [phone]         Introducer 05/19/25 Internal jugular Right (Active)   Placement Date/Time: 05/19/25 2330   Hand Hygiene Completed: Yes  Location: Internal jugular  Orientation: Right  Placed by: CG/AL  Placement Verified: X-ray;Pressure tracing changes;Transduced waveform   Number of days: 0       Arterial Line 05/19/25 Left Radial (Active)   Placement Date/Time: 05/19/25 2200   Orientation: Left  Location: Radial  Local Anesthetic: Injectable  Technique: Anatomical landmarks  Insertion attempts: 1  Securement Method: Sutured  Patient Tolerance: Fair   Number of days: 0       Arterial Sheath 05/20/25 0529 6 Fr. Right Femoral (Active)   Placement Date/Time: 05/20/25 0529   Sheath Size: (c) 6 Fr.  Line Orientation: Right  Sheath Insertion Site: Femoral   Number of days: 0       Arterial Sheath 05/20/25 0532 Other (Comment) Right Femoral (Active)   Placement Date/Time: 05/20/25 0532   Sheath Size: (c) Other (Comment)  Line Orientation: Right  Sheath Insertion Site: Femoral   Number of days: 0       Arterial Sheath 05/20/25 0559 6 Fr. Left Femoral (Active)   Placement Date/Time: 05/20/25 0559   Sheath Size: 6 Fr.  Line Orientation: Left  Sheath Insertion Site: Femoral   Number of days: 0       Pulmonary Artery Catheter Internal jugular (Active)   Placement Date/Time: 05/19/25 2330   Hand Hygiene Performed Prior to CVC Insertion: Yes  Site Prep: Alcohol;Chlorhexidine ;Usual sterile procedure followed  Site Prep Agent has Completely  Dried Before Insertion: Yes  All 5 Sterile Barriers Used (Glove...   Number of days: 0       ETT  7.5 mm (Active)   Placement Date/Time: 05/19/25 2305   Hand Hygiene Completed: Yes  ETT Type: ETT - single  Single Lumen Tube Size: 7.5 mm  Cuffed: Yes  Location: Oral  Airway Insertion Attempts: 1  Placement Verification: Auscultation;Capnometry;Fiberoptic visualizati...   Number of days: 0       Urethral Catheter 16 Fr. (Active)   Placement Date/Time: 05/20/25 0200   Hand Hygiene Completed: Yes  Tube Size (Fr.): 16 Fr.  Catheter Balloon Size: 10 mL  Urine Returned: Yes   Number of days: 0       NG/OG/Feeding Tube OG - [place] sump 16 Fr Center mouth (Active)   Placement Date/Time: 05/19/25 2310   Placed by: MARLENA  Hand Hygiene Completed: Yes  Type of Tube: NG/OG Tube  Tube Length: 55 cm  Tube Type: OG - [place] sump  NG/OG Tube Size: 16 Fr  Tube Location: Center mouth   Number of days: 0       Social:  Code: Full Code    Disposition: NABOR BROWNLEE DO [place]   05/28/25 at 10:41 AM     Disclaimer: Documentation completed with the information available at the time of input. The times in the chart may not be reflective of actual patient care times, interventions, or procedures. Documentation occurs after the physical care of the patient.           [1] aspirin, 81 mg, oral, Daily  insulin lispro, 0-5 Units, subcutaneous, q4h  meropenem, 500 mg, intravenous, q24h  micafungin, 100 mg, intravenous, q24h  oxygen, , inhalation, Continuous - Inhalation  pantoprazole, 40 mg, intravenous, BID  perflutren lipid microspheres, 0.5-10 mL of dilution, intravenous, Once in imaging  perflutren protein A microsphere, 0.5 mL, intravenous, Once in imaging  polyethylene glycol, 17 g, oral, 4x daily  sennosides, 2 tablet, oral, BID     [2] amiodarone, 0.5 mg/min, Last Rate: 0.5 mg/min (05/28/25 0909)  dexmedeTOMIDine, 0.1-1.5 mcg/kg/hr (Dosing Weight), Last Rate: 0.8 mcg/kg/hr (05/28/25 0909)  fentaNYL,  mcg/hr, Last Rate: 75  mcg/hr (05/28/25 0800)  heparin Impella Purge 25 units/mL in 500 mL D5W, 10 mL/hr, Last Rate: 10 mL/hr (05/28/25 0800)  midazolam, 0.5-20 mg/hr, Last Rate: 2 mg/hr (05/28/25 0800)  nitroprusside, 0.25-5 mcg/kg/min (Dosing Weight), Last Rate: Stopped (05/25/25 1939)  norepinephrine, 0.01-1 mcg/kg/min (Dosing Weight), Last Rate: 0.06 mcg/kg/min (05/28/25 0800)     [3] PRN medications: alteplase, bisacodyl, dextrose, dextrose, glucagon, glucagon, vancomycin

## 2025-05-28 NOTE — PROGRESS NOTES
"Palliative Medicine following for:  Complex medical decision making, symptom management, patient/family support    History obtained from chart review including ED note, H&P, patient's daily progress notes, review of lab/test results, and discussion with primary team and bedside RN.    Subjective    Patient seen at bedside  Remains critically ill, ROS limited  Supportive phone call placed to mother today per sons request    Objective    Last Recorded Vitals  BP 91/65   Pulse 61   Temp 37.5 °C (99.5 °F) (Temporal)   Resp 21   Ht 1.727 m (5' 8\")   Wt 91.2 kg (201 lb)   SpO2 100%   BMI 30.56 kg/m²      Physical Exam  Constitutional:       Appearance: He is ill-appearing.   Pulmonary:      Comments: intubated  Skin:     Coloration: Skin is jaundiced.   Neurological:      Mental Status: He is disoriented.          Relevant Results   Results for orders placed or performed during the hospital encounter of 05/20/25 (from the past 24 hours)   Vancomycin   Result Value Ref Range    Vancomycin 23.6 (H) 5.0 - 20.0 ug/mL   Blood Gas Arterial Full Panel   Result Value Ref Range    POCT pH, Arterial 7.35 (L) 7.38 - 7.42 pH    POCT pCO2, Arterial 29 (L) 38 - 42 mm Hg    POCT pO2, Arterial 108 (H) 85 - 95 mm Hg    POCT SO2, Arterial 99 94 - 100 %    POCT Oxy Hemoglobin, Arterial 95.2 94.0 - 98.0 %    POCT Hematocrit Calculated, Arterial 25.0 (L) 41.0 - 52.0 %    POCT Sodium, Arterial 125 (L) 136 - 145 mmol/L    POCT Potassium, Arterial 4.2 3.5 - 5.3 mmol/L    POCT Chloride, Arterial 91 (L) 98 - 107 mmol/L    POCT Ionized Calcium, Arterial 1.07 (L) 1.10 - 1.33 mmol/L    POCT Glucose, Arterial 191 (H) 74 - 99 mg/dL    POCT Lactate, Arterial 1.3 0.4 - 2.0 mmol/L    POCT Base Excess, Arterial -8.6 (L) -2.0 - 3.0 mmol/L    POCT HCO3 Calculated, Arterial 16.0 (L) 22.0 - 26.0 mmol/L    POCT Hemoglobin, Arterial 8.4 (L) 13.5 - 17.5 g/dL    POCT Anion Gap, Arterial 22 10 - 25 mmo/L    Patient Temperature 37.0 degrees Celsius    FiO2 30 " %    Ventilator Mode A/C    ACTIVATED CLOTTING TIME LOW   Result Value Ref Range    POCT Activated Clotting Time Low Range 182 83 - 199 sec   POCT GLUCOSE   Result Value Ref Range    POCT Glucose 195 (H) 74 - 99 mg/dL   Blood Gas Mixed Venous Full Panel   Result Value Ref Range    POCT pH, Mixed 7.30 (L) 7.33 - 7.43 pH    POCT pCO2, Mixed 34 (L) 41 - 51 mm Hg    POCT pO2, Mixed 36 35 - 45 mm Hg    POCT SO2, Mixed 49 45 - 75 %    POCT Oxy Hemoglobin, Mixed 47.5 45.0 - 75.0 %    POCT Hematocrit Calculated, Mixed 27.0 (L) 41.0 - 52.0 %    POCT Sodium, Mixed 125 (L) 136 - 145 mmol/L    POCT Potassium, Mixed 4.1 3.5 - 5.3 mmol/L    POCT Chloride, Mixed 90 (L) 98 - 107 mmol/L    POCT Ionized Calcium, Mixed 1.05 (L) 1.10 - 1.33 mmol/L    POCT Glucose, Mixed      POCT Lactate, Mixed 1.5 0.4 - 2.0 mmol/L    POCT Base Excess, Mixed -8.9 (L) -2.0 - 3.0 mmol/L    POCT HCO3 Calculated, Mixed 16.7 (L) 22.0 - 26.0 mmol/L    POCT Hemoglobin, Mixed 8.9 (L) 13.5 - 17.5 g/dL    POCT Anion Gap, Mixed 22 10 - 25 mmo/L    Patient Temperature 37.0 degrees Celsius    FiO2 30 %    Ventilator Mode A/C    ACTIVATED CLOTTING TIME LOW   Result Value Ref Range    POCT Activated Clotting Time Low Range 176 83 - 199 sec   ACTIVATED CLOTTING TIME LOW   Result Value Ref Range    POCT Activated Clotting Time Low Range 179 83 - 199 sec   POCT GLUCOSE   Result Value Ref Range    POCT Glucose 194 (H) 74 - 99 mg/dL   ACTIVATED CLOTTING TIME LOW   Result Value Ref Range    POCT Activated Clotting Time Low Range 168 83 - 199 sec   CBC and Auto Differential   Result Value Ref Range    WBC 24.9 (H) 4.4 - 11.3 x10*3/uL    nRBC 8.6 (H) 0.0 - 0.0 /100 WBCs    RBC 2.61 (L) 4.50 - 5.90 x10*6/uL    Hemoglobin 8.2 (L) 13.5 - 17.5 g/dL    Hematocrit 22.5 (L) 41.0 - 52.0 %    MCV 86 80 - 100 fL    MCH 31.4 26.0 - 34.0 pg    MCHC 36.4 (H) 32.0 - 36.0 g/dL    RDW 15.7 (H) 11.5 - 14.5 %    Platelets 89 (L) 150 - 450 x10*3/uL    Immature Granulocytes %, Automated 12.7  (H) 0.0 - 0.9 %    Immature Granulocytes Absolute, Automated 3.16 (H) 0.00 - 0.70 x10*3/uL   Hepatic function panel   Result Value Ref Range    Albumin 2.8 (L) 3.4 - 5.0 g/dL    Bilirubin, Total 20.6 (H) 0.0 - 1.2 mg/dL    Bilirubin, Direct 15.3 (H) 0.0 - 0.3 mg/dL    Alkaline Phosphatase 443 (H) 33 - 120 U/L     (H) 10 - 52 U/L     (H) 9 - 39 U/L    Total Protein 4.9 (L) 6.4 - 8.2 g/dL   Magnesium   Result Value Ref Range    Magnesium 3.25 (H) 1.60 - 2.40 mg/dL   Coagulation Screen   Result Value Ref Range    Protime 14.6 (H) 9.8 - 12.4 seconds    INR 1.3 (H) 0.9 - 1.1    aPTT 33 26 - 36 seconds   Lactate   Result Value Ref Range    Lactate 1.3 0.4 - 2.0 mmol/L   Phosphorus   Result Value Ref Range    Phosphorus 8.9 (H) 2.5 - 4.9 mg/dL   Basic Metabolic Panel   Result Value Ref Range    Glucose 236 (H) 74 - 99 mg/dL    Sodium 127 (L) 136 - 145 mmol/L    Potassium 4.4 3.5 - 5.3 mmol/L    Chloride 86 (L) 98 - 107 mmol/L    Bicarbonate 18 (L) 21 - 32 mmol/L    Anion Gap 27 (H) 10 - 20 mmol/L    Urea Nitrogen 126 (HH) 6 - 23 mg/dL    Creatinine 10.11 (H) 0.50 - 1.30 mg/dL    eGFR 6 (L) >60 mL/min/1.73m*2    Calcium 8.5 (L) 8.6 - 10.6 mg/dL   Manual Differential   Result Value Ref Range    Neutrophils %, Manual 75.0 40.0 - 80.0 %    Bands %, Manual 6.9 0.0 - 5.0 %    Lymphocytes %, Manual 7.7 13.0 - 44.0 %    Monocytes %, Manual 2.6 2.0 - 10.0 %    Eosinophils %, Manual 0.0 0.0 - 6.0 %    Basophils %, Manual 0.0 0.0 - 2.0 %    Atypical Lymphocytes %, Manual 0.0 0.0 - 2.0 %    Metamyelocytes %, Manual 2.6 0.0 - 0.0 %    Myelocytes %, Manual 4.3 0.0 - 0.0 %    Plasma Cells %, Manual 0.0 0.00 - 0.00 %    Promyelocytes %, Manual 0.9 0.0 - 0.0 %    Blasts %, Manual 0.0 0.0 - 0.0 %    Seg Neutrophils Absolute, Manual 18.68 (H) 1.20 - 7.00 x10*3/uL    Bands Absolute, Manual 1.72 (H) 0.00 - 0.70 x10*3/uL    Lymphocytes Absolute, Manual 1.92 1.20 - 4.80 x10*3/uL    Monocytes Absolute, Manual 0.65 0.10 - 1.00  x10*3/uL    Eosinophils Absolute, Manual 0.00 0.00 - 0.70 x10*3/uL    Basophils Absolute, Manual 0.00 0.00 - 0.10 x10*3/uL    Atypical Lymphs Absolute, Manual 0.00 0.00 - 0.50 x10*3/uL    Metamyelocytes Absolute, Manual 0.65 0.00 - 0.00 x10*3/uL    Myelocytes Absolute, Manual 1.07 0.00 - 0.00 x10*3/uL    Plasma Cells Absolute, Manual 0.00 0.00 - 0.00 x10*3/uL    Promyelocytes Absolute, Manual 0.22 0.00 - 0.00 x10*3/uL    Blasts Absolute, Manual 0.00 0.00 - 0.00 x10*3/uL    Total Cells Counted 116     Neutrophils Absolute, Manual 20.40 (H) 1.20 - 7.70 x10*3/uL    Manual nRBC per 100 Cells 0.0 0.0 - 0.0 %    RBC Morphology See Below     Polychromasia Mild     [place] Cells Few    Blood Gas Arterial Full Panel   Result Value Ref Range    POCT pH, Arterial 7.30 (L) 7.38 - 7.42 pH    POCT pCO2, Arterial 29 (L) 38 - 42 mm Hg    POCT pO2, Arterial 133 (H) 85 - 95 mm Hg    POCT SO2, Arterial 100 94 - 100 %    POCT Oxy Hemoglobin, Arterial 95.6 94.0 - 98.0 %    POCT Hematocrit Calculated, Arterial 29.0 (L) 41.0 - 52.0 %    POCT Sodium, Arterial 123 (L) 136 - 145 mmol/L    POCT Potassium, Arterial 4.6 3.5 - 5.3 mmol/L    POCT Chloride, Arterial 90 (L) 98 - 107 mmol/L    POCT Ionized Calcium, Arterial 1.05 (L) 1.10 - 1.33 mmol/L    POCT Glucose, Arterial 233 (H) 74 - 99 mg/dL    POCT Lactate, Arterial 1.8 0.4 - 2.0 mmol/L    POCT Base Excess, Arterial -10.9 (L) -2.0 - 3.0 mmol/L    POCT HCO3 Calculated, Arterial 14.3 (L) 22.0 - 26.0 mmol/L    POCT Hemoglobin, Arterial 9.8 (L) 13.5 - 17.5 g/dL    POCT Anion Gap, Arterial 23 10 - 25 mmo/L    Patient Temperature 37.0 degrees Celsius    FiO2 30 %    Ventilator Mode A/C    Blood Gas Mixed Venous Full Panel   Result Value Ref Range    POCT pH, Mixed 7.26 (L) 7.33 - 7.43 pH    POCT pCO2, Mixed 34 (L) 41 - 51 mm Hg    POCT pO2, Mixed 39 35 - 45 mm Hg    POCT SO2, Mixed 55 45 - 75 %    POCT Oxy Hemoglobin, Mixed 53.4 45.0 - 75.0 %    POCT Hematocrit Calculated, Mixed 24.0 (L) 41.0 - 52.0  %    POCT Sodium, Mixed 125 (L) 136 - 145 mmol/L    POCT Potassium, Mixed 4.3 3.5 - 5.3 mmol/L    POCT Chloride, Mixed 93 (L) 98 - 107 mmol/L    POCT Ionized Calcium, Mixed 1.04 (L) 1.10 - 1.33 mmol/L    POCT Glucose, Mixed 215 (H) 74 - 99 mg/dL    POCT Lactate, Mixed 1.6 0.4 - 2.0 mmol/L    POCT Base Excess, Mixed -10.8 (L) -2.0 - 3.0 mmol/L    POCT HCO3 Calculated, Mixed 15.3 (L) 22.0 - 26.0 mmol/L    POCT Hemoglobin, Mixed 8.0 (L) 13.5 - 17.5 g/dL    POCT Anion Gap, Mixed 21 10 - 25 mmo/L    Patient Temperature 37.0 degrees Celsius    FiO2 30 %    Ventilator Mode A/C    ACTIVATED CLOTTING TIME LOW   Result Value Ref Range    POCT Activated Clotting Time Low Range 170 83 - 199 sec   ACTIVATED CLOTTING TIME LOW   Result Value Ref Range    POCT Activated Clotting Time Low Range 155 83 - 199 sec   POCT GLUCOSE   Result Value Ref Range    POCT Glucose 203 (H) 74 - 99 mg/dL   ACTIVATED CLOTTING TIME LOW   Result Value Ref Range    POCT Activated Clotting Time Low Range 178 83 - 199 sec   ACTIVATED CLOTTING TIME LOW   Result Value Ref Range    POCT Activated Clotting Time Low Range 176 83 - 199 sec   ACTIVATED CLOTTING TIME LOW   Result Value Ref Range    POCT Activated Clotting Time Low Range 168 83 - 199 sec   POCT GLUCOSE   Result Value Ref Range    POCT Glucose 155 (H) 74 - 99 mg/dL   Blood Gas Mixed Venous Full Panel   Result Value Ref Range    POCT pH, Mixed 7.43 7.33 - 7.43 pH    POCT pCO2, Mixed 32 (L) 41 - 51 mm Hg    POCT pO2, Mixed 55 (H) 35 - 45 mm Hg    POCT SO2, Mixed 86 (H) 45 - 75 %    POCT Oxy Hemoglobin, Mixed 81.9 (H) 45.0 - 75.0 %    POCT Hematocrit Calculated, Mixed 28.0 (L) 41.0 - 52.0 %    POCT Sodium, Mixed 129 (L) 136 - 145 mmol/L    POCT Potassium, Mixed 3.5 3.5 - 5.3 mmol/L    POCT Chloride, Mixed 95 (L) 98 - 107 mmol/L    POCT Ionized Calcium, Mixed 0.98 (L) 1.10 - 1.33 mmol/L    POCT Glucose, Mixed 124 (H) 74 - 99 mg/dL    POCT Lactate, Mixed 1.3 0.4 - 2.0 mmol/L    POCT Base Excess, Mixed  -2.6 (L) -2.0 - 3.0 mmol/L    POCT HCO3 Calculated, Mixed 21.2 (L) 22.0 - 26.0 mmol/L    POCT Hemoglobin, Mixed 9.3 (L) 13.5 - 17.5 g/dL    POCT Anion Gap, Mixed 16 10 - 25 mmo/L    Patient Temperature 37.0 degrees Celsius    FiO2 30 %   ACTIVATED CLOTTING TIME LOW   Result Value Ref Range    POCT Activated Clotting Time Low Range 155 83 - 199 sec   Blood Gas Mixed Venous Full Panel   Result Value Ref Range    POCT pH, Mixed 7.42 7.33 - 7.43 pH    POCT pCO2, Mixed 35 (L) 41 - 51 mm Hg    POCT pO2, Mixed 35 35 - 45 mm Hg    POCT SO2, Mixed 53 45 - 75 %    POCT Oxy Hemoglobin, Mixed 50.4 45.0 - 75.0 %    POCT Hematocrit Calculated, Mixed 26.0 (L) 41.0 - 52.0 %    POCT Sodium, Mixed 130 (L) 136 - 145 mmol/L    POCT Potassium, Mixed 3.3 (L) 3.5 - 5.3 mmol/L    POCT Chloride, Mixed 96 (L) 98 - 107 mmol/L    POCT Ionized Calcium, Mixed 0.96 (L) 1.10 - 1.33 mmol/L    POCT Glucose, Mixed 122 (H) 74 - 99 mg/dL    POCT Lactate, Mixed 1.3 0.4 - 2.0 mmol/L    POCT Base Excess, Mixed -1.5 -2.0 - 3.0 mmol/L    POCT HCO3 Calculated, Mixed 22.7 22.0 - 26.0 mmol/L    POCT Hemoglobin, Mixed 8.7 (L) 13.5 - 17.5 g/dL    POCT Anion Gap, Mixed 15 10 - 25 mmo/L    Patient Temperature 37.0 degrees Celsius    FiO2 30 %   ACTIVATED CLOTTING TIME LOW   Result Value Ref Range    POCT Activated Clotting Time Low Range 200 (H) 83 - 199 sec   ACTIVATED CLOTTING TIME LOW   Result Value Ref Range    POCT Activated Clotting Time Low Range 163 83 - 199 sec   Magnesium   Result Value Ref Range    Magnesium 2.31 1.60 - 2.40 mg/dL   CBC and Auto Differential   Result Value Ref Range    WBC 27.5 (H) 4.4 - 11.3 x10*3/uL    nRBC 12.9 (H) 0.0 - 0.0 /100 WBCs    RBC 2.65 (L) 4.50 - 5.90 x10*6/uL    Hemoglobin 8.2 (L) 13.5 - 17.5 g/dL    Hematocrit 22.8 (L) 41.0 - 52.0 %    MCV 86 80 - 100 fL    MCH 30.9 26.0 - 34.0 pg    MCHC 36.0 32.0 - 36.0 g/dL    RDW 16.5 (H) 11.5 - 14.5 %    Platelets 114 (L) 150 - 450 x10*3/uL    Neutrophils %      Immature  Granulocytes %, Automated 12.3 (H) 0.0 - 0.9 %    Lymphocytes %      Monocytes %      Eosinophils %      Basophils %      Neutrophils Absolute      Immature Granulocytes Absolute, Automated 3.38 (H) 0.00 - 0.70 x10*3/uL    Lymphocytes Absolute      Monocytes Absolute      Eosinophils Absolute      Basophils Absolute     Hepatic function panel   Result Value Ref Range    Albumin 3.0 (L) 3.4 - 5.0 g/dL    Bilirubin, Total 21.0 (H) 0.0 - 1.2 mg/dL    Bilirubin, Direct 19.8 (H) 0.0 - 0.3 mg/dL    Alkaline Phosphatase 449 (H) 33 - 120 U/L     (H) 10 - 52 U/L     (H) 9 - 39 U/L    Total Protein 5.2 (L) 6.4 - 8.2 g/dL   Lactate dehydrogenase   Result Value Ref Range    LDH 2,618 (H) 84 - 246 U/L   Haptoglobin   Result Value Ref Range    Haptoglobin <30 (L) 30 - 200 mg/dL   Phosphorus   Result Value Ref Range    Phosphorus 2.5 2.5 - 4.9 mg/dL   Basic Metabolic Panel   Result Value Ref Range    Glucose 114 (H) 74 - 99 mg/dL    Sodium 133 (L) 136 - 145 mmol/L    Potassium 3.1 (L) 3.5 - 5.3 mmol/L    Chloride 92 (L) 98 - 107 mmol/L    Bicarbonate 29 21 - 32 mmol/L    Anion Gap 15 10 - 20 mmol/L    Urea Nitrogen 35 (H) 6 - 23 mg/dL    Creatinine 3.11 (H) 0.50 - 1.30 mg/dL    eGFR 24 (L) >60 mL/min/1.73m*2    Calcium 7.6 (L) 8.6 - 10.6 mg/dL   ACTIVATED CLOTTING TIME LOW   Result Value Ref Range    POCT Activated Clotting Time Low Range 178 83 - 199 sec   ACTIVATED CLOTTING TIME LOW   Result Value Ref Range    POCT Activated Clotting Time Low Range 161 83 - 199 sec   POCT GLUCOSE   Result Value Ref Range    POCT Glucose 103 (H) 74 - 99 mg/dL   ACTIVATED CLOTTING TIME LOW   Result Value Ref Range    POCT Activated Clotting Time Low Range 166 83 - 199 sec   Blood Gas Mixed Venous Full Panel   Result Value Ref Range    POCT pH, Mixed 7.43 7.33 - 7.43 pH    POCT pCO2, Mixed 37 (L) 41 - 51 mm Hg    POCT pO2, Mixed 38 35 - 45 mm Hg    POCT SO2, Mixed 53 45 - 75 %    POCT Oxy Hemoglobin, Mixed 50.8 45.0 - 75.0 %    POCT  Hematocrit Calculated, Mixed 26.0 (L) 41.0 - 52.0 %    POCT Sodium, Mixed 130 (L) 136 - 145 mmol/L    POCT Potassium, Mixed 4.0 3.5 - 5.3 mmol/L    POCT Chloride, Mixed 96 (L) 98 - 107 mmol/L    POCT Ionized Calcium, Mixed 0.97 (L) 1.10 - 1.33 mmol/L    POCT Glucose, Mixed 100 (H) 74 - 99 mg/dL    POCT Lactate, Mixed 1.4 0.4 - 2.0 mmol/L    POCT Base Excess, Mixed 0.4 -2.0 - 3.0 mmol/L    POCT HCO3 Calculated, Mixed 24.6 22.0 - 26.0 mmol/L    POCT Hemoglobin, Mixed 8.6 (L) 13.5 - 17.5 g/dL    POCT Anion Gap, Mixed 13 10 - 25 mmo/L    Patient Temperature 37.0 degrees Celsius    FiO2 30 %   ACTIVATED CLOTTING TIME LOW   Result Value Ref Range    POCT Activated Clotting Time Low Range 153 83 - 199 sec   Renal function panel   Result Value Ref Range    Glucose 111 (H) 74 - 99 mg/dL    Sodium 131 (L) 136 - 145 mmol/L    Potassium 4.0 3.5 - 5.3 mmol/L    Chloride 93 (L) 98 - 107 mmol/L    Bicarbonate 26 21 - 32 mmol/L    Anion Gap 16 10 - 20 mmol/L    Urea Nitrogen 25 (H) 6 - 23 mg/dL    Creatinine 2.85 (H) 0.50 - 1.30 mg/dL    eGFR 27 (L) >60 mL/min/1.73m*2    Calcium 7.7 (L) 8.6 - 10.6 mg/dL    Phosphorus 3.1 2.5 - 4.9 mg/dL    Albumin 2.8 (L) 3.4 - 5.0 g/dL   Magnesium   Result Value Ref Range    Magnesium 3.02 (H) 1.60 - 2.40 mg/dL   POCT GLUCOSE   Result Value Ref Range    POCT Glucose 137 (H) 74 - 99 mg/dL   ACTIVATED CLOTTING TIME LOW   Result Value Ref Range    POCT Activated Clotting Time Low Range 160 83 - 199 sec        Allergies  Patient has no known allergies.  Medications  Scheduled medications  Scheduled Medications[1]  Continuous medications  Continuous Medications[2]  PRN medications  PRN Medications[3]     Assessment/Plan    Alpesh Elena is a 45y gentleman with a pmh tobacco use, submandibular abscess, and nephrolithiasis who presented with inferior STEMI, now s/p balloon angioplasty to the PDA. Course c/b suspected VSD with mixed cardiogenic and septic shock. Impella CP placed for LV support given  concern for STEMI-induced VSD /inferior LV wall rupture.     EGD planned for 5/27 to evaluate for GIB  Nephrology following for acute renal failure   Acute hypoxic respiratory failure  Ischemic hepatitis   ID following for infection with new unclear source    5/28 - Impella 5.5 placement today  Supportive phone call with mother today to introduce palliative care services per pt's son's request    #Goals of Care  - Code status: full code   - Surrogate decision maker: bria Betts [phone], please make sure son is primary contact and he usually visits daily  - Goals of care remain treatment focused at this time     #Psychosocial Support  - Music Therapy  - Spiritual Care Support  - Art Therapy     Plan of Care discussed with: Updated MD and bedside RN on goals of care decision, medication adjustments, and code status      Medical Decision Making was high level due to high complexity of problems, extensive data review, and high risk of management/treatment.        Thank you for allowing us to participate in the care of this patient. Palliative will continue to follow as needed. Palliative medicine is available Monday-Friday, 8a-6p. Please contact team with any questions or concerns.  Team pager [phone] (weekdays)  Amy Stratton DNP, APRN-CNP         [1] aspirin, 81 mg, oral, Daily  bisacodyl, 10 mg, rectal, BID  insulin lispro, 0-5 Units, subcutaneous, q4h  meropenem, 500 mg, intravenous, q24h  micafungin, 100 mg, intravenous, q24h  oxygen, , inhalation, Continuous - Inhalation  pantoprazole, 40 mg, intravenous, BID  perflutren lipid microspheres, 0.5-10 mL of dilution, intravenous, Once in imaging  perflutren protein A microsphere, 0.5 mL, intravenous, Once in imaging  polyethylene glycol, 17 g, oral, 4x daily  sennosides, 2 tablet, oral, BID    [2] amiodarone, 0.5 mg/min, Last Rate: 0.5 mg/min (05/28/25 0909)  dexmedeTOMIDine, 0.1-1.5 mcg/kg/hr (Dosing Weight), Last Rate: 0.8 mcg/kg/hr (05/28/25 0909)  fentaNYL,   mcg/hr, Last Rate: 75 mcg/hr (05/28/25 0643)  heparin Impella Purge 25 units/mL in 500 mL D5W, 10 mL/hr, Last Rate: 10 mL/hr (05/28/25 0643)  midazolam, 0.5-20 mg/hr, Last Rate: 2 mg/hr (05/28/25 0643)  nitroprusside, 0.25-5 mcg/kg/min (Dosing Weight), Last Rate: Stopped (05/25/25 1939)  norepinephrine, 0.01-1 mcg/kg/min (Dosing Weight), Last Rate: 0.06 mcg/kg/min (05/28/25 0643)    [3] PRN medications: alteplase, dextrose, dextrose, glucagon, glucagon, vancomycin

## 2025-05-28 NOTE — ANESTHESIA PREPROCEDURE EVALUATION
Patient: Alpesh Elena    Procedure Information       Anesthesia Start Date/Time: 05/28/25 1622    Procedure: INSERTION, IMPELLA 5.5 (Right: Chest)    Location: [facility] OR 18 / [facility] OR    Surgeons: Mateo Omer MD            Relevant Problems   Anesthesia   (-) Family history of malignant hyperthermia      Cardiac  STEMI, cardiogenic shock, VSD with Impella CP in place    EF 55% most recent TTE   (+) ST elevation myocardial infarction (STEMI), unspecified artery (Multi)   (+) STEMI (ST elevation myocardial infarction) (Multi)      Pulmonary  Intubated & sedated      ID   (+) Submandibular space infection       Clinical information reviewed:    Allergies  Meds               NPO Detail:  No data recorded     Physical Exam    Airway  Mallampati: unable to assess     Cardiovascular Comments: Impella CP    Dental    Pulmonary Comments: Mechanically ventilated   Abdominal            Anesthesia Plan    History of general anesthesia?: yes  History of complications of general anesthesia?: no    ASA 3     general     intravenous induction   Anesthetic plan and risks discussed with sibling.    Plan discussed with attending and resident.

## 2025-05-28 NOTE — PROGRESS NOTES
Alpesh Elena is a 45 y.o. male on day 8 of admission presenting with STEMI (ST elevation myocardial infarction) (Multi).      Subjective   Had SLED overnight for volume removal and clearnace but had ectopy while on SLED  No sign of renal recovery.  Anuric,   CVP 13,  No new oxygen requirements.         Objective          Vitals 24HR  Heart Rate:  [61-90]   Temp:  [36.6 °C (97.9 °F)-37.6 °C (99.7 °F)]   Resp:  [10-26]   Weight:  [87.6 kg (193 lb 2 oz)-91.2 kg (201 lb)]   SpO2:  [79 %-100 %]         Intake/Output last 3 Shifts:    Intake/Output Summary (Last 24 hours) at 5/28/2025 1305  Last data filed at 5/28/2025 1200  Gross per 24 hour   Intake 2455.78 ml   Output 1629 ml   Net 826.78 ml     On exam:  Sedated and intubated  Impella  Vent 30%  Trace edema      Assessment & Plan  STEMI (ST elevation myocardial infarction) (Multi)    Cardiogenic shock (Multi)    Ventricular septal defect ([facility])    Alpesh Elena is a 45 y.o. year old male patient admitted on 5/20/2025 with: Cardiogenic shock, impella, VSD/ Inferior LV wall rupture . Nephrology following for BENY.    #BENY-D  - Baseline Cr 0.9-1  - UA- RBCs, 2+ blood, trace proteins  - CT abd- no hydro or obstructions  - Etiology of BENY: likely ATN from contrast, septic shock vs cardiogenic shock  - SLED 5/23, 5/25 and 05/27    #HAGMA and metabolic alkalosis  #HFrEF    #lactic acidosis-improved  #sepsis    #Possible GI bleeding-EGD today  #Hemolysis- mechanical likely on impella     ** Recommendations:  -No indication for RRT, will evaluate once back from OR.  - Dose all treatment to GFR <15.  - Avoid hypotension, further contrast and nephrotoxins if possible.  - Strict I/Os  - Daily BMP.       Hailee Parrish MD  Nephrology fellow.

## 2025-05-29 ENCOUNTER — APPOINTMENT (OUTPATIENT)
Dept: CARDIOLOGY | Facility: HOSPITAL | Age: 45
End: 2025-05-29
Payer: COMMERCIAL

## 2025-05-29 LAB
ABO GROUP (TYPE) IN BLOOD: NORMAL
ACT BLD: 123 SEC (ref 83–199)
ACT BLD: 134 SEC (ref 83–199)
ACT BLD: 152 SEC (ref 83–199)
ACT BLD: 152 SEC (ref 83–199)
ACT BLD: 153 SEC (ref 83–199)
ACT BLD: 155 SEC (ref 83–199)
ACT BLD: 157 SEC (ref 83–199)
ACT BLD: 160 SEC (ref 83–199)
ACT BLD: 160 SEC (ref 83–199)
ACT BLD: 171 SEC (ref 83–199)
ACT BLD: 176 SEC (ref 83–199)
ACT BLD: 89 SEC (ref 83–199)
ALBUMIN SERPL BCP-MCNC: 2.5 G/DL (ref 3.4–5)
ALBUMIN SERPL BCP-MCNC: 2.5 G/DL (ref 3.4–5)
ALP SERPL-CCNC: 279 U/L (ref 33–120)
ALP SERPL-CCNC: 308 U/L (ref 33–120)
ALT SERPL W P-5'-P-CCNC: 179 U/L (ref 10–52)
ALT SERPL W P-5'-P-CCNC: 195 U/L (ref 10–52)
ANION GAP BLDMV CALCULATED.4IONS-SCNC: 15 MMO/L (ref 10–25)
ANION GAP BLDV CALCULATED.4IONS-SCNC: 16 MMOL/L (ref 10–25)
ANION GAP SERPL CALC-SCNC: 19 MMOL/L (ref 10–20)
ANION GAP SERPL CALC-SCNC: 20 MMOL/L
ANTIBODY SCREEN: NORMAL
AST SERPL W P-5'-P-CCNC: 212 U/L (ref 9–39)
AST SERPL W P-5'-P-CCNC: 247 U/L (ref 9–39)
BASE EXCESS BLDMV CALC-SCNC: -4.4 MMOL/L (ref -2–3)
BASE EXCESS BLDV CALC-SCNC: -3.5 MMOL/L (ref -2–3)
BASO STIPL BLD QL SMEAR: PRESENT
BASOPHILS # BLD AUTO: 0.08 X10*3/UL (ref 0–0.1)
BASOPHILS # BLD MANUAL: 0 X10*3/UL (ref 0–0.1)
BASOPHILS # BLD MANUAL: 0 X10*3/UL (ref 0–0.1)
BASOPHILS NFR BLD AUTO: 0.3 %
BASOPHILS NFR BLD MANUAL: 0 %
BASOPHILS NFR BLD MANUAL: 0 %
BILIRUB DIRECT SERPL-MCNC: 15 MG/DL (ref 0–0.3)
BILIRUB SERPL-MCNC: 21.8 MG/DL (ref 0–1.2)
BILIRUB SERPL-MCNC: 22.4 MG/DL (ref 0–1.2)
BLASTS # BLD MANUAL: 0 X10*3/UL
BLASTS # BLD MANUAL: 0 X10*3/UL
BLASTS NFR BLD MANUAL: 0 %
BLASTS NFR BLD MANUAL: 0 %
BLOOD EXPIRATION DATE: NORMAL
BODY TEMPERATURE: 37 DEGREES CELSIUS
BODY TEMPERATURE: 37 DEGREES CELSIUS
BUN SERPL-MCNC: 48 MG/DL (ref 6–23)
BUN SERPL-MCNC: 51 MG/DL (ref 6–23)
BURR CELLS BLD QL SMEAR: ABNORMAL
CA-I BLDMV-SCNC: 1.04 MMOL/L (ref 1.1–1.33)
CA-I BLDV-SCNC: 1.07 MMOL/L (ref 1.1–1.33)
CALCIUM SERPL-MCNC: 7.7 MG/DL (ref 8.6–10.6)
CALCIUM SERPL-MCNC: 7.9 MG/DL (ref 8.6–10.6)
CHLORIDE BLD-SCNC: 95 MMOL/L (ref 98–107)
CHLORIDE BLDV-SCNC: 96 MMOL/L (ref 98–107)
CHLORIDE SERPL-SCNC: 93 MMOL/L (ref 98–107)
CHLORIDE SERPL-SCNC: 94 MMOL/L (ref 98–107)
CO2 SERPL-SCNC: 22 MMOL/L (ref 21–32)
CO2 SERPL-SCNC: 23 MMOL/L (ref 21–32)
CREAT SERPL-MCNC: 3.6 MG/DL (ref 0.5–1.3)
CREAT SERPL-MCNC: 4.44 MG/DL (ref 0.5–1.3)
DISPENSE STATUS: NORMAL
EGFRCR SERPLBLD CKD-EPI 2021: 16 ML/MIN/1.73M*2
EGFRCR SERPLBLD CKD-EPI 2021: 20 ML/MIN/1.73M*2
EOSINOPHIL # BLD AUTO: 0.08 X10*3/UL (ref 0–0.7)
EOSINOPHIL # BLD MANUAL: 0.27 X10*3/UL (ref 0–0.7)
EOSINOPHIL # BLD MANUAL: 4.53 X10*3/UL (ref 0–0.7)
EOSINOPHIL NFR BLD AUTO: 0.3 %
EOSINOPHIL NFR BLD MANUAL: 0.9 %
EOSINOPHIL NFR BLD MANUAL: 14.2 %
ERYTHROCYTE [DISTWIDTH] IN BLOOD BY AUTOMATED COUNT: 19.6 % (ref 11.5–14.5)
ERYTHROCYTE [DISTWIDTH] IN BLOOD BY AUTOMATED COUNT: 20.6 % (ref 11.5–14.5)
ERYTHROCYTE [DISTWIDTH] IN BLOOD BY AUTOMATED COUNT: 21.3 % (ref 11.5–14.5)
GLUCOSE BLD MANUAL STRIP-MCNC: 170 MG/DL (ref 74–99)
GLUCOSE BLD MANUAL STRIP-MCNC: 183 MG/DL (ref 74–99)
GLUCOSE BLD MANUAL STRIP-MCNC: 186 MG/DL (ref 74–99)
GLUCOSE BLD MANUAL STRIP-MCNC: 188 MG/DL (ref 74–99)
GLUCOSE BLD MANUAL STRIP-MCNC: 197 MG/DL (ref 74–99)
GLUCOSE BLD MANUAL STRIP-MCNC: 205 MG/DL (ref 74–99)
GLUCOSE BLD MANUAL STRIP-MCNC: 217 MG/DL (ref 74–99)
GLUCOSE BLD-MCNC: 241 MG/DL (ref 74–99)
GLUCOSE BLDV-MCNC: 214 MG/DL (ref 74–99)
GLUCOSE SERPL-MCNC: 195 MG/DL (ref 74–99)
GLUCOSE SERPL-MCNC: 217 MG/DL (ref 74–99)
HAPTOGLOB SERPL NEPH-MCNC: <30 MG/DL (ref 30–200)
HCO3 BLDMV-SCNC: 21 MMOL/L (ref 22–26)
HCO3 BLDV-SCNC: 21.3 MMOL/L (ref 22–26)
HCT VFR BLD AUTO: 18.9 % (ref 41–52)
HCT VFR BLD AUTO: 20.1 % (ref 41–52)
HCT VFR BLD AUTO: 22.9 % (ref 41–52)
HCT VFR BLD EST: 23 % (ref 41–52)
HCT VFR BLD EST: 40 % (ref 41–52)
HGB BLD-MCNC: 6.5 G/DL (ref 13.5–17.5)
HGB BLD-MCNC: 7 G/DL (ref 13.5–17.5)
HGB BLD-MCNC: 8.2 G/DL (ref 13.5–17.5)
HGB BLDMV-MCNC: 13.4 G/DL (ref 13.5–17.5)
HGB BLDV-MCNC: 7.8 G/DL (ref 13.5–17.5)
IMM GRANULOCYTES # BLD AUTO: 2.73 X10*3/UL (ref 0–0.7)
IMM GRANULOCYTES # BLD AUTO: 2.88 X10*3/UL (ref 0–0.7)
IMM GRANULOCYTES # BLD AUTO: 3.13 X10*3/UL (ref 0–0.7)
IMM GRANULOCYTES NFR BLD AUTO: 10.7 % (ref 0–0.9)
IMM GRANULOCYTES NFR BLD AUTO: 9 % (ref 0–0.9)
IMM GRANULOCYTES NFR BLD AUTO: 9.1 % (ref 0–0.9)
INHALED O2 CONCENTRATION: 30 %
INHALED O2 CONCENTRATION: 30 %
LACTATE BLDMV-SCNC: 3 MMOL/L (ref 0.4–2)
LACTATE BLDV-SCNC: 2.7 MMOL/L (ref 0.4–2)
LDH SERPL L TO P-CCNC: 2325 U/L (ref 84–246)
LYMPHOCYTES # BLD AUTO: 2.11 X10*3/UL (ref 1.2–4.8)
LYMPHOCYTES # BLD MANUAL: 1.34 X10*3/UL (ref 1.2–4.8)
LYMPHOCYTES # BLD MANUAL: 2.55 X10*3/UL (ref 1.2–4.8)
LYMPHOCYTES NFR BLD AUTO: 7.2 %
LYMPHOCYTES NFR BLD MANUAL: 4.2 %
LYMPHOCYTES NFR BLD MANUAL: 8.5 %
MAGNESIUM SERPL-MCNC: 2.8 MG/DL (ref 1.6–2.4)
MCH RBC QN AUTO: 31.7 PG (ref 26–34)
MCH RBC QN AUTO: 31.7 PG (ref 26–34)
MCH RBC QN AUTO: 31.8 PG (ref 26–34)
MCHC RBC AUTO-ENTMCNC: 34.4 G/DL (ref 32–36)
MCHC RBC AUTO-ENTMCNC: 34.8 G/DL (ref 32–36)
MCHC RBC AUTO-ENTMCNC: 35.8 G/DL (ref 32–36)
MCV RBC AUTO: 89 FL (ref 80–100)
MCV RBC AUTO: 91 FL (ref 80–100)
MCV RBC AUTO: 92 FL (ref 80–100)
METAMYELOCYTES # BLD MANUAL: 0.26 X10*3/UL
METAMYELOCYTES # BLD MANUAL: 0.51 X10*3/UL
METAMYELOCYTES NFR BLD MANUAL: 0.8 %
METAMYELOCYTES NFR BLD MANUAL: 1.7 %
MONOCYTES # BLD AUTO: 1.89 X10*3/UL (ref 0.1–1)
MONOCYTES # BLD MANUAL: 0.78 X10*3/UL (ref 0.1–1)
MONOCYTES # BLD MANUAL: 1.6 X10*3/UL (ref 0.1–1)
MONOCYTES NFR BLD AUTO: 6.5 %
MONOCYTES NFR BLD MANUAL: 2.6 %
MONOCYTES NFR BLD MANUAL: 5 %
MYELOCYTES # BLD MANUAL: 0.8 X10*3/UL
MYELOCYTES # BLD MANUAL: 1.02 X10*3/UL
MYELOCYTES NFR BLD MANUAL: 2.5 %
MYELOCYTES NFR BLD MANUAL: 3.4 %
NEUTROPHILS # BLD AUTO: 21.93 X10*3/UL (ref 1.2–7.7)
NEUTROPHILS # BLD MANUAL: 23.13 X10*3/UL (ref 1.2–7.7)
NEUTROPHILS # BLD MANUAL: 24.87 X10*3/UL (ref 1.2–7.7)
NEUTROPHILS NFR BLD AUTO: 75 %
NEUTS BAND # BLD MANUAL: 0 X10*3/UL (ref 0–0.7)
NEUTS BAND # BLD MANUAL: 1.8 X10*3/UL (ref 0–0.7)
NEUTS BAND NFR BLD MANUAL: 0 %
NEUTS BAND NFR BLD MANUAL: 6 %
NEUTS SEG # BLD MANUAL: 23.07 X10*3/UL (ref 1.2–7)
NEUTS SEG # BLD MANUAL: 23.13 X10*3/UL (ref 1.2–7)
NEUTS SEG NFR BLD MANUAL: 72.5 %
NEUTS SEG NFR BLD MANUAL: 76.9 %
NRBC BLD MANUAL-RTO: 0 % (ref 0–0)
NRBC BLD MANUAL-RTO: 0 % (ref 0–0)
NRBC BLD-RTO: 5.8 /100 WBCS (ref 0–0)
NRBC BLD-RTO: 6.1 /100 WBCS (ref 0–0)
NRBC BLD-RTO: 8 /100 WBCS (ref 0–0)
OXYHGB MFR BLDMV: 48.2 % (ref 45–75)
OXYHGB MFR BLDV: 49.3 % (ref 45–75)
PAPPENHEIMER BOD BLD QL SMEAR: PRESENT
PCO2 BLDMV: 39 MM HG (ref 41–51)
PCO2 BLDV: 36 MM HG (ref 41–51)
PH BLDMV: 7.34 PH (ref 7.33–7.43)
PH BLDV: 7.38 PH (ref 7.33–7.43)
PHOSPHATE SERPL-MCNC: 6.5 MG/DL (ref 2.5–4.9)
PLASMA CELLS # BLD MANUAL: 0 X10*3/UL
PLASMA CELLS # BLD MANUAL: 0 X10*3/UL
PLASMA CELLS NFR BLD MANUAL: 0 %
PLASMA CELLS NFR BLD MANUAL: 0 %
PLATELET # BLD AUTO: 135 X10*3/UL (ref 150–450)
PLATELET # BLD AUTO: 137 X10*3/UL (ref 150–450)
PLATELET # BLD AUTO: 144 X10*3/UL (ref 150–450)
PO2 BLDMV: 36 MM HG (ref 35–45)
PO2 BLDV: 35 MM HG (ref 35–45)
POLYCHROMASIA BLD QL SMEAR: ABNORMAL
POLYCHROMASIA BLD QL SMEAR: ABNORMAL
POTASSIUM BLDMV-SCNC: 4.7 MMOL/L (ref 3.5–5.3)
POTASSIUM BLDV-SCNC: 5.2 MMOL/L (ref 3.5–5.3)
POTASSIUM SERPL-SCNC: 4.5 MMOL/L (ref 3.5–5.3)
POTASSIUM SERPL-SCNC: 4.7 MMOL/L (ref 3.5–5.3)
PRODUCT BLOOD TYPE: 6200
PRODUCT CODE: NORMAL
PROMYELOCYTES # BLD MANUAL: 0 X10*3/UL
PROMYELOCYTES # BLD MANUAL: 0 X10*3/UL
PROMYELOCYTES NFR BLD MANUAL: 0 %
PROMYELOCYTES NFR BLD MANUAL: 0 %
PROT SERPL-MCNC: 4.5 G/DL (ref 6.4–8.2)
PROT SERPL-MCNC: 4.6 G/DL (ref 6.4–8.2)
RBC # BLD AUTO: 2.05 X10*6/UL (ref 4.5–5.9)
RBC # BLD AUTO: 2.21 X10*6/UL (ref 4.5–5.9)
RBC # BLD AUTO: 2.58 X10*6/UL (ref 4.5–5.9)
RBC MORPH BLD: ABNORMAL
RBC MORPH BLD: ABNORMAL
RH FACTOR (ANTIGEN D): NORMAL
SAO2 % BLDMV: 50 % (ref 45–75)
SAO2 % BLDV: 52 % (ref 45–75)
SODIUM BLDMV-SCNC: 126 MMOL/L (ref 136–145)
SODIUM BLDV-SCNC: 128 MMOL/L (ref 136–145)
SODIUM SERPL-SCNC: 130 MMOL/L (ref 136–145)
SODIUM SERPL-SCNC: 131 MMOL/L (ref 136–145)
TARGETS BLD QL SMEAR: ABNORMAL
TOTAL CELLS COUNTED BLD: 117
TOTAL CELLS COUNTED BLD: 120
UNIT ABO: NORMAL
UNIT NUMBER: NORMAL
UNIT RH: NORMAL
UNIT VOLUME: 350
VANCOMYCIN TROUGH SERPL-MCNC: 20.5 UG/ML (ref 5–20)
VARIANT LYMPHS # BLD MANUAL: 0 X10*3/UL (ref 0–0.5)
VARIANT LYMPHS # BLD MANUAL: 0.26 X10*3/UL (ref 0–0.5)
VARIANT LYMPHS NFR BLD: 0 %
VARIANT LYMPHS NFR BLD: 0.8 %
WBC # BLD AUTO: 29.2 X10*3/UL (ref 4.4–11.3)
WBC # BLD AUTO: 30 X10*3/UL (ref 4.4–11.3)
WBC # BLD AUTO: 31.9 X10*3/UL (ref 4.4–11.3)
XM INTEP: NORMAL

## 2025-05-29 PROCEDURE — 90937 HEMODIALYSIS REPEATED EVAL: CPT

## 2025-05-29 PROCEDURE — 2500000001 HC RX 250 WO HCPCS SELF ADMINISTERED DRUGS (ALT 637 FOR MEDICARE OP)

## 2025-05-29 PROCEDURE — 2020000001 HC ICU ROOM DAILY

## 2025-05-29 PROCEDURE — 2500000004 HC RX 250 GENERAL PHARMACY W/ HCPCS (ALT 636 FOR OP/ED): Mod: JZ

## 2025-05-29 PROCEDURE — 93750 INTERROGATION VAD IN PERSON: CPT

## 2025-05-29 PROCEDURE — 93308 TTE F-UP OR LMTD: CPT | Performed by: INTERNAL MEDICINE

## 2025-05-29 PROCEDURE — 99233 SBSQ HOSP IP/OBS HIGH 50: CPT

## 2025-05-29 PROCEDURE — 90945 DIALYSIS ONE EVALUATION: CPT | Performed by: INTERNAL MEDICINE

## 2025-05-29 PROCEDURE — 85007 BL SMEAR W/DIFF WBC COUNT: CPT

## 2025-05-29 PROCEDURE — 83735 ASSAY OF MAGNESIUM: CPT

## 2025-05-29 PROCEDURE — 37799 UNLISTED PX VASCULAR SURGERY: CPT

## 2025-05-29 PROCEDURE — 94003 VENT MGMT INPAT SUBQ DAY: CPT

## 2025-05-29 PROCEDURE — 80048 BASIC METABOLIC PNL TOTAL CA: CPT

## 2025-05-29 PROCEDURE — 2500000002 HC RX 250 W HCPCS SELF ADMINISTERED DRUGS (ALT 637 FOR MEDICARE OP, ALT 636 FOR OP/ED)

## 2025-05-29 PROCEDURE — 85027 COMPLETE CBC AUTOMATED: CPT

## 2025-05-29 PROCEDURE — 71045 X-RAY EXAM CHEST 1 VIEW: CPT

## 2025-05-29 PROCEDURE — 99291 CRITICAL CARE FIRST HOUR: CPT

## 2025-05-29 PROCEDURE — 86923 COMPATIBILITY TEST ELECTRIC: CPT

## 2025-05-29 PROCEDURE — 84132 ASSAY OF SERUM POTASSIUM: CPT

## 2025-05-29 PROCEDURE — 82248 BILIRUBIN DIRECT: CPT

## 2025-05-29 PROCEDURE — 74018 RADEX ABDOMEN 1 VIEW: CPT

## 2025-05-29 PROCEDURE — 80202 ASSAY OF VANCOMYCIN: CPT

## 2025-05-29 PROCEDURE — 99232 SBSQ HOSP IP/OBS MODERATE 35: CPT | Performed by: PHYSICIAN ASSISTANT

## 2025-05-29 PROCEDURE — 85347 COAGULATION TIME ACTIVATED: CPT

## 2025-05-29 PROCEDURE — 99233 SBSQ HOSP IP/OBS HIGH 50: CPT | Performed by: STUDENT IN AN ORGANIZED HEALTH CARE EDUCATION/TRAINING PROGRAM

## 2025-05-29 PROCEDURE — 83010 ASSAY OF HAPTOGLOBIN QUANT: CPT

## 2025-05-29 PROCEDURE — P9016 RBC LEUKOCYTES REDUCED: HCPCS

## 2025-05-29 PROCEDURE — 2500000004 HC RX 250 GENERAL PHARMACY W/ HCPCS (ALT 636 FOR OP/ED)

## 2025-05-29 PROCEDURE — 93308 TTE F-UP OR LMTD: CPT

## 2025-05-29 PROCEDURE — G0545 PR INHERENT VISIT TO INPT: HCPCS | Performed by: STUDENT IN AN ORGANIZED HEALTH CARE EDUCATION/TRAINING PROGRAM

## 2025-05-29 PROCEDURE — 2500000005 HC RX 250 GENERAL PHARMACY W/O HCPCS

## 2025-05-29 PROCEDURE — 82947 ASSAY GLUCOSE BLOOD QUANT: CPT

## 2025-05-29 PROCEDURE — 36430 TRANSFUSION BLD/BLD COMPNT: CPT

## 2025-05-29 PROCEDURE — 85025 COMPLETE CBC W/AUTO DIFF WBC: CPT

## 2025-05-29 PROCEDURE — 84100 ASSAY OF PHOSPHORUS: CPT

## 2025-05-29 PROCEDURE — 83615 LACTATE (LD) (LDH) ENZYME: CPT

## 2025-05-29 PROCEDURE — 86901 BLOOD TYPING SEROLOGIC RH(D): CPT

## 2025-05-29 RX ORDER — HEPARIN SODIUM 10000 [USP'U]/100ML
0-4000 INJECTION, SOLUTION INTRAVENOUS CONTINUOUS
Status: DISCONTINUED | OUTPATIENT
Start: 2025-05-29 | End: 2025-05-29

## 2025-05-29 RX ORDER — HEPARIN SODIUM 10000 [USP'U]/100ML
INJECTION, SOLUTION INTRAVENOUS
Status: COMPLETED
Start: 2025-05-29 | End: 2025-05-29

## 2025-05-29 RX ORDER — HEPARIN SODIUM 10000 [USP'U]/100ML
0-2000 INJECTION, SOLUTION INTRAVENOUS CONTINUOUS
Status: DISCONTINUED | OUTPATIENT
Start: 2025-05-29 | End: 2025-06-05 | Stop reason: HOSPADM

## 2025-05-29 RX ADMIN — AMIODARONE HYDROCHLORIDE 0.5 MG/MIN: 1.8 INJECTION, SOLUTION INTRAVENOUS at 21:59

## 2025-05-29 RX ADMIN — ASPIRIN 81 MG: 81 TABLET, CHEWABLE ORAL at 10:43

## 2025-05-29 RX ADMIN — INSULIN LISPRO 1 UNITS: 100 INJECTION, SOLUTION INTRAVENOUS; SUBCUTANEOUS at 03:50

## 2025-05-29 RX ADMIN — MICAFUNGIN SODIUM 100 MG: 100 INJECTION, POWDER, LYOPHILIZED, FOR SOLUTION INTRAVENOUS at 10:43

## 2025-05-29 RX ADMIN — HEPARIN SODIUM 500 UNITS/HR: 10000 INJECTION, SOLUTION INTRAVENOUS at 15:45

## 2025-05-29 RX ADMIN — PANTOPRAZOLE SODIUM 40 MG: 40 INJECTION, POWDER, FOR SOLUTION INTRAVENOUS at 09:50

## 2025-05-29 RX ADMIN — SENNOSIDES 17.2 MG: 8.6 TABLET, FILM COATED ORAL at 10:43

## 2025-05-29 RX ADMIN — DEXMEDETOMIDINE HYDROCHLORIDE 0.8 MCG/KG/HR: 400 INJECTION INTRAVENOUS at 16:00

## 2025-05-29 RX ADMIN — Medication 30 PERCENT: at 08:00

## 2025-05-29 RX ADMIN — DEXMEDETOMIDINE HYDROCHLORIDE 0.6 MCG/KG/HR: 400 INJECTION INTRAVENOUS at 02:48

## 2025-05-29 RX ADMIN — Medication 75 MCG/HR: at 07:16

## 2025-05-29 RX ADMIN — SENNOSIDES 17.2 MG: 8.6 TABLET, FILM COATED ORAL at 21:59

## 2025-05-29 RX ADMIN — HEPARIN SODIUM 10 ML/HR: 10000 INJECTION, SOLUTION INTRAVENOUS; SUBCUTANEOUS at 15:45

## 2025-05-29 RX ADMIN — AMIODARONE HYDROCHLORIDE 0.5 MG/MIN: 1.8 INJECTION, SOLUTION INTRAVENOUS at 09:50

## 2025-05-29 RX ADMIN — INSULIN LISPRO 1 UNITS: 100 INJECTION, SOLUTION INTRAVENOUS; SUBCUTANEOUS at 20:00

## 2025-05-29 RX ADMIN — DEXMEDETOMIDINE HYDROCHLORIDE 0.8 MCG/KG/HR: 400 INJECTION INTRAVENOUS at 21:59

## 2025-05-29 RX ADMIN — DEXMEDETOMIDINE HYDROCHLORIDE 0.8 MCG/KG/HR: 400 INJECTION INTRAVENOUS at 09:52

## 2025-05-29 RX ADMIN — VANCOMYCIN HYDROCHLORIDE 750 MG: 750 INJECTION, SOLUTION INTRAVENOUS at 05:47

## 2025-05-29 RX ADMIN — POLYETHYLENE GLYCOL 3350 17 G: 17 POWDER, FOR SOLUTION ORAL at 21:59

## 2025-05-29 RX ADMIN — INSULIN LISPRO 1 UNITS: 100 INJECTION, SOLUTION INTRAVENOUS; SUBCUTANEOUS at 16:03

## 2025-05-29 RX ADMIN — Medication 30 PERCENT: at 20:00

## 2025-05-29 RX ADMIN — SODIUM CHLORIDE 2 G: 9 INJECTION, SOLUTION INTRAVENOUS at 23:14

## 2025-05-29 RX ADMIN — Medication 75 MCG/HR: at 19:24

## 2025-05-29 RX ADMIN — VANCOMYCIN HYDROCHLORIDE 750 MG: 750 INJECTION, SOLUTION INTRAVENOUS at 18:21

## 2025-05-29 RX ADMIN — NOREPINEPHRINE BITARTRATE 0.1 MCG/KG/MIN: 8 INJECTION, SOLUTION INTRAVENOUS at 09:51

## 2025-05-29 RX ADMIN — MEROPENEM 500 MG: 500 INJECTION, POWDER, FOR SOLUTION INTRAVENOUS at 09:51

## 2025-05-29 RX ADMIN — INSULIN LISPRO 1 UNITS: 100 INJECTION, SOLUTION INTRAVENOUS; SUBCUTANEOUS at 12:28

## 2025-05-29 RX ADMIN — PANTOPRAZOLE SODIUM 40 MG: 40 INJECTION, POWDER, FOR SOLUTION INTRAVENOUS at 21:59

## 2025-05-29 RX ADMIN — INSULIN LISPRO 2 UNITS: 100 INJECTION, SOLUTION INTRAVENOUS; SUBCUTANEOUS at 09:49

## 2025-05-29 ASSESSMENT — PAIN SCALES - GENERAL
PAINLEVEL_OUTOF10: 0 - NO PAIN

## 2025-05-29 ASSESSMENT — PAIN - FUNCTIONAL ASSESSMENT
PAIN_FUNCTIONAL_ASSESSMENT: CPOT (CRITICAL CARE PAIN OBSERVATION TOOL)

## 2025-05-29 NOTE — PROGRESS NOTES
Alpesh Elena is a 45 y.o. male on day 8 of admission presenting with STEMI (ST elevation myocardial infarction) (Multi).    Subjective   Interval History: Seen this AM, no changes per nursing, minimal white secretions, incr norepi     Review of Systems    Objective   Range of Vitals (last 24 hours)  Heart Rate:  [61-69]   Temp:  [36.6 °C (97.9 °F)-37.5 °C (99.5 °F)]   Resp:  [11-22]   Weight:  [87.6 kg (193 lb 2 oz)-91.2 kg (201 lb)]   SpO2:  [79 %-100 %]   Daily Weight  05/28/25 : 91.2 kg (201 lb)    Body mass index is 30.56 kg/m².    Physical Exam    Critically ill-appearing, intubated, sedated, not agitated appearing; jaundiced   Lungs rhonchorous bilaterally, no wheezes   Regular rate rhythm, S1/S2, systolic murmur  Abdomen soft, bowel sounds quiet  Left femoral reperfusion line, right groin Impella, right Errol catheter  Lower extremities cool to touch    Antibiotics  meropenem - 1 gram/50 mL, 500 mg/100 mL  micafungin - 100 mg/100 mL  vancomycin - 750 mg/150 mL    Relevant Results  Labs  Results from last 72 hours   Lab Units 05/28/25  0237 05/27/25  1814 05/27/25  0302 05/26/25  1414   WBC AUTO x10*3/uL 27.5* 24.9* 23.6* 25.1*   HEMOGLOBIN g/dL 8.2* 8.2* 7.7* 8.1*   HEMATOCRIT % 22.8* 22.5* 22.5* 22.9*   PLATELETS AUTO x10*3/uL 114* 89* 92* 87*   LYMPHO PCT MAN %  --  7.7 15.9 4.2   MONO PCT MAN %  --  2.6 6.2 3.3   EOSINO PCT MAN %  --  0.0 0.9 1.7     Results from last 72 hours   Lab Units 05/28/25  1235 05/28/25  0631 05/28/25  0237   SODIUM mmol/L 130* 131* 133*   POTASSIUM mmol/L 4.3 4.0 3.1*   CHLORIDE mmol/L 90* 93* 92*   CO2 mmol/L 23 26 29   BUN mg/dL 39* 25* 35*   CREATININE mg/dL 3.85* 2.85* 3.11*   GLUCOSE mg/dL 163* 111* 114*   CALCIUM mg/dL 8.3* 7.7* 7.6*   ANION GAP mmol/L 21* 16 15   EGFR mL/min/1.73m*2 19* 27* 24*   PHOSPHORUS mg/dL 5.5* 3.1 2.5     Results from last 72 hours   Lab Units 05/28/25  1235 05/28/25  0631 05/28/25  0237 05/27/25  1814 05/27/25  0302   ALK PHOS U/L  --   --   449* 443* 370*   BILIRUBIN TOTAL mg/dL  --   --  21.0* 20.6* 16.6*   BILIRUBIN DIRECT mg/dL  --   --  19.8* 15.3* 11.4*   PROTEIN TOTAL g/dL  --   --  5.2* 4.9* 4.6*   ALT U/L  --   --  273* 273* 287*   AST U/L  --   --  276* 241* 232*   ALBUMIN g/dL 2.7* 2.8* 3.0* 2.8* 2.6*     Estimated Creatinine Clearance: 26.3 mL/min (A) (by C-G formula based on SCr of 3.85 mg/dL (H)).  C-Reactive Protein   Date Value Ref Range Status   07/07/2024 10.34 (H) <1.00 mg/dL Final     Microbiology  Susceptibility data from last 14 days.  Collected Specimen Info Organism   05/20/25 Fluid from Tracheal Aspirate Normal throat margaret     5/19 blood cultures 2 sets no growth  5/20 MRSA nares negative  5/20 strep pneumo Legionella urine antigen negative  5/21 MRSA PCR negative  5/21 blood cultures 2 sets no growth  5/26 blood cultures NG      Imaging    RUQ US - large amount of sludge, no findings of acute cholecystitis      Assessment/Plan     Alpesh Elena is a 45-year-old man past medical history significant for tobacco use, transferred to [facility] on 5/20 after presenting to [place] with chest pain and STEMI, status post balloon angioplasty, developed shock and TTE with large VSD vs inferior LV wall rupture.  He underwent Impella placement on 5/20.  ID has been following for fever, Tmax 101.3 on 5/21 with WBC to 34K, now improved.  He was initially treated for pneumonia, and has been on pip-tazo since 5/19, with intermittent doses of vancomycin.  Blood cultures negative from 5/19 and 5/21.  Strep pneumo and Legionella urine antigen negative.  Sputum culture with rare normal throat margaret.  Started on RRT. LFTs improving, T. bili remains elevated, now at 10.  Remains with low FiO2 30%, PEEP 5.      Called back 5/26 for worsening leukocytosis.  No documented fever since 5/21, however in the past day or so he has been placed back on Arctic sun cooling device.  WBC now 25K, neutrophil predominant.  His last vancomycin dose was 5/24, and his WBC  was elevated by 5/25 at midnight, therefore it was likely rising despite vancomycin treatment.  Last vancomycin level 5/26 is 15.  Is now on pressor support with nor epi 0.06, and in the setting of unclear new source of infection, expanded his coverage with meropenem, empiric micafungin in the setting of critical illness, and add back vancomycin until blood cultures result.      EGD 5/27, single ulcer at GE junction likely from OGT trauma or suctioning. Tbili 21, direct bili 19, haptoglobin <30, LDH 2600. Neutrophil predominant leukocytosis, blood cx NGTD, vent settings remain low. No cholecystitis on US. CT imaging deferred by team for now. In the absence of obvious infection, leukocytosis may be driven by critical illness from his cardiogenic shock. Upgrading Impella today.       #STEMI complicated by cardiogenic shock, VSD, status post Impella  #Fever, resolved   #Leukocytosis         Recommendations:  -Continue IV vancomycin, dosed with pharmacy  - Cont V meropenem, renally dosed; given 1gm dose x 1 today, resume w/ 1gm q24H on 5/29  - Cont IV micafungin 100 mg every 24 hours, if blood cx neg at 72H, consider discontinuing   -Obtain repeat sputum cx   -Any liquid stool output send C diff screen       This is a complex infectious disease issue and the following was performed today (for more details please see the above note):   Management decisions reflecting the added complexity (e.g., changes in antimicrobial therapy, infection control strategies).    Following     Ananya Sanchez DO

## 2025-05-29 NOTE — OP NOTE
Operation Note    Date of the Operation: 05/28/2025  Name: Alpesh Elena is a 45 y.o. year old male patient with post infarction VSD  referred for left ventricle support .   MRN: [id number]    Preoperative Diagnosis:   1. STEMI (ST elevation myocardial infarction) (Multi)  Transthoracic Echo (TTE) Limited    Transthoracic Echo (TTE) Limited    Central Line    Central Line    Transthoracic Echo (TTE) Limited    Transthoracic Echo (TTE) Limited    Transthoracic Echo (TTE) Limited    Transthoracic Echo (TTE) Limited    Transthoracic Echo (TTE) Limited    Transthoracic Echo (TTE) Limited    Anesthesia Intraoperative Transesophageal Echocardiogram    Anesthesia Intraoperative Transesophageal Echocardiogram    Transthoracic Echo (TTE) Limited    Transthoracic Echo (TTE) Limited    Transthoracic Echo (TTE) Limited    Transthoracic Echo (TTE) Limited    CANCELED: Case Request Cath Lab: Impella Insertion    CANCELED: Case Request Cath Lab: Impella Insertion      2. Cardiogenic shock (Multi)  Case Request Cath Lab: Impella Insertion    Case Request Cath Lab: Impella Insertion    Cardiac Catheterization Procedure    Cardiac Catheterization Procedure    Transthoracic Echo (TTE) Limited    Transthoracic Echo (TTE) Limited    Central Line    Central Line    Transthoracic Echo (TTE) Limited    Transthoracic Echo (TTE) Limited    Transthoracic Echo (TTE) Limited    Case Request Operating Room: INSERTION, IMPELLA 5.5    Case Request Operating Room: INSERTION, IMPELLA 5.5    Anesthesia Intraoperative Transesophageal Echocardiogram    Anesthesia Intraoperative Transesophageal Echocardiogram    CANCELED: Case Request Cath Lab: Impella Insertion    CANCELED: Case Request Cath Lab: Impella Insertion    CANCELED: Transthoracic Echo (TTE) Limited    CANCELED: Transthoracic Echo (TTE) Limited      3. ST elevation myocardial infarction (STEMI), unspecified artery (Multi)  Transthoracic Echo (TTE) Limited    Transthoracic Echo (TTE) Limited     CANCELED: Transthoracic Echo (TTE) Limited    CANCELED: Transthoracic Echo (TTE) Limited      4. Ventricular septal defect (HHS-HCC)  Cardiac Catheterization Procedure    Transthoracic Echo (TTE) Limited    Transthoracic Echo (TTE) Limited    Transthoracic Echo (TTE) Limited    Transthoracic Echo (TTE) Limited    Case Request Operating Room: INSERTION, IMPELLA 5.5    Case Request Operating Room: INSERTION, IMPELLA 5.5      5. BENY (acute kidney injury)  Central Line    Central Line      6. Anemia, unspecified type  Esophagogastroduodenoscopy (EGD)    Esophagogastroduodenoscopy (EGD)      7. Melena  Esophagogastroduodenoscopy (EGD)    Esophagogastroduodenoscopy (EGD)      8. VSD (ventricular septal defect) (HHS-HCC)  Transthoracic Echo (TTE) Limited    Transthoracic Echo (TTE) Limited        Postoperative Diagnosis: The same  Operation Procedures:  #1 right axillary artery cutdown and 10 mm Gelweave graft cannulation  #2 5.5 Impella placement  #3 right femoral artery decannulation and percutaneous closure  #4 left femoral artery decannulation percutaneous closure  Surgeon: Mateo Omer MD    Assistants: KEM Bustamante M Perosek (The physician assistants performed standard duties and provided assistance throughout the entire operation. Their responsibilities included, but were not limited to, the following: cannulation, conduit harvesting, final hemostasis, placement and fixation of chest tubes, chest closure, and wound closure. Additionally, they assisted with patient transportation. They were under continuous supervision throughout the procedure.)      Anesthesia Type: General Endotracheal anesthesia    Complications: None     Estimated Blood loss: 100 cc    Indication for Surgery: This is a 45 years old male patient who presented to the emergency room with an NSTEMI.  The patient developed a postinfarction VSD and he became severely unstable.  Shock call was installed and the decision was to started on  femoral CP Impella that he has been on for the last 5 days.  The patient is somewhat unstable with progressive hemolysis, jaundice and some degree of low cardiac output state.  We discussed with the CICU team and we decided to escalate to 5.5 Impella.  Family is updated and the consent is signed by the family member.    Operative Findings:    MYNOR: 30% ejection fraction.  Large VSD    Direct: Right axillary artery is soft and normal size    Operation Description: The patient was brought to the operative room.  The patient is sedated, connected to mechanical ventilation, on the CP Impella with 2.5 L/min flow.  The patient was moved to the operating room table, he is already sedated and lined up and monitored so we proceeded immediately with prepping and draping as usual, we started with a right axillary artery cutdown.  Once we found the artery we isolated it, we gave 9000  U of heparin and we used a single side bite clamp to isolate the segment for the anastomosis.  We brought the 10 mm Gelweave graft and we performed a T-shaped anastomosis between the artery and the graft with a 5-0 running prolene suture.  The clamp was removed, the hemostasis of the suture line is obtained and then using standard technique with fluoroscopy, we exteriorized the graft through a separate skin incision we advanced the wire and a pigtail across the axillary artery into the ascending aorta and across the valve.  The pigtail was landed into the LV apex and then the dedicated wire was then placed into the left ventricle.  Using the wire and across the graft we placed a 5.5 Impella into position, once the position was accomplished the CP Impella was withdrawn and we started progressive support with a 5.5 Impella all the way up to 4 L/min.  Protamine was given the Impella was fixed into position and then the wound was closed by layer.  The CP Impella was removed and percutaneous closure was applied in both femoral arteries.  Patient is  stable and is transferred back to the CT ICU to continue with his postoperative management.  Dictated by Dr. Mateo Omer

## 2025-05-29 NOTE — PROGRESS NOTES
CARDIAC SURGERY CONSULT PROGRESS NOTE    SUBJECTIVE  Mr. Elena is a 45 year old male with a past medical history of tobacco abuse (30 pack years), submandibular abscess, and nephrolithiasis who presented to [facility] with a chief complaint of chest pain, found to have an inferior STEMI, now s/p coronary angiogram with balloon angioplasty to the PDA (right dominant system). His course was complicated by hemodynamic instability (tachycardia, hypotension) requiring vasopressor support and laboratory evidnce of end organ dysfunction. Due to concern for cardiogenic vs mixed shock, a shock call was intervened and recommended transfer to [facility] CICU. TTE at [facility] revealed large VSD. He underwent Impella CP placement 5/20.      Cardiac surgery is consulted for a VSD repair post STEMI.      Hospital course:   Per chart review, presented to [facility] 05/18 PM for the evaluation of chest tightness associated with SOB/nausea. On arrival he was afebrile, tachycardic, and hypertensive. ED workup remarkable for initial WBC 20.5, Cr 0.79, troponin ~24K,  and ECG with ST elevations in the inferior leads with reciprocal depressions. He was loaded with aspirin and brilinta and started on heparin gtt. He was taken for emergent coronary angiogram which revealed multi vessel CAD [LAD/Lcx/RCA mildly/diffusely diseased,100% RPDA culprit lesion] with PTCA to RPDA with 50% residual stenosis [GLENNA 0->3] (too small to stent). Notably, LVEF 45% and LVED 20-25 mmHg.      He then developed on 5/19 persistent tachycardia despite IVF and IV metoprolol. TTE with LVEF 55%, wall motion abnormalities, and no significant valvular disease. Due to concern for acute aortic pathology vs PE, he underwent a CTA CAP revealing of mild pulmonary edema. Due to concern for PNA, blood cultures were obtained and he was started on CTX/Doxycycline. Later that evening was in respiratory distress (tachypneic to RR 50) with hypotension (BP 85/52 ) and satting  92% on RA with labs revealing of end organ dysfunction and CXR with bilateral pulmonary infiltrates [pulmoinary edema vs multifocal PNA]. A shock call was convened and recommended intubation and PA catheter placement for adjudication of hemodynamics. He was intubated without issue and opening [place] numbers were revealing of RA 14, RV 55/18, PA 51/30 (40), PCWP 32, svO2 87, [place] CO 13.9, [place] CI 7.3.  He was then transferred to [facility].      On arrival to the CICU he was hypotensive with escalating pressor requirements. Bedside examination reveals a holosystolic murmur and serial mixed venous are revealing of a significant step up in SVO2 from the RA to PA concerning for the presence of a ischemic VSD, perhaps secondardy to a late presenting STEMI given troponin elevation to 24K on presentation. Shock call was done that recommended an impella CP placement to offload LV given concern for STEMI-induced VSD/ Inferior LV wall rupture.      Cardiac/Pertinent Imaging  C: 5/18/25:  CONCLUSIONS:   1. Left Main Coronary Artery: This artery is normal.   2. Left Anterior Descending Artery: is mildly diseased, is diffusely diseased and is ectatic.   3. Circumflex Coronary Artery: diffusely dieased, mildly diseased and ectatic.   4. Right Coronary Artery: is tortuous, is diffusely diseased and mildly diseased.   5. PDA RCA Lesion: The percent stenosis is 100%.   6. PDA RCA Lesion: PTCA: 50% residual stenosis. RCA: pre-procedure GLENNA flow was 0(no perfusion) and post-procedure GLENNA flow was 3(complete perfusion).   7. The Left Ventricular Ejection Fraction is 45%.   8. Left Ventricular End Diastolic Pressure: 20-25 mm Hg.   9. Aortic Stenosis: None.  10. Left Ventricular End Diastolic Pressure Dysfunction: Moderate.  11. LV: inferior akinesis.  12. LV: mild left ventricular dysfunction with regional wall motion abnormalities.     Echo: 5/20/25:  CONCLUSIONS:   1. Poorly visualized anatomical structures due to suboptimal image  "quality.   2. The left ventricle was not well visualized. The left ventricular ejection fraction could not be measured.   3. Limited views of the LV appear show a hyderdynamic LV with a large color flow from LV to RV through the inferoseptum with maximal velocity of 3.3m/sec, Unable to adequatly assess LVEF, though appears to have baal septal and inferior wall motion abnormality.   4. There is reduced right ventricular systolic function.   5. The RV appears dilated with significantly reduced function with reduced TAPSE and fractional area change.   6. Primary service notified of findings at the time of reporting.   7. Compared with the prior exam from 5/19/2025 ([facility]) there has been interval development of a large VSD throuh the inferoseptum with the RV now becoming large with significantly reduced function. Prior echo with reported LVEF of 55% with basal inferospetal and baal inferior hypokinesis in the setting of STEMI. RV size and function were nornal at that time. ( Note that the septum was more thoroughly interrogated with color Doppler today).    Objective   BP 96/89   Pulse 53   Temp 35 °C (95 °F) (Core)   Resp 14   Ht 1.727 m (5' 8\")   Wt 91.2 kg (201 lb 1 oz)   SpO2 96%   BMI 30.57 kg/m²   0-10 (Numeric) Pain Score: 0 - No pain  Critical-Care Pain Observation Score:  [0-1]    Vitals:    05/29/25 0600   Weight: 91.2 kg (201 lb 1 oz)           Intake/Output Summary (Last 24 hours) at 5/29/2025 1405  Last data filed at 5/29/2025 1200  Gross per 24 hour   Intake 1341.23 ml   Output 378 ml   Net 963.23 ml         Medications  Scheduled medications  Scheduled Medications[1]Continuous medications  Continuous Medications[2]PRN medications  PRN Medications[3]    Labs  Results for orders placed or performed during the hospital encounter of 05/20/25 (from the past 24 hours)   POCT GLUCOSE   Result Value Ref Range    POCT Glucose 177 (H) 74 - 99 mg/dL   Prepare Plasma: 4 Units   Result Value Ref " Range    PRODUCT CODE N2028O18     Unit Number [id number]     Unit ABO A     Unit RH POS     Dispense Status RE     Blood Expiration Date 6/2/2025  1:41:00 PM EDT     PRODUCT BLOOD TYPE 6200     UNIT VOLUME 229     PRODUCT CODE C1122F97     Unit Number [id number]     Unit ABO A     Unit RH POS     Dispense Status RE     Blood Expiration Date 6/2/2025  1:41:00 PM EDT     PRODUCT BLOOD TYPE 6200     UNIT VOLUME 223     PRODUCT CODE H6819H09     Unit Number [id number]     Unit ABO A     Unit RH POS     Dispense Status RE     Blood Expiration Date 6/1/2025  7:42:00 AM EDT     PRODUCT BLOOD TYPE 6200     UNIT VOLUME 314     PRODUCT CODE Q3841E30     Unit Number [id number]     Unit ABO A     Unit RH POS     Dispense Status RE     Blood Expiration Date 6/1/2025  7:42:00 AM EDT     PRODUCT BLOOD TYPE 6200     UNIT VOLUME 306    Blood Gas Arterial Full Panel Unsolicited   Result Value Ref Range    POCT pH, Arterial 7.36 (L) 7.38 - 7.42 pH    POCT pCO2, Arterial 33 (L) 38 - 42 mm Hg    POCT pO2, Arterial 417 (H) 85 - 95 mm Hg    POCT SO2, Arterial 100 94 - 100 %    POCT Oxy Hemoglobin, Arterial 95.4 94.0 - 98.0 %    POCT Hematocrit Calculated, Arterial 24.0 (L) 41.0 - 52.0 %    POCT Sodium, Arterial 125 (L) 136 - 145 mmol/L    POCT Potassium, Arterial 5.0 3.5 - 5.3 mmol/L    POCT Chloride, Arterial 95 (L) 98 - 107 mmol/L    POCT Ionized Calcium, Arterial 1.07 (L) 1.10 - 1.33 mmol/L    POCT Glucose, Arterial 184 (H) 74 - 99 mg/dL    POCT Lactate, Arterial 2.5 (H) 0.4 - 2.0 mmol/L    POCT Base Excess, Arterial -6.2 (L) -2.0 - 3.0 mmol/L    POCT HCO3 Calculated, Arterial 18.6 (L) 22.0 - 26.0 mmol/L    POCT Hemoglobin, Arterial 8.1 (L) 13.5 - 17.5 g/dL    POCT Anion Gap, Arterial 16 10 - 25 mmo/L    Patient Temperature 37.0 degrees Celsius    FiO2 100 %   Coox Panel, Arterial Unsolicited   Result Value Ref Range    POCT Hemoglobin, Arterial 8.1 (L) 13.5 - 17.5 g/dL    POCT Oxy Hemoglobin, Arterial 95.4 94.0 -  98.0 %    POCT Carboxyhemoglobin, Arterial 3.0 %    POCT Methemoglobin, Arterial 1.6 (H) 0.0 - 1.5 %    POCT Deoxy Hemoglobin, Arterial 0.0 0.0 - 5.0 %   Blood Gas Arterial Full Panel Unsolicited   Result Value Ref Range    POCT pH, Arterial 7.32 (L) 7.38 - 7.42 pH    POCT pCO2, Arterial 35 (L) 38 - 42 mm Hg    POCT pO2, Arterial 338 (H) 85 - 95 mm Hg    POCT SO2, Arterial 100 94 - 100 %    POCT Oxy Hemoglobin, Arterial 95.1 94.0 - 98.0 %    POCT Hematocrit Calculated, Arterial 24.0 (L) 41.0 - 52.0 %    POCT Sodium, Arterial 124 (L) 136 - 145 mmol/L    POCT Potassium, Arterial 6.2 (HH) 3.5 - 5.3 mmol/L    POCT Chloride, Arterial 96 (L) 98 - 107 mmol/L    POCT Ionized Calcium, Arterial 1.03 (L) 1.10 - 1.33 mmol/L    POCT Glucose, Arterial 187 (H) 74 - 99 mg/dL    POCT Lactate, Arterial 2.5 (H) 0.4 - 2.0 mmol/L    POCT Base Excess, Arterial -7.4 (L) -2.0 - 3.0 mmol/L    POCT HCO3 Calculated, Arterial 18.0 (L) 22.0 - 26.0 mmol/L    POCT Hemoglobin, Arterial 8.1 (L) 13.5 - 17.5 g/dL    POCT Anion Gap, Arterial 16 10 - 25 mmo/L    Patient Temperature 37.0 degrees Celsius    FiO2 100 %   Coox Panel, Arterial Unsolicited   Result Value Ref Range    POCT Hemoglobin, Arterial 8.1 (L) 13.5 - 17.5 g/dL    POCT Oxy Hemoglobin, Arterial 95.1 94.0 - 98.0 %    POCT Carboxyhemoglobin, Arterial 2.7 %    POCT Methemoglobin, Arterial 2.2 (H) 0.0 - 1.5 %    POCT Deoxy Hemoglobin, Arterial 0.0 0.0 - 5.0 %   Blood Gas Arterial Full Panel Unsolicited   Result Value Ref Range    POCT pH, Arterial 7.31 (L) 7.38 - 7.42 pH    POCT pCO2, Arterial 36 (L) 38 - 42 mm Hg    POCT pO2, Arterial 351 (H) 85 - 95 mm Hg    POCT SO2, Arterial 100 94 - 100 %    POCT Oxy Hemoglobin, Arterial 95.7 94.0 - 98.0 %    POCT Hematocrit Calculated, Arterial 24.0 (L) 41.0 - 52.0 %    POCT Sodium, Arterial 125 (L) 136 - 145 mmol/L    POCT Potassium, Arterial 5.2 3.5 - 5.3 mmol/L    POCT Chloride, Arterial 96 (L) 98 - 107 mmol/L    POCT Ionized Calcium, Arterial 1.07  (L) 1.10 - 1.33 mmol/L    POCT Glucose, Arterial 186 (H) 74 - 99 mg/dL    POCT Lactate, Arterial 2.1 (H) 0.4 - 2.0 mmol/L    POCT Base Excess, Arterial -7.5 (L) -2.0 - 3.0 mmol/L    POCT HCO3 Calculated, Arterial 18.1 (L) 22.0 - 26.0 mmol/L    POCT Hemoglobin, Arterial 7.9 (L) 13.5 - 17.5 g/dL    POCT Anion Gap, Arterial 16 10 - 25 mmo/L    Patient Temperature 37.0 degrees Celsius    FiO2 100 %   Coox Panel, Arterial Unsolicited   Result Value Ref Range    POCT Hemoglobin, Arterial 7.9 (L) 13.5 - 17.5 g/dL    POCT Oxy Hemoglobin, Arterial 95.7 94.0 - 98.0 %    POCT Carboxyhemoglobin, Arterial 2.5 %    POCT Methemoglobin, Arterial 1.7 (H) 0.0 - 1.5 %    POCT Deoxy Hemoglobin, Arterial 0.0 0.0 - 5.0 %   Blood Gas Arterial Full Panel Unsolicited   Result Value Ref Range    POCT pH, Arterial 7.31 (L) 7.38 - 7.42 pH    POCT pCO2, Arterial 37 (L) 38 - 42 mm Hg    POCT pO2, Arterial 362 (H) 85 - 95 mm Hg    POCT SO2, Arterial 100 94 - 100 %    POCT Oxy Hemoglobin, Arterial 95.6 94.0 - 98.0 %    POCT Hematocrit Calculated, Arterial 23.0 (L) 41.0 - 52.0 %    POCT Sodium, Arterial 125 (L) 136 - 145 mmol/L    POCT Potassium, Arterial 5.2 3.5 - 5.3 mmol/L    POCT Chloride, Arterial 97 (L) 98 - 107 mmol/L    POCT Ionized Calcium, Arterial 1.06 (L) 1.10 - 1.33 mmol/L    POCT Glucose, Arterial 185 (H) 74 - 99 mg/dL    POCT Lactate, Arterial 2.1 (H) 0.4 - 2.0 mmol/L    POCT Base Excess, Arterial -7.0 (L) -2.0 - 3.0 mmol/L    POCT HCO3 Calculated, Arterial 18.6 (L) 22.0 - 26.0 mmol/L    POCT Hemoglobin, Arterial 7.6 (L) 13.5 - 17.5 g/dL    POCT Anion Gap, Arterial 15 10 - 25 mmo/L    Patient Temperature 37.0 degrees Celsius    FiO2 100 %   Coox Panel, Arterial Unsolicited   Result Value Ref Range    POCT Hemoglobin, Arterial 7.6 (L) 13.5 - 17.5 g/dL    POCT Oxy Hemoglobin, Arterial 95.6 94.0 - 98.0 %    POCT Carboxyhemoglobin, Arterial 2.7 %    POCT Methemoglobin, Arterial 1.7 (H) 0.0 - 1.5 %    POCT Deoxy Hemoglobin, Arterial 0.0  0.0 - 5.0 %   POCT GLUCOSE   Result Value Ref Range    POCT Glucose 206 (H) 74 - 99 mg/dL   Blood Gas Arterial Full Panel   Result Value Ref Range    POCT pH, Arterial 7.38 7.38 - 7.42 pH    POCT pCO2, Arterial 29 (L) 38 - 42 mm Hg    POCT pO2, Arterial 87 85 - 95 mm Hg    POCT SO2, Arterial 97 94 - 100 %    POCT Oxy Hemoglobin, Arterial 92.9 (L) 94.0 - 98.0 %    POCT Hematocrit Calculated, Arterial 37.0 (L) 41.0 - 52.0 %    POCT Sodium, Arterial 123 (L) 136 - 145 mmol/L    POCT Potassium, Arterial 4.6 3.5 - 5.3 mmol/L    POCT Chloride, Arterial 92 (L) 98 - 107 mmol/L    POCT Ionized Calcium, Arterial 0.97 (L) 1.10 - 1.33 mmol/L    POCT Glucose, Arterial 216 (H) 74 - 99 mg/dL    POCT Lactate, Arterial 4.1 (HH) 0.4 - 2.0 mmol/L    POCT Base Excess, Arterial -6.7 (L) -2.0 - 3.0 mmol/L    POCT HCO3 Calculated, Arterial 17.2 (L) 22.0 - 26.0 mmol/L    POCT Hemoglobin, Arterial 12.3 (L) 13.5 - 17.5 g/dL    POCT Anion Gap, Arterial 18 10 - 25 mmo/L    Patient Temperature 37.0 degrees Celsius    FiO2 30 %   Blood Gas Lactic Acid, Venous   Result Value Ref Range    POCT Lactate, Venous 2.7 (H) 0.4 - 2.0 mmol/L   Blood Gas Venous Full Panel   Result Value Ref Range    POCT pH, Venous 7.35 7.33 - 7.43 pH    POCT pCO2, Venous 21 (L) 41 - 51 mm Hg    POCT pO2, Venous 45 35 - 45 mm Hg    POCT SO2, Venous 67 45 - 75 %    POCT Oxy Hemoglobin, Venous 63.7 45.0 - 75.0 %    POCT Hematocrit Calculated, Venous 14.0 (L) 41.0 - 52.0 %    POCT Sodium, Venous 133 (L) 136 - 145 mmol/L    POCT Potassium, Venous 3.0 (L) 3.5 - 5.3 mmol/L    POCT Chloride, Venous 109 (H) 98 - 107 mmol/L    POCT Ionized Calicum, Venous 0.90 (L) 1.10 - 1.33 mmol/L    POCT Glucose, Venous 145 (H) 74 - 99 mg/dL    POCT Lactate, Venous 2.7 (H) 0.4 - 2.0 mmol/L    POCT Base Excess, Venous -13.0 (L) -2.0 - 3.0 mmol/L    POCT HCO3 Calculated, Venous 11.6 (L) 22.0 - 26.0 mmol/L    POCT Hemoglobin, Venous 4.7 (LL) 13.5 - 17.5 g/dL    POCT Anion Gap, Venous 15.0 10.0 -  25.0 mmol/L    Patient Temperature 37.0 degrees Celsius    FiO2 30 %   POCT GLUCOSE   Result Value Ref Range    POCT Glucose 194 (H) 74 - 99 mg/dL   Magnesium   Result Value Ref Range    Magnesium 2.80 (H) 1.60 - 2.40 mg/dL   CBC and Auto Differential   Result Value Ref Range    WBC 29.2 (H) 4.4 - 11.3 x10*3/uL    nRBC 8.0 (H) 0.0 - 0.0 /100 WBCs    RBC 2.21 (L) 4.50 - 5.90 x10*6/uL    Hemoglobin 7.0 (L) 13.5 - 17.5 g/dL    Hematocrit 20.1 (L) 41.0 - 52.0 %    MCV 91 80 - 100 fL    MCH 31.7 26.0 - 34.0 pg    MCHC 34.8 32.0 - 36.0 g/dL    RDW 19.6 (H) 11.5 - 14.5 %    Platelets 137 (L) 150 - 450 x10*3/uL    Neutrophils % 75.0 40.0 - 80.0 %    Immature Granulocytes %, Automated 10.7 (H) 0.0 - 0.9 %    Lymphocytes % 7.2 13.0 - 44.0 %    Monocytes % 6.5 2.0 - 10.0 %    Eosinophils % 0.3 0.0 - 6.0 %    Basophils % 0.3 0.0 - 2.0 %    Neutrophils Absolute 21.93 (H) 1.20 - 7.70 x10*3/uL    Immature Granulocytes Absolute, Automated 3.13 (H) 0.00 - 0.70 x10*3/uL    Lymphocytes Absolute 2.11 1.20 - 4.80 x10*3/uL    Monocytes Absolute 1.89 (H) 0.10 - 1.00 x10*3/uL    Eosinophils Absolute 0.08 0.00 - 0.70 x10*3/uL    Basophils Absolute 0.08 0.00 - 0.10 x10*3/uL   Hepatic function panel   Result Value Ref Range    Albumin 2.5 (L) 3.4 - 5.0 g/dL    Bilirubin, Total 21.8 (H) 0.0 - 1.2 mg/dL    Bilirubin, Direct 15.0 (H) 0.0 - 0.3 mg/dL    Alkaline Phosphatase 308 (H) 33 - 120 U/L     (H) 10 - 52 U/L     (H) 9 - 39 U/L    Total Protein 4.5 (L) 6.4 - 8.2 g/dL   Lactate dehydrogenase   Result Value Ref Range    LDH 2,325 (H) 84 - 246 U/L   Haptoglobin   Result Value Ref Range    Haptoglobin <30 (L) 30 - 200 mg/dL   Phosphorus   Result Value Ref Range    Phosphorus 6.5 (H) 2.5 - 4.9 mg/dL   Basic Metabolic Panel   Result Value Ref Range    Glucose 195 (H) 74 - 99 mg/dL    Sodium 130 (L) 136 - 145 mmol/L    Potassium 4.7 3.5 - 5.3 mmol/L    Chloride 93 (L) 98 - 107 mmol/L    Bicarbonate 22 21 - 32 mmol/L    Anion Gap 20  mmol/L    Urea Nitrogen 51 (H) 6 - 23 mg/dL    Creatinine 4.44 (H) 0.50 - 1.30 mg/dL    eGFR 16 (L) >60 mL/min/1.73m*2    Calcium 7.9 (L) 8.6 - 10.6 mg/dL   ACTIVATED CLOTTING TIME LOW   Result Value Ref Range    POCT Activated Clotting Time Low Range 171 83 - 199 sec   POCT GLUCOSE   Result Value Ref Range    POCT Glucose 186 (H) 74 - 99 mg/dL   Blood Gas Venous Full Panel   Result Value Ref Range    POCT pH, Venous 7.38 7.33 - 7.43 pH    POCT pCO2, Venous 36 (L) 41 - 51 mm Hg    POCT pO2, Venous 35 35 - 45 mm Hg    POCT SO2, Venous 52 45 - 75 %    POCT Oxy Hemoglobin, Venous 49.3 45.0 - 75.0 %    POCT Hematocrit Calculated, Venous 23.0 (L) 41.0 - 52.0 %    POCT Sodium, Venous 128 (L) 136 - 145 mmol/L    POCT Potassium, Venous 5.2 3.5 - 5.3 mmol/L    POCT Chloride, Venous 96 (L) 98 - 107 mmol/L    POCT Ionized Calicum, Venous 1.07 (L) 1.10 - 1.33 mmol/L    POCT Glucose, Venous 214 (H) 74 - 99 mg/dL    POCT Lactate, Venous 2.7 (H) 0.4 - 2.0 mmol/L    POCT Base Excess, Venous -3.5 (L) -2.0 - 3.0 mmol/L    POCT HCO3 Calculated, Venous 21.3 (L) 22.0 - 26.0 mmol/L    POCT Hemoglobin, Venous 7.8 (L) 13.5 - 17.5 g/dL    POCT Anion Gap, Venous 16.0 10.0 - 25.0 mmol/L    Patient Temperature 37.0 degrees Celsius    FiO2 30 %   ACTIVATED CLOTTING TIME LOW   Result Value Ref Range    POCT Activated Clotting Time Low Range 152 83 - 199 sec   CBC and Auto Differential   Result Value Ref Range    WBC 31.9 (H) 4.4 - 11.3 x10*3/uL    nRBC 5.8 (H) 0.0 - 0.0 /100 WBCs    RBC 2.05 (L) 4.50 - 5.90 x10*6/uL    Hemoglobin 6.5 (LL) 13.5 - 17.5 g/dL    Hematocrit 18.9 (L) 41.0 - 52.0 %    MCV 92 80 - 100 fL    MCH 31.7 26.0 - 34.0 pg    MCHC 34.4 32.0 - 36.0 g/dL    RDW 21.3 (H) 11.5 - 14.5 %    Platelets 135 (L) 150 - 450 x10*3/uL    Immature Granulocytes %, Automated 9.0 (H) 0.0 - 0.9 %    Immature Granulocytes Absolute, Automated 2.88 (H) 0.00 - 0.70 x10*3/uL   Manual Differential   Result Value Ref Range    Neutrophils %, Manual 72.5  40.0 - 80.0 %    Bands %, Manual 0.0 0.0 - 5.0 %    Lymphocytes %, Manual 4.2 13.0 - 44.0 %    Monocytes %, Manual 5.0 2.0 - 10.0 %    Eosinophils %, Manual 14.2 0.0 - 6.0 %    Basophils %, Manual 0.0 0.0 - 2.0 %    Atypical Lymphocytes %, Manual 0.8 0.0 - 2.0 %    Metamyelocytes %, Manual 0.8 0.0 - 0.0 %    Myelocytes %, Manual 2.5 0.0 - 0.0 %    Plasma Cells %, Manual 0.0 0.00 - 0.00 %    Promyelocytes %, Manual 0.0 0.0 - 0.0 %    Blasts %, Manual 0.0 0.0 - 0.0 %    Seg Neutrophils Absolute, Manual 23.13 (H) 1.20 - 7.00 x10*3/uL    Bands Absolute, Manual 0.00 0.00 - 0.70 x10*3/uL    Lymphocytes Absolute, Manual 1.34 1.20 - 4.80 x10*3/uL    Monocytes Absolute, Manual 1.60 (H) 0.10 - 1.00 x10*3/uL    Eosinophils Absolute, Manual 4.53 (H) 0.00 - 0.70 x10*3/uL    Basophils Absolute, Manual 0.00 0.00 - 0.10 x10*3/uL    Atypical Lymphs Absolute, Manual 0.26 0.00 - 0.50 x10*3/uL    Metamyelocytes Absolute, Manual 0.26 0.00 - 0.00 x10*3/uL    Myelocytes Absolute, Manual 0.80 0.00 - 0.00 x10*3/uL    Plasma Cells Absolute, Manual 0.00 0.00 - 0.00 x10*3/uL    Promyelocytes Absolute, Manual 0.00 0.00 - 0.00 x10*3/uL    Blasts Absolute, Manual 0.00 0.00 - 0.00 x10*3/uL    Total Cells Counted 120     Neutrophils Absolute, Manual 23.13 (H) 1.20 - 7.70 x10*3/uL    Manual nRBC per 100 Cells 0.0 0.0 - 0.0 %    RBC Morphology See Below     Polychromasia Mild     Tonny Cells Few     Basophilic Stippling Present    POCT GLUCOSE   Result Value Ref Range    POCT Glucose 205 (H) 74 - 99 mg/dL   ACTIVATED CLOTTING TIME LOW   Result Value Ref Range    POCT Activated Clotting Time Low Range 176 83 - 199 sec   Type and screen   Result Value Ref Range    ABO TYPE A     Rh TYPE POS     ANTIBODY SCREEN NEG    Comprehensive metabolic panel   Result Value Ref Range    Glucose 217 (H) 74 - 99 mg/dL    Sodium 131 (L) 136 - 145 mmol/L    Potassium 4.5 3.5 - 5.3 mmol/L    Chloride 94 (L) 98 - 107 mmol/L    Bicarbonate 23 21 - 32 mmol/L    Anion Gap 19  10 - 20 mmol/L    Urea Nitrogen 48 (H) 6 - 23 mg/dL    Creatinine 3.60 (H) 0.50 - 1.30 mg/dL    eGFR 20 (L) >60 mL/min/1.73m*2    Calcium 7.7 (L) 8.6 - 10.6 mg/dL    Albumin 2.5 (L) 3.4 - 5.0 g/dL    Alkaline Phosphatase 279 (H) 33 - 120 U/L    Total Protein 4.6 (L) 6.4 - 8.2 g/dL     (H) 9 - 39 U/L    Bilirubin, Total 22.4 (H) 0.0 - 1.2 mg/dL     (H) 10 - 52 U/L   Blood Gas Mixed Venous Full Panel   Result Value Ref Range    POCT pH, Mixed 7.34 7.33 - 7.43 pH    POCT pCO2, Mixed 39 (L) 41 - 51 mm Hg    POCT pO2, Mixed 36 35 - 45 mm Hg    POCT SO2, Mixed 50 45 - 75 %    POCT Oxy Hemoglobin, Mixed 48.2 45.0 - 75.0 %    POCT Hematocrit Calculated, Mixed 40.0 (L) 41.0 - 52.0 %    POCT Sodium, Mixed 126 (L) 136 - 145 mmol/L    POCT Potassium, Mixed 4.7 3.5 - 5.3 mmol/L    POCT Chloride, Mixed 95 (L) 98 - 107 mmol/L    POCT Ionized Calcium, Mixed 1.04 (L) 1.10 - 1.33 mmol/L    POCT Glucose, Mixed 241 (H) 74 - 99 mg/dL    POCT Lactate, Mixed 3.0 (H) 0.4 - 2.0 mmol/L    POCT Base Excess, Mixed -4.4 (L) -2.0 - 3.0 mmol/L    POCT HCO3 Calculated, Mixed 21.0 (L) 22.0 - 26.0 mmol/L    POCT Hemoglobin, Mixed 13.4 (L) 13.5 - 17.5 g/dL    POCT Anion Gap, Mixed 15 10 - 25 mmo/L    Patient Temperature 37.0 degrees Celsius    FiO2 30 %   POCT GLUCOSE   Result Value Ref Range    POCT Glucose 217 (H) 74 - 99 mg/dL   ACTIVATED CLOTTING TIME LOW   Result Value Ref Range    POCT Activated Clotting Time Low Range 134 83 - 199 sec   POCT GLUCOSE   Result Value Ref Range    POCT Glucose 183 (H) 74 - 99 mg/dL   ACTIVATED CLOTTING TIME LOW   Result Value Ref Range    POCT Activated Clotting Time Low Range 155 83 - 199 sec   ACTIVATED CLOTTING TIME LOW   Result Value Ref Range    POCT Activated Clotting Time Low Range 153 83 - 199 sec               ASSESSMENT & PLAN  Mr. Elena is a 45 year old male with a past medical history of tobacco abuse (30 pack years), submandibular abscess, and nephrolithiasis who presented to [facility]  with a chief complaint of chest pain, found to have an inferior STEMI, now s/p coronary angiogram with balloon angioplasty to the PDA (right dominant system). His course was complicated by hemodynamic instability (tachycardia, hypotension) requiring vasopressor support and laboratory evidnce of end organ dysfunction. Due to concern for cardiogenic vs mixed shock, a shock call was intervened and recommended transfer to [facility] CICU. TTE at [facility] revealed large VSD. He underwent Impella CP placement 5/20.      Cardiac surgery is consulted for a VSD repair post STEMI.         RECOMMENDATIONS/PLAN  Dr. Omer aware of patient and reviewed case and available imaging.   - Emergent surgery for patient's VSD not indicated at this time.   - Recommend medical optimization per primary team  - Early surgery for VSD repairs post STEMI have significantly high mortality rates, ideally would prefer to wait a couple weeks to decrease surgical risk. Currently patient is not a surgical candidate. Will need to make significant improvements prior to consideration for surgical repair.  - Impella 5.5 placed 5/28 by Dr. Omer       Will continue to follow along.   Thank you for the consultation.   Please page the cardiac surgery consult pager [phone] with any questions or changes in patient condition.    Holly Diez PA-C  Cardiac Surgery Consult [facility]  [facility]  Cardiac Surgery Consult Pager [phone]     5/29/2025  2:05 PM            [1] aspirin, 81 mg, oral, Daily  heparin, 4,000 Units, intravenous, Once  insulin lispro, 0-5 Units, subcutaneous, q4h  meropenem, 1 g, intravenous, Once  meropenem, 2 g, intravenous, q12h  micafungin, 100 mg, intravenous, q24h  oxygen, , inhalation, Continuous - Inhalation  pantoprazole, 40 mg, intravenous, BID  perflutren lipid microspheres, 0.5-10 mL of dilution, intravenous, Once in imaging  perflutren protein A microsphere, 0.5 mL, intravenous, Once in imaging  polyethylene glycol, 17 g,  oral, 4x daily  sennosides, 2 tablet, oral, BID  vancomycin, 750 mg, intravenous, q12h     [2] amiodarone, 0.5 mg/min, Last Rate: 0.5 mg/min (05/29/25 1200)  dexmedeTOMIDine, 0.1-1.5 mcg/kg/hr (Dosing Weight), Last Rate: 0.8 mcg/kg/hr (05/29/25 1200)  fentaNYL,  mcg/hr, Last Rate: 75 mcg/hr (05/29/25 1200)  heparin, 0-4,000 Units/hr  heparin Impella Purge 25 units/mL in 500 mL D5W, 10 mL/hr, Last Rate: 10 mL/hr (05/29/25 1200)  midazolam, 0.5-20 mg/hr, Last Rate: Stopped (05/28/25 1415)  norepinephrine, 0.01-1 mcg/kg/min (Dosing Weight), Last Rate: 0.06 mcg/kg/min (05/29/25 1200)  PrismaSol 4/2.5, 24 mL/kg/hr (Dosing Weight), Last Rate: 24 mL/kg/hr (05/28/25 1707)     [3] PRN medications: alteplase, bisacodyl, dextrose, dextrose, glucagon, glucagon, heparin, vancomycin

## 2025-05-29 NOTE — PROGRESS NOTES
Met with pt's son Alpesh who brought in STD paperwork.  SW provided education that Jacqui (son) needs to submit for the claim first, and SW explained how to do this.  Once the claim is submitted, agency will send son paperwork which SW can help get to the MD's to complete.  Will follow.      TANIKA Louis

## 2025-05-29 NOTE — PROGRESS NOTES
Alpesh Elena is a 45 y.o. male on day 9 of admission presenting with STEMI (ST elevation myocardial infarction) (Multi).      Subjective   On CVVH  No sign of renal recovery.  Anuric,   CVP 13,  No new oxygen requirements.  Hemodynamically unstable, on levo and vaso       Objective          Vitals 24HR  Heart Rate:  [49-72]   Temp:  [34.3 °C (93.7 °F)-36.5 °C (97.7 °F)]   Resp:  [10-29]   BP: (89-96)/(82-89)   Weight:  [91.2 kg (201 lb 1 oz)]   SpO2:  [94 %-100 %]         Intake/Output last 3 Shifts:    Intake/Output Summary (Last 24 hours) at 5/29/2025 1811  Last data filed at 5/29/2025 1800  Gross per 24 hour   Intake 2210.03 ml   Output 376 ml   Net 1834.03 ml     On exam:  Sedated and intubated  Impella  Vent 30%  Trace edema      Assessment & Plan  STEMI (ST elevation myocardial infarction) (Multi)    Cardiogenic shock (Multi)    Ventricular septal defect ([facility])    Alpesh Elena is a 45 y.o. year old male patient admitted on 5/20/2025 with: Cardiogenic shock, impella, VSD/ Inferior LV wall rupture . Nephrology following for BENY.    #BENY-D  - Baseline Cr 0.9-1  - UA- RBCs, 2+ blood, trace proteins  - CT abd- no hydro or obstructions  - Etiology of BENY: likely ATN from contrast, septic shock vs cardiogenic shock  - SLED 5/23, 5/25 and 05/27-started CVVH on 05/28    #HAGMA and metabolic alkalosis  #HFrEF    #lactic acidosis-improved  #sepsis    #Possible GI bleeding-EGD today  #Hemolysis- mechanical likely on impella     ** Recommendations:  -Continue CVVH.  - Dose all treatment to CVVH  -Replace Mg, Phos and Ca PRN.  - Avoid hypotension, further contrast and nephrotoxins if possible.  - Strict I/Os  - Daily BMP.       Hailee Parrish MD  Nephrology fellow.

## 2025-05-29 NOTE — PROGRESS NOTES
"Palliative Medicine following for:  Complex medical decision making, symptom management, patient/family support    History obtained from chart review including ED note, H&P, patient's daily progress notes, review of lab/test results, and discussion with primary team and bedside RN.    Subjective    Patient seen at bedside   No family present at time of visit     Objective    Last Recorded Vitals  BP 96/89   Pulse 53   Temp 35 °C (95 °F) (Core)   Resp 14   Ht 1.727 m (5' 8\")   Wt 91.2 kg (201 lb 1 oz)   SpO2 96%   BMI 30.57 kg/m²      Physical Exam  Constitutional:       Appearance: He is ill-appearing.   HENT:      Head: Normocephalic.   Pulmonary:      Comments: intubated  Skin:     Coloration: Skin is jaundiced.      Findings: Bruising present.   Neurological:      Mental Status: He is disoriented.          Relevant Results   Results for orders placed or performed during the hospital encounter of 05/20/25 (from the past 24 hours)   POCT GLUCOSE   Result Value Ref Range    POCT Glucose 177 (H) 74 - 99 mg/dL   Prepare Plasma: 4 Units   Result Value Ref Range    PRODUCT CODE K5541L42     Unit Number [id number]     Unit ABO A     Unit RH POS     Dispense Status RE     Blood Expiration Date 6/2/2025  1:41:00 PM EDT     PRODUCT BLOOD TYPE 6200     UNIT VOLUME 229     PRODUCT CODE Q6820R87     Unit Number [id number]     Unit ABO A     Unit RH POS     Dispense Status RE     Blood Expiration Date 6/2/2025  1:41:00 PM EDT     PRODUCT BLOOD TYPE 6200     UNIT VOLUME 223     PRODUCT CODE O8116W26     Unit Number [id number]     Unit ABO A     Unit RH POS     Dispense Status RE     Blood Expiration Date 6/1/2025  7:42:00 AM EDT     PRODUCT BLOOD TYPE 6200     UNIT VOLUME 314     PRODUCT CODE A0676Q80     Unit Number [id number]     Unit ABO A     Unit RH POS     Dispense Status RE     Blood Expiration Date 6/1/2025  7:42:00 AM EDT     PRODUCT BLOOD TYPE 6200     UNIT VOLUME 306    Blood Gas Arterial " Full Panel Unsolicited   Result Value Ref Range    POCT pH, Arterial 7.36 (L) 7.38 - 7.42 pH    POCT pCO2, Arterial 33 (L) 38 - 42 mm Hg    POCT pO2, Arterial 417 (H) 85 - 95 mm Hg    POCT SO2, Arterial 100 94 - 100 %    POCT Oxy Hemoglobin, Arterial 95.4 94.0 - 98.0 %    POCT Hematocrit Calculated, Arterial 24.0 (L) 41.0 - 52.0 %    POCT Sodium, Arterial 125 (L) 136 - 145 mmol/L    POCT Potassium, Arterial 5.0 3.5 - 5.3 mmol/L    POCT Chloride, Arterial 95 (L) 98 - 107 mmol/L    POCT Ionized Calcium, Arterial 1.07 (L) 1.10 - 1.33 mmol/L    POCT Glucose, Arterial 184 (H) 74 - 99 mg/dL    POCT Lactate, Arterial 2.5 (H) 0.4 - 2.0 mmol/L    POCT Base Excess, Arterial -6.2 (L) -2.0 - 3.0 mmol/L    POCT HCO3 Calculated, Arterial 18.6 (L) 22.0 - 26.0 mmol/L    POCT Hemoglobin, Arterial 8.1 (L) 13.5 - 17.5 g/dL    POCT Anion Gap, Arterial 16 10 - 25 mmo/L    Patient Temperature 37.0 degrees Celsius    FiO2 100 %   Coox Panel, Arterial Unsolicited   Result Value Ref Range    POCT Hemoglobin, Arterial 8.1 (L) 13.5 - 17.5 g/dL    POCT Oxy Hemoglobin, Arterial 95.4 94.0 - 98.0 %    POCT Carboxyhemoglobin, Arterial 3.0 %    POCT Methemoglobin, Arterial 1.6 (H) 0.0 - 1.5 %    POCT Deoxy Hemoglobin, Arterial 0.0 0.0 - 5.0 %   Blood Gas Arterial Full Panel Unsolicited   Result Value Ref Range    POCT pH, Arterial 7.32 (L) 7.38 - 7.42 pH    POCT pCO2, Arterial 35 (L) 38 - 42 mm Hg    POCT pO2, Arterial 338 (H) 85 - 95 mm Hg    POCT SO2, Arterial 100 94 - 100 %    POCT Oxy Hemoglobin, Arterial 95.1 94.0 - 98.0 %    POCT Hematocrit Calculated, Arterial 24.0 (L) 41.0 - 52.0 %    POCT Sodium, Arterial 124 (L) 136 - 145 mmol/L    POCT Potassium, Arterial 6.2 (HH) 3.5 - 5.3 mmol/L    POCT Chloride, Arterial 96 (L) 98 - 107 mmol/L    POCT Ionized Calcium, Arterial 1.03 (L) 1.10 - 1.33 mmol/L    POCT Glucose, Arterial 187 (H) 74 - 99 mg/dL    POCT Lactate, Arterial 2.5 (H) 0.4 - 2.0 mmol/L    POCT Base Excess, Arterial -7.4 (L) -2.0 - 3.0  mmol/L    POCT HCO3 Calculated, Arterial 18.0 (L) 22.0 - 26.0 mmol/L    POCT Hemoglobin, Arterial 8.1 (L) 13.5 - 17.5 g/dL    POCT Anion Gap, Arterial 16 10 - 25 mmo/L    Patient Temperature 37.0 degrees Celsius    FiO2 100 %   Coox Panel, Arterial Unsolicited   Result Value Ref Range    POCT Hemoglobin, Arterial 8.1 (L) 13.5 - 17.5 g/dL    POCT Oxy Hemoglobin, Arterial 95.1 94.0 - 98.0 %    POCT Carboxyhemoglobin, Arterial 2.7 %    POCT Methemoglobin, Arterial 2.2 (H) 0.0 - 1.5 %    POCT Deoxy Hemoglobin, Arterial 0.0 0.0 - 5.0 %   Blood Gas Arterial Full Panel Unsolicited   Result Value Ref Range    POCT pH, Arterial 7.31 (L) 7.38 - 7.42 pH    POCT pCO2, Arterial 36 (L) 38 - 42 mm Hg    POCT pO2, Arterial 351 (H) 85 - 95 mm Hg    POCT SO2, Arterial 100 94 - 100 %    POCT Oxy Hemoglobin, Arterial 95.7 94.0 - 98.0 %    POCT Hematocrit Calculated, Arterial 24.0 (L) 41.0 - 52.0 %    POCT Sodium, Arterial 125 (L) 136 - 145 mmol/L    POCT Potassium, Arterial 5.2 3.5 - 5.3 mmol/L    POCT Chloride, Arterial 96 (L) 98 - 107 mmol/L    POCT Ionized Calcium, Arterial 1.07 (L) 1.10 - 1.33 mmol/L    POCT Glucose, Arterial 186 (H) 74 - 99 mg/dL    POCT Lactate, Arterial 2.1 (H) 0.4 - 2.0 mmol/L    POCT Base Excess, Arterial -7.5 (L) -2.0 - 3.0 mmol/L    POCT HCO3 Calculated, Arterial 18.1 (L) 22.0 - 26.0 mmol/L    POCT Hemoglobin, Arterial 7.9 (L) 13.5 - 17.5 g/dL    POCT Anion Gap, Arterial 16 10 - 25 mmo/L    Patient Temperature 37.0 degrees Celsius    FiO2 100 %   Coox Panel, Arterial Unsolicited   Result Value Ref Range    POCT Hemoglobin, Arterial 7.9 (L) 13.5 - 17.5 g/dL    POCT Oxy Hemoglobin, Arterial 95.7 94.0 - 98.0 %    POCT Carboxyhemoglobin, Arterial 2.5 %    POCT Methemoglobin, Arterial 1.7 (H) 0.0 - 1.5 %    POCT Deoxy Hemoglobin, Arterial 0.0 0.0 - 5.0 %   Blood Gas Arterial Full Panel Unsolicited   Result Value Ref Range    POCT pH, Arterial 7.31 (L) 7.38 - 7.42 pH    POCT pCO2, Arterial 37 (L) 38 - 42 mm Hg     POCT pO2, Arterial 362 (H) 85 - 95 mm Hg    POCT SO2, Arterial 100 94 - 100 %    POCT Oxy Hemoglobin, Arterial 95.6 94.0 - 98.0 %    POCT Hematocrit Calculated, Arterial 23.0 (L) 41.0 - 52.0 %    POCT Sodium, Arterial 125 (L) 136 - 145 mmol/L    POCT Potassium, Arterial 5.2 3.5 - 5.3 mmol/L    POCT Chloride, Arterial 97 (L) 98 - 107 mmol/L    POCT Ionized Calcium, Arterial 1.06 (L) 1.10 - 1.33 mmol/L    POCT Glucose, Arterial 185 (H) 74 - 99 mg/dL    POCT Lactate, Arterial 2.1 (H) 0.4 - 2.0 mmol/L    POCT Base Excess, Arterial -7.0 (L) -2.0 - 3.0 mmol/L    POCT HCO3 Calculated, Arterial 18.6 (L) 22.0 - 26.0 mmol/L    POCT Hemoglobin, Arterial 7.6 (L) 13.5 - 17.5 g/dL    POCT Anion Gap, Arterial 15 10 - 25 mmo/L    Patient Temperature 37.0 degrees Celsius    FiO2 100 %   Coox Panel, Arterial Unsolicited   Result Value Ref Range    POCT Hemoglobin, Arterial 7.6 (L) 13.5 - 17.5 g/dL    POCT Oxy Hemoglobin, Arterial 95.6 94.0 - 98.0 %    POCT Carboxyhemoglobin, Arterial 2.7 %    POCT Methemoglobin, Arterial 1.7 (H) 0.0 - 1.5 %    POCT Deoxy Hemoglobin, Arterial 0.0 0.0 - 5.0 %   POCT GLUCOSE   Result Value Ref Range    POCT Glucose 206 (H) 74 - 99 mg/dL   Blood Gas Arterial Full Panel   Result Value Ref Range    POCT pH, Arterial 7.38 7.38 - 7.42 pH    POCT pCO2, Arterial 29 (L) 38 - 42 mm Hg    POCT pO2, Arterial 87 85 - 95 mm Hg    POCT SO2, Arterial 97 94 - 100 %    POCT Oxy Hemoglobin, Arterial 92.9 (L) 94.0 - 98.0 %    POCT Hematocrit Calculated, Arterial 37.0 (L) 41.0 - 52.0 %    POCT Sodium, Arterial 123 (L) 136 - 145 mmol/L    POCT Potassium, Arterial 4.6 3.5 - 5.3 mmol/L    POCT Chloride, Arterial 92 (L) 98 - 107 mmol/L    POCT Ionized Calcium, Arterial 0.97 (L) 1.10 - 1.33 mmol/L    POCT Glucose, Arterial 216 (H) 74 - 99 mg/dL    POCT Lactate, Arterial 4.1 (HH) 0.4 - 2.0 mmol/L    POCT Base Excess, Arterial -6.7 (L) -2.0 - 3.0 mmol/L    POCT HCO3 Calculated, Arterial 17.2 (L) 22.0 - 26.0 mmol/L    POCT  Hemoglobin, Arterial 12.3 (L) 13.5 - 17.5 g/dL    POCT Anion Gap, Arterial 18 10 - 25 mmo/L    Patient Temperature 37.0 degrees Celsius    FiO2 30 %   Blood Gas Lactic Acid, Venous   Result Value Ref Range    POCT Lactate, Venous 2.7 (H) 0.4 - 2.0 mmol/L   Blood Gas Venous Full Panel   Result Value Ref Range    POCT pH, Venous 7.35 7.33 - 7.43 pH    POCT pCO2, Venous 21 (L) 41 - 51 mm Hg    POCT pO2, Venous 45 35 - 45 mm Hg    POCT SO2, Venous 67 45 - 75 %    POCT Oxy Hemoglobin, Venous 63.7 45.0 - 75.0 %    POCT Hematocrit Calculated, Venous 14.0 (L) 41.0 - 52.0 %    POCT Sodium, Venous 133 (L) 136 - 145 mmol/L    POCT Potassium, Venous 3.0 (L) 3.5 - 5.3 mmol/L    POCT Chloride, Venous 109 (H) 98 - 107 mmol/L    POCT Ionized Calicum, Venous 0.90 (L) 1.10 - 1.33 mmol/L    POCT Glucose, Venous 145 (H) 74 - 99 mg/dL    POCT Lactate, Venous 2.7 (H) 0.4 - 2.0 mmol/L    POCT Base Excess, Venous -13.0 (L) -2.0 - 3.0 mmol/L    POCT HCO3 Calculated, Venous 11.6 (L) 22.0 - 26.0 mmol/L    POCT Hemoglobin, Venous 4.7 (LL) 13.5 - 17.5 g/dL    POCT Anion Gap, Venous 15.0 10.0 - 25.0 mmol/L    Patient Temperature 37.0 degrees Celsius    FiO2 30 %   POCT GLUCOSE   Result Value Ref Range    POCT Glucose 194 (H) 74 - 99 mg/dL   Magnesium   Result Value Ref Range    Magnesium 2.80 (H) 1.60 - 2.40 mg/dL   CBC and Auto Differential   Result Value Ref Range    WBC 29.2 (H) 4.4 - 11.3 x10*3/uL    nRBC 8.0 (H) 0.0 - 0.0 /100 WBCs    RBC 2.21 (L) 4.50 - 5.90 x10*6/uL    Hemoglobin 7.0 (L) 13.5 - 17.5 g/dL    Hematocrit 20.1 (L) 41.0 - 52.0 %    MCV 91 80 - 100 fL    MCH 31.7 26.0 - 34.0 pg    MCHC 34.8 32.0 - 36.0 g/dL    RDW 19.6 (H) 11.5 - 14.5 %    Platelets 137 (L) 150 - 450 x10*3/uL    Neutrophils % 75.0 40.0 - 80.0 %    Immature Granulocytes %, Automated 10.7 (H) 0.0 - 0.9 %    Lymphocytes % 7.2 13.0 - 44.0 %    Monocytes % 6.5 2.0 - 10.0 %    Eosinophils % 0.3 0.0 - 6.0 %    Basophils % 0.3 0.0 - 2.0 %    Neutrophils Absolute 21.93  (H) 1.20 - 7.70 x10*3/uL    Immature Granulocytes Absolute, Automated 3.13 (H) 0.00 - 0.70 x10*3/uL    Lymphocytes Absolute 2.11 1.20 - 4.80 x10*3/uL    Monocytes Absolute 1.89 (H) 0.10 - 1.00 x10*3/uL    Eosinophils Absolute 0.08 0.00 - 0.70 x10*3/uL    Basophils Absolute 0.08 0.00 - 0.10 x10*3/uL   Hepatic function panel   Result Value Ref Range    Albumin 2.5 (L) 3.4 - 5.0 g/dL    Bilirubin, Total 21.8 (H) 0.0 - 1.2 mg/dL    Bilirubin, Direct 15.0 (H) 0.0 - 0.3 mg/dL    Alkaline Phosphatase 308 (H) 33 - 120 U/L     (H) 10 - 52 U/L     (H) 9 - 39 U/L    Total Protein 4.5 (L) 6.4 - 8.2 g/dL   Lactate dehydrogenase   Result Value Ref Range    LDH 2,325 (H) 84 - 246 U/L   Haptoglobin   Result Value Ref Range    Haptoglobin <30 (L) 30 - 200 mg/dL   Phosphorus   Result Value Ref Range    Phosphorus 6.5 (H) 2.5 - 4.9 mg/dL   Basic Metabolic Panel   Result Value Ref Range    Glucose 195 (H) 74 - 99 mg/dL    Sodium 130 (L) 136 - 145 mmol/L    Potassium 4.7 3.5 - 5.3 mmol/L    Chloride 93 (L) 98 - 107 mmol/L    Bicarbonate 22 21 - 32 mmol/L    Anion Gap 20 mmol/L    Urea Nitrogen 51 (H) 6 - 23 mg/dL    Creatinine 4.44 (H) 0.50 - 1.30 mg/dL    eGFR 16 (L) >60 mL/min/1.73m*2    Calcium 7.9 (L) 8.6 - 10.6 mg/dL   ACTIVATED CLOTTING TIME LOW   Result Value Ref Range    POCT Activated Clotting Time Low Range 171 83 - 199 sec   POCT GLUCOSE   Result Value Ref Range    POCT Glucose 186 (H) 74 - 99 mg/dL   Blood Gas Venous Full Panel   Result Value Ref Range    POCT pH, Venous 7.38 7.33 - 7.43 pH    POCT pCO2, Venous 36 (L) 41 - 51 mm Hg    POCT pO2, Venous 35 35 - 45 mm Hg    POCT SO2, Venous 52 45 - 75 %    POCT Oxy Hemoglobin, Venous 49.3 45.0 - 75.0 %    POCT Hematocrit Calculated, Venous 23.0 (L) 41.0 - 52.0 %    POCT Sodium, Venous 128 (L) 136 - 145 mmol/L    POCT Potassium, Venous 5.2 3.5 - 5.3 mmol/L    POCT Chloride, Venous 96 (L) 98 - 107 mmol/L    POCT Ionized Calicum, Venous 1.07 (L) 1.10 - 1.33 mmol/L     POCT Glucose, Venous 214 (H) 74 - 99 mg/dL    POCT Lactate, Venous 2.7 (H) 0.4 - 2.0 mmol/L    POCT Base Excess, Venous -3.5 (L) -2.0 - 3.0 mmol/L    POCT HCO3 Calculated, Venous 21.3 (L) 22.0 - 26.0 mmol/L    POCT Hemoglobin, Venous 7.8 (L) 13.5 - 17.5 g/dL    POCT Anion Gap, Venous 16.0 10.0 - 25.0 mmol/L    Patient Temperature 37.0 degrees Celsius    FiO2 30 %   ACTIVATED CLOTTING TIME LOW   Result Value Ref Range    POCT Activated Clotting Time Low Range 152 83 - 199 sec   CBC and Auto Differential   Result Value Ref Range    WBC 31.9 (H) 4.4 - 11.3 x10*3/uL    nRBC 5.8 (H) 0.0 - 0.0 /100 WBCs    RBC 2.05 (L) 4.50 - 5.90 x10*6/uL    Hemoglobin 6.5 (LL) 13.5 - 17.5 g/dL    Hematocrit 18.9 (L) 41.0 - 52.0 %    MCV 92 80 - 100 fL    MCH 31.7 26.0 - 34.0 pg    MCHC 34.4 32.0 - 36.0 g/dL    RDW 21.3 (H) 11.5 - 14.5 %    Platelets 135 (L) 150 - 450 x10*3/uL    Immature Granulocytes %, Automated 9.0 (H) 0.0 - 0.9 %    Immature Granulocytes Absolute, Automated 2.88 (H) 0.00 - 0.70 x10*3/uL   Manual Differential   Result Value Ref Range    Neutrophils %, Manual 72.5 40.0 - 80.0 %    Bands %, Manual 0.0 0.0 - 5.0 %    Lymphocytes %, Manual 4.2 13.0 - 44.0 %    Monocytes %, Manual 5.0 2.0 - 10.0 %    Eosinophils %, Manual 14.2 0.0 - 6.0 %    Basophils %, Manual 0.0 0.0 - 2.0 %    Atypical Lymphocytes %, Manual 0.8 0.0 - 2.0 %    Metamyelocytes %, Manual 0.8 0.0 - 0.0 %    Myelocytes %, Manual 2.5 0.0 - 0.0 %    Plasma Cells %, Manual 0.0 0.00 - 0.00 %    Promyelocytes %, Manual 0.0 0.0 - 0.0 %    Blasts %, Manual 0.0 0.0 - 0.0 %    Seg Neutrophils Absolute, Manual 23.13 (H) 1.20 - 7.00 x10*3/uL    Bands Absolute, Manual 0.00 0.00 - 0.70 x10*3/uL    Lymphocytes Absolute, Manual 1.34 1.20 - 4.80 x10*3/uL    Monocytes Absolute, Manual 1.60 (H) 0.10 - 1.00 x10*3/uL    Eosinophils Absolute, Manual 4.53 (H) 0.00 - 0.70 x10*3/uL    Basophils Absolute, Manual 0.00 0.00 - 0.10 x10*3/uL    Atypical Lymphs Absolute, Manual 0.26 0.00  - 0.50 x10*3/uL    Metamyelocytes Absolute, Manual 0.26 0.00 - 0.00 x10*3/uL    Myelocytes Absolute, Manual 0.80 0.00 - 0.00 x10*3/uL    Plasma Cells Absolute, Manual 0.00 0.00 - 0.00 x10*3/uL    Promyelocytes Absolute, Manual 0.00 0.00 - 0.00 x10*3/uL    Blasts Absolute, Manual 0.00 0.00 - 0.00 x10*3/uL    Total Cells Counted 120     Neutrophils Absolute, Manual 23.13 (H) 1.20 - 7.70 x10*3/uL    Manual nRBC per 100 Cells 0.0 0.0 - 0.0 %    RBC Morphology See Below     Polychromasia Mild     [place] Cells Few     Basophilic Stippling Present    POCT GLUCOSE   Result Value Ref Range    POCT Glucose 205 (H) 74 - 99 mg/dL   ACTIVATED CLOTTING TIME LOW   Result Value Ref Range    POCT Activated Clotting Time Low Range 176 83 - 199 sec   Type and screen   Result Value Ref Range    ABO TYPE A     Rh TYPE POS     ANTIBODY SCREEN NEG    Comprehensive metabolic panel   Result Value Ref Range    Glucose 217 (H) 74 - 99 mg/dL    Sodium 131 (L) 136 - 145 mmol/L    Potassium 4.5 3.5 - 5.3 mmol/L    Chloride 94 (L) 98 - 107 mmol/L    Bicarbonate 23 21 - 32 mmol/L    Anion Gap 19 10 - 20 mmol/L    Urea Nitrogen 48 (H) 6 - 23 mg/dL    Creatinine 3.60 (H) 0.50 - 1.30 mg/dL    eGFR 20 (L) >60 mL/min/1.73m*2    Calcium 7.7 (L) 8.6 - 10.6 mg/dL    Albumin 2.5 (L) 3.4 - 5.0 g/dL    Alkaline Phosphatase 279 (H) 33 - 120 U/L    Total Protein 4.6 (L) 6.4 - 8.2 g/dL     (H) 9 - 39 U/L    Bilirubin, Total 22.4 (H) 0.0 - 1.2 mg/dL     (H) 10 - 52 U/L   Blood Gas Mixed Venous Full Panel   Result Value Ref Range    POCT pH, Mixed 7.34 7.33 - 7.43 pH    POCT pCO2, Mixed 39 (L) 41 - 51 mm Hg    POCT pO2, Mixed 36 35 - 45 mm Hg    POCT SO2, Mixed 50 45 - 75 %    POCT Oxy Hemoglobin, Mixed 48.2 45.0 - 75.0 %    POCT Hematocrit Calculated, Mixed 40.0 (L) 41.0 - 52.0 %    POCT Sodium, Mixed 126 (L) 136 - 145 mmol/L    POCT Potassium, Mixed 4.7 3.5 - 5.3 mmol/L    POCT Chloride, Mixed 95 (L) 98 - 107 mmol/L    POCT Ionized Calcium, Mixed  1.04 (L) 1.10 - 1.33 mmol/L    POCT Glucose, Mixed 241 (H) 74 - 99 mg/dL    POCT Lactate, Mixed 3.0 (H) 0.4 - 2.0 mmol/L    POCT Base Excess, Mixed -4.4 (L) -2.0 - 3.0 mmol/L    POCT HCO3 Calculated, Mixed 21.0 (L) 22.0 - 26.0 mmol/L    POCT Hemoglobin, Mixed 13.4 (L) 13.5 - 17.5 g/dL    POCT Anion Gap, Mixed 15 10 - 25 mmo/L    Patient Temperature 37.0 degrees Celsius    FiO2 30 %   POCT GLUCOSE   Result Value Ref Range    POCT Glucose 217 (H) 74 - 99 mg/dL   ACTIVATED CLOTTING TIME LOW   Result Value Ref Range    POCT Activated Clotting Time Low Range 134 83 - 199 sec   POCT GLUCOSE   Result Value Ref Range    POCT Glucose 183 (H) 74 - 99 mg/dL   ACTIVATED CLOTTING TIME LOW   Result Value Ref Range    POCT Activated Clotting Time Low Range 155 83 - 199 sec        Allergies  Patient has no known allergies.  Medications  Scheduled medications  Scheduled Medications[1]  Continuous medications  Continuous Medications[2]  PRN medications  PRN Medications[3]     Assessment/Plan    Alpesh Elena is a 45y gentleman with a pmh tobacco use, submandibular abscess, and nephrolithiasis who presented with inferior STEMI, now s/p balloon angioplasty to the PDA. Course c/b suspected VSD with mixed cardiogenic and septic shock. Impella CP placed for LV support given concern for STEMI-induced VSD /inferior LV wall rupture.     EGD negative for GIB on 5/27  Nephrology following for acute renal failure   Acute hypoxic respiratory failure  Ischemic hepatitis   ID following for infection with new unclear source  Impella 5.5 placed 5/28     #Goals of Care  - Code status: full code   - Surrogate decision maker: bria Betts [phone], please make sure son is primary contact and he usually visits daily  - Goals of care remain treatment focused at this time. Prognosis remains guarded given tenuous course. Patient independent and fully functional at baseline, goals are recovery focused.      #Psychosocial Support  - Music Therapy  -  Spiritual Care Support  - Art Therapy     Plan of Care discussed with: Updated MD and bedside RN on goals of care decision, medication adjustments, and code status      Medical Decision Making was high level due to high complexity of problems, extensive data review, and high risk of management/treatment.        Thank you for allowing us to participate in the care of this patient. Palliative will continue to follow as needed. Palliative medicine is available Monday-Friday, 8a-6p. Please contact team with any questions or concerns.  Team pager [phone] (weekdays)  Amy Stratton DNP, APRN-CNP       [1] aspirin, 81 mg, oral, Daily  insulin lispro, 0-5 Units, subcutaneous, q4h  meropenem, 1 g, intravenous, Once  meropenem, 2 g, intravenous, q12h  micafungin, 100 mg, intravenous, q24h  oxygen, , inhalation, Continuous - Inhalation  pantoprazole, 40 mg, intravenous, BID  perflutren lipid microspheres, 0.5-10 mL of dilution, intravenous, Once in imaging  perflutren protein A microsphere, 0.5 mL, intravenous, Once in imaging  polyethylene glycol, 17 g, oral, 4x daily  sennosides, 2 tablet, oral, BID  vancomycin, 750 mg, intravenous, q12h    [2] amiodarone, 0.5 mg/min, Last Rate: 0.5 mg/min (05/29/25 1200)  dexmedeTOMIDine, 0.1-1.5 mcg/kg/hr (Dosing Weight), Last Rate: 0.8 mcg/kg/hr (05/29/25 1200)  fentaNYL,  mcg/hr, Last Rate: 75 mcg/hr (05/29/25 1200)  heparin Impella Purge 25 units/mL in 500 mL D5W, 10 mL/hr, Last Rate: 10 mL/hr (05/29/25 1200)  midazolam, 0.5-20 mg/hr, Last Rate: Stopped (05/28/25 1415)  norepinephrine, 0.01-1 mcg/kg/min (Dosing Weight), Last Rate: 0.06 mcg/kg/min (05/29/25 1200)  PrismaSol 4/2.5, 24 mL/kg/hr (Dosing Weight), Last Rate: 24 mL/kg/hr (05/28/25 8967)    [3] PRN medications: alteplase, bisacodyl, dextrose, dextrose, glucagon, glucagon, vancomycin

## 2025-05-29 NOTE — PROGRESS NOTES
Alpesh Elena is a 45 y.o. male on day 9 of admission presenting with STEMI (ST elevation myocardial infarction) (Multi).    Subjective   Interval History: Worsening pressor requirements today up to 0.1 nor epi, hemoglobin downtrending to 6.5, WBC 31K, T. bili 22, FiO2 30%, PEEP of 5    Review of Systems    Objective   Range of Vitals (last 24 hours)  Heart Rate:  [49-72]   Temp:  [34.6 °C (94.3 °F)-37 °C (98.6 °F)]   Resp:  [10-29]   BP: (90-96)/(84-89)   Weight:  [91.2 kg (201 lb 1 oz)]   SpO2:  [94 %-100 %]   Daily Weight  05/29/25 : 91.2 kg (201 lb 1 oz)    Body mass index is 30.57 kg/m².    Physical Exam    Critically ill-appearing, intubated, sedated, jaundiced  New right axillary Impella, right [place], left trialysis catheter currently undergoing CRRT  Bilateral prior line sites in groin without erythema  Lower extremities cool to touch    Antibiotics  meropenem - 1 gram/50 mL  meropenem (Merrem) IV 2 g in 100 mL NS  micafungin - 100 mg/100 mL  vancomycin - 750 mg/150 mL    Relevant Results  Labs  Results from last 72 hours   Lab Units 05/29/25  0641 05/29/25  0158 05/28/25  0237 05/27/25  1814   WBC AUTO x10*3/uL 31.9* 29.2* 27.5* 24.9*   HEMOGLOBIN g/dL 6.5* 7.0* 8.2* 8.2*   HEMATOCRIT % 18.9* 20.1* 22.8* 22.5*   PLATELETS AUTO x10*3/uL 135* 137* 114* 89*   NEUTROS PCT AUTO %  --  75.0  --   --    LYMPHO PCT MAN % 4.2  --   --  7.7   LYMPHS PCT AUTO %  --  7.2  --   --    MONO PCT MAN % 5.0  --   --  2.6   MONOS PCT AUTO %  --  6.5  --   --    EOSINO PCT MAN % 14.2  --   --  0.0   EOS PCT AUTO %  --  0.3  --   --      Results from last 72 hours   Lab Units 05/29/25  0919 05/29/25  0158 05/28/25  1235 05/28/25  0631   SODIUM mmol/L 131* 130* 130* 131*   POTASSIUM mmol/L 4.5 4.7 4.3 4.0   CHLORIDE mmol/L 94* 93* 90* 93*   CO2 mmol/L 23 22 23 26   BUN mg/dL 48* 51* 39* 25*   CREATININE mg/dL 3.60* 4.44* 3.85* 2.85*   GLUCOSE mg/dL 217* 195* 163* 111*   CALCIUM mg/dL 7.7* 7.9* 8.3* 7.7*   ANION GAP mmol/L 19  20 21* 16   EGFR mL/min/1.73m*2 20* 16* 19* 27*   PHOSPHORUS mg/dL  --  6.5* 5.5* 3.1     Results from last 72 hours   Lab Units 05/29/25  0919 05/29/25  0158 05/28/25  1235 05/28/25  0631 05/28/25  0237 05/27/25  1814   ALK PHOS U/L 279* 308*  --   --  449* 443*   BILIRUBIN TOTAL mg/dL 22.4* 21.8*  --   --  21.0* 20.6*   BILIRUBIN DIRECT mg/dL  --  15.0*  --   --  19.8* 15.3*   PROTEIN TOTAL g/dL 4.6* 4.5*  --   --  5.2* 4.9*   ALT U/L 179* 195*  --   --  273* 273*   AST U/L 212* 247*  --   --  276* 241*   ALBUMIN g/dL 2.5* 2.5* 2.7*   < > 3.0* 2.8*    < > = values in this interval not displayed.     Estimated Creatinine Clearance: 28.1 mL/min (A) (by C-G formula based on SCr of 3.6 mg/dL (H)).  C-Reactive Protein   Date Value Ref Range Status   07/07/2024 10.34 (H) <1.00 mg/dL Final     Microbiology  Susceptibility data from last 14 days.  Collected Specimen Info Organism   05/20/25 Fluid from Tracheal Aspirate Normal throat margaret     5/19 blood cultures 2 sets no growth  5/20 MRSA nares negative  5/20 strep pneumo Legionella urine antigen negative  5/21 MRSA PCR negative  5/21 blood cultures 2 sets no growth  5/26 blood cultures NG      Imaging    CXR w/ diffuse patchy opacities, KUB bowel gas pattern        Assessment/Plan     Alpesh Elena is a 45-year-old man past medical history significant for tobacco use, [place] to [facility] [place] after presenting to [place] with chest pain and STEMI, status post balloon angioplasty, developed shock and TTE with large VSD vs inferior LV wall rupture.  He underwent Impella placement on 5/20.  ID has been following for fever, Tmax 101.3 on 5/21 with WBC to 34K, now improved.  He was initially treated for pneumonia, and has been on pip-tazo since 5/19, with intermittent doses of vancomycin.  Blood cultures negative from 5/19 and 5/21.  Strep pneumo and Legionella urine antigen negative.  Sputum culture with rare normal throat margaret.  Started on RRT. LFTs improving, T. bili  remains elevated.  Remains with low FiO2 30%, PEEP 5.      Called back 5/26 for worsening leukocytosis.  No documented fever since 5/21, however in the past day or so he has been placed back on Arctic sun cooling device.  WBC now 25K, neutrophil predominant.  His last vancomycin dose was 5/24, and his WBC was elevated by 5/25 at midnight, therefore it was likely rising despite vancomycin treatment.  Last vancomycin level 5/26 is 15.  Is now on pressor support with nor epi 0.06, and in the setting of unclear new source of infection, expanded his coverage with meropenem, empiric micafungin in the setting of critical illness, and add back vancomycin until blood cultures result.       EGD 5/27, single ulcer at GE junction likely from OGT trauma or suctioning. Tbili 21, direct bili 19, haptoglobin <30, LDH 2600. Neutrophil predominant leukocytosis, blood cx NGTD, vent settings remain low. No cholecystitis on US. CT imaging deferred by team for now. In the absence of obvious infection, leukocytosis may be driven by critical illness from his cardiogenic shock. Upgrading Impella today.      5/29 -worsening hemodynamics, leukocytosis, decreasing hemoglobin.  Clinical picture mixed between septic shock or worsening cardiogenic shock versus new bleed.  He has not responded to his expanded antibiotics with vancomycin/meropenem/micafungin, and no organisms been identified on blood culture, last sputum culture with normal throat margaret, and although we requested repeat sputum now, there is minimal secretions via his ET tube per nursing; vent settings remain low.  Intermittent stool output with a bowel regimen, KUB today with nonobstructive gas pattern, no obvious ileus.       #STEMI complicated by cardiogenic shock, VSD, status post Impella  #Fever, resolved   #Leukocytosis         Recommendations:  -Continue IV vancomycin, dosed with pharmacy  - Cont V meropenem, renally dosed, 1gm q24H   - Cont IV micafungin 100 mg every 24  hours, if blood cx neg at 72H, consider discontinuing   -Obtain repeat sputum cx   -Any liquid stool output send C diff screen, if persistent leukocytosis and pressor requirements, or if new development of ileus, consider empiric treatment for C diff ileus w/ IV metronidazole and high dose vanc via NG      Discussed w/ primary team     This is a complex infectious disease issue and the following was performed today (for more details please see the above note):   Management decisions reflecting the added complexity (e.g., changes in antimicrobial therapy, infection control strategies).      Ananya Sanchez, DO

## 2025-05-29 NOTE — CARE PLAN
The patient's goals for the shift include      The clinical goals for the shift include PT will maintain MAP >70 throughout this shift

## 2025-05-29 NOTE — NURSING NOTE
Enteral feeding tube placement- replacement  #12 Fr, small bore feeding tube inserted into R nare with Enteral Access System CORTARK 2 per provider orders,including insertion of nasal bridle and removal of wire stylet; patient tolerated procedure; feeding tube secured at 82 cm with the bridle; team made aware; bedside nurse notified and educated on appropriate use; no bleeding or adverse reaction noted; KUB activated for placement verification.  Previous tube placed was displaced with procedure yesterday, requiring replacement.

## 2025-05-29 NOTE — PROGRESS NOTES
"Cardiac Intensive Care - Daily Progress Note   Subjective    Alpesh Elena is a 45 y.o. year old male patient admitted on 5/20/2025 with following ICU needs: Cardiogenic shock, impella, VSD/ Inferior LV wall rupture    Interval History:  Patient was upgraded to Impella 5.5 today. This AM, remains intubated and sedated. He withdraws to pain.     Meds    Scheduled medications  Scheduled Medications[1]  Continuous medications  Continuous Medications[2]  PRN medications  PRN Medications[3]     Objective    Blood pressure 91/65, pulse 54, temperature 35.6 °C (96.1 °F), resp. rate 24, height 1.727 m (5' 8\"), weight 91.2 kg (201 lb 1 oz), SpO2 98%.     Physical Exam  Constitutional:       Comments: Intubated, eye opens to sternal rub    Cardiovascular:      Rate and Rhythm: Normal rate and regular rhythm.      Heart sounds: Murmur heard.      Comments: Holosystolic murmer, best heard at 4th intercostal rib. Very weak vs absent b/l PT and pedal.   Pulmonary:      Effort: Pulmonary effort is normal.      Breath sounds: Normal breath sounds. No wheezing or rales.   Abdominal:      General: Abdomen is flat. Bowel sounds are normal. There is no distension.      Palpations: Abdomen is soft.   Musculoskeletal:      Right lower leg: No edema.      Left lower leg: No edema.   Skin:     General: Skin is warm.   Neurological:      Comments: Sedated             Intake/Output Summary (Last 24 hours) at 5/29/2025 0824  Last data filed at 5/29/2025 0800  Gross per 24 hour   Intake 2139.95 ml   Output 381 ml   Net 1758.95 ml     Labs:   Results from last 72 hours   Lab Units 05/29/25  0158 05/28/25  1235 05/28/25  0631   SODIUM mmol/L 130* 130* 131*   POTASSIUM mmol/L 4.7 4.3 4.0   CHLORIDE mmol/L 93* 90* 93*   CO2 mmol/L 22 23 26   BUN mg/dL 51* 39* 25*   CREATININE mg/dL 4.44* 3.85* 2.85*   GLUCOSE mg/dL 195* 163* 111*   CALCIUM mg/dL 7.9* 8.3* 7.7*   ANION GAP mmol/L 20 21* 16   EGFR mL/min/1.73m*2 16* 19* 27*   PHOSPHORUS mg/dL " 6.5* 5.5* 3.1      Results from last 72 hours   Lab Units 05/29/25  0641 05/29/25  0158 05/28/25  0237 05/27/25  1814 05/27/25  0302   WBC AUTO x10*3/uL 31.9* 29.2* 27.5* 24.9* 23.6*   HEMOGLOBIN g/dL 6.5* 7.0* 8.2* 8.2* 7.7*   HEMATOCRIT % 18.9* 20.1* 22.8* 22.5* 22.5*   PLATELETS AUTO x10*3/uL 135* 137* 114* 89* 92*   NEUTROS PCT AUTO %  --  75.0  --   --   --    LYMPHO PCT MAN %  --   --   --  7.7 15.9   LYMPHS PCT AUTO %  --  7.2  --   --   --    MONO PCT MAN %  --   --   --  2.6 6.2   MONOS PCT AUTO %  --  6.5  --   --   --    EOSINO PCT MAN %  --   --   --  0.0 0.9   EOS PCT AUTO %  --  0.3  --   --   --       Results from last 72 hours   Lab Units 05/28/25  2256 05/28/25  1832 05/28/25  1815   POCT PH, ARTERIAL pH 7.38 7.31* 7.31*   POCT PCO2, ARTERIAL mm Hg 29* 37* 36*   POCT PO2, ARTERIAL mm Hg 87 362* 351*   POCT SO2, ARTERIAL % 97 100 100     Results from last 72 hours   Lab Units 05/29/25  0428 05/28/25  2258   POCT PH, VENOUS pH 7.38 7.35   POCT PCO2, VENOUS mm Hg 36* 21*   POCT PO2, VENOUS mm Hg 35 45        Micro/ID:     Lab Results   Component Value Date    BLOODCULT No growth at 2 days 05/26/2025    BLOODCULT No growth at 2 days 05/26/2025       EKG Trend:    EKG 5/20: Sinus Tachycardia, T wave inversions inferior leads. Prolonged QTC     Echo Data:  Results for orders placed during the hospital encounter of 05/18/25    Transthoracic Echo Complete    Narrative  [place] [address]  Tel [phone] Fax [phone]    TRANSTHORACIC ECHOCARDIOGRAM REPORT    Patient Name:       AVRIL REYESAnnette FRITZ     Cammal Physician:    [id number] Faraz Ortega DO  Study Date:         5/19/2025            Ordering Provider:    [id number] MARGARET GASPAR  MRN/PID:            [id number]             Fellow:  Accession#:         [id number]         Nurse:  Date of Birth/Age:  1980 / 45 years Sonographer:          Koki Mendez  [facility]  Gender Assigned at  M                     Additional Staff:  Birth:  Height:             172.72 cm            Admit Date:           5/18/2025  Weight:             75.30 kg             Admission Status:     Inpatient - STAT  BSA / BMI:          1.89 m2 / 25.24      Department Location:  [facility]  kg/m2  Blood Pressure: 97 /65 mmHg    Study Type:    TRANSTHORACIC ECHO (TTE) COMPLETE  Diagnosis/ICD: ST elevation (STEMI) myocardial infarction of unspecified  site-I21.3  Indication:    STEMI  CPT Codes:     Echo Complete w Full Doppler-[id number]    Patient History:  PCI and Stent:     PCI performed on 5/18/2025.  Smoker:            Current.  Pertinent History: Chest Pain and Murmur.    Study Detail: The following Echo studies were performed: 2D, M-Mode, Doppler and  color flow. Definity used as a contrast agent for endocardial  border definition. Total contrast used for this procedure was 2 mL  via IV push.      PHYSICIAN INTERPRETATION:  Left Ventricle: The left ventricular systolic function is normal, with a visually estimated ejection fraction of 55%. There is mild concentric left ventricular hypertrophy. Wall motion is abnormal. The left ventricular cavity size is normal. There is mild increased septal and mildly increased posterior left ventricular wall thickness. Spectral Doppler shows a normal pattern of left ventricular diastolic filling.  LV Wall Scoring:  The basal inferoseptal segment, basal inferolateral segment, and basal inferior  segment are hypokinetic. All remaining scored segments are normal.    Left Atrium: The left atrial size is normal.  Right Ventricle: The right ventricle is normal in size. There is normal right ventricular global systolic function.  Right Atrium: The right atrial size is normal.  Aortic Valve: The aortic valve appears structurally normal. There is no evidence of aortic valve stenosis.  The aortic valve dimensionless index is 0.73. There is no evidence of aortic valve regurgitation. The peak instantaneous gradient of the  aortic valve is 5 mmHg. The mean gradient of the aortic valve is 3 mmHg.  Mitral Valve: The mitral valve is normal in structure. There is no evidence of mitral valve stenosis. There is normal mitral valve leaflet mobility. There is no evidence of mitral valve regurgitation.  Tricuspid Valve: The tricuspid valve is structurally normal. There is normal tricuspid valve leaflet mobility. There is trace tricuspid regurgitation.  Pulmonic Valve: The pulmonic valve is structurally normal. There is no indication of pulmonic valve regurgitation.  Pericardium: No pericardial effusion noted.  Aorta: The aortic root is normal.  Pulmonary Artery: The main pulmonary artery is normal in size, and position, with normal bifurcation into the left and right pulmonary arteries. The tricuspid regurgitant velocity is 2.88 m/s, and with an estimated right atrial pressure of 3 mmHg, the estimated pulmonary artery pressure is mildly elevated with the RVSP at 36.2 mmHg.  Systemic Veins: The inferior vena cava appears normal in size.      CONCLUSIONS:  1. The left ventricular systolic function is normal, with a visually estimated ejection fraction of 55%.  2. Basal inferoseptal segment, basal inferolateral segment, and basal inferior segment are abnormal.  3. Abnormal wall motion.  4. There is normal right ventricular global systolic function.  5. Normal sized right ventricle.  6. There is no evidence of mitral valve stenosis.  7. No evidence of mitral valve regurgitation.  8. Trace tricuspid regurgitation is visualized.  9. Aortic valve stenosis is not present.  10. The main pulmonary artery is normal in size, and position, with normal bifurcation into the left and right pulmonary arteries.    QUANTITATIVE DATA SUMMARY:    2D MEASUREMENTS:             Normal Ranges:  Ao Root d:       2.86 cm     (2.0-3.7cm)  LAs:             3.88 cm     (2.7-4.0cm)  IVSd:            1.12 cm     (0.6-1.1cm)  LVPWd:           1.08 cm     (0.6-1.1cm)  LVIDd:            4.02 cm     (3.9-5.9cm)  LVIDs:           2.94 cm  LV Mass Index:   77.7 g/m2  LVEDV Index:     59.87 ml/m2  LV % FS          26.9 %      LEFT ATRIUM:                  Normal Ranges:  LA Vol A4C:        29.5 ml    (22+/-6mL/m2)  LA Vol A2C:        25.4 ml  LA Vol BP:         30.5 ml  LA Vol Index A4C:  15.6ml/m2  LA Vol Index A2C:  13.4 ml/m2  LA Vol Index BP:   16.1 ml/m2  LA Area A4C:       13.8 cm2  LA Area A2C:       11.5 cm2  LA Major Axis A4C: 5.5 cm  LA Major Axis A2C: 4.4 cm  LA Volume Index:   14.9 ml/m2      RIGHT ATRIUM:                 Normal Ranges:  RA Vol A4C:        24.1 ml    (8.3-19.5ml)  RA Vol Index A4C:  12.7 ml/m2  RA Area A4C:       11.1 cm2  RA Major Axis A4C: 4.4 cm      AORTA MEASUREMENTS:         Normal Ranges:  Asc Ao, d:          2.48 cm (2.1-3.4cm)      LV SYSTOLIC FUNCTION:  Normal Ranges:  EF-A4C View:    54 % (>=55%)  EF-A2C View:    54 %  EF-Biplane:     55 %  EF-Visual:      55 %  LV EF Reported: 55 %      LV DIASTOLIC FUNCTION:           Normal Ranges:  MV Peak E:             1.20 m/s  (0.7-1.2 m/s)  MV Peak A:             1.08 m/s  (0.42-0.7 m/s)  E/A Ratio:             1.11      (1.0-2.2)  MV e'                  0.090 m/s (>8.0)  MV lateral e'          0.08 m/s  MV medial e'           0.10 m/s  E/e' Ratio:            13.29     (<8.0)      MITRAL VALVE:          Normal Ranges:  MV DT:        126 msec (150-240msec)      AORTIC VALVE:                     Normal Ranges:  AoV Vmax:                1.16 m/s (<=1.7m/s)  AoV Peak P.4 mmHg (<20mmHg)  AoV Mean PG:             3.0 mmHg (1.7-11.5mmHg)  LVOT Max Aime:            1.03 m/s (<=1.1m/s)  AoV VTI:                 22.30 cm (18-25cm)  LVOT VTI:                16.20 cm  LVOT Diameter:           2.03 cm  (1.8-2.4cm)  AoV Area, VTI:           2.35 cm2 (2.5-5.5cm2)  AoV Area,Vmax:           2.87 cm2 (2.5-4.5cm2)  AoV Dimensionless Index: 0.73      RIGHT VENTRICLE:  RV Basal 3.49 cm  RV Mid   2.65 cm  RV Major 7.3  cm  TAPSE:   22.4 mm  RV s'    0.13 m/s      TRICUSPID VALVE/RVSP:          Normal Ranges:  Peak TR Velocity:     2.88 m/s  RV Syst Pressure:     36 mmHg  (< 30mmHg)  IVC Diam:             1.74 cm      PULMONIC VALVE:          Normal Ranges:  PV Accel Time:  116 msec (>120ms)  PV Max Aime:     1.5 m/s  (0.6-0.9m/s)  PV Max P.8 mmHg      [id number] Faraz Alexandriarosa   Electronically signed on 2025 at 9:34:06 AM      Wall Scoring        ** Final **      Cath Data:  [facility]     LMT normal.  LAD mild irregularity, 30% mid, wraps around the apex.  LCX mild iregularity.  RCA tortuous, dom.  PDA is 100% ostial.  LVEDP 20-25.  LVEF 40-45%, inferior basal AK.       Successful PTA R % GLENNA 0 reduced to 30%, GLENNA 3 after POBA, with no stent placed due to small caliber vessel and minimal runoff, not enough to warrant stenting.     Concern for drug use considering patient very tachycardic and LVEDP elevated with very small PDA occlusion    Summary of key imaging results from the last 24 hours     See above    Assessment and Plan     Assessment: This is a 45 year old male with a past medical history of tobacco abuse (30 pack years), submandibular abscess, and nephrolithiasis who presented to [facility] with a chief complaint of chest pain, found to have an inferior STEMI, now s/p coronary angiogram with balloon angioplasty to the PDA (right dominant system). His course was complicated by hemodynamic instability (tachycardia, hypotension) requiring vasopressor support and laboratory evidnce of end organ dysfunction. Due to concern for cardiogenic vs mixed shock, a shock call was intervened and recommended placement of  PA catheter for adjudication of hemodynamics and transfer to [facility] CICU for a higher level of care. He is now admitted to the CICU for further management.      On arrival to the CICU he is hypotensive with escalating pressor requirements. Bedside examination reveals a holosystolic murmur and serial  mixed venous are revealing of a significant step up in SVO2 from the RA to PA concerning for the presence of a ischemic VSD, perhaps secondardy to a late presenting STEMI given troponin elevation to 24K on presentation. Will re-conference with Shock team for consideration of MCS given continued hemodynamic instability and now suspected VSD with cardiogenic shock. Due to concern for mixed shock will start stress dose steroids (as currently on 3 pressors), broaden abx, and send full infectious workup. Continue with CAD/STEMI GDMT. Shock team determined impella CP placement to offload LV given concern for STEMI-induced VSD/ Inferior LV wall rupture.     Patient's lactate has normalized while on Impella CP.  However, still large difference between CVP and PA mixed venous, indicating ongoing shunting.  Infectious disease and nephrology following.  Monitoring the patient will need Impella 5.5 Started Sled for volume control. Will eventually Need VSD repair and possible future transplant evaluation.        Mechanical Ventilation: 4-10 days  Sedation/Analgesia:  none  Restraints: Restraints indicated as alternative therapies have been attempted and have been ineffective.  Restrain with soft wrist restraints and side rails up x4 until medical devices discontinued and/or patient able to participate with plan of care.     Summary for 05/29/25  :  Replaced feeding tube  Upgraded to Impella 5.5  Changed meropenum to renal dosing 2 g q 12   Removed [place], line holiday  Will continue to titrate levo to goal MAP 75-85   Hg 6.5, will follow up on post transfusion CBC  CVVH running net even today   RUQ US negative for acute cholecystitis     NEUROLOGIC  #Sedation  - fent, versed, and precedex   - Wean sedation as able, CTM for responsiveness  - RAAS goal: 0 after OR     CARDIOVASCULAR  #RPDA STEMI s/p POBA  #C/f Cardiogenic vs mixed shock   #C/f VSD  ::  ECG on admit to OSH with ST elevations in the inferior leads with reciprocal  depressions  :: Troponin:  24,085>21,701,19,289,10079  :: Lipid panel (05/2025)- cholesterol 220, HDL 33.9, , TG 57  :: TSH 1.51 (05/2025), A1C 7.4  (05/2025)  :: coronary angiogram (05/2025)  which revealed multi vessel CAD [LAD/Lcx/RCA mildly/diffusely diseased,100% RPDA culprit lesion] with PTCA to RPDA with 50% residual stenosis [GLENNA 0->3] (too small to stent)   :: LVEF 45% and LVED 20-25 mmHg via LV Gram   :: TTE  (05/2025) LVEF 55%, wall motion abnormalities, and no significant valvular disease   :: SVO2 59 from CVP port, 92 from PA port (prior to impella)  :: SVO2 57 from CVP port, 80 from PA port (after impella)  :: Off all pressors 5/21  :: restarted Levo 5/26  Plan:  - Trend Lactate, CVP mix venous (w/VBG) and PA mix venous Q8-12H  - Aspirin 81 mg every day, DC [place]il[place] given no stent / possible future surgery   - Statin: HELD Crestor 40 mg given c/f shock liver  - Beta blocker: hold given shock requiring MCS  - RAAS Inhibition: hold given shock requiring MCS  - Limited TTE repeat: Compared with the prior exam from 5/19/2025 ([facility]) there has been interval development of a large VSD throuh the inferoseptum with the RV now becoming large with significantly reduced function.   - CT surgery following for future possible VSD repair   - OR with Ct surg on 5/28 for Impella 5.5 placement     # NSVT  # PVC  :: started 5/24 PM, longest 20 sec run  :: likely iso sub-optimal impella position  :: repositioned under US 5/25 AM with less ectopy burden after   - IV Amio loaded, started on amio gtt 5/25 Am and will continue   - CTM       PULMONARY  #AHRF   :: Prior to intubation satting 92% on RA  :: CXR with pulmonary edema vs PNA  :: CTA CAP- mild interstitial pulmonary edema   :: , WBC 34.1 on admission   Plan:  - Infectious workup/abx as below   - [place] guided therapy   - Wean Ventilator when more responsive      RENAL/  # BENY, Anuric   # HAGMA   # Volume overload iso VSD   :: Baseline  Cr 0.8-1.0, 1.61 on admit, 1.85 on transport - pleatu but Cr still > 10  Plan:  - Likely hemodynamic mediated , vanco, LV gram  - Brief SLED 5/23, 5/25, 5/27   - Nephrology following   - Follow up UA/Ulytes if sample if available   - Avoid hypotension, rapid fluctuations in BP  - [place] guided volume resuscitation/ diuresis   - Daily RFP        GASTROINTESTINAL  #GI PPX  # Traumatic OG placement , small superficial ulcer  # Ileus   # jaundice, Fever  # hyperbilirubinemia, both direct and indirect   - IV PPI BID for 2 months for esophageal ulcer   - Aggressive bowel reg: miralx QID, Senna 2 tab BID, Prn enema and suppository   - S/p EGD w/ GI 5/7 w/o concern for overtly large Gib   - RUQ ordered 5/38 to assess for cholecystis      ENDOCRINE  #T2DM   :: A1C 7.4  (05/2025)  Plan:  - NPO   - Q4H POC glucose, SSI while NPO  - Titrate -180  - Will need diabetes education prior to discharge      HEME/ONC  #thrombocytopenia  # Anemia    # Hemolysis iso impella use, improving    - C/f some degree of hemolysis   - Hepatic function, LHD, hapto daily  - Heparin products DC 5/23, restarted 5/26   - Impella Purg: Hep/D5W   - Systemic: Heparin (follow ACT)  - Anti-Plt Factor 4 AB / Serotonin release assay negative       INFECTIOUS DISEASE  #C/f Mixed shock   #Leukocytosis  # Fever  Unclear if Fever is from infection or general cardiogenic shock at this time   :: S/p Doxy/CTX 05/19  :: blood cultures sent 05/19 x 2 sets   :: Rising WBC despite 8 days of zosyn   Work up:  - MRSA probe: -  - Bcx2: NGTD  - UA: non-infectious   Plan:  -Dc zosyn; Start Vanco, Adolfo, and micafungin 5/26  - Follow infectious labs  - ID following      MSK/DERM  SWAPNIL         ICU Check List       ICU Liberation: Intervention:   Assess, Prevent, Manage Pain 0 - No pain fentanyl gtt   Both SAT and SBT [] SAT  [] SBT 30-60 min [] Extubate to NC  [] Extubate to NIV  [] Discuss Trach   Choice of analgesia and sedation Romero Agitation Sedation Scale (RASS):  Moderate sedation Fentanyl, Precedex, and Versed  -1 to -2   Delirium: Assess, prevent and manage  CAM ICU Positive Assess Qshift   Early Mobility and Exercise Proceed with mobilization - No exclusion criteria met [] PT /OT consult   Family Engagement and Empowerment [x]Family updated []SW consult     F: PRN for euvolemia; swan guided therapy   E: PRN K>4, Mg>2, P>3   N: NPO  A: PIV, RIJ swan, left radial A line      Oxygen: MV 30%/500/14/5  Abx: Vanco, Adolfo, Danae   Gtts: levo, fent, versed      DVT ppx: Lovenox  GI ppx: IV PPI     Code Status: full (confirmed on admit)  Surrogate Medical Decision-maker:    Ashley Hogan (Mother) [phone]         Introducer 05/19/25 Internal jugular Right (Active)   Placement Date/Time: 05/19/25 2330   Hand Hygiene Completed: Yes  Location: Internal jugular  Orientation: Right  Placed by: CG/AL  Placement Verified: X-ray;Pressure tracing changes;Transduced waveform   Number of days: 0       Arterial Line 05/19/25 Left Radial (Active)   Placement Date/Time: 05/19/25 2200   Orientation: Left  Location: Radial  Local Anesthetic: Injectable  Technique: Anatomical landmarks  Insertion attempts: 1  Securement Method: Sutured  Patient Tolerance: Fair   Number of days: 0       Arterial Sheath 05/20/25 0529 6 Fr. Right Femoral (Active)   Placement Date/Time: 05/20/25 0529   Sheath Size: (c) 6 Fr.  Line Orientation: Right  Sheath Insertion Site: Femoral   Number of days: 0       Arterial Sheath 05/20/25 0532 Other (Comment) Right Femoral (Active)   Placement Date/Time: 05/20/25 0532   Sheath Size: (c) Other (Comment)  Line Orientation: Right  Sheath Insertion Site: Femoral   Number of days: 0       Arterial Sheath 05/20/25 0559 6 Fr. Left Femoral (Active)   Placement Date/Time: 05/20/25 0559   Sheath Size: 6 Fr.  Line Orientation: Left  Sheath Insertion Site: Femoral   Number of days: 0       Pulmonary Artery Catheter Internal jugular (Active)   Placement Date/Time: 05/19/25 2330    Hand Hygiene Performed Prior to CVC Insertion: Yes  Site Prep: Alcohol;Chlorhexidine ;Usual sterile procedure followed  Site Prep Agent has Completely Dried Before Insertion: Yes  All 5 Sterile Barriers Used (Glove...   Number of days: 0       ETT  7.5 mm (Active)   Placement Date/Time: 05/19/25 2305   Hand Hygiene Completed: Yes  ETT Type: ETT - single  Single Lumen Tube Size: 7.5 mm  Cuffed: Yes  Location: Oral  Airway Insertion Attempts: 1  Placement Verification: Auscultation;Capnometry;Fiberoptic visualizati...   Number of days: 0       Urethral Catheter 16 Fr. (Active)   Placement Date/Time: 05/20/25 0200   Hand Hygiene Completed: Yes  Tube Size (Fr.): 16 Fr.  Catheter Balloon Size: 10 mL  Urine Returned: Yes   Number of days: 0       NG/OG/Feeding Tube OG - [place] sump 16 Fr Center mouth (Active)   Placement Date/Time: 05/19/25 2310   Placed by: MARLENA  Hand Hygiene Completed: Yes  Type of Tube: NG/OG Tube  Tube Length: 55 cm  Tube Type: OG - [place] sump  NG/OG Tube Size: 16 Fr  Tube Location: Center mouth   Number of days: 0       Social:  Code: Full Code    Disposition: NABOR Joseph MD   05/29/25 at 8:24 AM     Disclaimer: Documentation completed with the information available at the time of input. The times in the chart may not be reflective of actual patient care times, interventions, or procedures. Documentation occurs after the physical care of the patient.           [1] aspirin, 81 mg, oral, Daily  insulin lispro, 0-5 Units, subcutaneous, q4h  meropenem, 1 g, intravenous, Once  meropenem, 500 mg, intravenous, q24h  micafungin, 100 mg, intravenous, q24h  oxygen, , inhalation, Continuous - Inhalation  pantoprazole, 40 mg, intravenous, BID  perflutren lipid microspheres, 0.5-10 mL of dilution, intravenous, Once in imaging  perflutren protein A microsphere, 0.5 mL, intravenous, Once in imaging  polyethylene glycol, 17 g, oral, 4x daily  sennosides, 2 tablet, oral, BID  vancomycin, 750 mg,  intravenous, q12h     [2] amiodarone, 0.5 mg/min, Last Rate: 0.5 mg/min (05/29/25 0800)  dexmedeTOMIDine, 0.1-1.5 mcg/kg/hr (Dosing Weight), Last Rate: 0.8 mcg/kg/hr (05/29/25 0800)  fentaNYL,  mcg/hr, Last Rate: 75 mcg/hr (05/29/25 0800)  heparin Impella Purge 25 units/mL in 500 mL D5W, 10 mL/hr, Last Rate: 10 mL/hr (05/29/25 0800)  midazolam, 0.5-20 mg/hr, Last Rate: Stopped (05/28/25 1415)  nitroprusside, 0.25-5 mcg/kg/min (Dosing Weight), Last Rate: Stopped (05/25/25 1939)  norepinephrine, 0.01-1 mcg/kg/min (Dosing Weight), Last Rate: 0.1 mcg/kg/min (05/29/25 0800)  PrismaSol 4/2.5, 24 mL/kg/hr (Dosing Weight), Last Rate: 24 mL/kg/hr (05/28/25 2237)     [3] PRN medications: alteplase, bisacodyl, dextrose, dextrose, glucagon, glucagon, vancomycin

## 2025-05-30 LAB
ACANTHOCYTES BLD QL SMEAR: ABNORMAL
ACT BLD: 160 SEC (ref 83–199)
ACT BLD: 165 SEC (ref 83–199)
ACT BLD: 171 SEC (ref 83–199)
ACT BLD: 173 SEC (ref 83–199)
ACT BLD: 176 SEC (ref 83–199)
ACT BLD: 184 SEC (ref 83–199)
ACT BLD: 186 SEC (ref 83–199)
ALBUMIN SERPL BCP-MCNC: 2.4 G/DL (ref 3.4–5)
ALBUMIN SERPL BCP-MCNC: 2.5 G/DL (ref 3.4–5)
ALP SERPL-CCNC: 235 U/L (ref 33–120)
ALT SERPL W P-5'-P-CCNC: 157 U/L (ref 10–52)
ANION GAP BLDA CALCULATED.4IONS-SCNC: ABNORMAL MMOL/L
ANION GAP SERPL CALC-SCNC: 13 MMOL/L (ref 10–20)
ANION GAP SERPL CALC-SCNC: 15 MMOL/L (ref 10–20)
AST SERPL W P-5'-P-CCNC: 150 U/L (ref 9–39)
ATRIAL RATE: 48 BPM
BACTERIA BLD CULT: NORMAL
BACTERIA BLD CULT: NORMAL
BACTERIA SPEC RESP CULT: ABNORMAL
BASE EXCESS BLDA CALC-SCNC: -4 MMOL/L (ref -2–3)
BASO STIPL BLD QL SMEAR: PRESENT
BASO STIPL BLD QL SMEAR: PRESENT
BASOPHILS # BLD MANUAL: 0 X10*3/UL (ref 0–0.1)
BASOPHILS # BLD MANUAL: 0 X10*3/UL (ref 0–0.1)
BASOPHILS NFR BLD MANUAL: 0 %
BASOPHILS NFR BLD MANUAL: 0 %
BILIRUB DIRECT SERPL-MCNC: 14.4 MG/DL (ref 0–0.3)
BILIRUB SERPL-MCNC: 20.8 MG/DL (ref 0–1.2)
BLASTS # BLD MANUAL: 0 X10*3/UL
BLASTS # BLD MANUAL: 0 X10*3/UL
BLASTS NFR BLD MANUAL: 0 %
BLASTS NFR BLD MANUAL: 0 %
BLOOD EXPIRATION DATE: NORMAL
BODY TEMPERATURE: 37 DEGREES CELSIUS
BUN SERPL-MCNC: 33 MG/DL (ref 6–23)
BUN SERPL-MCNC: 40 MG/DL (ref 6–23)
BURR CELLS BLD QL SMEAR: ABNORMAL
BURR CELLS BLD QL SMEAR: ABNORMAL
CA-I BLDA-SCNC: 1.09 MMOL/L (ref 1.1–1.33)
CALCIUM SERPL-MCNC: 7.7 MG/DL (ref 8.6–10.6)
CALCIUM SERPL-MCNC: 7.9 MG/DL (ref 8.6–10.6)
CHLORIDE BLDA-SCNC: ABNORMAL MMOL/L
CHLORIDE SERPL-SCNC: 97 MMOL/L (ref 98–107)
CHLORIDE SERPL-SCNC: 98 MMOL/L (ref 98–107)
CO2 SERPL-SCNC: 23 MMOL/L (ref 21–32)
CO2 SERPL-SCNC: 24 MMOL/L (ref 21–32)
CREAT SERPL-MCNC: 2.45 MG/DL (ref 0.5–1.3)
CREAT SERPL-MCNC: 3.15 MG/DL (ref 0.5–1.3)
DISPENSE STATUS: NORMAL
EGFRCR SERPLBLD CKD-EPI 2021: 24 ML/MIN/1.73M*2
EGFRCR SERPLBLD CKD-EPI 2021: 32 ML/MIN/1.73M*2
EOSINOPHIL # BLD MANUAL: 0 X10*3/UL (ref 0–0.7)
EOSINOPHIL # BLD MANUAL: 0.22 X10*3/UL (ref 0–0.7)
EOSINOPHIL NFR BLD MANUAL: 0 %
EOSINOPHIL NFR BLD MANUAL: 0.8 %
ERYTHROCYTE [DISTWIDTH] IN BLOOD BY AUTOMATED COUNT: 21.4 % (ref 11.5–14.5)
ERYTHROCYTE [DISTWIDTH] IN BLOOD BY AUTOMATED COUNT: 24 % (ref 11.5–14.5)
GLUCOSE BLD MANUAL STRIP-MCNC: 170 MG/DL (ref 74–99)
GLUCOSE BLD MANUAL STRIP-MCNC: 182 MG/DL (ref 74–99)
GLUCOSE BLD MANUAL STRIP-MCNC: 189 MG/DL (ref 74–99)
GLUCOSE BLD MANUAL STRIP-MCNC: 205 MG/DL (ref 74–99)
GLUCOSE BLD MANUAL STRIP-MCNC: 207 MG/DL (ref 74–99)
GLUCOSE BLD MANUAL STRIP-MCNC: 212 MG/DL (ref 74–99)
GLUCOSE BLDA-MCNC: 199 MG/DL (ref 74–99)
GLUCOSE SERPL-MCNC: 185 MG/DL (ref 74–99)
GLUCOSE SERPL-MCNC: 208 MG/DL (ref 74–99)
GRAM STN SPEC: ABNORMAL
HAPTOGLOB SERPL NEPH-MCNC: <30 MG/DL (ref 30–200)
HCO3 BLDA-SCNC: 20.7 MMOL/L (ref 22–26)
HCT VFR BLD AUTO: 21.3 % (ref 41–52)
HCT VFR BLD AUTO: 22.6 % (ref 41–52)
HCT VFR BLD EST: 26 % (ref 41–52)
HGB BLD-MCNC: 7.4 G/DL (ref 13.5–17.5)
HGB BLD-MCNC: 8.1 G/DL (ref 13.5–17.5)
HGB BLDA-MCNC: 8.8 G/DL (ref 13.5–17.5)
IMM GRANULOCYTES # BLD AUTO: 1.39 X10*3/UL (ref 0–0.7)
IMM GRANULOCYTES # BLD AUTO: 2.24 X10*3/UL (ref 0–0.7)
IMM GRANULOCYTES NFR BLD AUTO: 6.3 % (ref 0–0.9)
IMM GRANULOCYTES NFR BLD AUTO: 8.1 % (ref 0–0.9)
INHALED O2 CONCENTRATION: 30 %
LACTATE BLDA-SCNC: 1.6 MMOL/L (ref 0.4–2)
LDH SERPL L TO P-CCNC: 1437 U/L (ref 84–246)
LYMPHOCYTES # BLD MANUAL: 0.73 X10*3/UL (ref 1.2–4.8)
LYMPHOCYTES # BLD MANUAL: 1.41 X10*3/UL (ref 1.2–4.8)
LYMPHOCYTES NFR BLD MANUAL: 3.3 %
LYMPHOCYTES NFR BLD MANUAL: 5.1 %
MAGNESIUM SERPL-MCNC: 2.68 MG/DL (ref 1.6–2.4)
MAGNESIUM SERPL-MCNC: 2.74 MG/DL (ref 1.6–2.4)
MCH RBC QN AUTO: 31.6 PG (ref 26–34)
MCH RBC QN AUTO: 31.8 PG (ref 26–34)
MCHC RBC AUTO-ENTMCNC: 34.7 G/DL (ref 32–36)
MCHC RBC AUTO-ENTMCNC: 35.8 G/DL (ref 32–36)
MCV RBC AUTO: 88 FL (ref 80–100)
MCV RBC AUTO: 91 FL (ref 80–100)
METAMYELOCYTES # BLD MANUAL: 0 X10*3/UL
METAMYELOCYTES # BLD MANUAL: 0.47 X10*3/UL
METAMYELOCYTES NFR BLD MANUAL: 0 %
METAMYELOCYTES NFR BLD MANUAL: 1.7 %
MONOCYTES # BLD MANUAL: 0.47 X10*3/UL (ref 0.1–1)
MONOCYTES # BLD MANUAL: 0.91 X10*3/UL (ref 0.1–1)
MONOCYTES NFR BLD MANUAL: 1.7 %
MONOCYTES NFR BLD MANUAL: 4.1 %
MYELOCYTES # BLD MANUAL: 0 X10*3/UL
MYELOCYTES # BLD MANUAL: 0.22 X10*3/UL
MYELOCYTES NFR BLD MANUAL: 0 %
MYELOCYTES NFR BLD MANUAL: 0.8 %
NEUTROPHILS # BLD MANUAL: 20.38 X10*3/UL (ref 1.2–7.7)
NEUTROPHILS # BLD MANUAL: 24.44 X10*3/UL (ref 1.2–7.7)
NEUTS BAND # BLD MANUAL: 0 X10*3/UL (ref 0–0.7)
NEUTS BAND # BLD MANUAL: 1.39 X10*3/UL (ref 0–0.7)
NEUTS BAND NFR BLD MANUAL: 0 %
NEUTS BAND NFR BLD MANUAL: 5 %
NEUTS SEG # BLD MANUAL: 20.38 X10*3/UL (ref 1.2–7)
NEUTS SEG # BLD MANUAL: 23.05 X10*3/UL (ref 1.2–7)
NEUTS SEG NFR BLD MANUAL: 83.2 %
NEUTS SEG NFR BLD MANUAL: 91.8 %
NRBC BLD MANUAL-RTO: 0 % (ref 0–0)
NRBC BLD MANUAL-RTO: 0 % (ref 0–0)
NRBC BLD-RTO: 4.2 /100 WBCS (ref 0–0)
NRBC BLD-RTO: 5.4 /100 WBCS (ref 0–0)
OVALOCYTES BLD QL SMEAR: ABNORMAL
OXYHGB MFR BLDA: 92.7 % (ref 94–98)
P AXIS: -18 DEGREES
P OFFSET: 194 MS
P ONSET: 148 MS
PCO2 BLDA: 35 MM HG (ref 38–42)
PH BLDA: 7.38 PH (ref 7.38–7.42)
PHOSPHATE SERPL-MCNC: 3.9 MG/DL (ref 2.5–4.9)
PHOSPHATE SERPL-MCNC: 3.9 MG/DL (ref 2.5–4.9)
PLASMA CELLS # BLD MANUAL: 0 X10*3/UL
PLASMA CELLS # BLD MANUAL: 0 X10*3/UL
PLASMA CELLS NFR BLD MANUAL: 0 %
PLASMA CELLS NFR BLD MANUAL: 0 %
PLATELET # BLD AUTO: 201 X10*3/UL (ref 150–450)
PLATELET # BLD AUTO: 92 X10*3/UL (ref 150–450)
PO2 BLDA: 77 MM HG (ref 85–95)
POLYCHROMASIA BLD QL SMEAR: ABNORMAL
POLYCHROMASIA BLD QL SMEAR: ABNORMAL
POTASSIUM BLDA-SCNC: 4.7 MMOL/L (ref 3.5–5.3)
POTASSIUM SERPL-SCNC: 4.1 MMOL/L (ref 3.5–5.3)
POTASSIUM SERPL-SCNC: 5.4 MMOL/L (ref 3.5–5.3)
PR INTERVAL: 136 MS
PRODUCT BLOOD TYPE: 6200
PRODUCT CODE: NORMAL
PROMYELOCYTES # BLD MANUAL: 0.18 X10*3/UL
PROMYELOCYTES # BLD MANUAL: 0.47 X10*3/UL
PROMYELOCYTES NFR BLD MANUAL: 0.8 %
PROMYELOCYTES NFR BLD MANUAL: 1.7 %
PROT SERPL-MCNC: 4.5 G/DL (ref 6.4–8.2)
Q ONSET: 216 MS
QRS COUNT: 8 BEATS
QRS DURATION: 104 MS
QT INTERVAL: 640 MS
QTC CALCULATION(BAZETT): 571 MS
QTC FREDERICIA: 594 MS
R AXIS: 75 DEGREES
RBC # BLD AUTO: 2.33 X10*6/UL (ref 4.5–5.9)
RBC # BLD AUTO: 2.56 X10*6/UL (ref 4.5–5.9)
RBC MORPH BLD: ABNORMAL
RBC MORPH BLD: ABNORMAL
SAO2 % BLDA: 97 % (ref 94–100)
SCHISTOCYTES BLD QL SMEAR: ABNORMAL
SCHISTOCYTES BLD QL SMEAR: ABNORMAL
SODIUM BLDA-SCNC: 129 MMOL/L (ref 136–145)
SODIUM SERPL-SCNC: 130 MMOL/L (ref 136–145)
SODIUM SERPL-SCNC: 131 MMOL/L (ref 136–145)
T AXIS: 19 DEGREES
T OFFSET: 536 MS
TARGETS BLD QL SMEAR: ABNORMAL
TARGETS BLD QL SMEAR: ABNORMAL
TOTAL CELLS COUNTED BLD: 119
TOTAL CELLS COUNTED BLD: 121
UNIT ABO: NORMAL
UNIT NUMBER: NORMAL
UNIT RH: NORMAL
UNIT VOLUME: 350
VANCOMYCIN SERPL-MCNC: 20.3 UG/ML (ref 5–20)
VARIANT LYMPHS # BLD MANUAL: 0 X10*3/UL (ref 0–0.5)
VARIANT LYMPHS # BLD MANUAL: 0 X10*3/UL (ref 0–0.5)
VARIANT LYMPHS NFR BLD: 0 %
VARIANT LYMPHS NFR BLD: 0 %
VENTRICULAR RATE: 48 BPM
WBC # BLD AUTO: 22.2 X10*3/UL (ref 4.4–11.3)
WBC # BLD AUTO: 27.7 X10*3/UL (ref 4.4–11.3)
XM INTEP: NORMAL

## 2025-05-30 PROCEDURE — 99233 SBSQ HOSP IP/OBS HIGH 50: CPT | Performed by: STUDENT IN AN ORGANIZED HEALTH CARE EDUCATION/TRAINING PROGRAM

## 2025-05-30 PROCEDURE — 83735 ASSAY OF MAGNESIUM: CPT

## 2025-05-30 PROCEDURE — 84132 ASSAY OF SERUM POTASSIUM: CPT

## 2025-05-30 PROCEDURE — 2500000004 HC RX 250 GENERAL PHARMACY W/ HCPCS (ALT 636 FOR OP/ED): Mod: JZ

## 2025-05-30 PROCEDURE — 2500000001 HC RX 250 WO HCPCS SELF ADMINISTERED DRUGS (ALT 637 FOR MEDICARE OP)

## 2025-05-30 PROCEDURE — P9016 RBC LEUKOCYTES REDUCED: HCPCS

## 2025-05-30 PROCEDURE — 85027 COMPLETE CBC AUTOMATED: CPT

## 2025-05-30 PROCEDURE — 36430 TRANSFUSION BLD/BLD COMPNT: CPT

## 2025-05-30 PROCEDURE — 2500000002 HC RX 250 W HCPCS SELF ADMINISTERED DRUGS (ALT 637 FOR MEDICARE OP, ALT 636 FOR OP/ED)

## 2025-05-30 PROCEDURE — 85007 BL SMEAR W/DIFF WBC COUNT: CPT

## 2025-05-30 PROCEDURE — 82947 ASSAY GLUCOSE BLOOD QUANT: CPT

## 2025-05-30 PROCEDURE — 37799 UNLISTED PX VASCULAR SURGERY: CPT

## 2025-05-30 PROCEDURE — 83010 ASSAY OF HAPTOGLOBIN QUANT: CPT

## 2025-05-30 PROCEDURE — 84100 ASSAY OF PHOSPHORUS: CPT

## 2025-05-30 PROCEDURE — 85347 COAGULATION TIME ACTIVATED: CPT

## 2025-05-30 PROCEDURE — 83615 LACTATE (LD) (LDH) ENZYME: CPT

## 2025-05-30 PROCEDURE — 99291 CRITICAL CARE FIRST HOUR: CPT

## 2025-05-30 PROCEDURE — 99233 SBSQ HOSP IP/OBS HIGH 50: CPT

## 2025-05-30 PROCEDURE — 2500000004 HC RX 250 GENERAL PHARMACY W/ HCPCS (ALT 636 FOR OP/ED)

## 2025-05-30 PROCEDURE — 94003 VENT MGMT INPAT SUBQ DAY: CPT

## 2025-05-30 PROCEDURE — 2500000005 HC RX 250 GENERAL PHARMACY W/O HCPCS

## 2025-05-30 PROCEDURE — 93750 INTERROGATION VAD IN PERSON: CPT

## 2025-05-30 PROCEDURE — 82248 BILIRUBIN DIRECT: CPT

## 2025-05-30 PROCEDURE — 2020000001 HC ICU ROOM DAILY

## 2025-05-30 PROCEDURE — 87205 SMEAR GRAM STAIN: CPT

## 2025-05-30 PROCEDURE — 37799 UNLISTED PX VASCULAR SURGERY: CPT | Performed by: PSYCHOLOGIST

## 2025-05-30 PROCEDURE — 90945 DIALYSIS ONE EVALUATION: CPT | Performed by: INTERNAL MEDICINE

## 2025-05-30 PROCEDURE — 80202 ASSAY OF VANCOMYCIN: CPT | Performed by: PSYCHOLOGIST

## 2025-05-30 PROCEDURE — G0545 PR INHERENT VISIT TO INPT: HCPCS | Performed by: STUDENT IN AN ORGANIZED HEALTH CARE EDUCATION/TRAINING PROGRAM

## 2025-05-30 PROCEDURE — 90937 HEMODIALYSIS REPEATED EVAL: CPT

## 2025-05-30 RX ORDER — THIAMINE HYDROCHLORIDE 100 MG/ML
500 INJECTION, SOLUTION INTRAMUSCULAR; INTRAVENOUS DAILY
Status: DISCONTINUED | OUTPATIENT
Start: 2025-05-30 | End: 2025-06-04

## 2025-05-30 RX ADMIN — PANTOPRAZOLE SODIUM 40 MG: 40 INJECTION, POWDER, FOR SOLUTION INTRAVENOUS at 09:07

## 2025-05-30 RX ADMIN — SENNOSIDES 17.2 MG: 8.6 TABLET, FILM COATED ORAL at 09:07

## 2025-05-30 RX ADMIN — CALCIUM CHLORIDE, MAGNESIUM CHLORIDE, DEXTROSE MONOHYDRATE, LACTIC ACID, SODIUM CHLORIDE, SODIUM BICARBONATE AND POTASSIUM CHLORIDE 24 ML/KG/HR: 3.68; 3.05; 22; 5.4; 6.46; 3.09; .314 INJECTION INTRAVENOUS at 19:47

## 2025-05-30 RX ADMIN — INSULIN LISPRO 2 UNITS: 100 INJECTION, SOLUTION INTRAVENOUS; SUBCUTANEOUS at 12:10

## 2025-05-30 RX ADMIN — AMIODARONE HYDROCHLORIDE 0.5 MG/MIN: 1.8 INJECTION, SOLUTION INTRAVENOUS at 10:00

## 2025-05-30 RX ADMIN — SODIUM CHLORIDE 2 G: 9 INJECTION, SOLUTION INTRAVENOUS at 11:30

## 2025-05-30 RX ADMIN — VANCOMYCIN HYDROCHLORIDE 750 MG: 750 INJECTION, SOLUTION INTRAVENOUS at 06:56

## 2025-05-30 RX ADMIN — INSULIN LISPRO 2 UNITS: 100 INJECTION, SOLUTION INTRAVENOUS; SUBCUTANEOUS at 09:00

## 2025-05-30 RX ADMIN — Medication 100 MCG/HR: at 11:33

## 2025-05-30 RX ADMIN — DEXMEDETOMIDINE HYDROCHLORIDE 0.8 MCG/KG/HR: 400 INJECTION INTRAVENOUS at 18:35

## 2025-05-30 RX ADMIN — HEPARIN SODIUM 800 UNITS/HR: 10000 INJECTION, SOLUTION INTRAVENOUS at 21:22

## 2025-05-30 RX ADMIN — PANTOPRAZOLE SODIUM 40 MG: 40 INJECTION, POWDER, FOR SOLUTION INTRAVENOUS at 20:13

## 2025-05-30 RX ADMIN — INSULIN LISPRO 1 UNITS: 100 INJECTION, SOLUTION INTRAVENOUS; SUBCUTANEOUS at 20:14

## 2025-05-30 RX ADMIN — CALCIUM CHLORIDE, MAGNESIUM CHLORIDE, DEXTROSE MONOHYDRATE, LACTIC ACID, SODIUM CHLORIDE, SODIUM BICARBONATE AND POTASSIUM CHLORIDE 24 ML/KG/HR: 3.68; 3.05; 22; 5.4; 6.46; 3.09; .314 INJECTION INTRAVENOUS at 19:48

## 2025-05-30 RX ADMIN — DEXMEDETOMIDINE HYDROCHLORIDE 0.8 MCG/KG/HR: 400 INJECTION INTRAVENOUS at 10:00

## 2025-05-30 RX ADMIN — POLYETHYLENE GLYCOL 3350 17 G: 17 POWDER, FOR SOLUTION ORAL at 20:14

## 2025-05-30 RX ADMIN — POLYETHYLENE GLYCOL 3350 17 G: 17 POWDER, FOR SOLUTION ORAL at 06:56

## 2025-05-30 RX ADMIN — Medication 30 PERCENT: at 20:43

## 2025-05-30 RX ADMIN — MICAFUNGIN SODIUM 100 MG: 100 INJECTION, POWDER, LYOPHILIZED, FOR SOLUTION INTRAVENOUS at 09:42

## 2025-05-30 RX ADMIN — CALCIUM CHLORIDE, MAGNESIUM CHLORIDE, DEXTROSE MONOHYDRATE, LACTIC ACID, SODIUM CHLORIDE, SODIUM BICARBONATE AND POTASSIUM CHLORIDE 24 ML/KG/HR: 3.68; 3.05; 22; 5.4; 6.46; 3.09; .314 INJECTION INTRAVENOUS at 12:01

## 2025-05-30 RX ADMIN — INSULIN LISPRO 2 UNITS: 100 INJECTION, SOLUTION INTRAVENOUS; SUBCUTANEOUS at 17:00

## 2025-05-30 RX ADMIN — Medication 100 MCG/HR: at 18:35

## 2025-05-30 RX ADMIN — INSULIN LISPRO 1 UNITS: 100 INJECTION, SOLUTION INTRAVENOUS; SUBCUTANEOUS at 03:56

## 2025-05-30 RX ADMIN — POLYETHYLENE GLYCOL 3350 17 G: 17 POWDER, FOR SOLUTION ORAL at 12:10

## 2025-05-30 RX ADMIN — CALCIUM CHLORIDE, MAGNESIUM CHLORIDE, DEXTROSE MONOHYDRATE, LACTIC ACID, SODIUM CHLORIDE, SODIUM BICARBONATE AND POTASSIUM CHLORIDE 24 ML/KG/HR: 3.68; 3.05; 22; 5.4; 6.46; 3.09; .314 INJECTION INTRAVENOUS at 12:00

## 2025-05-30 RX ADMIN — CALCIUM CHLORIDE, MAGNESIUM CHLORIDE, DEXTROSE MONOHYDRATE, LACTIC ACID, SODIUM CHLORIDE, SODIUM BICARBONATE AND POTASSIUM CHLORIDE 24 ML/KG/HR: 3.68; 3.05; 22; 5.4; 6.46; 3.09; .314 INJECTION INTRAVENOUS at 11:59

## 2025-05-30 RX ADMIN — Medication 30 PERCENT: at 07:47

## 2025-05-30 RX ADMIN — DEXMEDETOMIDINE HYDROCHLORIDE 0.8 MCG/KG/HR: 400 INJECTION INTRAVENOUS at 03:53

## 2025-05-30 RX ADMIN — CALCIUM CHLORIDE, MAGNESIUM CHLORIDE, DEXTROSE MONOHYDRATE, LACTIC ACID, SODIUM CHLORIDE, SODIUM BICARBONATE AND POTASSIUM CHLORIDE 24 ML/KG/HR: 3.68; 3.05; 22; 5.4; 6.46; 3.09; .314 INJECTION INTRAVENOUS at 19:46

## 2025-05-30 RX ADMIN — THIAMINE HYDROCHLORIDE 500 MG: 100 INJECTION, SOLUTION INTRAMUSCULAR; INTRAVENOUS at 13:14

## 2025-05-30 RX ADMIN — POLYETHYLENE GLYCOL 3350 17 G: 17 POWDER, FOR SOLUTION ORAL at 17:15

## 2025-05-30 RX ADMIN — ASPIRIN 81 MG: 81 TABLET, CHEWABLE ORAL at 09:07

## 2025-05-30 RX ADMIN — HEPARIN SODIUM 10 ML/HR: 10000 INJECTION, SOLUTION INTRAVENOUS; SUBCUTANEOUS at 21:14

## 2025-05-30 RX ADMIN — SODIUM CHLORIDE 2 G: 9 INJECTION, SOLUTION INTRAVENOUS at 22:19

## 2025-05-30 RX ADMIN — Medication 100 MCG/HR: at 03:49

## 2025-05-30 RX ADMIN — SENNOSIDES 17.2 MG: 8.6 TABLET, FILM COATED ORAL at 20:13

## 2025-05-30 RX ADMIN — NOREPINEPHRINE BITARTRATE 0.1 MCG/KG/MIN: 8 INJECTION, SOLUTION INTRAVENOUS at 05:21

## 2025-05-30 RX ADMIN — INSULIN LISPRO 1 UNITS: 100 INJECTION, SOLUTION INTRAVENOUS; SUBCUTANEOUS at 00:59

## 2025-05-30 ASSESSMENT — PAIN - FUNCTIONAL ASSESSMENT
PAIN_FUNCTIONAL_ASSESSMENT: CPOT (CRITICAL CARE PAIN OBSERVATION TOOL)

## 2025-05-30 ASSESSMENT — PAIN SCALES - GENERAL
PAINLEVEL_OUTOF10: 0 - NO PAIN

## 2025-05-30 NOTE — PROGRESS NOTES
Vancomycin Dosing by Pharmacy- FOLLOW UP    Alpesh Elena is a 45 y.o. year old male who Pharmacy has been consulted for vancomycin dosing for line infections. Based on the patient's indication and renal status this patient is being dosed based on a goal trough/random level of 15-20.     Renal function is currently dependant on CVVH.    Current vancomycin dose: 750 mg given every 12 hours    Most recent trough level: 20.3 mcg/mL (2 hrs prior to dose)    Visit Vitals  BP 89/85   Pulse (!) 48   Temp 34.7 °C (94.5 °F)   Resp (!) 0        Lab Results   Component Value Date    CREATININE 3.15 (H) 2025    CREATININE 3.60 (H) 2025    CREATININE 4.44 (H) 2025    CREATININE 3.85 (H) 2025        Patient weight is as follows:   Vitals:    25 0000   Weight: 91 kg (200 lb 9.9 oz)       Cultures:  Susceptibility data for the encounter in last 14 days.  Collected Specimen Info Organism   25 Fluid from Tracheal Aspirate Normal throat margaret        I/O last 3 completed shifts:  In: 3491.3 (38.4 mL/kg) [I.V.:2281.3 (25.1 mL/kg); Blood:350; NG/GT:320; IV Piggyback:540]  Out: 1739 (19.1 mL/kg) [Urine:17 (0 mL/kg/hr); Other:1722]  Weight: 91 kg   I/O during current shift:  I/O this shift:  In: 333.4 [I.V.:333.4]  Out: 227 [Other:227]    Temp (24hrs), Av.7 °C (96.2 °F), Min:34.7 °C (94.5 °F), Max:36.5 °C (97.7 °F)      Assessment/Plan    Within goal random/trough level. Continue regimen as ordered.   The next level will be obtained on  at 0500. May be obtained sooner if clinically indicated.   Will continue to monitor renal function daily while on vancomycin and order serum creatinine at least every 48 hours if not already ordered.  Follow for continued vancomycin needs, clinical response, and signs/symptoms of toxicity.       Radha Francois, PharmD

## 2025-05-30 NOTE — CONSULTS
"Nutrition Note:   Pt s/p corpack placement. Corpack at 82cm with tip lying in pylorus/ proximal duodenum. TF not yet started. Pt has now started on CRRT. He has been NPO for 10 days. Remains high risk for refeeding syndrome. On Levo 0.05mcg/kg/min.     Nutrition Significant Labs:  CBC Trend:   Results from last 7 days   Lab Units 05/30/25  0020 05/29/25  1552 05/29/25  0641 05/29/25 0158   WBC AUTO x10*3/uL 27.7* 30.0* 31.9* 29.2*   RBC AUTO x10*6/uL 2.56* 2.58* 2.05* 2.21*   HEMOGLOBIN g/dL 8.1* 8.2* 6.5* 7.0*   HEMATOCRIT % 22.6* 22.9* 18.9* 20.1*   MCV fL 88 89 92 91   PLATELETS AUTO x10*3/uL 201 144* 135* 137*    , BMP Trend:   Results from last 7 days   Lab Units 05/30/25  0020 05/29/25  0919 05/29/25 0158 05/28/25  1235   GLUCOSE mg/dL 185* 217* 195* 163*   CALCIUM mg/dL 7.7* 7.7* 7.9* 8.3*   SODIUM mmol/L 130* 131* 130* 130*   POTASSIUM mmol/L 5.4* 4.5 4.7 4.3   CO2 mmol/L 23 23 22 23   CHLORIDE mmol/L 97* 94* 93* 90*   BUN mg/dL 40* 48* 51* 39*   CREATININE mg/dL 3.15* 3.60* 4.44* 3.85*    , Renal Lab Trend:   Results from last 7 days   Lab Units 05/30/25  0021 05/30/25  0020 05/29/25  0919 05/29/25 0158 05/28/25  1235   POTASSIUM mmol/L  --  5.4* 4.5 4.7 4.3   PHOSPHORUS mg/dL 3.9  --   --  6.5* 5.5*   SODIUM mmol/L  --  130* 131* 130* 130*   MAGNESIUM mg/dL 2.74*  --   --  2.80* 2.99*   EGFR mL/min/1.73m*2  --  24* 20* 16* 19*   BUN mg/dL  --  40* 48* 51* 39*   CREATININE mg/dL  --  3.15* 3.60* 4.44* 3.85*    , Vit D: No results found for: \"VITD25\" , Vit B12:   Lab Results   Component Value Date    [id number] 766 05/24/2025        Estimated Needs:   Total Energy Estimated Needs in 24 hours (kCal): 1833 kCal  Method for Estimating Needs: PSU RMR (MVe 11.5L/min) (MVE fluctuating- RMR @ MVe 7.3= 1700)  Total Protein Estimated Needs in 24 Hours (g):  (105-140)  Method for Estimating 24 Hour Protein Needs: 1.5-2.0 g/kg IBW  Total Fluid Estimated Needs in 24 Hours (mL):  (per team)  Method for Estimating 24 " Hour Fluid Needs: per team        Nutrition Diagnosis   Malnutrition Diagnosis  Patient has Malnutrition Diagnosis: No (low threshold d/t prolonged NPO status)    Nutrition Diagnosis  Patient has Nutrition Diagnosis: Yes  Diagnosis Status (1): Active  Nutrition Diagnosis 1: Inadequate oral intake  Related to (1): clinical course  As Evidenced by (1): NPO x 7 days       Nutrition Interventions/Recommendations   Nutrition prescription for enteral nutrition    Nutrition Recommendations:  Refeeding precautions recommend as pt is high risk d/t prolonged NPO status    Start 500mg thiamine daily x 7 days    Monitor RFP+Mg Q12H   Replete electrolytes PRN + ensure lytes are replenished prior to starting nutrition support    Do not start nutrition support if serum potassium </= 3 mEq/L, phosphorus </= 2mg/dL, and/or magnesium </= 1.2mEq/L   If significant drop in electrolytes happens during nutrition support advancement, do not further advance     Tube Feeding  Pivot 1.5 @ 45ml/hr + 1pkt Prostat AWC daily   Start @ 15ml/hr and increase by [id number] until goal is met   Refer to refeeding precautions above   FWF per MD/team discretion; TF provides 810ml free water    If pt shows s/s of intolerance, would recommend further advancement of enteral tube.     Nutrition Interventions/Goals:   Enteral Intake: Management of composition of enteral nutrition  Goal: Tolerate TF @ goal: Pivot @ 45ml/hr + 1 pkt prostat AWC = 1720kcal, 118gm protein  Vitamin and Mineral Supplement Therapy: Thiamin supplement therapy  Goal: prevent refeeding      Education Documentation  No documentation found.            Nutrition Monitoring and Evaluation   Food/Nutrient Related History Monitoring  Monitoring and Evaluation Plan: Enteral and parenteral nutrition intake determination  Enteral and Parenteral Nutrition Intake Determination: Enteral nutrition intake - Prevent refeeding, Enteral nutrition intake - Tolerate TF at goal rate         Biochemical  Data, Medical Tests and Procedures  Monitoring and Evaluation Plan: Electrolyte/renal panel  Electrolyte and Renal Panel: Electrolytes within normal limits  Glucose/Endocrine Profile: Glucose within normal limits - ICU (140-180 mg/dL)         Goal Status: New goal(s) identified    Time Spent (min): 60 minutes

## 2025-05-30 NOTE — CARE PLAN
Problem: Safety - Medical Restraint  Goal: Remains free of injury from restraints (Restraint for Interference with Medical Device)  5/30/2025 0257 by Mary Jo Fernandes RN  Outcome: Progressing  Flowsheets (Taken 5/30/2025 0257)  Remains free of injury from restraints (restraint for interference with medical device):   Determine that other, less restrictive measures have been tried or would not be effective before applying the restraint   Evaluate the patient's condition at the time of restraint application   Inform patient/family regarding the reason for restraint   Every 2 hours: Monitor safety, psychosocial status, comfort, nutrition and hydration  5/30/2025 0257 by Mary Jo Fernandes RN  Outcome: Progressing  Flowsheets (Taken 5/30/2025 0257)  Remains free of injury from restraints (restraint for interference with medical device):   Determine that other, less restrictive measures have been tried or would not be effective before applying the restraint   Evaluate the patient's condition at the time of restraint application   Inform patient/family regarding the reason for restraint   Every 2 hours: Monitor safety, psychosocial status, comfort, nutrition and hydration     Problem: Safety - Medical Restraint  Goal: Free from restraint(s) (Restraint for Interference with Medical Device)  5/30/2025 0257 by Mary Jo Fernandes RN  Outcome: Progressing  Flowsheets (Taken 5/30/2025 0257)  Free from restraint(s) (restraint for interference with medical device):   ONCE/SHIFT or MINIMUM Every 12 hours: Assess and document the continuing need for restraints   Every 24 hours: Continued use of restraint requires Licensed Independent Practitioner to perform face to face examination and written order   Identify and implement measures to help patient regain control  5/30/2025 0257 by Mary Jo Fernandes RN  Outcome: Progressing  Flowsheets (Taken 5/30/2025 0257)  Free from restraint(s) (restraint for interference with medical device):   ONCE/SHIFT or  MINIMUM Every 12 hours: Assess and document the continuing need for restraints   Every 24 hours: Continued use of restraint requires Licensed Independent Practitioner to perform face to face examination and written order   Identify and implement measures to help patient regain control     Problem: ACS/CP/NSTEMI/STEMI  Goal: Chest pain managed (free from pain or at acceptable level)  5/30/2025 0257 by Mary Jo Fernandes RN  Outcome: Progressing  5/30/2025 0257 by Mary Jo Fernandes RN  Outcome: Progressing     Problem: Respiratory  Goal: Clear secretions with interventions this shift  5/30/2025 0257 by Mary Jo Fernandes RN  Outcome: Progressing  5/30/2025 0257 by Mary Jo Fernandes RN  Outcome: Progressing   The patient's goals for the shift include      The clinical goals for the shift include pt will maintain adequate impella flows

## 2025-05-30 NOTE — CONSULTS
Vancomycin Dosing by Pharmacy- Cessation of Therapy    Consult to pharmacy for vancomycin dosing has been discontinued by the prescriber, pharmacy will sign off at this time.    Please call pharmacy if there are further questions or re-enter a consult if vancomycin is resumed.     Hoda Barrow, PharmD

## 2025-05-30 NOTE — PROGRESS NOTES
Alpesh Elena is a 45 y.o. male on day 10 of admission presenting with STEMI (ST elevation myocardial infarction) (Multi).    Subjective   Interval History:       Remain intubated and sedated  On pressor   No fever   Wbc still elevated   Had 2nd degree block   No BM   Xray not concerning for ileus   C/s without growth       Objective   Range of Vitals (last 24 hours)  Heart Rate:  [48-80]   Temp:  [34.7 °C (94.5 °F)-36.5 °C (97.7 °F)]   Resp:  [0-22]   Weight:  [91 kg (200 lb 9.9 oz)]   SpO2:  [95 %-100 %]   Daily Weight  05/30/25 : 91 kg (200 lb 9.9 oz)    Body mass index is 30.5 kg/m².    Physical Exam  Constitutional:       Appearance: He is ill-appearing.      Comments: Intubated , sedated, undergoing CVVH    Cardiovascular:      Heart sounds: Murmur heard.   Pulmonary:      Comments: MV sounds  Abdominal:      Palpations: Abdomen is soft.           Antibiotics  meropenem (Merrem) IV 2 g in 100 mL NS    Relevant Results  Labs  Results from last 72 hours   Lab Units 05/30/25  0020 05/29/25  1552 05/29/25  0641 05/29/25  0158   WBC AUTO x10*3/uL 27.7* 30.0* 31.9* 29.2*   HEMOGLOBIN g/dL 8.1* 8.2* 6.5* 7.0*   HEMATOCRIT % 22.6* 22.9* 18.9* 20.1*   PLATELETS AUTO x10*3/uL 201 144* 135* 137*   NEUTROS PCT AUTO %  --   --   --  75.0   LYMPHO PCT MAN % 5.1 8.5 4.2  --    LYMPHS PCT AUTO %  --   --   --  7.2   MONO PCT MAN % 1.7 2.6 5.0  --    MONOS PCT AUTO %  --   --   --  6.5   EOSINO PCT MAN % 0.8 0.9 14.2  --    EOS PCT AUTO %  --   --   --  0.3     Results from last 72 hours   Lab Units 05/30/25  0021 05/30/25  0020 05/29/25  0919 05/29/25  0158 05/28/25  1235   SODIUM mmol/L  --  130* 131* 130* 130*   POTASSIUM mmol/L  --  5.4* 4.5 4.7 4.3   CHLORIDE mmol/L  --  97* 94* 93* 90*   CO2 mmol/L  --  23 23 22 23   BUN mg/dL  --  40* 48* 51* 39*   CREATININE mg/dL  --  3.15* 3.60* 4.44* 3.85*   GLUCOSE mg/dL  --  185* 217* 195* 163*   CALCIUM mg/dL  --  7.7* 7.7* 7.9* 8.3*   ANION GAP mmol/L  --  15 19 20 21*   EGFR  mL/min/1.73m*2  --  24* 20* 16* 19*   PHOSPHORUS mg/dL 3.9  --   --  6.5* 5.5*     Results from last 72 hours   Lab Units 05/30/25  0021 05/29/25  0919 05/29/25  0158 05/28/25  0631 05/28/25  0237   ALK PHOS U/L 235* 279* 308*  --  449*   BILIRUBIN TOTAL mg/dL 20.8* 22.4* 21.8*  --  21.0*   BILIRUBIN DIRECT mg/dL 14.4*  --  15.0*  --  19.8*   PROTEIN TOTAL g/dL 4.5* 4.6* 4.5*  --  5.2*   ALT U/L 157* 179* 195*  --  273*   AST U/L 150* 212* 247*  --  276*   ALBUMIN g/dL 2.4* 2.5* 2.5*   < > 3.0*    < > = values in this interval not displayed.     Estimated Creatinine Clearance: 32.1 mL/min (A) (by C-G formula based on SCr of 3.15 mg/dL (H)).  C-Reactive Protein   Date Value Ref Range Status   07/07/2024 10.34 (H) <1.00 mg/dL Final     Microbiology  Susceptibility data from last 14 days.  Collected Specimen Info Organism   05/20/25 Fluid from Tracheal Aspirate Normal throat margaret     Imaging  5/19 blood cultures 2 sets no growth  5/20 MRSA nares negative  5/20 strep pneumo Legionella urine antigen negative  5/21 MRSA PCR negative  5/21 blood cultures 2 sets no growth  5/26 blood cultures NG        Imaging     CXR w/ diffuse patchy opacities, KUB bowel gas pattern      Assessment/Plan   liv Elena is a 45-year-old man past medical history significant for tobacco use, [place] [facility] [place] presenting to [place] with chest pain and STEMI, status post balloon angioplasty, developed shock and TTE with large VSD vs inferior LV wall rupture.  He underwent Impella placement on 5/20.  ID has been following for fever, Tmax 101.3 on 5/21 with WBC to 34K, now improved.  He was initially treated for pneumonia, and has been on pip-tazo since 5/19, with intermittent doses of vancomycin.  Blood cultures negative from 5/19 and 5/21.  Strep pneumo and Legionella urine antigen negative.  Sputum culture with rare normal throat margaret.  Started on RRT. LFTs improving, T. bili remains elevated.  Remains with low FiO2 30%, PEEP  5.      Called back 5/26 for worsening leukocytosis.  No documented fever since 5/21, however in the past day or so he has been placed back on Arctic sun cooling device.  WBC now 25K, neutrophil predominant.  His last vancomycin dose was 5/24, and his WBC was elevated by 5/25 at midnight, therefore it was likely rising despite vancomycin treatment.  Last vancomycin level 5/26 is 15.  Is now on pressor support with nor epi 0.06, and in the setting of unclear new source of infection, expanded his coverage with meropenem, empiric micafungin in the setting of critical illness, and add back vancomycin until blood cultures result.       EGD 5/27, single ulcer at GE junction likely from OGT trauma or suctioning. Tbili 21, direct bili 19, haptoglobin <30, LDH 2600. Neutrophil predominant leukocytosis, blood cx NGTD, vent settings remain low. No cholecystitis on US. CT imaging deferred by team for now. In the absence of obvious infection, leukocytosis may be driven by critical illness from his cardiogenic shock. Upgrading Impella today.       5/29 -worsening hemodynamics, leukocytosis, decreasing hemoglobin.  Clinical picture mixed between septic shock or worsening cardiogenic shock versus new bleed.  He has not responded to his expanded antibiotics with vancomycin/meropenem/micafungin, and no organisms been identified on blood culture, last sputum culture with normal throat margaret, and although we requested repeat sputum now, there is minimal secretions via his ET tube per nursing; vent settings remain low.  Intermittent stool output with a bowel regimen, KUB today with nonobstructive gas pattern, no obvious ileus.    5/30 afebrile, ALT and AST downtrending , PLT N . Leukocytosis 27 previous 30   would discontinue vancomycin and micafungin, no evidence of MRSA or fungal infection     #STEMI complicated by cardiogenic shock, VSD, status post Impella  #Fever, resolved   #Leukocytosis     Recommendations:  -  discontinue  vancomycin and micafungin  -  continue meropenem, renally dosed  - ID will follow               Jennifer Islas MD

## 2025-05-30 NOTE — PROGRESS NOTES
"Cardiac Intensive Care - Daily Progress Note   Subjective    Alpesh Elena is a 45 y.o. year old male patient admitted on 5/20/2025 with following ICU needs: Cardiogenic shock, impella, VSD/ Inferior LV wall rupture    Interval History:  NAEON. This AM, remains intubated and sedated. He withdraws to pain. This AM found to be bradycardiac with 2:1 Second degree AV block.     Meds    Scheduled medications  Scheduled Medications[1]  Continuous medications  Continuous Medications[2]  PRN medications  PRN Medications[3]     Objective    Blood pressure 89/85, pulse (!) 48, temperature 34.7 °C (94.5 °F), resp. rate (!) 0, height 1.727 m (5' 8\"), weight 91 kg (200 lb 9.9 oz), SpO2 99%.     Physical Exam  Constitutional:       Comments: Intubated, eye opens to sternal rub    Cardiovascular:      Rate and Rhythm: Normal rate and regular rhythm.      Heart sounds: Murmur heard.      Comments: Holosystolic murmer, best heard at 4th intercostal rib. Very weak vs absent b/l PT and pedal.   Pulmonary:      Effort: Pulmonary effort is normal.      Breath sounds: Normal breath sounds. No wheezing or rales.   Abdominal:      General: Abdomen is flat. Bowel sounds are normal. There is no distension.      Palpations: Abdomen is soft.   Musculoskeletal:      Right lower leg: No edema.      Left lower leg: No edema.   Skin:     General: Skin is warm.   Neurological:      Comments: Sedated             Intake/Output Summary (Last 24 hours) at 5/30/2025 1138  Last data filed at 5/30/2025 1100  Gross per 24 hour   Intake 2510.22 ml   Output 1360 ml   Net 1150.22 ml     Labs:   Results from last 72 hours   Lab Units 05/30/25  0021 05/30/25  0020 05/29/25  0919 05/29/25  0158 05/28/25  1235   SODIUM mmol/L  --  130* 131* 130* 130*   POTASSIUM mmol/L  --  5.4* 4.5 4.7 4.3   CHLORIDE mmol/L  --  97* 94* 93* 90*   CO2 mmol/L  --  23 23 22 23   BUN mg/dL  --  40* 48* 51* 39*   CREATININE mg/dL  --  3.15* 3.60* 4.44* 3.85*   GLUCOSE mg/dL  --  " 185* 217* 195* 163*   CALCIUM mg/dL  --  7.7* 7.7* 7.9* 8.3*   ANION GAP mmol/L  --  15 19 20 21*   EGFR mL/min/1.73m*2  --  24* 20* 16* 19*   PHOSPHORUS mg/dL 3.9  --   --  6.5* 5.5*      Results from last 72 hours   Lab Units 05/30/25  0020 05/29/25  1552 05/29/25  0641 05/29/25  0158   WBC AUTO x10*3/uL 27.7* 30.0* 31.9* 29.2*   HEMOGLOBIN g/dL 8.1* 8.2* 6.5* 7.0*   HEMATOCRIT % 22.6* 22.9* 18.9* 20.1*   PLATELETS AUTO x10*3/uL 201 144* 135* 137*   NEUTROS PCT AUTO %  --   --   --  75.0   LYMPHO PCT MAN % 5.1 8.5 4.2  --    LYMPHS PCT AUTO %  --   --   --  7.2   MONO PCT MAN % 1.7 2.6 5.0  --    MONOS PCT AUTO %  --   --   --  6.5   EOSINO PCT MAN % 0.8 0.9 14.2  --    EOS PCT AUTO %  --   --   --  0.3      Results from last 72 hours   Lab Units 05/30/25  0024 05/28/25  2256 05/28/25  1832   POCT PH, ARTERIAL pH 7.38 7.38 7.31*   POCT PCO2, ARTERIAL mm Hg 35* 29* 37*   POCT PO2, ARTERIAL mm Hg 77* 87 362*   POCT SO2, ARTERIAL % 97 97 100     Results from last 72 hours   Lab Units 05/29/25  0428 05/28/25  2258   POCT PH, VENOUS pH 7.38 7.35   POCT PCO2, VENOUS mm Hg 36* 21*   POCT PO2, VENOUS mm Hg 35 45        Micro/ID:     Lab Results   Component Value Date    BLOODCULT No growth at 3 days 05/26/2025    BLOODCULT No growth at 3 days 05/26/2025       EKG Trend:    EKG 5/20: Sinus Tachycardia, T wave inversions inferior leads. Prolonged QTC     Echo Data:  Results for orders placed during the hospital encounter of 05/18/25    Transthoracic Echo Complete    Narrative  [place] [address]  Tel [phone] Fax [phone]    TRANSTHORACIC ECHOCARDIOGRAM REPORT    Patient Name:       AVRIL Yoder Physician:    [id number] Faraz Ortega DO  Study Date:         5/19/2025            Ordering Provider:    [id number] MARGARET GASPAR  MRN/PID:            [id number]             Fellow:  Accession#:         [id number]         Nurse:  Date of Birth/Age:  1980 / 45  years Sonographer:          Koki Mendez  [facility]  Gender Assigned at  M                    Additional Staff:  Birth:  Height:             172.72 cm            Admit Date:           5/18/2025  Weight:             75.30 kg             Admission Status:     Inpatient - STAT  BSA / BMI:          1.89 m2 / 25.24      Department Location:  [facility]  kg/m2  Blood Pressure: 97 /65 mmHg    Study Type:    TRANSTHORACIC ECHO (TTE) COMPLETE  Diagnosis/ICD: ST elevation (STEMI) myocardial infarction of unspecified  site-I21.3  Indication:    STEMI  CPT Codes:     Echo Complete w Full [id number]    Patient History:  PCI and Stent:     PCI performed on 5/18/2025.  Smoker:            Current.  Pertinent History: Chest Pain and Murmur.    Study Detail: The following Echo studies were performed: 2D, M-Mode, Doppler and  color flow. Definity used as a contrast agent for endocardial  border definition. Total contrast used for this procedure was 2 mL  via IV push.      PHYSICIAN INTERPRETATION:  Left Ventricle: The left ventricular systolic function is normal, with a visually estimated ejection fraction of 55%. There is mild concentric left ventricular hypertrophy. Wall motion is abnormal. The left ventricular cavity size is normal. There is mild increased septal and mildly increased posterior left ventricular wall thickness. Spectral Doppler shows a normal pattern of left ventricular diastolic filling.  LV Wall Scoring:  The basal inferoseptal segment, basal inferolateral segment, and basal inferior  segment are hypokinetic. All remaining scored segments are normal.    Left Atrium: The left atrial size is normal.  Right Ventricle: The right ventricle is normal in size. There is normal right ventricular global systolic function.  Right Atrium: The right atrial size is normal.  Aortic Valve: The aortic valve appears structurally normal. There is no evidence of aortic valve stenosis.  The aortic valve dimensionless index is 0.73.  There is no evidence of aortic valve regurgitation. The peak instantaneous gradient of the aortic valve is 5 mmHg. The mean gradient of the aortic valve is 3 mmHg.  Mitral Valve: The mitral valve is normal in structure. There is no evidence of mitral valve stenosis. There is normal mitral valve leaflet mobility. There is no evidence of mitral valve regurgitation.  Tricuspid Valve: The tricuspid valve is structurally normal. There is normal tricuspid valve leaflet mobility. There is trace tricuspid regurgitation.  Pulmonic Valve: The pulmonic valve is structurally normal. There is no indication of pulmonic valve regurgitation.  Pericardium: No pericardial effusion noted.  Aorta: The aortic root is normal.  Pulmonary Artery: The main pulmonary artery is normal in size, and position, with normal bifurcation into the left and right pulmonary arteries. The tricuspid regurgitant velocity is 2.88 m/s, and with an estimated right atrial pressure of 3 mmHg, the estimated pulmonary artery pressure is mildly elevated with the RVSP at 36.2 mmHg.  Systemic Veins: The inferior vena cava appears normal in size.      CONCLUSIONS:  1. The left ventricular systolic function is normal, with a visually estimated ejection fraction of 55%.  2. Basal inferoseptal segment, basal inferolateral segment, and basal inferior segment are abnormal.  3. Abnormal wall motion.  4. There is normal right ventricular global systolic function.  5. Normal sized right ventricle.  6. There is no evidence of mitral valve stenosis.  7. No evidence of mitral valve regurgitation.  8. Trace tricuspid regurgitation is visualized.  9. Aortic valve stenosis is not present.  10. The main pulmonary artery is normal in size, and position, with normal bifurcation into the left and right pulmonary arteries.    QUANTITATIVE DATA SUMMARY:    2D MEASUREMENTS:             Normal Ranges:  Ao Root d:       2.86 cm     (2.0-3.7cm)  LAs:             3.88 cm      (2.7-4.0cm)  IVSd:            1.12 cm     (0.6-1.1cm)  LVPWd:           1.08 cm     (0.6-1.1cm)  LVIDd:           4.02 cm     (3.9-5.9cm)  LVIDs:           2.94 cm  LV Mass Index:   77.7 g/m2  LVEDV Index:     59.87 ml/m2  LV % FS          26.9 %      LEFT ATRIUM:                  Normal Ranges:  LA Vol A4C:        29.5 ml    (22+/-6mL/m2)  LA Vol A2C:        25.4 ml  LA Vol BP:         30.5 ml  LA Vol Index A4C:  15.6ml/m2  LA Vol Index A2C:  13.4 ml/m2  LA Vol Index BP:   16.1 ml/m2  LA Area A4C:       13.8 cm2  LA Area A2C:       11.5 cm2  LA Major Axis A4C: 5.5 cm  LA Major Axis A2C: 4.4 cm  LA Volume Index:   14.9 ml/m2      RIGHT ATRIUM:                 Normal Ranges:  RA Vol A4C:        24.1 ml    (8.3-19.5ml)  RA Vol Index A4C:  12.7 ml/m2  RA Area A4C:       11.1 cm2  RA Major Axis A4C: 4.4 cm      AORTA MEASUREMENTS:         Normal Ranges:  Asc Ao, d:          2.48 cm (2.1-3.4cm)      LV SYSTOLIC FUNCTION:  Normal Ranges:  EF-A4C View:    54 % (>=55%)  EF-A2C View:    54 %  EF-Biplane:     55 %  EF-Visual:      55 %  LV EF Reported: 55 %      LV DIASTOLIC FUNCTION:           Normal Ranges:  MV Peak E:             1.20 m/s  (0.7-1.2 m/s)  MV Peak A:             1.08 m/s  (0.42-0.7 m/s)  E/A Ratio:             1.11      (1.0-2.2)  MV e'                  0.090 m/s (>8.0)  MV lateral e'          0.08 m/s  MV medial e'           0.10 m/s  E/e' Ratio:            13.29     (<8.0)      MITRAL VALVE:          Normal Ranges:  MV DT:        126 msec (150-240msec)      AORTIC VALVE:                     Normal Ranges:  AoV Vmax:                1.16 m/s (<=1.7m/s)  AoV Peak P.4 mmHg (<20mmHg)  AoV Mean PG:             3.0 mmHg (1.7-11.5mmHg)  LVOT Max Aime:            1.03 m/s (<=1.1m/s)  AoV VTI:                 22.30 cm (18-25cm)  LVOT VTI:                16.20 cm  LVOT Diameter:           2.03 cm  (1.8-2.4cm)  AoV Area, VTI:           2.35 cm2 (2.5-5.5cm2)  AoV Area,Vmax:           2.87 cm2  (2.5-4.5cm2)  AoV Dimensionless Index: 0.73      RIGHT VENTRICLE:  RV Basal 3.49 cm  RV Mid   2.65 cm  RV Major 7.3 cm  TAPSE:   22.4 mm  RV s'    0.13 m/s      TRICUSPID VALVE/RVSP:          Normal Ranges:  Peak TR Velocity:     2.88 m/s  RV Syst Pressure:     36 mmHg  (< 30mmHg)  IVC Diam:             1.74 cm      PULMONIC VALVE:          Normal Ranges:  PV Accel Time:  116 msec (>120ms)  PV Max Aime:     1.5 m/s  (0.6-0.9m/s)  PV Max P.8 mmHg      [id number] Faraz Ortega DO  Electronically signed on 2025 at 9:34:06 AM      Wall Scoring        ** Final **      Cath Data:  [facility]     LMT normal.  LAD mild irregularity, 30% mid, wraps around the apex.  LCX mild iregularity.  RCA tortuous, dom.  PDA is 100% ostial.  LVEDP 20-25.  LVEF 40-45%, inferior basal AK.       Successful PTA R % GLENNA 0 reduced to 30%, GLENNA 3 after POBA, with no stent placed due to small caliber vessel and minimal runoff, not enough to warrant stenting.     Concern for drug use considering patient very tachycardic and LVEDP elevated with very small PDA occlusion    Summary of key imaging results from the last 24 hours     See above    Assessment and Plan     Assessment: This is a 45 year old male with a past medical history of tobacco abuse (30 pack years), submandibular abscess, and nephrolithiasis who presented to [facility] with a chief complaint of chest pain, found to have an inferior STEMI, now s/p coronary angiogram with balloon angioplasty to the PDA (right dominant system). This late presenting MI c/b cardiogenic shock iso of ischemia -induced VSD.      original shock call resulted in impella CP placment. Normalized lactate but resultant hemolysis. Stay c/b renal failure, now requiring CVVH. prior c/f GIB, found to be OG trauma and small ulcer     On  patient upgraded to impella 5.5 with reduction in hemolysis. To wean off vent as able and CT surg following for future VSD repair.       Mechanical Ventilation: 4-10  days  Sedation/Analgesia:  none  Restraints: Restraints indicated as alternative therapies have been attempted and have been ineffective.  Restrain with soft wrist restraints and side rails up x4 until medical devices discontinued and/or patient able to participate with plan of care.     Summary for 05/30/25  :  New 2:1 AV block-- stop Amio and wean precedex as able  Will continue to titrate levo to goal MAP 75-85   CVVH to run -2 L /hr    Infection: c/w [place] line holiday, DC micafungin given > 72 hrs BC NGTD  Sputum culture obtained  C diff sample as able     NEUROLOGIC  #Sedation  - fent, and precedex   - Wean sedation as able, CTM for responsiveness  - RAAS goal: 0 after OR     CARDIOVASCULAR  #RPDA STEMI s/p POBA  #C/f Cardiogenic vs mixed shock   #C/f VSD  ::  ECG on admit to OSH with ST elevations in the inferior leads with reciprocal depressions  :: Troponin:  24,085>21,701,19,289,[id number]  :: Lipid panel (05/2025)- cholesterol 220, HDL 33.9, , TG 57  :: TSH 1.51 (05/2025), A1C 7.4  (05/2025)  :: coronary angiogram (05/2025)  which revealed multi vessel CAD [LAD/Lcx/RCA mildly/diffusely diseased,100% RPDA culprit lesion] with PTCA to RPDA with 50% residual stenosis [GLENNA 0->3] (too small to stent)   :: LVEF 45% and LVED 20-25 mmHg via LV Gram   :: TTE  (05/2025) LVEF 55%, wall motion abnormalities, and no significant valvular disease   :: SVO2 59 from CVP port, 92 from PA port (prior to impella)  :: SVO2 57 from CVP port, 80 from PA port (after impella)  :: Off all pressors 5/21  :: restarted Levo 5/26  Plan:  - Trend Lactate, CVP mix venous (w/VBG) and PA mix venous Q8-12H  - Aspirin 81 mg every day, DC Brilinita given no stent / possible future surgery   - Statin: HELD Crestor 40 mg given c/f shock liver  - Beta blocker: hold given shock requiring MCS  - RAAS Inhibition: hold given shock requiring MCS  - Limited TTE repeat: Compared with the prior exam from 5/19/2025 ([facility]) there has  been interval development of a large VSD throuh the inferoseptum with the RV now becoming large with significantly reduced function.   - CT surgery following for future possible VSD repair   - OR with Ct surg on 5/28 for Impella 5.5 placement     # NSVT  # PVC  # 2:1 AV block   :: started 5/24 PM, longest 20 sec run  :: VT ikely iso sub-optimal impella position  :: AV block possibly iso Vsd injury to conduction pathway   :: repositioned under US 5/25 AM with less ectopy burden after   - DC IV amio 5/30 and monitor Tele     PULMONARY  #AHRF   :: Prior to intubation satting 92% on RA  :: CXR with pulmonary edema vs PNA  :: CTA CAP- mild interstitial pulmonary edema   :: , WBC 34.1 on admission   Plan:  - Infectious workup/abx as below   - Ranger guided therapy   - Wean Ventilator when more responsive      RENAL/  # BENY, Anuric   # HAGMA   # Volume overload iso VSD   :: Baseline Cr 0.8-1.0, 1.61 on admit, 1.85 on transport - pleatu but Cr still > 10  Plan:  - Likely hemodynamic mediated , vanco, LV gram  - Brief SLED 5/23, 5/25, 5/27   - Nephrology following   - CVVH started 4/28   - aiming 2 L out   - Avoid hypotension, rapid fluctuations in BP  - Ranger guided volume resuscitation/ diuresis   - Daily RFP        GASTROINTESTINAL  #GI PPX  # Traumatic OG placement , small superficial ulcer  # Ileus   # jaundice, Fever  # hyperbilirubinemia, both direct and indirect   - IV PPI BID for 2 months for esophageal ulcer   - Aggressive bowel reg: miralx QID, Senna 2 tab BID, Prn enema and suppository   - S/p EGD w/ GI 5/7 w/o concern for overtly large Gib   - RUQ 5/28 negative for cholecystis   - look to start trickle feed soon      ENDOCRINE  #T2DM   :: A1C 7.4  (05/2025)  Plan:  - NPO   - Q4H POC glucose, SSI while NPO  - Titrate -180  - Will need diabetes education prior to discharge      HEME/ONC  #thrombocytopenia  # Anemia    # Hemolysis iso impella use, improving    - C/f some degree of hemolysis   - Hepatic  function, LHD, hapto daily  - Heparin products DC 5/23, restarted 5/26   - Impella Purg: Hep/D5W   - Systemic: Heparin (follow ACT)  - Anti-Plt Factor 4 AB / Serotonin release assay negative       INFECTIOUS DISEASE  #C/f Mixed shock   #Leukocytosis  # Fever  Unclear if Fever is from infection or general cardiogenic shock at this time   :: S/p Doxy/CTX 05/19  :: blood cultures sent 05/19 x 2 sets   :: Rising WBC despite 8 days of zosyn   Work up:  - MRSA probe: -  - Bcx2: NGTD  - UA: non-infectious   - Sputum: pending  - C diff: pending sample  Plan:  -Dc zosyn; Start Vanco, Adolfo, and micafungin 5/26   - DC emma 5/30 given negative BC  - Follow infectious labs  - ID following      MSK/DERM  SWAPNIL         ICU Check List       ICU Liberation: Intervention:   Assess, Prevent, Manage Pain 0 - No pain fentanyl gtt   Both SAT and SBT [] SAT  [] SBT 30-60 min [] Extubate to NC  [] Extubate to NIV  [] Discuss Trach   Choice of analgesia and sedation Romero Agitation Sedation Scale (RASS): Light sedation Fentanyl, Precedex, and Versed  -1 to -2   Delirium: Assess, prevent and manage  CAM ICU Negative Assess Qshift   Early Mobility and Exercise Proceed with mobilization - No exclusion criteria met [] PT /OT consult   Family Engagement and Empowerment [x]Family updated []SW consult     F: PRN for euvolemia; swan guided therapy   E: PRN K>4, Mg>2, P>3   N: NPO  A: PIV, RIJ, left radial A line      Oxygen: MV 30%/500/14/5  Abx: Vanco, Adolfo, Emma   Gtts: levo, fent, prcedex     DVT ppx: Lovenox  GI ppx: IV PPI     Code Status: full (confirmed on admit)  Surrogate Medical Decision-maker:    Ashley Hogan (Mother) [phone]         Introducer 05/19/25 Internal jugular Right (Active)   Placement Date/Time: 05/19/25 2330   Hand Hygiene Completed: Yes  Location: Internal jugular  Orientation: Right  Placed by: CG/AL  Placement Verified: X-ray;Pressure tracing changes;Transduced waveform   Number of days: 0       Arterial Line  05/19/25 Left Radial (Active)   Placement Date/Time: 05/19/25 2200   Orientation: Left  Location: Radial  Local Anesthetic: Injectable  Technique: Anatomical landmarks  Insertion attempts: 1  Securement Method: Sutured  Patient Tolerance: Fair   Number of days: 0       Arterial Sheath 05/20/25 0529 6 Fr. Right Femoral (Active)   Placement Date/Time: 05/20/25 0529   Sheath Size: (c) 6 Fr.  Line Orientation: Right  Sheath Insertion Site: Femoral   Number of days: 0       Arterial Sheath 05/20/25 0532 Other (Comment) Right Femoral (Active)   Placement Date/Time: 05/20/25 0532   Sheath Size: (c) Other (Comment)  Line Orientation: Right  Sheath Insertion Site: Femoral   Number of days: 0       Arterial Sheath 05/20/25 0559 6 Fr. Left Femoral (Active)   Placement Date/Time: 05/20/25 0559   Sheath Size: 6 Fr.  Line Orientation: Left  Sheath Insertion Site: Femoral   Number of days: 0       Pulmonary Artery Catheter Internal jugular (Active)   Placement Date/Time: 05/19/25 2330   Hand Hygiene Performed Prior to CVC Insertion: Yes  Site Prep: Alcohol;Chlorhexidine ;Usual sterile procedure followed  Site Prep Agent has Completely Dried Before Insertion: Yes  All 5 Sterile Barriers Used (Glove...   Number of days: 0       ETT  7.5 mm (Active)   Placement Date/Time: 05/19/25 2305   Hand Hygiene Completed: Yes  ETT Type: ETT - single  Single Lumen Tube Size: 7.5 mm  Cuffed: Yes  Location: Oral  Airway Insertion Attempts: 1  Placement Verification: Auscultation;Capnometry;Fiberoptic visualizati...   Number of days: 0       Urethral Catheter 16 Fr. (Active)   Placement Date/Time: 05/20/25 0200   Hand Hygiene Completed: Yes  Tube Size (Fr.): 16 Fr.  Catheter Balloon Size: 10 mL  Urine Returned: Yes   Number of days: 0       NG/OG/Feeding Tube OG - Carlton sump 16 Fr Center mouth (Active)   Placement Date/Time: 05/19/25 2310   Placed by: DM  Hand Hygiene Completed: Yes  Type of Tube: NG/OG Tube  Tube Length: 55 cm  Tube Type: OG -  Lynette dawson  NG/OG Tube Size: 16 Fr  Tube Location: Center mouth   Number of days: 0       Social:  Code: Full Code    Disposition: NABOR Leong DO   05/30/25 at 11:38 AM     Disclaimer: Documentation completed with the information available at the time of input. The times in the chart may not be reflective of actual patient care times, interventions, or procedures. Documentation occurs after the physical care of the patient.           [1] aspirin, 81 mg, oral, Daily  insulin lispro, 0-5 Units, subcutaneous, q4h  meropenem, 2 g, intravenous, q12h  oxygen, , inhalation, Continuous - Inhalation  pantoprazole, 40 mg, intravenous, BID  perflutren lipid microspheres, 0.5-10 mL of dilution, intravenous, Once in imaging  perflutren protein A microsphere, 0.5 mL, intravenous, Once in imaging  polyethylene glycol, 17 g, oral, 4x daily  sennosides, 2 tablet, oral, BID  vancomycin, 750 mg, intravenous, q12h     [2] dexmedeTOMIDine, 0.1-1.5 mcg/kg/hr (Dosing Weight), Last Rate: 0.6 mcg/kg/hr (05/30/25 1100)  fentaNYL,  mcg/hr, Last Rate: 100 mcg/hr (05/30/25 1133)  heparin, 0-2,000 Units/hr, Last Rate: 800 Units/hr (05/30/25 1100)  heparin Impella Purge 25 units/mL in 500 mL D5W, 10 mL/hr, Last Rate: 10 mL/hr (05/29/25 1600)  midazolam, 0.5-20 mg/hr, Last Rate: Stopped (05/28/25 1415)  norepinephrine, 0.01-1 mcg/kg/min (Dosing Weight), Last Rate: 0.05 mcg/kg/min (05/30/25 1100)  PrismaSol 4/2.5, 24 mL/kg/hr (Dosing Weight), Last Rate: 24 mL/kg/hr (05/28/25 2237)     [3] PRN medications: alteplase, bisacodyl, dextrose, dextrose, glucagon, glucagon, vancomycin

## 2025-05-30 NOTE — PROGRESS NOTES
Alpesh Elena is a 45 y.o. male on day 10 of admission presenting with STEMI (ST elevation myocardial infarction) (Multi).      Subjective   On CVVH  No sign of renal recovery.  Anuric,   No new oxygen requirements.  Hemodynamically unstable, on levo.       Objective          Vitals 24HR  Heart Rate:  [48-80]   Temp:  [34.7 °C (94.5 °F)-36.5 °C (97.7 °F)]   Resp:  [0-22]   Weight:  [91 kg (200 lb 9.9 oz)]   SpO2:  [95 %-100 %]         Intake/Output last 3 Shifts:    Intake/Output Summary (Last 24 hours) at 5/30/2025 1523  Last data filed at 5/30/2025 1500  Gross per 24 hour   Intake 2076.53 ml   Output 1466 ml   Net 610.53 ml     On exam:  Sedated and intubated  Impella  Vent 30%  Trace edema      Assessment & Plan  STEMI (ST elevation myocardial infarction) (Multi)    Cardiogenic shock (Multi)    Ventricular septal defect ([facility])    Alpesh Elena is a 45 y.o. year old male patient admitted on 5/20/2025 with: Cardiogenic shock, impella, VSD/ Inferior LV wall rupture . Nephrology following for BENY.    #BENY-D  - Baseline Cr 0.9-1  - UA- RBCs, 2+ blood, trace proteins  - CT abd- no hydro or obstructions  - Etiology of BENY: likely ATN from contrast, septic shock vs cardiogenic shock  - SLED 5/23, 5/25 and 05/27-started CVVH on 05/28    #HAGMA and metabolic alkalosis  #HFrEF    #lactic acidosis-improved  #sepsis    #Hemolysis- mechanical likely on impella     ** Recommendations:  -Continue CVVH.Goal net negative 1 L x 24 hr  -Daily bladder scan.  - Dose all treatment to CVVH  -Replace Mg, Phos and Ca PRN.  - Avoid hypotension, further contrast and nephrotoxins if possible.  - Strict I/Os  - Daily BMP.       Hailee Parrish MD  Nephrology fellow.

## 2025-05-31 LAB
ACANTHOCYTES BLD QL SMEAR: ABNORMAL
ACT BLD: 166 SEC (ref 83–199)
ACT BLD: 168 SEC (ref 83–199)
ACT BLD: 168 SEC (ref 83–199)
ACT BLD: 173 SEC (ref 83–199)
ACT BLD: 174 SEC (ref 83–199)
ACT BLD: 178 SEC (ref 83–199)
ACT BLD: 187 SEC (ref 83–199)
ACT BLD: 190 SEC (ref 83–199)
ACT BLD: <68 SEC (ref 83–199)
ALBUMIN SERPL BCP-MCNC: 2.6 G/DL (ref 3.4–5)
ALBUMIN SERPL BCP-MCNC: 2.7 G/DL (ref 3.4–5)
ALP SERPL-CCNC: 197 U/L (ref 33–120)
ALT SERPL W P-5'-P-CCNC: 131 U/L (ref 10–52)
ANION GAP SERPL CALC-SCNC: 13 MMOL/L (ref 10–20)
ANION GAP SERPL CALC-SCNC: 9 MMOL/L (ref 10–20)
AST SERPL W P-5'-P-CCNC: 103 U/L (ref 9–39)
BASOPHILS # BLD MANUAL: 0 X10*3/UL (ref 0–0.1)
BASOPHILS # BLD MANUAL: 0 X10*3/UL (ref 0–0.1)
BASOPHILS NFR BLD MANUAL: 0 %
BASOPHILS NFR BLD MANUAL: 0 %
BILIRUB DIRECT SERPL-MCNC: 16 MG/DL (ref 0–0.3)
BILIRUB SERPL-MCNC: 21 MG/DL (ref 0–1.2)
BLASTS # BLD MANUAL: 0 X10*3/UL
BLASTS NFR BLD MANUAL: 0 %
BUN SERPL-MCNC: 30 MG/DL (ref 6–23)
BUN SERPL-MCNC: 31 MG/DL (ref 6–23)
BURR CELLS BLD QL SMEAR: ABNORMAL
CALCIUM SERPL-MCNC: 8 MG/DL (ref 8.6–10.6)
CALCIUM SERPL-MCNC: 8.1 MG/DL (ref 8.6–10.6)
CHLORIDE SERPL-SCNC: 100 MMOL/L (ref 98–107)
CHLORIDE SERPL-SCNC: 99 MMOL/L (ref 98–107)
CO2 SERPL-SCNC: 25 MMOL/L (ref 21–32)
CO2 SERPL-SCNC: 28 MMOL/L (ref 21–32)
CREAT SERPL-MCNC: 2.35 MG/DL (ref 0.5–1.3)
CREAT SERPL-MCNC: 2.52 MG/DL (ref 0.5–1.3)
EGFRCR SERPLBLD CKD-EPI 2021: 31 ML/MIN/1.73M*2
EGFRCR SERPLBLD CKD-EPI 2021: 34 ML/MIN/1.73M*2
EOSINOPHIL # BLD MANUAL: 0 X10*3/UL (ref 0–0.7)
EOSINOPHIL # BLD MANUAL: 0 X10*3/UL (ref 0–0.7)
EOSINOPHIL NFR BLD MANUAL: 0 %
EOSINOPHIL NFR BLD MANUAL: 0 %
ERYTHROCYTE [DISTWIDTH] IN BLOOD BY AUTOMATED COUNT: 23.6 % (ref 11.5–14.5)
ERYTHROCYTE [DISTWIDTH] IN BLOOD BY AUTOMATED COUNT: 25.2 % (ref 11.5–14.5)
GLUCOSE BLD MANUAL STRIP-MCNC: 164 MG/DL (ref 74–99)
GLUCOSE BLD MANUAL STRIP-MCNC: 175 MG/DL (ref 74–99)
GLUCOSE BLD MANUAL STRIP-MCNC: 178 MG/DL (ref 74–99)
GLUCOSE BLD MANUAL STRIP-MCNC: 179 MG/DL (ref 74–99)
GLUCOSE BLD MANUAL STRIP-MCNC: 183 MG/DL (ref 74–99)
GLUCOSE BLD MANUAL STRIP-MCNC: 186 MG/DL (ref 74–99)
GLUCOSE BLD MANUAL STRIP-MCNC: 197 MG/DL (ref 74–99)
GLUCOSE BLD MANUAL STRIP-MCNC: 200 MG/DL (ref 74–99)
GLUCOSE SERPL-MCNC: 165 MG/DL (ref 74–99)
GLUCOSE SERPL-MCNC: 184 MG/DL (ref 74–99)
HAPTOGLOB SERPL NEPH-MCNC: <30 MG/DL (ref 30–200)
HCT VFR BLD AUTO: 24.1 % (ref 41–52)
HCT VFR BLD AUTO: 27.7 % (ref 41–52)
HGB BLD-MCNC: 8.4 G/DL (ref 13.5–17.5)
HGB BLD-MCNC: 9.2 G/DL (ref 13.5–17.5)
IMM GRANULOCYTES # BLD AUTO: 0.78 X10*3/UL (ref 0–0.7)
IMM GRANULOCYTES # BLD AUTO: 1.43 X10*3/UL (ref 0–0.7)
IMM GRANULOCYTES NFR BLD AUTO: 4.6 % (ref 0–0.9)
IMM GRANULOCYTES NFR BLD AUTO: 6.3 % (ref 0–0.9)
LDH SERPL L TO P-CCNC: 1035 U/L (ref 84–246)
LYMPHOCYTES # BLD MANUAL: 0.55 X10*3/UL (ref 1.2–4.8)
LYMPHOCYTES # BLD MANUAL: 1.35 X10*3/UL (ref 1.2–4.8)
LYMPHOCYTES NFR BLD MANUAL: 3.3 %
LYMPHOCYTES NFR BLD MANUAL: 6 %
MAGNESIUM SERPL-MCNC: 2.75 MG/DL (ref 1.6–2.4)
MAGNESIUM SERPL-MCNC: 2.95 MG/DL (ref 1.6–2.4)
MCH RBC QN AUTO: 31.9 PG (ref 26–34)
MCH RBC QN AUTO: 32.2 PG (ref 26–34)
MCHC RBC AUTO-ENTMCNC: 33.2 G/DL (ref 32–36)
MCHC RBC AUTO-ENTMCNC: 34.9 G/DL (ref 32–36)
MCV RBC AUTO: 92 FL (ref 80–100)
MCV RBC AUTO: 96 FL (ref 80–100)
METAMYELOCYTES # BLD MANUAL: 0.29 X10*3/UL
METAMYELOCYTES NFR BLD MANUAL: 1.7 %
MONOCYTES # BLD MANUAL: 0.42 X10*3/UL (ref 0.1–1)
MONOCYTES # BLD MANUAL: 0.68 X10*3/UL (ref 0.1–1)
MONOCYTES NFR BLD MANUAL: 2.5 %
MONOCYTES NFR BLD MANUAL: 3 %
MYELOCYTES # BLD MANUAL: 0.13 X10*3/UL
MYELOCYTES # BLD MANUAL: 0.23 X10*3/UL
MYELOCYTES NFR BLD MANUAL: 0.8 %
MYELOCYTES NFR BLD MANUAL: 1 %
NEUTROPHILS # BLD MANUAL: 15.41 X10*3/UL (ref 1.2–7.7)
NEUTROPHILS # BLD MANUAL: 20.03 X10*3/UL (ref 1.2–7.7)
NEUTS BAND # BLD MANUAL: 0 X10*3/UL (ref 0–0.7)
NEUTS BAND # BLD MANUAL: 0.45 X10*3/UL (ref 0–0.7)
NEUTS BAND NFR BLD MANUAL: 0 %
NEUTS BAND NFR BLD MANUAL: 2 %
NEUTS SEG # BLD MANUAL: 15.41 X10*3/UL (ref 1.2–7)
NEUTS SEG # BLD MANUAL: 19.58 X10*3/UL (ref 1.2–7)
NEUTS SEG NFR BLD MANUAL: 87 %
NEUTS SEG NFR BLD MANUAL: 91.7 %
NRBC BLD MANUAL-RTO: 0 % (ref 0–0)
NRBC BLD-RTO: 5.3 /100 WBCS (ref 0–0)
NRBC BLD-RTO: 6.6 /100 WBCS (ref 0–0)
PHOSPHATE SERPL-MCNC: 2.3 MG/DL (ref 2.5–4.9)
PHOSPHATE SERPL-MCNC: 3.2 MG/DL (ref 2.5–4.9)
PLASMA CELLS # BLD MANUAL: 0 X10*3/UL
PLASMA CELLS NFR BLD MANUAL: 0 %
PLATELET # BLD AUTO: 104 X10*3/UL (ref 150–450)
PLATELET # BLD AUTO: 70 X10*3/UL (ref 150–450)
POLYCHROMASIA BLD QL SMEAR: ABNORMAL
POLYCHROMASIA BLD QL SMEAR: ABNORMAL
POTASSIUM SERPL-SCNC: 4.1 MMOL/L (ref 3.5–5.3)
POTASSIUM SERPL-SCNC: 4.1 MMOL/L (ref 3.5–5.3)
PROMYELOCYTES # BLD MANUAL: 0 X10*3/UL
PROMYELOCYTES NFR BLD MANUAL: 0 %
PROT SERPL-MCNC: 4.9 G/DL (ref 6.4–8.2)
RBC # BLD AUTO: 2.61 X10*6/UL (ref 4.5–5.9)
RBC # BLD AUTO: 2.88 X10*6/UL (ref 4.5–5.9)
RBC MORPH BLD: ABNORMAL
RBC MORPH BLD: ABNORMAL
SCHISTOCYTES BLD QL SMEAR: ABNORMAL
SCHISTOCYTES BLD QL SMEAR: ABNORMAL
SODIUM SERPL-SCNC: 133 MMOL/L (ref 136–145)
SODIUM SERPL-SCNC: 133 MMOL/L (ref 136–145)
TARGETS BLD QL SMEAR: ABNORMAL
TARGETS BLD QL SMEAR: ABNORMAL
TOTAL CELLS COUNTED BLD: 100
TOTAL CELLS COUNTED BLD: 120
VARIANT LYMPHS # BLD MANUAL: 0 X10*3/UL (ref 0–0.5)
VARIANT LYMPHS # BLD MANUAL: 0.23 X10*3/UL (ref 0–0.5)
VARIANT LYMPHS NFR BLD: 0 %
VARIANT LYMPHS NFR BLD: 1 %
WBC # BLD AUTO: 16.8 X10*3/UL (ref 4.4–11.3)
WBC # BLD AUTO: 22.5 X10*3/UL (ref 4.4–11.3)

## 2025-05-31 PROCEDURE — 80069 RENAL FUNCTION PANEL: CPT | Mod: CCI

## 2025-05-31 PROCEDURE — 99291 CRITICAL CARE FIRST HOUR: CPT

## 2025-05-31 PROCEDURE — 2500000004 HC RX 250 GENERAL PHARMACY W/ HCPCS (ALT 636 FOR OP/ED): Mod: JZ

## 2025-05-31 PROCEDURE — 5A1D90Z PERFORMANCE OF URINARY FILTRATION, CONTINUOUS, GREATER THAN 18 HOURS PER DAY: ICD-10-PCS | Performed by: INTERNAL MEDICINE

## 2025-05-31 PROCEDURE — 83010 ASSAY OF HAPTOGLOBIN QUANT: CPT

## 2025-05-31 PROCEDURE — 2500000004 HC RX 250 GENERAL PHARMACY W/ HCPCS (ALT 636 FOR OP/ED)

## 2025-05-31 PROCEDURE — 84075 ASSAY ALKALINE PHOSPHATASE: CPT

## 2025-05-31 PROCEDURE — 82947 ASSAY GLUCOSE BLOOD QUANT: CPT

## 2025-05-31 PROCEDURE — 83735 ASSAY OF MAGNESIUM: CPT

## 2025-05-31 PROCEDURE — 99233 SBSQ HOSP IP/OBS HIGH 50: CPT | Performed by: STUDENT IN AN ORGANIZED HEALTH CARE EDUCATION/TRAINING PROGRAM

## 2025-05-31 PROCEDURE — 2500000005 HC RX 250 GENERAL PHARMACY W/O HCPCS

## 2025-05-31 PROCEDURE — 85347 COAGULATION TIME ACTIVATED: CPT

## 2025-05-31 PROCEDURE — 85007 BL SMEAR W/DIFF WBC COUNT: CPT

## 2025-05-31 PROCEDURE — 94003 VENT MGMT INPAT SUBQ DAY: CPT

## 2025-05-31 PROCEDURE — 90937 HEMODIALYSIS REPEATED EVAL: CPT

## 2025-05-31 PROCEDURE — 82374 ASSAY BLOOD CARBON DIOXIDE: CPT

## 2025-05-31 PROCEDURE — 2500000002 HC RX 250 W HCPCS SELF ADMINISTERED DRUGS (ALT 637 FOR MEDICARE OP, ALT 636 FOR OP/ED)

## 2025-05-31 PROCEDURE — 37799 UNLISTED PX VASCULAR SURGERY: CPT

## 2025-05-31 PROCEDURE — 83615 LACTATE (LD) (LDH) ENZYME: CPT

## 2025-05-31 PROCEDURE — 2500000001 HC RX 250 WO HCPCS SELF ADMINISTERED DRUGS (ALT 637 FOR MEDICARE OP)

## 2025-05-31 PROCEDURE — 2020000001 HC ICU ROOM DAILY

## 2025-05-31 PROCEDURE — 85027 COMPLETE CBC AUTOMATED: CPT

## 2025-05-31 PROCEDURE — 94762 N-INVAS EAR/PLS OXIMTRY CONT: CPT

## 2025-05-31 PROCEDURE — 84100 ASSAY OF PHOSPHORUS: CPT

## 2025-05-31 PROCEDURE — 90945 DIALYSIS ONE EVALUATION: CPT | Performed by: INTERNAL MEDICINE

## 2025-05-31 RX ORDER — MIDAZOLAM HYDROCHLORIDE 1 MG/ML
INJECTION INTRAMUSCULAR; INTRAVENOUS
Status: COMPLETED
Start: 2025-05-31 | End: 2025-05-31

## 2025-05-31 RX ORDER — MIDAZOLAM HYDROCHLORIDE 1 MG/ML
2 INJECTION INTRAMUSCULAR; INTRAVENOUS ONCE
Status: COMPLETED | OUTPATIENT
Start: 2025-05-31 | End: 2025-05-31

## 2025-05-31 RX ORDER — FENTANYL CITRATE 50 UG/ML
INJECTION, SOLUTION INTRAMUSCULAR; INTRAVENOUS
Status: DISCONTINUED
Start: 2025-05-31 | End: 2025-05-31 | Stop reason: WASHOUT

## 2025-05-31 RX ADMIN — INSULIN LISPRO 1 UNITS: 100 INJECTION, SOLUTION INTRAVENOUS; SUBCUTANEOUS at 12:07

## 2025-05-31 RX ADMIN — DEXMEDETOMIDINE HYDROCHLORIDE 0.5 MCG/KG/HR: 400 INJECTION INTRAVENOUS at 20:16

## 2025-05-31 RX ADMIN — INSULIN LISPRO 1 UNITS: 100 INJECTION, SOLUTION INTRAVENOUS; SUBCUTANEOUS at 16:12

## 2025-05-31 RX ADMIN — POLYETHYLENE GLYCOL 3350 17 G: 17 POWDER, FOR SOLUTION ORAL at 20:16

## 2025-05-31 RX ADMIN — THIAMINE HYDROCHLORIDE 500 MG: 100 INJECTION, SOLUTION INTRAMUSCULAR; INTRAVENOUS at 09:38

## 2025-05-31 RX ADMIN — SENNOSIDES 17.2 MG: 8.6 TABLET, FILM COATED ORAL at 20:16

## 2025-05-31 RX ADMIN — CALCIUM CHLORIDE, MAGNESIUM CHLORIDE, DEXTROSE MONOHYDRATE, LACTIC ACID, SODIUM CHLORIDE, SODIUM BICARBONATE AND POTASSIUM CHLORIDE 24 ML/KG/HR: 3.68; 3.05; 22; 5.4; 6.46; 3.09; .314 INJECTION INTRAVENOUS at 10:41

## 2025-05-31 RX ADMIN — DEXMEDETOMIDINE HYDROCHLORIDE 0.5 MCG/KG/HR: 400 INJECTION INTRAVENOUS at 11:03

## 2025-05-31 RX ADMIN — INSULIN LISPRO 1 UNITS: 100 INJECTION, SOLUTION INTRAVENOUS; SUBCUTANEOUS at 09:00

## 2025-05-31 RX ADMIN — INSULIN LISPRO 1 UNITS: 100 INJECTION, SOLUTION INTRAVENOUS; SUBCUTANEOUS at 00:10

## 2025-05-31 RX ADMIN — NOREPINEPHRINE BITARTRATE 0.06 MCG/KG/MIN: 8 INJECTION, SOLUTION INTRAVENOUS at 11:03

## 2025-05-31 RX ADMIN — POLYETHYLENE GLYCOL 3350 17 G: 17 POWDER, FOR SOLUTION ORAL at 12:07

## 2025-05-31 RX ADMIN — DEXMEDETOMIDINE HYDROCHLORIDE 0.5 MCG/KG/HR: 400 INJECTION INTRAVENOUS at 02:12

## 2025-05-31 RX ADMIN — HEPARIN SODIUM 10 ML/HR: 10000 INJECTION, SOLUTION INTRAVENOUS; SUBCUTANEOUS at 20:16

## 2025-05-31 RX ADMIN — Medication 100 MCG/HR: at 11:03

## 2025-05-31 RX ADMIN — ASPIRIN 81 MG: 81 TABLET, CHEWABLE ORAL at 09:24

## 2025-05-31 RX ADMIN — Medication 30 PERCENT: at 20:41

## 2025-05-31 RX ADMIN — CALCIUM CHLORIDE, MAGNESIUM CHLORIDE, DEXTROSE MONOHYDRATE, LACTIC ACID, SODIUM CHLORIDE, SODIUM BICARBONATE AND POTASSIUM CHLORIDE 24 ML/KG/HR: 3.68; 3.05; 22; 5.4; 6.46; 3.09; .314 INJECTION INTRAVENOUS at 19:07

## 2025-05-31 RX ADMIN — INSULIN LISPRO 1 UNITS: 100 INJECTION, SOLUTION INTRAVENOUS; SUBCUTANEOUS at 20:16

## 2025-05-31 RX ADMIN — SENNOSIDES 17.2 MG: 8.6 TABLET, FILM COATED ORAL at 09:24

## 2025-05-31 RX ADMIN — Medication 100 MCG/HR: at 02:59

## 2025-05-31 RX ADMIN — CALCIUM CHLORIDE, MAGNESIUM CHLORIDE, DEXTROSE MONOHYDRATE, LACTIC ACID, SODIUM CHLORIDE, SODIUM BICARBONATE AND POTASSIUM CHLORIDE 24 ML/KG/HR: 3.68; 3.05; 22; 5.4; 6.46; 3.09; .314 INJECTION INTRAVENOUS at 10:40

## 2025-05-31 RX ADMIN — Medication 125 MCG/HR: at 22:10

## 2025-05-31 RX ADMIN — MIDAZOLAM IN SODIUM CHLORIDE 1 MG/HR: 1 INJECTION INTRAVENOUS at 03:09

## 2025-05-31 RX ADMIN — Medication 30 PERCENT: at 20:00

## 2025-05-31 RX ADMIN — POLYETHYLENE GLYCOL 3350 17 G: 17 POWDER, FOR SOLUTION ORAL at 16:12

## 2025-05-31 RX ADMIN — SODIUM CHLORIDE 2 G: 9 INJECTION, SOLUTION INTRAVENOUS at 09:38

## 2025-05-31 RX ADMIN — CALCIUM CHLORIDE, MAGNESIUM CHLORIDE, DEXTROSE MONOHYDRATE, LACTIC ACID, SODIUM CHLORIDE, SODIUM BICARBONATE AND POTASSIUM CHLORIDE 24 ML/KG/HR: 3.68; 3.05; 22; 5.4; 6.46; 3.09; .314 INJECTION INTRAVENOUS at 10:42

## 2025-05-31 RX ADMIN — PANTOPRAZOLE SODIUM 40 MG: 40 INJECTION, POWDER, FOR SOLUTION INTRAVENOUS at 20:16

## 2025-05-31 RX ADMIN — CALCIUM CHLORIDE, MAGNESIUM CHLORIDE, DEXTROSE MONOHYDRATE, LACTIC ACID, SODIUM CHLORIDE, SODIUM BICARBONATE AND POTASSIUM CHLORIDE 24 ML/KG/HR: 3.68; 3.05; 22; 5.4; 6.46; 3.09; .314 INJECTION INTRAVENOUS at 03:04

## 2025-05-31 RX ADMIN — PANTOPRAZOLE SODIUM 40 MG: 40 INJECTION, POWDER, FOR SOLUTION INTRAVENOUS at 09:24

## 2025-05-31 RX ADMIN — POLYETHYLENE GLYCOL 3350 17 G: 17 POWDER, FOR SOLUTION ORAL at 06:41

## 2025-05-31 RX ADMIN — MIDAZOLAM HYDROCHLORIDE 2 MG: 1 INJECTION, SOLUTION INTRAMUSCULAR; INTRAVENOUS at 03:09

## 2025-05-31 RX ADMIN — INSULIN LISPRO 1 UNITS: 100 INJECTION, SOLUTION INTRAVENOUS; SUBCUTANEOUS at 04:02

## 2025-05-31 RX ADMIN — MIDAZOLAM HYDROCHLORIDE 2 MG: 1 INJECTION INTRAMUSCULAR; INTRAVENOUS at 03:09

## 2025-05-31 RX ADMIN — Medication 30 PERCENT: at 08:20

## 2025-05-31 RX ADMIN — SODIUM CHLORIDE 2 G: 9 INJECTION, SOLUTION INTRAVENOUS at 22:10

## 2025-05-31 ASSESSMENT — PAIN - FUNCTIONAL ASSESSMENT
PAIN_FUNCTIONAL_ASSESSMENT: CPOT (CRITICAL CARE PAIN OBSERVATION TOOL)
PAIN_FUNCTIONAL_ASSESSMENT: 0-10
PAIN_FUNCTIONAL_ASSESSMENT: CPOT (CRITICAL CARE PAIN OBSERVATION TOOL)
PAIN_FUNCTIONAL_ASSESSMENT: CPOT (CRITICAL CARE PAIN OBSERVATION TOOL)

## 2025-05-31 NOTE — CARE PLAN
The patient's goals for the shift include get better    The clinical goals for the shift include pt temp will be maintained    Attempted to wean precedex due to HR drop to 43, pt got agitated, had to increase precedex. HR stable at 64 now.

## 2025-05-31 NOTE — PROCEDURES
I evaluated the patient during while s/he was receiving CVVH    BP: 99/73 BFR: 200  Replacement fluid: Prismasol 4K/2.5Ca  Flow rate: 700/pump/700/filter/700    Plan: Continue CVVH until BP stable enough for IHD or renal function recovers.

## 2025-05-31 NOTE — PROGRESS NOTES
"Cardiac Intensive Care - Daily Progress Note   Subjective    Alpesh Elena is a 45 y.o. year old male patient admitted on 5/20/2025 with following ICU needs: Cardiogenic shock, impella, VSD/ Inferior LV wall rupture    Interval History:  ON: after suctioning, patient went into sinus tachycardia, and short bouts of NSVT. Patient given versed bolus and gtt started with resolution of arhythmia/agitation . Additionally, patient noted to be hypothermic 34.3C, bed warmer placed. Seen resting comfortably on the vent this AM.     Meds    Scheduled medications  Scheduled Medications[1]  Continuous medications  Continuous Medications[2]  PRN medications  PRN Medications[3]     Objective    Blood pressure 94/87, pulse 55, temperature 35.6 °C (96.1 °F), resp. rate 16, height 1.727 m (5' 8\"), weight 91.8 kg (202 lb 6.1 oz), SpO2 98%.     Physical Exam  Constitutional:       Comments: Intubated, eye opens to sternal rub    Cardiovascular:      Rate and Rhythm: Normal rate and regular rhythm.      Heart sounds: Murmur heard.      Comments: Holosystolic murmer, best heard at 4th intercostal rib. Very weak vs absent b/l PT and pedal.   Pulmonary:      Effort: Pulmonary effort is normal.      Breath sounds: Normal breath sounds. No wheezing or rales.   Abdominal:      General: Abdomen is flat. Bowel sounds are normal. There is no distension.      Palpations: Abdomen is soft.   Musculoskeletal:      Right lower leg: No edema.      Left lower leg: No edema.   Skin:     General: Skin is warm.   Neurological:      Comments: Sedated             Intake/Output Summary (Last 24 hours) at 5/31/2025 0756  Last data filed at 5/31/2025 0700  Gross per 24 hour   Intake 2439.45 ml   Output 1972 ml   Net 467.45 ml     Labs:   Results from last 72 hours   Lab Units 05/31/25  0204 05/30/25  1837 05/30/25  0021 05/30/25  0020   SODIUM mmol/L 133* 131*  --  130*   POTASSIUM mmol/L 4.1 4.1  --  5.4*   CHLORIDE mmol/L 99 98  --  97*   CO2 mmol/L 25 24 "  --  23   BUN mg/dL 31* 33*  --  40*   CREATININE mg/dL 2.35* 2.45*  --  3.15*   GLUCOSE mg/dL 184* 208*  --  185*   CALCIUM mg/dL 8.0* 7.9*  --  7.7*   ANION GAP mmol/L 13 13  --  15   EGFR mL/min/1.73m*2 34* 32*  --  24*   PHOSPHORUS mg/dL 3.2 3.9 3.9  --       Results from last 72 hours   Lab Units 05/31/25  0204 05/30/25  1837 05/30/25  0020 05/29/25  0641 05/29/25  0158   WBC AUTO x10*3/uL 22.5* 22.2* 27.7*   < > 29.2*   HEMOGLOBIN g/dL 9.2* 7.4* 8.1*   < > 7.0*   HEMATOCRIT % 27.7* 21.3* 22.6*   < > 20.1*   PLATELETS AUTO x10*3/uL 104* 92* 201   < > 137*   NEUTROS PCT AUTO %  --   --   --   --  75.0   LYMPHO PCT MAN % 6.0 3.3 5.1   < >  --    LYMPHS PCT AUTO %  --   --   --   --  7.2   MONO PCT MAN % 3.0 4.1 1.7   < >  --    MONOS PCT AUTO %  --   --   --   --  6.5   EOSINO PCT MAN % 0.0 0.0 0.8   < >  --    EOS PCT AUTO %  --   --   --   --  0.3    < > = values in this interval not displayed.      Results from last 72 hours   Lab Units 05/30/25  0024 05/28/25 2256 05/28/25 1832   POCT PH, ARTERIAL pH 7.38 7.38 7.31*   POCT PCO2, ARTERIAL mm Hg 35* 29* 37*   POCT PO2, ARTERIAL mm Hg 77* 87 362*   POCT SO2, ARTERIAL % 97 97 100     Results from last 72 hours   Lab Units 05/29/25 0428 05/28/25 2258   POCT PH, VENOUS pH 7.38 7.35   POCT PCO2, VENOUS mm Hg 36* 21*   POCT PO2, VENOUS mm Hg 35 45        Micro/ID:     Lab Results   Component Value Date    BLOODCULT No growth at 4 days -  FINAL REPORT 05/26/2025    BLOODCULT No growth at 4 days -  FINAL REPORT 05/26/2025       EKG Trend:    EKG 5/20: Sinus Tachycardia, T wave inversions inferior leads. Prolonged QTC     Echo Data:  Results for orders placed during the hospital encounter of 05/18/25    Transthoracic Echo Complete    [place] [id number]  Tel [phone] Fax [phone]    TRANSTHORACIC ECHOCARDIOGRAM REPORT    Patient Name:       AVRIL Yoder Physician:    [id number] Faraz Ortega  DO  Study Date:         5/19/2025            Ordering Provider:    [id number] MARGARET RAMIREZ  CHASITY  MRN/PID:            [id number]             Fellow:  Accession#:         [id number]         Nurse:  Date of Birth/Age:  1980 / 45 years Sonographer:          Koki Mendez  RDCS  Gender Assigned at  M                    Additional Staff:  Birth:  Height:             172.72 cm            Admit Date:           5/18/2025  Weight:             75.30 kg             Admission Status:     Inpatient - STAT  BSA / BMI:          1.89 m2 / 25.24      Department Location:  [facility]  kg/m2  Blood Pressure: 97 /65 mmHg    Study Type:    TRANSTHORACIC ECHO (TTE) COMPLETE  Diagnosis/ICD: ST elevation (STEMI) myocardial infarction of unspecified  site-I21.3  Indication:    STEMI  CPT Codes:     Echo Complete w Full Doppler-[id number]    Patient History:  PCI and Stent:     PCI performed on 5/18/2025.  Smoker:            Current.  Pertinent History: Chest Pain and Murmur.    Study Detail: The following Echo studies were performed: 2D, M-Mode, Doppler and  color flow. Definity used as a contrast agent for endocardial  border definition. Total contrast used for this procedure was 2 mL  via IV push.      PHYSICIAN INTERPRETATION:  Left Ventricle: The left ventricular systolic function is normal, with a visually estimated ejection fraction of 55%. There is mild concentric left ventricular hypertrophy. Wall motion is abnormal. The left ventricular cavity size is normal. There is mild increased septal and mildly increased posterior left ventricular wall thickness. Spectral Doppler shows a normal pattern of left ventricular diastolic filling.  LV Wall Scoring:  The basal inferoseptal segment, basal inferolateral segment, and basal inferior  segment are hypokinetic. All remaining scored segments are normal.    Left Atrium: The left atrial size is normal.  Right Ventricle: The right ventricle is normal in size. There is normal right  ventricular global systolic function.  Right Atrium: The right atrial size is normal.  Aortic Valve: The aortic valve appears structurally normal. There is no evidence of aortic valve stenosis.  The aortic valve dimensionless index is 0.73. There is no evidence of aortic valve regurgitation. The peak instantaneous gradient of the aortic valve is 5 mmHg. The mean gradient of the aortic valve is 3 mmHg.  Mitral Valve: The mitral valve is normal in structure. There is no evidence of mitral valve stenosis. There is normal mitral valve leaflet mobility. There is no evidence of mitral valve regurgitation.  Tricuspid Valve: The tricuspid valve is structurally normal. There is normal tricuspid valve leaflet mobility. There is trace tricuspid regurgitation.  Pulmonic Valve: The pulmonic valve is structurally normal. There is no indication of pulmonic valve regurgitation.  Pericardium: No pericardial effusion noted.  Aorta: The aortic root is normal.  Pulmonary Artery: The main pulmonary artery is normal in size, and position, with normal bifurcation into the left and right pulmonary arteries. The tricuspid regurgitant velocity is 2.88 m/s, and with an estimated right atrial pressure of 3 mmHg, the estimated pulmonary artery pressure is mildly elevated with the RVSP at 36.2 mmHg.  Systemic Veins: The inferior vena cava appears normal in size.      CONCLUSIONS:  1. The left ventricular systolic function is normal, with a visually estimated ejection fraction of 55%.  2. Basal inferoseptal segment, basal inferolateral segment, and basal inferior segment are abnormal.  3. Abnormal wall motion.  4. There is normal right ventricular global systolic function.  5. Normal sized right ventricle.  6. There is no evidence of mitral valve stenosis.  7. No evidence of mitral valve regurgitation.  8. Trace tricuspid regurgitation is visualized.  9. Aortic valve stenosis is not present.  10. The main pulmonary artery is normal in size, and  position, with normal bifurcation into the left and right pulmonary arteries.    QUANTITATIVE DATA SUMMARY:    2D MEASUREMENTS:             Normal Ranges:  Ao Root d:       2.86 cm     (2.0-3.7cm)  LAs:             3.88 cm     (2.7-4.0cm)  IVSd:            1.12 cm     (0.6-1.1cm)  LVPWd:           1.08 cm     (0.6-1.1cm)  LVIDd:           4.02 cm     (3.9-5.9cm)  LVIDs:           2.94 cm  LV Mass Index:   77.7 g/m2  LVEDV Index:     59.87 ml/m2  LV % FS          26.9 %      LEFT ATRIUM:                  Normal Ranges:  LA Vol A4C:        29.5 ml    (22+/-6mL/m2)  LA Vol A2C:        25.4 ml  LA Vol BP:         30.5 ml  LA Vol Index A4C:  15.6ml/m2  LA Vol Index A2C:  13.4 ml/m2  LA Vol Index BP:   16.1 ml/m2  LA Area A4C:       13.8 cm2  LA Area A2C:       11.5 cm2  LA Major Axis A4C: 5.5 cm  LA Major Axis A2C: 4.4 cm  LA Volume Index:   14.9 ml/m2      RIGHT ATRIUM:                 Normal Ranges:  RA Vol A4C:        24.1 ml    (8.3-19.5ml)  RA Vol Index A4C:  12.7 ml/m2  RA Area A4C:       11.1 cm2  RA Major Axis A4C: 4.4 cm      AORTA MEASUREMENTS:         Normal Ranges:  Asc Ao, d:          2.48 cm (2.1-3.4cm)      LV SYSTOLIC FUNCTION:  Normal Ranges:  EF-A4C View:    54 % (>=55%)  EF-A2C View:    54 %  EF-Biplane:     55 %  EF-Visual:      55 %  LV EF Reported: 55 %      LV DIASTOLIC FUNCTION:           Normal Ranges:  MV Peak E:             1.20 m/s  (0.7-1.2 m/s)  MV Peak A:             1.08 m/s  (0.42-0.7 m/s)  E/A Ratio:             1.11      (1.0-2.2)  MV e'                  0.090 m/s (>8.0)  MV lateral e'          0.08 m/s  MV medial e'           0.10 m/s  E/e' Ratio:            13.29     (<8.0)      MITRAL VALVE:          Normal Ranges:  MV DT:        126 msec (150-240msec)      AORTIC VALVE:                     Normal Ranges:  AoV Vmax:                1.16 m/s (<=1.7m/s)  AoV Peak P.4 mmHg (<20mmHg)  AoV Mean PG:             3.0 mmHg (1.7-11.5mmHg)  LVOT Max Aime:            1.03 m/s  (<=1.1m/s)  AoV VTI:                 22.30 cm (18-25cm)  LVOT VTI:                16.20 cm  LVOT Diameter:           2.03 cm  (1.8-2.4cm)  AoV Area, VTI:           2.35 cm2 (2.5-5.5cm2)  AoV Area,Vmax:           2.87 cm2 (2.5-4.5cm2)  AoV Dimensionless Index: 0.73      RIGHT VENTRICLE:  RV Basal 3.49 cm  RV Mid   2.65 cm  RV Major 7.3 cm  TAPSE:   22.4 mm  RV s'    0.13 m/s      TRICUSPID VALVE/RVSP:          Normal Ranges:  Peak TR Velocity:     2.88 m/s  RV Syst Pressure:     36 mmHg  (< 30mmHg)  IVC Diam:             1.74 cm      PULMONIC VALVE:          Normal Ranges:  PV Accel Time:  116 msec (>120ms)  PV Max Aime:     1.5 m/s  (0.6-0.9m/s)  PV Max P.8 mmHg      [id number] Faraz Ortega DO  Electronically signed on 2025 at 9:34:06 AM      Wall Scoring        ** Final **      Cath Data:  [facility]     LMT normal.  LAD mild irregularity, 30% mid, wraps around the apex.  LCX mild iregularity.  RCA tortuous, dom.  PDA is 100% ostial.  LVEDP 20-25.  LVEF 40-45%, inferior basal AK.       Successful PTA R % GLENNA 0 reduced to 30%, GLENNA 3 after POBA, with no stent placed due to small caliber vessel and minimal runoff, not enough to warrant stenting.     Concern for drug use considering patient very tachycardic and LVEDP elevated with very small PDA occlusion    Summary of key imaging results from the last 24 hours     See above    Assessment and Plan     Assessment: This is a 45 year old male with a past medical history of tobacco abuse (30 pack years), submandibular abscess, and nephrolithiasis who presented to [facility] with a chief complaint of chest pain, found to have an inferior STEMI, now s/p coronary angiogram with balloon angioplasty to the PDA (right dominant system). This late presenting MI c/b cardiogenic shock iso of ischemia -induced VSD.      original shock call resulted in impella CP placment. Normalized lactate but resultant hemolysis. Stay c/b renal failure, now requiring CVVH. prior c/f  GIB, found to be OG trauma and small ulcer     On 5/28 patient upgraded to impella 5.5 with reduction in hemolysis. To wean off vent as able and CT surg following for future VSD repair.       Mechanical Ventilation: 4-10 days  Sedation/Analgesia:  none  Restraints: Restraints indicated as alternative therapies have been attempted and have been ineffective.  Restrain with soft wrist restraints and side rails up x4 until medical devices discontinued and/or patient able to participate with plan of care.     Summary for 05/31/25  :  Weekend goal is to attempt vent wean , wean pressors as able    Will continue to titrate levo to goal MAP 75-85   CVVH to run -2 L /hr    Infection: c/w [place] line holiday, DC micafungin and vanco given      NEUROLOGIC  #Sedation  - fent, versed and precedex   - Wean sedation as able, CTM for responsiveness  - RAAS goal: 0 after OR     CARDIOVASCULAR  #RPDA STEMI s/p POBA  #C/f Cardiogenic vs mixed shock   #C/f VSD  ::  ECG on admit to OSH with ST elevations in the inferior leads with reciprocal depressions  :: Troponin:  24,085>21,701,19,289,12755  :: Lipid panel (05/2025)- cholesterol 220, HDL 33.9, , TG 57  :: TSH 1.51 (05/2025), A1C 7.4  (05/2025)  :: coronary angiogram (05/2025)  which revealed multi vessel CAD [LAD/Lcx/RCA mildly/diffusely diseased,100% RPDA culprit lesion] with PTCA to RPDA with 50% residual stenosis [GLENNA 0->3] (too small to stent)   :: LVEF 45% and LVED 20-25 mmHg via LV Gram   :: TTE  (05/2025) LVEF 55%, wall motion abnormalities, and no significant valvular disease   :: SVO2 59 from CVP port, 92 from PA port (prior to impella)  :: SVO2 57 from CVP port, 80 from PA port (after impella)  :: Off all pressors 5/21  :: restarted Levo 5/26  Plan:  - Trend Lactate, CVP mix venous (w/VBG) and PA mix venous Q8-12H  - Aspirin 81 mg every day, DC Bril[place]a given no stent / possible future surgery   - Statin: HELD Crestor 40 mg given c/f shock liver  - Beta blocker:  hold given shock requiring MCS  - RAAS Inhibition: hold given shock requiring MCS  - Limited TTE repeat: Compared with the prior exam from 5/19/2025 ([facility]) there has been interval development of a large VSD throuh the inferoseptum with the RV now becoming large with significantly reduced function.   - CT surgery following for future possible VSD repair   - OR with Ct surg on 5/28 for Impella 5.5 placement    - OR for repair likely in early to mid June     # NSVT  # PVC  # 2:1 AV block   :: started 5/24 PM, longest 20 sec run  :: VT ikely iso sub-optimal impella position  :: AV block possibly iso Vsd injury to conduction pathway   :: repositioned under US 5/25 AM with less ectopy burden after   - DC IV amio 5/30 and monitor Tele     PULMONARY  #AHRF   :: Prior to intubation satting 92% on RA  :: CXR with pulmonary edema vs PNA  :: CTA CAP- mild interstitial pulmonary edema   :: , WBC 34.1 on admission   Plan:  - Infectious workup/abx as below   - [place] guided therapy   - Wean Ventilator when more responsive      RENAL/  # BENY, Anuric   # HAGMA   # Volume overload iso VSD   :: Baseline Cr 0.8-1.0, 1.61 on admit, 1.85 on transport - pleatu but Cr still > 10  Plan:  - Likely hemodynamic mediated , vanco, LV gram  - Brief SLED 5/23, 5/25, 5/27   - Nephrology following   - CVVH started 4/28   - aiming 2 L out   - Avoid hypotension, rapid fluctuations in BP  - [place] guided volume resuscitation/ diuresis   - Daily RFP        GASTROINTESTINAL  #GI PPX  # Traumatic OG placement , small superficial ulcer  # Ileus   # jaundice, Fever  # hyperbilirubinemia, both direct and indirect   - IV PPI BID for 2 months for esophageal ulcer   - Aggressive bowel reg: miralx QID, Senna 2 tab BID, Prn enema and suppository   - S/p EGD w/ GI 5/7 w/o concern for overtly large Gib   - RUQ 5/28 negative for cholecystis   - look to start trickle feed soon      ENDOCRINE  # T2DM   :: A1C 7.4  (05/2025)  Plan:  - NPO w/  trickle feeds  - Q4H POC glucose, SSI while NPO w/ trickle feeds  - Titrate -180  - Will need diabetes education prior to discharge   - BID labs for monitor for refeeding      HEME/ONC  #thrombocytopenia  # Anemia    # Hemolysis iso impella use, improving    - C/f some degree of hemolysis   - Hepatic function, LHD, hapto daily  - Heparin products DC 5/23, restarted 5/26   - Impella Purg: Hep/D5W   - Systemic: Heparin (follow ACT)  - Anti-Plt Factor 4 AB / Serotonin release assay negative       INFECTIOUS DISEASE  #C/f Mixed shock   #Leukocytosis  # Fever  Unclear if Fever is from infection or general cardiogenic shock at this time   :: S/p Doxy/CTX 05/19  :: blood cultures sent 05/19 x 2 sets   :: Rising WBC despite 8 days of zosyn   Work up:  - MRSA probe: -  - Bcx2: NGTD  - UA: non-infectious   - Sputum: contaminate  - C diff: pending sample  Plan:  -Dc zosyn; Start Vanco, Adolfo, and micafungin 5/26   - DC emma 5/30 and Vanco given negative BC  - Follow infectious labs  - ID following      MSK/DERM  SWAPNIL         ICU Check List       ICU Liberation: Intervention:   Assess, Prevent, Manage Pain 0 - No pain fentanyl gtt   Both SAT and SBT [] SAT  [] SBT 30-60 min [] Extubate to NC  [] Extubate to NIV  [] Discuss Trach   Choice of analgesia and sedation Romero Agitation Sedation Scale (RASS): Moderate sedation Fentanyl, Precedex, and Versed  -1 to -2   Delirium: Assess, prevent and manage  CAM ICU Positive Assess Qshift   Early Mobility and Exercise RASS less than -2 [] PT /OT consult   Family Engagement and Empowerment [x]Family updated []SW consult     F: PRN for euvolemia; swan guided therapy   E: PRN K>4, Mg>2, P>3   N: NPO  A: PIV, RIJ, left radial A line      Oxygen: MV 30%/500/14/5  Abx: Vanco, Adolfo, Emma   Gtts: levo, fent, prcedex     DVT ppx: Lovenox  GI ppx: IV PPI     Code Status: full (confirmed on admit)  Surrogate Medical Decision-maker:    Ashley Hogan (Mother) [phone]          Introducer 05/19/25 Internal jugular Right (Active)   Placement Date/Time: 05/19/25 2330   Hand Hygiene Completed: Yes  Location: Internal jugular  Orientation: Right  Placed by: CG/AL  Placement Verified: X-ray;Pressure tracing changes;Transduced waveform   Number of days: 0       Arterial Line 05/19/25 Left Radial (Active)   Placement Date/Time: 05/19/25 2200   Orientation: Left  Location: Radial  Local Anesthetic: Injectable  Technique: Anatomical landmarks  Insertion attempts: 1  Securement Method: Sutured  Patient Tolerance: Fair   Number of days: 0       Arterial Sheath 05/20/25 0529 6 Fr. Right Femoral (Active)   Placement Date/Time: 05/20/25 0529   Sheath Size: (c) 6 Fr.  Line Orientation: Right  Sheath Insertion Site: Femoral   Number of days: 0       Arterial Sheath 05/20/25 0532 Other (Comment) Right Femoral (Active)   Placement Date/Time: 05/20/25 0532   Sheath Size: (c) Other (Comment)  Line Orientation: Right  Sheath Insertion Site: Femoral   Number of days: 0       Arterial Sheath 05/20/25 0559 6 Fr. Left Femoral (Active)   Placement Date/Time: 05/20/25 0559   Sheath Size: 6 Fr.  Line Orientation: Left  Sheath Insertion Site: Femoral   Number of days: 0       Pulmonary Artery Catheter Internal jugular (Active)   Placement Date/Time: 05/19/25 2330   Hand Hygiene Performed Prior to CVC Insertion: Yes  Site Prep: Alcohol;Chlorhexidine ;Usual sterile procedure followed  Site Prep Agent has Completely Dried Before Insertion: Yes  All 5 Sterile Barriers Used (Glove...   Number of days: 0       ETT  7.5 mm (Active)   Placement Date/Time: 05/19/25 2305   Hand Hygiene Completed: Yes  ETT Type: ETT - single  Single Lumen Tube Size: 7.5 mm  Cuffed: Yes  Location: Oral  Airway Insertion Attempts: 1  Placement Verification: Auscultation;Capnometry;Fiberoptic visualizati...   Number of days: 0       Urethral Catheter 16 Fr. (Active)   Placement Date/Time: 05/20/25 0200   Hand Hygiene Completed: Yes  Tube Size  (Fr.): 16 Fr.  Catheter Balloon Size: 10 mL  Urine Returned: Yes   Number of days: 0       NG/OG/Feeding Tube OG - [place] sump 16 Fr Center mouth (Active)   Placement Date/Time: 05/19/25 2310   Placed by: MARLENA  Hand Hygiene Completed: Yes  Type of Tube: NG/OG Tube  Tube Length: 55 cm  Tube Type: OG - [place] sump  NG/OG Tube Size: 16 Fr  Tube Location: Center mouth   Number of days: 0       Social:  Code: Full Code    Disposition: NABOR Leong,    05/31/25 at 7:56 AM     Disclaimer: Documentation completed with the information available at the time of input. The times in the chart may not be reflective of actual patient care times, interventions, or procedures. Documentation occurs after the physical care of the patient.           [1] amiodarone, , ,   aspirin, 81 mg, oral, Daily  insulin lispro, 0-5 Units, subcutaneous, q4h  meropenem, 2 g, intravenous, q12h  oxygen, , inhalation, Continuous - Inhalation  pantoprazole, 40 mg, intravenous, BID  perflutren lipid microspheres, 0.5-10 mL of dilution, intravenous, Once in imaging  perflutren protein A microsphere, 0.5 mL, intravenous, Once in imaging  polyethylene glycol, 17 g, oral, 4x daily  sennosides, 2 tablet, oral, BID  thiamine, 500 mg, intravenous, Daily     [2] dexmedeTOMIDine, 0.1-1.5 mcg/kg/hr (Dosing Weight), Last Rate: 0.5 mcg/kg/hr (05/31/25 0700)  fentaNYL,  mcg/hr, Last Rate: 100 mcg/hr (05/31/25 0700)  heparin, 0-2,000 Units/hr, Last Rate: 700 Units/hr (05/31/25 0700)  heparin Impella Purge 25 units/mL in 500 mL D5W, 10 mL/hr, Last Rate: 10 mL/hr (05/31/25 0700)  midazolam, 0.5-20 mg/hr, Last Rate: 1 mg/hr (05/31/25 0700)  norepinephrine, 0.01-1 mcg/kg/min (Dosing Weight), Last Rate: 0.06 mcg/kg/min (05/31/25 0700)  PrismaSol 4/2.5, 24 mL/kg/hr (Dosing Weight), Last Rate: 24 mL/kg/hr (05/31/25 0304)     [3] PRN medications: alteplase, amiodarone, bisacodyl, dextrose, dextrose, glucagon, glucagon

## 2025-06-01 VITALS
WEIGHT: 198.85 LBS | HEIGHT: 68 IN | SYSTOLIC BLOOD PRESSURE: 94 MMHG | DIASTOLIC BLOOD PRESSURE: 87 MMHG | OXYGEN SATURATION: 97 % | RESPIRATION RATE: 16 BRPM | HEART RATE: 66 BPM | BODY MASS INDEX: 30.14 KG/M2 | TEMPERATURE: 97 F

## 2025-06-01 LAB
ACT BLD: 153 SEC (ref 83–199)
ACT BLD: 155 SEC (ref 83–199)
ACT BLD: 168 SEC (ref 83–199)
ACT BLD: 168 SEC (ref 83–199)
ACT BLD: 170 SEC (ref 83–199)
ACT BLD: 171 SEC (ref 83–199)
ACT BLD: 174 SEC (ref 83–199)
ACT BLD: 176 SEC (ref 83–199)
ALBUMIN SERPL BCP-MCNC: 2.6 G/DL (ref 3.4–5)
ALBUMIN SERPL BCP-MCNC: 2.9 G/DL (ref 3.4–5)
ALP SERPL-CCNC: 175 U/L (ref 33–120)
ALT SERPL W P-5'-P-CCNC: 100 U/L (ref 10–52)
ANION GAP BLDA CALCULATED.4IONS-SCNC: 10 MMO/L (ref 10–25)
ANION GAP SERPL CALC-SCNC: 12 MMOL/L (ref 10–20)
ANION GAP SERPL CALC-SCNC: 12 MMOL/L (ref 10–20)
AST SERPL W P-5'-P-CCNC: 106 U/L (ref 9–39)
BASE EXCESS BLDA CALC-SCNC: -0.9 MMOL/L (ref -2–3)
BASO STIPL BLD QL SMEAR: PRESENT
BASO STIPL BLD QL SMEAR: PRESENT
BASOPHILS # BLD AUTO: 0.05 X10*3/UL (ref 0–0.1)
BASOPHILS # BLD AUTO: 0.06 X10*3/UL (ref 0–0.1)
BASOPHILS NFR BLD AUTO: 0.3 %
BASOPHILS NFR BLD AUTO: 0.4 %
BILIRUB DIRECT SERPL-MCNC: 14.7 MG/DL (ref 0–0.3)
BILIRUB SERPL-MCNC: 21.9 MG/DL (ref 0–1.2)
BODY TEMPERATURE: 37 DEGREES CELSIUS
BUN SERPL-MCNC: 30 MG/DL (ref 6–23)
BUN SERPL-MCNC: 31 MG/DL (ref 6–23)
CA-I BLDA-SCNC: 1.07 MMOL/L (ref 1.1–1.33)
CALCIUM SERPL-MCNC: 7.7 MG/DL (ref 8.6–10.6)
CALCIUM SERPL-MCNC: 8.1 MG/DL (ref 8.6–10.6)
CHLORIDE BLDA-SCNC: 105 MMOL/L (ref 98–107)
CHLORIDE SERPL-SCNC: 101 MMOL/L (ref 98–107)
CHLORIDE SERPL-SCNC: 102 MMOL/L (ref 98–107)
CO2 SERPL-SCNC: 26 MMOL/L (ref 21–32)
CO2 SERPL-SCNC: 27 MMOL/L (ref 21–32)
CREAT SERPL-MCNC: 2.44 MG/DL (ref 0.5–1.3)
CREAT SERPL-MCNC: 2.48 MG/DL (ref 0.5–1.3)
EGFRCR SERPLBLD CKD-EPI 2021: 32 ML/MIN/1.73M*2
EGFRCR SERPLBLD CKD-EPI 2021: 32 ML/MIN/1.73M*2
EOSINOPHIL # BLD AUTO: 0.08 X10*3/UL (ref 0–0.7)
EOSINOPHIL # BLD AUTO: 0.08 X10*3/UL (ref 0–0.7)
EOSINOPHIL NFR BLD AUTO: 0.4 %
EOSINOPHIL NFR BLD AUTO: 0.6 %
ERYTHROCYTE [DISTWIDTH] IN BLOOD BY AUTOMATED COUNT: 25.2 % (ref 11.5–14.5)
ERYTHROCYTE [DISTWIDTH] IN BLOOD BY AUTOMATED COUNT: 26.7 % (ref 11.5–14.5)
GLUCOSE BLD MANUAL STRIP-MCNC: 147 MG/DL (ref 74–99)
GLUCOSE BLD MANUAL STRIP-MCNC: 154 MG/DL (ref 74–99)
GLUCOSE BLD MANUAL STRIP-MCNC: 172 MG/DL (ref 74–99)
GLUCOSE BLD MANUAL STRIP-MCNC: 185 MG/DL (ref 74–99)
GLUCOSE BLD MANUAL STRIP-MCNC: 189 MG/DL (ref 74–99)
GLUCOSE BLD MANUAL STRIP-MCNC: 189 MG/DL (ref 74–99)
GLUCOSE BLDA-MCNC: 179 MG/DL (ref 74–99)
GLUCOSE SERPL-MCNC: 178 MG/DL (ref 74–99)
GLUCOSE SERPL-MCNC: 208 MG/DL (ref 74–99)
HAPTOGLOB SERPL NEPH-MCNC: <30 MG/DL (ref 30–200)
HCO3 BLDA-SCNC: 23.2 MMOL/L (ref 22–26)
HCT VFR BLD AUTO: 24.8 % (ref 41–52)
HCT VFR BLD AUTO: 24.9 % (ref 41–52)
HCT VFR BLD EST: 27 % (ref 41–52)
HGB BLD-MCNC: 8.4 G/DL (ref 13.5–17.5)
HGB BLD-MCNC: 8.6 G/DL (ref 13.5–17.5)
HGB BLDA-MCNC: 8.9 G/DL (ref 13.5–17.5)
HYPOCHROMIA BLD QL SMEAR: NORMAL
IMM GRANULOCYTES # BLD AUTO: 0.63 X10*3/UL (ref 0–0.7)
IMM GRANULOCYTES # BLD AUTO: 0.76 X10*3/UL (ref 0–0.7)
IMM GRANULOCYTES NFR BLD AUTO: 4.2 % (ref 0–0.9)
IMM GRANULOCYTES NFR BLD AUTO: 4.5 % (ref 0–0.9)
INHALED O2 CONCENTRATION: 30 %
LACTATE BLDA-SCNC: 0.9 MMOL/L (ref 0.4–2)
LDH SERPL L TO P-CCNC: 880 U/L (ref 84–246)
LYMPHOCYTES # BLD AUTO: 1.51 X10*3/UL (ref 1.2–4.8)
LYMPHOCYTES # BLD AUTO: 2.16 X10*3/UL (ref 1.2–4.8)
LYMPHOCYTES NFR BLD AUTO: 10.9 %
LYMPHOCYTES NFR BLD AUTO: 11.9 %
MAGNESIUM SERPL-MCNC: 2.93 MG/DL (ref 1.6–2.4)
MAGNESIUM SERPL-MCNC: 2.95 MG/DL (ref 1.6–2.4)
MCH RBC QN AUTO: 31.9 PG (ref 26–34)
MCH RBC QN AUTO: 32.1 PG (ref 26–34)
MCHC RBC AUTO-ENTMCNC: 33.9 G/DL (ref 32–36)
MCHC RBC AUTO-ENTMCNC: 34.5 G/DL (ref 32–36)
MCV RBC AUTO: 92 FL (ref 80–100)
MCV RBC AUTO: 95 FL (ref 80–100)
MONOCYTES # BLD AUTO: 0.81 X10*3/UL (ref 0.1–1)
MONOCYTES # BLD AUTO: 1.1 X10*3/UL (ref 0.1–1)
MONOCYTES NFR BLD AUTO: 5.8 %
MONOCYTES NFR BLD AUTO: 6.1 %
NEUTROPHILS # BLD AUTO: 10.77 X10*3/UL (ref 1.2–7.7)
NEUTROPHILS # BLD AUTO: 13.95 X10*3/UL (ref 1.2–7.7)
NEUTROPHILS NFR BLD AUTO: 77.1 %
NEUTROPHILS NFR BLD AUTO: 77.8 %
NRBC BLD-RTO: 2.7 /100 WBCS (ref 0–0)
NRBC BLD-RTO: 3.6 /100 WBCS (ref 0–0)
OXYHGB MFR BLDA: 95.8 % (ref 94–98)
PCO2 BLDA: 35 MM HG (ref 38–42)
PH BLDA: 7.43 PH (ref 7.38–7.42)
PHOSPHATE SERPL-MCNC: 2.7 MG/DL (ref 2.5–4.9)
PHOSPHATE SERPL-MCNC: 3.5 MG/DL (ref 2.5–4.9)
PLATELET # BLD AUTO: 69 X10*3/UL (ref 150–450)
PLATELET # BLD AUTO: 73 X10*3/UL (ref 150–450)
PO2 BLDA: 92 MM HG (ref 85–95)
POLYCHROMASIA BLD QL SMEAR: NORMAL
POLYCHROMASIA BLD QL SMEAR: NORMAL
POTASSIUM BLDA-SCNC: 4 MMOL/L (ref 3.5–5.3)
POTASSIUM SERPL-SCNC: 3.9 MMOL/L (ref 3.5–5.3)
POTASSIUM SERPL-SCNC: 4 MMOL/L (ref 3.5–5.3)
PROT SERPL-MCNC: 5.1 G/DL (ref 6.4–8.2)
RBC # BLD AUTO: 2.62 X10*6/UL (ref 4.5–5.9)
RBC # BLD AUTO: 2.7 X10*6/UL (ref 4.5–5.9)
RBC MORPH BLD: NORMAL
RBC MORPH BLD: NORMAL
SAO2 % BLDA: 100 % (ref 94–100)
SODIUM BLDA-SCNC: 134 MMOL/L (ref 136–145)
SODIUM SERPL-SCNC: 136 MMOL/L (ref 136–145)
SODIUM SERPL-SCNC: 136 MMOL/L (ref 136–145)
TARGETS BLD QL SMEAR: NORMAL
TARGETS BLD QL SMEAR: NORMAL
WBC # BLD AUTO: 13.9 X10*3/UL (ref 4.4–11.3)
WBC # BLD AUTO: 18.1 X10*3/UL (ref 4.4–11.3)

## 2025-06-01 PROCEDURE — 83010 ASSAY OF HAPTOGLOBIN QUANT: CPT

## 2025-06-01 PROCEDURE — 84132 ASSAY OF SERUM POTASSIUM: CPT

## 2025-06-01 PROCEDURE — 2500000004 HC RX 250 GENERAL PHARMACY W/ HCPCS (ALT 636 FOR OP/ED)

## 2025-06-01 PROCEDURE — 76937 US GUIDE VASCULAR ACCESS: CPT

## 2025-06-01 PROCEDURE — 83615 LACTATE (LD) (LDH) ENZYME: CPT

## 2025-06-01 PROCEDURE — 2500000005 HC RX 250 GENERAL PHARMACY W/O HCPCS

## 2025-06-01 PROCEDURE — 2500000004 HC RX 250 GENERAL PHARMACY W/ HCPCS (ALT 636 FOR OP/ED): Mod: JZ

## 2025-06-01 PROCEDURE — 83735 ASSAY OF MAGNESIUM: CPT

## 2025-06-01 PROCEDURE — 82947 ASSAY GLUCOSE BLOOD QUANT: CPT

## 2025-06-01 PROCEDURE — 85025 COMPLETE CBC W/AUTO DIFF WBC: CPT

## 2025-06-01 PROCEDURE — 90945 DIALYSIS ONE EVALUATION: CPT | Performed by: INTERNAL MEDICINE

## 2025-06-01 PROCEDURE — 2500000002 HC RX 250 W HCPCS SELF ADMINISTERED DRUGS (ALT 637 FOR MEDICARE OP, ALT 636 FOR OP/ED)

## 2025-06-01 PROCEDURE — 99291 CRITICAL CARE FIRST HOUR: CPT

## 2025-06-01 PROCEDURE — 37799 UNLISTED PX VASCULAR SURGERY: CPT

## 2025-06-01 PROCEDURE — 90937 HEMODIALYSIS REPEATED EVAL: CPT

## 2025-06-01 PROCEDURE — 2500000001 HC RX 250 WO HCPCS SELF ADMINISTERED DRUGS (ALT 637 FOR MEDICARE OP)

## 2025-06-01 PROCEDURE — 71045 X-RAY EXAM CHEST 1 VIEW: CPT

## 2025-06-01 PROCEDURE — 99233 SBSQ HOSP IP/OBS HIGH 50: CPT | Performed by: STUDENT IN AN ORGANIZED HEALTH CARE EDUCATION/TRAINING PROGRAM

## 2025-06-01 PROCEDURE — 85347 COAGULATION TIME ACTIVATED: CPT

## 2025-06-01 PROCEDURE — 2020000001 HC ICU ROOM DAILY

## 2025-06-01 PROCEDURE — 82248 BILIRUBIN DIRECT: CPT

## 2025-06-01 PROCEDURE — 84100 ASSAY OF PHOSPHORUS: CPT

## 2025-06-01 RX ORDER — POTASSIUM CHLORIDE 1.5 G/1.58G
20 POWDER, FOR SOLUTION ORAL ONCE
Status: COMPLETED | OUTPATIENT
Start: 2025-06-01 | End: 2025-06-01

## 2025-06-01 RX ADMIN — SODIUM CHLORIDE 2 G: 9 INJECTION, SOLUTION INTRAVENOUS at 21:08

## 2025-06-01 RX ADMIN — THIAMINE HYDROCHLORIDE 500 MG: 100 INJECTION, SOLUTION INTRAMUSCULAR; INTRAVENOUS at 10:24

## 2025-06-01 RX ADMIN — CALCIUM CHLORIDE, MAGNESIUM CHLORIDE, DEXTROSE MONOHYDRATE, LACTIC ACID, SODIUM CHLORIDE, SODIUM BICARBONATE AND POTASSIUM CHLORIDE 24 ML/KG/HR: 3.68; 3.05; 22; 5.4; 6.46; 3.09; .314 INJECTION INTRAVENOUS at 17:01

## 2025-06-01 RX ADMIN — CALCIUM CHLORIDE, MAGNESIUM CHLORIDE, DEXTROSE MONOHYDRATE, LACTIC ACID, SODIUM CHLORIDE, SODIUM BICARBONATE AND POTASSIUM CHLORIDE 24 ML/KG/HR: 3.68; 3.05; 22; 5.4; 6.46; 3.09; .314 INJECTION INTRAVENOUS at 09:37

## 2025-06-01 RX ADMIN — POTASSIUM CHLORIDE 20 MEQ: 1.5 POWDER, FOR SOLUTION ORAL at 20:21

## 2025-06-01 RX ADMIN — POLYETHYLENE GLYCOL 3350 17 G: 17 POWDER, FOR SOLUTION ORAL at 16:27

## 2025-06-01 RX ADMIN — POLYETHYLENE GLYCOL 3350 17 G: 17 POWDER, FOR SOLUTION ORAL at 12:00

## 2025-06-01 RX ADMIN — Medication 75 MCG/HR: at 19:14

## 2025-06-01 RX ADMIN — Medication 30 PERCENT: at 20:00

## 2025-06-01 RX ADMIN — HEPARIN SODIUM 600 UNITS/HR: 10000 INJECTION, SOLUTION INTRAVENOUS at 00:32

## 2025-06-01 RX ADMIN — INSULIN LISPRO 1 UNITS: 100 INJECTION, SOLUTION INTRAVENOUS; SUBCUTANEOUS at 00:18

## 2025-06-01 RX ADMIN — CALCIUM CHLORIDE, MAGNESIUM CHLORIDE, DEXTROSE MONOHYDRATE, LACTIC ACID, SODIUM CHLORIDE, SODIUM BICARBONATE AND POTASSIUM CHLORIDE 24 ML/KG/HR: 3.68; 3.05; 22; 5.4; 6.46; 3.09; .314 INJECTION INTRAVENOUS at 09:35

## 2025-06-01 RX ADMIN — INSULIN LISPRO 1 UNITS: 100 INJECTION, SOLUTION INTRAVENOUS; SUBCUTANEOUS at 04:15

## 2025-06-01 RX ADMIN — CALCIUM CHLORIDE, MAGNESIUM CHLORIDE, DEXTROSE MONOHYDRATE, LACTIC ACID, SODIUM CHLORIDE, SODIUM BICARBONATE AND POTASSIUM CHLORIDE 24 ML/KG/HR: 3.68; 3.05; 22; 5.4; 6.46; 3.09; .314 INJECTION INTRAVENOUS at 17:00

## 2025-06-01 RX ADMIN — SODIUM PHOSPHATE, MONOBASIC, MONOHYDRATE AND SODIUM PHOSPHATE, DIBASIC, ANHYDROUS 21 MMOL: 142; 276 INJECTION, SOLUTION INTRAVENOUS at 02:32

## 2025-06-01 RX ADMIN — INSULIN LISPRO 1 UNITS: 100 INJECTION, SOLUTION INTRAVENOUS; SUBCUTANEOUS at 11:54

## 2025-06-01 RX ADMIN — POLYETHYLENE GLYCOL 3350 17 G: 17 POWDER, FOR SOLUTION ORAL at 06:09

## 2025-06-01 RX ADMIN — SENNOSIDES 17.2 MG: 8.6 TABLET, FILM COATED ORAL at 20:21

## 2025-06-01 RX ADMIN — CALCIUM CHLORIDE, MAGNESIUM CHLORIDE, DEXTROSE MONOHYDRATE, LACTIC ACID, SODIUM CHLORIDE, SODIUM BICARBONATE AND POTASSIUM CHLORIDE 24 ML/KG/HR: 3.68; 3.05; 22; 5.4; 6.46; 3.09; .314 INJECTION INTRAVENOUS at 09:36

## 2025-06-01 RX ADMIN — CALCIUM CHLORIDE, MAGNESIUM CHLORIDE, DEXTROSE MONOHYDRATE, LACTIC ACID, SODIUM CHLORIDE, SODIUM BICARBONATE AND POTASSIUM CHLORIDE 24 ML/KG/HR: 3.68; 3.05; 22; 5.4; 6.46; 3.09; .314 INJECTION INTRAVENOUS at 02:17

## 2025-06-01 RX ADMIN — HEPARIN SODIUM 10 ML/HR: 10000 INJECTION, SOLUTION INTRAVENOUS; SUBCUTANEOUS at 20:14

## 2025-06-01 RX ADMIN — Medication 30 PERCENT: at 10:27

## 2025-06-01 RX ADMIN — CALCIUM CHLORIDE, MAGNESIUM CHLORIDE, DEXTROSE MONOHYDRATE, LACTIC ACID, SODIUM CHLORIDE, SODIUM BICARBONATE AND POTASSIUM CHLORIDE 24 ML/KG/HR: 3.68; 3.05; 22; 5.4; 6.46; 3.09; .314 INJECTION INTRAVENOUS at 17:02

## 2025-06-01 RX ADMIN — INSULIN LISPRO 1 UNITS: 100 INJECTION, SOLUTION INTRAVENOUS; SUBCUTANEOUS at 16:27

## 2025-06-01 RX ADMIN — ASPIRIN 81 MG: 81 TABLET, CHEWABLE ORAL at 08:27

## 2025-06-01 RX ADMIN — INSULIN LISPRO 1 UNITS: 100 INJECTION, SOLUTION INTRAVENOUS; SUBCUTANEOUS at 20:13

## 2025-06-01 RX ADMIN — PANTOPRAZOLE SODIUM 40 MG: 40 INJECTION, POWDER, FOR SOLUTION INTRAVENOUS at 20:13

## 2025-06-01 RX ADMIN — NOREPINEPHRINE BITARTRATE 0.05 MCG/KG/MIN: 8 INJECTION, SOLUTION INTRAVENOUS at 00:31

## 2025-06-01 RX ADMIN — DEXMEDETOMIDINE HYDROCHLORIDE 0.6 MCG/KG/HR: 400 INJECTION INTRAVENOUS at 08:26

## 2025-06-01 RX ADMIN — DEXMEDETOMIDINE HYDROCHLORIDE 0.75 MCG/KG/HR: 400 INJECTION INTRAVENOUS at 03:04

## 2025-06-01 RX ADMIN — Medication 75 MCG/HR: at 09:24

## 2025-06-01 RX ADMIN — SODIUM CHLORIDE 2 G: 9 INJECTION, SOLUTION INTRAVENOUS at 10:24

## 2025-06-01 RX ADMIN — PANTOPRAZOLE SODIUM 40 MG: 40 INJECTION, POWDER, FOR SOLUTION INTRAVENOUS at 08:26

## 2025-06-01 RX ADMIN — SENNOSIDES 17.2 MG: 8.6 TABLET, FILM COATED ORAL at 08:27

## 2025-06-01 RX ADMIN — Medication 30 PERCENT: at 08:27

## 2025-06-01 RX ADMIN — DEXMEDETOMIDINE HYDROCHLORIDE 0.6 MCG/KG/HR: 400 INJECTION INTRAVENOUS at 16:27

## 2025-06-01 ASSESSMENT — PAIN - FUNCTIONAL ASSESSMENT
PAIN_FUNCTIONAL_ASSESSMENT: CPOT (CRITICAL CARE PAIN OBSERVATION TOOL)

## 2025-06-01 ASSESSMENT — PAIN SCALES - GENERAL
PAINLEVEL_OUTOF10: 0 - NO PAIN

## 2025-06-01 NOTE — PROGRESS NOTES
"Cardiac Intensive Care - Daily Progress Note   Subjective    Alpesh Elena is a 45 y.o. year old male patient admitted on 5/20/2025 with following ICU needs: Cardiogenic shock, impella, VSD/ Inferior LV wall rupture    Interval History:  ON: Again placed on Versed On for aggegation. Short runs of NSVT. Seen resting comfortably on the vent this AM.     Meds    Scheduled medications  Scheduled Medications[1]  Continuous medications  Continuous Medications[2]  PRN medications  PRN Medications[3]     Objective    Blood pressure 94/87, pulse 68, temperature 36.8 °C (98.2 °F), temperature source Temporal, resp. rate 16, height 1.727 m (5' 8\"), weight 90.2 kg (198 lb 13.7 oz), SpO2 97%.     Physical Exam  Constitutional:       Comments: Intubated, eye opens to sternal rub    Cardiovascular:      Rate and Rhythm: Normal rate and regular rhythm.      Heart sounds: Murmur heard.      Comments: Holosystolic murmer, best heard at 4th intercostal rib. Very weak vs absent b/l PT and pedal.   Pulmonary:      Effort: Pulmonary effort is normal.      Breath sounds: Normal breath sounds. No wheezing or rales.   Abdominal:      General: Abdomen is flat. Bowel sounds are normal. There is no distension.      Palpations: Abdomen is soft.   Musculoskeletal:      Right lower leg: No edema.      Left lower leg: No edema.   Skin:     General: Skin is warm.      Coloration: Skin is jaundiced.      Comments: Jaundice improving    Neurological:      Comments: Sedated             Intake/Output Summary (Last 24 hours) at 6/1/2025 0824  Last data filed at 6/1/2025 0700  Gross per 24 hour   Intake 1723.88 ml   Output 3111 ml   Net -1387.12 ml     Labs:   Results from last 72 hours   Lab Units 06/01/25  0410 05/31/25  1814 05/31/25  0204   SODIUM mmol/L 136 133* 133*   POTASSIUM mmol/L 4.0 4.1 4.1   CHLORIDE mmol/L 102 100 99   CO2 mmol/L 26 28 25   BUN mg/dL 30* 30* 31*   CREATININE mg/dL 2.48* 2.52* 2.35*   GLUCOSE mg/dL 178* 165* 184* "   CALCIUM mg/dL 7.7* 8.1* 8.0*   ANION GAP mmol/L 12 9* 13   EGFR mL/min/1.73m*2 32* 31* 34*   PHOSPHORUS mg/dL 2.7 2.3* 3.2      Results from last 72 hours   Lab Units 06/01/25  0410 05/31/25  1814 05/31/25  0204   WBC AUTO x10*3/uL 18.1* 16.8* 22.5*   HEMOGLOBIN g/dL 8.6* 8.4* 9.2*   HEMATOCRIT % 24.9* 24.1* 27.7*   PLATELETS AUTO x10*3/uL 73* 70* 104*   NEUTROS PCT AUTO % 77.1  --   --    LYMPHO PCT MAN %  --  3.3 6.0   LYMPHS PCT AUTO % 11.9  --   --    MONO PCT MAN %  --  2.5 3.0   MONOS PCT AUTO % 6.1  --   --    EOSINO PCT MAN %  --  0.0 0.0   EOS PCT AUTO % 0.4  --   --       Results from last 72 hours   Lab Units 06/01/25  0412 05/30/25  0024   POCT PH, ARTERIAL pH 7.43* 7.38   POCT PCO2, ARTERIAL mm Hg 35* 35*   POCT PO2, ARTERIAL mm Hg 92 77*   POCT SO2, ARTERIAL % 100 97              Micro/ID:     Lab Results   Component Value Date    BLOODCULT No growth at 4 days -  FINAL REPORT 05/26/2025    BLOODCULT No growth at 4 days -  FINAL REPORT 05/26/2025       EKG Trend:    EKG 5/20: Sinus Tachycardia, T wave inversions inferior leads. Prolonged QTC     Echo Data:  Results for orders placed during the hospital encounter of 05/18/25    Transthoracic Echo Complete    Narrative  [place] [id number]  Tel [phone] Fax [phone]    TRANSTHORACIC ECHOCARDIOGRAM REPORT    Patient Name:       AVRIL Yoder Physician:    [id number] Faraz Ortega DO  Study Date:         5/19/2025            Ordering Provider:    [id number] MARGARET GASPAR  MRN/PID:            [id number]             Fellow:  Accession#:         [id number]         Nurse:  Date of Birth/Age:  1980 / 45 years Sonographer:          Koki Mendez  RDCS  Gender Assigned at  M                    Additional Staff:  Birth:  Height:             172.72 cm            Admit Date:           5/18/2025  Weight:             75.30 kg             Admission Status:     Inpatient - STAT  BSA / BMI:           1.89 m2 / 25.24      Department Location:  [facility]  kg/m2  Blood Pressure: 97 /65 mmHg    Study Type:    TRANSTHORACIC ECHO (TTE) COMPLETE  Diagnosis/ICD: ST elevation (STEMI) myocardial infarction of unspecified  site-I21.3  Indication:    STEMI  CPT Codes:     Echo Complete w Full Doppler-[id number]    Patient History:  PCI and Stent:     PCI performed on 5/18/2025.  Smoker:            Current.  Pertinent History: Chest Pain and Murmur.    Study Detail: The following Echo studies were performed: 2D, M-Mode, Doppler and  color flow. Definity used as a contrast agent for endocardial  border definition. Total contrast used for this procedure was 2 mL  via IV push.      PHYSICIAN INTERPRETATION:  Left Ventricle: The left ventricular systolic function is normal, with a visually estimated ejection fraction of 55%. There is mild concentric left ventricular hypertrophy. Wall motion is abnormal. The left ventricular cavity size is normal. There is mild increased septal and mildly increased posterior left ventricular wall thickness. Spectral Doppler shows a normal pattern of left ventricular diastolic filling.  LV Wall Scoring:  The basal inferoseptal segment, basal inferolateral segment, and basal inferior  segment are hypokinetic. All remaining scored segments are normal.    Left Atrium: The left atrial size is normal.  Right Ventricle: The right ventricle is normal in size. There is normal right ventricular global systolic function.  Right Atrium: The right atrial size is normal.  Aortic Valve: The aortic valve appears structurally normal. There is no evidence of aortic valve stenosis.  The aortic valve dimensionless index is 0.73. There is no evidence of aortic valve regurgitation. The peak instantaneous gradient of the aortic valve is 5 mmHg. The mean gradient of the aortic valve is 3 mmHg.  Mitral Valve: The mitral valve is normal in structure. There is no evidence of mitral valve stenosis. There is normal mitral  valve leaflet mobility. There is no evidence of mitral valve regurgitation.  Tricuspid Valve: The tricuspid valve is structurally normal. There is normal tricuspid valve leaflet mobility. There is trace tricuspid regurgitation.  Pulmonic Valve: The pulmonic valve is structurally normal. There is no indication of pulmonic valve regurgitation.  Pericardium: No pericardial effusion noted.  Aorta: The aortic root is normal.  Pulmonary Artery: The main pulmonary artery is normal in size, and position, with normal bifurcation into the left and right pulmonary arteries. The tricuspid regurgitant velocity is 2.88 m/s, and with an estimated right atrial pressure of 3 mmHg, the estimated pulmonary artery pressure is mildly elevated with the RVSP at 36.2 mmHg.  Systemic Veins: The inferior vena cava appears normal in size.      CONCLUSIONS:  1. The left ventricular systolic function is normal, with a visually estimated ejection fraction of 55%.  2. Basal inferoseptal segment, basal inferolateral segment, and basal inferior segment are abnormal.  3. Abnormal wall motion.  4. There is normal right ventricular global systolic function.  5. Normal sized right ventricle.  6. There is no evidence of mitral valve stenosis.  7. No evidence of mitral valve regurgitation.  8. Trace tricuspid regurgitation is visualized.  9. Aortic valve stenosis is not present.  10. The main pulmonary artery is normal in size, and position, with normal bifurcation into the left and right pulmonary arteries.    QUANTITATIVE DATA SUMMARY:    2D MEASUREMENTS:             Normal Ranges:  Ao Root d:       2.86 cm     (2.0-3.7cm)  LAs:             3.88 cm     (2.7-4.0cm)  IVSd:            1.12 cm     (0.6-1.1cm)  LVPWd:           1.08 cm     (0.6-1.1cm)  LVIDd:           4.02 cm     (3.9-5.9cm)  LVIDs:           2.94 cm  LV Mass Index:   77.7 g/m2  LVEDV Index:     59.87 ml/m2  LV % FS          26.9 %      LEFT ATRIUM:                  Normal Ranges:  LA Vol  A4C:        29.5 ml    (22+/-6mL/m2)  LA Vol A2C:        25.4 ml  LA Vol BP:         30.5 ml  LA Vol Index A4C:  15.6ml/m2  LA Vol Index A2C:  13.4 ml/m2  LA Vol Index BP:   16.1 ml/m2  LA Area A4C:       13.8 cm2  LA Area A2C:       11.5 cm2  LA Major Axis A4C: 5.5 cm  LA Major Axis A2C: 4.4 cm  LA Volume Index:   14.9 ml/m2      RIGHT ATRIUM:                 Normal Ranges:  RA Vol A4C:        24.1 ml    (8.3-19.5ml)  RA Vol Index A4C:  12.7 ml/m2  RA Area A4C:       11.1 cm2  RA Major Axis A4C: 4.4 cm      AORTA MEASUREMENTS:         Normal Ranges:  Asc Ao, d:          2.48 cm (2.1-3.4cm)      LV SYSTOLIC FUNCTION:  Normal Ranges:  EF-A4C View:    54 % (>=55%)  EF-A2C View:    54 %  EF-Biplane:     55 %  EF-Visual:      55 %  LV EF Reported: 55 %      LV DIASTOLIC FUNCTION:           Normal Ranges:  MV Peak E:             1.20 m/s  (0.7-1.2 m/s)  MV Peak A:             1.08 m/s  (0.42-0.7 m/s)  E/A Ratio:             1.11      (1.0-2.2)  MV e'                  0.090 m/s (>8.0)  MV lateral e'          0.08 m/s  MV medial e'           0.10 m/s  E/e' Ratio:            13.29     (<8.0)      MITRAL VALVE:          Normal Ranges:  MV DT:        126 msec (150-240msec)      AORTIC VALVE:                     Normal Ranges:  AoV Vmax:                1.16 m/s (<=1.7m/s)  AoV Peak P.4 mmHg (<20mmHg)  AoV Mean PG:             3.0 mmHg (1.7-11.5mmHg)  LVOT Max Aime:            1.03 m/s (<=1.1m/s)  AoV VTI:                 22.30 cm (18-25cm)  LVOT VTI:                16.20 cm  LVOT Diameter:           2.03 cm  (1.8-2.4cm)  AoV Area, VTI:           2.35 cm2 (2.5-5.5cm2)  AoV Area,Vmax:           2.87 cm2 (2.5-4.5cm2)  AoV Dimensionless Index: 0.73      RIGHT VENTRICLE:  RV Basal 3.49 cm  RV Mid   2.65 cm  RV Major 7.3 cm  TAPSE:   22.4 mm  RV s'    0.13 m/s      TRICUSPID VALVE/RVSP:          Normal Ranges:  Peak TR Velocity:     2.88 m/s  RV Syst Pressure:     36 mmHg  (< 30mmHg)  IVC Diam:             1.74  cm      PULMONIC VALVE:          Normal Ranges:  PV Accel Time:  116 msec (>120ms)  PV Max Aime:     1.5 m/s  (0.6-0.9m/s)  PV Max P.8 mmHg      [id number] Faraz Ortega DO  Electronically signed on 2025 at 9:34:06 AM      Wall Scoring        ** Final **      Cath Data:  [facility]     LMT normal.  LAD mild irregularity, 30% mid, wraps around the apex.  LCX mild iregularity.  RCA tortuous, dom.  PDA is 100% ostial.  LVEDP 20-25.  LVEF 40-45%, inferior basal AK.       Successful PTA R % GLENNA 0 reduced to 30%, GLENNA 3 after POBA, with no stent placed due to small caliber vessel and minimal runoff, not enough to warrant stenting.     Concern for drug use considering patient very tachycardic and LVEDP elevated with very small PDA occlusion    Summary of key imaging results from the last 24 hours     See above    Assessment and Plan     Assessment: This is a 45 year old male with a past medical history of tobacco abuse (30 pack years), submandibular abscess, and nephrolithiasis who presented to [facility] with a chief complaint of chest pain, found to have an inferior STEMI, now s/p coronary angiogram with balloon angioplasty to the PDA (right dominant system). This late presenting MI c/b cardiogenic shock iso of ischemia -induced VSD.      original shock call resulted in impella CP placment. Normalized lactate but resultant hemolysis. Stay c/b renal failure, now requiring CVVH. prior c/f GIB, found to be OG trauma and small ulcer     On  patient upgraded to impella 5.5 with reduction in hemolysis. To wean off vent as able and CT surg following for future VSD repair.       Mechanical Ventilation: 4-10 days  Sedation/Analgesia:  none  Restraints: Restraints indicated as alternative therapies have been attempted and have been ineffective.  Restrain with soft wrist restraints and side rails up x4 until medical devices discontinued and/or patient able to participate with plan of care.     Summary for 25   :  Weekend goal is to attempt vent wean , wean pressors as able    Will continue to titrate levo to goal MAP 75-85   CVVH to run -2 L /hr    Infection: c/w [place] line holiday, C/w Meropenem       NEUROLOGIC  #Sedation  - fent, versed and precedex   - Wean sedation as able, CTM for responsiveness  - RAAS goal: 0 after OR     CARDIOVASCULAR  #RPDA STEMI s/p POBA  #C/f Cardiogenic vs mixed shock   #C/f VSD  ::  ECG on admit to OSH with ST elevations in the inferior leads with reciprocal depressions  :: Troponin:  24,085>21,701,[id number]  :: Lipid panel (05/2025)- cholesterol 220, HDL 33.9, , TG 57  :: TSH 1.51 (05/2025), A1C 7.4  (05/2025)  :: coronary angiogram (05/2025)  which revealed multi vessel CAD [LAD/Lcx/RCA mildly/diffusely diseased,100% RPDA culprit lesion] with PTCA to RPDA with 50% residual stenosis [GLENNA 0->3] (too small to stent)   :: LVEF 45% and LVED 20-25 mmHg via LV Gram   :: TTE  (05/2025) LVEF 55%, wall motion abnormalities, and no significant valvular disease   :: SVO2 59 from CVP port, 92 from PA port (prior to impella)  :: SVO2 57 from CVP port, 80 from PA port (after impella)  :: Off all pressors 5/21  :: restarted Levo 5/26  Plan:  - Trend Lactate, CVP mix venous (w/VBG) and PA mix venous Q8-12H when [place]-Cath is in  - Aspirin 81 mg every day, DC Brilinita given no stent / possible future surgery   - Statin: HELD Crestor 40 mg given c/f shock liver  - Beta blocker: hold given shock requiring MCS  - RAAS Inhibition: hold given shock requiring MCS  - Limited TTE repeat: Compared with the prior exam from 5/19/2025 ([facility]) there has been interval development of a large VSD throuh the inferoseptum with the RV now becoming large with significantly reduced function.   - CT surgery following for future possible VSD repair   - OR with Ct surg on 5/28 for Impella 5.5 placement    - OR for repair likely in early to mid June     # NSVT  # PVC  # 2:1 AV block , resolved   ::  started 5/24 PM, longest 20 sec run  :: VT ikely iso sub-optimal impella position  :: AV block possibly iso Vsd injury to conduction pathway   :: repositioned under US 5/25 AM with less ectopy burden after   - DC IV amio 5/30 and monitor Tele     PULMONARY  #AHRF   :: Prior to intubation satting 92% on RA  :: CXR with pulmonary edema vs PNA  :: CTA CAP- mild interstitial pulmonary edema   :: , WBC 34.1 on admission   Plan:  - Infectious workup/abx as below   - Wean Ventilator when more responsive      RENAL/  # BENY, Anuric   # HAGMA   # Volume overload iso VSD   :: Baseline Cr 0.8-1.0, 1.61 on admit, 1.85 on transport - pleatu but Cr still > 10  Plan:  - Likely hemodynamic mediated , vanco, LV gram  - Brief SLED 5/23, 5/25, 5/27   - Nephrology following   - CVVH started 4/28   - goal 2 L out   - Avoid hypotension, rapid fluctuations in BP  - Daily RFP        GASTROINTESTINAL  #GI PPX  # Traumatic OG placement , small superficial ulcer  # Ileus   # jaundice, Fever  # hyperbilirubinemia, both direct and indirect   - IV PPI BID for 2 months for esophageal ulcer   - Aggressive bowel reg: miralx QID, Senna 2 tab BID, Prn enema and suppository   - S/p EGD w/ GI 5/7 w/o concern for overtly large Gib   - RUQ 5/28 negative for cholecystis   - Tube feeds started 5/29 (Held for possible SBT)      ENDOCRINE  # T2DM   :: A1C 7.4  (05/2025)  Plan:  - NPO w/ Tube feeds started 5/29  - Q4H POC glucose, SSI while NPO w/ trickle feeds  - Titrate -180  - Will need diabetes education prior to discharge   - BID labs for monitor for refeeding      HEME/ONC  #thrombocytopenia  # Anemia    # Hemolysis iso impella use, improving    - C/f some degree of hemolysis   - Hepatic function, LHD, hapto daily  - Heparin products DC 5/23, restarted 5/26   - Impella Purg: Hep/D5W   - Systemic: Heparin (follow ACT)  - Anti-Plt Factor 4 AB / Serotonin release assay negative       INFECTIOUS DISEASE  #C/f Mixed shock   #Leukocytosis  #  Fever  Unclear if Fever is from infection or general cardiogenic shock at this time   :: S/p Doxy/CTX 05/19  :: blood cultures sent 05/19 x 2 sets   :: Rising WBC despite 8 days of zosyn   Work up:  - MRSA probe: -  - Bcx2: NGTD  - UA: non-infectious   - Sputum: contaminate  - C diff: pending sample  Plan:  -Dc zosyn; Start Vanco, Adolfo, and micafungin 5/26   - DC emma 5/30 and Vanco given negative BC  - Follow infectious labs  - ID following      MSK/DERM  SWAPNIL         ICU Check List       ICU Liberation: Intervention:   Assess, Prevent, Manage Pain 0 - No pain fentanyl gtt   Both SAT and SBT [] SAT  [] SBT 30-60 min [] Extubate to NC  [] Extubate to NIV  [] Discuss Trach   Choice of analgesia and sedation Romero Agitation Sedation Scale (RASS): Light sedation Fentanyl, Precedex, and Versed  -1 to -2   Delirium: Assess, prevent and manage  CAM ICU Positive Assess Qshift   Early Mobility and Exercise Receiving therapies that restrict mobility [] PT /OT consult   Family Engagement and Empowerment [x]Family updated []SW consult     F: PRN for euvolemia; swan guided therapy   E: PRN K>4, Mg>2, P>3   N: NPO  A: PIV, RIJ, left radial A line      Oxygen: MV 30%/500/14/5  Abx: Vanco, Adolfo, Emma   Gtts: levo, fent, prcedex     DVT ppx: Lovenox  GI ppx: IV PPI     Code Status: full (confirmed on admit)  Surrogate Medical Decision-maker:    Ashley Hogan (Mother) [phone]         Introducer 05/19/25 Internal jugular Right (Active)   Placement Date/Time: 05/19/25 2330   Hand Hygiene Completed: Yes  Location: Internal jugular  Orientation: Right  Placed by: CG/AL  Placement Verified: X-ray;Pressure tracing changes;Transduced waveform   Number of days: 0       Arterial Line 05/19/25 Left Radial (Active)   Placement Date/Time: 05/19/25 2200   Orientation: Left  Location: Radial  Local Anesthetic: Injectable  Technique: Anatomical landmarks  Insertion attempts: 1  Securement Method: Sutured  Patient Tolerance: Fair    Number of days: 0       Arterial Sheath 05/20/25 0529 6 Fr. Right Femoral (Active)   Placement Date/Time: 05/20/25 0529   Sheath Size: (c) 6 Fr.  Line Orientation: Right  Sheath Insertion Site: Femoral   Number of days: 0       Arterial Sheath 05/20/25 0532 Other (Comment) Right Femoral (Active)   Placement Date/Time: 05/20/25 0532   Sheath Size: (c) Other (Comment)  Line Orientation: Right  Sheath Insertion Site: Femoral   Number of days: 0       Arterial Sheath 05/20/25 0559 6 Fr. Left Femoral (Active)   Placement Date/Time: 05/20/25 0559   Sheath Size: 6 Fr.  Line Orientation: Left  Sheath Insertion Site: Femoral   Number of days: 0       Pulmonary Artery Catheter Internal jugular (Active)   Placement Date/Time: 05/19/25 2330   Hand Hygiene Performed Prior to CVC Insertion: Yes  Site Prep: Alcohol;Chlorhexidine ;Usual sterile procedure followed  Site Prep Agent has Completely Dried Before Insertion: Yes  All 5 Sterile Barriers Used (Glove...   Number of days: 0       ETT  7.5 mm (Active)   Placement Date/Time: 05/19/25 2305   Hand Hygiene Completed: Yes  ETT Type: ETT - single  Single Lumen Tube Size: 7.5 mm  Cuffed: Yes  Location: Oral  Airway Insertion Attempts: 1  Placement Verification: Auscultation;Capnometry;Fiberoptic visualizati...   Number of days: 0       Urethral Catheter 16 Fr. (Active)   Placement Date/Time: 05/20/25 0200   Hand Hygiene Completed: Yes  Tube Size (Fr.): 16 Fr.  Catheter Balloon Size: 10 mL  Urine Returned: Yes   Number of days: 0       NG/OG/Feeding Tube OG - [place] sump 16 Fr Center mouth (Active)   Placement Date/Time: 05/19/25 2310   Placed by: DM  Hand Hygiene Completed: Yes  Type of Tube: NG/OG Tube  Tube Length: 55 cm  Tube Type: OG - [place] sump  NG/OG Tube Size: 16 Fr  Tube Location: Center mouth   Number of days: 0       Social:  Code: Full Code    Disposition: NABOR Leong,    06/01/25 at 8:24 AM     Disclaimer: Documentation completed with the  information available at the time of input. The times in the chart may not be reflective of actual patient care times, interventions, or procedures. Documentation occurs after the physical care of the patient.           [1] aspirin, 81 mg, oral, Daily  insulin lispro, 0-5 Units, subcutaneous, q4h  meropenem, 2 g, intravenous, q12h  oxygen, , inhalation, Continuous - Inhalation  pantoprazole, 40 mg, intravenous, BID  perflutren lipid microspheres, 0.5-10 mL of dilution, intravenous, Once in imaging  perflutren protein A microsphere, 0.5 mL, intravenous, Once in imaging  polyethylene glycol, 17 g, oral, 4x daily  sennosides, 2 tablet, oral, BID  sodium phosphate, 21 mmol, intravenous, Once  thiamine, 500 mg, intravenous, Daily     [2] dexmedeTOMIDine, 0.1-1.5 mcg/kg/hr (Dosing Weight), Last Rate: 0.6 mcg/kg/hr (06/01/25 0648)  fentaNYL,  mcg/hr, Last Rate: 75 mcg/hr (06/01/25 0400)  heparin, 0-2,000 Units/hr, Last Rate: 600 Units/hr (06/01/25 0400)  heparin Impella Purge 25 units/mL in 500 mL D5W, 10 mL/hr, Last Rate: 10 mL/hr (06/01/25 0400)  midazolam, 0.5-20 mg/hr, Last Rate: 1 mg/hr (06/01/25 0438)  norepinephrine, 0.01-1 mcg/kg/min (Dosing Weight), Last Rate: 0.04 mcg/kg/min (06/01/25 0648)  PrismaSol 4/2.5, 24 mL/kg/hr (Dosing Weight), Last Rate: 24 mL/kg/hr (06/01/25 0217)     [3] PRN medications: alteplase, bisacodyl, dextrose, dextrose, glucagon, glucagon

## 2025-06-01 NOTE — PROGRESS NOTES
Alpesh Elena is a 45 y.o. male on day 12 of admission presenting with STEMI (ST elevation myocardial infarction) (Multi).    Subjective   Interval History:       Remain intubated and sedated  0.02 levo  WBC improving  Frothy sputum in canister, 2 small BM yesterday      Objective   Range of Vitals (last 24 hours)  Heart Rate:  []   Temp:  [35.3 °C (95.5 °F)-36.9 °C (98.4 °F)]   Resp:  [0-24]   Weight:  [90.2 kg (198 lb 13.7 oz)]   SpO2:  [95 %-100 %]   Daily Weight  06/01/25 : 90.2 kg (198 lb 13.7 oz)    Body mass index is 30.24 kg/m².    EXAM:  GEN: Intubated, sedated, critically ill  HEENT: Icteric sclera, no injection. R impella site without surrounding erythema or bleeding. Internal jugular appears c/d/I  CV: RRR, systolic murmur, no rubs or gallops  RESP: Intubated, largely clear anteriorly  ABD: Distended but soft, NT  EXT: Less edematous today. No obvious IV-associated issues    Antibiotics  meropenem (Merrem) IV 2 g in 100 mL NS    Relevant Results  Labs  Results from last 72 hours   Lab Units 06/01/25 0410 05/31/25 1814 05/31/25  0204   WBC AUTO x10*3/uL 18.1* 16.8* 22.5*   HEMOGLOBIN g/dL 8.6* 8.4* 9.2*   HEMATOCRIT % 24.9* 24.1* 27.7*   PLATELETS AUTO x10*3/uL 73* 70* 104*   NEUTROS PCT AUTO % 77.1  --   --    LYMPHO PCT MAN %  --  3.3 6.0   LYMPHS PCT AUTO % 11.9  --   --    MONO PCT MAN %  --  2.5 3.0   MONOS PCT AUTO % 6.1  --   --    EOSINO PCT MAN %  --  0.0 0.0   EOS PCT AUTO % 0.4  --   --      Results from last 72 hours   Lab Units 06/01/25 0410 05/31/25 1814 05/31/25  0204   SODIUM mmol/L 136 133* 133*   POTASSIUM mmol/L 4.0 4.1 4.1   CHLORIDE mmol/L 102 100 99   CO2 mmol/L 26 28 25   BUN mg/dL 30* 30* 31*   CREATININE mg/dL 2.48* 2.52* 2.35*   GLUCOSE mg/dL 178* 165* 184*   CALCIUM mg/dL 7.7* 8.1* 8.0*   ANION GAP mmol/L 12 9* 13   EGFR mL/min/1.73m*2 32* 31* 34*   PHOSPHORUS mg/dL 2.7 2.3* 3.2     Results from last 72 hours   Lab Units 06/01/25  0410 05/31/25  1814 05/31/25  0204  05/30/25  1837 05/30/25  0021   ALK PHOS U/L 175*  --  197*  --  235*   BILIRUBIN TOTAL mg/dL 21.9*  --  21.0*  --  20.8*   BILIRUBIN DIRECT mg/dL 14.7*  --  16.0*  --  14.4*   PROTEIN TOTAL g/dL 5.1*  --  4.9*  --  4.5*   ALT U/L 100*  --  131*  --  157*   AST U/L 106*  --  103*  --  150*   ALBUMIN g/dL 2.6* 2.7* 2.6*   < > 2.4*    < > = values in this interval not displayed.     Estimated Creatinine Clearance: 40.6 mL/min (A) (by C-G formula based on SCr of 2.48 mg/dL (H)).  C-Reactive Protein   Date Value Ref Range Status   07/07/2024 10.34 (H) <1.00 mg/dL Final     Microbiology  Susceptibility data from last 14 days.  Collected Specimen Info Organism   05/20/25 Fluid from Tracheal Aspirate Normal throat margaret     Imaging  -05/19 BCx: NG  -05/20 RCx: throat margaret  -05/20 Strep pneumo, legionella ag: negative  -05/21 BCx: NG  -05/26 BCx: NGTD  -05/30 Rcx: contaminated sputum    Imaging  Reviewed in Epic.     Assessment/Plan   45yM with tobacco use (30py history) and nephrolithiasis who presented to [facility] on 5/18 with chest pain and found with inferior STEMI s/p multi-vessel disease (LAD/LCx/RCA) s/p PTCA to RPDA 5/18 with EF 45%. Course complicated by concern for ischemic VSD s/p impella placement 5/20 for LV offloading, ARF requiring intermittent RRT, ileus, possible shock liver, non-bleeding benign ulceration at GE junction and linear erosions in stock (OG trauma) per colonoscopy 5/27.    ID reconsulted in setting of possible mixed shock despite Zosyn 5/18-5/23. Switched to vanc/deon/micafungin since 5/26. No cross-sectional imaging pursued in setting of critical illness on discussions with primary team. Underwent Impella upsize 5/28 with reduction in hemolysis, with possible relationship to improvement in WBC and possibly pressors on review of trends. Vanc/micafungin stopped 5/30.     #STEMI complicated by cardiogenic shock, VSD, status post Impella  #Fever, resolved   #Leukocytosis,  improving    Recommendations:  - No obvious infectious source identified. Shock and leukocytosis may have been multifactorial for reasons above  - Continue meropenem, renally dosed, through tomorrow 6/2 to complete 7 days, then can stop and monitor. If recurs, would reculture as appropriate and consider cross-sectional imaging again as part of work-up    ID Team A will plan to sign off, but please contact ID Team A through 6/5 with any questions or concerns.    Alton Flores MD

## 2025-06-01 NOTE — PROCEDURES
I evaluated the patient during while he was receiving CVVH    BP: 119/83 BFR: 200  Replacement fluid: Prismasol 4K/2.5Ca  Flow rate: 700/pump/700/filter/700    Plan: Continue CVVH until BP stable enough for IHD or renal function recovers.

## 2025-06-01 NOTE — PROGRESS NOTES
Alpesh Elena is a 45 y.o. male on day 11 of admission presenting with STEMI (ST elevation myocardial infarction) (Multi).    Subjective   Interval History:  had hypothermia, tachycardia, on norepi 0.06, same fio2  5/2 sputum c/s salivary contamination  Blood c/s NGTD  Wbc trending down   22 from 27  ALT, AST downtrending, hu still high     On CVVH       Objective   Range of Vitals (last 24 hours)  Heart Rate:  []   Temp:  [34 °C (93.2 °F)-36.8 °C (98.2 °F)]   Resp:  [0-26]   BP: (84-94)/(79-88)   Weight:  [91 kg (200 lb 9.9 oz)-91.8 kg (202 lb 6.1 oz)]   SpO2:  [94 %-99 %]   Daily Weight  05/31/25 : 91.8 kg (202 lb 6.1 oz)    Body mass index is 30.77 kg/m².    Physical Exam  Physical Exam  Constitutional:       Appearance: opening eyes today  He is ill-appearing. Jandiced      Comments: Intubated , sedated, undergoing CVVH    Cardiovascular:      Heart sounds: Murmur heard.   Impella at RT chest    Pulmonary:      Comments: MV sounds  Abdominal:      Palpations: Abdomen is soft.        Antibiotics  meropenem (Merrem) IV 2 g in 100 mL NS    Relevant Results  Labs  Results from last 72 hours   Lab Units 05/31/25  1814 05/31/25  0204 05/30/25  1837 05/29/25  0641 05/29/25  0158   WBC AUTO x10*3/uL 16.8* 22.5* 22.2*   < > 29.2*   HEMOGLOBIN g/dL 8.4* 9.2* 7.4*   < > 7.0*   HEMATOCRIT % 24.1* 27.7* 21.3*   < > 20.1*   PLATELETS AUTO x10*3/uL 70* 104* 92*   < > 137*   NEUTROS PCT AUTO %  --   --   --   --  75.0   LYMPHO PCT MAN % 3.3 6.0 3.3   < >  --    LYMPHS PCT AUTO %  --   --   --   --  7.2   MONO PCT MAN % 2.5 3.0 4.1   < >  --    MONOS PCT AUTO %  --   --   --   --  6.5   EOSINO PCT MAN % 0.0 0.0 0.0   < >  --    EOS PCT AUTO %  --   --   --   --  0.3    < > = values in this interval not displayed.     Results from last 72 hours   Lab Units 05/31/25  1814 05/31/25  0204 05/30/25  1837   SODIUM mmol/L 133* 133* 131*   POTASSIUM mmol/L 4.1 4.1 4.1   CHLORIDE mmol/L 100 99 98   CO2 mmol/L 28 25 24   BUN  mg/dL 30* 31* 33*   CREATININE mg/dL 2.52* 2.35* 2.45*   GLUCOSE mg/dL 165* 184* 208*   CALCIUM mg/dL 8.1* 8.0* 7.9*   ANION GAP mmol/L 9* 13 13   EGFR mL/min/1.73m*2 31* 34* 32*   PHOSPHORUS mg/dL 2.3* 3.2 3.9     Results from last 72 hours   Lab Units 05/31/25  1814 05/31/25  0204 05/30/25  1837 05/30/25  0021 05/29/25  0919 05/29/25  0158   ALK PHOS U/L  --  197*  --  235* 279* 308*   BILIRUBIN TOTAL mg/dL  --  21.0*  --  20.8* 22.4* 21.8*   BILIRUBIN DIRECT mg/dL  --  16.0*  --  14.4*  --  15.0*   PROTEIN TOTAL g/dL  --  4.9*  --  4.5* 4.6* 4.5*   ALT U/L  --  131*  --  157* 179* 195*   AST U/L  --  103*  --  150* 212* 247*   ALBUMIN g/dL 2.7* 2.6* 2.5* 2.4* 2.5* 2.5*     Estimated Creatinine Clearance: 40.3 mL/min (A) (by C-G formula based on SCr of 2.52 mg/dL (H)).  C-Reactive Protein   Date Value Ref Range Status   07/07/2024 10.34 (H) <1.00 mg/dL Final     Microbiology  Susceptibility data from last 14 days.  Collected Specimen Info Organism   05/20/25 Fluid from Tracheal Aspirate Normal throat margaret     5/19 blood cultures 2 sets no growth  5/20 MRSA nares negative  5/20 strep pneumo Legionella urine antigen negative  5/21 MRSA PCR negative  5/21 blood cultures 2 sets no growth  5/26 blood cultures NG  Imaging              Assessment/Plan   45-year-old man past medical history significant for tobacco use, transferred to [facility] [place] [facility] on 5/20 after presenting to [place] with chest pain and STEMI, status post balloon angioplasty, developed shock and TTE with large VSD vs inferior LV wall rupture.  He underwent Impella placement on 5/20.  ID has been following for fever, Tmax 101.3 on 5/21 with WBC to 34K, now improved.  He was initially treated for pneumonia, and has been on pip-tazo since 5/19, with intermittent doses of vancomycin.  Blood cultures negative from 5/19 and 5/21.  Strep pneumo and Legionella urine antigen negative.  Sputum culture with rare normal throat margaret.  Started on RRT. LFTs improving, T. bili  remains elevated.  Remains with low FiO2 30%, PEEP 5.      Called back 5/26 for worsening leukocytosis.  No documented fever since 5/21, however in the past day or so he has been placed back on Arctic sun cooling device.  WBC now 25K, neutrophil predominant.  His last vancomycin dose was 5/24, and his WBC was elevated by 5/25 at midnight, therefore it was likely rising despite vancomycin treatment.  Last vancomycin level 5/26 is 15.  Is now on pressor support with nor epi 0.06, and in the setting of unclear new source of infection, expanded his coverage with meropenem, empiric micafungin in the setting of critical illness, and add back vancomycin until blood cultures result.       EGD 5/27, single ulcer at GE junction likely from OGT trauma or suctioning. Tbili 21, direct bili 19, haptoglobin <30, LDH 2600. Neutrophil predominant leukocytosis, blood cx NGTD, vent settings remain low. No cholecystitis on US. CT imaging deferred by team for now. In the absence of obvious infection, leukocytosis may be driven by critical illness from his cardiogenic shock. Upgrading Impella today.       5/29 -worsening hemodynamics, leukocytosis, decreasing hemoglobin.  Clinical picture mixed between septic shock or worsening cardiogenic shock versus new bleed.  He has not responded to his expanded antibiotics with vancomycin/meropenem/micafungin, and no organisms been identified on blood culture, last sputum culture with normal throat margaret, and although we requested repeat sputum now, there is minimal secretions via his ET tube per nursing; vent settings remain low.  Intermittent stool output with a bowel regimen, KUB today with nonobstructive gas pattern, no obvious ileus.     5/30 afebrile, ALT and AST downtrending , PLT N . Leukocytosis 27 previous 30   would discontinue vancomycin and micafungin, no evidence of MRSA or fungal infection      #STEMI complicated by cardiogenic shock, VSD, status post Impella  #Fever, resolved    #Leukocytosis    Recommendations:  - continue meropenem, renally dosed  - ID will follow            Jennifer Islas MD

## 2025-06-01 NOTE — PROGRESS NOTES
Cardiac ICU Progress Note    Admit Date: 2025   Hospital Length of Stay: 12   ICU Length of Stay: 12d 13h     History of Present Illness  Alpesh Elena is a 45 y.o. male on day 12d 13h of admission for STEMI (ST elevation myocardial infarction) (Multi). with following ICU needs: Cardiogenic shock, impella, VSD/ Inferior LV wall rupture     Subjective   Subjective  ***      Objective     Objective    Vitals and I/O    24 Hour Vitals  Temp:  [35.3 °C (95.5 °F)-36.9 °C (98.4 °F)] 36.8 °C (98.2 °F)  Heart Rate:  [] 80  Resp:  [0-24] 16  Arterial Line BP 1: ()/() 96/86  FiO2 (%):  [30 %] 30 %    Temp (24hrs), Av.2 °C (97.2 °F), Min:35.3 °C (95.5 °F), Max:36.9 °C (98.4 °F)     24 hour Intake/Output    Intake/Output Summary (Last 24 hours) at 2025 1429  Last data filed at 2025 1300  Gross per 24 hour   Intake 1864.99 ml   Output 2952 ml   Net -1087.01 ml        Vitals:    25 0600   Weight: 90.2 kg (198 lb 13.7 oz)        Vent settings    Most Recent Range Past 24hrs   Mode Pressure support    FiO2 30 % FiO2 (%)  Min: 30 %   Min taken time: 25 0400  Max: 30 %   Max taken time: 25 0400   Rate 16 Resp Rate (Set)  Min: 16   Min taken time: 25 0842  Max: 16   Max taken time: 25 0842   Vt 450 mL  Vt (Set, mL)  Min: 450 mL   Min taken time: 25 0842  Max: 450 mL   Max taken time: 25 0842   PEEP 5 cm H20 PEEP/CPAP (cm H2O)  Min: 5 cm H20   Min taken time: 25 1108  Max: 5 cm H20   Max taken time: 25 1108       Invasive Hemodynamics   Most Recent Range Past 24hrs   BP (Art) 96/86 Arterial Line BP 1  Min: 79/75  Max: 147/67   MAP(Art) 89 mmHg Arterial Line MAP 1 (mmHg)   Min: 75 mmHg  Max: 120 mmHg   RA/CVP   No data recorded   PA 36/12 No data recorded   PA(mean) 21 mmHg No data recorded   PCWP 14 mmHg No data recorded   CO 6.3 L/min No data recorded   CI 3.1 L/min/m2 No data recorded   Mixed Venous 50 % No data recorded   SVR  939  (dyne*sec)/cm5 No data recorded    (dyne*sec)/cm5 No data recorded       Physical Exam  Physical Exam  Constitutional:       Comments: Intubated, eye opens to sternal rub    Cardiovascular:      Rate and Rhythm: Normal rate and regular rhythm.      Heart sounds: Murmur heard.      Comments: Holosystolic murmer, best heard at 4th intercostal rib. Very weak vs absent b/l PT and pedal.   Pulmonary:      Effort: Pulmonary effort is normal.      Breath sounds: Normal breath sounds. No wheezing or rales.   Abdominal:      General: Abdomen is flat. Bowel sounds are normal. There is no distension.      Palpations: Abdomen is soft.   Musculoskeletal:      Right lower leg: No edema.      Left lower leg: No edema.   Skin:     General: Skin is warm.      Coloration: Skin is jaundiced.      Comments: Jaundice improving    Neurological:      Comments: Sedated      Labs    CMP:  Recent Labs     06/01/25 0410 05/31/25 1814 05/31/25 0204 05/30/25  1837 05/30/25  0021 05/30/25  0020 05/29/25  0919 05/29/25  0158 05/28/25  1235 05/28/25  0631    133* 133* 131*  --  130* 131* 130* 130* 131*   K 4.0 4.1 4.1 4.1  --  5.4* 4.5 4.7 4.3 4.0    100 99 98  --  97* 94* 93* 90* 93*   CO2 26 28 25 24  --  23 23 22 23 26   ANIONGAP 12 9* 13 13  --  15 19 20 21* 16   BUN 30* 30* 31* 33*  --  40* 48* 51* 39* 25*   CREATININE 2.48* 2.52* 2.35* 2.45*  --  3.15* 3.60* 4.44* 3.85* 2.85*   EGFR 32* 31* 34* 32*  --  24* 20* 16* 19* 27*   MG 2.93* 2.95* 2.75* 2.68* 2.74*  --   --  2.80* 2.99* 3.02*     Recent Labs     06/01/25  0410 05/31/25  1814 05/31/25  0204 05/30/25  1837 05/30/25  0021 05/29/25  0919 05/29/25  0158 05/28/25  0631 05/28/25  0237   ALBUMIN 2.6* 2.7* 2.6* 2.5* 2.4* 2.5* 2.5*   < > 3.0*   ALKPHOS 175*  --  197*  --  235* 279* 308*  --  449*   *  --  131*  --  157* 179* 195*  --  273*   *  --  103*  --  150* 212* 247*  --  276*   BILITOT 21.9*  --  21.0*  --  20.8* 22.4* 21.8*  --  21.0*    < > =  values in this interval not displayed.       CBC:  Recent Labs     06/01/25  0410 05/31/25  1814 05/31/25  0204 05/30/25  1837 05/30/25  0020 05/29/25  1552 05/29/25  0641 05/29/25  0158   WBC 18.1* 16.8* 22.5* 22.2* 27.7* 30.0* 31.9* 29.2*   HGB 8.6* 8.4* 9.2* 7.4* 8.1* 8.2* 6.5* 7.0*   HCT 24.9* 24.1* 27.7* 21.3* 22.6* 22.9* 18.9* 20.1*   PLT 73* 70* 104* 92* 201 144* 135* 137*   MCV 92 92 96 91 88 89 92 91       COAG:   Recent Labs     05/27/25  1814 05/26/25  0026 05/25/25  1545 05/20/25  0133 05/18/25  2159 07/07/24  2303   INR 1.3* 1.9* 1.9* 1.4* 1.1 1.2*       ABO:   Recent Labs     05/29/25  0919   ABO A       HEME/ENDO:  Recent Labs     05/25/25  0033 05/20/25  0133 05/18/25  2328 05/18/25  2158   FERRITIN 1,099*  --   --   --    IRONSAT 53*  --   --   --    TSH  --  3.50 1.51  --    HGBA1C  --   --   --  7.4*        CARDIAC:   Recent Labs     06/01/25  0410 05/31/25  0204 05/30/25  0021 05/29/25  0158 05/28/25  0237 05/27/25  0302 05/20/25  1414 05/20/25  0133 05/19/25  1929 05/19/25  1211 05/19/25  0443 05/18/25  2328 05/18/25  2159   * 1,035* 1,437* 2,325* 2,618* 1,955*   < > 1,026*  --   --   --   --   --    TROPHS  --   --   --   --   --   --   --   --  18,638* 19,289* 21,701* 24,085*  --    BNP  --   --   --   --   --   --   --  760*  --   --   --   --  236*    < > = values in this interval not displayed.     Recent Labs     06/01/25 0412 05/30/25 0024 05/29/25 0919 05/28/25 2256 05/28/25  1832 05/28/25  1815 05/28/25  1659 05/28/25  1236 05/28/25  0540 05/28/25  0011 05/27/25  2353   LACMX  --   --  3.0*  --   --   --   --  2.8* 1.4 1.3 1.3   LACTATEART 0.9 1.6  --  4.1* 2.1* 2.1*   < >  --   --   --   --    SO2MV  --   --  50  --   --   --   --  48 53 53 86*   O2CMX  --   --  48.2  --   --   --   --  46.0 50.8 50.4 81.9*    < > = values in this interval not displayed.       ABG:   Results from last 7 days   Lab Units 06/01/25 0412 05/30/25 0024 05/29/25 0919 05/28/25 2258  "05/28/25 2256   POCT PH, ARTERIAL pH 7.43* 7.38  --   --  7.38   POCT PO2, ARTERIAL mm Hg 92 77*  --   --  87   POCT PCO2, ARTERIAL mm Hg 35* 35*  --   --  29*   POCT LACTATE, ARTERIAL mmol/L 0.9 1.6  --   --  4.1*   FIO2 % 30 30 30   < > 30    < > = values in this interval not displayed.       VBG:   Results from last 7 days   Lab Units 05/29/25  0428 05/28/25  2258 05/26/25  0556   POCT PH, VENOUS pH 7.38 7.35 7.35   POCT PCO2, VENOUS mm Hg 36* 21* 38*       Micro/ID:   Lab Results   Component Value Date    BLOODCULT No growth at 4 days -  FINAL REPORT 05/26/2025    BLOODCULT No growth at 4 days -  FINAL REPORT 05/26/2025           Results from last 7 days   Lab Units 05/27/25  1814   LACTATE mmol/L 1.3               No results found for: \"CKTOTAL\", \"CKMB\", \"CKMBINDEX\", \"TROPONINI\"   Lab Results   Component Value Date    HGBA1C 7.4 (H) 05/18/2025      Lab Results   Component Value Date    CHOL 220 (H) 05/18/2025     Lab Results   Component Value Date    HDL 33.9 05/18/2025     Lab Results   Component Value Date    LDLCALC 175 (H) 05/18/2025     Lab Results   Component Value Date    TRIG 57 05/18/2025     No components found for: \"CHOLHDL\"     Cardiac Data    EKG  Encounter Date: 05/20/25   Electrocardiogram, 12-lead PRN ACS symptoms   Result Value    Ventricular Rate 48    Atrial Rate 48    GA Interval 136    QRS Duration 104    QT Interval 640    QTC Calculation(Bazett) 571    P Axis -18    R Axis 75    T Axis 19    QRS Count 8    Q Onset 216    P Onset 148    P Offset 194    T Offset 536    QTC Fredericia 594    Narrative    Sinus bradycardia  Possible Inferior infarct (cited on or before 18-MAY-2025)  Cannot rule out Anterior infarct , age undetermined  Prolonged QT  Abnormal ECG  When compared with ECG of 28-MAY-2025 03:26,  Serial changes of Inferior infarct Present      Echocardiogram  Transthoracic Echo (TTE) Limited  Result Date: 5/29/2025   [facility], [address] [place] [address] " [id number]                Tel [phone] and Fax [phone] TRANSTHORACIC ECHOCARDIOGRAM REPORT  Patient Name:       AVRIL FRITZ    Reading Physician:    [id number] Aviles MD Study Date:         5/29/2025           Ordering Provider:    [id number] ZACHARY BROWN MRN/PID:            [id number]            Fellow: Accession#:         [id number]        Nurse: Date of Birth/Age:  1980 / 45      Sonographer:          Fellow Exam                     years Gender assigned at  M                   Additional Staff: Birth: Height:                                 Admit Date:           5/20/2025 Weight:                                 Admission Status:     Inpatient - STAT BSA / BMI:          m2 / kg/m2          Encounter#:           [id number] Blood Pressure:     /                   Department Location:  [facility] Study Type:    TRANSTHORACIC ECHO (TTE) LIMITED Diagnosis/ICD: ST elevation (STEMI) myocardial infarction of unspecified                site-I21.3 Indication:    Impella Placement CPT Code:      Echo Limited-21672  Study Detail: The following Echo studies were performed: 2D.  PHYSICIAN INTERPRETATION: Left Ventricle: The left ventricle was not well visualized. The left ventricular ejection fraction could not be measured. The left ventricular cavity size was not assessed. Left ventricular diastolic filling was not assessed. Left Atrium: The left atrial size was not assessed. Right Ventricle: The right ventricle was not well visualized. Unable to determine right ventricular systolic function. A device is visualized in the right ventricle. Right Atrium: The right atrial size was not assessed. Aortic Valve: There is indeterminate aortic valve regurgitation. Mitral Valve: The mitral valve was not well visualized. Mitral valve regurgitation was not assessed. Tricuspid Valve: The  tricuspid valve was not assessed. Tricuspid regurgitation was not assessed. Pulmonic Valve: The pulmonic valve was not assessed. Pulmonic valve regurgitation was not assessed. Pericardium: Pericardial effusion was not assessed. Aorta: The aortic root was not well visualized. Systemic Veins: The inferior vena cava was not assessed, IVC inspiratory collapse was not assessed.  LEFT VENTRICULAR ASSIST DEVICE: LVAD: The patient has a(n) Impella LVAD device present.  CONCLUSIONS:  1. The left ventricle was not well visualized. The left ventricular ejection fraction could not be measured.  2. Unable to determine right ventricular systolic function.  3. This is a truncated Fellow exam, advise full study be performed. QUANTITATIVE DATA SUMMARY:  [id number] Juan F Aviles MD Electronically signed on 5/29/2025 at 10:39:31 AM  ** Final **     Transthoracic Echo (TTE) Limited  Result Date: 5/27/2025   [facility], [address] [place] [address]                Tel [phone] and Fax [phone] TRANSTHORACIC ECHOCARDIOGRAM REPORT  Patient Name:       AVRIL FRITZ    Reading Physician:    [id number] Maribel Coughlin MD Study Date:         5/27/2025           Ordering Provider:    [id number] TIBURCIO TUCKER MRN/PID:            [id number]            Fellow: Accession#:         [id number]        Nurse: Date of Birth/Age:  1980 / 45      Sonographer:          Saulo carranza RDCS Gender assigned at  M                   Additional Staff: Birth: Height:             172.72 cm           Admit Date: Weight:             87.54 kg            Admission Status:     Inpatient - STAT BSA / BMI:          2.01 m2 / 29.35     Encounter#:           [id number]                     kg/m2 Blood Pressure:     117/74 mmHg         [facility] Study Type:    TRANSTHORACIC ECHO (TTE) LIMITED Diagnosis/ICD: Ventricular septal defect (VSD)-Q21.0 Indication:    impella placement CPT Code:      Echo Limited-91048; Color Doppler-76387 Patient History: Pertinent History: Chest pain and STEMI, status post balloon angioplasty,                    developed shock and TTE with large VSD vs inferior LV wall                    rupture, Impella placement on 5/20. Study Detail: The following Echo studies were performed: 2D and color flow.               Technically challenging study due to body habitus, patient lying               in supine position, poor acoustic windows and prominent lung               artifact. The patient is intubated.  PHYSICIAN INTERPRETATION: Left Ventricle: The left ventricle was not well visualized. The left ventricular ejection fraction could not be measured. The left ventricular cavity size was not assessed. Left ventricular diastolic filling was not assessed. Left Atrium: The left atrial size was not assessed. Right Ventricle: The right ventricle was not assessed. Right ventricular systolic function not assessed. A device is visualized in the right ventricle. Right Atrium: The right atrial size was not assessed. There is a device visualized in the right atrium. Aortic Valve: The aortic valve was not assessed. Aortic valve regurgitation was not assessed. Mitral Valve: The mitral valve is normal in structure. There is trace mitral valve regurgitation. Tricuspid Valve: The tricuspid valve was not assessed. Tricuspid regurgitation was not assessed. Pulmonic Valve: The pulmonic valve was not assessed. Pulmonic valve regurgitation was not assessed. Pericardium: Trivial pericardial effusion. Aorta: The aortic root was not assessed. Systemic Veins: The inferior vena cava appears dilated.  LEFT VENTRICULAR ASSIST DEVICE: LVAD: The patient has a(n) Impella LVAD device present. LVAD Comments: Difficult to assess given image quality, though Impella  appears to extend approx 3.7-3.9 cm beneath the AV.  CONCLUSIONS:  1. The left ventricle was not well visualized. The left ventricular ejection fraction could not be measured.  2. Limited TTE to asess LVAD placement.  3. Impella appears to be 3.7cm to 3.9cm beneath the AV though diffiult images. QUANTITATIVE DATA SUMMARY:  TRICUSPID VALVE/RVSP:         Normal Ranges: IVC Diam:             2.20 cm  [id number] Maribel Coughlin MD Electronically signed on 5/27/2025 at 12:35:42 PM  ** Final **     Transthoracic Echo (TTE) Limited  Result Date: 5/27/2025   [facility], [address] [place] [address]                Tel [phone] and Fax [phone] TRANSTHORACIC ECHOCARDIOGRAM REPORT  Patient Name:        AVRIL FRITZ     Reading Physician: [id number] Maribel Coughlin MD Study Date:          5/26/2025            Ordering Provider: [id number] MIMI SARGENT MRN/PID:             [id number]             Fellow: Accession#:          [id number]         Nurse: Date of Birth/Age:   1980 / 45 years Sonographer:       Cardiology Fellow Gender assigned at   M                    Additional Staff: Birth: Height:                                   Admit Date:        5/20/2025 Weight:                                   Admission Status:  Inpatient - Routine BSA / BMI:           m2 / kg/m2           Encounter#:        [id number] Study Type:    TRANSTHORACIC ECHO (TTE) LIMITED Diagnosis/ICD: ST elevation (STEMI) myocardial infarction of unspecified                site-I21.3 Indication:    VSD, shock CPT Code:      Echo Limited-16762; Doppler Limited-83302; Color Doppler-20922  Study Detail: The following Echo studies were performed: 2D, Doppler and color               flow.  PHYSICIAN INTERPRETATION: Left Ventricle: The left ventricle was not well visualized. The left ventricular ejection fraction could not  be measured. Left venticular wall motion is abnormal. The left ventricular cavity size was not assessed. Left ventricular diastolic filling was not assessed. Large VSD ( inferoseptal) with left to right shunting noted on color Doppler. Vmax of VSD flow only 3.2m/sec. Left Atrium: The left atrial size was not assessed. Right Ventricle: The right ventricle was not well visualized. There is reduced right ventricular systolic function. A device is visualized in the right ventricle. Limited views of RV which appears to be dilated with reduced fx. Right Atrium: The right atrial size was not assessed. There is a device visualized in the right atrium. Aortic Valve: The aortic valve was not assessed. Aortic valve regurgitation was not assessed. Mitral Valve: The mitral valve was not well visualized. There is trace mitral valve regurgitation. Tricuspid Valve: The tricuspid valve was not assessed. Tricuspid regurgitation was not assessed. Pulmonic Valve: The pulmonic valve was not assessed. Pulmonic valve regurgitation was not assessed. Pericardium: There is no pericardial effusion noted. Aorta: The aortic root was not assessed. In comparison to the previous echocardiogram(s): Compared with study dated 5/25/2025, the prior exam was a very limited study only to assess depth of Impella device and did not assess VSD with Doppler. Compared with the exam from 5/20/2025, the prior VSD Vmax was 3.3m/sec, so not significantly changed at this time. Was already a large inferoseptal VSD at that time. Note the Impella device was already present on the prior exam.  LEFT VENTRICULAR ASSIST DEVICE: LVAD: The patient has a(n) Impella LVAD device present.  CONCLUSIONS:  1. Limited and technically difficult fellow bedside exam.  2. Poorly visualized anatomical structures due to suboptimal image quality.  3. The left ventricle was not well visualized. The left ventricular ejection fraction could not be measured.  4. Abnormal left venticular wall  motion.  5. Large VSD ( inferoseptal) with left to right shunting noted on color Doppler. Vmax of VSD flow only 3.2m/sec.  6. There is reduced right ventricular systolic function.  7. Impella device noted within the LV cavity, though exact depth beneath the AV coud not be measured.  8. Compared with study dated 5/25/2025, the prior exam was a very limited study only to assess depth of Impella device and did not assess VSD with Doppler. Compared with the exam from 5/20/2025, the prior VSD Vmax was 3.3m/sec, so not significantly changed at this time. Was already a large inferoseptal VSD at that time. Note the Impella device was already present on the prior exam. QUANTITATIVE DATA SUMMARY:  [id number] Maribel Coughlin MD Electronically signed on 5/27/2025 at 8:26:10 AM  ** Final **     Transthoracic Echo (TTE) Limited  Result Date: 5/26/2025   [facility], [address] [place] [address]                Tel [phone] and Fax [phone] TRANSTHORACIC ECHOCARDIOGRAM REPORT  Patient Name:       AVRIL REYESAnnette FRITZ    Reading Physician:    [id number] Tera Allred MD Study Date:         5/24/2025           Ordering Provider:    [id number] TIBURCIO TUCKER MRN/PID:            [id number]            Fellow: Accession#:         [id number]        Nurse: Date of Birth/Age:  1980 / 45      Sonographer:          Fellow Exam                     years Gender assigned at  M                   Additional Staff: Birth: Height:             172.72 cm           Admit Date: Weight:             89.80 kg            Admission Status: BSA / BMI:          2.04 m2 / 30.10     Encounter#:           [id number]                     kg/m2 Blood Pressure:     102/92 mmHg         Department Location: Study Type:    TRANSTHORACIC ECHO (TTE) LIMITED Diagnosis/ICD: Cardiogenic shock-R57.0 Indication:    Impella positioning CPT  Code:      Doppler Limited-19669; Echo Limited-81225; Color Doppler-87074 PHYSICIAN INTERPRETATION: Left Ventricle: The left ventricle was not well visualized. The left ventricular ejection fraction could not be measured. The left ventricular cavity size was not assessed. Left ventricular diastolic filling was not assessed. Left Atrium: The left atrial size was not assessed. Right Ventricle: The right ventricle was not assessed. Right ventricular systolic function not assessed. Right Atrium: The right atrial size was not assessed. Aortic Valve: The aortic valve was not assessed. Aortic valve regurgitation was not assessed. Mitral Valve: The mitral valve was not assessed. Mitral valve regurgitation was not assessed. Tricuspid Valve: The tricuspid valve was not assessed. Tricuspid regurgitation was not assessed. Pulmonic Valve: The pulmonic valve was not assessed. Pulmonic valve regurgitation was not assessed. Pericardium: Pericardial effusion was not assessed. Aorta: The aortic root was not assessed.  LEFT VENTRICULAR ASSIST DEVICE: LVAD: The patient has a(n) Impella LVAD device present. LVAD Comments: Limited exam for Impllea inflow cannula repositioning, cannula is 5.1cm deep in LV cavity.  CONCLUSIONS:  1. The left ventricle was not well visualized. The left ventricular ejection fraction could not be measured. QUANTITATIVE DATA SUMMARY:  Ramón Alrled MD Electronically signed on 5/25/2025 at 9:26:25 AM  ** Final (Updated) **     Transthoracic Echo (TTE) Limited  Result Date: 5/26/2025   [facility] [place], [address] [place] [address]                Tel [phone] and Fax [phone] TRANSTHORACIC ECHOCARDIOGRAM REPORT  Patient Name:       AVRIL FRITZ    Reading Physician:    Ramón Allred MD Study Date:         5/25/2025           Ordering Provider:    Opal DEY                                                                CAITLIN MRN/PID:            [id number]            Fellow: Accession#:         [id number]        Nurse: Date of Birth/Age:  1980 / 45      Sonographer:          Fellow Exam                     years Gender assigned at  M                   Additional Staff: Birth: Height:             172.70 cm           Admit Date: Weight:             89.80 kg            Admission Status: BSA / BMI:          2.03 m2 / 30.11     Encounter#:           [id number]                     kg/m2 Blood Pressure:     102/92 mmHg         Department Location: Study Type:    TRANSTHORACIC ECHO (TTE) LIMITED Diagnosis/ICD: Cardiogenic shock-R57.0 Indication:    Impella positioning CPT Code:      Echo Limited-63152 PHYSICIAN INTERPRETATION: Left Ventricle: The left ventricle was not well visualized. The left ventricular ejection fraction could not be measured. The left ventricular cavity size was not assessed. Left ventricular diastolic filling was not assessed. Left Atrium: The left atrial size was not assessed. Right Ventricle: The right ventricle was not assessed. Right ventricular systolic function not assessed. A device is visualized in the right ventricle. Right Atrium: The right atrial size was not assessed. Aortic Valve: The aortic valve was not assessed. Aortic valve regurgitation was not assessed. Mitral Valve: The mitral valve was not assessed. Mitral valve regurgitation was not assessed. Tricuspid Valve: The tricuspid valve was not assessed. Tricuspid regurgitation was not assessed. Pulmonic Valve: The pulmonic valve was not assessed. Pulmonic valve regurgitation was not assessed. Pericardium: Pericardial effusion was not assessed. Aorta: The aortic root was not assessed.  LEFT VENTRICULAR ASSIST DEVICE: LVAD: The patient has a(n) Impella LVAD device present. LVAD Comments: Limited exam for Impella repositioning. Impella cannula is 3.7cm deep in left ventricular cavity.  CONCLUSIONS:  1. The left ventricle was not well  visualized. The left ventricular ejection fraction could not be measured. QUANTITATIVE DATA SUMMARY:  [id number] Tera Allred MD Electronically signed on 5/25/2025 at 9:28:32 AM  ** Final (Updated) **     Transthoracic Echo (TTE) Limited  Result Date: 5/23/2025   [facility], [address] [place] [address]                Tel [phone] and Fax [phone] TRANSTHORACIC ECHOCARDIOGRAM REPORT  Patient Name:       AVRIL REYESAnnette FRITZ    Reading Physician:    [id number] Marcio Dodson MD Study Date:         5/22/2025           Ordering Provider:    [id number] TIBURCIO TUCKER MRN/PID:            [id number]            Fellow: Accession#:         [id number]        Nurse: Date of Birth/Age:  1980 / 45      Sonographer:          Fellow Exam                     years Gender assigned at  M                   Additional Staff: Birth: Height:             172.72 cm           Admit Date: Weight:             89.81 kg            Admission Status:     Inpatient - STAT BSA / BMI:          2.04 m2 / 30.11     Encounter#:           [id number]                     kg/m2 Blood Pressure:     102/92 mmHg         Department Location: Study Type:    TRANSTHORACIC ECHO (TTE) LIMITED Diagnosis/ICD: Cardiogenic shock-R57.0 Indication:    Impella position CPT Code:      Echo Limited-25530  Study Detail: The following Echo studies were performed: 2D.  PHYSICIAN INTERPRETATION: Left Ventricle: The left ventricle was not well visualized. The left ventricular ejection fraction could not be measured. The left ventricular cavity size was not assessed. Left ventricular diastolic filling was not assessed. Left Atrium: The left atrial size was not assessed. Right Ventricle: The right ventricle was not assessed. Right ventricular systolic function not assessed. Right Atrium: The right atrial size was not assessed. Aortic  Valve: The aortic valve was not assessed. Aortic valve regurgitation was not assessed. Mitral Valve: The mitral valve was not assessed. Mitral valve regurgitation was not assessed. Tricuspid Valve: The tricuspid valve was not assessed. Tricuspid regurgitation was not assessed. Pulmonic Valve: The pulmonic valve was not assessed. Pulmonic valve regurgitation was not assessed. Pericardium: Pericardial effusion was not assessed. Aorta: The aortic root was not assessed. Additional Comments: Impella at approximately 5.3 cm depth in the left ventricle. Limited study for Impella depth. In comparison to the previous echocardiogram(s): Although previous studies are available for review, a comparison with the current echocardiogram is not feasible due to its limited scope or image quality.  CONCLUSIONS:  1. Limited study for Impella depth.  2. The left ventricle was not well visualized. The left ventricular ejection fraction could not be measured.  3. Impella at approximately 5.3 cm depth in the left ventricle. QUANTITATIVE DATA SUMMARY:  [id number] Marcio Dodson MD Electronically signed on 5/23/2025 at 12:12:26 PM  ** Final (Updated) **     Transthoracic Echo (TTE) Limited  Result Date: 5/20/2025   [facility], [address] [place] [address]                Tel [phone] and Fax [phone] TRANSTHORACIC ECHOCARDIOGRAM REPORT  Patient Name:       AVRIL Yoder Physician:    [id number] Maribel Coughlin MD Study Date:         5/20/2025           Ordering Provider:    [id number] TIBURCIO TUCKER MRN/PID:            [id number]            Fellow: Accession#:         [id number]        Nurse: Date of Birth/Age:  1980 / 45      Sonographer:          Saulo carranza RDCS Gender assigned at  M                   Additional Staff:  Birth: Height:             172.72 cm           Admit Date: Weight:             78.47 kg            Admission Status:     Inpatient -                                                               Routine BSA / BMI:          1.92 m2 / 26.30     Encounter#:           [id number]                     kg/m2 Blood Pressure:     87/82 mmHg          Department Location:  [facility] Study Type:    TRANSTHORACIC ECHO (TTE) LIMITED Diagnosis/ICD: ST elevation (STEMI) myocardial infarction of unspecified                site-I21.3 Indication:    c/f VSD CPT Code:      Echo Limited-85742; Doppler Limited-27267; Color Doppler-18721 Patient History: Pertinent History: Inferior STEMI on Impella, tobacco abuse, CP. Study Detail: The following Echo studies were performed: 2D, M-Mode, Doppler and               color flow. Technically challenging study due to body habitus,               patient lying in supine position, poor acoustic windows and               prominent lung artifact. The patient is intubated. Optison used as               a contrast agent for endocardial border definition. Total contrast               used for this procedure was 2 mL via IV push.  PHYSICIAN INTERPRETATION: Left Ventricle: The left ventricle was not well visualized. The left ventricular ejection fraction could not be measured. The left ventricular cavity size was not assessed. There is paradoxical septal wall motion. Left ventricular diastolic filling cannot be determined due to left ventricular assist device. Limited views of the LV appear show a hyderdynamic LV with a large color flow from LV to RV through the inferoseptum with maximal velocity of 3.3m/sec, Unable to adequatly assess LVEF, though appears to have baal septal and inferior wall motion abnormality. Left Atrium: The left atrial size was not assessed. Right Ventricle: The right ventricle was not well visualized. There is reduced right ventricular systolic function. A device is visualized  in the right ventricle. The RV appears dilated with significantly reduced function with reduced TAPSE and fractional area change. Right Atrium: The right atrium is normal in size. There is a device visualized in the right atrium. Aortic Valve: The aortic valve was not well visualized. There is indeterminate aortic valve regurgitation. Mitral Valve: The mitral valve is normal in structure. There is trace mitral valve regurgitation. Tricuspid Valve: The tricuspid valve was not well visualized. There is trace tricuspid regurgitation. The right ventricular systolic pressure is unable to be estimated. Pulmonic Valve: The pulmonic valve is not well visualized. Pulmonic valve regurgitation was not assessed. Pericardium: Trivial to small pericardial effusion. Aorta: The aortic root was not well visualized. Systemic Veins: The inferior vena cava appears normal in size, on ventillator. In comparison to the previous echocardiogram(s): Compared with the prior exam from 5/19/2025 ([facility]) there has been interval development of a large VSD throuh the inferoseptum with the RV now becoming large with significantly reduced function. Prior echo with reported LVEF of 55% with basal inferospetal and baal inferior hypokinesis in the setting of STEMI. RV size and function were nornal at that time. ( Note that the septum was more thoroughly interrogated with color Doppler today).  LEFT VENTRICULAR ASSIST DEVICE: LVAD: The patient has a(n) Impella LVAD device present. LVAD Comments: Impella seen within the LVOT extending possibly 2.1-2.3 cm beneath the AV. The Device was repositioned to 3.2-3.4 cm beneath the AV.  CONCLUSIONS:  1. Poorly visualized anatomical structures due to suboptimal image quality.  2. The left ventricle was not well visualized. The left ventricular ejection fraction could not be measured.  3. Limited views of the LV appear show a hyderdynamic LV with a large color flow from LV to RV through the  inferoseptum with maximal velocity of 3.3m/sec, Unable to adequatly assess LVEF, though appears to have baal septal and inferior wall motion abnormality.  4. There is reduced right ventricular systolic function.  5. The RV appears dilated with significantly reduced function with reduced TAPSE and fractional area change.  6. Primary service notified of findings at the time of reporting.  7. Compared with the prior exam from 2025 ([facility]) there has been interval development of a large VSD throuh the inferoseptum with the RV now becoming large with significantly reduced function. Prior echo with reported LVEF of 55% with basal inferospetal and baal inferior hypokinesis in the setting of STEMI. RV size and function were nornal at that time. ( Note that the septum was more thoroughly interrogated with color Doppler today). QUANTITATIVE DATA SUMMARY:  RIGHT VENTRICLE: TAPSE: 11.2 mm RV s'  0.11 m/s  TRICUSPID VALVE/RVSP:         Normal Ranges: IVC Diam:             2.00 cm  SHUNT (Qp/Qs) DATA: VSD Aime: 3.10 m/s VSD P.3 mmHg  [id number] Maribel Coughlin MD Electronically signed on 2025 at 11:35:30 AM  ** Final **     Transthoracic Echo Complete  Result Date: 2025          [address] [address]  Tel [phone] Fax [phone] TRANSTHORACIC ECHOCARDIOGRAM REPORT Patient Name:       AVRIL Yoder Physician:    [id number] Faraz Ortega DO Study Date:         2025            Ordering Provider:    [id number] MARGARET GASPAR MRN/PID:            [id number]             Fellow: Accession#:         [id number]         Nurse: Date of Birth/Age:  [birth year] / 45 years Sonographer:          Koki Mendez                                                                [facility] Gender Assigned at                      Additional Staff:  Birth: Height:             172.72 cm            Admit Date:           5/18/2025 Weight:             75.30 kg             Admission Status:     Inpatient - STAT BSA / BMI:          1.89 m2 / 25.24      Department Location:  [facility]                     kg/m2 Blood Pressure: 97 /65 mmHg Study Type:    TRANSTHORACIC ECHO (TTE) COMPLETE Diagnosis/ICD: ST elevation (STEMI) myocardial infarction of unspecified                site-I21.3 Indication:    STEMI CPT Codes:     Echo Complete w Full Doppler-86651 Patient History: PCI and Stent:     PCI performed on 5/18/2025. Smoker:            Current. Pertinent History: Chest Pain and Murmur. Study Detail: The following Echo studies were performed: 2D, M-Mode, Doppler and               color flow. Definity used as a contrast agent for endocardial               border definition. Total contrast used for this procedure was 2 mL               via IV push.  PHYSICIAN INTERPRETATION: Left Ventricle: The left ventricular systolic function is normal, with a visually estimated ejection fraction of 55%. There is mild concentric left ventricular hypertrophy. Wall motion is abnormal. The left ventricular cavity size is normal. There is mild increased septal and mildly increased posterior left ventricular wall thickness. Spectral Doppler shows a normal pattern of left ventricular diastolic filling. LV Wall Scoring: The basal inferoseptal segment, basal inferolateral segment, and basal inferior segment are hypokinetic. All remaining scored segments are normal. Left Atrium: The left atrial size is normal. Right Ventricle: The right ventricle is normal in size. There is normal right ventricular global systolic function. Right Atrium: The right atrial size is normal. Aortic Valve: The aortic valve appears structurally normal. There is no evidence of aortic valve stenosis. The aortic valve dimensionless index is 0.73. There is no evidence of aortic valve regurgitation. The peak instantaneous  gradient of the aortic valve is 5 mmHg. The mean gradient of the aortic valve is 3 mmHg. Mitral Valve: The mitral valve is normal in structure. There is no evidence of mitral valve stenosis. There is normal mitral valve leaflet mobility. There is no evidence of mitral valve regurgitation. Tricuspid Valve: The tricuspid valve is structurally normal. There is normal tricuspid valve leaflet mobility. There is trace tricuspid regurgitation. Pulmonic Valve: The pulmonic valve is structurally normal. There is no indication of pulmonic valve regurgitation. Pericardium: No pericardial effusion noted. Aorta: The aortic root is normal. Pulmonary Artery: The main pulmonary artery is normal in size, and position, with normal bifurcation into the left and right pulmonary arteries. The tricuspid regurgitant velocity is 2.88 m/s, and with an estimated right atrial pressure of 3 mmHg, the estimated pulmonary artery pressure is mildly elevated with the RVSP at 36.2 mmHg. Systemic Veins: The inferior vena cava appears normal in size.  CONCLUSIONS:  1. The left ventricular systolic function is normal, with a visually estimated ejection fraction of 55%.  2. Basal inferoseptal segment, basal inferolateral segment, and basal inferior segment are abnormal.  3. Abnormal wall motion.  4. There is normal right ventricular global systolic function.  5. Normal sized right ventricle.  6. There is no evidence of mitral valve stenosis.  7. No evidence of mitral valve regurgitation.  8. Trace tricuspid regurgitation is visualized.  9. Aortic valve stenosis is not present. 10. The main pulmonary artery is normal in size, and position, with normal bifurcation into the left and right pulmonary arteries. QUANTITATIVE DATA SUMMARY:  2D MEASUREMENTS:             Normal Ranges: Ao Root d:       2.86 cm     (2.0-3.7cm) LAs:             3.88 cm     (2.7-4.0cm) IVSd:            1.12 cm     (0.6-1.1cm) LVPWd:           1.08 cm     (0.6-1.1cm) LVIDd:            4.02 cm     (3.9-5.9cm) LVIDs:           2.94 cm LV Mass Index:   77.7 g/m2 LVEDV Index:     59.87 ml/m2 LV % FS          26.9 %  LEFT ATRIUM:                  Normal Ranges: LA Vol A4C:        29.5 ml    (22+/-6mL/m2) LA Vol A2C:        25.4 ml LA Vol BP:         30.5 ml LA Vol Index A4C:  15.6ml/m2 LA Vol Index A2C:  13.4 ml/m2 LA Vol Index BP:   16.1 ml/m2 LA Area A4C:       13.8 cm2 LA Area A2C:       11.5 cm2 LA Major Axis A4C: 5.5 cm LA Major Axis A2C: 4.4 cm LA Volume Index:   14.9 ml/m2  RIGHT ATRIUM:                 Normal Ranges: RA Vol A4C:        24.1 ml    (8.3-19.5ml) RA Vol Index A4C:  12.7 ml/m2 RA Area A4C:       11.1 cm2 RA Major Axis A4C: 4.4 cm  AORTA MEASUREMENTS:         Normal Ranges: Asc Ao, d:          2.48 cm (2.1-3.4cm)  LV SYSTOLIC FUNCTION:                      Normal Ranges: EF-A4C View:    54 % (>=55%) EF-A2C View:    54 % EF-Biplane:     55 % EF-Visual:      55 % LV EF Reported: 55 %  LV DIASTOLIC FUNCTION:           Normal Ranges: MV Peak E:             1.20 m/s  (0.7-1.2 m/s) MV Peak A:             1.08 m/s  (0.42-0.7 m/s) E/A Ratio:             1.11      (1.0-2.2) MV e'                  0.090 m/s (>8.0) MV lateral e'          0.08 m/s MV medial e'           0.10 m/s E/e' Ratio:            13.29     (<8.0)  MITRAL VALVE:          Normal Ranges: MV DT:        126 msec (150-240msec)  AORTIC VALVE:                     Normal Ranges: AoV Vmax:                1.16 m/s (<=1.7m/s) AoV Peak P.4 mmHg (<20mmHg) AoV Mean PG:             3.0 mmHg (1.7-11.5mmHg) LVOT Max Aime:            1.03 m/s (<=1.1m/s) AoV VTI:                 22.30 cm (18-25cm) LVOT VTI:                16.20 cm LVOT Diameter:           2.03 cm  (1.8-2.4cm) AoV Area, VTI:           2.35 cm2 (2.5-5.5cm2) AoV Area,Vmax:           2.87 cm2 (2.5-4.5cm2) AoV Dimensionless Index: 0.73  RIGHT VENTRICLE: RV Basal 3.49 cm RV Mid   2.65 cm RV Major 7.3 cm TAPSE:   22.4 mm RV s'    0.13 m/s  TRICUSPID VALVE/RVSP:           Normal Ranges: Peak TR Velocity:     2.88 m/s RV Syst Pressure:     36 mmHg  (< 30mmHg) IVC Diam:             1.74 cm  PULMONIC VALVE:          Normal Ranges: PV Accel Time:  116 msec (>120ms) PV Max Aime:     1.5 m/s  (0.6-0.9m/s) PV Max P.8 mmHg  [id number] Faraz Ortega  Electronically signed on 2025 at 9:34:06 AM  Wall Scoring  ** Final **     Stress Testing  IMGRESULT(PHC4103:1:1825): No results found for this or any previous visit from the past 1825 days.    Cardiac Catheterization  No results found for this or any previous visit from the past 1825 days.  Impella Insertion 2025    [facility], Cath Lab, [address] [place] [address]    Cardiovascular Catheterization Report    Patient Name:     AVRIL FRITZ    Performing Physician:  [id number] Mcduffie MD  Study Date:       2025           Verifying Physician:   [id number] Mcduffie MD  MRN/PID:          [id number]            Cardiologist/Co-Scrub:  Accession#:       [id number]        Ordering Provider:     [id number] TIBURCIO [facility]  Date of           1980 / 45      Cardiologist:  Birth/Age:        years  Gender:           M                   Fellow:                [id number] Froilan Miranda MD  Encounter#:       [id number]          Surgeon:      Study:            Impella Insertion  Additional Study: Peripheral Angiogram      Indications:  AVRIL FRITZ is a 45 year old male who presents with dyslipidemia, hypertension and prior percutaneous coronary intervention. Acute MI, andcardiogenic shock.    Procedure Description:  Femoral artery angiography was performed at the additional arterial access site. This demonstrated a common femoral artery puncture appropriate for closure. The additional arterial sheath was left intact and sutured in place. Femoral artery angiography was performed at the additional arterial access site. This demonstrated a common femoral artery puncture appropriate for closure. The  additional arterial sheath was left intact and sutured in place.    Procedure Description Comments:  Access was obtained using US and micropuncture technique in the right CFA and a 4 Fr [place] sheath was placed. Angiogram revealed a CFA without significant PAD. Access was obtained in the right CFA using US and micropuncture technique and a 9 [place] sheath was advanced. A 6 Fr angled pigtail was then advanced for measurement of LV pressures. 2 Percloses were deployed at the right CFA site and a 14 [place] Impella Peel-Away sheath was then advanced in the right CFA. Heparin was given and therapeutic ACTs were maintained for the entirety of the procedure. The 6 [place] angled pigtail was again used to gain entry into the LV cavity and subsequently the soft Impella wire was placed into the LV. Positioning of the wire was confirmed in the ARRIOLA view. The Impella was advanced into the LV and mechanical support was initiated at P9. Access was obtained in the left CFA using ultrasound and micropuncture sheath and a 6 [place] braided sheath was placed in the left CFA. The 4 [place] in the  right SFA was exchanged for a 6 [place] braided sheath as well and the two were connected using a male to male connector to provide antegrade perfusion to the right side. The 14 [place] Impella Peel-Away sheath was then removed and the 9 [place] Repositioning sheath was advanced. No suction/position alarms were noted at the end of the procedure. Procedure was tolerated without complications.      Impella:  There is good positioning of the Impella CP via the right femoral artery. See Procedure Description.    Hemo Personnel:  +-------------------+---------+  Name               Duty       +-------------------+---------+  Fanny Mcduffie MD 1  +-------------------+---------+      Hemodynamic Pressures:    +----+-------------------+---------+------------+-------------+------+---------+  Site     Date Time       Phase     Systolic    Diastolic    ED  Mean mmHg                           Name       mmHg        mmHg      mmHg            +----+-------------------+---------+------------+-------------+------+---------+   Art  5/20/2025 5:30:45     Rest          89           65             73                       AM                                                   +----+-------------------+---------+------------+-------------+------+---------+    LV  5/20/2025 5:30:45     Rest          95            4    23                                AM                                                   +----+-------------------+---------+------------+-------------+------+---------+   Art  5/20/2025 5:30:53     Rest          87           62             70                       AM                                                   +----+-------------------+---------+------------+-------------+------+---------+    LV  5/20/2025 5:30:53     Rest          95            5    23                                AM                                                   +----+-------------------+---------+------------+-------------+------+---------+   Art  5/20/2025 6:10:31     Rest          98           90             92                       AM                                                   +----+-------------------+---------+------------+-------------+------+---------+    AO  5/20/2025 6:10:31     Rest          98           90             92                       AM                                                   +----+-------------------+---------+------------+-------------+------+---------+        Oxygen Saturation %:  +-----------+----------+------------+  Sample SiteO2 Sat (%)HB (g/100ml)  +-----------+----------+------------+          SVC        61        15.0  +-----------+----------+------------+          SVC        61         15.0  +-----------+----------+------------+      SYS ART                  15.0  +-----------+----------+------------+      PUL JODI                  15.0  +-----------+----------+------------+      Complications:  No in-lab complications observed.    Cardiac Cath Post Procedure Notes:  Post Procedure Diagnosis: Successful Right CFA Impella placement with  simultaneous placement of Left CFA to Right SFA  antegrade perfusion sheath.  Blood Loss:               Estimated blood loss during the procedure was 10 mls.  Specimens Removed:        Number of specimen(s) removed: none.      Recommendations:  Maximize medical therapy.    ____________________________________________________________________________________  CONCLUSIONS:  1. Successful Right CFA Impella placement.  2. Simultaneous placement of Left CFA to Right SFA antegrade perfusion sheath.    ICD 10 Codes:  Ventricular septal defect (VSD)-Q21.0    CPT Codes:  Insertion of ventricular assist device, percutaneous S&I left heart arterial access only-42102; Angiography, Extremity,Bilat ,S&I (PER)-47913    [id number] Fanny Mcduffie MD  Performing Physician  Electronically signed by [id number] Fanny Mcduffie MD on 5/20/2025 at 10:18:07 AM          ** Final **    Cardiac Scoring  No results found for this or any previous visit from the past 1825 days.    AAA  No results found for this or any previous visit from the past 1825 days.    Other  No results found for this or any previous visit from the past 1825 days.      Imaging  Imaging  XR chest 1 view  Result Date: 6/1/2025  1. Interval increase in interstitial/alveolar pulmonary edema. 2. Interval development of left retrocardiac opacification which could represent atelectasis versus infectious process. 3. Trace left pleural effusion. 4. Medical devices as above.   I personally reviewed the images/study and I agree with the findings as stated by Dr. Jori Hansen.   MACRO: None   Signed by: Faizan Champion 6/1/2025 10:08 AM  Dictation workstation:   [id number]      Cardiology, Vascular, and Other Imaging  No other imaging results found for the past 2 days        Inpatient Medications    Continuous medications  Continuous Medications[1]    Scheduled medications  Scheduled Medications[2]    PRN medications  PRN Medications[3]          Assessment/Plan     Assessment & Plan  Alpesh Elena is a 45 y.o. male with a PMH of tobacco abuse (30 pack years), submandibular abscess, and nephrolithiasis  on 12d 13h of CICU admission for an inferior STEMI, now s/p coronary angiogram with balloon angioplasty to the PDA (right dominant system). This late presenting MI c/b cardiogenic shock iso of ischemia -induced VSD.     Original shock call resulted in impella CP placment. Normalized lactate but resultant hemolysis. Stay c/b renal failure, now requiring CVVH. prior c/f GIB, found to be OG trauma and small ulcer      On 5/28 patient upgraded to impella 5.5 with reduction in hemolysis. To wean off vent as able and CT surg following for future VSD repair.     Updates 06/01/25:  ***  -continue Meropenam, through 6/2 to complete 7 days        Neurologic  #Sedation  - fent, versed and precedex   - Wean sedation as able, CTM for responsiveness  - RAAS goal: 0 after OR    Cardiovascular  #RPDA STEMI s/p POBA  #C/f Cardiogenic vs mixed shock   #C/f VSD  ::  ECG on admit to OSH with ST elevations in the inferior leads with reciprocal depressions  :: Troponin:  24,085>21,701,19,289,05898  :: Lipid panel (05/2025)- cholesterol 220, HDL 33.9, , TG 57  :: TSH 1.51 (05/2025), A1C 7.4  (05/2025)  :: coronary angiogram (05/2025)  which revealed multi vessel CAD [LAD/Lcx/RCA mildly/diffusely diseased,100% RPDA culprit lesion] with PTCA to RPDA with 50% residual stenosis [GLENNA 0->3] (too small to stent)   :: LVEF 45% and LVED 20-25 mmHg via LV Gram   :: TTE  (05/2025) LVEF 55%, wall motion abnormalities, and no significant valvular disease   :: SVO2 59 from  CVP port, 92 from PA port (prior to impella)  :: SVO2 57 from CVP port, 80 from PA port (after impella)  :: Off all pressors 5/21  :: restarted Levo 5/26  ::OR with Ct surg on 5/28 for Impella 5.5 placement   ::5/29 Limited TTE repeat: Compared with the prior exam from 5/19/2025 ([facility]) there has been interval development of a large VSD throuh the inferoseptum with the RV now becoming large with significantly reduced function.   Plan:  - Trend Lactate, CVP mix venous (w/VBG) and PA mix venous Q8-12H when [place]-Cath is in  - Aspirin 81 mg every day, DC Brilinita given no stent / possible future surgery   - Statin: HELD Crestor 40 mg given c/f shock liver  - Beta blocker: hold given shock requiring MCS  - RAAS Inhibition: hold given shock requiring MCS  - CT surgery following for future possible VSD repair               - OR for repair likely in early to mid June       # NSVT  # PVC  # 2:1 AV block , resolved   :: started 5/24 PM, longest 20 sec run  :: VT ikely iso sub-optimal impella position  :: AV block possibly iso Vsd injury to conduction pathway   :: repositioned under US 5/25 AM with less ectopy burden after   :: stopped IV amio on 5/30  Plan:  - monitor Tele    Pulmonary  #AHRF   Vent Mode: Pressure support  FiO2 (%):  [30 %] 30 %  S RR:  [16] 16  S VT:  [450 mL] 450 mL  PEEP/CPAP (cm H2O):  [5 cm H20] 5 cm H20  OH SUP:  [5 cm H20-12 cm H20] 5 cm H20  MAP (cm H2O):  [7.5-11] 8  :: Prior to intubation satting 92% on RA  :: CXR with pulmonary edema vs PNA  :: CTA CAP- mild interstitial pulmonary edema   :: , WBC 34.1 on admission   Plan:  - Infectious workup/abx as below   - Wean Ventilator when more responsive       Gastrointestinal  #GI PPX  # Traumatic OG placement , small superficial ulcer  # Ileus   # jaundice, Fever  # hyperbilirubinemia, both direct and indirect   - IV PPI BID for 2 months for esophageal ulcer   - Aggressive bowel reg: miralx QID, Senna 2 tab BID, Prn enema and  suppository   - S/p EGD w/ GI 5/7 w/o concern for overtly large Gib   - RUQ 5/28 negative for cholecystis   - Tube feeds started 5/29 (Held for possible SBT)     Renal  # BENY, Anuric   # HAGMA   # Volume overload iso VSD   :: Baseline Cr 0.8-1.0, 1.61 on admit, Currently stable ~2.4-2.5  ::Brief SLED 5/23, 5/25, 5/27  Plan:  - Nephrology following   - CVVH started 4/28              - goal 2 L out    -Continue CVVH until BP stable enough for IHD or renal function recovers.   - Avoid hypotension, rapid fluctuations in BP  - Daily RFP     Hematologic  #Thrombocytopenia  #Anemia    #Hemolysis iso impella use, improving    - C/f some degree of hemolysis   - Hepatic function, LHD, hapto daily  - Heparin products DC 5/23, restarted 5/26              - Impella Purg: Hep/D5W              - Systemic: Heparin (follow ACT)  - Anti-Plt Factor 4 AB / Serotonin release assay negative      Infectious Disease  #C/f Mixed shock   #Leukocytosis  #Fever  Unclear if Fever is from infection or general cardiogenic shock at this time   :: S/p Doxy/CTX 05/19  :: blood cultures sent 05/19 x 2 sets   :: Rising WBC despite 8 days of zosyn   Work up:  - MRSA probe: -  - Bcx2: NGTD  - UA: non-infectious   - Sputum: contaminate  Plan:  -Dc zosyn;   -DC emma 5/30 and Vanco given negative BC  -continue Meropenam, through 6/2 to complete 7 days   - ID following   - C diff: pending sample    Endocrine  # T2DM   :: A1C 7.4  (05/2025)  Plan:  - NPO w/ Tube feeds started 5/29  - Q4H POC glucose, SSI while NPO w/ trickle feeds  - Titrate -180  - Will need diabetes education prior to discharge   - BID labs for monitor for refeeding       Fluids: PRN  Electrolytes: PRN  Nutrition: No diet orders on file  Lines: PIV, RIJ, left radial A line   DVT prophylaxis: on therapeutic AC w/ Heparin gtt  GI prophylaxis: IV PPI    Code Status: Full Code (Confirmed on admission)   Emergency Contact / NOK: : Alpesh fraser [phone],            Jesus Hooks,  MD  Internal Medicine PGY-1    6/1/2025         [1] dexmedeTOMIDine, 0.1-1.5 mcg/kg/hr (Dosing Weight), Last Rate: 0.6 mcg/kg/hr (06/01/25 1200)  fentaNYL,  mcg/hr, Last Rate: 75 mcg/hr (06/01/25 1200)  heparin, 0-2,000 Units/hr, Last Rate: 500 Units/hr (06/01/25 1300)  heparin Impella Purge 25 units/mL in 500 mL D5W, 10 mL/hr, Last Rate: 10 mL/hr (06/01/25 0400)  midazolam, 0.5-20 mg/hr, Last Rate: 1 mg/hr (06/01/25 0438)  norepinephrine, 0.01-1 mcg/kg/min (Dosing Weight), Last Rate: 0.02 mcg/kg/min (06/01/25 1100)  PrismaSol 4/2.5, 24 mL/kg/hr (Dosing Weight), Last Rate: 24 mL/kg/hr (06/01/25 0937)     [2] aspirin, 81 mg, oral, Daily  insulin lispro, 0-5 Units, subcutaneous, q4h  meropenem, 2 g, intravenous, q12h  oxygen, , inhalation, Continuous - Inhalation  pantoprazole, 40 mg, intravenous, BID  perflutren lipid microspheres, 0.5-10 mL of dilution, intravenous, Once in imaging  perflutren protein A microsphere, 0.5 mL, intravenous, Once in imaging  polyethylene glycol, 17 g, oral, 4x daily  sennosides, 2 tablet, oral, BID  thiamine, 500 mg, intravenous, Daily     [3] PRN medications: alteplase, bisacodyl, dextrose, dextrose, glucagon, glucagon

## 2025-06-02 LAB
ACT BLD: 155 SEC (ref 83–199)
ACT BLD: 160 SEC (ref 83–199)
ACT BLD: 163 SEC (ref 83–199)
ACT BLD: 163 SEC (ref 83–199)
ACT BLD: 165 SEC (ref 83–199)
ACT BLD: 165 SEC (ref 83–199)
ACT BLD: 166 SEC (ref 83–199)
ACT BLD: 186 SEC (ref 83–199)
ALBUMIN SERPL BCP-MCNC: 2.6 G/DL (ref 3.4–5)
ALP SERPL-CCNC: 175 U/L (ref 33–120)
ALT SERPL W P-5'-P-CCNC: 81 U/L (ref 10–52)
ANION GAP BLDA CALCULATED.4IONS-SCNC: 12 MMO/L (ref 10–25)
ANION GAP BLDA CALCULATED.4IONS-SCNC: 15 MMO/L (ref 10–25)
ANION GAP BLDA CALCULATED.4IONS-SCNC: 9 MMO/L (ref 10–25)
ANION GAP BLDMV CALCULATED.4IONS-SCNC: 13 MMO/L (ref 10–25)
ANION GAP SERPL CALC-SCNC: 13 MMOL/L (ref 10–20)
AST SERPL W P-5'-P-CCNC: 109 U/L (ref 9–39)
ATRIAL RATE: 116 BPM
ATRIAL RATE: 84 BPM
BASE EXCESS BLDA CALC-SCNC: -0.9 MMOL/L (ref -2–3)
BASE EXCESS BLDA CALC-SCNC: -2.8 MMOL/L (ref -2–3)
BASE EXCESS BLDA CALC-SCNC: -3.9 MMOL/L (ref -2–3)
BASE EXCESS BLDMV CALC-SCNC: -2 MMOL/L (ref -2–3)
BASOPHILS # BLD AUTO: 0.04 X10*3/UL (ref 0–0.1)
BASOPHILS NFR BLD AUTO: 0.3 %
BILIRUB DIRECT SERPL-MCNC: 13.8 MG/DL (ref 0–0.3)
BILIRUB SERPL-MCNC: 21.4 MG/DL (ref 0–1.2)
BODY TEMPERATURE: 37 DEGREES CELSIUS
BUN SERPL-MCNC: 29 MG/DL (ref 6–23)
CA-I BLDA-SCNC: 1.02 MMOL/L (ref 1.1–1.33)
CA-I BLDA-SCNC: 1.05 MMOL/L (ref 1.1–1.33)
CA-I BLDA-SCNC: 1.09 MMOL/L (ref 1.1–1.33)
CA-I BLDMV-SCNC: 1.08 MMOL/L (ref 1.1–1.33)
CALCIUM SERPL-MCNC: 7.9 MG/DL (ref 8.6–10.6)
CHLORIDE BLD-SCNC: 103 MMOL/L (ref 98–107)
CHLORIDE BLDA-SCNC: 102 MMOL/L (ref 98–107)
CHLORIDE BLDA-SCNC: 106 MMOL/L (ref 98–107)
CHLORIDE BLDA-SCNC: 107 MMOL/L (ref 98–107)
CHLORIDE SERPL-SCNC: 103 MMOL/L (ref 98–107)
CO2 SERPL-SCNC: 25 MMOL/L (ref 21–32)
CREAT SERPL-MCNC: 2.41 MG/DL (ref 0.5–1.3)
EGFRCR SERPLBLD CKD-EPI 2021: 33 ML/MIN/1.73M*2
EOSINOPHIL # BLD AUTO: 0.1 X10*3/UL (ref 0–0.7)
EOSINOPHIL NFR BLD AUTO: 0.7 %
ERYTHROCYTE [DISTWIDTH] IN BLOOD BY AUTOMATED COUNT: 26.9 % (ref 11.5–14.5)
ERYTHROCYTE [DISTWIDTH] IN BLOOD BY AUTOMATED COUNT: 27.9 % (ref 11.5–14.5)
GLUCOSE BLD MANUAL STRIP-MCNC: 144 MG/DL (ref 74–99)
GLUCOSE BLD MANUAL STRIP-MCNC: 152 MG/DL (ref 74–99)
GLUCOSE BLD MANUAL STRIP-MCNC: 168 MG/DL (ref 74–99)
GLUCOSE BLD MANUAL STRIP-MCNC: 168 MG/DL (ref 74–99)
GLUCOSE BLD MANUAL STRIP-MCNC: 182 MG/DL (ref 74–99)
GLUCOSE BLD MANUAL STRIP-MCNC: 188 MG/DL (ref 74–99)
GLUCOSE BLD-MCNC: 186 MG/DL (ref 74–99)
GLUCOSE BLDA-MCNC: 142 MG/DL (ref 74–99)
GLUCOSE BLDA-MCNC: 144 MG/DL (ref 74–99)
GLUCOSE BLDA-MCNC: 190 MG/DL (ref 74–99)
GLUCOSE SERPL-MCNC: 146 MG/DL (ref 74–99)
HAPTOGLOB SERPL NEPH-MCNC: <30 MG/DL (ref 30–200)
HCO3 BLDA-SCNC: 19.9 MMOL/L (ref 22–26)
HCO3 BLDA-SCNC: 20.2 MMOL/L (ref 22–26)
HCO3 BLDA-SCNC: 23.2 MMOL/L (ref 22–26)
HCO3 BLDMV-SCNC: 22.1 MMOL/L (ref 22–26)
HCT VFR BLD AUTO: 23.2 % (ref 41–52)
HCT VFR BLD AUTO: 24.3 % (ref 41–52)
HCT VFR BLD EST: 24 % (ref 41–52)
HCT VFR BLD EST: 24 % (ref 41–52)
HCT VFR BLD EST: 26 % (ref 41–52)
HCT VFR BLD EST: 35 % (ref 41–52)
HGB BLD-MCNC: 8 G/DL (ref 13.5–17.5)
HGB BLD-MCNC: 8.2 G/DL (ref 13.5–17.5)
HGB BLDA-MCNC: 11.6 G/DL (ref 13.5–17.5)
HGB BLDA-MCNC: 7.9 G/DL (ref 13.5–17.5)
HGB BLDA-MCNC: 8 G/DL (ref 13.5–17.5)
HGB BLDMV-MCNC: 8.6 G/DL (ref 13.5–17.5)
IMM GRANULOCYTES # BLD AUTO: 0.43 X10*3/UL (ref 0–0.7)
IMM GRANULOCYTES NFR BLD AUTO: 3.1 % (ref 0–0.9)
INHALED O2 CONCENTRATION: 21 %
INHALED O2 CONCENTRATION: 28 %
INHALED O2 CONCENTRATION: 30 %
INHALED O2 CONCENTRATION: 36 %
LACTATE BLDA-SCNC: 0.9 MMOL/L (ref 0.4–2)
LACTATE BLDA-SCNC: 1.2 MMOL/L (ref 0.4–2)
LACTATE BLDA-SCNC: 2.8 MMOL/L (ref 0.4–2)
LACTATE BLDMV-SCNC: 2.4 MMOL/L (ref 0.4–2)
LDH SERPL L TO P-CCNC: 731 U/L (ref 84–246)
LYMPHOCYTES # BLD AUTO: 1.84 X10*3/UL (ref 1.2–4.8)
LYMPHOCYTES NFR BLD AUTO: 13.1 %
MAGNESIUM SERPL-MCNC: 2.89 MG/DL (ref 1.6–2.4)
MCH RBC QN AUTO: 32.2 PG (ref 26–34)
MCH RBC QN AUTO: 32.4 PG (ref 26–34)
MCHC RBC AUTO-ENTMCNC: 33.7 G/DL (ref 32–36)
MCHC RBC AUTO-ENTMCNC: 34.5 G/DL (ref 32–36)
MCV RBC AUTO: 94 FL (ref 80–100)
MCV RBC AUTO: 95 FL (ref 80–100)
MONOCYTES # BLD AUTO: 0.85 X10*3/UL (ref 0.1–1)
MONOCYTES NFR BLD AUTO: 6.1 %
NEUTROPHILS # BLD AUTO: 10.78 X10*3/UL (ref 1.2–7.7)
NEUTROPHILS NFR BLD AUTO: 76.7 %
NRBC BLD-RTO: 1.7 /100 WBCS (ref 0–0)
NRBC BLD-RTO: 3.3 /100 WBCS (ref 0–0)
OXYHGB MFR BLDA: 91.9 % (ref 94–98)
OXYHGB MFR BLDA: 95.2 % (ref 94–98)
OXYHGB MFR BLDA: 96.2 % (ref 94–98)
OXYHGB MFR BLDMV: 84.5 % (ref 45–75)
P AXIS: 100 DEGREES
P AXIS: 37 DEGREES
P OFFSET: 169 MS
P OFFSET: 193 MS
P ONSET: 123 MS
P ONSET: 147 MS
PCO2 BLDA: 29 MM HG (ref 38–42)
PCO2 BLDA: 30 MM HG (ref 38–42)
PCO2 BLDA: 35 MM HG (ref 38–42)
PCO2 BLDMV: 34 MM HG (ref 41–51)
PH BLDA: 7.43 PH (ref 7.38–7.42)
PH BLDA: 7.43 PH (ref 7.38–7.42)
PH BLDA: 7.45 PH (ref 7.38–7.42)
PH BLDMV: 7.42 PH (ref 7.33–7.43)
PHOSPHATE SERPL-MCNC: 2.5 MG/DL (ref 2.5–4.9)
PLATELET # BLD AUTO: 66 X10*3/UL (ref 150–450)
PLATELET # BLD AUTO: 86 X10*3/UL (ref 150–450)
PO2 BLDA: 116 MM HG (ref 85–95)
PO2 BLDA: 144 MM HG (ref 85–95)
PO2 BLDA: 72 MM HG (ref 85–95)
PO2 BLDMV: 56 MM HG (ref 35–45)
POTASSIUM BLDA-SCNC: 4.1 MMOL/L (ref 3.5–5.3)
POTASSIUM BLDA-SCNC: 4.2 MMOL/L (ref 3.5–5.3)
POTASSIUM BLDA-SCNC: 5.4 MMOL/L (ref 3.5–5.3)
POTASSIUM BLDMV-SCNC: 5.1 MMOL/L (ref 3.5–5.3)
POTASSIUM SERPL-SCNC: 4.4 MMOL/L (ref 3.5–5.3)
PR INTERVAL: 140 MS
PR INTERVAL: 178 MS
PROT SERPL-MCNC: 5.1 G/DL (ref 6.4–8.2)
Q ONSET: 212 MS
Q ONSET: 217 MS
QRS COUNT: 14 BEATS
QRS COUNT: 19 BEATS
QRS DURATION: 94 MS
QRS DURATION: 98 MS
QT INTERVAL: 362 MS
QT INTERVAL: 368 MS
QTC CALCULATION(BAZETT): 434 MS
QTC CALCULATION(BAZETT): 503 MS
QTC FREDERICIA: 411 MS
QTC FREDERICIA: 451 MS
R AXIS: 102 DEGREES
R AXIS: 256 DEGREES
RBC # BLD AUTO: 2.47 X10*6/UL (ref 4.5–5.9)
RBC # BLD AUTO: 2.55 X10*6/UL (ref 4.5–5.9)
SAO2 % BLDA: 100 % (ref 94–100)
SAO2 % BLDA: 95 % (ref 94–100)
SAO2 % BLDA: 99 % (ref 94–100)
SAO2 % BLDMV: 88 % (ref 45–75)
SODIUM BLDA-SCNC: 132 MMOL/L (ref 136–145)
SODIUM BLDA-SCNC: 134 MMOL/L (ref 136–145)
SODIUM BLDA-SCNC: 135 MMOL/L (ref 136–145)
SODIUM BLDMV-SCNC: 133 MMOL/L (ref 136–145)
SODIUM SERPL-SCNC: 137 MMOL/L (ref 136–145)
T AXIS: -23 DEGREES
T AXIS: 97 DEGREES
T OFFSET: 396 MS
T OFFSET: 398 MS
VENTRICULAR RATE: 116 BPM
VENTRICULAR RATE: 84 BPM
WBC # BLD AUTO: 14 X10*3/UL (ref 4.4–11.3)
WBC # BLD AUTO: 24.6 X10*3/UL (ref 4.4–11.3)

## 2025-06-02 PROCEDURE — 99291 CRITICAL CARE FIRST HOUR: CPT

## 2025-06-02 PROCEDURE — 83010 ASSAY OF HAPTOGLOBIN QUANT: CPT

## 2025-06-02 PROCEDURE — 85027 COMPLETE CBC AUTOMATED: CPT

## 2025-06-02 PROCEDURE — 90945 DIALYSIS ONE EVALUATION: CPT | Performed by: INTERNAL MEDICINE

## 2025-06-02 PROCEDURE — 36556 INSERT NON-TUNNEL CV CATH: CPT | Performed by: STUDENT IN AN ORGANIZED HEALTH CARE EDUCATION/TRAINING PROGRAM

## 2025-06-02 PROCEDURE — 84132 ASSAY OF SERUM POTASSIUM: CPT

## 2025-06-02 PROCEDURE — 2500000004 HC RX 250 GENERAL PHARMACY W/ HCPCS (ALT 636 FOR OP/ED): Mod: JZ

## 2025-06-02 PROCEDURE — 2020000001 HC ICU ROOM DAILY

## 2025-06-02 PROCEDURE — 2500000004 HC RX 250 GENERAL PHARMACY W/ HCPCS (ALT 636 FOR OP/ED)

## 2025-06-02 PROCEDURE — 99232 SBSQ HOSP IP/OBS MODERATE 35: CPT | Performed by: PHYSICIAN ASSISTANT

## 2025-06-02 PROCEDURE — 2500000001 HC RX 250 WO HCPCS SELF ADMINISTERED DRUGS (ALT 637 FOR MEDICARE OP)

## 2025-06-02 PROCEDURE — 83615 LACTATE (LD) (LDH) ENZYME: CPT

## 2025-06-02 PROCEDURE — 82947 ASSAY GLUCOSE BLOOD QUANT: CPT

## 2025-06-02 PROCEDURE — 71045 X-RAY EXAM CHEST 1 VIEW: CPT

## 2025-06-02 PROCEDURE — 2500000005 HC RX 250 GENERAL PHARMACY W/O HCPCS

## 2025-06-02 PROCEDURE — 37799 UNLISTED PX VASCULAR SURGERY: CPT

## 2025-06-02 PROCEDURE — 85025 COMPLETE CBC W/AUTO DIFF WBC: CPT

## 2025-06-02 PROCEDURE — 84100 ASSAY OF PHOSPHORUS: CPT

## 2025-06-02 PROCEDURE — 83735 ASSAY OF MAGNESIUM: CPT

## 2025-06-02 PROCEDURE — 94003 VENT MGMT INPAT SUBQ DAY: CPT

## 2025-06-02 PROCEDURE — 85347 COAGULATION TIME ACTIVATED: CPT

## 2025-06-02 PROCEDURE — 71045 X-RAY EXAM CHEST 1 VIEW: CPT | Performed by: RADIOLOGY

## 2025-06-02 PROCEDURE — 2500000002 HC RX 250 W HCPCS SELF ADMINISTERED DRUGS (ALT 637 FOR MEDICARE OP, ALT 636 FOR OP/ED)

## 2025-06-02 PROCEDURE — 82248 BILIRUBIN DIRECT: CPT

## 2025-06-02 PROCEDURE — 99233 SBSQ HOSP IP/OBS HIGH 50: CPT | Performed by: INTERNAL MEDICINE

## 2025-06-02 RX ORDER — ONDANSETRON HYDROCHLORIDE 2 MG/ML
INJECTION, SOLUTION INTRAVENOUS
Status: COMPLETED
Start: 2025-06-02 | End: 2025-06-02

## 2025-06-02 RX ORDER — ONDANSETRON HYDROCHLORIDE 2 MG/ML
4 INJECTION, SOLUTION INTRAVENOUS ONCE
Status: COMPLETED | OUTPATIENT
Start: 2025-06-02 | End: 2025-06-02

## 2025-06-02 RX ADMIN — SENNOSIDES 17.2 MG: 8.6 TABLET, FILM COATED ORAL at 08:21

## 2025-06-02 RX ADMIN — AMIODARONE HYDROCHLORIDE 1 MG/MIN: 1.8 INJECTION, SOLUTION INTRAVENOUS at 16:52

## 2025-06-02 RX ADMIN — POLYETHYLENE GLYCOL 3350 17 G: 17 POWDER, FOR SOLUTION ORAL at 20:47

## 2025-06-02 RX ADMIN — PANTOPRAZOLE SODIUM 40 MG: 40 INJECTION, POWDER, FOR SOLUTION INTRAVENOUS at 20:47

## 2025-06-02 RX ADMIN — DEXMEDETOMIDINE HYDROCHLORIDE 0.9 MCG/KG/HR: 400 INJECTION INTRAVENOUS at 06:04

## 2025-06-02 RX ADMIN — SODIUM CHLORIDE 2 G: 9 INJECTION, SOLUTION INTRAVENOUS at 11:12

## 2025-06-02 RX ADMIN — ONDANSETRON HYDROCHLORIDE 4 MG: 2 INJECTION, SOLUTION INTRAVENOUS at 16:49

## 2025-06-02 RX ADMIN — Medication 30 PERCENT: at 08:00

## 2025-06-02 RX ADMIN — HEPARIN SODIUM 600 UNITS/HR: 10000 INJECTION, SOLUTION INTRAVENOUS at 18:27

## 2025-06-02 RX ADMIN — SENNOSIDES 17.2 MG: 8.6 TABLET, FILM COATED ORAL at 20:47

## 2025-06-02 RX ADMIN — THIAMINE HYDROCHLORIDE 500 MG: 100 INJECTION, SOLUTION INTRAMUSCULAR; INTRAVENOUS at 08:21

## 2025-06-02 RX ADMIN — AMIODARONE HYDROCHLORIDE 1 MG/MIN: 1.8 INJECTION, SOLUTION INTRAVENOUS at 18:27

## 2025-06-02 RX ADMIN — POLYETHYLENE GLYCOL 3350 17 G: 17 POWDER, FOR SOLUTION ORAL at 16:04

## 2025-06-02 RX ADMIN — PANTOPRAZOLE SODIUM 40 MG: 40 INJECTION, POWDER, FOR SOLUTION INTRAVENOUS at 08:21

## 2025-06-02 RX ADMIN — CALCIUM CHLORIDE, MAGNESIUM CHLORIDE, DEXTROSE MONOHYDRATE, LACTIC ACID, SODIUM CHLORIDE, SODIUM BICARBONATE AND POTASSIUM CHLORIDE 24 ML/KG/HR: 3.68; 3.05; 22; 5.4; 6.46; 3.09; .314 INJECTION INTRAVENOUS at 00:15

## 2025-06-02 RX ADMIN — AMIODARONE HYDROCHLORIDE 150 MG: 1.5 INJECTION, SOLUTION INTRAVENOUS at 16:51

## 2025-06-02 RX ADMIN — ASPIRIN 81 MG: 81 TABLET, CHEWABLE ORAL at 08:21

## 2025-06-02 RX ADMIN — POLYETHYLENE GLYCOL 3350 17 G: 17 POWDER, FOR SOLUTION ORAL at 16:50

## 2025-06-02 RX ADMIN — DEXMEDETOMIDINE HYDROCHLORIDE 0.75 MCG/KG/HR: 400 INJECTION INTRAVENOUS at 00:01

## 2025-06-02 RX ADMIN — Medication 21 PERCENT: at 20:43

## 2025-06-02 RX ADMIN — SODIUM CHLORIDE 2 G: 9 INJECTION, SOLUTION INTRAVENOUS at 23:27

## 2025-06-02 RX ADMIN — INSULIN LISPRO 1 UNITS: 100 INJECTION, SOLUTION INTRAVENOUS; SUBCUTANEOUS at 21:02

## 2025-06-02 RX ADMIN — INSULIN LISPRO 1 UNITS: 100 INJECTION, SOLUTION INTRAVENOUS; SUBCUTANEOUS at 00:08

## 2025-06-02 RX ADMIN — ONDANSETRON 4 MG: 2 INJECTION INTRAMUSCULAR; INTRAVENOUS at 16:49

## 2025-06-02 ASSESSMENT — PAIN SCALES - GENERAL
PAINLEVEL_OUTOF10: 0 - NO PAIN
PAINLEVEL_OUTOF10: 0 - NO PAIN

## 2025-06-02 ASSESSMENT — PAIN - FUNCTIONAL ASSESSMENT
PAIN_FUNCTIONAL_ASSESSMENT: 0-10
PAIN_FUNCTIONAL_ASSESSMENT: CPOT (CRITICAL CARE PAIN OBSERVATION TOOL)

## 2025-06-02 NOTE — CARE PLAN
Problem: Safety - Medical Restraint  Goal: Remains free of injury from restraints (Restraint for Interference with Medical Device)  Outcome: Progressing  Flowsheets (Taken 6/2/2025 0912)  Remains free of injury from restraints (restraint for interference with medical device):   Determine that other, less restrictive measures have been tried or would not be effective before applying the restraint   Evaluate the patient's condition at the time of restraint application   Inform patient/family regarding the reason for restraint   Every 2 hours: Monitor safety, psychosocial status, comfort, nutrition and hydration  Goal: Free from restraint(s) (Restraint for Interference with Medical Device)  Outcome: Progressing  Flowsheets (Taken 6/2/2025 0912)  Free from restraint(s) (restraint for interference with medical device):   ONCE/SHIFT or MINIMUM Every 12 hours: Assess and document the continuing need for restraints   Every 24 hours: Continued use of restraint requires Licensed Independent Practitioner to perform face to face examination and written order   Identify and implement measures to help patient regain control     Problem: Skin  Goal: Participates in plan/prevention/treatment measures  Outcome: Progressing  Flowsheets (Taken 6/2/2025 0912)  Participates in plan/prevention/treatment measures:   Discuss with provider PT/OT consult   Elevate heels  Goal: Prevent/manage excess moisture  Outcome: Progressing  Flowsheets (Taken 6/2/2025 0912)  Prevent/manage excess moisture: Cleanse incontinence/protect with barrier cream  Goal: Prevent/minimize sheer/friction injuries  Outcome: Progressing  Flowsheets (Taken 6/2/2025 0912)  Prevent/minimize sheer/friction injuries:   Use pull sheet   HOB 30 degrees or less   Turn/reposition every 2 hours/use positioning/transfer devices   Utilize specialty bed per algorithm  Goal: Promote skin healing  Outcome: Progressing  Flowsheets (Taken 6/2/2025 0912)  Promote skin healing:    Assess skin/pad under line(s)/device(s)   Protective dressings over bony prominences   Turn/reposition every 2 hours/use positioning/transfer devices   Rotate device position/do not position patient on device

## 2025-06-02 NOTE — CARE PLAN
Problem: Safety - Medical Restraint  Goal: Remains free of injury from restraints (Restraint for Interference with Medical Device)  Outcome: Progressing  Flowsheets (Taken 6/1/2025 0147)  Remains free of injury from restraints (restraint for interference with medical device):   Determine that other, less restrictive measures have been tried or would not be effective before applying the restraint   Evaluate the patient's condition at the time of restraint application   Inform patient/family regarding the reason for restraint  Goal: Free from restraint(s) (Restraint for Interference with Medical Device)  Outcome: Progressing  Flowsheets (Taken 5/31/2025 0241 by Emma Vanegas RN)  Free from restraint(s) (restraint for interference with medical device): ONCE/SHIFT or MINIMUM Every 12 hours: Assess and document the continuing need for restraints   The patient's goals for the shift include patient unable to state his goal for the shift.   The clinical goals for the shift include patient will remain HDS and safe without any injuries while on restraints.

## 2025-06-02 NOTE — PROGRESS NOTES
GUILLERMO updated pt's son Alpesh that pt was extubated this morning.  Son plans to visit after work today, and he is hoping pt will be more alert and communicative when he arrives.  Will continue to follow for support        TANIKA Louis

## 2025-06-02 NOTE — PROGRESS NOTES
CARDIAC SURGERY CONSULT PROGRESS NOTE    SUBJECTIVE  Mr. Elena is a 45 year old male with a past medical history of tobacco abuse (30 pack years), submandibular abscess, and nephrolithiasis who presented to [facility] with a chief complaint of chest pain, found to have an inferior STEMI, now s/p coronary angiogram with balloon angioplasty to the PDA (right dominant system). His course was complicated by hemodynamic instability (tachycardia, hypotension) requiring vasopressor support and laboratory evidnce of end organ dysfunction. Due to concern for cardiogenic vs mixed shock, a shock call was intervened and recommended transfer to [facility] CICU. TTE at [facility] revealed large VSD. He underwent Impella CP placement 5/20.      Cardiac surgery is consulted for a VSD repair post STEMI.      Hospital course:   Per chart review, presented to [facility] 05/18 PM for the evaluation of chest tightness associated with SOB/nausea. On arrival he was afebrile, tachycardic, and hypertensive. ED workup remarkable for initial WBC 20.5, Cr 0.79, troponin ~24K,  and ECG with ST elevations in the inferior leads with reciprocal depressions. He was loaded with aspirin and brilinta and started on heparin gtt. He was taken for emergent coronary angiogram which revealed multi vessel CAD [LAD/Lcx/RCA mildly/diffusely diseased,100% RPDA culprit lesion] with PTCA to RPDA with 50% residual stenosis [GLENNA 0->3] (too small to stent). Notably, LVEF 45% and LVED 20-25 mmHg.      He then developed on 5/19 persistent tachycardia despite IVF and IV metoprolol. TTE with LVEF 55%, wall motion abnormalities, and no significant valvular disease. Due to concern for acute aortic pathology vs PE, he underwent a CTA CAP revealing of mild pulmonary edema. Due to concern for PNA, blood cultures were obtained and he was started on CTX/Doxycycline. Later that evening was in respiratory distress (tachypneic to RR 50) with hypotension (BP 85/52 ) and satting  92% on RA with labs revealing of end organ dysfunction and CXR with bilateral pulmonary infiltrates [pulmoinary edema vs multifocal PNA]. A shock call was convened and recommended intubation and PA catheter placement for adjudication of hemodynamics. He was intubated without issue and opening Casey numbers were revealing of RA 14, RV 55/18, PA 51/30 (40), PCWP 32, svO2 87, [place] CO 13.9, [place] CI 7.3.  He was then transferred to [facility].      On arrival to the CICU he was hypotensive with escalating pressor requirements. Bedside examination reveals a holosystolic murmur and serial mixed venous are revealing of a significant step up in SVO2 from the RA to PA concerning for the presence of a ischemic VSD, perhaps secondardy to a late presenting STEMI given troponin elevation to 24K on presentation. Shock call was done that recommended an impella CP placement to offload LV given concern for STEMI-induced VSD/ Inferior LV wall rupture.      Cardiac/Pertinent Imaging  C: 5/18/25:  CONCLUSIONS:   1. Left Main Coronary Artery: This artery is normal.   2. Left Anterior Descending Artery: is mildly diseased, is diffusely diseased and is ectatic.   3. Circumflex Coronary Artery: diffusely dieased, mildly diseased and ectatic.   4. Right Coronary Artery: is tortuous, is diffusely diseased and mildly diseased.   5. PDA RCA Lesion: The percent stenosis is 100%.   6. PDA RCA Lesion: PTCA: 50% residual stenosis. RCA: pre-procedure GLENNA flow was 0(no perfusion) and post-procedure GLENNA flow was 3(complete perfusion).   7. The Left Ventricular Ejection Fraction is 45%.   8. Left Ventricular End Diastolic Pressure: 20-25 mm Hg.   9. Aortic Stenosis: None.  10. Left Ventricular End Diastolic Pressure Dysfunction: Moderate.  11. LV: inferior akinesis.  12. LV: mild left ventricular dysfunction with regional wall motion abnormalities.     Echo: 5/20/25:  CONCLUSIONS:   1. Poorly visualized anatomical structures due to suboptimal image  "quality.   2. The left ventricle was not well visualized. The left ventricular ejection fraction could not be measured.   3. Limited views of the LV appear show a hyderdynamic LV with a large color flow from LV to RV through the inferoseptum with maximal velocity of 3.3m/sec, Unable to adequatly assess LVEF, though appears to have baal septal and inferior wall motion abnormality.   4. There is reduced right ventricular systolic function.   5. The RV appears dilated with significantly reduced function with reduced TAPSE and fractional area change.   6. Primary service notified of findings at the time of reporting.   7. Compared with the prior exam from 5/19/2025 ([facility]) there has been interval development of a large VSD throuh the inferoseptum with the RV now becoming large with significantly reduced function. Prior echo with reported LVEF of 55% with basal inferospetal and baal inferior hypokinesis in the setting of STEMI. RV size and function were nornal at that time. ( Note that the septum was more thoroughly interrogated with color Doppler today).    Objective   BP 94/87   Pulse 90   Temp 37.5 °C (99.5 °F)   Resp 25   Ht 1.727 m (5' 8\")   Wt 87.1 kg (192 lb 0.3 oz)   SpO2 99%   BMI 29.20 kg/m²   0-10 (Numeric) Pain Score: 0 - No pain  Critical-Care Pain Observation Score:  [0-2]    Vitals:    06/02/25 0600   Weight: 87.1 kg (192 lb 0.3 oz)           Intake/Output Summary (Last 24 hours) at 6/2/2025 1424  Last data filed at 6/2/2025 1206  Gross per 24 hour   Intake 1606.83 ml   Output 2967 ml   Net -1360.17 ml         Medications  Scheduled medications  Scheduled Medications[1]Continuous medications  Continuous Medications[2]PRN medications  PRN Medications[3]    Labs  Results for orders placed or performed during the hospital encounter of 05/20/25 (from the past 24 hours)   POCT GLUCOSE   Result Value Ref Range    POCT Glucose 154 (H) 74 - 99 mg/dL   ACTIVATED CLOTTING TIME LOW   Result " Value Ref Range    POCT Activated Clotting Time Low Range 176 83 - 199 sec   CBC and Auto Differential   Result Value Ref Range    WBC 13.9 (H) 4.4 - 11.3 x10*3/uL    nRBC 2.7 (H) 0.0 - 0.0 /100 WBCs    RBC 2.62 (L) 4.50 - 5.90 x10*6/uL    Hemoglobin 8.4 (L) 13.5 - 17.5 g/dL    Hematocrit 24.8 (L) 41.0 - 52.0 %    MCV 95 80 - 100 fL    MCH 32.1 26.0 - 34.0 pg    MCHC 33.9 32.0 - 36.0 g/dL    RDW 26.7 (H) 11.5 - 14.5 %    Platelets 69 (L) 150 - 450 x10*3/uL    Neutrophils % 77.8 40.0 - 80.0 %    Immature Granulocytes %, Automated 4.5 (H) 0.0 - 0.9 %    Lymphocytes % 10.9 13.0 - 44.0 %    Monocytes % 5.8 2.0 - 10.0 %    Eosinophils % 0.6 0.0 - 6.0 %    Basophils % 0.4 0.0 - 2.0 %    Neutrophils Absolute 10.77 (H) 1.20 - 7.70 x10*3/uL    Immature Granulocytes Absolute, Automated 0.63 0.00 - 0.70 x10*3/uL    Lymphocytes Absolute 1.51 1.20 - 4.80 x10*3/uL    Monocytes Absolute 0.81 0.10 - 1.00 x10*3/uL    Eosinophils Absolute 0.08 0.00 - 0.70 x10*3/uL    Basophils Absolute 0.06 0.00 - 0.10 x10*3/uL   Renal Function Panel   Result Value Ref Range    Glucose 208 (H) 74 - 99 mg/dL    Sodium 136 136 - 145 mmol/L    Potassium 3.9 3.5 - 5.3 mmol/L    Chloride 101 98 - 107 mmol/L    Bicarbonate 27 21 - 32 mmol/L    Anion Gap 12 10 - 20 mmol/L    Urea Nitrogen 31 (H) 6 - 23 mg/dL    Creatinine 2.44 (H) 0.50 - 1.30 mg/dL    eGFR 32 (L) >60 mL/min/1.73m*2    Calcium 8.1 (L) 8.6 - 10.6 mg/dL    Phosphorus 3.5 2.5 - 4.9 mg/dL    Albumin 2.9 (L) 3.4 - 5.0 g/dL   Magnesium   Result Value Ref Range    Magnesium 2.95 (H) 1.60 - 2.40 mg/dL   Morphology   Result Value Ref Range    RBC Morphology See Below     Polychromasia Mild     Target Cells Few     Basophilic Stippling Present    ACTIVATED CLOTTING TIME LOW   Result Value Ref Range    POCT Activated Clotting Time Low Range 155 83 - 199 sec   POCT GLUCOSE   Result Value Ref Range    POCT Glucose 172 (H) 74 - 99 mg/dL   ACTIVATED CLOTTING TIME LOW   Result Value Ref Range    POCT  Activated Clotting Time Low Range 168 83 - 199 sec   ACTIVATED CLOTTING TIME LOW   Result Value Ref Range    POCT Activated Clotting Time Low Range 166 83 - 199 sec   POCT GLUCOSE   Result Value Ref Range    POCT Glucose 168 (H) 74 - 99 mg/dL   ACTIVATED CLOTTING TIME LOW   Result Value Ref Range    POCT Activated Clotting Time Low Range 165 83 - 199 sec   POCT GLUCOSE   Result Value Ref Range    POCT Glucose 144 (H) 74 - 99 mg/dL   Magnesium   Result Value Ref Range    Magnesium 2.89 (H) 1.60 - 2.40 mg/dL   CBC and Auto Differential   Result Value Ref Range    WBC 14.0 (H) 4.4 - 11.3 x10*3/uL    nRBC 1.7 (H) 0.0 - 0.0 /100 WBCs    RBC 2.47 (L) 4.50 - 5.90 x10*6/uL    Hemoglobin 8.0 (L) 13.5 - 17.5 g/dL    Hematocrit 23.2 (L) 41.0 - 52.0 %    MCV 94 80 - 100 fL    MCH 32.4 26.0 - 34.0 pg    MCHC 34.5 32.0 - 36.0 g/dL    RDW 26.9 (H) 11.5 - 14.5 %    Platelets 66 (L) 150 - 450 x10*3/uL    Neutrophils % 76.7 40.0 - 80.0 %    Immature Granulocytes %, Automated 3.1 (H) 0.0 - 0.9 %    Lymphocytes % 13.1 13.0 - 44.0 %    Monocytes % 6.1 2.0 - 10.0 %    Eosinophils % 0.7 0.0 - 6.0 %    Basophils % 0.3 0.0 - 2.0 %    Neutrophils Absolute 10.78 (H) 1.20 - 7.70 x10*3/uL    Immature Granulocytes Absolute, Automated 0.43 0.00 - 0.70 x10*3/uL    Lymphocytes Absolute 1.84 1.20 - 4.80 x10*3/uL    Monocytes Absolute 0.85 0.10 - 1.00 x10*3/uL    Eosinophils Absolute 0.10 0.00 - 0.70 x10*3/uL    Basophils Absolute 0.04 0.00 - 0.10 x10*3/uL   Hepatic function panel   Result Value Ref Range    Albumin 2.6 (L) 3.4 - 5.0 g/dL    Bilirubin, Total 21.4 (H) 0.0 - 1.2 mg/dL    Bilirubin, Direct 13.8 (H) 0.0 - 0.3 mg/dL    Alkaline Phosphatase 175 (H) 33 - 120 U/L    ALT 81 (H) 10 - 52 U/L     (H) 9 - 39 U/L    Total Protein 5.1 (L) 6.4 - 8.2 g/dL   Lactate dehydrogenase   Result Value Ref Range     (H) 84 - 246 U/L   Haptoglobin   Result Value Ref Range    Haptoglobin <30 (L) 30 - 200 mg/dL   Phosphorus   Result Value Ref Range     Phosphorus 2.5 2.5 - 4.9 mg/dL   Basic Metabolic Panel   Result Value Ref Range    Glucose 146 (H) 74 - 99 mg/dL    Sodium 137 136 - 145 mmol/L    Potassium 4.4 3.5 - 5.3 mmol/L    Chloride 103 98 - 107 mmol/L    Bicarbonate 25 21 - 32 mmol/L    Anion Gap 13 10 - 20 mmol/L    Urea Nitrogen 29 (H) 6 - 23 mg/dL    Creatinine 2.41 (H) 0.50 - 1.30 mg/dL    eGFR 33 (L) >60 mL/min/1.73m*2    Calcium 7.9 (L) 8.6 - 10.6 mg/dL   Blood Gas Arterial Full Panel   Result Value Ref Range    POCT pH, Arterial 7.43 (H) 7.38 - 7.42 pH    POCT pCO2, Arterial 35 (L) 38 - 42 mm Hg    POCT pO2, Arterial 116 (H) 85 - 95 mm Hg    POCT SO2, Arterial 99 94 - 100 %    POCT Oxy Hemoglobin, Arterial 95.2 94.0 - 98.0 %    POCT Hematocrit Calculated, Arterial 24.0 (L) 41.0 - 52.0 %    POCT Sodium, Arterial 134 (L) 136 - 145 mmol/L    POCT Potassium, Arterial 4.2 3.5 - 5.3 mmol/L    POCT Chloride, Arterial 106 98 - 107 mmol/L    POCT Ionized Calcium, Arterial 1.09 (L) 1.10 - 1.33 mmol/L    POCT Glucose, Arterial 142 (H) 74 - 99 mg/dL    POCT Lactate, Arterial 0.9 0.4 - 2.0 mmol/L    POCT Base Excess, Arterial -0.9 -2.0 - 3.0 mmol/L    POCT HCO3 Calculated, Arterial 23.2 22.0 - 26.0 mmol/L    POCT Hemoglobin, Arterial 8.0 (L) 13.5 - 17.5 g/dL    POCT Anion Gap, Arterial 9 (L) 10 - 25 mmo/L    Patient Temperature 37.0 degrees Celsius    FiO2 30 %   ACTIVATED CLOTTING TIME LOW   Result Value Ref Range    POCT Activated Clotting Time Low Range 160 83 - 199 sec   POCT GLUCOSE   Result Value Ref Range    POCT Glucose 152 (H) 74 - 99 mg/dL   Electrocardiogram, 12-lead PRN ACS symptoms   Result Value Ref Range    Ventricular Rate 84 BPM    Atrial Rate 84 BPM    SC Interval 178 ms    QRS Duration 94 ms    QT Interval 368 ms    QTC Calculation(Bazett) 434 ms    P Axis 100 degrees    R Axis 256 degrees    T Axis 97 degrees    QRS Count 14 beats    Q Onset 212 ms    P Onset 123 ms    P Offset 169 ms    T Offset 396 ms    QTC Fredericia 411 ms    Electrocardiogram, 12-lead PRN ACS symptoms   Result Value Ref Range    Ventricular Rate 116 BPM    Atrial Rate 116 BPM    MA Interval 140 ms    QRS Duration 98 ms    QT Interval 362 ms    QTC Calculation(Bazett) 503 ms    P Axis 37 degrees    R Axis 102 degrees    T Axis -23 degrees    QRS Count 19 beats    Q Onset 217 ms    P Onset 147 ms    P Offset 193 ms    T Offset 398 ms    QTC Fredericia 451 ms   ACTIVATED CLOTTING TIME LOW   Result Value Ref Range    POCT Activated Clotting Time Low Range 186 83 - 199 sec   POCT GLUCOSE   Result Value Ref Range    POCT Glucose 168 (H) 74 - 99 mg/dL   Blood Gas Arterial Full Panel   Result Value Ref Range    POCT pH, Arterial 7.43 (H) 7.38 - 7.42 pH    POCT pCO2, Arterial 30 (L) 38 - 42 mm Hg    POCT pO2, Arterial 144 (H) 85 - 95 mm Hg    POCT SO2, Arterial 100 94 - 100 %    POCT Oxy Hemoglobin, Arterial 96.2 94.0 - 98.0 %    POCT Hematocrit Calculated, Arterial 24.0 (L) 41.0 - 52.0 %    POCT Sodium, Arterial 135 (L) 136 - 145 mmol/L    POCT Potassium, Arterial 4.1 3.5 - 5.3 mmol/L    POCT Chloride, Arterial 107 98 - 107 mmol/L    POCT Ionized Calcium, Arterial 1.02 (L) 1.10 - 1.33 mmol/L    POCT Glucose, Arterial 144 (H) 74 - 99 mg/dL    POCT Lactate, Arterial 1.2 0.4 - 2.0 mmol/L    POCT Base Excess, Arterial -3.9 (L) -2.0 - 3.0 mmol/L    POCT HCO3 Calculated, Arterial 19.9 (L) 22.0 - 26.0 mmol/L    POCT Hemoglobin, Arterial 7.9 (L) 13.5 - 17.5 g/dL    POCT Anion Gap, Arterial 12 10 - 25 mmo/L    Patient Temperature 37.0 degrees Celsius    FiO2 36 %   ACTIVATED CLOTTING TIME LOW   Result Value Ref Range    POCT Activated Clotting Time Low Range 155 83 - 199 sec               ASSESSMENT & PLAN  Mr. Elena is a 45 year old male with a past medical history of tobacco abuse (30 pack years), submandibular abscess, and nephrolithiasis who presented to [facility] with a chief complaint of chest pain, found to have an inferior STEMI, now s/p coronary angiogram with balloon  angioplasty to the PDA (right dominant system). His course was complicated by hemodynamic instability (tachycardia, hypotension) requiring vasopressor support and laboratory evidnce of end organ dysfunction. Due to concern for cardiogenic vs mixed shock, a shock call was intervened and recommended transfer to [facility] CICU. TTE at [facility] revealed large VSD. He underwent Impella CP placement 5/20.      Cardiac surgery is consulted for a VSD repair post STEMI.         RECOMMENDATIONS/PLAN  Dr. Omer aware of patient and reviewed case and available imaging.   - Emergent surgery for patient's VSD not indicated at this time.   - Recommend medical optimization per primary team  - Early surgery for VSD repairs post STEMI have significantly high mortality rates, ideally would prefer to wait a couple weeks to decrease surgical risk. Currently patient is not a surgical candidate. Will need to make significant improvements prior to consideration for surgical repair.  - Impella 5.5 placed 5/28 by Dr. Omer   - 6/2/25: patient extubated today to 4L NC; will continue to follow patient for overall medical optimization     Will continue to follow along.   Thank you for the consultation.   Please page the cardiac surgery consult pager [phone] with any questions or changes in patient condition.    Holly Diez PA-C  Cardiac Surgery Consult [facility]  [facility]  Cardiac Surgery Consult Pager [phone]     6/2/2025  2:24 PM            [1] aspirin, 81 mg, oral, Daily  insulin lispro, 0-5 Units, subcutaneous, q4h  meropenem, 2 g, intravenous, q12h  oxygen, , inhalation, Continuous - Inhalation  pantoprazole, 40 mg, intravenous, BID  perflutren lipid microspheres, 0.5-10 mL of dilution, intravenous, Once in imaging  perflutren protein A microsphere, 0.5 mL, intravenous, Once in imaging  polyethylene glycol, 17 g, oral, 4x daily  sennosides, 2 tablet, oral, BID  thiamine, 500 mg, intravenous, Daily     [2] dexmedeTOMIDine, 0.1-1.5  mcg/kg/hr (Dosing Weight), Last Rate: Stopped (06/02/25 1020)  fentaNYL,  mcg/hr, Last Rate: Stopped (06/02/25 0653)  heparin, 0-2,000 Units/hr, Last Rate: 600 Units/hr (06/02/25 1329)  heparin Impella Purge 25 units/mL in 500 mL D5W, 10 mL/hr, Last Rate: 10 mL/hr (06/02/25 1100)  midazolam, 0.5-20 mg/hr, Last Rate: Stopped (06/02/25 0604)  norepinephrine, 0.01-1 mcg/kg/min (Dosing Weight), Last Rate: Stopped (06/02/25 0905)  PrismaSol 4/2.5, 24 mL/kg/hr (Dosing Weight), Last Rate: 24 mL/kg/hr (06/02/25 0015)     [3] PRN medications: alteplase, bisacodyl, dextrose, dextrose, glucagon, glucagon

## 2025-06-02 NOTE — PROGRESS NOTES
Social Work Progress Note  - ICU TREATMENT PLAN: S/p extubation 06/02. On Impella.    - Payer: [place]   -Support System: son, mother, sister  - Planned Disposition: Pending medical outcome and rehab recommendations.  - Barriers to discharge: None at this time. SW will continue to follow.  MEGAN VERMA

## 2025-06-02 NOTE — NURSING NOTE
Central Line Insertion Practices/2nd Observer Note   Name:  Alpesh Elena  Admission Date:  2025  1:14 AM  MRN: [id number]  Attending Provider: Elkin Molina MD  Room/Bed:               Sex: male  : 1980       Age: 45 y.o.    Pre-Procedure Checklist  Emergent Line Insertion: No  Type of Line to be Placed: Introducer  Consent Obtained: Yes  Emergency Medication Necessary: No  Patient Identified with 2 Independent Identifiers: Yes  Review of Allergies, Anticoagulation, Relevant Labs, ECG/Telemetry: Yes  Risks/Benefits/Alternatives Discussed with Patient/POA/Legal Representative: Yes  Stop Sign on Door: Yes  Time Out Performed: Yes  Catheter Exchange: No  Positioning and Preparation Checklist  All People, Including Patient, in the Room with Cap and Mask: Yes  Fluoroscopy Used to Identify Vessel and Guide Insertion; Sterile Cover Used: No  Ultrasound Used to Identify Vessel and Guide Insertion; Sterile Cover Used: Yes  Full Barrier Precautions Followed (Mask, Cap, Gown, Gloves): Yes  Hands Washed: Yes  Monitors Attached with Sound Alarms On: Yes  Full Body Sterile Drape (Head-to-Toe) Used to Cover Patient: Yes  [place] Position (For IJ and Subclavian): Yes  CHG Skin Prep Used and Allowed to Air Dry Prior to Skin Procedure: Yes  Procedure Checklist  Blood Aspirated From All Lumens, All Ports Subsequently Flushed: Yes  Catheter Caps Placed On All Lumens; Lumens Clamped: Yes  Maintain Guidewire Control Throughout, Ensuring Guidewire Removal: Yes  Maintain Sterile Field Throughout Insertion: Yes  Catheter Secured: Yes  Confirmatory Test of Venous Placement: Longitudinal ultrasound  Post-Procedure Checklist  Date and Time Written on Dressing: Yes  Sharp and Wire Count and Safe Disposal of all Sharps/Wires: Yes  Sterile Dressing Applied Per Protocol: Yes  X-ray Ordered or ECG Image: Yes    Leandra Buckner RN  Date: 2025  Time: 1:59 PM

## 2025-06-02 NOTE — PROGRESS NOTES
"Palliative Medicine following for:  Complex medical decision making, symptom management, patient/family support    History obtained from chart review including ED note, H&P, patient's daily progress notes, review of lab/test results, and discussion with primary team and bedside RN.    Chief Complaint: confused, coughing    Subjective    History of Present Illness  He was extubated today. He has intermittent cough with increased secretions.  He does not have pain.  He is confused and disoriented.      Symptoms  The patient is unable to participate with history and review of systems because of confusion.    Objective    Last Recorded Vitals  BP 94/87   Pulse 90   Temp 37.5 °C (99.5 °F)   Resp 25   Ht 1.727 m (5' 8\")   Wt 87.1 kg (192 lb 0.3 oz)   SpO2 99%   BMI 29.20 kg/m²      Physical Exam  Constitutional:       General: He is not in acute distress.     Appearance: He is ill-appearing.   HENT:      Mouth/Throat:      Mouth: Mucous membranes are moist.   Eyes:      General: Scleral icterus present.   Cardiovascular:      Rate and Rhythm: Normal rate and regular rhythm.   Pulmonary:      Effort: Tachypnea present. No accessory muscle usage or respiratory distress.   Skin:     General: Skin is warm.      Coloration: Skin is jaundiced.   Neurological:      Mental Status: He is alert. He is disoriented and confused.   Psychiatric:         Attention and Perception: He is inattentive.        Relevant Results   Results for orders placed or performed during the hospital encounter of 05/20/25 (from the past 24 hours)   ACTIVATED CLOTTING TIME LOW   Result Value Ref Range    POCT Activated Clotting Time Low Range 176 83 - 199 sec   CBC and Auto Differential   Result Value Ref Range    WBC 13.9 (H) 4.4 - 11.3 x10*3/uL    nRBC 2.7 (H) 0.0 - 0.0 /100 WBCs    RBC 2.62 (L) 4.50 - 5.90 x10*6/uL    Hemoglobin 8.4 (L) 13.5 - 17.5 g/dL    Hematocrit 24.8 (L) 41.0 - 52.0 %    MCV 95 80 - 100 fL    MCH 32.1 26.0 - 34.0 pg    MCHC " 33.9 32.0 - 36.0 g/dL    RDW 26.7 (H) 11.5 - 14.5 %    Platelets 69 (L) 150 - 450 x10*3/uL    Neutrophils % 77.8 40.0 - 80.0 %    Immature Granulocytes %, Automated 4.5 (H) 0.0 - 0.9 %    Lymphocytes % 10.9 13.0 - 44.0 %    Monocytes % 5.8 2.0 - 10.0 %    Eosinophils % 0.6 0.0 - 6.0 %    Basophils % 0.4 0.0 - 2.0 %    Neutrophils Absolute 10.77 (H) 1.20 - 7.70 x10*3/uL    Immature Granulocytes Absolute, Automated 0.63 0.00 - 0.70 x10*3/uL    Lymphocytes Absolute 1.51 1.20 - 4.80 x10*3/uL    Monocytes Absolute 0.81 0.10 - 1.00 x10*3/uL    Eosinophils Absolute 0.08 0.00 - 0.70 x10*3/uL    Basophils Absolute 0.06 0.00 - 0.10 x10*3/uL   Renal Function Panel   Result Value Ref Range    Glucose 208 (H) 74 - 99 mg/dL    Sodium 136 136 - 145 mmol/L    Potassium 3.9 3.5 - 5.3 mmol/L    Chloride 101 98 - 107 mmol/L    Bicarbonate 27 21 - 32 mmol/L    Anion Gap 12 10 - 20 mmol/L    Urea Nitrogen 31 (H) 6 - 23 mg/dL    Creatinine 2.44 (H) 0.50 - 1.30 mg/dL    eGFR 32 (L) >60 mL/min/1.73m*2    Calcium 8.1 (L) 8.6 - 10.6 mg/dL    Phosphorus 3.5 2.5 - 4.9 mg/dL    Albumin 2.9 (L) 3.4 - 5.0 g/dL   Magnesium   Result Value Ref Range    Magnesium 2.95 (H) 1.60 - 2.40 mg/dL   Morphology   Result Value Ref Range    RBC Morphology See Below     Polychromasia Mild     Target Cells Few     Basophilic Stippling Present    ACTIVATED CLOTTING TIME LOW   Result Value Ref Range    POCT Activated Clotting Time Low Range 155 83 - 199 sec   POCT GLUCOSE   Result Value Ref Range    POCT Glucose 172 (H) 74 - 99 mg/dL   ACTIVATED CLOTTING TIME LOW   Result Value Ref Range    POCT Activated Clotting Time Low Range 168 83 - 199 sec   ACTIVATED CLOTTING TIME LOW   Result Value Ref Range    POCT Activated Clotting Time Low Range 166 83 - 199 sec   POCT GLUCOSE   Result Value Ref Range    POCT Glucose 168 (H) 74 - 99 mg/dL   ACTIVATED CLOTTING TIME LOW   Result Value Ref Range    POCT Activated Clotting Time Low Range 165 83 - 199 sec   POCT GLUCOSE    Result Value Ref Range    POCT Glucose 144 (H) 74 - 99 mg/dL   Magnesium   Result Value Ref Range    Magnesium 2.89 (H) 1.60 - 2.40 mg/dL   CBC and Auto Differential   Result Value Ref Range    WBC 14.0 (H) 4.4 - 11.3 x10*3/uL    nRBC 1.7 (H) 0.0 - 0.0 /100 WBCs    RBC 2.47 (L) 4.50 - 5.90 x10*6/uL    Hemoglobin 8.0 (L) 13.5 - 17.5 g/dL    Hematocrit 23.2 (L) 41.0 - 52.0 %    MCV 94 80 - 100 fL    MCH 32.4 26.0 - 34.0 pg    MCHC 34.5 32.0 - 36.0 g/dL    RDW 26.9 (H) 11.5 - 14.5 %    Platelets 66 (L) 150 - 450 x10*3/uL    Neutrophils % 76.7 40.0 - 80.0 %    Immature Granulocytes %, Automated 3.1 (H) 0.0 - 0.9 %    Lymphocytes % 13.1 13.0 - 44.0 %    Monocytes % 6.1 2.0 - 10.0 %    Eosinophils % 0.7 0.0 - 6.0 %    Basophils % 0.3 0.0 - 2.0 %    Neutrophils Absolute 10.78 (H) 1.20 - 7.70 x10*3/uL    Immature Granulocytes Absolute, Automated 0.43 0.00 - 0.70 x10*3/uL    Lymphocytes Absolute 1.84 1.20 - 4.80 x10*3/uL    Monocytes Absolute 0.85 0.10 - 1.00 x10*3/uL    Eosinophils Absolute 0.10 0.00 - 0.70 x10*3/uL    Basophils Absolute 0.04 0.00 - 0.10 x10*3/uL   Hepatic function panel   Result Value Ref Range    Albumin 2.6 (L) 3.4 - 5.0 g/dL    Bilirubin, Total 21.4 (H) 0.0 - 1.2 mg/dL    Bilirubin, Direct 13.8 (H) 0.0 - 0.3 mg/dL    Alkaline Phosphatase 175 (H) 33 - 120 U/L    ALT 81 (H) 10 - 52 U/L     (H) 9 - 39 U/L    Total Protein 5.1 (L) 6.4 - 8.2 g/dL   Lactate dehydrogenase   Result Value Ref Range     (H) 84 - 246 U/L   Haptoglobin   Result Value Ref Range    Haptoglobin <30 (L) 30 - 200 mg/dL   Phosphorus   Result Value Ref Range    Phosphorus 2.5 2.5 - 4.9 mg/dL   Basic Metabolic Panel   Result Value Ref Range    Glucose 146 (H) 74 - 99 mg/dL    Sodium 137 136 - 145 mmol/L    Potassium 4.4 3.5 - 5.3 mmol/L    Chloride 103 98 - 107 mmol/L    Bicarbonate 25 21 - 32 mmol/L    Anion Gap 13 10 - 20 mmol/L    Urea Nitrogen 29 (H) 6 - 23 mg/dL    Creatinine 2.41 (H) 0.50 - 1.30 mg/dL    eGFR 33 (L)  >60 mL/min/1.73m*2    Calcium 7.9 (L) 8.6 - 10.6 mg/dL   Blood Gas Arterial Full Panel   Result Value Ref Range    POCT pH, Arterial 7.43 (H) 7.38 - 7.42 pH    POCT pCO2, Arterial 35 (L) 38 - 42 mm Hg    POCT pO2, Arterial 116 (H) 85 - 95 mm Hg    POCT SO2, Arterial 99 94 - 100 %    POCT Oxy Hemoglobin, Arterial 95.2 94.0 - 98.0 %    POCT Hematocrit Calculated, Arterial 24.0 (L) 41.0 - 52.0 %    POCT Sodium, Arterial 134 (L) 136 - 145 mmol/L    POCT Potassium, Arterial 4.2 3.5 - 5.3 mmol/L    POCT Chloride, Arterial 106 98 - 107 mmol/L    POCT Ionized Calcium, Arterial 1.09 (L) 1.10 - 1.33 mmol/L    POCT Glucose, Arterial 142 (H) 74 - 99 mg/dL    POCT Lactate, Arterial 0.9 0.4 - 2.0 mmol/L    POCT Base Excess, Arterial -0.9 -2.0 - 3.0 mmol/L    POCT HCO3 Calculated, Arterial 23.2 22.0 - 26.0 mmol/L    POCT Hemoglobin, Arterial 8.0 (L) 13.5 - 17.5 g/dL    POCT Anion Gap, Arterial 9 (L) 10 - 25 mmo/L    Patient Temperature 37.0 degrees Celsius    FiO2 30 %   ACTIVATED CLOTTING TIME LOW   Result Value Ref Range    POCT Activated Clotting Time Low Range 160 83 - 199 sec   POCT GLUCOSE   Result Value Ref Range    POCT Glucose 152 (H) 74 - 99 mg/dL   Electrocardiogram, 12-lead PRN ACS symptoms   Result Value Ref Range    Ventricular Rate 84 BPM    Atrial Rate 84 BPM    CO Interval 178 ms    QRS Duration 94 ms    QT Interval 368 ms    QTC Calculation(Bazett) 434 ms    P Axis 100 degrees    R Axis 256 degrees    T Axis 97 degrees    QRS Count 14 beats    Q Onset 212 ms    P Onset 123 ms    P Offset 169 ms    T Offset 396 ms    QTC Fredericia 411 ms   Electrocardiogram, 12-lead PRN ACS symptoms   Result Value Ref Range    Ventricular Rate 116 BPM    Atrial Rate 116 BPM    CO Interval 140 ms    QRS Duration 98 ms    QT Interval 362 ms    QTC Calculation(Bazett) 503 ms    P Axis 37 degrees    R Axis 102 degrees    T Axis -23 degrees    QRS Count 19 beats    Q Onset 217 ms    P Onset 147 ms    P Offset 193 ms    T Offset 398 ms     QTC Fredericia 451 ms   ACTIVATED CLOTTING TIME LOW   Result Value Ref Range    POCT Activated Clotting Time Low Range 186 83 - 199 sec   POCT GLUCOSE   Result Value Ref Range    POCT Glucose 168 (H) 74 - 99 mg/dL   Blood Gas Arterial Full Panel   Result Value Ref Range    POCT pH, Arterial 7.43 (H) 7.38 - 7.42 pH    POCT pCO2, Arterial 30 (L) 38 - 42 mm Hg    POCT pO2, Arterial 144 (H) 85 - 95 mm Hg    POCT SO2, Arterial 100 94 - 100 %    POCT Oxy Hemoglobin, Arterial 96.2 94.0 - 98.0 %    POCT Hematocrit Calculated, Arterial 24.0 (L) 41.0 - 52.0 %    POCT Sodium, Arterial 135 (L) 136 - 145 mmol/L    POCT Potassium, Arterial 4.1 3.5 - 5.3 mmol/L    POCT Chloride, Arterial 107 98 - 107 mmol/L    POCT Ionized Calcium, Arterial 1.02 (L) 1.10 - 1.33 mmol/L    POCT Glucose, Arterial 144 (H) 74 - 99 mg/dL    POCT Lactate, Arterial 1.2 0.4 - 2.0 mmol/L    POCT Base Excess, Arterial -3.9 (L) -2.0 - 3.0 mmol/L    POCT HCO3 Calculated, Arterial 19.9 (L) 22.0 - 26.0 mmol/L    POCT Hemoglobin, Arterial 7.9 (L) 13.5 - 17.5 g/dL    POCT Anion Gap, Arterial 12 10 - 25 mmo/L    Patient Temperature 37.0 degrees Celsius    FiO2 36 %   ACTIVATED CLOTTING TIME LOW   Result Value Ref Range    POCT Activated Clotting Time Low Range 155 83 - 199 sec        Allergies  Patient has no known allergies.  Medications  Scheduled medications  Scheduled Medications[1]  Continuous medications  Continuous Medications[2]  PRN medications  PRN Medications[3]     Assessment/Plan    Right posterior descending artery STEMI s/p balloon angioplasty  Cardiogenic versus mixed shock  Mechanical circulatory device with Impella  Ventricular septal defect  Non sustained ventricular tachycardia  Premature ventricular complexes  2:1 AV block  Acute hypoxic respiratory failure status post endotracheal intubation and mechanical ventilation status post extubation  Ileus  Hyperbilirubinemia likely due to hemolysis associated to Impella  Anuric acute kidney  injury  High anion gap metabolic acidosis  Volume overload  Thrombocytopenia  Anemia  Leukocytosis  Diabetes mellitus type 2  Encephalopathy    Palliative Performance Scale (PPS): 20      #Complex Medical Decision Making   #Goals of Care  - Goals are mix of survival and time and improved quality of life    #Advance Care Planning   - Code status: Full code  - Next of kin: Alpesh Elena  - Plan for patient to complete Advanced Directives with social work prior to discharge     #Palliative Care Encounter.  Support and empathy was provided throughout the encounter. I Provided emotional support through reflective listening and presence.     I called Alpesh on the phone.  He visited his father this afternoon.  I offered assistance in big picture and goals of care discussion given his father's overall condition.  Palliative care will be meeting with Alpesh (son) and Kitty Hogan (mother) tomorrow, Keli 3, 2025 at 15:15.     Alpesh was extubated today.  Respiratory status is being monitored closely due to increased secretions.  Oxygen supplementation and oral suctioning are provided.  He was weaned off norepinephrine continuous infusion.  He remains on heparin continuous infusion with APTT and CBC monitoring while on Impella.  [place] catheter placement this afternoon.  Beta-blocker and RAAS inhibition are on hold due to cardiogenic shock requiring mechanical circulatory support with Impella.  He is continued on amiodarone continuous infusion for NSVT suppression.  He has severe hyperbilirubinemia secondary to hemolysis associated to Impella.  Continuous monitoring in progress.  His acute kidney injury with anuria and has been started on CVVH.    #Psychosocial Support  - Music Therapy  - Spiritual Care Support  - Art Therapy  - Palliative SW Support      Plan of Care discussed with: Updated MD CICU medical team and bedside RN on goals of care decision, medication adjustments, and code status     Medical Decision  Making   - Cardiogenic shock, VSD, hypoxic respiratory failure posing threat to life and function  - I Reviewed external notes from CICU and cardiac surgery  - I Reviewed results from CMP and CBC, chest x-ray  - I Discussed the management with CICU medical team  - Drug therapy requiring intensive monitoring for toxicity: He is on heparin continuous intravenous infusion requiring serial APTT and CBC, and amiodarone continuous infusion requiring telemetry and vital signs monitoring    Thank you for allowing us to care for this patient. Palliative Team will continue to follow as needed. Please contact team with any questions or concerns.   Team pager [phone] (weekdays)    Antonino Fernández MD  Palliative Care Physician  Message: Epic Secure chat  Team pager [phone]         [1] aspirin, 81 mg, oral, Daily  insulin lispro, 0-5 Units, subcutaneous, q4h  meropenem, 2 g, intravenous, q12h  oxygen, , inhalation, Continuous - Inhalation  pantoprazole, 40 mg, intravenous, BID  perflutren lipid microspheres, 0.5-10 mL of dilution, intravenous, Once in imaging  perflutren protein A microsphere, 0.5 mL, intravenous, Once in imaging  polyethylene glycol, 17 g, oral, 4x daily  sennosides, 2 tablet, oral, BID  thiamine, 500 mg, intravenous, Daily  [2] dexmedeTOMIDine, 0.1-1.5 mcg/kg/hr (Dosing Weight), Last Rate: Stopped (06/02/25 1020)  fentaNYL,  mcg/hr, Last Rate: Stopped (06/02/25 0653)  heparin, 0-2,000 Units/hr, Last Rate: 600 Units/hr (06/02/25 1329)  heparin Impella Purge 25 units/mL in 500 mL D5W, 10 mL/hr, Last Rate: 10 mL/hr (06/02/25 1100)  midazolam, 0.5-20 mg/hr, Last Rate: Stopped (06/02/25 0604)  norepinephrine, 0.01-1 mcg/kg/min (Dosing Weight), Last Rate: Stopped (06/02/25 0905)  PrismaSol 4/2.5, 24 mL/kg/hr (Dosing Weight), Last Rate: 24 mL/kg/hr (06/02/25 0015)  [3] PRN medications: alteplase, bisacodyl, dextrose, dextrose, glucagon, glucagon

## 2025-06-03 LAB
ACT BLD: 165 SEC (ref 83–199)
ACT BLD: 166 SEC (ref 83–199)
ACT BLD: 168 SEC (ref 83–199)
ACT BLD: 174 SEC (ref 83–199)
ACT BLD: 182 SEC (ref 83–199)
ACT BLD: 182 SEC (ref 83–199)
ACT BLD: 187 SEC (ref 83–199)
ACT BLD: 187 SEC (ref 83–199)
ALBUMIN SERPL BCP-MCNC: 2.6 G/DL (ref 3.4–5)
ALBUMIN SERPL BCP-MCNC: 2.6 G/DL (ref 3.4–5)
ALP SERPL-CCNC: 181 U/L (ref 33–120)
ALT SERPL W P-5'-P-CCNC: 67 U/L (ref 10–52)
ANION GAP BLDA CALCULATED.4IONS-SCNC: 13 MMO/L (ref 10–25)
ANION GAP BLDA CALCULATED.4IONS-SCNC: 14 MMO/L (ref 10–25)
ANION GAP BLDA CALCULATED.4IONS-SCNC: 16 MMO/L (ref 10–25)
ANION GAP BLDA CALCULATED.4IONS-SCNC: 17 MMO/L (ref 10–25)
ANION GAP BLDMV CALCULATED.4IONS-SCNC: 12 MMO/L (ref 10–25)
ANION GAP BLDMV CALCULATED.4IONS-SCNC: 14 MMO/L (ref 10–25)
ANION GAP BLDMV CALCULATED.4IONS-SCNC: 15 MMO/L (ref 10–25)
ANION GAP BLDMV CALCULATED.4IONS-SCNC: 16 MMO/L (ref 10–25)
ANION GAP BLDMV CALCULATED.4IONS-SCNC: 16 MMO/L (ref 10–25)
ANION GAP BLDMV CALCULATED.4IONS-SCNC: ABNORMAL MMOL/L
ANION GAP SERPL CALC-SCNC: 18 MMOL/L (ref 10–20)
ANION GAP SERPL CALC-SCNC: 19 MMOL/L (ref 10–20)
AST SERPL W P-5'-P-CCNC: 134 U/L (ref 9–39)
ATRIAL RATE: 100 BPM
ATRIAL RATE: 127 BPM
BASE EXCESS BLDA CALC-SCNC: -2.6 MMOL/L (ref -2–3)
BASE EXCESS BLDA CALC-SCNC: -2.7 MMOL/L (ref -2–3)
BASE EXCESS BLDA CALC-SCNC: -3.3 MMOL/L (ref -2–3)
BASE EXCESS BLDA CALC-SCNC: -5 MMOL/L (ref -2–3)
BASE EXCESS BLDMV CALC-SCNC: -0.9 MMOL/L (ref -2–3)
BASE EXCESS BLDMV CALC-SCNC: -1.4 MMOL/L (ref -2–3)
BASE EXCESS BLDMV CALC-SCNC: -2.2 MMOL/L (ref -2–3)
BASE EXCESS BLDMV CALC-SCNC: -2.4 MMOL/L (ref -2–3)
BASE EXCESS BLDMV CALC-SCNC: -2.7 MMOL/L (ref -2–3)
BASE EXCESS BLDMV CALC-SCNC: -3.8 MMOL/L (ref -2–3)
BASE EXCESS BLDMV CALC-SCNC: -3.9 MMOL/L (ref -2–3)
BASE EXCESS BLDMV CALC-SCNC: -4.5 MMOL/L (ref -2–3)
BASOPHILS # BLD AUTO: 0.04 X10*3/UL (ref 0–0.1)
BASOPHILS NFR BLD AUTO: 0.2 %
BILIRUB DIRECT SERPL-MCNC: 15.3 MG/DL (ref 0–0.3)
BILIRUB SERPL-MCNC: 23.6 MG/DL (ref 0–1.2)
BODY TEMPERATURE: 37 DEGREES CELSIUS
BODY TEMPERATURE: ABNORMAL
BUN SERPL-MCNC: 45 MG/DL (ref 6–23)
BUN SERPL-MCNC: 58 MG/DL (ref 6–23)
BURR CELLS BLD QL SMEAR: NORMAL
CA-I BLDA-SCNC: 1.08 MMOL/L (ref 1.1–1.33)
CA-I BLDA-SCNC: 1.09 MMOL/L (ref 1.1–1.33)
CA-I BLDA-SCNC: 1.09 MMOL/L (ref 1.1–1.33)
CA-I BLDA-SCNC: 1.11 MMOL/L (ref 1.1–1.33)
CA-I BLDMV-SCNC: 1.07 MMOL/L (ref 1.1–1.33)
CA-I BLDMV-SCNC: 1.08 MMOL/L (ref 1.1–1.33)
CA-I BLDMV-SCNC: 1.1 MMOL/L (ref 1.1–1.33)
CA-I BLDMV-SCNC: 1.11 MMOL/L (ref 1.1–1.33)
CALCIUM SERPL-MCNC: 8.1 MG/DL (ref 8.6–10.6)
CALCIUM SERPL-MCNC: 8.4 MG/DL (ref 8.6–10.6)
CHLORIDE BLD-SCNC: 102 MMOL/L (ref 98–107)
CHLORIDE BLD-SCNC: 103 MMOL/L (ref 98–107)
CHLORIDE BLD-SCNC: 104 MMOL/L (ref 98–107)
CHLORIDE BLDA-SCNC: 103 MMOL/L (ref 98–107)
CHLORIDE BLDA-SCNC: 104 MMOL/L (ref 98–107)
CHLORIDE SERPL-SCNC: 100 MMOL/L (ref 98–107)
CHLORIDE SERPL-SCNC: 101 MMOL/L (ref 98–107)
CO2 SERPL-SCNC: 22 MMOL/L (ref 21–32)
CO2 SERPL-SCNC: 23 MMOL/L (ref 21–32)
CREAT SERPL-MCNC: 3.48 MG/DL (ref 0.5–1.3)
CREAT SERPL-MCNC: 4.44 MG/DL (ref 0.5–1.3)
EGFRCR SERPLBLD CKD-EPI 2021: 16 ML/MIN/1.73M*2
EGFRCR SERPLBLD CKD-EPI 2021: 21 ML/MIN/1.73M*2
EOSINOPHIL # BLD AUTO: 0 X10*3/UL (ref 0–0.7)
EOSINOPHIL NFR BLD AUTO: 0 %
ERYTHROCYTE [DISTWIDTH] IN BLOOD BY AUTOMATED COUNT: ABNORMAL %
ERYTHROCYTE [DISTWIDTH] IN BLOOD BY AUTOMATED COUNT: ABNORMAL %
GLUCOSE BLD MANUAL STRIP-MCNC: 146 MG/DL (ref 74–99)
GLUCOSE BLD MANUAL STRIP-MCNC: 147 MG/DL (ref 74–99)
GLUCOSE BLD MANUAL STRIP-MCNC: 157 MG/DL (ref 74–99)
GLUCOSE BLD MANUAL STRIP-MCNC: 161 MG/DL (ref 74–99)
GLUCOSE BLD MANUAL STRIP-MCNC: 163 MG/DL (ref 74–99)
GLUCOSE BLD MANUAL STRIP-MCNC: 172 MG/DL (ref 74–99)
GLUCOSE BLD MANUAL STRIP-MCNC: 187 MG/DL (ref 74–99)
GLUCOSE BLD-MCNC: 133 MG/DL (ref 74–99)
GLUCOSE BLD-MCNC: 133 MG/DL (ref 74–99)
GLUCOSE BLD-MCNC: 148 MG/DL (ref 74–99)
GLUCOSE BLD-MCNC: 148 MG/DL (ref 74–99)
GLUCOSE BLD-MCNC: 154 MG/DL (ref 74–99)
GLUCOSE BLD-MCNC: 188 MG/DL (ref 74–99)
GLUCOSE BLD-MCNC: ABNORMAL MG/DL
GLUCOSE BLD-MCNC: ABNORMAL MG/DL
GLUCOSE BLDA-MCNC: 150 MG/DL (ref 74–99)
GLUCOSE BLDA-MCNC: 158 MG/DL (ref 74–99)
GLUCOSE BLDA-MCNC: 161 MG/DL (ref 74–99)
GLUCOSE BLDA-MCNC: 163 MG/DL (ref 74–99)
GLUCOSE SERPL-MCNC: 152 MG/DL (ref 74–99)
GLUCOSE SERPL-MCNC: 166 MG/DL (ref 74–99)
HAPTOGLOB SERPL NEPH-MCNC: <30 MG/DL (ref 30–200)
HCO3 BLDA-SCNC: 18.6 MMOL/L (ref 22–26)
HCO3 BLDA-SCNC: 19.9 MMOL/L (ref 22–26)
HCO3 BLDA-SCNC: 21.1 MMOL/L (ref 22–26)
HCO3 BLDA-SCNC: 21.2 MMOL/L (ref 22–26)
HCO3 BLDMV-SCNC: 19.9 MMOL/L (ref 22–26)
HCO3 BLDMV-SCNC: 20.2 MMOL/L (ref 22–26)
HCO3 BLDMV-SCNC: 20.3 MMOL/L (ref 22–26)
HCO3 BLDMV-SCNC: 20.9 MMOL/L (ref 22–26)
HCO3 BLDMV-SCNC: 21.6 MMOL/L (ref 22–26)
HCO3 BLDMV-SCNC: 22.2 MMOL/L (ref 22–26)
HCO3 BLDMV-SCNC: 22.3 MMOL/L (ref 22–26)
HCO3 BLDMV-SCNC: 23.4 MMOL/L (ref 22–26)
HCT VFR BLD AUTO: 24.4 % (ref 41–52)
HCT VFR BLD AUTO: 29.2 % (ref 41–52)
HCT VFR BLD EST: 23 % (ref 41–52)
HCT VFR BLD EST: 23 % (ref 41–52)
HCT VFR BLD EST: 24 % (ref 41–52)
HCT VFR BLD EST: 25 % (ref 41–52)
HCT VFR BLD EST: 25 % (ref 41–52)
HCT VFR BLD EST: 26 % (ref 41–52)
HCT VFR BLD EST: 26 % (ref 41–52)
HCT VFR BLD EST: 29 % (ref 41–52)
HCT VFR BLD EST: 35 % (ref 41–52)
HGB BLD-MCNC: 8.1 G/DL (ref 13.5–17.5)
HGB BLD-MCNC: 9.8 G/DL (ref 13.5–17.5)
HGB BLDA-MCNC: 8 G/DL (ref 13.5–17.5)
HGB BLDA-MCNC: 8.1 G/DL (ref 13.5–17.5)
HGB BLDA-MCNC: 8.2 G/DL (ref 13.5–17.5)
HGB BLDA-MCNC: 8.6 G/DL (ref 13.5–17.5)
HGB BLDMV-MCNC: 11.8 G/DL (ref 13.5–17.5)
HGB BLDMV-MCNC: 7.7 G/DL (ref 13.5–17.5)
HGB BLDMV-MCNC: 7.8 G/DL (ref 13.5–17.5)
HGB BLDMV-MCNC: 7.9 G/DL (ref 13.5–17.5)
HGB BLDMV-MCNC: 8 G/DL (ref 13.5–17.5)
HGB BLDMV-MCNC: 8.3 G/DL (ref 13.5–17.5)
HGB BLDMV-MCNC: 8.7 G/DL (ref 13.5–17.5)
HGB BLDMV-MCNC: 9.6 G/DL (ref 13.5–17.5)
IMM GRANULOCYTES # BLD AUTO: 0.4 X10*3/UL (ref 0–0.7)
IMM GRANULOCYTES NFR BLD AUTO: 1.7 % (ref 0–0.9)
INHALED O2 CONCENTRATION: 21 %
INHALED O2 CONCENTRATION: 21 %
INHALED O2 CONCENTRATION: 28 %
INHALED O2 CONCENTRATION: 30 %
INHALED O2 CONCENTRATION: 30 %
INHALED O2 CONCENTRATION: 35 %
LACTATE BLDA-SCNC: 1.4 MMOL/L (ref 0.4–2)
LACTATE BLDA-SCNC: 1.6 MMOL/L (ref 0.4–2)
LACTATE BLDA-SCNC: 1.6 MMOL/L (ref 0.4–2)
LACTATE BLDA-SCNC: 1.9 MMOL/L (ref 0.4–2)
LACTATE BLDMV-SCNC: 1.2 MMOL/L (ref 0.4–2)
LACTATE BLDMV-SCNC: 1.4 MMOL/L (ref 0.4–2)
LACTATE BLDMV-SCNC: 1.5 MMOL/L (ref 0.4–2)
LACTATE BLDMV-SCNC: 1.6 MMOL/L (ref 0.4–2)
LACTATE BLDMV-SCNC: 1.6 MMOL/L (ref 0.4–2)
LACTATE BLDMV-SCNC: 2 MMOL/L (ref 0.4–2)
LACTATE BLDMV-SCNC: 2 MMOL/L (ref 0.4–2)
LACTATE BLDMV-SCNC: 2.5 MMOL/L (ref 0.4–2)
LACTATE BLDV-SCNC: 1.7 MMOL/L (ref 0.4–2)
LACTATE SERPL-SCNC: 1.5 MMOL/L (ref 0.4–2)
LDH SERPL L TO P-CCNC: 767 U/L (ref 84–246)
LYMPHOCYTES # BLD AUTO: 1.03 X10*3/UL (ref 1.2–4.8)
LYMPHOCYTES NFR BLD AUTO: 4.4 %
MAGNESIUM SERPL-MCNC: 3.02 MG/DL (ref 1.6–2.4)
MAGNESIUM SERPL-MCNC: 3.08 MG/DL (ref 1.6–2.4)
MCH RBC QN AUTO: 32.2 PG (ref 26–34)
MCH RBC QN AUTO: 32.3 PG (ref 26–34)
MCHC RBC AUTO-ENTMCNC: 33.2 G/DL (ref 32–36)
MCHC RBC AUTO-ENTMCNC: 33.6 G/DL (ref 32–36)
MCV RBC AUTO: 96 FL (ref 80–100)
MCV RBC AUTO: 97 FL (ref 80–100)
MONOCYTES # BLD AUTO: 1.24 X10*3/UL (ref 0.1–1)
MONOCYTES NFR BLD AUTO: 5.3 %
NEUTROPHILS # BLD AUTO: 20.67 X10*3/UL (ref 1.2–7.7)
NEUTROPHILS NFR BLD AUTO: 88.4 %
NRBC BLD-RTO: 2.4 /100 WBCS (ref 0–0)
NRBC BLD-RTO: 2.9 /100 WBCS (ref 0–0)
OXYHGB MFR BLDA: 94.3 % (ref 94–98)
OXYHGB MFR BLDA: 95.5 % (ref 94–98)
OXYHGB MFR BLDA: 96.3 % (ref 94–98)
OXYHGB MFR BLDA: 96.3 % (ref 94–98)
OXYHGB MFR BLDMV: 61.6 % (ref 45–75)
OXYHGB MFR BLDMV: 62.5 % (ref 45–75)
OXYHGB MFR BLDMV: 63.9 % (ref 45–75)
OXYHGB MFR BLDMV: 65.1 % (ref 45–75)
OXYHGB MFR BLDMV: 79.6 % (ref 45–75)
OXYHGB MFR BLDMV: 79.7 % (ref 45–75)
OXYHGB MFR BLDMV: 80.6 % (ref 45–75)
OXYHGB MFR BLDMV: 87.5 % (ref 45–75)
P AXIS: 17 DEGREES
P AXIS: 52 DEGREES
P OFFSET: 196 MS
P OFFSET: 197 MS
P ONSET: 148 MS
P ONSET: 153 MS
PCO2 BLDA: 28 MM HG (ref 38–42)
PCO2 BLDA: 28 MM HG (ref 38–42)
PCO2 BLDA: 31 MM HG (ref 38–42)
PCO2 BLDA: 32 MM HG (ref 38–42)
PCO2 BLDMV: 30 MM HG (ref 41–51)
PCO2 BLDMV: 30 MM HG (ref 41–51)
PCO2 BLDMV: 32 MM HG (ref 41–51)
PCO2 BLDMV: 32 MM HG (ref 41–51)
PCO2 BLDMV: 34 MM HG (ref 41–51)
PCO2 BLDMV: 35 MM HG (ref 41–51)
PCO2 BLDMV: 36 MM HG (ref 41–51)
PCO2 BLDMV: 36 MM HG (ref 41–51)
PH BLDA: 7.43 PH (ref 7.38–7.42)
PH BLDA: 7.43 PH (ref 7.38–7.42)
PH BLDA: 7.44 PH (ref 7.38–7.42)
PH BLDA: 7.46 PH (ref 7.38–7.42)
PH BLDMV: 7.37 PH (ref 7.33–7.43)
PH BLDMV: 7.4 PH (ref 7.33–7.43)
PH BLDMV: 7.41 PH (ref 7.33–7.43)
PH BLDMV: 7.41 PH (ref 7.33–7.43)
PH BLDMV: 7.42 PH (ref 7.33–7.43)
PH BLDMV: 7.43 PH (ref 7.33–7.43)
PH BLDMV: 7.45 PH (ref 7.33–7.43)
PH BLDMV: 7.45 PH (ref 7.33–7.43)
PHOSPHATE SERPL-MCNC: 4.6 MG/DL (ref 2.5–4.9)
PHOSPHATE SERPL-MCNC: 4.6 MG/DL (ref 2.5–4.9)
PLATELET # BLD AUTO: 84 X10*3/UL (ref 150–450)
PLATELET # BLD AUTO: 93 X10*3/UL (ref 150–450)
PO2 BLDA: 124 MM HG (ref 85–95)
PO2 BLDA: 126 MM HG (ref 85–95)
PO2 BLDA: 135 MM HG (ref 85–95)
PO2 BLDA: 85 MM HG (ref 85–95)
PO2 BLDMV: 42 MM HG (ref 35–45)
PO2 BLDMV: 43 MM HG (ref 35–45)
PO2 BLDMV: 43 MM HG (ref 35–45)
PO2 BLDMV: 45 MM HG (ref 35–45)
PO2 BLDMV: 51 MM HG (ref 35–45)
PO2 BLDMV: 53 MM HG (ref 35–45)
PO2 BLDMV: 53 MM HG (ref 35–45)
PO2 BLDMV: 56 MM HG (ref 35–45)
POLYCHROMASIA BLD QL SMEAR: NORMAL
POTASSIUM BLDA-SCNC: 5.4 MMOL/L (ref 3.5–5.3)
POTASSIUM BLDA-SCNC: 5.5 MMOL/L (ref 3.5–5.3)
POTASSIUM BLDA-SCNC: 5.6 MMOL/L (ref 3.5–5.3)
POTASSIUM BLDA-SCNC: 5.6 MMOL/L (ref 3.5–5.3)
POTASSIUM BLDMV-SCNC: 5 MMOL/L (ref 3.5–5.3)
POTASSIUM BLDMV-SCNC: 5.1 MMOL/L (ref 3.5–5.3)
POTASSIUM BLDMV-SCNC: 5.1 MMOL/L (ref 3.5–5.3)
POTASSIUM BLDMV-SCNC: 5.2 MMOL/L (ref 3.5–5.3)
POTASSIUM BLDMV-SCNC: 5.3 MMOL/L (ref 3.5–5.3)
POTASSIUM BLDMV-SCNC: 5.4 MMOL/L (ref 3.5–5.3)
POTASSIUM BLDMV-SCNC: 5.5 MMOL/L (ref 3.5–5.3)
POTASSIUM BLDMV-SCNC: 5.5 MMOL/L (ref 3.5–5.3)
POTASSIUM SERPL-SCNC: 5 MMOL/L (ref 3.5–5.3)
POTASSIUM SERPL-SCNC: 5.2 MMOL/L (ref 3.5–5.3)
PR INTERVAL: 124 MS
PR INTERVAL: 136 MS
PROT SERPL-MCNC: 5 G/DL (ref 6.4–8.2)
Q ONSET: 215 MS
Q ONSET: 216 MS
QRS COUNT: 16 BEATS
QRS COUNT: 21 BEATS
QRS DURATION: 102 MS
QRS DURATION: 96 MS
QT INTERVAL: 318 MS
QT INTERVAL: 376 MS
QTC CALCULATION(BAZETT): 462 MS
QTC CALCULATION(BAZETT): 485 MS
QTC FREDERICIA: 408 MS
QTC FREDERICIA: 446 MS
R AXIS: 101 DEGREES
R AXIS: 112 DEGREES
RBC # BLD AUTO: 2.51 X10*6/UL (ref 4.5–5.9)
RBC # BLD AUTO: 3.04 X10*6/UL (ref 4.5–5.9)
RBC MORPH BLD: NORMAL
SAO2 % BLDA: 100 % (ref 94–100)
SAO2 % BLDA: 100 % (ref 94–100)
SAO2 % BLDA: 98 % (ref 94–100)
SAO2 % BLDA: 99 % (ref 94–100)
SAO2 % BLDMV: 64 % (ref 45–75)
SAO2 % BLDMV: 64 % (ref 45–75)
SAO2 % BLDMV: 66 % (ref 45–75)
SAO2 % BLDMV: 67 % (ref 45–75)
SAO2 % BLDMV: 82 % (ref 45–75)
SAO2 % BLDMV: 83 % (ref 45–75)
SAO2 % BLDMV: 84 % (ref 45–75)
SAO2 % BLDMV: 88 % (ref 45–75)
SCHISTOCYTES BLD QL SMEAR: NORMAL
SODIUM BLDA-SCNC: 133 MMOL/L (ref 136–145)
SODIUM BLDA-SCNC: 133 MMOL/L (ref 136–145)
SODIUM BLDA-SCNC: 134 MMOL/L (ref 136–145)
SODIUM BLDA-SCNC: 134 MMOL/L (ref 136–145)
SODIUM BLDMV-SCNC: 133 MMOL/L (ref 136–145)
SODIUM BLDMV-SCNC: 133 MMOL/L (ref 136–145)
SODIUM BLDMV-SCNC: 134 MMOL/L (ref 136–145)
SODIUM BLDMV-SCNC: 135 MMOL/L (ref 136–145)
SODIUM BLDMV-SCNC: ABNORMAL MMOL/L
SODIUM SERPL-SCNC: 136 MMOL/L (ref 136–145)
SODIUM SERPL-SCNC: 137 MMOL/L (ref 136–145)
T AXIS: -26 DEGREES
T AXIS: -34 DEGREES
T OFFSET: 375 MS
T OFFSET: 403 MS
TARGETS BLD QL SMEAR: NORMAL
VENTRICULAR RATE: 100 BPM
VENTRICULAR RATE: 127 BPM
WBC # BLD AUTO: 17.9 X10*3/UL (ref 4.4–11.3)
WBC # BLD AUTO: 23.4 X10*3/UL (ref 4.4–11.3)

## 2025-06-03 PROCEDURE — 85347 COAGULATION TIME ACTIVATED: CPT

## 2025-06-03 PROCEDURE — 2500000005 HC RX 250 GENERAL PHARMACY W/O HCPCS

## 2025-06-03 PROCEDURE — 97530 THERAPEUTIC ACTIVITIES: CPT | Mod: GP

## 2025-06-03 PROCEDURE — 83605 ASSAY OF LACTIC ACID: CPT

## 2025-06-03 PROCEDURE — 99233 SBSQ HOSP IP/OBS HIGH 50: CPT | Performed by: INTERNAL MEDICINE

## 2025-06-03 PROCEDURE — 99497 ADVNCD CARE PLAN 30 MIN: CPT | Performed by: INTERNAL MEDICINE

## 2025-06-03 PROCEDURE — 2500000004 HC RX 250 GENERAL PHARMACY W/ HCPCS (ALT 636 FOR OP/ED)

## 2025-06-03 PROCEDURE — 2500000001 HC RX 250 WO HCPCS SELF ADMINISTERED DRUGS (ALT 637 FOR MEDICARE OP)

## 2025-06-03 PROCEDURE — 83735 ASSAY OF MAGNESIUM: CPT

## 2025-06-03 PROCEDURE — 84132 ASSAY OF SERUM POTASSIUM: CPT

## 2025-06-03 PROCEDURE — 82248 BILIRUBIN DIRECT: CPT

## 2025-06-03 PROCEDURE — 84100 ASSAY OF PHOSPHORUS: CPT

## 2025-06-03 PROCEDURE — 36556 INSERT NON-TUNNEL CV CATH: CPT | Performed by: STUDENT IN AN ORGANIZED HEALTH CARE EDUCATION/TRAINING PROGRAM

## 2025-06-03 PROCEDURE — 99232 SBSQ HOSP IP/OBS MODERATE 35: CPT | Performed by: PHYSICIAN ASSISTANT

## 2025-06-03 PROCEDURE — 37799 UNLISTED PX VASCULAR SURGERY: CPT

## 2025-06-03 PROCEDURE — 90937 HEMODIALYSIS REPEATED EVAL: CPT

## 2025-06-03 PROCEDURE — 2020000001 HC ICU ROOM DAILY

## 2025-06-03 PROCEDURE — 82947 ASSAY GLUCOSE BLOOD QUANT: CPT

## 2025-06-03 PROCEDURE — 85025 COMPLETE CBC W/AUTO DIFF WBC: CPT

## 2025-06-03 PROCEDURE — 85027 COMPLETE CBC AUTOMATED: CPT

## 2025-06-03 PROCEDURE — 83615 LACTATE (LD) (LDH) ENZYME: CPT

## 2025-06-03 PROCEDURE — 83010 ASSAY OF HAPTOGLOBIN QUANT: CPT

## 2025-06-03 PROCEDURE — 71045 X-RAY EXAM CHEST 1 VIEW: CPT | Performed by: RADIOLOGY

## 2025-06-03 PROCEDURE — 97530 THERAPEUTIC ACTIVITIES: CPT | Mod: GO

## 2025-06-03 PROCEDURE — 97166 OT EVAL MOD COMPLEX 45 MIN: CPT | Mod: GO

## 2025-06-03 PROCEDURE — 97163 PT EVAL HIGH COMPLEX 45 MIN: CPT | Mod: GP

## 2025-06-03 PROCEDURE — 2500000004 HC RX 250 GENERAL PHARMACY W/ HCPCS (ALT 636 FOR OP/ED): Mod: JZ,TB

## 2025-06-03 PROCEDURE — 2500000002 HC RX 250 W HCPCS SELF ADMINISTERED DRUGS (ALT 637 FOR MEDICARE OP, ALT 636 FOR OP/ED)

## 2025-06-03 PROCEDURE — 99291 CRITICAL CARE FIRST HOUR: CPT

## 2025-06-03 RX ORDER — HYDROXYZINE HYDROCHLORIDE 25 MG/1
25 TABLET, FILM COATED ORAL ONCE
Status: DISCONTINUED | OUTPATIENT
Start: 2025-06-03 | End: 2025-06-03

## 2025-06-03 RX ORDER — GLYCOPYRROLATE 0.2 MG/ML
0.2 INJECTION INTRAMUSCULAR; INTRAVENOUS EVERY 6 HOURS PRN
Status: DISCONTINUED | OUTPATIENT
Start: 2025-06-03 | End: 2025-06-04

## 2025-06-03 RX ORDER — POLYETHYLENE GLYCOL 3350 17 G/17G
17 POWDER, FOR SOLUTION ORAL 4 TIMES DAILY PRN
Status: DISCONTINUED | OUTPATIENT
Start: 2025-06-03 | End: 2025-06-04

## 2025-06-03 RX ORDER — HYDROXYZINE HYDROCHLORIDE 10 MG/1
10 TABLET, FILM COATED ORAL ONCE
Status: COMPLETED | OUTPATIENT
Start: 2025-06-03 | End: 2025-06-04

## 2025-06-03 RX ORDER — HYDROXYZINE HYDROCHLORIDE 10 MG/1
10 TABLET, FILM COATED ORAL ONCE
Status: COMPLETED | OUTPATIENT
Start: 2025-06-03 | End: 2025-06-03

## 2025-06-03 RX ORDER — HYDROMORPHONE HYDROCHLORIDE 0.2 MG/ML
0.2 INJECTION INTRAMUSCULAR; INTRAVENOUS; SUBCUTANEOUS EVERY 4 HOURS PRN
Status: DISCONTINUED | OUTPATIENT
Start: 2025-06-03 | End: 2025-06-04

## 2025-06-03 RX ORDER — OXYCODONE HYDROCHLORIDE 5 MG/1
5 TABLET ORAL EVERY 6 HOURS PRN
Refills: 0 | Status: DISCONTINUED | OUTPATIENT
Start: 2025-06-03 | End: 2025-06-04

## 2025-06-03 RX ADMIN — CALCIUM CHLORIDE, MAGNESIUM CHLORIDE, DEXTROSE MONOHYDRATE, LACTIC ACID, SODIUM CHLORIDE, SODIUM BICARBONATE AND POTASSIUM CHLORIDE 24 ML/KG/HR: 3.68; 3.05; 22; 5.4; 6.46; 3.09; .314 INJECTION INTRAVENOUS at 23:46

## 2025-06-03 RX ADMIN — HYDROMORPHONE HYDROCHLORIDE 0.2 MG: 0.2 INJECTION, SOLUTION INTRAMUSCULAR; INTRAVENOUS; SUBCUTANEOUS at 18:04

## 2025-06-03 RX ADMIN — PANTOPRAZOLE SODIUM 40 MG: 40 INJECTION, POWDER, FOR SOLUTION INTRAVENOUS at 20:27

## 2025-06-03 RX ADMIN — INSULIN LISPRO 1 UNITS: 100 INJECTION, SOLUTION INTRAVENOUS; SUBCUTANEOUS at 08:16

## 2025-06-03 RX ADMIN — PANTOPRAZOLE SODIUM 40 MG: 40 INJECTION, POWDER, FOR SOLUTION INTRAVENOUS at 08:15

## 2025-06-03 RX ADMIN — AMIODARONE HYDROCHLORIDE 0.5 MG/MIN: 1.8 INJECTION, SOLUTION INTRAVENOUS at 12:38

## 2025-06-03 RX ADMIN — SENNOSIDES 17.2 MG: 8.6 TABLET, FILM COATED ORAL at 08:16

## 2025-06-03 RX ADMIN — THIAMINE HYDROCHLORIDE 500 MG: 100 INJECTION, SOLUTION INTRAMUSCULAR; INTRAVENOUS at 08:15

## 2025-06-03 RX ADMIN — INSULIN LISPRO 1 UNITS: 100 INJECTION, SOLUTION INTRAVENOUS; SUBCUTANEOUS at 20:40

## 2025-06-03 RX ADMIN — INSULIN LISPRO 1 UNITS: 100 INJECTION, SOLUTION INTRAVENOUS; SUBCUTANEOUS at 17:00

## 2025-06-03 RX ADMIN — Medication 60 L/MIN: at 20:08

## 2025-06-03 RX ADMIN — Medication 21 PERCENT: at 08:40

## 2025-06-03 RX ADMIN — HYDROXYZINE HYDROCHLORIDE 10 MG: 10 TABLET ORAL at 14:58

## 2025-06-03 RX ADMIN — AMIODARONE HYDROCHLORIDE 0.5 MG/MIN: 1.8 INJECTION, SOLUTION INTRAVENOUS at 01:59

## 2025-06-03 RX ADMIN — CALCIUM CHLORIDE, MAGNESIUM CHLORIDE, DEXTROSE MONOHYDRATE, LACTIC ACID, SODIUM CHLORIDE, SODIUM BICARBONATE AND POTASSIUM CHLORIDE 24 ML/KG/HR: 3.68; 3.05; 22; 5.4; 6.46; 3.09; .314 INJECTION INTRAVENOUS at 23:45

## 2025-06-03 RX ADMIN — SODIUM CHLORIDE 2 G: 9 INJECTION, SOLUTION INTRAVENOUS at 13:31

## 2025-06-03 RX ADMIN — INSULIN LISPRO 1 UNITS: 100 INJECTION, SOLUTION INTRAVENOUS; SUBCUTANEOUS at 00:18

## 2025-06-03 RX ADMIN — ASPIRIN 81 MG: 81 TABLET, CHEWABLE ORAL at 08:16

## 2025-06-03 RX ADMIN — GLYCOPYRROLATE 0.2 MG: 0.2 INJECTION, SOLUTION INTRAMUSCULAR; INTRAVENOUS at 13:39

## 2025-06-03 RX ADMIN — CALCIUM CHLORIDE, MAGNESIUM CHLORIDE, DEXTROSE MONOHYDRATE, LACTIC ACID, SODIUM CHLORIDE, SODIUM BICARBONATE AND POTASSIUM CHLORIDE 24 ML/KG/HR: 3.68; 3.05; 22; 5.4; 6.46; 3.09; .314 INJECTION INTRAVENOUS at 23:47

## 2025-06-03 RX ADMIN — NOREPINEPHRINE BITARTRATE 0.08 MCG/KG/MIN: 8 INJECTION, SOLUTION INTRAVENOUS at 21:50

## 2025-06-03 RX ADMIN — SODIUM CHLORIDE 2 G: 9 INJECTION, SOLUTION INTRAVENOUS at 23:27

## 2025-06-03 RX ADMIN — POLYETHYLENE GLYCOL 3350 17 G: 17 POWDER, FOR SOLUTION ORAL at 08:17

## 2025-06-03 ASSESSMENT — PAIN - FUNCTIONAL ASSESSMENT
PAIN_FUNCTIONAL_ASSESSMENT: 0-10

## 2025-06-03 ASSESSMENT — PAIN SCALES - GENERAL
PAINLEVEL_OUTOF10: 0 - NO PAIN

## 2025-06-03 ASSESSMENT — COGNITIVE AND FUNCTIONAL STATUS - GENERAL
DAILY ACTIVITIY SCORE: 7
HELP NEEDED FOR BATHING: TOTAL
MOBILITY SCORE: 6
MOVING FROM LYING ON BACK TO SITTING ON SIDE OF FLAT BED WITH BEDRAILS: TOTAL
CLIMB 3 TO 5 STEPS WITH RAILING: TOTAL
DRESSING REGULAR LOWER BODY CLOTHING: TOTAL
STANDING UP FROM CHAIR USING ARMS: TOTAL
EATING MEALS: TOTAL
PERSONAL GROOMING: A LOT
DRESSING REGULAR UPPER BODY CLOTHING: TOTAL
TOILETING: TOTAL
WALKING IN HOSPITAL ROOM: TOTAL
MOVING TO AND FROM BED TO CHAIR: TOTAL
TURNING FROM BACK TO SIDE WHILE IN FLAT BAD: TOTAL

## 2025-06-03 ASSESSMENT — ACTIVITIES OF DAILY LIVING (ADL)
ADL_ASSISTANCE: INDEPENDENT
BATHING_ASSISTANCE: TOTAL

## 2025-06-03 NOTE — PROGRESS NOTES
Spiritual Care Visit  Spiritual Care Request    Reason for Visit:  Routine Visit: Follow-up  Continue Visiting: Yes  Crisis Visit: Critical care     Focus of Care:  Visited With: Family     Met with patient's son, Alpesh, patient's mother, Nayla and patient's sister, Kelsey along with the Palliative Attending and SW to discuss patient's condition, potential medical trajectories, barriers to recovery and coping. Provided non-anxious, supportive presence, reflective listening, affirmation and normalization of experience. Will continue to follow.

## 2025-06-03 NOTE — PROGRESS NOTES
CARDIAC SURGERY CONSULT PROGRESS NOTE    SUBJECTIVE  Mr. Elena is a 45 year old male with a past medical history of tobacco abuse (30 pack years), submandibular abscess, and nephrolithiasis who presented to [facility] with a chief complaint of chest pain, found to have an inferior STEMI, now s/p coronary angiogram with balloon angioplasty to the PDA (right dominant system). His course was complicated by hemodynamic instability (tachycardia, hypotension) requiring vasopressor support and laboratory evidnce of end organ dysfunction. Due to concern for cardiogenic vs mixed shock, a shock call was intervened and recommended transfer to [facility] CICU. TTE at [facility] revealed large VSD. He underwent Impella CP placement 5/20.      Cardiac surgery is consulted for a VSD repair post STEMI.      Hospital course:   Per chart review, presented to [facility] 05/18 PM for the evaluation of chest tightness associated with SOB/nausea. On arrival he was afebrile, tachycardic, and hypertensive. ED workup remarkable for initial WBC 20.5, Cr 0.79, troponin ~24K,  and ECG with ST elevations in the inferior leads with reciprocal depressions. He was loaded with aspirin and brilinta and started on heparin gtt. He was taken for emergent coronary angiogram which revealed multi vessel CAD [LAD/Lcx/RCA mildly/diffusely diseased,100% RPDA culprit lesion] with PTCA to RPDA with 50% residual stenosis [GLENNA 0->3] (too small to stent). Notably, LVEF 45% and LVED 20-25 mmHg.      He then developed on 5/19 persistent tachycardia despite IVF and IV metoprolol. TTE with LVEF 55%, wall motion abnormalities, and no significant valvular disease. Due to concern for acute aortic pathology vs PE, he underwent a CTA CAP revealing of mild pulmonary edema. Due to concern for PNA, blood cultures were obtained and he was started on CTX/Doxycycline. Later that evening was in respiratory distress (tachypneic to RR 50) with hypotension (BP 85/52 ) and satting  92% on RA with labs revealing of end organ dysfunction and CXR with bilateral pulmonary infiltrates [pulmoinary edema vs multifocal PNA]. A shock call was convened and recommended intubation and PA catheter placement for adjudication of hemodynamics. He was intubated without issue and opening [facility] numbers were revealing of RA 14, RV 55/18, PA 51/30 (40), PCWP 32, svO2 87, [place] CO 13.9, Everton CI 7.3.  He was then transferred to [facility].      On arrival to the CICU he was hypotensive with escalating pressor requirements. Bedside examination reveals a holosystolic murmur and serial mixed venous are revealing of a significant step up in SVO2 from the RA to PA concerning for the presence of a ischemic VSD, perhaps secondardy to a late presenting STEMI given troponin elevation to 24K on presentation. Shock call was done that recommended an impella CP placement to offload LV given concern for STEMI-induced VSD/ Inferior LV wall rupture.      Cardiac/Pertinent Imaging  C: 5/18/25:  CONCLUSIONS:   1. Left Main Coronary Artery: This artery is normal.   2. Left Anterior Descending Artery: is mildly diseased, is diffusely diseased and is ectatic.   3. Circumflex Coronary Artery: diffusely dieased, mildly diseased and ectatic.   4. Right Coronary Artery: is tortuous, is diffusely diseased and mildly diseased.   5. PDA RCA Lesion: The percent stenosis is 100%.   6. PDA RCA Lesion: PTCA: 50% residual stenosis. RCA: pre-procedure GLENNA flow was 0(no perfusion) and post-procedure GLENNA flow was 3(complete perfusion).   7. The Left Ventricular Ejection Fraction is 45%.   8. Left Ventricular End Diastolic Pressure: 20-25 mm Hg.   9. Aortic Stenosis: None.  10. Left Ventricular End Diastolic Pressure Dysfunction: Moderate.  11. LV: inferior akinesis.  12. LV: mild left ventricular dysfunction with regional wall motion abnormalities.     Echo: 5/20/25:  CONCLUSIONS:   1. Poorly visualized anatomical structures due to suboptimal image  "quality.   2. The left ventricle was not well visualized. The left ventricular ejection fraction could not be measured.   3. Limited views of the LV appear show a hyderdynamic LV with a large color flow from LV to RV through the inferoseptum with maximal velocity of 3.3m/sec, Unable to adequatly assess LVEF, though appears to have baal septal and inferior wall motion abnormality.   4. There is reduced right ventricular systolic function.   5. The RV appears dilated with significantly reduced function with reduced TAPSE and fractional area change.   6. Primary service notified of findings at the time of reporting.   7. Compared with the prior exam from 5/19/2025 ([facility]) there has been interval development of a large VSD throuh the inferoseptum with the RV now becoming large with significantly reduced function. Prior echo with reported LVEF of 55% with basal inferospetal and baal inferior hypokinesis in the setting of STEMI. RV size and function were nornal at that time. ( Note that the septum was more thoroughly interrogated with color Doppler today).    Objective   BP 94/87   Pulse (!) 112   Temp 36.8 °C (98.2 °F)   Resp (!) 39 Comment: Resident  made aware see med order  Ht 1.727 m (5' 8\")   Wt 88.2 kg (194 lb 7.1 oz)   SpO2 96%   BMI 29.57 kg/m²   0-10 (Numeric) Pain Score: 0 - No pain  Critical-Care Pain Observation Score:  [0]    Vitals:    06/03/25 0500   Weight: 88.2 kg (194 lb 7.1 oz)           Intake/Output Summary (Last 24 hours) at 6/3/2025 1453  Last data filed at 6/3/2025 0800  Gross per 24 hour   Intake 977.3 ml   Output 95 ml   Net 882.3 ml     Physical Exam  Constitutional:       Appearance: He is ill-appearing.      Comments: Alert, awake. Unable to vocalize. Follows commands.    Cardiovascular:      Rate and Rhythm: Tachycardia present.      Heart sounds: Murmur heard.   Pulmonary:      Comments: On supplemental oxygen  Skin:     Coloration: Skin is jaundiced.   Neurological: "      Comments: Awake and alert; generalized deconditioning; unable to vocalize          Medications  Scheduled medications  Scheduled Medications[1]Continuous medications  Continuous Medications[2]PRN medications  PRN Medications[3]    Labs  Results for orders placed or performed during the hospital encounter of 05/20/25 (from the past 24 hours)   ACTIVATED CLOTTING TIME LOW   Result Value Ref Range    POCT Activated Clotting Time Low Range 163 83 - 199 sec   Electrocardiogram, 12-lead PRN ACS symptoms   Result Value Ref Range    Ventricular Rate 127 BPM    Atrial Rate 127 BPM    TN Interval 136 ms    QRS Duration 96 ms    QT Interval 318 ms    QTC Calculation(Bazett) 462 ms    P Axis 52 degrees    R Axis 112 degrees    T Axis -26 degrees    QRS Count 21 beats    Q Onset 216 ms    P Onset 148 ms    P Offset 196 ms    T Offset 375 ms    QTC Fredericia 408 ms   POCT GLUCOSE   Result Value Ref Range    POCT Glucose 182 (H) 74 - 99 mg/dL   ACTIVATED CLOTTING TIME LOW   Result Value Ref Range    POCT Activated Clotting Time Low Range 163 83 - 199 sec   BLOOD GAS MIXED VENOUS FULL PANEL   Result Value Ref Range    POCT pH, Mixed 7.42 7.33 - 7.43 pH    POCT pCO2, Mixed 36 (L) 41 - 51 mm Hg    POCT pO2, Mixed 56 (H) 35 - 45 mm Hg    POCT SO2, Mixed 88 (H) 45 - 75 %    POCT Oxy Hemoglobin, Mixed 87.5 (H) 45.0 - 75.0 %    POCT Hematocrit Calculated, Mixed 26.0 (L) 41.0 - 52.0 %    POCT Sodium, Mixed 133 (L) 136 - 145 mmol/L    POCT Potassium, Mixed 5.1 3.5 - 5.3 mmol/L    POCT Chloride, Mixed 103 98 - 107 mmol/L    POCT Ionized Calcium, Mixed 1.08 (L) 1.10 - 1.33 mmol/L    POCT Glucose, Mixed 188 (H) 74 - 99 mg/dL    POCT Lactate, Mixed 2.5 (H) 0.4 - 2.0 mmol/L    POCT Base Excess, Mixed -0.9 -2.0 - 3.0 mmol/L    POCT HCO3 Calculated, Mixed 23.4 22.0 - 26.0 mmol/L    POCT Hemoglobin, Mixed 8.7 (L) 13.5 - 17.5 g/dL    POCT Anion Gap, Mixed 12 10 - 25 mmo/L    Patient Temperature      FiO2 28 %   BLOOD GAS MIXED VENOUS FULL  PANEL   Result Value Ref Range    POCT pH, Mixed 7.42 7.33 - 7.43 pH    POCT pCO2, Mixed 34 (L) 41 - 51 mm Hg    POCT pO2, Mixed 56 (H) 35 - 45 mm Hg    POCT SO2, Mixed 88 (H) 45 - 75 %    POCT Oxy Hemoglobin, Mixed 84.5 (H) 45.0 - 75.0 %    POCT Hematocrit Calculated, Mixed 26.0 (L) 41.0 - 52.0 %    POCT Sodium, Mixed 133 (L) 136 - 145 mmol/L    POCT Potassium, Mixed 5.1 3.5 - 5.3 mmol/L    POCT Chloride, Mixed 103 98 - 107 mmol/L    POCT Ionized Calcium, Mixed 1.08 (L) 1.10 - 1.33 mmol/L    POCT Glucose, Mixed 186 (H) 74 - 99 mg/dL    POCT Lactate, Mixed 2.4 (H) 0.4 - 2.0 mmol/L    POCT Base Excess, Mixed -2.0 -2.0 - 3.0 mmol/L    POCT HCO3 Calculated, Mixed 22.1 22.0 - 26.0 mmol/L    POCT Hemoglobin, Mixed 8.6 (L) 13.5 - 17.5 g/dL    POCT Anion Gap, Mixed 13 10 - 25 mmo/L    Patient Temperature 37.0 degrees Celsius    FiO2 28 %   Blood Gas Arterial Full Panel   Result Value Ref Range    POCT pH, Arterial 7.45 (H) 7.38 - 7.42 pH    POCT pCO2, Arterial 29 (L) 38 - 42 mm Hg    POCT pO2, Arterial 72 (L) 85 - 95 mm Hg    POCT SO2, Arterial 95 94 - 100 %    POCT Oxy Hemoglobin, Arterial 91.9 (L) 94.0 - 98.0 %    POCT Hematocrit Calculated, Arterial 35.0 (L) 41.0 - 52.0 %    POCT Sodium, Arterial 132 (L) 136 - 145 mmol/L    POCT Potassium, Arterial 5.4 (H) 3.5 - 5.3 mmol/L    POCT Chloride, Arterial 102 98 - 107 mmol/L    POCT Ionized Calcium, Arterial 1.05 (L) 1.10 - 1.33 mmol/L    POCT Glucose, Arterial 190 (H) 74 - 99 mg/dL    POCT Lactate, Arterial 2.8 (H) 0.4 - 2.0 mmol/L    POCT Base Excess, Arterial -2.8 (L) -2.0 - 3.0 mmol/L    POCT HCO3 Calculated, Arterial 20.2 (L) 22.0 - 26.0 mmol/L    POCT Hemoglobin, Arterial 11.6 (L) 13.5 - 17.5 g/dL    POCT Anion Gap, Arterial 15 10 - 25 mmo/L    Patient Temperature 37.0 degrees Celsius    FiO2 21 %   CBC   Result Value Ref Range    WBC 24.6 (H) 4.4 - 11.3 x10*3/uL    nRBC 3.3 (H) 0.0 - 0.0 /100 WBCs    RBC 2.55 (L) 4.50 - 5.90 x10*6/uL    Hemoglobin 8.2 (L) 13.5 - 17.5  g/dL    Hematocrit 24.3 (L) 41.0 - 52.0 %    MCV 95 80 - 100 fL    MCH 32.2 26.0 - 34.0 pg    MCHC 33.7 32.0 - 36.0 g/dL    RDW 27.9 (H) 11.5 - 14.5 %    Platelets 86 (L) 150 - 450 x10*3/uL   POCT GLUCOSE   Result Value Ref Range    POCT Glucose 188 (H) 74 - 99 mg/dL   ACTIVATED CLOTTING TIME LOW   Result Value Ref Range    POCT Activated Clotting Time Low Range 165 83 - 199 sec   ACTIVATED CLOTTING TIME LOW   Result Value Ref Range    POCT Activated Clotting Time Low Range 174 83 - 199 sec   Magnesium   Result Value Ref Range    Magnesium 3.02 (H) 1.60 - 2.40 mg/dL   CBC and Auto Differential   Result Value Ref Range    WBC 23.4 (H) 4.4 - 11.3 x10*3/uL    nRBC 2.4 (H) 0.0 - 0.0 /100 WBCs    RBC 2.51 (L) 4.50 - 5.90 x10*6/uL    Hemoglobin 8.1 (L) 13.5 - 17.5 g/dL    Hematocrit 24.4 (L) 41.0 - 52.0 %    MCV 97 80 - 100 fL    MCH 32.3 26.0 - 34.0 pg    MCHC 33.2 32.0 - 36.0 g/dL    RDW      Platelets 84 (L) 150 - 450 x10*3/uL    Neutrophils % 88.4 40.0 - 80.0 %    Immature Granulocytes %, Automated 1.7 (H) 0.0 - 0.9 %    Lymphocytes % 4.4 13.0 - 44.0 %    Monocytes % 5.3 2.0 - 10.0 %    Eosinophils % 0.0 0.0 - 6.0 %    Basophils % 0.2 0.0 - 2.0 %    Neutrophils Absolute 20.67 (H) 1.20 - 7.70 x10*3/uL    Immature Granulocytes Absolute, Automated 0.40 0.00 - 0.70 x10*3/uL    Lymphocytes Absolute 1.03 (L) 1.20 - 4.80 x10*3/uL    Monocytes Absolute 1.24 (H) 0.10 - 1.00 x10*3/uL    Eosinophils Absolute 0.00 0.00 - 0.70 x10*3/uL    Basophils Absolute 0.04 0.00 - 0.10 x10*3/uL   Basic Metabolic Panel   Result Value Ref Range    Glucose 152 (H) 74 - 99 mg/dL    Sodium 136 136 - 145 mmol/L    Potassium 5.0 3.5 - 5.3 mmol/L    Chloride 101 98 - 107 mmol/L    Bicarbonate 22 21 - 32 mmol/L    Anion Gap 18 10 - 20 mmol/L    Urea Nitrogen 45 (H) 6 - 23 mg/dL    Creatinine 3.48 (H) 0.50 - 1.30 mg/dL    eGFR 21 (L) >60 mL/min/1.73m*2    Calcium 8.1 (L) 8.6 - 10.6 mg/dL   BLOOD GAS MIXED VENOUS FULL PANEL   Result Value Ref Range     POCT pH, Mixed 7.45 (H) 7.33 - 7.43 pH    POCT pCO2, Mixed 32 (L) 41 - 51 mm Hg    POCT pO2, Mixed 51 (H) 35 - 45 mm Hg    POCT SO2, Mixed 82 (H) 45 - 75 %    POCT Oxy Hemoglobin, Mixed 79.6 (H) 45.0 - 75.0 %    POCT Hematocrit Calculated, Mixed 25.0 (L) 41.0 - 52.0 %    POCT Sodium, Mixed 134 (L) 136 - 145 mmol/L    POCT Potassium, Mixed 5.3 3.5 - 5.3 mmol/L    POCT Chloride, Mixed 103 98 - 107 mmol/L    POCT Ionized Calcium, Mixed 1.11 1.10 - 1.33 mmol/L    POCT Glucose, Mixed      POCT Lactate, Mixed 2.0 0.4 - 2.0 mmol/L    POCT Base Excess, Mixed -1.4 -2.0 - 3.0 mmol/L    POCT HCO3 Calculated, Mixed 22.2 22.0 - 26.0 mmol/L    POCT Hemoglobin, Mixed 8.3 (L) 13.5 - 17.5 g/dL    POCT Anion Gap, Mixed 14 10 - 25 mmo/L    Patient Temperature 37.0 degrees Celsius    FiO2 21 %   Morphology   Result Value Ref Range    RBC Morphology See Below     Polychromasia Mild     RBC Fragments Few     Target Cells Few     Granger Cells Few    Hepatic function panel   Result Value Ref Range    Albumin 2.6 (L) 3.4 - 5.0 g/dL    Bilirubin, Total 23.6 (H) 0.0 - 1.2 mg/dL    Bilirubin, Direct 15.3 (H) 0.0 - 0.3 mg/dL    Alkaline Phosphatase 181 (H) 33 - 120 U/L    ALT 67 (H) 10 - 52 U/L     (H) 9 - 39 U/L    Total Protein 5.0 (L) 6.4 - 8.2 g/dL   Lactate dehydrogenase   Result Value Ref Range     (H) 84 - 246 U/L   Haptoglobin   Result Value Ref Range    Haptoglobin <30 (L) 30 - 200 mg/dL   Phosphorus   Result Value Ref Range    Phosphorus 4.6 2.5 - 4.9 mg/dL   POCT GLUCOSE   Result Value Ref Range    POCT Glucose 187 (H) 74 - 99 mg/dL   POCT GLUCOSE   Result Value Ref Range    POCT Glucose 147 (H) 74 - 99 mg/dL   ACTIVATED CLOTTING TIME LOW   Result Value Ref Range    POCT Activated Clotting Time Low Range 182 83 - 199 sec   BLOOD GAS MIXED VENOUS FULL PANEL   Result Value Ref Range    POCT pH, Mixed 7.45 (H) 7.33 - 7.43 pH    POCT pCO2, Mixed 30 (L) 41 - 51 mm Hg    POCT pO2, Mixed 53 (H) 35 - 45 mm Hg    POCT SO2, Mixed  83 (H) 45 - 75 %    POCT Oxy Hemoglobin, Mixed 79.7 (H) 45.0 - 75.0 %    POCT Hematocrit Calculated, Mixed 29.0 (L) 41.0 - 52.0 %    POCT Sodium, Mixed 134 (L) 136 - 145 mmol/L    POCT Potassium, Mixed 5.1 3.5 - 5.3 mmol/L    POCT Chloride, Mixed 104 98 - 107 mmol/L    POCT Ionized Calcium, Mixed 1.07 (L) 1.10 - 1.33 mmol/L    POCT Glucose, Mixed 148 (H) 74 - 99 mg/dL    POCT Lactate, Mixed 1.6 0.4 - 2.0 mmol/L    POCT Base Excess, Mixed -2.4 (L) -2.0 - 3.0 mmol/L    POCT HCO3 Calculated, Mixed 20.9 (L) 22.0 - 26.0 mmol/L    POCT Hemoglobin, Mixed 9.6 (L) 13.5 - 17.5 g/dL    POCT Anion Gap, Mixed 14 10 - 25 mmo/L    Patient Temperature 37.0 degrees Celsius    FiO2 21 %   POCT GLUCOSE   Result Value Ref Range    POCT Glucose 161 (H) 74 - 99 mg/dL   Blood Gas Lactic Acid, Venous   Result Value Ref Range    POCT Lactate, Venous 1.7 0.4 - 2.0 mmol/L   ACTIVATED CLOTTING TIME LOW   Result Value Ref Range    POCT Activated Clotting Time Low Range 187 83 - 199 sec   Electrocardiogram, 12-lead PRN ACS symptoms   Result Value Ref Range    Ventricular Rate 100 BPM    Atrial Rate 100 BPM    HI Interval 124 ms    QRS Duration 102 ms    QT Interval 376 ms    QTC Calculation(Bazett) 485 ms    P Axis 17 degrees    R Axis 101 degrees    T Axis -34 degrees    QRS Count 16 beats    Q Onset 215 ms    P Onset 153 ms    P Offset 197 ms    T Offset 403 ms    QTC Fredericia 446 ms   POCT GLUCOSE   Result Value Ref Range    POCT Glucose 146 (H) 74 - 99 mg/dL   Blood Gas Mixed Venous Full Panel   Result Value Ref Range    POCT pH, Mixed 7.43 7.33 - 7.43 pH    POCT pCO2, Mixed 30 (L) 41 - 51 mm Hg    POCT pO2, Mixed 53 (H) 35 - 45 mm Hg    POCT SO2, Mixed 84 (H) 45 - 75 %    POCT Oxy Hemoglobin, Mixed 80.6 (H) 45.0 - 75.0 %    POCT Hematocrit Calculated, Mixed 23.0 (L) 41.0 - 52.0 %    POCT Sodium, Mixed 135 (L) 136 - 145 mmol/L    POCT Potassium, Mixed 5.0 3.5 - 5.3 mmol/L    POCT Chloride, Mixed 104 98 - 107 mmol/L    POCT Ionized Calcium,  Mixed 1.11 1.10 - 1.33 mmol/L    POCT Glucose, Mixed 133 (H) 74 - 99 mg/dL    POCT Lactate, Mixed 1.2 0.4 - 2.0 mmol/L    POCT Base Excess, Mixed -3.9 (L) -2.0 - 3.0 mmol/L    POCT HCO3 Calculated, Mixed 19.9 (L) 22.0 - 26.0 mmol/L    POCT Hemoglobin, Mixed 7.8 (L) 13.5 - 17.5 g/dL    POCT Anion Gap, Mixed 16 10 - 25 mmo/L    Patient Temperature 37.0 degrees Celsius    FiO2 28 %   BLOOD GAS MIXED VENOUS FULL PANEL   Result Value Ref Range    POCT pH, Mixed 7.41 7.33 - 7.43 pH    POCT pCO2, Mixed 34 (L) 41 - 51 mm Hg    POCT pO2, Mixed 43 35 - 45 mm Hg    POCT SO2, Mixed 66 45 - 75 %    POCT Oxy Hemoglobin, Mixed 63.9 45.0 - 75.0 %    POCT Hematocrit Calculated, Mixed 24.0 (L) 41.0 - 52.0 %    POCT Sodium, Mixed 134 (L) 136 - 145 mmol/L    POCT Potassium, Mixed 5.2 3.5 - 5.3 mmol/L    POCT Chloride, Mixed 103 98 - 107 mmol/L    POCT Ionized Calcium, Mixed 1.11 1.10 - 1.33 mmol/L    POCT Glucose, Mixed 133 (H) 74 - 99 mg/dL    POCT Lactate, Mixed 1.4 0.4 - 2.0 mmol/L    POCT Base Excess, Mixed -2.7 (L) -2.0 - 3.0 mmol/L    POCT HCO3 Calculated, Mixed 21.6 (L) 22.0 - 26.0 mmol/L    POCT Hemoglobin, Mixed 8.0 (L) 13.5 - 17.5 g/dL    POCT Anion Gap, Mixed 15 10 - 25 mmo/L    Patient Temperature 37.0 degrees Celsius    FiO2 28 %   ACTIVATED CLOTTING TIME LOW   Result Value Ref Range    POCT Activated Clotting Time Low Range 187 83 - 199 sec   ACTIVATED CLOTTING TIME LOW   Result Value Ref Range    POCT Activated Clotting Time Low Range 166 83 - 199 sec   ACTIVATED CLOTTING TIME LOW   Result Value Ref Range    POCT Activated Clotting Time Low Range 182 83 - 199 sec               ASSESSMENT & PLAN  Mr. Elena is a 45 year old male with a past medical history of tobacco abuse (30 pack years), submandibular abscess, and nephrolithiasis who presented to [facility] with a chief complaint of chest pain, found to have an inferior STEMI, now s/p coronary angiogram with balloon angioplasty to the PDA (right dominant system). His  course was complicated by hemodynamic instability (tachycardia, hypotension) requiring vasopressor support and laboratory evidnce of end organ dysfunction. Due to concern for cardiogenic vs mixed shock, a shock call was intervened and recommended transfer to [facility] CICU. TTE at [facility] revealed large VSD. He underwent Impella CP placement 5/20.      Cardiac surgery is consulted for a VSD repair post STEMI.         RECOMMENDATIONS/PLAN  Dr. Omer aware of patient and reviewed case and available imaging.   - Emergent surgery for patient's VSD not indicated at this time.   - Recommend medical optimization per primary team  - Early surgery for VSD repairs post STEMI have significantly high mortality rates, ideally would prefer to wait a couple weeks to decrease surgical risk.   - Currently patient is not a surgical candidate. Will need to make significant improvements prior to consideration for surgical repair.  - Impella 5.5 placed 5/28 by Dr. Omer   - 6/2/25: patient extubated today to 4L NC; will continue to follow patient for overall medical optimization     Will continue to follow along.   Thank you for the consultation.   Please page the cardiac surgery consult pager [phone] with any questions or changes in patient condition.    Holly Diez PA-C  Cardiac Surgery Consult [facility]  [facility] (formerly [facility]3]  Cardiac Surgery Consult Pager [phone]     6/3/2025  2:53 PM            [1] aspirin, 81 mg, oral, Daily  hydrOXYzine HCL, 10 mg, oral, Once  insulin lispro, 0-5 Units, subcutaneous, q4h  meropenem, 2 g, intravenous, q12h  oxygen, , inhalation, Continuous - Inhalation  pantoprazole, 40 mg, intravenous, BID  perflutren lipid microspheres, 0.5-10 mL of dilution, intravenous, Once in imaging  perflutren protein A microsphere, 0.5 mL, intravenous, Once in imaging  sennosides, 2 tablet, oral, BID  thiamine, 500 mg, intravenous, Daily     [2] amiodarone, 0.5 mg/min, Last Rate: 0.5 mg/min (06/03/25 1400)  dexmedeTOMIDine,  0.1-1.5 mcg/kg/hr (Dosing Weight), Last Rate: Stopped (06/02/25 1020)  heparin, 0-2,000 Units/hr, Last Rate: 400 Units/hr (06/03/25 1400)  heparin Impella Purge 25 units/mL in 500 mL D5W, 10 mL/hr, Last Rate: 10 mL/hr (06/03/25 1400)  norepinephrine, 0.01-1 mcg/kg/min (Dosing Weight), Last Rate: Stopped (06/02/25 0905)  PrismaSol 4/2.5, 24 mL/kg/hr (Dosing Weight), Last Rate: 24 mL/kg/hr (06/02/25 0015)     [3] PRN medications: alteplase, bisacodyl, dextrose, dextrose, glucagon, glucagon, glycopyrrolate, oxyCODONE **AND** HYDROmorphone, polyethylene glycol

## 2025-06-03 NOTE — PROGRESS NOTES
"Palliative Medicine following for:  Complex medical decision making, symptom management, patient/family support    History obtained from chart review including ED note, H&P, patient's daily progress notes, review of lab/test results, and discussion with primary team and bedside RN.    Chief Complaint: confused, coughing    Subjective    History of Present Illness    He has persistent tachypnea. He does not have chest pain or palpitations.  He does not have nausea or vomiting.    Symptoms  The patient is unable to participate with history and review of systems because of confusion.    Objective    Last Recorded Vitals  BP 94/87   Pulse 90   Temp 37.5 °C (99.5 °F)   Resp 25   Ht 1.727 m (5' 8\")   Wt 87.1 kg (192 lb 0.3 oz)   SpO2 99%   BMI 29.20 kg/m²      Physical Exam  Constitutional:       General: He is not in acute distress.     Appearance: He is ill-appearing.   HENT:      Mouth/Throat:      Mouth: Mucous membranes are moist.   Eyes:      General: Scleral icterus present.   Cardiovascular:      Rate and Rhythm: Regular rhythm. Tachycardia present.      Heart sounds: Murmur heard.   Pulmonary:      Effort: Tachypnea present.   Abdominal:      Palpations: Abdomen is soft.   Skin:     General: Skin is warm.      Coloration: Skin is jaundiced.   Neurological:      Mental Status: He is alert. He is disoriented and confused.   Psychiatric:         Attention and Perception: He is inattentive.          Relevant Results   Results for orders placed or performed during the hospital encounter of 05/20/25 (from the past 24 hours)   ACTIVATED CLOTTING TIME LOW   Result Value Ref Range    POCT Activated Clotting Time Low Range 176 83 - 199 sec   CBC and Auto Differential   Result Value Ref Range    WBC 13.9 (H) 4.4 - 11.3 x10*3/uL    nRBC 2.7 (H) 0.0 - 0.0 /100 WBCs    RBC 2.62 (L) 4.50 - 5.90 x10*6/uL    Hemoglobin 8.4 (L) 13.5 - 17.5 g/dL    Hematocrit 24.8 (L) 41.0 - 52.0 %    MCV 95 80 - 100 fL    MCH 32.1 26.0 - " 34.0 pg    MCHC 33.9 32.0 - 36.0 g/dL    RDW 26.7 (H) 11.5 - 14.5 %    Platelets 69 (L) 150 - 450 x10*3/uL    Neutrophils % 77.8 40.0 - 80.0 %    Immature Granulocytes %, Automated 4.5 (H) 0.0 - 0.9 %    Lymphocytes % 10.9 13.0 - 44.0 %    Monocytes % 5.8 2.0 - 10.0 %    Eosinophils % 0.6 0.0 - 6.0 %    Basophils % 0.4 0.0 - 2.0 %    Neutrophils Absolute 10.77 (H) 1.20 - 7.70 x10*3/uL    Immature Granulocytes Absolute, Automated 0.63 0.00 - 0.70 x10*3/uL    Lymphocytes Absolute 1.51 1.20 - 4.80 x10*3/uL    Monocytes Absolute 0.81 0.10 - 1.00 x10*3/uL    Eosinophils Absolute 0.08 0.00 - 0.70 x10*3/uL    Basophils Absolute 0.06 0.00 - 0.10 x10*3/uL   Renal Function Panel   Result Value Ref Range    Glucose 208 (H) 74 - 99 mg/dL    Sodium 136 136 - 145 mmol/L    Potassium 3.9 3.5 - 5.3 mmol/L    Chloride 101 98 - 107 mmol/L    Bicarbonate 27 21 - 32 mmol/L    Anion Gap 12 10 - 20 mmol/L    Urea Nitrogen 31 (H) 6 - 23 mg/dL    Creatinine 2.44 (H) 0.50 - 1.30 mg/dL    eGFR 32 (L) >60 mL/min/1.73m*2    Calcium 8.1 (L) 8.6 - 10.6 mg/dL    Phosphorus 3.5 2.5 - 4.9 mg/dL    Albumin 2.9 (L) 3.4 - 5.0 g/dL   Magnesium   Result Value Ref Range    Magnesium 2.95 (H) 1.60 - 2.40 mg/dL   Morphology   Result Value Ref Range    RBC Morphology See Below     Polychromasia Mild     Target Cells Few     Basophilic Stippling Present    ACTIVATED CLOTTING TIME LOW   Result Value Ref Range    POCT Activated Clotting Time Low Range 155 83 - 199 sec   POCT GLUCOSE   Result Value Ref Range    POCT Glucose 172 (H) 74 - 99 mg/dL   ACTIVATED CLOTTING TIME LOW   Result Value Ref Range    POCT Activated Clotting Time Low Range 168 83 - 199 sec   ACTIVATED CLOTTING TIME LOW   Result Value Ref Range    POCT Activated Clotting Time Low Range 166 83 - 199 sec   POCT GLUCOSE   Result Value Ref Range    POCT Glucose 168 (H) 74 - 99 mg/dL   ACTIVATED CLOTTING TIME LOW   Result Value Ref Range    POCT Activated Clotting Time Low Range 165 83 - 199 sec    POCT GLUCOSE   Result Value Ref Range    POCT Glucose 144 (H) 74 - 99 mg/dL   Magnesium   Result Value Ref Range    Magnesium 2.89 (H) 1.60 - 2.40 mg/dL   CBC and Auto Differential   Result Value Ref Range    WBC 14.0 (H) 4.4 - 11.3 x10*3/uL    nRBC 1.7 (H) 0.0 - 0.0 /100 WBCs    RBC 2.47 (L) 4.50 - 5.90 x10*6/uL    Hemoglobin 8.0 (L) 13.5 - 17.5 g/dL    Hematocrit 23.2 (L) 41.0 - 52.0 %    MCV 94 80 - 100 fL    MCH 32.4 26.0 - 34.0 pg    MCHC 34.5 32.0 - 36.0 g/dL    RDW 26.9 (H) 11.5 - 14.5 %    Platelets 66 (L) 150 - 450 x10*3/uL    Neutrophils % 76.7 40.0 - 80.0 %    Immature Granulocytes %, Automated 3.1 (H) 0.0 - 0.9 %    Lymphocytes % 13.1 13.0 - 44.0 %    Monocytes % 6.1 2.0 - 10.0 %    Eosinophils % 0.7 0.0 - 6.0 %    Basophils % 0.3 0.0 - 2.0 %    Neutrophils Absolute 10.78 (H) 1.20 - 7.70 x10*3/uL    Immature Granulocytes Absolute, Automated 0.43 0.00 - 0.70 x10*3/uL    Lymphocytes Absolute 1.84 1.20 - 4.80 x10*3/uL    Monocytes Absolute 0.85 0.10 - 1.00 x10*3/uL    Eosinophils Absolute 0.10 0.00 - 0.70 x10*3/uL    Basophils Absolute 0.04 0.00 - 0.10 x10*3/uL   Hepatic function panel   Result Value Ref Range    Albumin 2.6 (L) 3.4 - 5.0 g/dL    Bilirubin, Total 21.4 (H) 0.0 - 1.2 mg/dL    Bilirubin, Direct 13.8 (H) 0.0 - 0.3 mg/dL    Alkaline Phosphatase 175 (H) 33 - 120 U/L    ALT 81 (H) 10 - 52 U/L     (H) 9 - 39 U/L    Total Protein 5.1 (L) 6.4 - 8.2 g/dL   Lactate dehydrogenase   Result Value Ref Range     (H) 84 - 246 U/L   Haptoglobin   Result Value Ref Range    Haptoglobin <30 (L) 30 - 200 mg/dL   Phosphorus   Result Value Ref Range    Phosphorus 2.5 2.5 - 4.9 mg/dL   Basic Metabolic Panel   Result Value Ref Range    Glucose 146 (H) 74 - 99 mg/dL    Sodium 137 136 - 145 mmol/L    Potassium 4.4 3.5 - 5.3 mmol/L    Chloride 103 98 - 107 mmol/L    Bicarbonate 25 21 - 32 mmol/L    Anion Gap 13 10 - 20 mmol/L    Urea Nitrogen 29 (H) 6 - 23 mg/dL    Creatinine 2.41 (H) 0.50 - 1.30 mg/dL     eGFR 33 (L) >60 mL/min/1.73m*2    Calcium 7.9 (L) 8.6 - 10.6 mg/dL   Blood Gas Arterial Full Panel   Result Value Ref Range    POCT pH, Arterial 7.43 (H) 7.38 - 7.42 pH    POCT pCO2, Arterial 35 (L) 38 - 42 mm Hg    POCT pO2, Arterial 116 (H) 85 - 95 mm Hg    POCT SO2, Arterial 99 94 - 100 %    POCT Oxy Hemoglobin, Arterial 95.2 94.0 - 98.0 %    POCT Hematocrit Calculated, Arterial 24.0 (L) 41.0 - 52.0 %    POCT Sodium, Arterial 134 (L) 136 - 145 mmol/L    POCT Potassium, Arterial 4.2 3.5 - 5.3 mmol/L    POCT Chloride, Arterial 106 98 - 107 mmol/L    POCT Ionized Calcium, Arterial 1.09 (L) 1.10 - 1.33 mmol/L    POCT Glucose, Arterial 142 (H) 74 - 99 mg/dL    POCT Lactate, Arterial 0.9 0.4 - 2.0 mmol/L    POCT Base Excess, Arterial -0.9 -2.0 - 3.0 mmol/L    POCT HCO3 Calculated, Arterial 23.2 22.0 - 26.0 mmol/L    POCT Hemoglobin, Arterial 8.0 (L) 13.5 - 17.5 g/dL    POCT Anion Gap, Arterial 9 (L) 10 - 25 mmo/L    Patient Temperature 37.0 degrees Celsius    FiO2 30 %   ACTIVATED CLOTTING TIME LOW   Result Value Ref Range    POCT Activated Clotting Time Low Range 160 83 - 199 sec   POCT GLUCOSE   Result Value Ref Range    POCT Glucose 152 (H) 74 - 99 mg/dL   Electrocardiogram, 12-lead PRN ACS symptoms   Result Value Ref Range    Ventricular Rate 84 BPM    Atrial Rate 84 BPM    UT Interval 178 ms    QRS Duration 94 ms    QT Interval 368 ms    QTC Calculation(Bazett) 434 ms    P Axis 100 degrees    R Axis 256 degrees    T Axis 97 degrees    QRS Count 14 beats    Q Onset 212 ms    P Onset 123 ms    P Offset 169 ms    T Offset 396 ms    QTC Fredericia 411 ms   Electrocardiogram, 12-lead PRN ACS symptoms   Result Value Ref Range    Ventricular Rate 116 BPM    Atrial Rate 116 BPM    UT Interval 140 ms    QRS Duration 98 ms    QT Interval 362 ms    QTC Calculation(Bazett) 503 ms    P Axis 37 degrees    R Axis 102 degrees    T Axis -23 degrees    QRS Count 19 beats    Q Onset 217 ms    P Onset 147 ms    P Offset 193 ms     T Offset 398 ms    QTC Fredericia 451 ms   ACTIVATED CLOTTING TIME LOW   Result Value Ref Range    POCT Activated Clotting Time Low Range 186 83 - 199 sec   POCT GLUCOSE   Result Value Ref Range    POCT Glucose 168 (H) 74 - 99 mg/dL   Blood Gas Arterial Full Panel   Result Value Ref Range    POCT pH, Arterial 7.43 (H) 7.38 - 7.42 pH    POCT pCO2, Arterial 30 (L) 38 - 42 mm Hg    POCT pO2, Arterial 144 (H) 85 - 95 mm Hg    POCT SO2, Arterial 100 94 - 100 %    POCT Oxy Hemoglobin, Arterial 96.2 94.0 - 98.0 %    POCT Hematocrit Calculated, Arterial 24.0 (L) 41.0 - 52.0 %    POCT Sodium, Arterial 135 (L) 136 - 145 mmol/L    POCT Potassium, Arterial 4.1 3.5 - 5.3 mmol/L    POCT Chloride, Arterial 107 98 - 107 mmol/L    POCT Ionized Calcium, Arterial 1.02 (L) 1.10 - 1.33 mmol/L    POCT Glucose, Arterial 144 (H) 74 - 99 mg/dL    POCT Lactate, Arterial 1.2 0.4 - 2.0 mmol/L    POCT Base Excess, Arterial -3.9 (L) -2.0 - 3.0 mmol/L    POCT HCO3 Calculated, Arterial 19.9 (L) 22.0 - 26.0 mmol/L    POCT Hemoglobin, Arterial 7.9 (L) 13.5 - 17.5 g/dL    POCT Anion Gap, Arterial 12 10 - 25 mmo/L    Patient Temperature 37.0 degrees Celsius    FiO2 36 %   ACTIVATED CLOTTING TIME LOW   Result Value Ref Range    POCT Activated Clotting Time Low Range 155 83 - 199 sec        Allergies  Patient has no known allergies.  Medications  Scheduled medications  Scheduled Medications[1]  Continuous medications  Continuous Medications[2]  PRN medications  PRN Medications[3]     Assessment/Plan    Right posterior descending artery STEMI s/p balloon angioplasty  Cardiogenic versus mixed shock  Mechanical circulatory device with Impella  Ventricular septal defect  Non sustained ventricular tachycardia  Premature ventricular complexes  2:1 AV block  Acute hypoxic respiratory failure status post endotracheal intubation and mechanical ventilation status post extubation  Ileus  Hyperbilirubinemia likely due to hemolysis associated to Impella  Anuric  acute kidney injury  High anion gap metabolic acidosis  Volume overload  Thrombocytopenia  Anemia  Leukocytosis  Diabetes mellitus type 2  Encephalopathy    Palliative Performance Scale (PPS): 20    #Palliative Care Encounter.  Support and empathy was provided throughout the encounter. I Provided emotional support through reflective listening and presence.     We had a family meeting with Alpesh Elena (son), Nayla Hogan (mother), and sister at the CICU consultation room. Staff present included Chaplain Rev. Tanesha Prater and TANIKA Negrete.      Advanced Care Planning  Patient and/or family consented to a voluntary Advanced Care Planning meeting.   Serious Illness Assessment and Counseling:  Life Limiting Disease: VSD, Cardiogenic shock requiring Impella, inferior STEMI status post balloon angioplasty, NSVT, acute hypoxic respiratory failure, hyperbilirubinemia, and acute anuric renal failure posing threat to life or function.     Disease Specific Information Provided/Prognosis Discussed: Patient's current clinical condition, including diagnosis, prognosis, and management plan were discussed.   Counseling provided on guarded prognosis and what to expect with disease progression of VSD, Cardiogenic shock requiring Impella, inferior STEMI status post balloon angioplasty, NSVT, acute hypoxic respiratory failure, hyperbilirubinemia, and acute anuric renal failure.     Understanding/Overall Impression: Patient's family expressed clear understanding of overall health status and severity of illness.     Goals/Hopes: Discussion ensued about patient's goals for their medical care going forward. Allowed patient's family time to talk about his current quality of life, disease course/progression, and symptom and treatment burden. Discussed care plan to continue with aggressive hospital care despite symptom and treatment burden versus choosing to transition to comfort based plan of care that  focuses on symptom management and quality of life.    They are hoping for him to get better enough to be a candidate for VSD repair.   They are hoping he would get better enough to go home.    Fears/Worries/Concerns:   Alpesh (son) worries about him suffering.    Minimal Acceptable Quality of Life/Maximal [place] Tolerable for the Possibility of More Time: Counseling provided on the concept of MAO/Maximal [place].     They think Alpesh can tolerate continued treatments in the ICU with Impella and dialysis if it will help him get better and eventually be able to undergo VSD repair.    They would not want him to be re-intubated if this would mean that his over all condition is worsened, unlikely to recover and undergo VSD repair.    Resuscitation Assessment: Counseling provided on the benefit versus burden of CPR in the setting of the patient's overall health status. Alpesh (son) expressed wishes to change code status to DNR and DNI.     I spent 60 minutes in providing separately identifiable ACP services with the patient and/or surrogate decision maker in a voluntary conversation discussing the patient's wishes and goals as detailed in the above note.       #Complex Medical Decision Making   #Goals of Care  - Goals are mix of survival and time and improved quality of life    #Advance Care Planning   - Code status: Alpesh (Son) made a decision to transition to DNR and DNI given Alpesh Elena's clinical condition and prognosis.  - Next of kin: Alpesh Elena    Plan to continue active medical management with Impella, renal replacement therapy, airvo, and ongoing medical care to optimize his condition hoping that he will clinically stable to undergo VSD. We talked about preparedness plan and their preference if his clinical status worsens or if he decompensates. Alpesh (son) expresses wishes to transition to comfort based care if he does not improve or continue to worsen. We talked about use of opioid such as  hydromorphone IV to treat dyspnea and lorazepam IV to treat anxiety and agitation at the end-of-life.     #Psychosocial Support  - Palliative SW Support      Plan of Care discussed with: Updated MD CICU medical team and bedside RN on goals of care decision, medication adjustments, and code status     Medical Decision Making   - Cardiogenic shock, VSD, acute hypoxic respiratory failure posing threat to life and function  - I Reviewed external notes from CICU and cardiac surgery  - I Reviewed results from CMP and CBC, chest x-ray  - I Discussed the management with Dr. Tidwell and Dr. Molina  - Drug therapy requiring intensive monitoring for toxicity: He is on heparin continuous intravenous infusion requiring serial APTT and CBC, and amiodarone continuous infusion requiring telemetry and vital signs monitoring  - Decision was made to transition to DNR/DNI given his prognosis.    Thank you for allowing us to care for this patient. Palliative Team will continue to follow as needed. Please contact team with any questions or concerns.   Team pager [phone] (weekdays)    Antonino Fernández MD  Palliative Care Physician  Message: Epic Secure chat  Team pager [phone]         [1] aspirin, 81 mg, oral, Daily  insulin lispro, 0-5 Units, subcutaneous, q4h  meropenem, 2 g, intravenous, q12h  oxygen, , inhalation, Continuous - Inhalation  pantoprazole, 40 mg, intravenous, BID  perflutren lipid microspheres, 0.5-10 mL of dilution, intravenous, Once in imaging  perflutren protein A microsphere, 0.5 mL, intravenous, Once in imaging  polyethylene glycol, 17 g, oral, 4x daily  sennosides, 2 tablet, oral, BID  thiamine, 500 mg, intravenous, Daily  [2] dexmedeTOMIDine, 0.1-1.5 mcg/kg/hr (Dosing Weight), Last Rate: Stopped (06/02/25 1020)  fentaNYL,  mcg/hr, Last Rate: Stopped (06/02/25 0653)  heparin, 0-2,000 Units/hr, Last Rate: 600 Units/hr (06/02/25 1329)  heparin Impella Purge 25 units/mL in 500 mL D5W, 10 mL/hr, Last  Rate: 10 mL/hr (06/02/25 1100)  midazolam, 0.5-20 mg/hr, Last Rate: Stopped (06/02/25 0604)  norepinephrine, 0.01-1 mcg/kg/min (Dosing Weight), Last Rate: Stopped (06/02/25 0905)  PrismaSol 4/2.5, 24 mL/kg/hr (Dosing Weight), Last Rate: 24 mL/kg/hr (06/02/25 0015)  [3] PRN medications: alteplase, bisacodyl, dextrose, dextrose, glucagon, glucagon

## 2025-06-03 NOTE — NURSING NOTE
Central Line Insertion Practices/2nd Observer Note   Name:  Alpesh Elena  Admission Date:  2025  1:14 AM  MRN: [id number]  Attending Provider: Elkin Molina MD  Room/Bed:               Sex: male  : 1980       Age: 45 y.o.    Pre-Procedure Checklist  Emergent Line Insertion: No  Type of Line to be Placed: Hemodialysis  Consent Obtained: Yes  Emergency Medication Necessary: No  Patient Identified with 2 Independent Identifiers: Yes  Risks/Benefits/Alternatives Discussed with Patient/POA/Legal Representative: Yes  Stop Sign on Door: Yes  Time Out Performed: Yes  Catheter Exchange: No  Positioning and Preparation Checklist  All People, Including Patient, in the Room with Cap and Mask: Yes  Fluoroscopy Used to Identify Vessel and Guide Insertion; Sterile Cover Used: No  Ultrasound Used to Identify Vessel and Guide Insertion; Sterile Cover Used: Yes  Full Barrier Precautions Followed (Mask, Cap, Gown, Gloves): Yes  Hands Washed: Yes  Monitors Attached with Sound Alarms On: Yes  Full Body Sterile Drape (Head-to-Toe) Used to Cover Patient: Yes  [place] Position (For IJ and Subclavian): Yes  CHG Skin Prep Used and Allowed to Air Dry Prior to Skin Procedure: Yes  Procedure Checklist  Blood Aspirated From All Lumens, All Ports Subsequently Flushed: Yes  Catheter Caps Placed On All Lumens; Lumens Clamped: Yes  Maintain Guidewire Control Throughout, Ensuring Guidewire Removal: Yes  Maintain Sterile Field Throughout Insertion: Yes  Catheter Secured: Yes  Confirmatory Test of Venous Placement: Longitudinal ultrasound  Post-Procedure Checklist  Date and Time Written on Dressing: Yes  Sharp and Wire Count and Safe Disposal of all Sharps/Wires: Yes  Sterile Dressing Applied Per Protocol: Yes  X-ray Ordered or ECG Image: Yes    VIOLETA CALVILLO RN  Date: 6/3/2025  Time: 12:40 PM

## 2025-06-03 NOTE — PROGRESS NOTES
Alpesh Elena   45 y.o.    @WT@  MRN/Room: [id number]/12/12-A    Subjective: Overnight trialysis line fell out. Not on CVVH. Pt has no acute complaints    Objective:     Meds:   aspirin, 81 mg, Daily  insulin lispro, 0-5 Units, q4h  meropenem, 2 g, q12h  oxygen, , Continuous - Inhalation  pantoprazole, 40 mg, BID  perflutren lipid microspheres, 0.5-10 mL of dilution, Once in imaging  perflutren protein A microsphere, 0.5 mL, Once in imaging  sennosides, 2 tablet, BID  thiamine, 500 mg, Daily      amiodarone, Last Rate: 0.5 mg/min (06/03/25 0800)  dexmedeTOMIDine, Last Rate: Stopped (06/02/25 1020)  heparin, Last Rate: 500 Units/hr (06/03/25 1045)  heparin Impella Purge 25 units/mL in 500 mL D5W, Last Rate: 10 mL/hr (06/03/25 1000)  norepinephrine, Last Rate: Stopped (06/02/25 0905)  PrismaSol 4/2.5, Last Rate: 24 mL/kg/hr (06/02/25 0015)      alteplase, 2 mg, PRN  bisacodyl, 10 mg, Daily PRN  dextrose, 12.5 g, q15 min PRN  dextrose, 25 g, q15 min PRN  glucagon, 1 mg, q15 min PRN  glucagon, 1 mg, q15 min PRN  glycopyrrolate, 0.2 mg, q6h PRN  oxyCODONE, 5 mg, q6h PRN   And  HYDROmorphone, 0.2 mg, q4h PRN  polyethylene glycol, 17 g, 4x daily PRN        Vitals:    06/03/25 1100   BP:    Pulse: 100   Resp: 23   Temp:    SpO2: 97%          Intake/Output Summary (Last 24 hours) at 6/3/2025 1229  Last data filed at 6/3/2025 0800  Gross per 24 hour   Intake 1008.15 ml   Output 371 ml   Net 637.15 ml       General appearance: no distress  Eyes: -icteric  Heart: RRR  Lungs: CTA bilat   Abdomen: soft, nt/nd  Extremities: trace edema bilat  Neuro: No FND  Access: none    Blood Labs:  Results for orders placed or performed during the hospital encounter of 05/20/25 (from the past 24 hours)   ACTIVATED CLOTTING TIME LOW   Result Value Ref Range    POCT Activated Clotting Time Low Range 155 83 - 199 sec   ACTIVATED CLOTTING TIME LOW   Result Value Ref Range    POCT Activated Clotting Time Low Range 163 83 - 199 sec    Electrocardiogram, 12-lead PRN ACS symptoms   Result Value Ref Range    Ventricular Rate 127 BPM    Atrial Rate 127 BPM    OH Interval 136 ms    QRS Duration 96 ms    QT Interval 318 ms    QTC Calculation(Bazett) 462 ms    P Axis 52 degrees    R Axis 112 degrees    T Axis -26 degrees    QRS Count 21 beats    Q Onset 216 ms    P Onset 148 ms    P Offset 196 ms    T Offset 375 ms    QTC Fredericia 408 ms   POCT GLUCOSE   Result Value Ref Range    POCT Glucose 182 (H) 74 - 99 mg/dL   ACTIVATED CLOTTING TIME LOW   Result Value Ref Range    POCT Activated Clotting Time Low Range 163 83 - 199 sec   BLOOD GAS MIXED VENOUS FULL PANEL   Result Value Ref Range    POCT pH, Mixed 7.42 7.33 - 7.43 pH    POCT pCO2, Mixed 36 (L) 41 - 51 mm Hg    POCT pO2, Mixed 56 (H) 35 - 45 mm Hg    POCT SO2, Mixed 88 (H) 45 - 75 %    POCT Oxy Hemoglobin, Mixed 87.5 (H) 45.0 - 75.0 %    POCT Hematocrit Calculated, Mixed 26.0 (L) 41.0 - 52.0 %    POCT Sodium, Mixed 133 (L) 136 - 145 mmol/L    POCT Potassium, Mixed 5.1 3.5 - 5.3 mmol/L    POCT Chloride, Mixed 103 98 - 107 mmol/L    POCT Ionized Calcium, Mixed 1.08 (L) 1.10 - 1.33 mmol/L    POCT Glucose, Mixed 188 (H) 74 - 99 mg/dL    POCT Lactate, Mixed 2.5 (H) 0.4 - 2.0 mmol/L    POCT Base Excess, Mixed -0.9 -2.0 - 3.0 mmol/L    POCT HCO3 Calculated, Mixed 23.4 22.0 - 26.0 mmol/L    POCT Hemoglobin, Mixed 8.7 (L) 13.5 - 17.5 g/dL    POCT Anion Gap, Mixed 12 10 - 25 mmo/L    Patient Temperature      FiO2 28 %   BLOOD GAS MIXED VENOUS FULL PANEL   Result Value Ref Range    POCT pH, Mixed 7.42 7.33 - 7.43 pH    POCT pCO2, Mixed 34 (L) 41 - 51 mm Hg    POCT pO2, Mixed 56 (H) 35 - 45 mm Hg    POCT SO2, Mixed 88 (H) 45 - 75 %    POCT Oxy Hemoglobin, Mixed 84.5 (H) 45.0 - 75.0 %    POCT Hematocrit Calculated, Mixed 26.0 (L) 41.0 - 52.0 %    POCT Sodium, Mixed 133 (L) 136 - 145 mmol/L    POCT Potassium, Mixed 5.1 3.5 - 5.3 mmol/L    POCT Chloride, Mixed 103 98 - 107 mmol/L    POCT Ionized Calcium,  Mixed 1.08 (L) 1.10 - 1.33 mmol/L    POCT Glucose, Mixed 186 (H) 74 - 99 mg/dL    POCT Lactate, Mixed 2.4 (H) 0.4 - 2.0 mmol/L    POCT Base Excess, Mixed -2.0 -2.0 - 3.0 mmol/L    POCT HCO3 Calculated, Mixed 22.1 22.0 - 26.0 mmol/L    POCT Hemoglobin, Mixed 8.6 (L) 13.5 - 17.5 g/dL    POCT Anion Gap, Mixed 13 10 - 25 mmo/L    Patient Temperature 37.0 degrees Celsius    FiO2 28 %   Blood Gas Arterial Full Panel   Result Value Ref Range    POCT pH, Arterial 7.45 (H) 7.38 - 7.42 pH    POCT pCO2, Arterial 29 (L) 38 - 42 mm Hg    POCT pO2, Arterial 72 (L) 85 - 95 mm Hg    POCT SO2, Arterial 95 94 - 100 %    POCT Oxy Hemoglobin, Arterial 91.9 (L) 94.0 - 98.0 %    POCT Hematocrit Calculated, Arterial 35.0 (L) 41.0 - 52.0 %    POCT Sodium, Arterial 132 (L) 136 - 145 mmol/L    POCT Potassium, Arterial 5.4 (H) 3.5 - 5.3 mmol/L    POCT Chloride, Arterial 102 98 - 107 mmol/L    POCT Ionized Calcium, Arterial 1.05 (L) 1.10 - 1.33 mmol/L    POCT Glucose, Arterial 190 (H) 74 - 99 mg/dL    POCT Lactate, Arterial 2.8 (H) 0.4 - 2.0 mmol/L    POCT Base Excess, Arterial -2.8 (L) -2.0 - 3.0 mmol/L    POCT HCO3 Calculated, Arterial 20.2 (L) 22.0 - 26.0 mmol/L    POCT Hemoglobin, Arterial 11.6 (L) 13.5 - 17.5 g/dL    POCT Anion Gap, Arterial 15 10 - 25 mmo/L    Patient Temperature 37.0 degrees Celsius    FiO2 21 %   CBC   Result Value Ref Range    WBC 24.6 (H) 4.4 - 11.3 x10*3/uL    nRBC 3.3 (H) 0.0 - 0.0 /100 WBCs    RBC 2.55 (L) 4.50 - 5.90 x10*6/uL    Hemoglobin 8.2 (L) 13.5 - 17.5 g/dL    Hematocrit 24.3 (L) 41.0 - 52.0 %    MCV 95 80 - 100 fL    MCH 32.2 26.0 - 34.0 pg    MCHC 33.7 32.0 - 36.0 g/dL    RDW 27.9 (H) 11.5 - 14.5 %    Platelets 86 (L) 150 - 450 x10*3/uL   POCT GLUCOSE   Result Value Ref Range    POCT Glucose 188 (H) 74 - 99 mg/dL   ACTIVATED CLOTTING TIME LOW   Result Value Ref Range    POCT Activated Clotting Time Low Range 165 83 - 199 sec   ACTIVATED CLOTTING TIME LOW   Result Value Ref Range    POCT Activated  Clotting Time Low Range 174 83 - 199 sec   Magnesium   Result Value Ref Range    Magnesium 3.02 (H) 1.60 - 2.40 mg/dL   CBC and Auto Differential   Result Value Ref Range    WBC 23.4 (H) 4.4 - 11.3 x10*3/uL    nRBC 2.4 (H) 0.0 - 0.0 /100 WBCs    RBC 2.51 (L) 4.50 - 5.90 x10*6/uL    Hemoglobin 8.1 (L) 13.5 - 17.5 g/dL    Hematocrit 24.4 (L) 41.0 - 52.0 %    MCV 97 80 - 100 fL    MCH 32.3 26.0 - 34.0 pg    MCHC 33.2 32.0 - 36.0 g/dL    RDW      Platelets 84 (L) 150 - 450 x10*3/uL    Neutrophils % 88.4 40.0 - 80.0 %    Immature Granulocytes %, Automated 1.7 (H) 0.0 - 0.9 %    Lymphocytes % 4.4 13.0 - 44.0 %    Monocytes % 5.3 2.0 - 10.0 %    Eosinophils % 0.0 0.0 - 6.0 %    Basophils % 0.2 0.0 - 2.0 %    Neutrophils Absolute 20.67 (H) 1.20 - 7.70 x10*3/uL    Immature Granulocytes Absolute, Automated 0.40 0.00 - 0.70 x10*3/uL    Lymphocytes Absolute 1.03 (L) 1.20 - 4.80 x10*3/uL    Monocytes Absolute 1.24 (H) 0.10 - 1.00 x10*3/uL    Eosinophils Absolute 0.00 0.00 - 0.70 x10*3/uL    Basophils Absolute 0.04 0.00 - 0.10 x10*3/uL   Basic Metabolic Panel   Result Value Ref Range    Glucose 152 (H) 74 - 99 mg/dL    Sodium 136 136 - 145 mmol/L    Potassium 5.0 3.5 - 5.3 mmol/L    Chloride 101 98 - 107 mmol/L    Bicarbonate 22 21 - 32 mmol/L    Anion Gap 18 10 - 20 mmol/L    Urea Nitrogen 45 (H) 6 - 23 mg/dL    Creatinine 3.48 (H) 0.50 - 1.30 mg/dL    eGFR 21 (L) >60 mL/min/1.73m*2    Calcium 8.1 (L) 8.6 - 10.6 mg/dL   BLOOD GAS MIXED VENOUS FULL PANEL   Result Value Ref Range    POCT pH, Mixed 7.45 (H) 7.33 - 7.43 pH    POCT pCO2, Mixed 32 (L) 41 - 51 mm Hg    POCT pO2, Mixed 51 (H) 35 - 45 mm Hg    POCT SO2, Mixed 82 (H) 45 - 75 %    POCT Oxy Hemoglobin, Mixed 79.6 (H) 45.0 - 75.0 %    POCT Hematocrit Calculated, Mixed 25.0 (L) 41.0 - 52.0 %    POCT Sodium, Mixed 134 (L) 136 - 145 mmol/L    POCT Potassium, Mixed 5.3 3.5 - 5.3 mmol/L    POCT Chloride, Mixed 103 98 - 107 mmol/L    POCT Ionized Calcium, Mixed 1.11 1.10 - 1.33  mmol/L    POCT Glucose, Mixed      POCT Lactate, Mixed 2.0 0.4 - 2.0 mmol/L    POCT Base Excess, Mixed -1.4 -2.0 - 3.0 mmol/L    POCT HCO3 Calculated, Mixed 22.2 22.0 - 26.0 mmol/L    POCT Hemoglobin, Mixed 8.3 (L) 13.5 - 17.5 g/dL    POCT Anion Gap, Mixed 14 10 - 25 mmo/L    Patient Temperature 37.0 degrees Celsius    FiO2 21 %   Morphology   Result Value Ref Range    RBC Morphology See Below     Polychromasia Mild     RBC Fragments Few     Target Cells Few     Tonny Cells Few    Hepatic function panel   Result Value Ref Range    Albumin 2.6 (L) 3.4 - 5.0 g/dL    Bilirubin, Total 23.6 (H) 0.0 - 1.2 mg/dL    Bilirubin, Direct 15.3 (H) 0.0 - 0.3 mg/dL    Alkaline Phosphatase 181 (H) 33 - 120 U/L    ALT 67 (H) 10 - 52 U/L     (H) 9 - 39 U/L    Total Protein 5.0 (L) 6.4 - 8.2 g/dL   Lactate dehydrogenase   Result Value Ref Range     (H) 84 - 246 U/L   Haptoglobin   Result Value Ref Range    Haptoglobin <30 (L) 30 - 200 mg/dL   Phosphorus   Result Value Ref Range    Phosphorus 4.6 2.5 - 4.9 mg/dL   POCT GLUCOSE   Result Value Ref Range    POCT Glucose 187 (H) 74 - 99 mg/dL   POCT GLUCOSE   Result Value Ref Range    POCT Glucose 147 (H) 74 - 99 mg/dL   ACTIVATED CLOTTING TIME LOW   Result Value Ref Range    POCT Activated Clotting Time Low Range 182 83 - 199 sec   BLOOD GAS MIXED VENOUS FULL PANEL   Result Value Ref Range    POCT pH, Mixed 7.45 (H) 7.33 - 7.43 pH    POCT pCO2, Mixed 30 (L) 41 - 51 mm Hg    POCT pO2, Mixed 53 (H) 35 - 45 mm Hg    POCT SO2, Mixed 83 (H) 45 - 75 %    POCT Oxy Hemoglobin, Mixed 79.7 (H) 45.0 - 75.0 %    POCT Hematocrit Calculated, Mixed 29.0 (L) 41.0 - 52.0 %    POCT Sodium, Mixed 134 (L) 136 - 145 mmol/L    POCT Potassium, Mixed 5.1 3.5 - 5.3 mmol/L    POCT Chloride, Mixed 104 98 - 107 mmol/L    POCT Ionized Calcium, Mixed 1.07 (L) 1.10 - 1.33 mmol/L    POCT Glucose, Mixed 148 (H) 74 - 99 mg/dL    POCT Lactate, Mixed 1.6 0.4 - 2.0 mmol/L    POCT Base Excess, Mixed -2.4 (L) -2.0  - 3.0 mmol/L    POCT HCO3 Calculated, Mixed 20.9 (L) 22.0 - 26.0 mmol/L    POCT Hemoglobin, Mixed 9.6 (L) 13.5 - 17.5 g/dL    POCT Anion Gap, Mixed 14 10 - 25 mmo/L    Patient Temperature 37.0 degrees Celsius    FiO2 21 %   POCT GLUCOSE   Result Value Ref Range    POCT Glucose 161 (H) 74 - 99 mg/dL   Blood Gas Lactic Acid, Venous   Result Value Ref Range    POCT Lactate, Venous 1.7 0.4 - 2.0 mmol/L   ACTIVATED CLOTTING TIME LOW   Result Value Ref Range    POCT Activated Clotting Time Low Range 187 83 - 199 sec   Electrocardiogram, 12-lead PRN ACS symptoms   Result Value Ref Range    Ventricular Rate 100 BPM    Atrial Rate 100 BPM    MD Interval 124 ms    QRS Duration 102 ms    QT Interval 376 ms    QTC Calculation(Bazett) 485 ms    P Axis 17 degrees    R Axis 101 degrees    T Axis -34 degrees    QRS Count 16 beats    Q Onset 215 ms    P Onset 153 ms    P Offset 197 ms    T Offset 403 ms    QTC Fredericia 446 ms   POCT GLUCOSE   Result Value Ref Range    POCT Glucose 146 (H) 74 - 99 mg/dL   Blood Gas Mixed Venous Full Panel   Result Value Ref Range    POCT pH, Mixed 7.43 7.33 - 7.43 pH    POCT pCO2, Mixed 30 (L) 41 - 51 mm Hg    POCT pO2, Mixed 53 (H) 35 - 45 mm Hg    POCT SO2, Mixed 84 (H) 45 - 75 %    POCT Oxy Hemoglobin, Mixed 80.6 (H) 45.0 - 75.0 %    POCT Hematocrit Calculated, Mixed 23.0 (L) 41.0 - 52.0 %    POCT Sodium, Mixed 135 (L) 136 - 145 mmol/L    POCT Potassium, Mixed 5.0 3.5 - 5.3 mmol/L    POCT Chloride, Mixed 104 98 - 107 mmol/L    POCT Ionized Calcium, Mixed 1.11 1.10 - 1.33 mmol/L    POCT Glucose, Mixed 133 (H) 74 - 99 mg/dL    POCT Lactate, Mixed 1.2 0.4 - 2.0 mmol/L    POCT Base Excess, Mixed -3.9 (L) -2.0 - 3.0 mmol/L    POCT HCO3 Calculated, Mixed 19.9 (L) 22.0 - 26.0 mmol/L    POCT Hemoglobin, Mixed 7.8 (L) 13.5 - 17.5 g/dL    POCT Anion Gap, Mixed 16 10 - 25 mmo/L    Patient Temperature 37.0 degrees Celsius    FiO2 28 %   BLOOD GAS MIXED VENOUS FULL PANEL   Result Value Ref Range    POCT pH,  Mixed 7.41 7.33 - 7.43 pH    POCT pCO2, Mixed 34 (L) 41 - 51 mm Hg    POCT pO2, Mixed 43 35 - 45 mm Hg    POCT SO2, Mixed 66 45 - 75 %    POCT Oxy Hemoglobin, Mixed 63.9 45.0 - 75.0 %    POCT Hematocrit Calculated, Mixed 24.0 (L) 41.0 - 52.0 %    POCT Sodium, Mixed 134 (L) 136 - 145 mmol/L    POCT Potassium, Mixed 5.2 3.5 - 5.3 mmol/L    POCT Chloride, Mixed 103 98 - 107 mmol/L    POCT Ionized Calcium, Mixed 1.11 1.10 - 1.33 mmol/L    POCT Glucose, Mixed 133 (H) 74 - 99 mg/dL    POCT Lactate, Mixed 1.4 0.4 - 2.0 mmol/L    POCT Base Excess, Mixed -2.7 (L) -2.0 - 3.0 mmol/L    POCT HCO3 Calculated, Mixed 21.6 (L) 22.0 - 26.0 mmol/L    POCT Hemoglobin, Mixed 8.0 (L) 13.5 - 17.5 g/dL    POCT Anion Gap, Mixed 15 10 - 25 mmo/L    Patient Temperature 37.0 degrees Celsius    FiO2 28 %   ACTIVATED CLOTTING TIME LOW   Result Value Ref Range    POCT Activated Clotting Time Low Range 187 83 - 199 sec              Alpesh Elena is a 45 y.o. male with a PMH of tobacco abuse (30 pack years), submandibular abscess, and nephrolithiasis  on 14d 5h of CICU admission for an inferior STEMI, now s/p coronary angiogram with balloon angioplasty to the PDA (right dominant system). This late presenting MI c/b cardiogenic shock iso of ischemia -induced VSD, s/p Impella placement due to shock. Nephrology following for BENY.     Updates:  -Trialysis line fell out overnight so CVVH stopped     #BENY, anuric - BL Cr 1  -Etiology: ischemic ATN 2/2 cardiogenic shock. Required dialysis initiation with CVVH, started 4/28  -hemodynamics: not on pressors, BP soft  -volume status: appears hypervolemic   -lytes: wnl    #Cardiogenic shock  #VSD  -impella  -Pt not a candidate for VSD repair surgery    Recs:  -Plan to reinsert trialysis catheter today  -Pt remains anuric, Will determine dialysis modality depending on hemodynamics today after line placement   -strict I/O, daily RFP    Mayco Chow DO  Nephrology Fellow   Daytime / Weekend Renal Pager  [phone]  After 7 pm Emergencies [phone] Pager [phone]      I saw and evaluated the patient. I personally obtained the key and critical portions of the history and physical exam or was physically present for key and critical portions performed by the resident/fellow. I reviewed the resident/fellow's documentation and discussed the patient with the resident/fellow. I agree with the resident/fellow's medical decision making as documented in the note.

## 2025-06-03 NOTE — PROGRESS NOTES
Occupational Therapy    Evaluation and Treatment    Patient Name: Alpesh Elena  MRN: [id number]  Today's Date: 6/3/2025  Room: 12/12-A  Time Calculation  Start Time: 0916  Stop Time: 0955  Time Calculation (min): 39 min    Assessment  IP OT Assessment  OT Assessment: Pt presents with decreased activity tolerance, strength, cognition, coordination, and balance. Pt would benefit from skilled OT services to address deficits and increase safety and IND with ADLs, IADLs, functional mobility, and transfers for safe return home to prior living environment.  Prognosis: Good  Barriers to Discharge Home: Caregiver assistance, Cognition needs, Physical needs  Caregiver Assistance: Caregiver assistance needed per identified barriers - however, level of patient's required assistance exceeds assistance available at home  Cognition Needs: 24hr supervision for safety awareness needed, Insight of patient limited regarding functional ability/needs, Cognition-related high falls risk  Physical Needs: 24hr mobility assistance needed, 24hr ADL assistance needed, High falls risk due to function or environment  Evaluation/Treatment Tolerance: Patient tolerated treatment well  Medical Staff Made Aware: Yes  End of Session Communication: Bedside nurse  End of Session Patient Position: Bed, 3 rail up, Alarm off, not on at start of session  Plan:  Inpatient Plan  Treatment Interventions: ADL retraining, Functional transfer training, UE strengthening/ROM, Endurance training, Cognitive reorientation, Patient/family training, Equipment evaluation/education, Neuromuscular reeducation, Fine motor coordination activities, Compensatory technique education  OT Frequency: 5 times per week  OT Discharge Recommendations: High intensity level of continued care  Equipment Recommended upon Discharge:  (TBD)  OT Recommended Transfer Status: Assist of 2  OT - OK to Discharge: Yes  OT Assessment  OT Assessment Results: Decreased ADL status, Decreased upper  extremity strength, Decreased upper extremity range of motion, Decreased safe judgment during ADL, Decreased cognition, Decreased endurance, Decreased fine motor control, Decreased functional mobility, Decreased gross motor control, Decreased IADLs  Prognosis: Good  Evaluation/Treatment Tolerance: Patient tolerated treatment well  Medical Staff Made Aware: Yes    Subjective   Current Problem:  1. STEMI (ST elevation myocardial infarction) (Multi)  Transthoracic Echo (TTE) Limited    Transthoracic Echo (TTE) Limited    Central Line    Central Line    Transthoracic Echo (TTE) Limited    Transthoracic Echo (TTE) Limited    Transthoracic Echo (TTE) Limited    Transthoracic Echo (TTE) Limited    Transthoracic Echo (TTE) Limited    Transthoracic Echo (TTE) Limited    Anesthesia Intraoperative Transesophageal Echocardiogram    Anesthesia Intraoperative Transesophageal Echocardiogram    Transthoracic Echo (TTE) Limited    Transthoracic Echo (TTE) Limited    CANCELED: Case Request Cath Lab: Impella Insertion    CANCELED: Case Request Cath Lab: Impella Insertion    CANCELED: Transthoracic Echo (TTE) Limited    CANCELED: Transthoracic Echo (TTE) Limited      2. Cardiogenic shock (Multi)  Case Request Cath Lab: Impella Insertion    Case Request Cath Lab: Impella Insertion    Cardiac Catheterization Procedure    Cardiac Catheterization Procedure    Transthoracic Echo (TTE) Limited    Transthoracic Echo (TTE) Limited    Central Line    Central Line    Transthoracic Echo (TTE) Limited    Transthoracic Echo (TTE) Limited    Transthoracic Echo (TTE) Limited    Case Request Operating Room: INSERTION, IMPELLA 5.5    Case Request Operating Room: INSERTION, IMPELLA 5.5    Anesthesia Intraoperative Transesophageal Echocardiogram    Anesthesia Intraoperative Transesophageal Echocardiogram    CANCELED: Case Request Cath Lab: Impella Insertion    CANCELED: Case Request Cath Lab: Impella Insertion    CANCELED: Transthoracic Echo (TTE)  Limited    CANCELED: Transthoracic Echo (TTE) Limited    CANCELED: Transthoracic Echo (TTE) Limited    CANCELED: Transthoracic Echo (TTE) Limited      3. ST elevation myocardial infarction (STEMI), unspecified artery (Multi)  Transthoracic Echo (TTE) Limited    Transthoracic Echo (TTE) Limited    CANCELED: Transthoracic Echo (TTE) Limited    CANCELED: Transthoracic Echo (TTE) Limited      4. Ventricular septal defect (HHS-HCC)  Cardiac Catheterization Procedure    Transthoracic Echo (TTE) Limited    Transthoracic Echo (TTE) Limited    Transthoracic Echo (TTE) Limited    Transthoracic Echo (TTE) Limited    Case Request Operating Room: INSERTION, IMPELLA 5.5    Case Request Operating Room: INSERTION, IMPELLA 5.5      5. BENY (acute kidney injury)  Central Line    Central Line      6. Anemia, unspecified type  Esophagogastroduodenoscopy (EGD)    Esophagogastroduodenoscopy (EGD)      7. Melena  Esophagogastroduodenoscopy (EGD)    Esophagogastroduodenoscopy (EGD)      8. VSD (ventricular septal defect) (HHS-HCC)  Transthoracic Echo (TTE) Limited    Transthoracic Echo (TTE) Limited      9. Ventricular septal defect as current complication following acute myocardial infarction (Multi)  Anesthesia Intraoperative Transesophageal Echocardiogram        General:  Reason for Referral: inferior STEMI, now s/p coronary angiogram with balloon angioplasty to the PDA (right dominant system). MI c/b cardiogenic shock iso of ischemia -induced VSD. S/p impella CP placment. Hospital course c/b renal failure, requiring CVVH. prior c/f GIB, found to be OG trauma and small ulcer. On 5/28 patient upgraded to impella 5.5 with reduction in hemolysis.  Past Medical History Relevant to Rehab: tobacco abuse, submandibular abscess, and nephrolithiasis  Co-Treatment: PT  Co-Treatment Reason: to maximize pt safety with mobility d/t AMPAC < 10 and high complexity  Prior to Session Communication: Bedside nurse  Patient Position Received: Bed, 3 rail  up, Alarm off, not on at start of session  Family/Caregiver Present: No  General Comment: Pt alert and agreeable to therapy.   Precautions:  Hearing/Visual Limitations: WFL  Medical Precautions: Fall precautions, Cardiac precautions  Precautions Comment: R UE axillary pecautions      Vital Signs     Vitals Session Pre OT During OT Post OT   Heart Rate 107  100   Resp  20  28   SpO2 95  96   BP 94/73  83/65   MAP 78  70       Pain:  Pain Assessment  Pain Assessment: 0-10  0-10 (Numeric) Pain Score: 0 - No pain  Lines/Tubes/Drains:  0.5 Amio, Hep gtt, swan-ashley, A-line, tele, R axillary impella P-8 4.5 L/min, dobhoff.      Objective   Cognition:  Overall Cognitive Status: Impaired  Arousal/Alertness: Delayed responses to stimuli  Orientation Level:  (oriented to self, hospital, not  (thought he was at [facility]), oriented to year but not month with choices provided.)  Following Commands: Follows one step commands with repetition (50-75% simple commands with cues and increased time)  Insight: Mild  Processing Speed: Delayed     Confusion Assessment Method (CAM)  Acute Onset and Fluctuating Course (1A): Yes  Acute Onset and Fluctuating Course (1B): Yes  Inattention (2): Yes  Disorganized Thinking (3): No  Rate Patient's Level of Consciousness (4): Alert (Normal), No  Delirium Present: No  Romero Agitation Sedation Scale  Romero Agitation Sedation Scale (RASS): Alert and calm  Home Living:  Type of Home: House  Lives With: Significant other  Home Layout: One level  Home Access: Level entry  Home Living Comments: Details obtained from pt but pt is a questionable historian.   Prior Function:  Level of [place]: Independent with ADLs and functional transfers, Independent with homemaking with ambulation  ADL Assistance: Independent  Homemaking Assistance: Independent  Ambulatory Assistance: Independent  Vocational:  ((+) works)  Hand Dominance: Right  Prior Function Comments: (+) drives; details obtained from pt but pt  is a questionable historian.  IADL History:     ADL:  Eating Assistance: Total  Grooming Assistance: Maximal  Grooming Deficit:  ([place] assist)  Bathing Assistance: Total  UE Dressing Assistance: Total  LE Dressing Assistance: Total  Toileting Assistance with Device: Total  Toileting Deficit:  (BM management in bed)  Activity Tolerance:  Endurance: Tolerates 10 - 20 min exercise with multiple rests  Early Mobility/Exercise Safety Screen: Proceed with mobilization - No exclusion criteria met  Balance:  Dynamic Sitting Balance  Dynamic Sitting-Level of Assistance: Minimum assistance, Moderate assistance  Static Sitting Balance  Static Sitting-Level of Assistance: Minimum assistance, Moderate assistance  Bed Mobility/Transfers: Bed Mobility/Transfers: Bed Mobility  Bed Mobility: Yes  Bed Mobility 1  Bed Mobility 1: Supine to sitting, Sitting to supine  Level of Assistance 1: Dependent, +2  Bed Mobility Comments 1: HOB elevated, drawsheet used, cues for UE placement  Bed Mobility 2  Bed Mobility  2: Rolling right, Rolling left  Level of Assistance 2: Dependent (x 1 assist)  Bed Mobility Comments 2: drawsheet used   and Transfers  Transfer: No  IADL's:      Vision: Vision - Basic Assessment  Current Vision: No visual deficits   and    Sensation:  Light Touch: No apparent deficits  Strength:     Perception:  Inattention/Neglect: Appears intact  Initiation: Cues to initiate tasks  Motor Planning: Appears intact  Coordination:  Movements are Fluid and Coordinated: No   Hand Function:  Hand Function  Gross Grasp: Impaired ( strength 3+/5)  Coordination: Impaired  Extremities:   RUE   RUE : Exceptions to WFL  RUE AROM (degrees)  RUE AROM Comment: Limited AROM, PROM WFL but loimited by axillary device precautions  RUE Strength  R Shoulder Flexion: 2-/5  R Shoulder Extension: 2-/5  R Shoulder ABduction: 2-/5  R Elbow Flexion: 2-/5  R Elbow Extension: 2-/5  R Forearm Pronation: 2-/5  R Forearm Supination: 2-/5  R Wrist  Flexion: 2-/5  R Wrist Extension: 2-/5, LUE   LUE: Exceptions to WFL  LUE AROM (degrees)  LUE AROM Comment: Limited AROM, PROM WFL  LUE Strength  L Shoulder Flexion: 3-/5  L Shoulder Extension: 3-/5  L Shoulder ABduction: 3-/5  L Elbow Flexion: 3-/5  L Elbow Extension: 3-/5  L Forearm Pronation: 3-/5  L Forearm Supination: 3-/5  L Wrist Flexion: 3-/5  L Wrist Extension: 3-/5, RLE   RLE :  (strength >/= 2-/5), and LLE   LLE :  (strength >/= 2-/5)      Outcome Measures: [facility] Daily Activity  Putting on and taking off regular lower body clothing: Total  Bathing (including washing, rinsing, drying): Total  Putting on and taking off regular upper body clothing: Total  Toileting, which includes using toilet, bedpan or urinal: Total  Taking care of personal grooming such as brushing teeth: A lot  Eating Meals: Total  Daily Activity - Total Score: 7    Confusion Assessment Method-ICU (CAM-ICU)  Feature 3: Altered Level of Consciousness: Negative   ICU Mobility Screen  Early Mobility/Exercise Safety Screen: Proceed with mobilization - No exclusion criteria met  ICU Mobility Scale: Sitting over edge of bed,   Romero Agitation Sedation Scale  Romero Agitation Sedation Scale (RASS): Alert and calm      Education Documentation  Body Mechanics, taught by Brooklyn Bolanos OT at 6/3/2025 10:37 AM.  Learner: Patient  Readiness: Acceptance  Method: Explanation, Demonstration  Response: Needs Reinforcement  Comment: OT POC    Precautions, taught by Brooklyn Bolanos OT at 6/3/2025 10:37 AM.  Learner: Patient  Readiness: Acceptance  Method: Explanation, Demonstration  Response: Needs Reinforcement  Comment: OT POC    ADL Training, taught by Brooklyn Bolanos OT at 6/3/2025 10:37 AM.  Learner: Patient  Readiness: Acceptance  Method: Explanation, Demonstration  Response: Needs Reinforcement  Comment: OT POC    Education Comments  No comments found.        Goals:   Encounter Problems       Encounter Problems (Active)       ADLs        Pt will perform UB/LB bathing with Mod A and min verbal cues in supported seated position using adaptive technique and compensatory strategies as needed.        Start:  06/03/25    Expected End:  06/17/25            Pt will perform UB dressing with Mod A using adaptive technique and compensatory strategies as needed, with min cueing.        Start:  06/03/25    Expected End:  06/17/25            Pt will perform LB dressing with Mod A using adaptive tech, AE, and compensatory strategies as needed with min cueing.        Start:  06/03/25    Expected End:  06/17/25            Pt will perform daily grooming tasks in seated position with Min A using adaptive tech and AE as needed.        Start:  06/03/25    Expected End:  06/17/25               BALANCE       Pt will tolerate dynamic sitting EOB > 15 min with supervision without LOB while completing ADL and functional tasks.         Start:  06/03/25    Expected End:  06/17/25               COGNITION/SAFETY       Patient will score WFL on standardized cognitive assessment within reasonable time frame       Start:  06/03/25    Expected End:  06/17/25            Pt will be alert and oriented x 4 and follow 100% multi-step commands and be CAM (-).        Start:  06/03/25    Expected End:  06/17/25               TRANSFERS       Pt will perform bed mobility with Max A x 1 using bedrails as needed.       Start:  06/03/25    Expected End:  06/17/25            Pt will perform functional transfers on various surfaces with Max A x 1 using LRAD with good safety awareness.        Start:  06/03/25    Expected End:  06/17/25                   Treatment Completed on Evaluation    Activities of Daily Living:     Provided [place] assist during grooming with pt using R UE with oral hygiene management using yankayuer brush and bath wipe to wash face. Pt able to grasp tool but required assist to manipulate and effectively reach face.            Therapy/Activity:     Therapeutic  Activity  Therapeutic Activity Performed: Yes  Therapeutic Activity 1: Pt placed bed in chair position x 10 min with py hemodynamically stable.  Therapeutic Activity 2: Pt sat EOB x 12 min with Min A primarily with periods of Mod A for posterior lean. Pt with protracted shoulders and flexed posture, provided cues for upright, midline trunk alignment with cues for breathing technique. Pt able to assist in using B UE to support self.          06/03/25 at 10:38 AM   Brooklyn Bolanos, OT   Rehab Office: [phone]

## 2025-06-03 NOTE — CARE PLAN
This 45-year-old male patient has been admitted to our intensive care unit for almost two weeks now. He had a recent STEMI and underwent PCI over the RCA in an outside hospital. After that episode, he developed a post-infarction VSD and was transferred to our hospital for further care. Upon arrival, he was unstable, so we initiated a shock call and decided advance him to mechanical support and place a femoral CP Impella to stabilize him and prepare him for potential corrective surgery. After the CP Impella was placed, he improved, but soon became unstable again due to increasing hemolysis and a low cardiac output state. We discussed the situation with the heart failure team and decided to progress him to a 5.5 Impella through right axillary cannulation to stabilize him better, this was done 6 days ago. We hoped that a better flow and left ventricle decompression would improve his condition enough for surgery. The 5.5 Impella was successfully placed, and his hemodynamics improved. He was weaned from high-dose inotropic support and was even able to be extubated. We closely monitor his metabolic parameters and consider surgery as an option. Today, Dr. Dean Molina, requested my opinion about surgery. We discussed the risks involved. The patient has a STS score of 44% mortality and 91% combined morbidity and mortality. Despite some positive improvements, he remains prohibitive risk for surgery. While I agree that surgery is the only option to save him, I don't think he will be able to tolerate and survive it. I'm open to discussing this further with the heart failure team and getting a second opinion from another colleague. At this point, I still believe this patient would not survive an operation. I suggest comfort care and optimal medical management.

## 2025-06-03 NOTE — SIGNIFICANT EVENT
Patient was noted to have increased work of breathing in the afternoon. CXR with significant pulmonary edema. ABG with evidence of respiratory alkalosis. He was restarted on CVVH and high flow NC. In discussion with patient's son and the palliative care team, son would like to change patient's code status to DNR/DNI. If patient's respiratory status were to worsen despite current interventions, patient's son would like his code status changed to DNR comfort care only.     Dina Joseph MD  PGY-2 Internal Medicine

## 2025-06-03 NOTE — PROGRESS NOTES
Participated in family meeting with pt's son Alpesh, mother Nayla and sister Kelsey with Dr. Antonino Fernández from palliative care.  Pt's mom and sister are hoping that pt is going to be able to return home, and hoping that he will continue to get better.  Dr. Fernández shared medical concerns as to why we are worried.  Pt's son Alpesh has a lot of anxiety and depression and is carrying the weight of these decisions as best as he can.  He has had similar situations with his grandparents, and this is bringing all of those memories back.  He is finding it hard to balance everything between work, and visiting his dad, and eating and drinking.  After further conversations with ICU staff as pt was having more difficulty breathing, code status conversation ensued, and son is in agreement with DNR/ DNI.  Son wants to be informed of any changes in pt's condition, but agreeable to comfort care if pt can not be managed on current care plan.  Will follow and continue to support pt and family.      TANIKA Louis

## 2025-06-03 NOTE — PROGRESS NOTES
Subjective     Interval History: Alpesh Elena extubated today    Medications  Current Medications[1]    Objective     Physical Exam  Heart S1 S2 RRR, Lungs CTA, no edema      Vital signs in last 24 hours:  Temp:  [35.9 °C (96.6 °F)-37.7 °C (99.9 °F)] 37.7 °C (99.9 °F)  Heart Rate:  [] 105  Resp:  [12-58] 20  Arterial Line BP 1: ()/() 101/76  FiO2 (%):  [30 %] 30 %       Intake/Output last 3 shifts:  I/O last 3 completed shifts:  In: 2620.3 (30.1 mL/kg) [I.V.:1438.1 (16.5 mL/kg); NG/GT:545; IV Piggyback:637.1]  Out: 4788 (55 mL/kg) [Other:4788]  Weight: 87.1 kg     Labs:  Results from last 7 days   Lab Units 25  1911   WBC AUTO x10*3/uL 24.6*   RBC AUTO x10*6/uL 2.55*   HEMOGLOBIN g/dL 8.2*   HEMATOCRIT % 24.3*     Results from last 7 days   Lab Units 25  0420   SODIUM mmol/L 137   POTASSIUM mmol/L 4.4   CHLORIDE mmol/L 103   CO2 mmol/L 25   BUN mg/dL 29*   CREATININE mg/dL 2.41*   CALCIUM mg/dL 7.9*   PHOSPHORUS mg/dL 2.5   MAGNESIUM mg/dL 2.89*   BILIRUBIN TOTAL mg/dL 21.4*   ALT U/L 81*   AST U/L 109*       Assessment/Plan     Assessment & Plan  STEMI (ST elevation myocardial infarction) (Multi)    Cardiogenic shock (Multi)    Ventricular septal defect ([facility])  BENY in the setting of multiple other medical problems.  Continue CVVH.        Arjun Vee MD  2025  11:18 PM       [1]   Current Facility-Administered Medications:     alteplase (Cathflo Activase) injection 2 mg, 2 mg, intra-catheter, PRN, Teresa Parrish MD, 1.4 mg at 25    [COMPLETED] amiodarone (Nexterone) 150 mg in dextrose (iso)  mL, 150 mg, intravenous, Once, Stopped at 25 1652 **FOLLOWED BY** [] amiodarone (Nexterone) 360 mg in dextrose,iso-osm 200 mL (1.8 mg/mL) infusion (premix), 1 mg/min, intravenous, Continuous, Stopped at 25 2312 **FOLLOWED BY** amiodarone (Nexterone) 360 mg in dextrose,iso-osm 200 mL (1.8 mg/mL) infusion (premix), 0.5 mg/min, intravenous, Continuous,  Leo Herr MD, Last Rate: 16.67 mL/hr at 06/02/25 2312, 0.5 mg/min at 06/02/25 2312    aspirin chewable tablet 81 mg, 81 mg, oral, Daily, Teresa Parrish MD, 81 mg at 06/02/25 0821    bisacodyl (Dulcolax) suppository 10 mg, 10 mg, rectal, Daily PRN, Teresa Parrish MD    dexmedeTOMIDine (Precedex) 400 mcg in 100 mL (4 mcg/mL) sodium chloride 0.9% infusion, 0.1-1.5 mcg/kg/hr (Dosing Weight), intravenous, Continuous, Teresa Parrish MD, Stopped at 06/02/25 1020    dextrose 50 % injection 12.5 g, 12.5 g, intravenous, q15 min PRN, Tersea Parrish MD    dextrose 50 % injection 25 g, 25 g, intravenous, q15 min PRN, Teresa Parrish MD    fentanyl (Sublimaze) 10 mcg/mL in sodium chloride 0.9% infusion,  mcg/hr, intravenous, Continuous, Teresa Parrish MD, Stopped at 06/02/25 0653    glucagon (Glucagen) injection 1 mg, 1 mg, intramuscular, q15 min PRN, Teresa Parrish MD    glucagon (Glucagen) injection 1 mg, 1 mg, intramuscular, q15 min PRN, Teresa Parrish MD    heparin 25,000 Units in dextrose 5% 250 mL (100 Units/mL) infusion (premix), 0-2,000 Units/hr, intravenous, Continuous, Joelle Gonzalez MD, Last Rate: 6 mL/hr at 06/02/25 1827, 600 Units/hr at 06/02/25 1827    heparin Impella Purge 25 units/mL in 500 mL D5W, 10 mL/hr, intra-catheter, Continuous, Teresa Parrish MD, Last Rate: 10 mL/hr at 06/02/25 1800, 10 mL/hr at 06/02/25 1800    insulin lispro injection 0-5 Units, 0-5 Units, subcutaneous, q4h, Teresa Parrish MD, 1 Units at 06/02/25 2102    meropenem (Merrem) 2 g in sodium chloride 0.9% 100 mL IV, 2 g, intravenous, q12h, Louie Hooks MD, Stopped at 06/02/25 1206    midazolam in NS (Versed) 1 mg/mL infusion solution, 0.5-20 mg/hr, intravenous, Continuous, Bilal FRANCISCO Parrish MD, Stopped at 06/02/25 0604    norepinephrine (Levophed) 8 mg in dextrose 5% 250 mL (0.032 mg/mL) infusion (premix), 0.01-1 mcg/kg/min (Dosing Weight), intravenous, Continuous, Bilal FRANCISCO Parrish MD, Stopped at 06/02/25 0905    oxygen (O2) therapy, , inhalation,  Continuous - Inhalation, Dianne Alexandra MD, 21 percent at 06/02/25 2043    pantoprazole (Protonix) injection 40 mg, 40 mg, intravenous, BID, Teresa Parrish MD, 40 mg at 06/02/25 2047    perflutren lipid microspheres (Definity) injection 0.5-10 mL of dilution, 0.5-10 mL of dilution, intravenous, Once in imaging, Teresa Parrish MD    perflutren protein A microsphere (Optison) injection 0.5 mL, 0.5 mL, intravenous, Once in imaging, Teresa Parrish MD    polyethylene glycol (Glycolax, Miralax) packet 17 g, 17 g, oral, 4x daily, Teresa Parrish MD, 17 g at 06/02/25 2047    PrismaSol 4/2.5 CRRT solution, 24 mL/kg/hr (Dosing Weight), CRRT, Continuous, Hailee Parrish MD, Last Rate: 2,104.8 mL/hr at 06/02/25 0015, 24 mL/kg/hr at 06/02/25 0015    sennosides (Senokot) tablet 17.2 mg, 2 tablet, oral, BID, Teresa Parrish MD, 17.2 mg at 06/02/25 2047    thiamine (Vitamin B1) injection 500 mg, 500 mg, intravenous, Daily, Kevin Leong DO, 500 mg at 06/02/25 0821

## 2025-06-03 NOTE — PROCEDURES
Central Line    Date/Time: 6/3/2025 1:37 PM    Performed by: Vidal Tidwell MD  Authorized by: Vidal Tidwell MD    Consent:     Consent obtained:  Verbal and written    Consent given by:  Guardian    Risks discussed:  Arterial puncture, incorrect placement, nerve damage and pneumothorax  Universal protocol:     Procedure explained and questions answered to patient or proxy's satisfaction: yes      Relevant documents present and verified: yes      Test results available: yes      Imaging studies available: yes      Required blood products, implants, devices, and special equipment available: yes      Site/side marked: yes      Immediately prior to procedure, a time out was called: yes      Patient identity confirmed:  Verbally with patient and arm band  Pre-procedure details:     Indication(s): central venous access      Skin preparation:  Chlorhexidine  Sedation:     Sedation type:  None  Anesthesia:     Anesthesia method:  None  Procedure details:     Location:  L internal jugular    Patient position:  Supine    Procedural supplies:  Triple lumen    Catheter size:  13 Fr    Landmarks identified: yes      Ultrasound guidance: yes      Ultrasound guidance timing: prior to insertion and real time      Sterile ultrasound techniques: Sterile gel and sterile probe covers were used      Successful placement: yes    Post-procedure details:     Post-procedure:  Dressing applied    Assessment:  Blood return through all ports    Procedure completion:  Tolerated well, no immediate complications

## 2025-06-03 NOTE — PROCEDURES
Central Line    Date/Time: 6/2/2025 4:29 PM    Performed by: Vidal Tidwell MD  Authorized by: Vidal Tidwell MD    Consent:     Consent obtained:  Verbal and written    Consent given by:  Patient    Risks, benefits, and alternatives were discussed: yes      Risks discussed:  Arterial puncture, incorrect placement and pneumothorax    Alternatives discussed:  No treatment  Universal protocol:     Procedure explained and questions answered to patient or proxy's satisfaction: yes      Relevant documents present and verified: yes      Test results available: yes      Imaging studies available: yes      Required blood products, implants, devices, and special equipment available: yes      Site/side marked: yes      Immediately prior to procedure, a time out was called: yes      Patient identity confirmed:  Verbally with patient and arm band  Pre-procedure details:     Indication(s): central venous access      Skin preparation:  Chlorhexidine  Sedation:     Sedation type:  None  Anesthesia:     Anesthesia method:  None  Procedure details:     Location:  R internal jugular    Procedural supplies:  Cordis    Catheter size:  9 Fr    Ultrasound guidance: yes      Ultrasound guidance timing: prior to insertion and real time      Sterile ultrasound techniques: Sterile gel and sterile probe covers were used      Number of attempts:  1    Successful placement: yes    Post-procedure details:     Post-procedure:  Line sutured and dressing applied        A time-out was completed verifying correct patient, procedure, site, positioning, and special equipment. The patient was put in the trendelenburg position with head turned 30 degrees away from the insertion site. The skin overlying the right neck/chest was thoroughly sponged with chlorhexidine and allowed to dry. All persons involved were shielded with hairnets, facemasks and sterile gowns. With sterile-gloved hands the right neck area was draped with the large disposable sterile  field provided in the pre-manufactured kit. The skin and subcutaneous tissues superficial to the right internal jugular vein were anesthetized with 10 mL of 1% lidocaine. The internal jugular vein was identified on ultrasound from the angle of the mandible down into the supraclavicular fossa using the linear ultrasound probe in the transverse orientation. The carotid artery was identified and avoided utilizing color-flow. The internal jugular vein was then placed in the center of the ultrasound field and compressed for patency. A movement artifact was identified as the needle was advanced through the skin and advanced toward the vessel. A real time hyperechoic signal revealed visualization of vascular needle entry into the lumen as blood was noted to flashback in the syringe. The needle was then held in place while the guide wire was advanced. The needle was then removed. Direct visualization of guide wire location within the vein was noted on ultrasound indicating proper placement.   A small incision was made at the skin surface with a scalpel for the dilator which entered the skin easily. 9F Cordis catheter e was then inserted over the guide wire and the guidewire/dilator were removed. The 9F cordis was secured with sutures. A triple lumen continuous cardiac output Buford-Maximiliano catheter was brought onto the field and each line flushed with sterile saline. The catheter was introduced into the Cordis catheter to a distance of 20 cm. The balloon was then inflated and the catheter was advanced through the right ventricle and into the pulmonary artery until a wedge position pressure tracing was obtained. The balloon was then deflated and verification of return of a pulmonary artery pressure tracing made. During the floating procedure to position the catheter the position of the catheter tip was determined by continuous pressure monitoring via the distal port. Xray confirmed appropriate positioning.

## 2025-06-03 NOTE — CARE PLAN
Problem: ACS/CP/NSTEMI/STEMI  Goal: Chest pain managed (free from pain or at acceptable level)  6/2/2025 2136 by Mary Jo Fernandes RN  Outcome: Progressing  6/2/2025 2127 by Mary Jo Fernandes RN  Outcome: Progressing     Problem: ACS/CP/NSTEMI/STEMI  Goal: Lab values return to normal range  6/2/2025 2136 by Mary Jo Fernandes RN  Outcome: Progressing     Problem: ACS/CP/NSTEMI/STEMI  Goal: Verbalize understanding of procedures/devices  6/2/2025 2136 by Mary Jo Fernandes RN  Outcome: Progressing  6/2/2025 2127 by Mary Jo Fernandes RN  Outcome: Progressing   The patient's goals for the shift include      The clinical goals for the shift include pt will oxygenate appropriately post extubation

## 2025-06-03 NOTE — PROGRESS NOTES
Cardiac ICU Progress Note    Admit Date: 2025   Hospital Length of Stay: 14   ICU Length of Stay: 14d 5h     History of Present Illness  Alpesh Elena is a 45 y.o. male on day 14d 5h of admission for STEMI (ST elevation myocardial infarction) (Multi). with following ICU needs: Cardiogenic shock, impella, VSD/ Inferior LV wall rupture     Subjective   Subjective  Overnight patients [place] catheter was placed and trialysis line fell out. This morning patient is awake and able to follow commands but has restricted movement from weakness.       Objective   Objective    Vitals and I/O    24 Hour Vitals  Temp:  [36.2 °C (97.2 °F)-37.7 °C (99.9 °F)] 37.2 °C (99 °F)  Heart Rate:  [] 99  Resp:  [11-58] 11  Arterial Line BP 1: ()/() 97/75  FiO2 (%):  [30 %] 30 %    Temp (24hrs), Av.1 °C (98.7 °F), Min:36.2 °C (97.2 °F), Max:37.7 °C (99.9 °F)     24 hour Intake/Output    Intake/Output Summary (Last 24 hours) at 6/3/2025 0700  Last data filed at 6/3/2025 0159  Gross per 24 hour   Intake 1099.45 ml   Output 1059 ml   Net 40.45 ml        Vitals:    25 0500   Weight: 88.2 kg (194 lb 7.1 oz)        Vent settings - EXTUBATED 6/2 Morning    Most Recent Range Past 24hrs   Mode CPAP    FiO2 30 % FiO2 (%)  Min: 30 %   Min taken time: 25  Max: 30 %   Max taken time: 25   Rate 16 No data recorded   Vt 450 mL  No data recorded   PEEP 5 cm H20 PEEP/CPAP (cm H2O)  Min: 5 cm H20   Min taken time: 25  Max: 5 cm H20   Max taken time: 25       Invasive Hemodynamics   Most Recent Range Past 24hrs   BP (Art) 97/75 Arterial Line BP 1  Min: 90/84  Max: 130/109   MAP(Art) 81 mmHg Arterial Line MAP 1 (mmHg)   Min: 77 mmHg  Max: 115 mmHg   RA/CVP   No data recorded   PA 42/14 PAP  Min: 42/14  Max: 66/12   PA(mean) 26 mmHg PAP (Mean)  Min: 26 mmHg  Max: 36 mmHg   PCWP 12 mmHg PCWP (mmHg)  Min: 10 mmHg  Max: 17 mmHg   CO 15 L/min CO (L/min)  Min: 12.7 L/min  Max: 23.86 L/min    CI 7.5 L/min/m2 CI (L/min/m2)  Min: 6.3 L/min/m2  Max: 11.87 L/min/m2   Mixed Venous 83 % SVO2 (%)  Min: 82 %  Max: 88 %   SVR  389 (dyne*sec)/cm5 SVR (dyne*sec)/cm5  Min: 292 (dyne*sec)/cm5  Max: 466 (dyne*sec)/cm5   PVR 75 (dyne*sec)/cm5 PVR (dyne*sec)/cm5  Min: 67 (dyne*sec)/cm5  Max: 113 (dyne*sec)/cm5       Physical Exam  Physical Exam  Constitutional:       Comments: Awake, mildly alert. Eyes open to verbal stimuli. Not able to respond to questions/vocalize  Cardiovascular:      Rate and Rhythm: Normal rate and regular rhythm.      Heart sounds: Murmur heard.      Comments: Holosystolic murmer, best heard at right 4th intercostal rib space. Very weak vs absent b/l PT and pedal.   Pulmonary:      Effort: Pulmonary effort is normal. On RA     Breath sounds: Normal breath sounds. No wheezing or rales.   Abdominal:      General: Abdomen is flat. Bowel sounds are normal. There is no distension.      Palpations: Abdomen is soft.   Musculoskeletal:      Right lower le+ edema.      Left lower le+ edema.   Skin:     General: Skin is warm.      Coloration: Skin is jaundiced.   Neurological:      Comments: Awake and alert. Significant generalized weakness and unable to vocalize but can follow directions    Labs    CMP:  Recent Labs     25  0005 25  0420 25  1752 25  0410 25  1814 25  0204 25  1837 25  0021 25  0020    137 136 136 133* 133* 131*  --  130*   K 5.0 4.4 3.9 4.0 4.1 4.1 4.1  --  5.4*    103 101 102 100 99 98  --  97*   CO2 22 25 27 26 28 25 24  --  23   ANIONGAP 18 13 12 12 9* 13 13  --  15   BUN 45* 29* 31* 30* 30* 31* 33*  --  40*   CREATININE 3.48* 2.41* 2.44* 2.48* 2.52* 2.35* 2.45*  --  3.15*   EGFR 21* 33* 32* 32* 31* 34* 32*  --  24*   MG 3.02* 2.89* 2.95* 2.93* 2.95* 2.75* 2.68* 2.74*  --      Recent Labs     25  0006 25  0420 25  1752 25  0410 25  1814 25  0204 25  1837 25  0021  05/29/25  0919   ALBUMIN 2.6* 2.6* 2.9* 2.6* 2.7* 2.6*   < > 2.4* 2.5*   ALKPHOS 181* 175*  --  175*  --  197*  --  235* 279*   ALT 67* 81*  --  100*  --  131*  --  157* 179*   * 109*  --  106*  --  103*  --  150* 212*   BILITOT 23.6* 21.4*  --  21.9*  --  21.0*  --  20.8* 22.4*    < > = values in this interval not displayed.       CBC:  Recent Labs     06/03/25  0005 06/02/25  1911 06/02/25  0420 06/01/25  1752 06/01/25  0410 05/31/25  1814 05/31/25  0204 05/30/25  1837   WBC 23.4* 24.6* 14.0* 13.9* 18.1* 16.8* 22.5* 22.2*   HGB 8.1* 8.2* 8.0* 8.4* 8.6* 8.4* 9.2* 7.4*   HCT 24.4* 24.3* 23.2* 24.8* 24.9* 24.1* 27.7* 21.3*   PLT 84* 86* 66* 69* 73* 70* 104* 92*   MCV 97 95 94 95 92 92 96 91       COAG:   Recent Labs     05/27/25 1814 05/26/25  0026 05/25/25  1545 05/20/25  0133 05/18/25 2159 07/07/24  2303   INR 1.3* 1.9* 1.9* 1.4* 1.1 1.2*       ABO:   Recent Labs     05/29/25 0919   ABO A       HEME/ENDO:  Recent Labs     05/25/25  0033 05/20/25  0133 05/18/25  2328 05/18/25  2158   FERRITIN 1,099*  --   --   --    IRONSAT 53*  --   --   --    TSH  --  3.50 1.51  --    HGBA1C  --   --   --  7.4*        CARDIAC:   Recent Labs     06/03/25  0006 06/02/25  0420 06/01/25  0410 05/31/25  0204 05/30/25  0021 05/29/25  0158 05/20/25  1414 05/20/25  0133 05/19/25  1929 05/19/25  1211 05/19/25  0443 05/18/25  2328 05/18/25  2159   * 731* 880* 1,035* 1,437* 2,325*   < > 1,026*  --   --   --   --   --    TROPHS  --   --   --   --   --   --   --   --  18,638* 19,289* 21,701* 24,085*  --    BNP  --   --   --   --   --   --   --  760*  --   --   --   --  236*    < > = values in this interval not displayed.     Recent Labs     06/03/25  0603 06/03/25  0005 06/02/25  1910 06/02/25  1739 06/02/25  1206 06/02/25  0421 06/01/25  0412 05/30/25  0024 05/29/25  0919 05/28/25  1659 05/28/25  1236   LACMX 1.6 2.0  --  2.4*  --   --   --   --  3.0*  --  2.8*   LACTATEART  --   --  2.8*  --  1.2 0.9 0.9 1.6  --    < >  --   "  SO2MV 83* 82*  --  88*  --   --   --   --  50  --  48   O2CMX 79.7* 79.6*  --  84.5*  --   --   --   --  48.2  --  46.0    < > = values in this interval not displayed.       ABG:   Results from last 7 days   Lab Units 06/03/25  0603 06/03/25  0005 06/02/25  1910 06/02/25  1739 06/02/25  1206 06/02/25  0421   POCT PH, ARTERIAL pH  --   --  7.45*  --  7.43* 7.43*   POCT PO2, ARTERIAL mm Hg  --   --  72*  --  144* 116*   POCT PCO2, ARTERIAL mm Hg  --   --  29*  --  30* 35*   POCT LACTATE, ARTERIAL mmol/L  --   --  2.8*  --  1.2 0.9   FIO2 % 21 21 21   < > 36 30    < > = values in this interval not displayed.       VBG:   Results from last 7 days   Lab Units 05/29/25  0428 05/28/25  2258   POCT PH, VENOUS pH 7.38 7.35   POCT PCO2, VENOUS mm Hg 36* 21*       Micro/ID:   Lab Results   Component Value Date    BLOODCULT No growth at 4 days -  FINAL REPORT 05/26/2025    BLOODCULT No growth at 4 days -  FINAL REPORT 05/26/2025           Results from last 7 days   Lab Units 05/27/25  1814   LACTATE mmol/L 1.3               No results found for: \"CKTOTAL\", \"CKMB\", \"CKMBINDEX\", \"TROPONINI\"   Lab Results   Component Value Date    HGBA1C 7.4 (H) 05/18/2025      Lab Results   Component Value Date    CHOL 220 (H) 05/18/2025     Lab Results   Component Value Date    HDL 33.9 05/18/2025     Lab Results   Component Value Date    LDLCALC 175 (H) 05/18/2025     Lab Results   Component Value Date    TRIG 57 05/18/2025     No components found for: \"CHOLHDL\"     Cardiac Data    EKG  Encounter Date: 05/20/25   Electrocardiogram, 12-lead PRN ACS symptoms   Result Value    Ventricular Rate 116    Atrial Rate 116    MN Interval 140    QRS Duration 98    QT Interval 362    QTC Calculation(Bazett) 503    P Axis 37    R Axis 102    T Axis -23    QRS Count 19    Q Onset 217    P Onset 147    P Offset 193    T Offset 398    QTC Fredericia 451    Narrative    Sinus tachycardia  Rightward axis  Cannot rule out Inferior infarct (cited on or before " 18-MAY-2025)  Abnormal ECG  When compared with ECG of 30-MAY-2025 16:12,  Incomplete right bundle branch block is no longer Present  Minimal criteria for Anterior infarct are no longer Present  Questionable change in initial forces of Inferior leads      Echocardiogram  Transthoracic Echo (TTE) Limited  Result Date: 5/29/2025   [facility], [address] [place] [address]                Tel [phone] and Fax [phone] TRANSTHORACIC ECHOCARDIOGRAM REPORT  Patient Name:       AVRIL FRITZ    Reading Physician:    [id number] Aviles MD Study Date:         5/29/2025           Ordering Provider:    [id number] ZACHARY BROWN MRN/PID:            [id number]            Fellow: Accession#:         [id number]        Nurse: Date of Birth/Age:  1980 / 45      Sonographer:          Fellow Exam                     years Gender assigned at  M                   Additional Staff: Birth: Height:                                 Admit Date:           5/20/2025 Weight:                                 Admission Status:     Inpatient - STAT BSA / BMI:          m2 / kg/m2          Encounter#:           [id number] Blood Pressure:     /                   Department Location:  [facility] Study Type:    TRANSTHORACIC ECHO (TTE) LIMITED Diagnosis/ICD: ST elevation (STEMI) myocardial infarction of unspecified                site-I21.3 Indication:    Impella Placement CPT Code:      Echo Limited-50618  Study Detail: The following Echo studies were performed: 2D.  PHYSICIAN INTERPRETATION: Left Ventricle: The left ventricle was not well visualized. The left ventricular ejection fraction could not be measured. The left ventricular cavity size was not assessed. Left ventricular diastolic filling was not assessed. Left Atrium: The left atrial size was not assessed. Right Ventricle: The  right ventricle was not well visualized. Unable to determine right ventricular systolic function. A device is visualized in the right ventricle. Right Atrium: The right atrial size was not assessed. Aortic Valve: There is indeterminate aortic valve regurgitation. Mitral Valve: The mitral valve was not well visualized. Mitral valve regurgitation was not assessed. Tricuspid Valve: The tricuspid valve was not assessed. Tricuspid regurgitation was not assessed. Pulmonic Valve: The pulmonic valve was not assessed. Pulmonic valve regurgitation was not assessed. Pericardium: Pericardial effusion was not assessed. Aorta: The aortic root was not well visualized. Systemic Veins: The inferior vena cava was not assessed, IVC inspiratory collapse was not assessed.  LEFT VENTRICULAR ASSIST DEVICE: LVAD: The patient has a(n) Impella LVAD device present.  CONCLUSIONS:  1. The left ventricle was not well visualized. The left ventricular ejection fraction could not be measured.  2. Unable to determine right ventricular systolic function.  3. This is a truncated Fellow exam, advise full study be performed. QUANTITATIVE DATA SUMMARY:  [id number] Juan F Aviles MD Electronically signed on 5/29/2025 at 10:39:31 AM  ** Final **     Transthoracic Echo (TTE) Limited  Result Date: 5/27/2025   [facility], [address] [place] [address]                Tel [phone] and Fax [phone] TRANSTHORACIC ECHOCARDIOGRAM REPORT  Patient Name:       AVRIL REYESAnnette VAUGHANSARAHHERIBERTO    Reading Physician:    [id number] Maribel Coughlin MD Study Date:         5/27/2025           Ordering Provider:    [id number] TIBURCIO TUCKER MRN/PID:            [id number]            Fellow: Accession#:         [id number]        Nurse: Date of Birth/Age:  1980 / 45      Sonographer:          Saulo carranza                                      RDCS Gender assigned at  M                   Additional Staff: Birth: Height:             172.72 cm           Admit Date: Weight:             87.54 kg            Admission Status:     Inpatient - STAT BSA / BMI:          2.01 m2 / 29.35     Encounter#:           [id number]                     kg/m2 Blood Pressure:     117/74 mmHg         Department Location:  [facility] Study Type:    TRANSTHORACIC ECHO (TTE) LIMITED Diagnosis/ICD: Ventricular septal defect (VSD)-Q21.0 Indication:    impella placement CPT Code:      Echo Limited-31852; Color Doppler-61380 Patient History: Pertinent History: Chest pain and STEMI, status post balloon angioplasty,                    developed shock and TTE with large VSD vs inferior LV wall                    rupture, Impella placement on 5/20. Study Detail: The following Echo studies were performed: 2D and color flow.               Technically challenging study due to body habitus, patient lying               in supine position, poor acoustic windows and prominent lung               artifact. The patient is intubated.  PHYSICIAN INTERPRETATION: Left Ventricle: The left ventricle was not well visualized. The left ventricular ejection fraction could not be measured. The left ventricular cavity size was not assessed. Left ventricular diastolic filling was not assessed. Left Atrium: The left atrial size was not assessed. Right Ventricle: The right ventricle was not assessed. Right ventricular systolic function not assessed. A device is visualized in the right ventricle. Right Atrium: The right atrial size was not assessed. There is a device visualized in the right atrium. Aortic Valve: The aortic valve was not assessed. Aortic valve regurgitation was not assessed. Mitral Valve: The mitral valve is normal in structure. There is trace mitral valve regurgitation. Tricuspid Valve: The tricuspid valve was not assessed. Tricuspid regurgitation was not assessed. Pulmonic Valve:  The pulmonic valve was not assessed. Pulmonic valve regurgitation was not assessed. Pericardium: Trivial pericardial effusion. Aorta: The aortic root was not assessed. Systemic Veins: The inferior vena cava appears dilated.  LEFT VENTRICULAR ASSIST DEVICE: LVAD: The patient has a(n) Impella LVAD device present. LVAD Comments: Difficult to assess given image quality, though Impella appears to extend approx 3.7-3.9 cm beneath the AV.  CONCLUSIONS:  1. The left ventricle was not well visualized. The left ventricular ejection fraction could not be measured.  2. Limited TTE to asess LVAD placement.  3. Impella appears to be 3.7cm to 3.9cm beneath the AV though diffiult images. QUANTITATIVE DATA SUMMARY:  TRICUSPID VALVE/RVSP:         Normal Ranges: IVC Diam:             2.20 cm  [id number] Maribel Coughlin MD Electronically signed on 5/27/2025 at 12:35:42 PM  ** Final **     Transthoracic Echo (TTE) Limited  Result Date: 5/27/2025   [facility], [address] [place] [address]                Tel [phone] and Fax [phone] TRANSTHORACIC ECHOCARDIOGRAM REPORT  Patient Name:        AVRIL FRITZ     Reading Physician: [id number] Maribel Coughlin MD Study Date:          5/26/2025            Ordering Provider: [id number] MIMI SARGENT MRN/PID:             [id number]             Fellow: Accession#:          [id number]         Nurse: Date of Birth/Age:   1980 / 45 years Sonographer:       Cardiology Fellow Gender assigned at   M                    Additional Staff: Birth: Height:                                   Admit Date:        5/20/2025 Weight:                                   Admission Status:  Inpatient - Routine BSA / BMI:           m2 / kg/m2           Encounter#:        [id number] Study Type:    TRANSTHORACIC ECHO (TTE) LIMITED Diagnosis/ICD: ST elevation (STEMI) myocardial  infarction of unspecified                site-I21.3 Indication:    VSD, shock CPT Code:      Echo Limited-86061; Doppler Limited-20188; Color Doppler-13157  Study Detail: The following Echo studies were performed: 2D, Doppler and color               flow.  PHYSICIAN INTERPRETATION: Left Ventricle: The left ventricle was not well visualized. The left ventricular ejection fraction could not be measured. Left venticular wall motion is abnormal. The left ventricular cavity size was not assessed. Left ventricular diastolic filling was not assessed. Large VSD ( inferoseptal) with left to right shunting noted on color Doppler. Vmax of VSD flow only 3.2m/sec. Left Atrium: The left atrial size was not assessed. Right Ventricle: The right ventricle was not well visualized. There is reduced right ventricular systolic function. A device is visualized in the right ventricle. Limited views of RV which appears to be dilated with reduced fx. Right Atrium: The right atrial size was not assessed. There is a device visualized in the right atrium. Aortic Valve: The aortic valve was not assessed. Aortic valve regurgitation was not assessed. Mitral Valve: The mitral valve was not well visualized. There is trace mitral valve regurgitation. Tricuspid Valve: The tricuspid valve was not assessed. Tricuspid regurgitation was not assessed. Pulmonic Valve: The pulmonic valve was not assessed. Pulmonic valve regurgitation was not assessed. Pericardium: There is no pericardial effusion noted. Aorta: The aortic root was not assessed. In comparison to the previous echocardiogram(s): Compared with study dated 5/25/2025, the prior exam was a very limited study only to assess depth of Impella device and did not assess VSD with Doppler. Compared with the exam from 5/20/2025, the prior VSD Vmax was 3.3m/sec, so not significantly changed at this time. Was already a large inferoseptal VSD at that time. Note the Impella device was already present on the  prior exam.  LEFT VENTRICULAR ASSIST DEVICE: LVAD: The patient has a(n) Impella LVAD device present.  CONCLUSIONS:  1. Limited and technically difficult fellow bedside exam.  2. Poorly visualized anatomical structures due to suboptimal image quality.  3. The left ventricle was not well visualized. The left ventricular ejection fraction could not be measured.  4. Abnormal left venticular wall motion.  5. Large VSD ( inferoseptal) with left to right shunting noted on color Doppler. Vmax of VSD flow only 3.2m/sec.  6. There is reduced right ventricular systolic function.  7. Impella device noted within the LV cavity, though exact depth beneath the AV coud not be measured.  8. Compared with study dated 5/25/2025, the prior exam was a very limited study only to assess depth of Impella device and did not assess VSD with Doppler. Compared with the exam from 5/20/2025, the prior VSD Vmax was 3.3m/sec, so not significantly changed at this time. Was already a large inferoseptal VSD at that time. Note the Impella device was already present on the prior exam. QUANTITATIVE DATA SUMMARY:  [id number] Maribel Coughlin MD Electronically signed on 5/27/2025 at 8:26:10 AM  ** Final **     Transthoracic Echo (TTE) Limited  Result Date: 5/26/2025   [facility], [address] [place] [address]                Tel [phone] and Fax [phone] TRANSTHORACIC ECHOCARDIOGRAM REPORT  Patient Name:       AVRIL REYESAnnette FRITZ    Reading Physician:    [id number] Tera Allred MD Study Date:         5/24/2025           Ordering Provider:    [id number] TIBURCIO TUCKER MRN/PID:            [id number]            Fellow: Accession#:         [id number]        Nurse: Date of Birth/Age:  1980 / 45      Sonographer:          Fellow Exam                     years Gender assigned at  M                   Additional Staff: Birth:  Height:             172.72 cm           Admit Date: Weight:             89.80 kg            Admission Status: BSA / BMI:          2.04 m2 / 30.10     Encounter#:           [id number]                     kg/m2 Blood Pressure:     102/92 mmHg         Department Location: Study Type:    TRANSTHORACIC ECHO (TTE) LIMITED Diagnosis/ICD: Cardiogenic shock-R57.0 Indication:    Impella positioning CPT Code:      Doppler Limited-51235; Echo Limited-06522; Color Doppler-58683 PHYSICIAN INTERPRETATION: Left Ventricle: The left ventricle was not well visualized. The left ventricular ejection fraction could not be measured. The left ventricular cavity size was not assessed. Left ventricular diastolic filling was not assessed. Left Atrium: The left atrial size was not assessed. Right Ventricle: The right ventricle was not assessed. Right ventricular systolic function not assessed. Right Atrium: The right atrial size was not assessed. Aortic Valve: The aortic valve was not assessed. Aortic valve regurgitation was not assessed. Mitral Valve: The mitral valve was not assessed. Mitral valve regurgitation was not assessed. Tricuspid Valve: The tricuspid valve was not assessed. Tricuspid regurgitation was not assessed. Pulmonic Valve: The pulmonic valve was not assessed. Pulmonic valve regurgitation was not assessed. Pericardium: Pericardial effusion was not assessed. Aorta: The aortic root was not assessed.  LEFT VENTRICULAR ASSIST DEVICE: LVAD: The patient has a(n) Impella LVAD device present. LVAD Comments: Limited exam for Impllea inflow cannula repositioning, cannula is 5.1cm deep in LV cavity.  CONCLUSIONS:  1. The left ventricle was not well visualized. The left ventricular ejection fraction could not be measured. QUANTITATIVE DATA SUMMARY:  [id number] Tera Allred MD Electronically signed on 5/25/2025 at 9:26:25 AM  ** Final (Updated) **     Transthoracic Echo (TTE) Limited  Result Date: 5/26/2025   [facility], [address] [place] [address]                Tel [phone] and Fax [phone] TRANSTHORACIC ECHOCARDIOGRAM REPORT  Patient Name:       AVRIL CORONAHERIBERTO Yoder Physician:    [id number] Tera Allred MD Study Date:         5/25/2025           Ordering Provider:    [id number] TIBURCIO TUCKER MRN/PID:            [id number]            Fellow: Accession#:         [id number]        Nurse: Date of Birth/Age:  1980 / 45      Sonographer:          Fellow Exam                     years Gender assigned at  M                   Additional Staff: Birth: Height:             172.70 cm           Admit Date: Weight:             89.80 kg            Admission Status: BSA / BMI:          2.03 m2 / 30.11     Encounter#:           [id number]                     kg/m2 Blood Pressure:     102/92 mmHg         Department Location: Study Type:    TRANSTHORACIC ECHO (TTE) LIMITED Diagnosis/ICD: Cardiogenic shock-R57.0 Indication:    Impella positioning CPT Code:      Echo Limited-33488 PHYSICIAN INTERPRETATION: Left Ventricle: The left ventricle was not well visualized. The left ventricular ejection fraction could not be measured. The left ventricular cavity size was not assessed. Left ventricular diastolic filling was not assessed. Left Atrium: The left atrial size was not assessed. Right Ventricle: The right ventricle was not assessed. Right ventricular systolic function not assessed. A device is visualized in the right ventricle. Right Atrium: The right atrial size was not assessed. Aortic Valve: The aortic valve was not assessed. Aortic valve regurgitation was not assessed. Mitral Valve: The mitral valve was not assessed. Mitral valve regurgitation was not assessed. Tricuspid Valve: The tricuspid valve was not assessed. Tricuspid regurgitation was not assessed. Pulmonic Valve: The pulmonic valve was not  assessed. Pulmonic valve regurgitation was not assessed. Pericardium: Pericardial effusion was not assessed. Aorta: The aortic root was not assessed.  LEFT VENTRICULAR ASSIST DEVICE: LVAD: The patient has a(n) Impella LVAD device present. LVAD Comments: Limited exam for Impella repositioning. Impella cannula is 3.7cm deep in left ventricular cavity.  CONCLUSIONS:  1. The left ventricle was not well visualized. The left ventricular ejection fraction could not be measured. QUANTITATIVE DATA SUMMARY:  [id number] Tera Allred MD Electronically signed on 5/25/2025 at 9:28:32 AM  ** Final (Updated) **     Transthoracic Echo (TTE) Limited  Result Date: 5/23/2025   [facility], [address] [place] [address]                Tel [phone] and Fax [phone] TRANSTHORACIC ECHOCARDIOGRAM REPORT  Patient Name:       AVRIL FRITZ    Reading Physician:    [id number] Marcio Dodson MD Study Date:         5/22/2025           Ordering Provider:    [id number] TUCKER MRN/PID:            [id number]            Fellow: Accession#:         [id number]        Nurse: Date of Birth/Age:  1980 / 45      Sonographer:          Fellow Exam                     years Gender assigned at  M                   Additional Staff: Birth: Height:             172.72 cm           Admit Date: Weight:             89.81 kg            Admission Status:     Inpatient - STAT BSA / BMI:          2.04 m2 / 30.11     Encounter#:           [id number]                     kg/m2 Blood Pressure:     102/92 mmHg         Department Location: Study Type:    TRANSTHORACIC ECHO (TTE) LIMITED Diagnosis/ICD: Cardiogenic shock-R57.0 Indication:    Impella position CPT Code:      Echo Limited-72748  Study Detail: The following Echo studies were performed: 2D.  PHYSICIAN INTERPRETATION: Left Ventricle: The left ventricle was not well  visualized. The left ventricular ejection fraction could not be measured. The left ventricular cavity size was not assessed. Left ventricular diastolic filling was not assessed. Left Atrium: The left atrial size was not assessed. Right Ventricle: The right ventricle was not assessed. Right ventricular systolic function not assessed. Right Atrium: The right atrial size was not assessed. Aortic Valve: The aortic valve was not assessed. Aortic valve regurgitation was not assessed. Mitral Valve: The mitral valve was not assessed. Mitral valve regurgitation was not assessed. Tricuspid Valve: The tricuspid valve was not assessed. Tricuspid regurgitation was not assessed. Pulmonic Valve: The pulmonic valve was not assessed. Pulmonic valve regurgitation was not assessed. Pericardium: Pericardial effusion was not assessed. Aorta: The aortic root was not assessed. Additional Comments: Impella at approximately 5.3 cm depth in the left ventricle. Limited study for Impella depth. In comparison to the previous echocardiogram(s): Although previous studies are available for review, a comparison with the current echocardiogram is not feasible due to its limited scope or image quality.  CONCLUSIONS:  1. Limited study for Impella depth.  2. The left ventricle was not well visualized. The left ventricular ejection fraction could not be measured.  3. Impella at approximately 5.3 cm depth in the left ventricle. QUANTITATIVE DATA SUMMARY:  [id number] Marcio Dodson MD Electronically signed on 5/23/2025 at 12:12:26 PM  ** Final (Updated) **     Transthoracic Echo (TTE) Limited  Result Date: 5/20/2025   [facility], [address] [place] [address]                Tel [phone] and Fax [phone] TRANSTHORACIC ECHOCARDIOGRAM REPORT  Patient Name:       AVRIL FRITZ    Reading Physician:    [id number] Maribel Coughlin MD Study Date:         5/20/2025            Ordering Provider:    [id number] TIBURCIO TUCKER MRN/PID:            [id number]            Fellow: Accession#:         [id number]        Nurse: Date of Birth/Age:  1980 / 45      Sonographer:          Saulo carranza RDCS Gender assigned at  M                   Additional Staff: Birth: Height:             172.72 cm           Admit Date: Weight:             78.47 kg            Admission Status:     Inpatient -                                                               Routine BSA / BMI:          1.92 m2 / 26.30     Encounter#:           [id number]                     kg/m2 Blood Pressure:     87/82 mmHg          Department Location:  [facility] Study Type:    TRANSTHORACIC ECHO (TTE) LIMITED Diagnosis/ICD: ST elevation (STEMI) myocardial infarction of unspecified                site-I21.3 Indication:    c/f VSD CPT Code:      Echo Limited-99425; Doppler Limited-30793; Color Doppler-06785 Patient History: Pertinent History: Inferior STEMI on Impella, tobacco abuse, CP. Study Detail: The following Echo studies were performed: 2D, M-Mode, Doppler and               color flow. Technically challenging study due to body habitus,               patient lying in supine position, poor acoustic windows and               prominent lung artifact. The patient is intubated. Optison used as               a contrast agent for endocardial border definition. Total contrast               used for this procedure was 2 mL via IV push.  PHYSICIAN INTERPRETATION: Left Ventricle: The left ventricle was not well visualized. The left ventricular ejection fraction could not be measured. The left ventricular cavity size was not assessed. There is paradoxical septal wall motion. Left ventricular diastolic filling cannot be determined due to left ventricular assist device. Limited views of the LV appear show a  hyderdynamic LV with a large color flow from LV to RV through the inferoseptum with maximal velocity of 3.3m/sec, Unable to adequatly assess LVEF, though appears to have baal septal and inferior wall motion abnormality. Left Atrium: The left atrial size was not assessed. Right Ventricle: The right ventricle was not well visualized. There is reduced right ventricular systolic function. A device is visualized in the right ventricle. The RV appears dilated with significantly reduced function with reduced TAPSE and fractional area change. Right Atrium: The right atrium is normal in size. There is a device visualized in the right atrium. Aortic Valve: The aortic valve was not well visualized. There is indeterminate aortic valve regurgitation. Mitral Valve: The mitral valve is normal in structure. There is trace mitral valve regurgitation. Tricuspid Valve: The tricuspid valve was not well visualized. There is trace tricuspid regurgitation. The right ventricular systolic pressure is unable to be estimated. Pulmonic Valve: The pulmonic valve is not well visualized. Pulmonic valve regurgitation was not assessed. Pericardium: Trivial to small pericardial effusion. Aorta: The aortic root was not well visualized. Systemic Veins: The inferior vena cava appears normal in size, on ventillator. In comparison to the previous echocardiogram(s): Compared with the prior exam from 5/19/2025 ([facility]) there has been interval development of a large VSD throuh the inferoseptum with the RV now becoming large with significantly reduced function. Prior echo with reported LVEF of 55% with basal inferospetal and baal inferior hypokinesis in the setting of STEMI. RV size and function were nornal at that time. ( Note that the septum was more thoroughly interrogated with color Doppler today).  LEFT VENTRICULAR ASSIST DEVICE: LVAD: The patient has a(n) Impella LVAD device present. LVAD Comments: Impella seen within the LVOT extending  possibly 2.1-2.3 cm beneath the AV. The Device was repositioned to 3.2-3.4 cm beneath the AV.  CONCLUSIONS:  1. Poorly visualized anatomical structures due to suboptimal image quality.  2. The left ventricle was not well visualized. The left ventricular ejection fraction could not be measured.  3. Limited views of the LV appear show a hyderdynamic LV with a large color flow from LV to RV through the inferoseptum with maximal velocity of 3.3m/sec, Unable to adequatly assess LVEF, though appears to have baal septal and inferior wall motion abnormality.  4. There is reduced right ventricular systolic function.  5. The RV appears dilated with significantly reduced function with reduced TAPSE and fractional area change.  6. Primary service notified of findings at the time of reporting.  7. Compared with the prior exam from 2025 ([facility]) there has been interval development of a large VSD throuh the inferoseptum with the RV now becoming large with significantly reduced function. Prior echo with reported LVEF of 55% with basal inferospetal and baal inferior hypokinesis in the setting of STEMI. RV size and function were nornal at that time. ( Note that the septum was more thoroughly interrogated with color Doppler today). QUANTITATIVE DATA SUMMARY:  RIGHT VENTRICLE: TAPSE: 11.2 mm RV s'  0.11 m/s  TRICUSPID VALVE/RVSP:         Normal Ranges: IVC Diam:             2.00 cm  SHUNT (Qp/Qs) DATA: VSD Aime: 3.10 m/s VSD P.3 mmHg  [id number] Maribel Coughlin MD Electronically signed on 2025 at 11:35:30 AM  ** Final **     Transthoracic Echo Complete  Result Date: 2025          [place] [id number]  Tel [phone] Fax [phone] TRANSTHORACIC ECHOCARDIOGRAM REPORT Patient Name:       AVRIL Yoder Physician:    [id number] Faraz Ortega DO Study Date:         2025            Ordering  Provider:    [id number] MARGARET ASHLEY GASPAR MRN/PID:            [id number]             Fellow: Accession#:         [id number]         Nurse: Date of Birth/Age:  1980 / 45 years Sonographer:          Koki Mendez RDCS Gender Assigned at                      Additional Staff: Birth: Height:             172.72 cm            Admit Date:           5/18/2025 Weight:             75.30 kg             Admission Status:     Inpatient - STAT BSA / BMI:          1.89 m2 / 25.24      Department Location:  [facility]                     kg/m2 Blood Pressure: 97 /65 mmHg Study Type:    TRANSTHORACIC ECHO (TTE) COMPLETE Diagnosis/ICD: ST elevation (STEMI) myocardial infarction of unspecified                site-I21.3 Indication:    STEMI CPT Codes:     Echo Complete w Full Doppler-42024 Patient History: PCI and Stent:     PCI performed on 5/18/2025. Smoker:            Current. Pertinent History: Chest Pain and Murmur. Study Detail: The following Echo studies were performed: 2D, M-Mode, Doppler and               color flow. Definity used as a contrast agent for endocardial               border definition. Total contrast used for this procedure was 2 mL               via IV push.  PHYSICIAN INTERPRETATION: Left Ventricle: The left ventricular systolic function is normal, with a visually estimated ejection fraction of 55%. There is mild concentric left ventricular hypertrophy. Wall motion is abnormal. The left ventricular cavity size is normal. There is mild increased septal and mildly increased posterior left ventricular wall thickness. Spectral Doppler shows a normal pattern of left ventricular diastolic filling. LV Wall Scoring: The basal inferoseptal segment, basal inferolateral segment, and basal inferior segment are hypokinetic. All remaining scored segments are normal. Left Atrium: The left  atrial size is normal. Right Ventricle: The right ventricle is normal in size. There is normal right ventricular global systolic function. Right Atrium: The right atrial size is normal. Aortic Valve: The aortic valve appears structurally normal. There is no evidence of aortic valve stenosis. The aortic valve dimensionless index is 0.73. There is no evidence of aortic valve regurgitation. The peak instantaneous gradient of the aortic valve is 5 mmHg. The mean gradient of the aortic valve is 3 mmHg. Mitral Valve: The mitral valve is normal in structure. There is no evidence of mitral valve stenosis. There is normal mitral valve leaflet mobility. There is no evidence of mitral valve regurgitation. Tricuspid Valve: The tricuspid valve is structurally normal. There is normal tricuspid valve leaflet mobility. There is trace tricuspid regurgitation. Pulmonic Valve: The pulmonic valve is structurally normal. There is no indication of pulmonic valve regurgitation. Pericardium: No pericardial effusion noted. Aorta: The aortic root is normal. Pulmonary Artery: The main pulmonary artery is normal in size, and position, with normal bifurcation into the left and right pulmonary arteries. The tricuspid regurgitant velocity is 2.88 m/s, and with an estimated right atrial pressure of 3 mmHg, the estimated pulmonary artery pressure is mildly elevated with the RVSP at 36.2 mmHg. Systemic Veins: The inferior vena cava appears normal in size.  CONCLUSIONS:  1. The left ventricular systolic function is normal, with a visually estimated ejection fraction of 55%.  2. Basal inferoseptal segment, basal inferolateral segment, and basal inferior segment are abnormal.  3. Abnormal wall motion.  4. There is normal right ventricular global systolic function.  5. Normal sized right ventricle.  6. There is no evidence of mitral valve stenosis.  7. No evidence of mitral valve regurgitation.  8. Trace tricuspid regurgitation is visualized.  9. Aortic  valve stenosis is not present. 10. The main pulmonary artery is normal in size, and position, with normal bifurcation into the left and right pulmonary arteries. QUANTITATIVE DATA SUMMARY:  2D MEASUREMENTS:             Normal Ranges: Ao Root d:       2.86 cm     (2.0-3.7cm) LAs:             3.88 cm     (2.7-4.0cm) IVSd:            1.12 cm     (0.6-1.1cm) LVPWd:           1.08 cm     (0.6-1.1cm) LVIDd:           4.02 cm     (3.9-5.9cm) LVIDs:           2.94 cm LV Mass Index:   77.7 g/m2 LVEDV Index:     59.87 ml/m2 LV % FS          26.9 %  LEFT ATRIUM:                  Normal Ranges: LA Vol A4C:        29.5 ml    (22+/-6mL/m2) LA Vol A2C:        25.4 ml LA Vol BP:         30.5 ml LA Vol Index A4C:  15.6ml/m2 LA Vol Index A2C:  13.4 ml/m2 LA Vol Index BP:   16.1 ml/m2 LA Area A4C:       13.8 cm2 LA Area A2C:       11.5 cm2 LA Major Axis A4C: 5.5 cm LA Major Axis A2C: 4.4 cm LA Volume Index:   14.9 ml/m2  RIGHT ATRIUM:                 Normal Ranges: RA Vol A4C:        24.1 ml    (8.3-19.5ml) RA Vol Index A4C:  12.7 ml/m2 RA Area A4C:       11.1 cm2 RA Major Axis A4C: 4.4 cm  AORTA MEASUREMENTS:         Normal Ranges: Asc Ao, d:          2.48 cm (2.1-3.4cm)  LV SYSTOLIC FUNCTION:                      Normal Ranges: EF-A4C View:    54 % (>=55%) EF-A2C View:    54 % EF-Biplane:     55 % EF-Visual:      55 % LV EF Reported: 55 %  LV DIASTOLIC FUNCTION:           Normal Ranges: MV Peak E:             1.20 m/s  (0.7-1.2 m/s) MV Peak A:             1.08 m/s  (0.42-0.7 m/s) E/A Ratio:             1.11      (1.0-2.2) MV e'                  0.090 m/s (>8.0) MV lateral e'          0.08 m/s MV medial e'           0.10 m/s E/e' Ratio:            13.29     (<8.0)  MITRAL VALVE:          Normal Ranges: MV DT:        126 msec (150-240msec)  AORTIC VALVE:                     Normal Ranges: AoV Vmax:                1.16 m/s (<=1.7m/s) AoV Peak P.4 mmHg (<20mmHg) AoV Mean PG:             3.0 mmHg (1.7-11.5mmHg) LVOT Max  Aime:            1.03 m/s (<=1.1m/s) AoV VTI:                 22.30 cm (18-25cm) LVOT VTI:                16.20 cm LVOT Diameter:           2.03 cm  (1.8-2.4cm) AoV Area, VTI:           2.35 cm2 (2.5-5.5cm2) AoV Area,Vmax:           2.87 cm2 (2.5-4.5cm2) AoV Dimensionless Index: 0.73  RIGHT VENTRICLE: RV Basal 3.49 cm RV Mid   2.65 cm RV Major 7.3 cm TAPSE:   22.4 mm RV s'    0.13 m/s  TRICUSPID VALVE/RVSP:          Normal Ranges: Peak TR Velocity:     2.88 m/s RV Syst Pressure:     36 mmHg  (< 30mmHg) IVC Diam:             1.74 cm  PULMONIC VALVE:          Normal Ranges: PV Accel Time:  116 msec (>120ms) PV Max Aime:     1.5 m/s  (0.6-0.9m/s) PV Max P.8 mmHg  [id number] Faraz Ortega DO Electronically signed on 2025 at 9:34:06 AM  Wall Scoring  ** Final **       Cardiac Catheterization  No results found for this or any previous visit from the past 1825 days.  Impella Insertion 2025    [facility], Cath Lab, [address] [place] [address]    Cardiovascular Catheterization Report    Patient Name:     AVRIL FRITZ    Performing Physician:  [id number] Mcduffie MD  Study Date:       2025           Verifying Physician:   [id number] Mcduffie MD  MRN/PID:          [id number]            Cardiologist/Co-Scrub:  Accession#:       [id number]        Ordering Provider:     [id number] [facility]  Date of           1980 / 45      Cardiologist:  Birth/Age:        years  Gender:           M                   Fellow:                [id number] Froilan Miranda MD  Encounter#:       [id number]          Surgeon:      Study:            Impella Insertion  Additional Study: Peripheral Angiogram      Indications:  AVRIL FRITZ is a 45 year old male who presents with dyslipidemia, hypertension and prior percutaneous coronary intervention. Acute MI, andcardiogenic shock.    Procedure Description:  Femoral artery angiography was performed at the additional arterial access site. This  demonstrated a common femoral artery puncture appropriate for closure. The additional arterial sheath was left intact and sutured in place. Femoral artery angiography was performed at the additional arterial access site. This demonstrated a common femoral artery puncture appropriate for closure. The additional arterial sheath was left intact and sutured in place.    Procedure Description Comments:  Access was obtained using US and micropuncture technique in the right CFA and a 4 Fr Sula sheath was placed. Angiogram revealed a CFA without significant PAD. Access was obtained in the right CFA using US and micropuncture technique and a 9 Swazi sheath was advanced. A 6 Fr angled pigtail was then advanced for measurement of LV pressures. 2 Percloses were deployed at the right CFA site and a 14 Swazi Impella Peel-Away sheath was then advanced in the right CFA. Heparin was given and therapeutic ACTs were maintained for the entirety of the procedure. The 6 Swazi angled pigtail was again used to gain entry into the LV cavity and subsequently the soft Impella wire was placed into the LV. Positioning of the wire was confirmed in the ARRIOLA view. The Impella was advanced into the LV and mechanical support was initiated at P9. Access was obtained in the left CFA using ultrasound and micropuncture sheath and a 6 Swazi braided sheath was placed in the left CFA. The 4 Swazi Sula in the  right SFA was exchanged for a 6 Swazi braided sheath as well and the two were connected using a male to male connector to provide antegrade perfusion to the right side. The 14 Swazi Impella Peel-Away sheath was then removed and the 9 Swazi Repositioning sheath was advanced. No suction/position alarms were noted at the end of the procedure. Procedure was tolerated without complications.      Impella:  There is good positioning of the Impella CP via the right femoral artery. See Procedure Description.    Hemo  Personnel:  +-------------------+---------+  Name               Duty       +-------------------+---------+  Fanny Mcduffie MD 1  +-------------------+---------+      Hemodynamic Pressures:    +----+-------------------+---------+------------+-------------+------+---------+  Site     Date Time       Phase    Systolic    Diastolic    ED  Mean mmHg                           Name       mmHg        mmHg      mmHg            +----+-------------------+---------+------------+-------------+------+---------+   Art  5/20/2025 5:30:45     Rest          89           65             73                       AM                                                   +----+-------------------+---------+------------+-------------+------+---------+    LV  5/20/2025 5:30:45     Rest          95            4    23                                AM                                                   +----+-------------------+---------+------------+-------------+------+---------+   Art  5/20/2025 5:30:53     Rest          87           62             70                       AM                                                   +----+-------------------+---------+------------+-------------+------+---------+    LV  5/20/2025 5:30:53     Rest          95            5    23                                AM                                                   +----+-------------------+---------+------------+-------------+------+---------+   Art  5/20/2025 6:10:31     Rest          98           90             92                       AM                                                   +----+-------------------+---------+------------+-------------+------+---------+    AO  5/20/2025 6:10:31     Rest          98           90             92                       AM                                                    +----+-------------------+---------+------------+-------------+------+---------+        Oxygen Saturation %:  +-----------+----------+------------+  Sample SiteO2 Sat (%)HB (g/100ml)  +-----------+----------+------------+          SVC        61        15.0  +-----------+----------+------------+          SVC        61        15.0  +-----------+----------+------------+      SYS ART                  15.0  +-----------+----------+------------+      PUL JODI                  15.0  +-----------+----------+------------+      Complications:  No in-lab complications observed.    Cardiac Cath Post Procedure Notes:  Post Procedure Diagnosis: Successful Right CFA Impella placement with  simultaneous placement of Left CFA to Right SFA  antegrade perfusion sheath.  Blood Loss:               Estimated blood loss during the procedure was 10 mls.  Specimens Removed:        Number of specimen(s) removed: none.      Recommendations:  Maximize medical therapy.    ____________________________________________________________________________________  CONCLUSIONS:  1. Successful Right CFA Impella placement.  2. Simultaneous placement of Left CFA to Right SFA antegrade perfusion sheath.    ICD 10 Codes:  Ventricular septal defect (VSD)-Q21.0    CPT Codes:  Insertion of ventricular assist device, percutaneous S&I left heart arterial access only-02818; Angiography, Extremity,Bilat ,S&I (PER)-86615    [id number] Fanny Mcduffie MD  Performing Physician  Electronically signed by [id number] Fanny Mcduffie MD on 5/20/2025 at 10:18:07 AM          ** Final **    Cardiac Scoring  No results found for this or any previous visit from the past 1825 days.    AAA  No results found for this or any previous visit from the past 1825 days.    Other  No results found for this or any previous visit from the past 1825 days.      Imaging  Imaging  XR chest 1 view  Result Date: 6/2/2025  1.  Right internal jugular approach pulmonary artery catheter tip  projects over a right lower lobe segmental pulmonary artery. Other medical devices as above. 2. Interval worsening of predominantly alveolar pulmonary edema. Trace bilateral pleural effusions.   I personally reviewed the images/study and I agree with the findings as stated by Joe Jimenez MD (resident).   MACRO: None   Signed by: Faizan Champion 6/2/2025 5:14 PM Dictation workstation:   [id number]    XR chest 1 view  Result Date: 6/1/2025  1. Interval increase in interstitial/alveolar pulmonary edema. 2. Interval development of left retrocardiac opacification which could represent atelectasis versus infectious process. 3. Trace left pleural effusion. 4. Medical devices as above.   I personally reviewed the images/study and I agree with the findings as stated by Dr. Jori Hansen.   MACRO: None   Signed by: Faizan Champion 6/1/2025 10:08 AM Dictation workstation:   [id number]          Inpatient Medications    Continuous medications  Continuous Medications[1]    Scheduled medications  Scheduled Medications[2]    PRN medications  PRN Medications[3]          Assessment/Plan     Assessment & Plan  Alpesh Elena is a 45 y.o. male with a PMH of tobacco abuse (30 pack years), submandibular abscess, and nephrolithiasis  on 14d 5h of CICU admission for an inferior STEMI, now s/p coronary angiogram with balloon angioplasty to the PDA (right dominant system). This late presenting MI c/b cardiogenic shock iso of ischemia -induced VSD. Original shock call resulted in impella CP placment. Normalized lactate but resultant hemolysis. Stay c/b renal failure, now requiring CVVH. prior c/f GIB, found to be OG trauma and small ulcer. On 5/28 patient upgraded to impella 5.5 with reduction in hemolysis. CT surg following for future VSD repair.     Updates 06/03/25:  - Chetek catheter placed yesterday. Goal for swan guided wean of Impella. Will obtain swan numbers from CVP port to guide weaning due to large shunt causing inaccurate values  from PA port.   -Trialysis line fell out yesterday, will replace today  -palliative meeting with family scheduled for today at 15:15  -Meropenam restarted due to rising WBC.   -glycopyrrolate to reduce secretions and risk of aspiration      Neurologic  SWAPNIL    Cardiovascular  #RPDA STEMI s/p POBA  #C/f Cardiogenic vs mixed shock   #C/f VSD  ::  ECG on admit to OSH with ST elevations in the inferior leads with reciprocal depressions  :: Troponin:  24,085>21,701,19,289,16539  :: Lipid panel (05/2025)- cholesterol 220, HDL 33.9, , TG 57  :: TSH 1.51 (05/2025), A1C 7.4  (05/2025)  :: coronary angiogram (05/2025)  which revealed multi vessel CAD [LAD/Lcx/RCA mildly/diffusely diseased,100% RPDA culprit lesion] with PTCA to RPDA with 50% residual stenosis [GLENNA 0->3] (too small to stent)   :: LVEF 45% and LVED 20-25 mmHg via LV Gram   :: TTE  (05/2025) LVEF 55%, wall motion abnormalities, and no significant valvular disease   :: impella placement 5/20 for LV offloading   :: SVO2 59 from CVP port, 92 from PA port (prior to impella)  :: SVO2 57 from CVP port, 80 from PA port (after impella)  :: Off all pressors 5/21  :: restarted Levo 5/26  :: 5/28 Impella 5.5 placement   ::5/29 Limited TTE repeat: Compared with the prior exam from 5/19/2025 ([facility]) there has been interval development of a large VSD throuh the inferoseptum with the RV now becoming large with significantly reduced function.   Plan:  -impella 5.5 at P8, wean as tolerated  -[place] numbers q6h from CVP port (NOT PA port due to shunting from large VSD)  -Lactate q6h from mixed venous while weaning impella   - Aspirin 81 mg every day, DC Brilinita given no stent / possible future surgery   - Statin: Hold Crestor 40 mg given c/f shock liver  - Beta blocker: hold given shock requiring MCS  - RAAS Inhibition: hold given shock requiring MCS  - CT surgery following for future possible VSD repair               - Currently patient is not a surgical  candidate, will need to make significant improvements prior to consideration for surgical repair   -ideally would wait a couple of weeks prior to any surgery      # NSVT  # PVC  # 2:1 AV block , resolved   :: started 5/24 PM, longest 20 sec run  :: VT ikely iso sub-optimal impella position  :: AV block possibly iso Vsd injury to conduction pathway   :: repositioned under US 5/25 AM with less ectopy burden after   :: stopped IV amio on 5/30  Plan:  - monitor Tele    Pulmonary  #AHRF, resolved  :: Prior to intubation satting 92% on RA  :: CXR with pulmonary edema vs PNA  :: CTA CAP- mild interstitial pulmonary edema   :: , WBC 34.1 on admission   Plan:  -Extubated 6/2 AM      Gastrointestinal  #GI PPX  # Traumatic OG placement , small superficial ulcer  # Ileus   # Jaundice  # hyperbilirubinemia, both direct and indirect   - Aggressive bowel reg: miralx QID, Senna 2 tab BID, Prn enema and suppository   - S/p EGD w/ GI 5/7 w/o concern for overtly large Gib   - RUQ 5/28 negative for cholecystis   Plan:  - Tube feeds started 5/29 (will hold post extubation due to concern for aspiration)   - Bili elevation likely secondary to hemolysis from Impella  - IV PPI BID for 2 months for esophageal ulcer       Renal  # BENY, Anuric   # HAGMA   # Volume overload iso VSD   :: Baseline Cr 0.8-1.0, 1.61 on admit, Currently stable ~2.4-2.5  ::Brief SLED 5/23, 5/25, 5/27  Plan:  - Nephrology following   - CVVH started 4/28, will replace trialysis line              - goal 2 L out    -Continue CVVH until BP stable enough for IHD or renal function recovers.   - Avoid hypotension, rapid fluctuations in BP  - Daily RFP       Hematologic  #Thrombocytopenia  #Anemia    #Hemolysis iso impella use, improving    - C/f some degree of hemolysis from impella  - Heparin products DC 5/23, restarted 5/26              - Impella Purg: Hep/D5W              - Systemic: Heparin (follow ACT)  - Anti-Plt Factor 4 AB / Serotonin release assay negative     Plan:  - Hepatic function, LHD, hapto daily with impella    Infectious Disease  #C/f Mixed shock   #Leukocytosis  - S/p Doxy/CTX 05/19  -Dc zosyn 5/23  -DC emma 5/30 and Vanco given negative BC  - MRSA probe: -  - Bcx2: NGTD  - UA: non-infectious   - Sputum: contaminate  Plan:  -restarted Meropenem due to rising WBC (concern for silent aspiration post extubation)  - ID signing off, Shock and leukocytosis may have been multifactorial.  If recurs, would reculture as appropriate and consider cross-sectional imaging again as part of work-up      Endocrine  # T2DM   :: A1C 7.4  (05/2025)  Plan:  - NPO w/ Tube feeds started 5/29 (Held post extubation, will resume once respiratory status improves)  - Q4H POC glucose, SSI while NPO w/ trickle feeds  - Titrate -180  - Will need diabetes education prior to discharge   - BID labs for monitor for refeeding       Fluids: PRN  Electrolytes: PRN  Nutrition: No diet orders on file  Lines: PIV, RIJ, left radial A line   DVT prophylaxis: on therapeutic AC w/ Heparin gtt  GI prophylaxis: IV PPI    Code Status: Full Code (Confirmed on admission)   Emergency Contact / NOK: : Alpesh fraser [phone],        Patient seen and staffed with attending physician on service    Jesus Hooks MD  Internal Medicine PGY-1    6/3/2025         [1] amiodarone, 0.5 mg/min, Last Rate: 0.5 mg/min (06/03/25 0159)  dexmedeTOMIDine, 0.1-1.5 mcg/kg/hr (Dosing Weight), Last Rate: Stopped (06/02/25 1020)  fentaNYL,  mcg/hr, Last Rate: Stopped (06/02/25 0653)  heparin, 0-2,000 Units/hr, Last Rate: 600 Units/hr (06/02/25 1827)  heparin Impella Purge 25 units/mL in 500 mL D5W, 10 mL/hr, Last Rate: 10 mL/hr (06/02/25 1800)  midazolam, 0.5-20 mg/hr, Last Rate: Stopped (06/02/25 0604)  norepinephrine, 0.01-1 mcg/kg/min (Dosing Weight), Last Rate: Stopped (06/02/25 0905)  PrismaSol 4/2.5, 24 mL/kg/hr (Dosing Weight), Last Rate: 24 mL/kg/hr (06/02/25 0015)     [2] aspirin, 81 mg, oral, Daily  insulin  lispro, 0-5 Units, subcutaneous, q4h  oxygen, , inhalation, Continuous - Inhalation  pantoprazole, 40 mg, intravenous, BID  perflutren lipid microspheres, 0.5-10 mL of dilution, intravenous, Once in imaging  perflutren protein A microsphere, 0.5 mL, intravenous, Once in imaging  polyethylene glycol, 17 g, oral, 4x daily  sennosides, 2 tablet, oral, BID  thiamine, 500 mg, intravenous, Daily     [3] PRN medications: alteplase, bisacodyl, dextrose, dextrose, glucagon, glucagon

## 2025-06-04 VITALS
DIASTOLIC BLOOD PRESSURE: 87 MMHG | OXYGEN SATURATION: 76 % | HEIGHT: 68 IN | SYSTOLIC BLOOD PRESSURE: 94 MMHG | WEIGHT: 199.3 LBS | BODY MASS INDEX: 30.2 KG/M2 | TEMPERATURE: 97.2 F

## 2025-06-04 LAB
ACT BLD: 163 SEC (ref 83–199)
ACT BLD: 165 SEC (ref 83–199)
ACT BLD: 184 SEC (ref 83–199)
ALBUMIN SERPL BCP-MCNC: 2.8 G/DL (ref 3.4–5)
ALP SERPL-CCNC: 188 U/L (ref 33–120)
ALT SERPL W P-5'-P-CCNC: 45 U/L (ref 10–52)
ANION GAP BLDA CALCULATED.4IONS-SCNC: 13 MMO/L (ref 10–25)
ANION GAP BLDA CALCULATED.4IONS-SCNC: 13 MMO/L (ref 10–25)
ANION GAP BLDA CALCULATED.4IONS-SCNC: 16 MMO/L (ref 10–25)
ANION GAP BLDMV CALCULATED.4IONS-SCNC: 12 MMO/L (ref 10–25)
ANION GAP SERPL CALC-SCNC: 15 MMOL/L (ref 10–20)
AST SERPL W P-5'-P-CCNC: 115 U/L (ref 9–39)
BASE EXCESS BLDA CALC-SCNC: -1.2 MMOL/L (ref -2–3)
BASE EXCESS BLDA CALC-SCNC: -3.2 MMOL/L (ref -2–3)
BASE EXCESS BLDA CALC-SCNC: -3.8 MMOL/L (ref -2–3)
BASE EXCESS BLDMV CALC-SCNC: -1 MMOL/L (ref -2–3)
BASO STIPL BLD QL SMEAR: PRESENT
BASOPHILS # BLD MANUAL: 0 X10*3/UL (ref 0–0.1)
BASOPHILS NFR BLD MANUAL: 0 %
BILIRUB DIRECT SERPL-MCNC: 17.7 MG/DL (ref 0–0.3)
BILIRUB SERPL-MCNC: 28.2 MG/DL (ref 0–1.2)
BLASTS # BLD MANUAL: 0 X10*3/UL
BLASTS NFR BLD MANUAL: 0 %
BODY TEMPERATURE: 37 DEGREES CELSIUS
BUN SERPL-MCNC: 49 MG/DL (ref 6–23)
CA-I BLDA-SCNC: 1.01 MMOL/L (ref 1.1–1.33)
CA-I BLDA-SCNC: 1.09 MMOL/L (ref 1.1–1.33)
CA-I BLDA-SCNC: 1.1 MMOL/L (ref 1.1–1.33)
CA-I BLDMV-SCNC: 1.07 MMOL/L (ref 1.1–1.33)
CALCIUM SERPL-MCNC: 8.3 MG/DL (ref 8.6–10.6)
CHLORIDE BLD-SCNC: 103 MMOL/L (ref 98–107)
CHLORIDE BLDA-SCNC: 100 MMOL/L (ref 98–107)
CHLORIDE BLDA-SCNC: 104 MMOL/L (ref 98–107)
CHLORIDE BLDA-SCNC: 106 MMOL/L (ref 98–107)
CHLORIDE SERPL-SCNC: 100 MMOL/L (ref 98–107)
CO2 SERPL-SCNC: 25 MMOL/L (ref 21–32)
CREAT SERPL-MCNC: 3.37 MG/DL (ref 0.5–1.3)
EGFRCR SERPLBLD CKD-EPI 2021: 22 ML/MIN/1.73M*2
EOSINOPHIL # BLD MANUAL: 0 X10*3/UL (ref 0–0.7)
EOSINOPHIL NFR BLD MANUAL: 0 %
ERYTHROCYTE [DISTWIDTH] IN BLOOD BY AUTOMATED COUNT: ABNORMAL %
GLUCOSE BLD MANUAL STRIP-MCNC: 161 MG/DL (ref 74–99)
GLUCOSE BLD MANUAL STRIP-MCNC: 161 MG/DL (ref 74–99)
GLUCOSE BLD-MCNC: 147 MG/DL (ref 74–99)
GLUCOSE BLDA-MCNC: 141 MG/DL (ref 74–99)
GLUCOSE BLDA-MCNC: 161 MG/DL (ref 74–99)
GLUCOSE BLDA-MCNC: 163 MG/DL (ref 74–99)
GLUCOSE SERPL-MCNC: 157 MG/DL (ref 74–99)
HAPTOGLOB SERPL NEPH-MCNC: <30 MG/DL (ref 30–200)
HCO3 BLDA-SCNC: 19.5 MMOL/L (ref 22–26)
HCO3 BLDA-SCNC: 20.2 MMOL/L (ref 22–26)
HCO3 BLDA-SCNC: 22.2 MMOL/L (ref 22–26)
HCO3 BLDMV-SCNC: 23.5 MMOL/L (ref 22–26)
HCT VFR BLD AUTO: 21.8 % (ref 41–52)
HCT VFR BLD EST: 23 % (ref 41–52)
HCT VFR BLD EST: 24 % (ref 41–52)
HCT VFR BLD EST: 25 % (ref 41–52)
HCT VFR BLD EST: 34 % (ref 41–52)
HGB BLD-MCNC: 7.4 G/DL (ref 13.5–17.5)
HGB BLDA-MCNC: 11.4 G/DL (ref 13.5–17.5)
HGB BLDA-MCNC: 8.1 G/DL (ref 13.5–17.5)
HGB BLDA-MCNC: 8.3 G/DL (ref 13.5–17.5)
HGB BLDMV-MCNC: 7.8 G/DL (ref 13.5–17.5)
IMM GRANULOCYTES # BLD AUTO: 0.77 X10*3/UL (ref 0–0.7)
IMM GRANULOCYTES NFR BLD AUTO: 3.7 % (ref 0–0.9)
INHALED O2 CONCENTRATION: 35 %
LACTATE BLDA-SCNC: 1.6 MMOL/L (ref 0.4–2)
LACTATE BLDA-SCNC: 1.8 MMOL/L (ref 0.4–2)
LACTATE BLDA-SCNC: 1.9 MMOL/L (ref 0.4–2)
LACTATE BLDMV-SCNC: 1.8 MMOL/L (ref 0.4–2)
LDH SERPL L TO P-CCNC: 689 U/L (ref 84–246)
LYMPHOCYTES # BLD MANUAL: 1.23 X10*3/UL (ref 1.2–4.8)
LYMPHOCYTES NFR BLD MANUAL: 5.9 %
MAGNESIUM SERPL-MCNC: 2.95 MG/DL (ref 1.6–2.4)
MCH RBC QN AUTO: 32.5 PG (ref 26–34)
MCHC RBC AUTO-ENTMCNC: 33.9 G/DL (ref 32–36)
MCV RBC AUTO: 96 FL (ref 80–100)
METAMYELOCYTES # BLD MANUAL: 0 X10*3/UL
METAMYELOCYTES NFR BLD MANUAL: 0 %
MONOCYTES # BLD MANUAL: 1.07 X10*3/UL (ref 0.1–1)
MONOCYTES NFR BLD MANUAL: 5.1 %
MYELOCYTES # BLD MANUAL: 0 X10*3/UL
MYELOCYTES NFR BLD MANUAL: 0 %
NEUTROPHILS # BLD MANUAL: 18.25 X10*3/UL (ref 1.2–7.7)
NEUTS BAND # BLD MANUAL: 0.19 X10*3/UL (ref 0–0.7)
NEUTS BAND NFR BLD MANUAL: 0.9 %
NEUTS SEG # BLD MANUAL: 18.06 X10*3/UL (ref 1.2–7)
NEUTS SEG NFR BLD MANUAL: 86.4 %
NRBC BLD MANUAL-RTO: 0 % (ref 0–0)
NRBC BLD-RTO: 1.4 /100 WBCS (ref 0–0)
OXYHGB MFR BLDA: 95.7 % (ref 94–98)
OXYHGB MFR BLDA: 96.1 % (ref 94–98)
OXYHGB MFR BLDA: 96.5 % (ref 94–98)
OXYHGB MFR BLDMV: 60.4 % (ref 45–75)
PCO2 BLDA: 28 MM HG (ref 38–42)
PCO2 BLDA: 29 MM HG (ref 38–42)
PCO2 BLDA: 32 MM HG (ref 38–42)
PCO2 BLDMV: 37 MM HG (ref 41–51)
PH BLDA: 7.45 PH (ref 7.38–7.42)
PH BLDMV: 7.41 PH (ref 7.33–7.43)
PHOSPHATE SERPL-MCNC: 4.1 MG/DL (ref 2.5–4.9)
PLASMA CELLS # BLD MANUAL: 0 X10*3/UL
PLASMA CELLS NFR BLD MANUAL: 0 %
PLATELET # BLD AUTO: 103 X10*3/UL (ref 150–450)
PO2 BLDA: 136 MM HG (ref 85–95)
PO2 BLDA: 139 MM HG (ref 85–95)
PO2 BLDA: 144 MM HG (ref 85–95)
PO2 BLDMV: 41 MM HG (ref 35–45)
POLYCHROMASIA BLD QL SMEAR: ABNORMAL
POTASSIUM BLDA-SCNC: 5 MMOL/L (ref 3.5–5.3)
POTASSIUM BLDA-SCNC: 5.7 MMOL/L (ref 3.5–5.3)
POTASSIUM BLDA-SCNC: 5.7 MMOL/L (ref 3.5–5.3)
POTASSIUM BLDMV-SCNC: 5.7 MMOL/L (ref 3.5–5.3)
POTASSIUM SERPL-SCNC: 5.4 MMOL/L (ref 3.5–5.3)
PROMYELOCYTES # BLD MANUAL: 0.17 X10*3/UL
PROMYELOCYTES NFR BLD MANUAL: 0.8 %
PROT SERPL-MCNC: 5.4 G/DL (ref 6.4–8.2)
RBC # BLD AUTO: 2.28 X10*6/UL (ref 4.5–5.9)
RBC MORPH BLD: ABNORMAL
SAO2 % BLDA: 100 % (ref 94–100)
SAO2 % BLDA: 100 % (ref 94–100)
SAO2 % BLDA: 99 % (ref 94–100)
SAO2 % BLDMV: 62 % (ref 45–75)
SODIUM BLDA-SCNC: 129 MMOL/L (ref 136–145)
SODIUM BLDA-SCNC: 133 MMOL/L (ref 136–145)
SODIUM BLDA-SCNC: 134 MMOL/L (ref 136–145)
SODIUM BLDMV-SCNC: 133 MMOL/L (ref 136–145)
SODIUM SERPL-SCNC: 135 MMOL/L (ref 136–145)
TARGETS BLD QL SMEAR: ABNORMAL
TOTAL CELLS COUNTED BLD: 118
VARIANT LYMPHS # BLD MANUAL: 0.19 X10*3/UL (ref 0–0.5)
VARIANT LYMPHS NFR BLD: 0.9 %
WBC # BLD AUTO: 20.9 X10*3/UL (ref 4.4–11.3)

## 2025-06-04 PROCEDURE — 2500000004 HC RX 250 GENERAL PHARMACY W/ HCPCS (ALT 636 FOR OP/ED)

## 2025-06-04 PROCEDURE — 85347 COAGULATION TIME ACTIVATED: CPT

## 2025-06-04 PROCEDURE — 2020000001 HC ICU ROOM DAILY

## 2025-06-04 PROCEDURE — 71045 X-RAY EXAM CHEST 1 VIEW: CPT | Performed by: RADIOLOGY

## 2025-06-04 PROCEDURE — 84132 ASSAY OF SERUM POTASSIUM: CPT

## 2025-06-04 PROCEDURE — 99418 PROLNG IP/OBS E/M EA 15 MIN: CPT | Performed by: INTERNAL MEDICINE

## 2025-06-04 PROCEDURE — 2500000001 HC RX 250 WO HCPCS SELF ADMINISTERED DRUGS (ALT 637 FOR MEDICARE OP)

## 2025-06-04 PROCEDURE — 99291 CRITICAL CARE FIRST HOUR: CPT

## 2025-06-04 PROCEDURE — 99233 SBSQ HOSP IP/OBS HIGH 50: CPT | Performed by: INTERNAL MEDICINE

## 2025-06-04 PROCEDURE — 2500000005 HC RX 250 GENERAL PHARMACY W/O HCPCS

## 2025-06-04 PROCEDURE — 2500000002 HC RX 250 W HCPCS SELF ADMINISTERED DRUGS (ALT 637 FOR MEDICARE OP, ALT 636 FOR OP/ED)

## 2025-06-04 PROCEDURE — 84100 ASSAY OF PHOSPHORUS: CPT

## 2025-06-04 PROCEDURE — 85027 COMPLETE CBC AUTOMATED: CPT

## 2025-06-04 PROCEDURE — 83010 ASSAY OF HAPTOGLOBIN QUANT: CPT

## 2025-06-04 PROCEDURE — 99239 HOSP IP/OBS DSCHRG MGMT >30: CPT

## 2025-06-04 PROCEDURE — 83615 LACTATE (LD) (LDH) ENZYME: CPT

## 2025-06-04 PROCEDURE — 82947 ASSAY GLUCOSE BLOOD QUANT: CPT

## 2025-06-04 PROCEDURE — 37799 UNLISTED PX VASCULAR SURGERY: CPT

## 2025-06-04 PROCEDURE — 99497 ADVNCD CARE PLAN 30 MIN: CPT | Performed by: INTERNAL MEDICINE

## 2025-06-04 PROCEDURE — 85007 BL SMEAR W/DIFF WBC COUNT: CPT

## 2025-06-04 PROCEDURE — 90937 HEMODIALYSIS REPEATED EVAL: CPT

## 2025-06-04 PROCEDURE — 83735 ASSAY OF MAGNESIUM: CPT

## 2025-06-04 PROCEDURE — 82248 BILIRUBIN DIRECT: CPT

## 2025-06-04 PROCEDURE — 94762 N-INVAS EAR/PLS OXIMTRY CONT: CPT

## 2025-06-04 RX ORDER — FENTANYL CITRATE-0.9 % NACL/PF 10 MCG/ML
25-200 PLASTIC BAG, INJECTION (ML) INTRAVENOUS CONTINUOUS
Status: DISCONTINUED | OUTPATIENT
Start: 2025-06-04 | End: 2025-06-05 | Stop reason: HOSPADM

## 2025-06-04 RX ORDER — LORAZEPAM 2 MG/ML
1 INJECTION INTRAMUSCULAR
Status: DISCONTINUED | OUTPATIENT
Start: 2025-06-04 | End: 2025-06-05 | Stop reason: HOSPADM

## 2025-06-04 RX ORDER — HYDROXYZINE HYDROCHLORIDE 25 MG/1
25 TABLET, FILM COATED ORAL EVERY 4 HOURS PRN
Status: DISCONTINUED | OUTPATIENT
Start: 2025-06-04 | End: 2025-06-05 | Stop reason: HOSPADM

## 2025-06-04 RX ORDER — LORAZEPAM 2 MG/ML
1 INJECTION INTRAMUSCULAR EVERY 4 HOURS PRN
Status: DISCONTINUED | OUTPATIENT
Start: 2025-06-04 | End: 2025-06-04

## 2025-06-04 RX ORDER — HYDROXYZINE HYDROCHLORIDE 10 MG/1
10 TABLET, FILM COATED ORAL EVERY 4 HOURS PRN
Status: DISCONTINUED | OUTPATIENT
Start: 2025-06-04 | End: 2025-06-04

## 2025-06-04 RX ORDER — LORAZEPAM 2 MG/ML
0.5 INJECTION INTRAMUSCULAR ONCE
Status: DISCONTINUED | OUTPATIENT
Start: 2025-06-04 | End: 2025-06-05 | Stop reason: HOSPADM

## 2025-06-04 RX ORDER — GLYCOPYRROLATE 0.2 MG/ML
0.2 INJECTION INTRAMUSCULAR; INTRAVENOUS EVERY 4 HOURS PRN
Status: DISCONTINUED | OUTPATIENT
Start: 2025-06-04 | End: 2025-06-05 | Stop reason: HOSPADM

## 2025-06-04 RX ORDER — FENTANYL CITRATE-0.9 % NACL/PF 10 MCG/ML
25-200 PLASTIC BAG, INJECTION (ML) INTRAVENOUS CONTINUOUS
Status: CANCELLED | OUTPATIENT
Start: 2025-06-04

## 2025-06-04 RX ORDER — ONDANSETRON HYDROCHLORIDE 2 MG/ML
4 INJECTION, SOLUTION INTRAVENOUS EVERY 6 HOURS PRN
Status: DISCONTINUED | OUTPATIENT
Start: 2025-06-04 | End: 2025-06-05 | Stop reason: HOSPADM

## 2025-06-04 RX ORDER — OXYCODONE HYDROCHLORIDE 5 MG/1
5 TABLET ORAL EVERY 6 HOURS PRN
Refills: 0 | Status: DISCONTINUED | OUTPATIENT
Start: 2025-06-04 | End: 2025-06-04

## 2025-06-04 RX ORDER — LORAZEPAM 2 MG/ML
0.5 INJECTION INTRAMUSCULAR EVERY 4 HOURS PRN
Status: DISCONTINUED | OUTPATIENT
Start: 2025-06-04 | End: 2025-06-04

## 2025-06-04 RX ORDER — POLYETHYLENE GLYCOL 3350 17 G/17G
17 POWDER, FOR SOLUTION ORAL 4 TIMES DAILY
Status: DISCONTINUED | OUTPATIENT
Start: 2025-06-04 | End: 2025-06-04

## 2025-06-04 RX ADMIN — HYDROMORPHONE HYDROCHLORIDE 0.2 MG: 0.2 INJECTION, SOLUTION INTRAMUSCULAR; INTRAVENOUS; SUBCUTANEOUS at 08:55

## 2025-06-04 RX ADMIN — PANTOPRAZOLE SODIUM 40 MG: 40 INJECTION, POWDER, FOR SOLUTION INTRAVENOUS at 09:07

## 2025-06-04 RX ADMIN — AMIODARONE HYDROCHLORIDE 0.5 MG/MIN: 1.8 INJECTION, SOLUTION INTRAVENOUS at 01:17

## 2025-06-04 RX ADMIN — Medication 150 MCG/HR: at 18:13

## 2025-06-04 RX ADMIN — ASPIRIN 81 MG: 81 TABLET, CHEWABLE ORAL at 09:07

## 2025-06-04 RX ADMIN — CALCIUM CHLORIDE, MAGNESIUM CHLORIDE, DEXTROSE MONOHYDRATE, LACTIC ACID, SODIUM CHLORIDE, SODIUM BICARBONATE AND POTASSIUM CHLORIDE 25 ML/KG/HR: 5.15; 2.03; 22; 5.4; 6.46; 3.09; .157 INJECTION INTRAVENOUS at 10:21

## 2025-06-04 RX ADMIN — INSULIN LISPRO 1 UNITS: 100 INJECTION, SOLUTION INTRAVENOUS; SUBCUTANEOUS at 09:12

## 2025-06-04 RX ADMIN — CALCIUM CHLORIDE, MAGNESIUM CHLORIDE, DEXTROSE MONOHYDRATE, LACTIC ACID, SODIUM CHLORIDE, SODIUM BICARBONATE AND POTASSIUM CHLORIDE 24 ML/KG/HR: 3.68; 3.05; 22; 5.4; 6.46; 3.09; .314 INJECTION INTRAVENOUS at 07:12

## 2025-06-04 RX ADMIN — CALCIUM CHLORIDE, MAGNESIUM CHLORIDE, DEXTROSE MONOHYDRATE, LACTIC ACID, SODIUM CHLORIDE, SODIUM BICARBONATE AND POTASSIUM CHLORIDE 25 ML/KG/HR: 5.15; 2.03; 22; 5.4; 6.46; 3.09; .157 INJECTION INTRAVENOUS at 10:25

## 2025-06-04 RX ADMIN — LORAZEPAM 1 MG: 2 INJECTION, SOLUTION INTRAMUSCULAR; INTRAVENOUS at 18:22

## 2025-06-04 RX ADMIN — Medication 30 L/MIN: at 11:57

## 2025-06-04 RX ADMIN — HYDROXYZINE HYDROCHLORIDE 10 MG: 10 TABLET ORAL at 00:00

## 2025-06-04 RX ADMIN — INSULIN LISPRO 1 UNITS: 100 INJECTION, SOLUTION INTRAVENOUS; SUBCUTANEOUS at 00:01

## 2025-06-04 RX ADMIN — CALCIUM CHLORIDE, MAGNESIUM CHLORIDE, DEXTROSE MONOHYDRATE, LACTIC ACID, SODIUM CHLORIDE, SODIUM BICARBONATE AND POTASSIUM CHLORIDE 24 ML/KG/HR: 3.68; 3.05; 22; 5.4; 6.46; 3.09; .314 INJECTION INTRAVENOUS at 07:10

## 2025-06-04 RX ADMIN — CALCIUM CHLORIDE, MAGNESIUM CHLORIDE, DEXTROSE MONOHYDRATE, LACTIC ACID, SODIUM CHLORIDE, SODIUM BICARBONATE AND POTASSIUM CHLORIDE 25 ML/KG/HR: 5.15; 2.03; 22; 5.4; 6.46; 3.09; .157 INJECTION INTRAVENOUS at 10:28

## 2025-06-04 RX ADMIN — VANCOMYCIN HYDROCHLORIDE 1250 MG: 1.25 INJECTION, POWDER, LYOPHILIZED, FOR SOLUTION INTRAVENOUS at 05:01

## 2025-06-04 RX ADMIN — LORAZEPAM 0.5 MG: 2 INJECTION, SOLUTION INTRAMUSCULAR; INTRAVENOUS at 15:35

## 2025-06-04 RX ADMIN — CALCIUM CHLORIDE, MAGNESIUM CHLORIDE, DEXTROSE MONOHYDRATE, LACTIC ACID, SODIUM CHLORIDE, SODIUM BICARBONATE AND POTASSIUM CHLORIDE 24 ML/KG/HR: 3.68; 3.05; 22; 5.4; 6.46; 3.09; .314 INJECTION INTRAVENOUS at 07:11

## 2025-06-04 RX ADMIN — LORAZEPAM 0.5 MG: 2 INJECTION, SOLUTION INTRAMUSCULAR; INTRAVENOUS at 17:21

## 2025-06-04 RX ADMIN — Medication 50 L/MIN: at 08:51

## 2025-06-04 RX ADMIN — Medication 25 MCG/HR: at 11:28

## 2025-06-04 RX ADMIN — THIAMINE HYDROCHLORIDE 500 MG: 100 INJECTION, SOLUTION INTRAMUSCULAR; INTRAVENOUS at 08:55

## 2025-06-04 RX ADMIN — SENNOSIDES 17.2 MG: 8.6 TABLET, FILM COATED ORAL at 09:07

## 2025-06-04 RX ADMIN — INSULIN LISPRO 1 UNITS: 100 INJECTION, SOLUTION INTRAVENOUS; SUBCUTANEOUS at 05:30

## 2025-06-04 RX ADMIN — LORAZEPAM 0.5 MG: 2 INJECTION, SOLUTION INTRAMUSCULAR; INTRAVENOUS at 11:28

## 2025-06-04 ASSESSMENT — PAIN - FUNCTIONAL ASSESSMENT
PAIN_FUNCTIONAL_ASSESSMENT: 0-10

## 2025-06-04 ASSESSMENT — RESPIRATORY DISTRESS OBSERVATION SCALE (RDOS)
RDOS TOTAL SCORE: 9
ACCESSORY MUSCLE RISE IN CLAVICLE DURING INSPIRATION: 1 - SLIGHT RISE
HEART RATE PER MINUTE: 0 - <90 BEATS
RESTLESS NONPURPOSEFUL MOVEMENTS: 1 - OCCASIONAL, SLIGHT MOVEMENTS
LOOK OF FEAR: 0 - NONE
RESPIRATORY RATE PER MINUTE: 1 - 19-30 BREATHS
GRUNTING AT END OF EXPIRATION: 2 - PRESENT
RESPIRATORY RATE PER MINUTE: 2 - >30 BREATHS
PARADOXICAL BREATHING PATTERN: 2 - PRESENT
INVOLUNTARY NASAL FLARING: 0 - NONE
LOOK OF FEAR: 2 - EYES WIDE OPEN, FACIAL MUSCLES TENSE, BROW FURROWED, MOUTH OPEN
RESTLESS NONPURPOSEFUL MOVEMENTS: 1 - OCCASIONAL, SLIGHT MOVEMENTS
PARADOXICAL BREATHING PATTERN: 2 - PRESENT
HEART RATE PER MINUTE: 0 - <90 BEATS
PARADOXICAL BREATHING PATTERN: 2 - PRESENT
GRUNTING AT END OF EXPIRATION: 2 - PRESENT
LOOK OF FEAR: 2 - EYES WIDE OPEN, FACIAL MUSCLES TENSE, BROW FURROWED, MOUTH OPEN
RESPIRATORY RATE PER MINUTE: 0 - <19 BREATHS
HEART RATE PER MINUTE: 0 - <90 BEATS
PARADOXICAL BREATHING PATTERN: 2 - PRESENT
RESTLESS NONPURPOSEFUL MOVEMENTS: 1 - OCCASIONAL, SLIGHT MOVEMENTS
LOOK OF FEAR: 0 - NONE
GRUNTING AT END OF EXPIRATION: 2 - PRESENT
ACCESSORY MUSCLE RISE IN CLAVICLE DURING INSPIRATION: 1 - SLIGHT RISE
PARADOXICAL BREATHING PATTERN: 2 - PRESENT
GRUNTING AT END OF EXPIRATION: 2 - PRESENT
ACCESSORY MUSCLE RISE IN CLAVICLE DURING INSPIRATION: 1 - SLIGHT RISE
LOOK OF FEAR: 0 - NONE
HEART RATE PER MINUTE: 0 - <90 BEATS
GRUNTING AT END OF EXPIRATION: 2 - PRESENT
LOOK OF FEAR: 0 - NONE
HEART RATE PER MINUTE: 0 - <90 BEATS
RESPIRATORY RATE PER MINUTE: 1 - 19-30 BREATHS
GRUNTING AT END OF EXPIRATION: 0 - NONE
ACCESSORY MUSCLE RISE IN CLAVICLE DURING INSPIRATION: 0 - NONE
RESPIRATORY RATE PER MINUTE: 2 - >30 BREATHS
RDOS TOTAL SCORE: 6
LOOK OF FEAR: 2 - EYES WIDE OPEN, FACIAL MUSCLES TENSE, BROW FURROWED, MOUTH OPEN
RDOS TOTAL SCORE: 10
RDOS TOTAL SCORE: 3
INVOLUNTARY NASAL FLARING: 0 - NONE
INVOLUNTARY NASAL FLARING: 0 - NONE
RESTLESS NONPURPOSEFUL MOVEMENTS: 0 - NONE
RESPIRATORY RATE PER MINUTE: 1 - 19-30 BREATHS
RDOS TOTAL SCORE: 3
INVOLUNTARY NASAL FLARING: 0 - NONE
ACCESSORY MUSCLE RISE IN CLAVICLE DURING INSPIRATION: 0 - NONE
PARADOXICAL BREATHING PATTERN: 2 - PRESENT
PARADOXICAL BREATHING PATTERN: 2 - PRESENT
GRUNTING AT END OF EXPIRATION: 2 - PRESENT
PARADOXICAL BREATHING PATTERN: 2 - PRESENT
RDOS TOTAL SCORE: 10
INVOLUNTARY NASAL FLARING: 0 - NONE
LOOK OF FEAR: 2 - EYES WIDE OPEN, FACIAL MUSCLES TENSE, BROW FURROWED, MOUTH OPEN
RESTLESS NONPURPOSEFUL MOVEMENTS: 0 - NONE
RESPIRATORY RATE PER MINUTE: 1 - 19-30 BREATHS
RESTLESS NONPURPOSEFUL MOVEMENTS: 0 - NONE
HEART RATE PER MINUTE: 0 - <90 BEATS
ACCESSORY MUSCLE RISE IN CLAVICLE DURING INSPIRATION: 1 - SLIGHT RISE
INVOLUNTARY NASAL FLARING: 0 - NONE
GRUNTING AT END OF EXPIRATION: 0 - NONE
RDOS TOTAL SCORE: 10
HEART RATE PER MINUTE: 0 - <90 BEATS
RESPIRATORY RATE PER MINUTE: 2 - >30 BREATHS
ACCESSORY MUSCLE RISE IN CLAVICLE DURING INSPIRATION: 0 - NONE
RESTLESS NONPURPOSEFUL MOVEMENTS: 1 - OCCASIONAL, SLIGHT MOVEMENTS
RDOS TOTAL SCORE: 5
INVOLUNTARY NASAL FLARING: 0 - NONE
HEART RATE PER MINUTE: 0 - <90 BEATS
INVOLUNTARY NASAL FLARING: 0 - NONE
ACCESSORY MUSCLE RISE IN CLAVICLE DURING INSPIRATION: 1 - SLIGHT RISE
RESTLESS NONPURPOSEFUL MOVEMENTS: 1 - OCCASIONAL, SLIGHT MOVEMENTS

## 2025-06-04 ASSESSMENT — COGNITIVE AND FUNCTIONAL STATUS - GENERAL
PERSONAL GROOMING: TOTAL
DAILY ACTIVITIY SCORE: 6
EATING MEALS: TOTAL
DAILY ACTIVITIY SCORE: 6
WALKING IN HOSPITAL ROOM: TOTAL
MOVING FROM LYING ON BACK TO SITTING ON SIDE OF FLAT BED WITH BEDRAILS: TOTAL
STANDING UP FROM CHAIR USING ARMS: TOTAL
TURNING FROM BACK TO SIDE WHILE IN FLAT BAD: TOTAL
DRESSING REGULAR UPPER BODY CLOTHING: TOTAL
EATING MEALS: TOTAL
MOBILITY SCORE: 6
MOVING FROM LYING ON BACK TO SITTING ON SIDE OF FLAT BED WITH BEDRAILS: TOTAL
CLIMB 3 TO 5 STEPS WITH RAILING: TOTAL
DRESSING REGULAR UPPER BODY CLOTHING: TOTAL
MOBILITY SCORE: 6
MOVING TO AND FROM BED TO CHAIR: TOTAL
DRESSING REGULAR LOWER BODY CLOTHING: TOTAL
DRESSING REGULAR LOWER BODY CLOTHING: TOTAL
TURNING FROM BACK TO SIDE WHILE IN FLAT BAD: TOTAL
CLIMB 3 TO 5 STEPS WITH RAILING: TOTAL
WALKING IN HOSPITAL ROOM: TOTAL
HELP NEEDED FOR BATHING: TOTAL
TOILETING: TOTAL
PERSONAL GROOMING: TOTAL
MOVING TO AND FROM BED TO CHAIR: TOTAL
STANDING UP FROM CHAIR USING ARMS: TOTAL
TOILETING: TOTAL
HELP NEEDED FOR BATHING: TOTAL

## 2025-06-04 ASSESSMENT — PAIN SCALES - GENERAL
PAINLEVEL_OUTOF10: 0 - NO PAIN
PAINLEVEL_OUTOF10: 5 - MODERATE PAIN

## 2025-06-04 ASSESSMENT — PAIN DESCRIPTION - DESCRIPTORS: DESCRIPTORS: ACHING

## 2025-06-04 ASSESSMENT — PAIN DESCRIPTION - LOCATION: LOCATION: OTHER (COMMENT)

## 2025-06-04 NOTE — SIGNIFICANT EVENT
NEPHROLOGY CARE PLAN:    Given the recent transition of code to comfort care, nephrology service will respectfully sign off.    Mary David MD  Nephrology Fellow   Daytime / Weekend Renal Pager [phone]  After 7 pm Emergencies [phone] Pager [phone]

## 2025-06-04 NOTE — PROGRESS NOTES
Communication Note    Patient Name: Alpesh Elena  MRN: [id number]  Today's Date: 6/4/2025   Room: 12/12-A    Discipline: Occupational Therapy      Missed Visit Reason:  (Pt made DNR/DNI yesterday with plans for transitioning to comfort measures if pt deteriorates. Per EMR, medical team suggesting comfort care. Pt medically tenuous with respiratory status. Will defer OT at this time and reattempt when/if appropriate.)      06/04/25 at 8:54 AM   Brooklyn Bolanos, OT   Rehab Office: [phone]

## 2025-06-04 NOTE — PROGRESS NOTES
Physical Therapy    Physical Therapy Evaluation & Treatment    Patient Name: Alpesh Elena  MRN: [id number]  Department: [facility]  Room: 12/12-A  Today's Date: 6/3/2025   Time Calculation  Start Time: 0916  Stop Time: 0955  Time Calculation (min): 39 min    Assessment/Plan   PT Assessment  PT Assessment Results: Decreased strength, Decreased range of motion, Decreased endurance, Impaired balance, Decreased mobility, Decreased cognition, Impaired judgement, Decreased safety awareness  Rehab Prognosis: Good  Barriers to Discharge Home: Caregiver assistance, Cognition needs, Physical needs  Caregiver Assistance: Caregiver assistance needed per identified barriers - however, level of patient's required assistance exceeds assistance available at home  Cognition Needs: Insight of patient limited regarding functional ability/needs, Cognition-related high falls risk  Physical Needs: 24hr mobility assistance needed, Ambulating household distances limited by function/safety, 24hr ADL assistance needed, High falls risk due to function or environment  End of Session Communication: Bedside nurse  Assessment Comment: Pt has had extended hospital course with STEMI, mixed shock, prolonged intubation, and femoral impella exchanged to axillary impella and has resulted in significant weakness, impaired cognition, and overal impaired mobility and balance. Pt can benefit from continued therapy in order to increase strength, balance, mobility, posture, and cognition. Pt will need high intensity therapy in order to address the above impairments.  End of Session Patient Position: Bed, 3 rail up, Alarm off, not on at start of session   IP OR SWING BED PT PLAN  Inpatient or Swing Bed: Inpatient  PT Plan  Treatment/Interventions: Bed mobility, Transfer training, Gait training, Balance training, Strengthening, Endurance training, Range of motion, Therapeutic exercise, Therapeutic activity, Positioning, Postural re-education  PT Plan: Ongoing  PT  PT Frequency: 5 times per week  PT Discharge Recommendations: High intensity level of continued care  PT Recommended Transfer Status: Total assist  PT - OK to Discharge: Yes    Subjective     PT Visit Info:  PT Received On: 06/03/25    General Visit Information:  General  Reason for Referral: presented to OSH on 5/18 with chest pain, found to have an inferior STEMI s/p coronary angiogram with balloon angioplasty to the PDA; course c/b hemodynamic instability (tachycardia, hypotension) requiring vasopressor support and end organ dysfunction, intubated per shock call recommendation. Due to concern for cardiogenic vs mixed shock pt transferred to [facility] for higher level of care. After arrival, c/f VSD with cardiogenic shock and mixed shock then requiring femoral impella placement via R CFA on 5/20; c/b c/f possible bleed and underwent EGD on 5/27; removal of femoral impella on 5/28 and placement of axillary Impella on 5/28; pt extubated on 6/2  Past Medical History Relevant to Rehab: tobacco abuse, submandibular abscess, and nephrolithiasis  Family/Caregiver Present: No  Co-Treatment: OT  Co-Treatment Reason: to maximize pt safety with mobility d/t AMPAC < 10 and high complexity  Prior to Session Communication: Bedside nurse  Patient Position Received: Bed, 3 rail up, Alarm off, not on at start of session  General Comment: Pt alert and agreeable to therapy. Pt hacing intermittent bursts of tachypnea and shallow breathing. Able to slightly increase depth and slow rate of breathing to verbal cues.    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    Lines/Tubes:   Telemetry   D[place]   Arterial line   [place]-ashley catheter   Heparin gtt   Amio 0.5 mg   R Axillary Impella (P-8, 4.5 L/min)- dressings secured     Vital Signs     Vitals Session Pre PT During PT Post PT   Heart Rate 103  100   Resp 10  35   SpO2 96 88% lowest briefly, but mostly >/= 90% 96   BP 91/71  83/65      Objective     Pain:  Pain Assessment  Pain Assessment: 0-10  0-10 (Numeric) Pain Score:  (Pt denied pain and did not show sign/symptoms of pain during session)    Cognition:  Cognition  Overall Cognitive Status: Impaired  Arousal/Alertness: Delayed responses to stimuli  Orientation Level:  (oriented to self, hospital, and year. Pt did not know which hospital and did not know month with choices. Reorientation provided.)  Following Commands: Follows one step commands with repetition (~50-75% one step commands)  Insight: Mild  Processing Speed: Delayed    General Assessments:   Activity Tolerance  Early Mobility/Exercise Safety Screen: Proceed with mobilization - No exclusion criteria met    Sensation  Light Touch:  (Appears that light touch sensation is intact in B LEs, but pt has impaired cognition.)    Strength  Strength Comments: R ankle DF/PF 3-/5, L ankle DF/PF 3/5, B LE strength grossly 2-/5.  Strength  Strength Comments: R ankle DF/PF 3-/5, L ankle DF/PF 3/5, B LE strength grossly 2-/5.    Static Sitting Balance  Static Sitting-Balance Support: No upper extremity supported, Feet supported  Static Sitting-Level of Assistance:  (MinAx1-modAx1)  Dynamic Sitting Balance  Dynamic Sitting-Balance Support: No upper extremity supported, Feet supported  Dynamic Sitting-Level of Assistance:  (MinAx1-modAx1)    Static Standing  Balance  Static Standing-Balance Support:  (N/A)  Dynamic Standing Balance  Dynamic Standing-Balance Support:  (N/A)    Functional Assessments:  Bed Mobility  Bed Mobility: Yes  Bed Mobility 1  Bed Mobility 1: Rolling right, Rolling left  Level of Assistance 1: Dependent (x1 person; cues for sequencing)  Bed Mobility 2  Bed Mobility  2: Supine to sitting, Sitting to supine  Level of Assistance 2: Dependent (x2 person; cues for sequencing)  Bed Mobility Comments 2: draw sheet used  Bed Mobility 3  Bed Mobility 3:  (Pt placed in chair position for 10 minutes to assess hemodynamic response after prolonged bedrest)  Level of Assistance 3: Dependent (via bed)    Extremity/Trunk Assessments:  RLE   RLE :  (PROM WFL)  LLE   LLE :  (PROM WFL)    Treatments:  Therapeutic Activity  Therapeutic Activity Performed: Yes  Therapeutic Activity 1: Pt sat EOB total of 12 minutes with minAx1 majority of the time, but needing occasional modAx1 and with increased time 2/2 fatigue. Pt with forward flexed posture and resting head in flexed position. Able to pick head up to verbal cues minimally, but after therapist positioning head in neutral position, pt able to maintain this position for 30-45 seconds at a time.    Outcome Measures:  [facility] Basic Mobility  Turning from your back to your side while in a flat bed without using bedrails: Total  Moving from lying on your back to sitting on the side of a flat bed without using bedrails: Total  Moving to and from bed to chair (including a wheelchair): Total  Standing up from a chair using your arms (e.g. wheelchair or bedside chair): Total  To walk in hospital room: Total  Climbing 3-5 steps with railing: Total  Basic Mobility - Total Score: 6    Confusion Assessment Method-ICU (CAM-ICU)  Feature 1: Acute Onset or Fluctuating Course: Positive  Feature 2: Inattention: Positive  Feature 3: Altered Level of Consciousness: Negative  Feature 4: Disorganized Thinking: Negative  Overall CAM-ICU:  Negative    FSS-ICU  Ambulation: Unable to attempt due to weakness  Rolling: Total assistance (performs 25% or requires another person)  Sitting: Moderate assistance (performs 50 - 74% of task)  Transfer Sit-to-Stand: Unable to perform  Transfer Supine-to-Sit: Total assistance (performs 25% or requires another person)  Total Score: 5    Early Mobility/Exercise Safety Screen: Proceed with mobilization - No exclusion criteria met  ICU Mobility Scale: Sitting over edge of bed [3]  E = Exercise and Early Mobility  Early Mobility/Exercise Safety Screen: Proceed with mobilization - No exclusion criteria met  ICU Mobility Scale: Sitting over edge of bed    Encounter Problems       Encounter Problems (Active)       Balance       Pt will be able to sit EOB statically/dynamically without UE support >15 minutes with supervision (Progressing)       Start:  06/03/25    Expected End:  06/17/25               Balance       Pt will be able to stand statically with LRAD >1 minute with modAx2 (Progressing)       Start:  06/03/25    Expected End:  06/17/25               PT Transfers       Patient to transfer to and from sit to supine with modAx1 (Progressing)       Start:  06/03/25    Expected End:  06/17/25            Patient will roll with modAx1 (Progressing)       Start:  06/03/25    Expected End:  06/17/25            Patient will transfer sit to and from stand with LRAD and modAx2 (Progressing)       Start:  06/03/25    Expected End:  06/17/25               Pain - Adult          Safety       Pt will follow 100% two step commands for improved participation and improved functional mobility (Progressing)       Start:  06/03/25    Expected End:  06/17/25                 Education Documentation  Mobility Training, taught by Susanne Lloyd PT at 6/3/2025  9:43 PM.  Learner: Patient  Readiness: Acceptance  Method: Explanation, Demonstration  Response: Needs Reinforcement  Comment: mobility precautions, reorientation    Education  Comments  No comments found.    Signed by Susanne Lloyd DPT

## 2025-06-04 NOTE — PROGRESS NOTES
"Nutrition Follow Up Assessment:   Nutrition Assessment       Pt was extubated, currently on NC. CXR with significant pulmonary edema + ABG showing respiratory alkalosis. Pt was restarted on CVVH. MD met with pt's son and the palliative team, son would like to change pt's code status to DNR/DNI. \"If patient's respiratory status were to worsen despite current interventions, patient's son would like his code status changed to DNR comfort care only.\"     Discussed pt's status this morning in IDT rounds. Per MD, holding off on restarting trickle feeds d/t his respiratory status. Pt has been with minimal nutrition for the last 16 days.       Anthropometrics:  Height: 172.7 cm (5' 8\")   Weight: 90.4 kg (199 lb 4.7 oz)   BMI (Calculated): 30.31  IBW/kg (Dietitian Calculated): 70 kg  Percent of IBW: 130 %      Weight History:     Weight History / % Weight Change: Weight up 7.3kg from admission r/t fluid    Nutrition Focused Physical Exam Findings:  Subcutaneous Fat Loss:   Orbital Fat Pads: Mild-Moderate (slight dark circles and slight hollowing)  Buccal Fat Pads: Mild-Moderate (flat cheeks, minimal bounce)  Muscle Wasting:  Temporalis: Mild-Moderate (slight depression)  Pectoralis (Clavicular Region): Mild-Moderate (some protrusion of clavicle)  Deltoid/Trapezius: Mild-Moderate (slight protrusion of acromion process)  Edema:  Edema: +2 mild, +1 trace  Edema Location: BUE; BLE  Physical Findings:  Skin: Positive (jaundiced)  Respiratory : Positive    Nutrition Significant Labs:  CBC Trend:   Results from last 7 days   Lab Units 06/04/25  0513 06/03/25  1748 06/03/25  0005 06/02/25  1911   WBC AUTO x10*3/uL 20.9* 17.9* 23.4* 24.6*   RBC AUTO x10*6/uL 2.28* 3.04* 2.51* 2.55*   HEMOGLOBIN g/dL 7.4* 9.8* 8.1* 8.2*   HEMATOCRIT % 21.8* 29.2* 24.4* 24.3*   MCV fL 96 96 97 95   PLATELETS AUTO x10*3/uL 103* 93* 84* 86*    , BMP Trend:   Results from last 7 days   Lab Units 06/04/25  0513 06/03/25  1748 06/03/25  0005 06/02/25  0420 " "  GLUCOSE mg/dL 157* 166* 152* 146*   CALCIUM mg/dL 8.3* 8.4* 8.1* 7.9*   SODIUM mmol/L 135* 137 136 137   POTASSIUM mmol/L 5.4* 5.2 5.0 4.4   CO2 mmol/L 25 23 22 25   CHLORIDE mmol/L 100 100 101 103   BUN mg/dL 49* 58* 45* 29*   CREATININE mg/dL 3.37* 4.44* 3.48* 2.41*    , Renal Lab Trend:   Results from last 7 days   Lab Units 06/04/25  0513 06/03/25  1748 06/03/25  0006 06/03/25  0005 06/02/25  0420   POTASSIUM mmol/L 5.4* 5.2  --  5.0 4.4   PHOSPHORUS mg/dL 4.1 4.6   < >  --  2.5   SODIUM mmol/L 135* 137  --  136 137   MAGNESIUM mg/dL 2.95* 3.08*  --  3.02* 2.89*   EGFR mL/min/1.73m*2 22* 16*  --  21* 33*   BUN mg/dL 49* 58*  --  45* 29*   CREATININE mg/dL 3.37* 4.44*  --  3.48* 2.41*    < > = values in this interval not displayed.    , Vit D: No results found for: \"VITD25\" , Vit B12:   Lab Results   Component Value Date    [id number] 05/24/2025        Nutrition Specific Medications:  Scheduled medications  Scheduled Medications[1]  Continuous medications  Continuous Medications[2]  PRN medications  PRN Medications[3]      I/O:   Last BM Date: 06/03/25; Stool Appearance: Loose (06/03/25 0900)    Dietary Orders (From admission, onward)       Start     Ordered    05/20/25 0324  May Not Participate in Room Service  ( ROOM SERVICE MAY NOT PARTICIPATE)  Once        Question:  .  Answer:  Yes    05/20/25 0323                     Estimated Needs:   Total Energy Estimated Needs in 24 hours (kCal):  (8724-9377)  Method for Estimating Needs: 25-30kcal/kg IBW (start lower d/t risk of refeeding)  Total Protein Estimated Needs in 24 Hours (g):  (105-140)  Method for Estimating 24 Hour Protein Needs: 1.5-2.0 g/kg IBW  Total Fluid Estimated Needs in 24 Hours (mL):  (per team)  Method for Estimating 24 Hour Fluid Needs: per team        Nutrition Diagnosis   Malnutrition Diagnosis  Patient has Malnutrition Diagnosis: Yes  Diagnosis Status: New  Malnutrition Diagnosis: Severe malnutrition related to acute disease or " injury  Related to: critical illness  As Evidenced by: moderate subcutaneous fat loss + muscle wasting < 50% estimated energy needs for > 5 days    Nutrition Diagnosis  Patient has Nutrition Diagnosis: Yes  Diagnosis Status (1): Active  Nutrition Diagnosis 1: Inadequate energy intake  Related to (1): clinical course  As Evidenced by (1): minimal nutrition given in the last 16 days       Nutrition Interventions/Recommendations   Nutrition prescription for enteral nutrition    Nutrition Recommendations:  Continue refeeding precautions with nutrition support  Continue daily thiamine   Monitor RFP+Mg Q12H   Replete electrolytes PRN + ensure lytes are replenished prior to starting nutrition support    Do not start nutrition support if serum potassium </= 3 mEq/L, phosphorus </= 2mg/dL, and/or magnesium </= 1.2mEq/L   If significant drop in electrolytes happens during nutrition support advancement, do not further advance     Start trickle feeds of Pivot 1.5 when appropriate. Would recommend new KUB to see if there has been any tube migration. It is important for tip of tube be be located at least in the duodenum.    Once pt is able to start TF advancement, recommend Pivot 1.5 @ 50ml/hr goal.   Start @ 10ml/hr and increase by [id number] until goal is met.   Refer to refeeding precautions above.   FWF per MD/team discretion     If unable to start TF advancement within 248 hours, would recommend TPN. RD available for recs as needed.     Recommend daily B-Complex     Nutrition Interventions/Goals:   Goal: Restart nutrition support within 24-48 hours (TF @ goal would provide 1800kcal, 113gm protein)  Vitamin and Mineral Supplement Therapy: Thiamin supplement therapy, Multivitamin multimineral supplement therapy  Goal: Prevent refeeding; Prevent deficiency      Education Documentation  No documentation found.            Nutrition Monitoring and Evaluation   Food/Nutrient Related History Monitoring  Monitoring and Evaluation Plan:  Enteral and parenteral nutrition intake determination  Enteral and Parenteral Nutrition Intake Determination: Enteral nutrition intake - Other, Parenteral nutrition formula/solution  Criteria: Restart nutrition support within 24-48 hours    Anthropometric Measurements  Monitoring and Evaluation Plan: Body weight  Body Weight: Body weight - Promote weight restoration    Biochemical Data, Medical Tests and Procedures  Monitoring and Evaluation Plan: Electrolyte/renal panel, Glucose/endocrine profile  Electrolyte and Renal Panel: Electrolytes within normal limits  Glucose/Endocrine Profile: Glucose within normal limits - ICU (140-180 mg/dL)         Goal Status: New goal(s) identified    Time Spent (min): 60 minutes            [1] aspirin, 81 mg, oral, Daily  insulin lispro, 0-5 Units, subcutaneous, q4h  meropenem, 2 g, intravenous, q12h  oxygen, , inhalation, Continuous - Inhalation  pantoprazole, 40 mg, intravenous, BID  perflutren lipid microspheres, 0.5-10 mL of dilution, intravenous, Once in imaging  perflutren protein A microsphere, 0.5 mL, intravenous, Once in imaging  polyethylene glycol, 17 g, oral, 4x daily  sennosides, 2 tablet, oral, BID  thiamine, 500 mg, intravenous, Daily     [2] amiodarone, 0.5 mg/min, Last Rate: 0.5 mg/min (06/04/25 0700)  dexmedeTOMIDine, 0.1-1.5 mcg/kg/hr (Dosing Weight), Last Rate: Stopped (06/02/25 1020)  heparin, 0-2,000 Units/hr, Last Rate: 400 Units/hr (06/04/25 0700)  heparin Impella Purge 25 units/mL in 500 mL D5W, 10 mL/hr, Last Rate: 10 mL/hr (06/04/25 0700)  norepinephrine, 0.01-1 mcg/kg/min (Dosing Weight), Last Rate: 0.06 mcg/kg/min (06/04/25 0700)  PrismaSol BGK 2/3.5, 25 mL/kg/hr (Dosing Weight), Last Rate: 25 mL/kg/hr (06/04/25 1021)     [3] PRN medications: alteplase, bisacodyl, dextrose, dextrose, glucagon, glucagon, glycopyrrolate, oxyCODONE **AND** HYDROmorphone, hydrOXYzine HCL

## 2025-06-04 NOTE — PROGRESS NOTES
Spiritual Care Visit  Spiritual Care Request    Reason for Visit:  Routine Visit: Follow-up  Continue Visiting: Yes  Crisis Visit: Critical care     Focus of Care:  Visited With: Patient and family together     Support patient and family (mother, son, sister-in-law) as patient is transitioned to comfort care.

## 2025-06-04 NOTE — PROGRESS NOTES
Cardiac ICU Progress Note    Admit Date: 2025   Hospital Length of Stay: 15   ICU Length of Stay: 15d 10h     History of Present Illness  Alpesh Elena is a 45 y.o. male on day 15d 10h of admission for STEMI (ST elevation myocardial infarction) (Multi). with following ICU needs: Cardiogenic shock, impella, VSD/ Inferior LV wall rupture     Subjective   Subjective  Overnight patient was tachypneic that resolved after one dose of hydroxyzine. A CXR was obtained that showed worsening pulmonary edema. CVVH was restarted and patient was also placed on HFNC. This morning patient is able to respond to questions with head nods, but due to weakness is unable to vocalize.      Objective   Objective    Vitals and I/O    24 Hour Vitals  Temp:  [36.1 °C (97 °F)-36.8 °C (98.2 °F)] 36.1 °C (97 °F)  Heart Rate:  [] 91  Resp:  [0-56] 18  Arterial Line BP 1: ()/(65-92) 91/77  FiO2 (%):  [30 %-35 %] 30 %    Temp (24hrs), Av.4 °C (97.5 °F), Min:36.1 °C (97 °F), Max:36.8 °C (98.2 °F)     24 hour Intake/Output    Intake/Output Summary (Last 24 hours) at 2025 1116  Last data filed at 2025 0712  Gross per 24 hour   Intake 1321.21 ml   Output 1956 ml   Net -634.79 ml        Vitals:    25 0900   Weight: 90.4 kg (199 lb 4.7 oz)        Vent settings - EXTUBATED / Morning    Most Recent Range Past 24hrs   Mode CPAP    FiO2 30 % FiO2 (%)  Min: 30 %   Min taken time: 25 0851  Max: 35 %   Max taken time: 25 0638   Rate 16 No data recorded   Vt 450 mL  No data recorded   PEEP 5 cm H20 No data recorded       Invasive Hemodynamics   Most Recent Range Past 24hrs   BP (Art) 91/77 Arterial Line BP 1  Min: 82/67  Max: 113/92   MAP(Art) 82 mmHg Arterial Line MAP 1 (mmHg)   Min: 70 mmHg  Max: 98 mmHg   RA/CVP   No data recorded   PA 38/17 PAP  Min: 38/17  Max: 58/17   PA(mean) 25 mmHg PAP (Mean)  Min: 25 mmHg  Max: 34 mmHg   PCWP 15 mmHg PCWP (mmHg)  Min: 12 mmHg  Max: 15 mmHg   CO 5.71 L/min CO (L/min)   Min: 5.71 L/min  Max: 7.5 L/min   CI 2.84 L/min/m2 CI (L/min/m2)  Min: 2.84 L/min/m2  Max: 3.73 L/min/m2   Mixed Venous 67 % SVO2 (%)  Min: 67 %  Max: 67 %   SVR  1064 (dyne*sec)/cm5 SVR (dyne*sec)/cm5  Min: 682 (dyne*sec)/cm5  Max: 1064 (dyne*sec)/cm5    (dyne*sec)/cm5 PVR (dyne*sec)/cm5  Min: 140 (dyne*sec)/cm5  Max: 203 (dyne*sec)/cm5       Physical Exam  Physical Exam  Constitutional:       Comments: Awake, mildly alert. Eyes open to verbal stimuli. Able to respond to questions with head nods. Overall generalized weakness   Cardiovascular:      Rate and Rhythm: Normal rate and regular rhythm.      Heart sounds: Murmur heard.      Comments: Holosystolic murmer, best heard at right 4th intercostal rib space. Very weak vs absent b/l PT and pedal. Impella 5.5 placed in right axillary. Edema in hands and legs bilaterally.   Pulmonary:      Effort: Increased effort. On HFNC 50L 37%     Breath sounds: Normal breath sounds. No wheezing or rales.   Abdominal:      General: Abdomen is flat. Bowel sounds are normal. There is no distension.      Palpations: Abdomen is soft.   Musculoskeletal:      Right lower le+ edema.      Left lower le+ edema.   Skin:     General: Skin is warm.      Coloration: Skin is jaundiced.   Neurological:      Comments: Awake and alert. Significant generalized weakness and unable to vocalize but can follow directions    Labs    CMP:  Recent Labs     25  0513 25  1748 25  0005 25  0420 25  1752 25  0410 25  1814 25  0204   * 137 136 137 136 136 133* 133*   K 5.4* 5.2 5.0 4.4 3.9 4.0 4.1 4.1    100 101 103 101 102 100 99   CO2 25 23 22 25 27 26 28 25   ANIONGAP 15 19 18 13 12 12 9* 13   BUN 49* 58* 45* 29* 31* 30* 30* 31*   CREATININE 3.37* 4.44* 3.48* 2.41* 2.44* 2.48* 2.52* 2.35*   EGFR 22* 16* 21* 33* 32* 32* 31* 34*   MG 2.95* 3.08* 3.02* 2.89* 2.95* 2.93* 2.95* 2.75*     Recent Labs     25  0513 25  1744  06/03/25  0006 06/02/25  0420 06/01/25  1752 06/01/25  0410 05/31/25  1814 05/31/25  0204 05/30/25  1837 05/30/25  0021   ALBUMIN 2.8* 2.6* 2.6* 2.6* 2.9* 2.6*   < > 2.6*   < > 2.4*   ALKPHOS 188*  --  181* 175*  --  175*  --  197*  --  235*   ALT 45  --  67* 81*  --  100*  --  131*  --  157*   *  --  134* 109*  --  106*  --  103*  --  150*   BILITOT 28.2*  --  23.6* 21.4*  --  21.9*  --  21.0*  --  20.8*    < > = values in this interval not displayed.       CBC:  Recent Labs     06/04/25  0513 06/03/25  1748 06/03/25  0005 06/02/25  1911 06/02/25  0420 06/01/25  1752 06/01/25  0410 05/31/25  1814   WBC 20.9* 17.9* 23.4* 24.6* 14.0* 13.9* 18.1* 16.8*   HGB 7.4* 9.8* 8.1* 8.2* 8.0* 8.4* 8.6* 8.4*   HCT 21.8* 29.2* 24.4* 24.3* 23.2* 24.8* 24.9* 24.1*   * 93* 84* 86* 66* 69* 73* 70*   MCV 96 96 97 95 94 95 92 92       COAG:   Recent Labs     05/27/25  1814 05/26/25  0026 05/25/25  1545 05/20/25  0133 05/18/25 2159 07/07/24  2303   INR 1.3* 1.9* 1.9* 1.4* 1.1 1.2*       ABO:   Recent Labs     05/29/25  0919   ABO A       HEME/ENDO:  Recent Labs     05/25/25  0033 05/20/25  0133 05/18/25  2328 05/18/25  2158   FERRITIN 1,099*  --   --   --    IRONSAT 53*  --   --   --    TSH  --  3.50 1.51  --    HGBA1C  --   --   --  7.4*        CARDIAC:   Recent Labs     06/04/25  0513 06/03/25  0006 06/02/25  0420 06/01/25  0410 05/31/25  0204 05/30/25  0021 05/20/25  1414 05/20/25  0133 05/19/25  1929 05/19/25  1211 05/19/25  0443 05/18/25  2328 05/18/25 2159   * 767* 731* 880* 1,035* 1,437*   < > 1,026*  --   --   --   --   --    TROPHS  --   --   --   --   --   --   --   --  18,638* 19,289* 21,701* 24,085*  --    BNP  --   --   --   --   --   --   --  760*  --   --   --   --  236*    < > = values in this interval not displayed.     Recent Labs     06/04/25  0513 06/04/25  0505 06/04/25  0224 06/03/25  2325 06/03/25  2157 06/03/25 2015 06/03/25  1748 06/03/25  1546 06/03/25  1532 06/03/25  1142   LACMX 1.8   "--   --  1.6  --   --  2.0 1.5  --  1.4   LACTATEART  --  1.9 1.8  --  1.6 1.6 1.9  --    < >  --    SO2MV 62  --   --  67  --   --  64 64  --  66   O2CMX 60.4  --   --  65.1  --   --  61.6 62.5  --  63.9    < > = values in this interval not displayed.       ABG:   Results from last 7 days   Lab Units 06/04/25  0513 06/04/25  0505 06/04/25  0224 06/03/25  2325 06/03/25  2157   POCT PH, ARTERIAL pH  --  7.45* 7.45*  --  7.44*   POCT PO2, ARTERIAL mm Hg  --  139* 144*  --  126*   POCT PCO2, ARTERIAL mm Hg  --  32* 29*  --  31*   POCT LACTATE, ARTERIAL mmol/L  --  1.9 1.8  --  1.6   FIO2 % 35 35 35   < > 35    < > = values in this interval not displayed.       VBG:   Results from last 7 days   Lab Units 05/29/25  0428 05/28/25  2258   POCT PH, VENOUS pH 7.38 7.35   POCT PCO2, VENOUS mm Hg 36* 21*       Micro/ID:   Lab Results   Component Value Date    BLOODCULT No growth at 4 days -  FINAL REPORT 05/26/2025    BLOODCULT No growth at 4 days -  FINAL REPORT 05/26/2025           Results from last 7 days   Lab Units 06/03/25  1748   LACTATE mmol/L 1.5               No results found for: \"CKTOTAL\", \"CKMB\", \"CKMBINDEX\", \"TROPONINI\"   Lab Results   Component Value Date    HGBA1C 7.4 (H) 05/18/2025      Lab Results   Component Value Date    CHOL 220 (H) 05/18/2025     Lab Results   Component Value Date    HDL 33.9 05/18/2025     Lab Results   Component Value Date    LDLCALC 175 (H) 05/18/2025     Lab Results   Component Value Date    TRIG 57 05/18/2025     No components found for: \"CHOLHDL\"     Cardiac Data    EKG  Encounter Date: 05/20/25   Electrocardiogram, 12-lead PRN ACS symptoms   Result Value    Ventricular Rate 100    Atrial Rate 100    IN Interval 124    QRS Duration 102    QT Interval 376    QTC Calculation(Bazett) 485    P Axis 17    R Axis 101    T Axis -34    QRS Count 16    Q Onset 215    P Onset 153    P Offset 197    T Offset 403    QTC Fredericia 446    Narrative    Normal sinus rhythm  Rightward " axis  Inferior infarct , age undetermined  T wave abnormality, consider lateral ischemia  Abnormal ECG  When compared with ECG of 02-JUN-2025 15:55,  Criteria for Anterior infarct are no longer Present  T wave inversion now evident in Lateral leads      Echocardiogram  Transthoracic Echo (TTE) Limited  Result Date: 5/29/2025   [facility], [address] [place] [address]                Tel [phone] and Fax [phone] TRANSTHORACIC ECHOCARDIOGRAM REPORT  Patient Name:       AVRIL REYESAnnette FRITZ    Reading Physician:    [id number] Aviles MD Study Date:         5/29/2025           Ordering Provider:    [id number] ZACHARY BROWN MRN/PID:            [id number]            Fellow: Accession#:         [id number]        Nurse: Date of Birth/Age:  1980 / 45      Sonographer:          Fellow Exam                     years Gender assigned at  M                   Additional Staff: Birth: Height:                                 Admit Date:           5/20/2025 Weight:                                 Admission Status:     Inpatient - STAT BSA / BMI:          m2 / kg/m2          Encounter#:           [id number] Blood Pressure:     /                   Department Location:  [facility] Study Type:    TRANSTHORACIC ECHO (TTE) LIMITED Diagnosis/ICD: ST elevation (STEMI) myocardial infarction of unspecified                site-I21.3 Indication:    Impella Placement CPT Code:      Echo Limited-31006  Study Detail: The following Echo studies were performed: 2D.  PHYSICIAN INTERPRETATION: Left Ventricle: The left ventricle was not well visualized. The left ventricular ejection fraction could not be measured. The left ventricular cavity size was not assessed. Left ventricular diastolic filling was not assessed. Left Atrium: The left atrial size was not assessed. Right Ventricle: The  right ventricle was not well visualized. Unable to determine right ventricular systolic function. A device is visualized in the right ventricle. Right Atrium: The right atrial size was not assessed. Aortic Valve: There is indeterminate aortic valve regurgitation. Mitral Valve: The mitral valve was not well visualized. Mitral valve regurgitation was not assessed. Tricuspid Valve: The tricuspid valve was not assessed. Tricuspid regurgitation was not assessed. Pulmonic Valve: The pulmonic valve was not assessed. Pulmonic valve regurgitation was not assessed. Pericardium: Pericardial effusion was not assessed. Aorta: The aortic root was not well visualized. Systemic Veins: The inferior vena cava was not assessed, IVC inspiratory collapse was not assessed.  LEFT VENTRICULAR ASSIST DEVICE: LVAD: The patient has a(n) Impella LVAD device present.  CONCLUSIONS:  1. The left ventricle was not well visualized. The left ventricular ejection fraction could not be measured.  2. Unable to determine right ventricular systolic function.  3. This is a truncated Fellow exam, advise full study be performed. QUANTITATIVE DATA SUMMARY:  [id number] Juan F Aviles MD Electronically signed on 5/29/2025 at 10:39:31 AM  ** Final **     Transthoracic Echo (TTE) Limited  Result Date: 5/27/2025   [facility], [address] [place] [address]                Tel [phone] and Fax [phone] TRANSTHORACIC ECHOCARDIOGRAM REPORT  Patient Name:       AVRIL REYESAnnette VAUGHANSARAHHERIBERTO    Reading Physician:    [id number] Maribel Coughlin MD Study Date:         5/27/2025           Ordering Provider:    [id number] TIBURCIO TUCKER MRN/PID:            [id number]            Fellow: Accession#:         [id number]        Nurse: Date of Birth/Age:  1980 / 45      Sonographer:          Saulo carranza                                      RDCS Gender assigned at  M                   Additional Staff: Birth: Height:             172.72 cm           Admit Date: Weight:             87.54 kg            Admission Status:     Inpatient - STAT BSA / BMI:          2.01 m2 / 29.35     Encounter#:           [id number]                     kg/m2 Blood Pressure:     117/74 mmHg         Department Location:  [facility] Study Type:    TRANSTHORACIC ECHO (TTE) LIMITED Diagnosis/ICD: Ventricular septal defect (VSD)-Q21.0 Indication:    impella placement CPT Code:      Echo Limited-69144; Color Doppler-36154 Patient History: Pertinent History: Chest pain and STEMI, status post balloon angioplasty,                    developed shock and TTE with large VSD vs inferior LV wall                    rupture, Impella placement on 5/20. Study Detail: The following Echo studies were performed: 2D and color flow.               Technically challenging study due to body habitus, patient lying               in supine position, poor acoustic windows and prominent lung               artifact. The patient is intubated.  PHYSICIAN INTERPRETATION: Left Ventricle: The left ventricle was not well visualized. The left ventricular ejection fraction could not be measured. The left ventricular cavity size was not assessed. Left ventricular diastolic filling was not assessed. Left Atrium: The left atrial size was not assessed. Right Ventricle: The right ventricle was not assessed. Right ventricular systolic function not assessed. A device is visualized in the right ventricle. Right Atrium: The right atrial size was not assessed. There is a device visualized in the right atrium. Aortic Valve: The aortic valve was not assessed. Aortic valve regurgitation was not assessed. Mitral Valve: The mitral valve is normal in structure. There is trace mitral valve regurgitation. Tricuspid Valve: The tricuspid valve was not assessed. Tricuspid regurgitation was not assessed. Pulmonic Valve:  The pulmonic valve was not assessed. Pulmonic valve regurgitation was not assessed. Pericardium: Trivial pericardial effusion. Aorta: The aortic root was not assessed. Systemic Veins: The inferior vena cava appears dilated.  LEFT VENTRICULAR ASSIST DEVICE: LVAD: The patient has a(n) Impella LVAD device present. LVAD Comments: Difficult to assess given image quality, though Impella appears to extend approx 3.7-3.9 cm beneath the AV.  CONCLUSIONS:  1. The left ventricle was not well visualized. The left ventricular ejection fraction could not be measured.  2. Limited TTE to asess LVAD placement.  3. Impella appears to be 3.7cm to 3.9cm beneath the AV though diffiult images. QUANTITATIVE DATA SUMMARY:  TRICUSPID VALVE/RVSP:         Normal Ranges: IVC Diam:             2.20 cm  [id number] Maribel Coughlin MD Electronically signed on 5/27/2025 at 12:35:42 PM  ** Final **     Transthoracic Echo (TTE) Limited  Result Date: 5/27/2025   [facility], [address] [place] [address]                Tel [phone] and Fax [phone] TRANSTHORACIC ECHOCARDIOGRAM REPORT  Patient Name:        AVRIL FRITZ     Reading Physician: [id number] Maribel Coughlin MD Study Date:          5/26/2025            Ordering Provider: [id number] MIMI SARGENT MRN/PID:             [id number]             Fellow: Accession#:          [id number]         Nurse: Date of Birth/Age:   1980 / 45 years Sonographer:       Cardiology Fellow Gender assigned at   M                    Additional Staff: Birth: Height:                                   Admit Date:        5/20/2025 Weight:                                   Admission Status:  Inpatient - Routine BSA / BMI:           m2 / kg/m2           Encounter#:        [id number] Study Type:    TRANSTHORACIC ECHO (TTE) LIMITED Diagnosis/ICD: ST elevation (STEMI) myocardial  infarction of unspecified                site-I21.3 Indication:    VSD, shock CPT Code:      Echo Limited-78553; Doppler Limited-88881; Color Doppler-30567  Study Detail: The following Echo studies were performed: 2D, Doppler and color               flow.  PHYSICIAN INTERPRETATION: Left Ventricle: The left ventricle was not well visualized. The left ventricular ejection fraction could not be measured. Left venticular wall motion is abnormal. The left ventricular cavity size was not assessed. Left ventricular diastolic filling was not assessed. Large VSD ( inferoseptal) with left to right shunting noted on color Doppler. Vmax of VSD flow only 3.2m/sec. Left Atrium: The left atrial size was not assessed. Right Ventricle: The right ventricle was not well visualized. There is reduced right ventricular systolic function. A device is visualized in the right ventricle. Limited views of RV which appears to be dilated with reduced fx. Right Atrium: The right atrial size was not assessed. There is a device visualized in the right atrium. Aortic Valve: The aortic valve was not assessed. Aortic valve regurgitation was not assessed. Mitral Valve: The mitral valve was not well visualized. There is trace mitral valve regurgitation. Tricuspid Valve: The tricuspid valve was not assessed. Tricuspid regurgitation was not assessed. Pulmonic Valve: The pulmonic valve was not assessed. Pulmonic valve regurgitation was not assessed. Pericardium: There is no pericardial effusion noted. Aorta: The aortic root was not assessed. In comparison to the previous echocardiogram(s): Compared with study dated 5/25/2025, the prior exam was a very limited study only to assess depth of Impella device and did not assess VSD with Doppler. Compared with the exam from 5/20/2025, the prior VSD Vmax was 3.3m/sec, so not significantly changed at this time. Was already a large inferoseptal VSD at that time. Note the Impella device was already present on the  prior exam.  LEFT VENTRICULAR ASSIST DEVICE: LVAD: The patient has a(n) Impella LVAD device present.  CONCLUSIONS:  1. Limited and technically difficult fellow bedside exam.  2. Poorly visualized anatomical structures due to suboptimal image quality.  3. The left ventricle was not well visualized. The left ventricular ejection fraction could not be measured.  4. Abnormal left venticular wall motion.  5. Large VSD ( inferoseptal) with left to right shunting noted on color Doppler. Vmax of VSD flow only 3.2m/sec.  6. There is reduced right ventricular systolic function.  7. Impella device noted within the LV cavity, though exact depth beneath the AV coud not be measured.  8. Compared with study dated 5/25/2025, the prior exam was a very limited study only to assess depth of Impella device and did not assess VSD with Doppler. Compared with the exam from 5/20/2025, the prior VSD Vmax was 3.3m/sec, so not significantly changed at this time. Was already a large inferoseptal VSD at that time. Note the Impella device was already present on the prior exam. QUANTITATIVE DATA SUMMARY:  [id number] Maribel Coughlin MD Electronically signed on 5/27/2025 at 8:26:10 AM  ** Final **     Transthoracic Echo (TTE) Limited  Result Date: 5/26/2025   [facility], [address] [place] [address]                Tel [phone] and Fax [phone] TRANSTHORACIC ECHOCARDIOGRAM REPORT  Patient Name:       AVRIL REYESAnnette FRITZ    Reading Physician:    [id number] Tera Allred MD Study Date:         5/24/2025           Ordering Provider:    [id number] TIBURCIO TUCKER MRN/PID:            [id number]            Fellow: Accession#:         [id number]        Nurse: Date of Birth/Age:  1980 / 45      Sonographer:          Fellow Exam                     years Gender assigned at  M                   Additional Staff: Birth:  Height:             172.72 cm           Admit Date: Weight:             89.80 kg            Admission Status: BSA / BMI:          2.04 m2 / 30.10     Encounter#:           [id number]                     kg/m2 Blood Pressure:     102/92 mmHg         Department Location: Study Type:    TRANSTHORACIC ECHO (TTE) LIMITED Diagnosis/ICD: Cardiogenic shock-R57.0 Indication:    Impella positioning CPT Code:      Doppler Limited-88775; Echo Limited-64282; Color Doppler-17007 PHYSICIAN INTERPRETATION: Left Ventricle: The left ventricle was not well visualized. The left ventricular ejection fraction could not be measured. The left ventricular cavity size was not assessed. Left ventricular diastolic filling was not assessed. Left Atrium: The left atrial size was not assessed. Right Ventricle: The right ventricle was not assessed. Right ventricular systolic function not assessed. Right Atrium: The right atrial size was not assessed. Aortic Valve: The aortic valve was not assessed. Aortic valve regurgitation was not assessed. Mitral Valve: The mitral valve was not assessed. Mitral valve regurgitation was not assessed. Tricuspid Valve: The tricuspid valve was not assessed. Tricuspid regurgitation was not assessed. Pulmonic Valve: The pulmonic valve was not assessed. Pulmonic valve regurgitation was not assessed. Pericardium: Pericardial effusion was not assessed. Aorta: The aortic root was not assessed.  LEFT VENTRICULAR ASSIST DEVICE: LVAD: The patient has a(n) Impella LVAD device present. LVAD Comments: Limited exam for Impllea inflow cannula repositioning, cannula is 5.1cm deep in LV cavity.  CONCLUSIONS:  1. The left ventricle was not well visualized. The left ventricular ejection fraction could not be measured. QUANTITATIVE DATA SUMMARY:  [id number] Tera Allred MD Electronically signed on 5/25/2025 at 9:26:25 AM  ** Final (Updated) **     Transthoracic Echo (TTE) Limited  Result Date: 5/26/2025   [facility], [address] [place] [address]                Tel [phone] and Fax [phone] TRANSTHORACIC ECHOCARDIOGRAM REPORT  Patient Name:       AVRIL CORONAHERIBERTO Yoder Physician:    [id number] Tera Allred MD Study Date:         5/25/2025           Ordering Provider:    [id number] TIBURCIO TUCKER MRN/PID:            [id number]            Fellow: Accession#:         [id number]        Nurse: Date of Birth/Age:  1980 / 45      Sonographer:          Fellow Exam                     years Gender assigned at  M                   Additional Staff: Birth: Height:             172.70 cm           Admit Date: Weight:             89.80 kg            Admission Status: BSA / BMI:          2.03 m2 / 30.11     Encounter#:           [id number]                     kg/m2 Blood Pressure:     102/92 mmHg         Department Location: Study Type:    TRANSTHORACIC ECHO (TTE) LIMITED Diagnosis/ICD: Cardiogenic shock-R57.0 Indication:    Impella positioning CPT Code:      Echo Limited-32695 PHYSICIAN INTERPRETATION: Left Ventricle: The left ventricle was not well visualized. The left ventricular ejection fraction could not be measured. The left ventricular cavity size was not assessed. Left ventricular diastolic filling was not assessed. Left Atrium: The left atrial size was not assessed. Right Ventricle: The right ventricle was not assessed. Right ventricular systolic function not assessed. A device is visualized in the right ventricle. Right Atrium: The right atrial size was not assessed. Aortic Valve: The aortic valve was not assessed. Aortic valve regurgitation was not assessed. Mitral Valve: The mitral valve was not assessed. Mitral valve regurgitation was not assessed. Tricuspid Valve: The tricuspid valve was not assessed. Tricuspid regurgitation was not assessed. Pulmonic Valve: The pulmonic valve was not  assessed. Pulmonic valve regurgitation was not assessed. Pericardium: Pericardial effusion was not assessed. Aorta: The aortic root was not assessed.  LEFT VENTRICULAR ASSIST DEVICE: LVAD: The patient has a(n) Impella LVAD device present. LVAD Comments: Limited exam for Impella repositioning. Impella cannula is 3.7cm deep in left ventricular cavity.  CONCLUSIONS:  1. The left ventricle was not well visualized. The left ventricular ejection fraction could not be measured. QUANTITATIVE DATA SUMMARY:  [id number] Tera Allred MD Electronically signed on 5/25/2025 at 9:28:32 AM  ** Final (Updated) **     Transthoracic Echo (TTE) Limited  Result Date: 5/23/2025   [facility], [address] [place] [address]                Tel [phone] and Fax [phone] TRANSTHORACIC ECHOCARDIOGRAM REPORT  Patient Name:       AVRIL FRITZ    Reading Physician:    [id number] Marcio Dodson MD Study Date:         5/22/2025           Ordering Provider:    [id number] TUCKER MRN/PID:            [id number]            Fellow: Accession#:         [id number]        Nurse: Date of Birth/Age:  1980 / 45      Sonographer:          Fellow Exam                     years Gender assigned at  M                   Additional Staff: Birth: Height:             172.72 cm           Admit Date: Weight:             89.81 kg            Admission Status:     Inpatient - STAT BSA / BMI:          2.04 m2 / 30.11     Encounter#:           [id number]                     kg/m2 Blood Pressure:     102/92 mmHg         Department Location: Study Type:    TRANSTHORACIC ECHO (TTE) LIMITED Diagnosis/ICD: Cardiogenic shock-R57.0 Indication:    Impella position CPT Code:      Echo Limited-24091  Study Detail: The following Echo studies were performed: 2D.  PHYSICIAN INTERPRETATION: Left Ventricle: The left ventricle was not well  visualized. The left ventricular ejection fraction could not be measured. The left ventricular cavity size was not assessed. Left ventricular diastolic filling was not assessed. Left Atrium: The left atrial size was not assessed. Right Ventricle: The right ventricle was not assessed. Right ventricular systolic function not assessed. Right Atrium: The right atrial size was not assessed. Aortic Valve: The aortic valve was not assessed. Aortic valve regurgitation was not assessed. Mitral Valve: The mitral valve was not assessed. Mitral valve regurgitation was not assessed. Tricuspid Valve: The tricuspid valve was not assessed. Tricuspid regurgitation was not assessed. Pulmonic Valve: The pulmonic valve was not assessed. Pulmonic valve regurgitation was not assessed. Pericardium: Pericardial effusion was not assessed. Aorta: The aortic root was not assessed. Additional Comments: Impella at approximately 5.3 cm depth in the left ventricle. Limited study for Impella depth. In comparison to the previous echocardiogram(s): Although previous studies are available for review, a comparison with the current echocardiogram is not feasible due to its limited scope or image quality.  CONCLUSIONS:  1. Limited study for Impella depth.  2. The left ventricle was not well visualized. The left ventricular ejection fraction could not be measured.  3. Impella at approximately 5.3 cm depth in the left ventricle. QUANTITATIVE DATA SUMMARY:  [id number] Marcio Dodson MD Electronically signed on 5/23/2025 at 12:12:26 PM  ** Final (Updated) **     Transthoracic Echo (TTE) Limited  Result Date: 5/20/2025   [facility], [address] [place] [address]                Tel [phone] and Fax [phone] TRANSTHORACIC ECHOCARDIOGRAM REPORT  Patient Name:       AVRIL FRITZ    Reading Physician:    [id number] Maribel Coughlin MD Study Date:         5/20/2025            Ordering Provider:    [id number] TIBURCIO TUCKER MRN/PID:            [id number]            Fellow: Accession#:         [id number]        Nurse: Date of Birth/Age:  1980 / 45      Sonographer:          Saulo carranza RDCS Gender assigned at  M                   Additional Staff: Birth: Height:             172.72 cm           Admit Date: Weight:             78.47 kg            Admission Status:     Inpatient -                                                               Routine BSA / BMI:          1.92 m2 / 26.30     Encounter#:           [id number]                     kg/m2 Blood Pressure:     87/82 mmHg          Department Location:  [facility] Study Type:    TRANSTHORACIC ECHO (TTE) LIMITED Diagnosis/ICD: ST elevation (STEMI) myocardial infarction of unspecified                site-I21.3 Indication:    c/f VSD CPT Code:      Echo Limited-32928; Doppler Limited-52195; Color Doppler-74554 Patient History: Pertinent History: Inferior STEMI on Impella, tobacco abuse, CP. Study Detail: The following Echo studies were performed: 2D, M-Mode, Doppler and               color flow. Technically challenging study due to body habitus,               patient lying in supine position, poor acoustic windows and               prominent lung artifact. The patient is intubated. Optison used as               a contrast agent for endocardial border definition. Total contrast               used for this procedure was 2 mL via IV push.  PHYSICIAN INTERPRETATION: Left Ventricle: The left ventricle was not well visualized. The left ventricular ejection fraction could not be measured. The left ventricular cavity size was not assessed. There is paradoxical septal wall motion. Left ventricular diastolic filling cannot be determined due to left ventricular assist device. Limited views of the LV appear show a  hyderdynamic LV with a large color flow from LV to RV through the inferoseptum with maximal velocity of 3.3m/sec, Unable to adequatly assess LVEF, though appears to have baal septal and inferior wall motion abnormality. Left Atrium: The left atrial size was not assessed. Right Ventricle: The right ventricle was not well visualized. There is reduced right ventricular systolic function. A device is visualized in the right ventricle. The RV appears dilated with significantly reduced function with reduced TAPSE and fractional area change. Right Atrium: The right atrium is normal in size. There is a device visualized in the right atrium. Aortic Valve: The aortic valve was not well visualized. There is indeterminate aortic valve regurgitation. Mitral Valve: The mitral valve is normal in structure. There is trace mitral valve regurgitation. Tricuspid Valve: The tricuspid valve was not well visualized. There is trace tricuspid regurgitation. The right ventricular systolic pressure is unable to be estimated. Pulmonic Valve: The pulmonic valve is not well visualized. Pulmonic valve regurgitation was not assessed. Pericardium: Trivial to small pericardial effusion. Aorta: The aortic root was not well visualized. Systemic Veins: The inferior vena cava appears normal in size, on ventillator. In comparison to the previous echocardiogram(s): Compared with the prior exam from 5/19/2025 ([facility]) there has been interval development of a large VSD throuh the inferoseptum with the RV now becoming large with significantly reduced function. Prior echo with reported LVEF of 55% with basal inferospetal and baal inferior hypokinesis in the setting of STEMI. RV size and function were nornal at that time. ( Note that the septum was more thoroughly interrogated with color Doppler today).  LEFT VENTRICULAR ASSIST DEVICE: LVAD: The patient has a(n) Impella LVAD device present. LVAD Comments: Impella seen within the LVOT extending  possibly 2.1-2.3 cm beneath the AV. The Device was repositioned to 3.2-3.4 cm beneath the AV.  CONCLUSIONS:  1. Poorly visualized anatomical structures due to suboptimal image quality.  2. The left ventricle was not well visualized. The left ventricular ejection fraction could not be measured.  3. Limited views of the LV appear show a hyderdynamic LV with a large color flow from LV to RV through the inferoseptum with maximal velocity of 3.3m/sec, Unable to adequatly assess LVEF, though appears to have baal septal and inferior wall motion abnormality.  4. There is reduced right ventricular systolic function.  5. The RV appears dilated with significantly reduced function with reduced TAPSE and fractional area change.  6. Primary service notified of findings at the time of reporting.  7. Compared with the prior exam from 2025 ([facility]) there has been interval development of a large VSD throuh the inferoseptum with the RV now becoming large with significantly reduced function. Prior echo with reported LVEF of 55% with basal inferospetal and baal inferior hypokinesis in the setting of STEMI. RV size and function were nornal at that time. ( Note that the septum was more thoroughly interrogated with color Doppler today). QUANTITATIVE DATA SUMMARY:  RIGHT VENTRICLE: TAPSE: 11.2 mm RV s'  0.11 m/s  TRICUSPID VALVE/RVSP:         Normal Ranges: IVC Diam:             2.00 cm  SHUNT (Qp/Qs) DATA: VSD Aime: 3.10 m/s VSD P.3 mmHg  [id number] Maribel Coughlin MD Electronically signed on 2025 at 11:35:30 AM  ** Final **     Transthoracic Echo Complete  Result Date: 2025          [place] [id number]  Tel [phone] Fax [phone] TRANSTHORACIC ECHOCARDIOGRAM REPORT Patient Name:       AVRIL Yoder Physician:    [id number] Faraz Ortega DO Study Date:         2025            Ordering  Provider:    [id number] MARGARET ASHLEY GASPAR MRN/PID:            [id number]             Fellow: Accession#:         [id number]         Nurse: Date of Birth/Age:  1980 / 45 years Sonographer:          Koki Mendez RDCS Gender Assigned at                      Additional Staff: Birth: Height:             172.72 cm            Admit Date:           5/18/2025 Weight:             75.30 kg             Admission Status:     Inpatient - STAT BSA / BMI:          1.89 m2 / 25.24      Department Location:  [facility]                     kg/m2 Blood Pressure: 97 /65 mmHg Study Type:    TRANSTHORACIC ECHO (TTE) COMPLETE Diagnosis/ICD: ST elevation (STEMI) myocardial infarction of unspecified                site-I21.3 Indication:    STEMI CPT Codes:     Echo Complete w Full Doppler-59111 Patient History: PCI and Stent:     PCI performed on 5/18/2025. Smoker:            Current. Pertinent History: Chest Pain and Murmur. Study Detail: The following Echo studies were performed: 2D, M-Mode, Doppler and               color flow. Definity used as a contrast agent for endocardial               border definition. Total contrast used for this procedure was 2 mL               via IV push.  PHYSICIAN INTERPRETATION: Left Ventricle: The left ventricular systolic function is normal, with a visually estimated ejection fraction of 55%. There is mild concentric left ventricular hypertrophy. Wall motion is abnormal. The left ventricular cavity size is normal. There is mild increased septal and mildly increased posterior left ventricular wall thickness. Spectral Doppler shows a normal pattern of left ventricular diastolic filling. LV Wall Scoring: The basal inferoseptal segment, basal inferolateral segment, and basal inferior segment are hypokinetic. All remaining scored segments are normal. Left Atrium: The left  atrial size is normal. Right Ventricle: The right ventricle is normal in size. There is normal right ventricular global systolic function. Right Atrium: The right atrial size is normal. Aortic Valve: The aortic valve appears structurally normal. There is no evidence of aortic valve stenosis. The aortic valve dimensionless index is 0.73. There is no evidence of aortic valve regurgitation. The peak instantaneous gradient of the aortic valve is 5 mmHg. The mean gradient of the aortic valve is 3 mmHg. Mitral Valve: The mitral valve is normal in structure. There is no evidence of mitral valve stenosis. There is normal mitral valve leaflet mobility. There is no evidence of mitral valve regurgitation. Tricuspid Valve: The tricuspid valve is structurally normal. There is normal tricuspid valve leaflet mobility. There is trace tricuspid regurgitation. Pulmonic Valve: The pulmonic valve is structurally normal. There is no indication of pulmonic valve regurgitation. Pericardium: No pericardial effusion noted. Aorta: The aortic root is normal. Pulmonary Artery: The main pulmonary artery is normal in size, and position, with normal bifurcation into the left and right pulmonary arteries. The tricuspid regurgitant velocity is 2.88 m/s, and with an estimated right atrial pressure of 3 mmHg, the estimated pulmonary artery pressure is mildly elevated with the RVSP at 36.2 mmHg. Systemic Veins: The inferior vena cava appears normal in size.  CONCLUSIONS:  1. The left ventricular systolic function is normal, with a visually estimated ejection fraction of 55%.  2. Basal inferoseptal segment, basal inferolateral segment, and basal inferior segment are abnormal.  3. Abnormal wall motion.  4. There is normal right ventricular global systolic function.  5. Normal sized right ventricle.  6. There is no evidence of mitral valve stenosis.  7. No evidence of mitral valve regurgitation.  8. Trace tricuspid regurgitation is visualized.  9. Aortic  valve stenosis is not present. 10. The main pulmonary artery is normal in size, and position, with normal bifurcation into the left and right pulmonary arteries. QUANTITATIVE DATA SUMMARY:  2D MEASUREMENTS:             Normal Ranges: Ao Root d:       2.86 cm     (2.0-3.7cm) LAs:             3.88 cm     (2.7-4.0cm) IVSd:            1.12 cm     (0.6-1.1cm) LVPWd:           1.08 cm     (0.6-1.1cm) LVIDd:           4.02 cm     (3.9-5.9cm) LVIDs:           2.94 cm LV Mass Index:   77.7 g/m2 LVEDV Index:     59.87 ml/m2 LV % FS          26.9 %  LEFT ATRIUM:                  Normal Ranges: LA Vol A4C:        29.5 ml    (22+/-6mL/m2) LA Vol A2C:        25.4 ml LA Vol BP:         30.5 ml LA Vol Index A4C:  15.6ml/m2 LA Vol Index A2C:  13.4 ml/m2 LA Vol Index BP:   16.1 ml/m2 LA Area A4C:       13.8 cm2 LA Area A2C:       11.5 cm2 LA Major Axis A4C: 5.5 cm LA Major Axis A2C: 4.4 cm LA Volume Index:   14.9 ml/m2  RIGHT ATRIUM:                 Normal Ranges: RA Vol A4C:        24.1 ml    (8.3-19.5ml) RA Vol Index A4C:  12.7 ml/m2 RA Area A4C:       11.1 cm2 RA Major Axis A4C: 4.4 cm  AORTA MEASUREMENTS:         Normal Ranges: Asc Ao, d:          2.48 cm (2.1-3.4cm)  LV SYSTOLIC FUNCTION:                      Normal Ranges: EF-A4C View:    54 % (>=55%) EF-A2C View:    54 % EF-Biplane:     55 % EF-Visual:      55 % LV EF Reported: 55 %  LV DIASTOLIC FUNCTION:           Normal Ranges: MV Peak E:             1.20 m/s  (0.7-1.2 m/s) MV Peak A:             1.08 m/s  (0.42-0.7 m/s) E/A Ratio:             1.11      (1.0-2.2) MV e'                  0.090 m/s (>8.0) MV lateral e'          0.08 m/s MV medial e'           0.10 m/s E/e' Ratio:            13.29     (<8.0)  MITRAL VALVE:          Normal Ranges: MV DT:        126 msec (150-240msec)  AORTIC VALVE:                     Normal Ranges: AoV Vmax:                1.16 m/s (<=1.7m/s) AoV Peak P.4 mmHg (<20mmHg) AoV Mean PG:             3.0 mmHg (1.7-11.5mmHg) LVOT Max  Aime:            1.03 m/s (<=1.1m/s) AoV VTI:                 22.30 cm (18-25cm) LVOT VTI:                16.20 cm LVOT Diameter:           2.03 cm  (1.8-2.4cm) AoV Area, VTI:           2.35 cm2 (2.5-5.5cm2) AoV Area,Vmax:           2.87 cm2 (2.5-4.5cm2) AoV Dimensionless Index: 0.73  RIGHT VENTRICLE: RV Basal 3.49 cm RV Mid   2.65 cm RV Major 7.3 cm TAPSE:   22.4 mm RV s'    0.13 m/s  TRICUSPID VALVE/RVSP:          Normal Ranges: Peak TR Velocity:     2.88 m/s RV Syst Pressure:     36 mmHg  (< 30mmHg) IVC Diam:             1.74 cm  PULMONIC VALVE:          Normal Ranges: PV Accel Time:  116 msec (>120ms) PV Max Aime:     1.5 m/s  (0.6-0.9m/s) PV Max P.8 mmHg  [id number] Faraz Ortega DO Electronically signed on 2025 at 9:34:06 AM  Wall Scoring  ** Final **       Cardiac Catheterization  No results found for this or any previous visit from the past 1825 days.  Impella Insertion 2025    [facility], Cath Lab, [address] [place] [address]    Cardiovascular Catheterization Report    Patient Name:     AVRIL FRITZ    Performing Physician:  [id number] Mcduffie MD  Study Date:       2025           Verifying Physician:   [id number] Mcduffie MD  MRN/PID:          [id number]            Cardiologist/Co-Scrub:  Accession#:       [id number]        Ordering Provider:     [id number] [facility]  Date of           1980 / 45      Cardiologist:  Birth/Age:        years  Gender:           M                   Fellow:                [id number] Froilan Miranda MD  Encounter#:       [id number]          Surgeon:      Study:            Impella Insertion  Additional Study: Peripheral Angiogram      Indications:  AVRIL FRITZ is a 45 year old male who presents with dyslipidemia, hypertension and prior percutaneous coronary intervention. Acute MI, andcardiogenic shock.    Procedure Description:  Femoral artery angiography was performed at the additional arterial access site. This  demonstrated a common femoral artery puncture appropriate for closure. The additional arterial sheath was left intact and sutured in place. Femoral artery angiography was performed at the additional arterial access site. This demonstrated a common femoral artery puncture appropriate for closure. The additional arterial sheath was left intact and sutured in place.    Procedure Description Comments:  Access was obtained using US and micropuncture technique in the right CFA and a 4 Fr Ghent sheath was placed. Angiogram revealed a CFA without significant PAD. Access was obtained in the right CFA using US and micropuncture technique and a 9 Vietnamese sheath was advanced. A 6 Fr angled pigtail was then advanced for measurement of LV pressures. 2 Percloses were deployed at the right CFA site and a 14 Vietnamese Impella Peel-Away sheath was then advanced in the right CFA. Heparin was given and therapeutic ACTs were maintained for the entirety of the procedure. The 6 Vietnamese angled pigtail was again used to gain entry into the LV cavity and subsequently the soft Impella wire was placed into the LV. Positioning of the wire was confirmed in the ARRIOLA view. The Impella was advanced into the LV and mechanical support was initiated at P9. Access was obtained in the left CFA using ultrasound and micropuncture sheath and a 6 Vietnamese braided sheath was placed in the left CFA. The 4 Vietnamese Ghent in the  right SFA was exchanged for a 6 Vietnamese braided sheath as well and the two were connected using a male to male connector to provide antegrade perfusion to the right side. The 14 Vietnamese Impella Peel-Away sheath was then removed and the 9 Vietnamese Repositioning sheath was advanced. No suction/position alarms were noted at the end of the procedure. Procedure was tolerated without complications.      Impella:  There is good positioning of the Impella CP via the right femoral artery. See Procedure Description.    Hemo  Personnel:  +-------------------+---------+  Name               Duty       +-------------------+---------+  Fanny Mcduffie MD 1  +-------------------+---------+      Hemodynamic Pressures:    +----+-------------------+---------+------------+-------------+------+---------+  Site     Date Time       Phase    Systolic    Diastolic    ED  Mean mmHg                           Name       mmHg        mmHg      mmHg            +----+-------------------+---------+------------+-------------+------+---------+   Art  5/20/2025 5:30:45     Rest          89           65             73                       AM                                                   +----+-------------------+---------+------------+-------------+------+---------+    LV  5/20/2025 5:30:45     Rest          95            4    23                                AM                                                   +----+-------------------+---------+------------+-------------+------+---------+   Art  5/20/2025 5:30:53     Rest          87           62             70                       AM                                                   +----+-------------------+---------+------------+-------------+------+---------+    LV  5/20/2025 5:30:53     Rest          95            5    23                                AM                                                   +----+-------------------+---------+------------+-------------+------+---------+   Art  5/20/2025 6:10:31     Rest          98           90             92                       AM                                                   +----+-------------------+---------+------------+-------------+------+---------+    AO  5/20/2025 6:10:31     Rest          98           90             92                       AM                                                    +----+-------------------+---------+------------+-------------+------+---------+        Oxygen Saturation %:  +-----------+----------+------------+  Sample SiteO2 Sat (%)HB (g/100ml)  +-----------+----------+------------+          SVC        61        15.0  +-----------+----------+------------+          SVC        61        15.0  +-----------+----------+------------+      SYS ART                  15.0  +-----------+----------+------------+      PUL JODI                  15.0  +-----------+----------+------------+      Complications:  No in-lab complications observed.    Cardiac Cath Post Procedure Notes:  Post Procedure Diagnosis: Successful Right CFA Impella placement with  simultaneous placement of Left CFA to Right SFA  antegrade perfusion sheath.  Blood Loss:               Estimated blood loss during the procedure was 10 mls.  Specimens Removed:        Number of specimen(s) removed: none.      Recommendations:  Maximize medical therapy.    ____________________________________________________________________________________  CONCLUSIONS:  1. Successful Right CFA Impella placement.  2. Simultaneous placement of Left CFA to Right SFA antegrade perfusion sheath.    ICD 10 Codes:  Ventricular septal defect (VSD)-Q21.0    CPT Codes:  Insertion of ventricular assist device, percutaneous S&I left heart arterial access only-18647; Angiography, Extremity,Bilat ,S&I (PER)-42006    [id number] Fanny Mcduffie MD  Performing Physician  Electronically signed by [id number] Fanny Mcduffie MD on 5/20/2025 at 10:18:07 AM          ** Final **    Cardiac Scoring  No results found for this or any previous visit from the past 1825 days.    AAA  No results found for this or any previous visit from the past 1825 days.    Other  No results found for this or any previous visit from the past 1825 days.      Imaging  Imaging  XR chest 1 view  Result Date: 6/3/2025  1. Persistent extensive ground-glass and patchy airspace opacity,  slightly worse compared to prior study. Correlate with worsening edema and volume status. 2. Small bilateral pleural effusion. 3. Medical devices as described above       Signed by: Megan Gibson 6/3/2025 4:14 PM Dictation workstation:   [id number]    XR chest 1 view  Result Date: 6/3/2025  1. Interval placement of left IJ central venous catheter projecting over cavoatrial junction. 2. Unchanged appearance of the lungs with persistent extensive ground-glass and patchy airspace opacity, likely due to severe edema. 3. Suggestion of small bilateral pleural effusion.       Signed by: Megan Gibson 6/3/2025 12:35 PM Dictation workstation:   [id number]    XR chest 1 view  Result Date: 6/3/2025  1. Unchanged appearance of the lungs with persistent ground-glass and patchy airspace opacity in the lungs, likely due to severe edema. Cannot exclude superimposed infection. 2. Question small bibasilar pleural effusion.       Signed by: Megan Gibson 6/3/2025 12:33 PM Dictation workstation:   [id number]    XR chest 1 view  Result Date: 6/3/2025  1. Unchanged aeration of the lungs with persistent extensive multifocal ground-glass and patchy airspace opacity. Findings are likely due to pulmonary edema with infectious process not excluded. 2. Question trace bilateral pleural effusion. 3. Medical devices as described above.       Signed by: Megan Gibson 6/3/2025 12:32 PM Dictation workstation:   [id number]    XR chest 1 view  Result Date: 6/2/2025  1.  Right internal jugular approach pulmonary artery catheter tip projects over a right lower lobe segmental pulmonary artery. Other medical devices as above. 2. Interval worsening of predominantly alveolar pulmonary edema. Trace bilateral pleural effusions.   I personally reviewed the images/study and I agree with the findings as stated by Joe Jimenez MD (resident).   MACRO: None   Signed by: Faizan Champion 6/2/2025 5:14 PM Dictation workstation:    [id number]          Inpatient Medications    Continuous medications  Continuous Medications[1]    Scheduled medications  Scheduled Medications[2]    PRN medications  PRN Medications[3]          Assessment/Plan     Assessment & Plan  Alpesh Elena is a 45 y.o. male with a PMH of tobacco abuse (30 pack years), submandibular abscess, and nephrolithiasis  on 15d 10h of CICU admission for an inferior STEMI, now s/p coronary angiogram with balloon angioplasty to the PDA (right dominant system). This late presenting MI c/b cardiogenic shock iso of ischemia -induced VSD. Original shock call resulted in impella CP placment. Normalized lactate but resultant hemolysis. Stay c/b renal failure, now requiring CVVH. prior c/f GIB, found to be OG trauma and small ulcer. On 5/28 patient upgraded to impella 5.5 with reduction in hemolysis. CT surg following for future VSD repair. Decision to pursue comfort care on 6/4/2025    Updates 06/04/25:  -After rounds team had discussion with family at bedside. Updated family on current medical situation and prognosis given multi organ failure at this time. Family in agreement to pursue comfort care. COMFORT CARE MEASURES STARTED. ALL INTERVENTIONS NOT DIRECTED TOWARDS COMFORT STOPPED.      Comfort care measures started:  -fentanyl gtt  -ativan q4h  -glycopyrrolate  -will wean down HFNC support  -will stop CVVH  -will slowly wean down Impella      Fluids: PRN  Electrolytes: PRN  Nutrition: No diet orders on file  Lines: PIV, RIJ, left radial A line   DVT prophylaxis: on therapeutic AC w/ Heparin gtt  GI prophylaxis: IV PPI    Code Status: DNR Comfort Measures Only    Emergency Contact / NOK: : sonAlpesh [phone],        Patient seen and staffed with attending physician on service    Jesus Hooks MD  Internal Medicine PGY-1    6/4/2025         [1] fentaNYL,  mcg/hr  heparin, 0-2,000 Units/hr, Last Rate: 400 Units/hr (06/04/25 0700)  heparin Impella Purge 25 units/mL in 500 mL D5W,  10 mL/hr, Last Rate: 10 mL/hr (06/04/25 0700)  PrismaSol BGK 2/3.5, 25 mL/kg/hr (Dosing Weight), Last Rate: 25 mL/kg/hr (06/04/25 1028)     [2] oxygen, , inhalation, Continuous - Inhalation     [3] PRN medications: alteplase, fentaNYL, glycopyrrolate, hydrOXYzine HCL, LORazepam

## 2025-06-04 NOTE — PROGRESS NOTES
I have seen and evaluated Alpesh Elena.  We had a family meeting with Alpesh (son), Nayla, and and his aunt.  They have decided to transition to comfort care measures because of his prognosis and suffering. Surgical intervention and recovery from that has more risks (including death, bleeding, and vasoplegia) more than benefit given his overall condition and multiple organ failure.    Alpesh (son) and his family have decided to transition to comfort care.    Goals of care:  Minimize symptom burden, optimize quality of remaining life, and allow for peaceful death.    Recommendations for comfort care:  Transition code status to DNR comfort care only.  Oxygen supplementation by NC for comfort.    Fentanyl infusion 25 mcg/hour infusion, initial bolus 25 mcg from bag and every 15 minutes as needed, titrate Q30 minutes 25 mcg bidirectional to maintain RDOS and CPOT >/= 3, pain or dyspnea.    Lorazepam 0.5 mg IV PRN Q 4 hours for agitation and anxiety.    Wean down the Implella Q30 minutes with medication treatments to achieve comfort and RDOS less than 3.  Oxygen can be transition to usual nasal canula and discontinue optiflow.    CVVH can be discontinued.     Glycopyrrolate 0.2 mg IV Q 6 hours PRN for increased secretions.  Ondansetron 4 mg IV Q 6 hours PRN for nausea and vomiting.  Bisacodyl suppository PRN for constipation.  Daily oral care.  Comfort feeding as the patient wishes.    Discussed with Dr. Molina, Dr. Tidwell, Dr. Hooks and RN Abby.   Rev. Tanesha Prater has been notified and will provide support.    Full progress note to follow.    Antonino Fernández MD  Palliative Care Physician  Message: [employer] Secure chat  Team pager [phone]    ADDENDUM:      Palliative Medicine following for:  Complex medical decision making, symptom management, patient/family support    History obtained from chart review including ED note, H&P, patient's daily progress notes, review of lab/test results, and  "discussion with primary team and bedside RN.    Chief Complaint: shortness of breath    Subjective    History of Present Illness  The patient is unable to participate with history and review of systems because of respiratory distress.    Symptoms  He is unable to participate.    Objective    Last Recorded Vitals  BP 94/87   Pulse (!) 0   Temp 36.2 °C (97.2 °F) (Temporal)   Resp (!) 0   Ht 1.727 m (5' 8\")   Wt 90.4 kg (199 lb 4.7 oz)   SpO2 (!) 76%   BMI 30.30 kg/m²      Physical Exam  Constitutional:       General: He is in acute distress.      Appearance: He is ill-appearing and toxic-appearing.   Eyes:      General: Scleral icterus present.   Cardiovascular:      Rate and Rhythm: Regular rhythm. Tachycardia present.   Pulmonary:      Effort: Tachypnea and accessory muscle usage present.      Breath sounds: Rales present.   Abdominal:      Palpations: Abdomen is soft.      Tenderness: There is no abdominal tenderness.   Skin:     General: Skin is warm.   Neurological:      Mental Status: He is lethargic.          Relevant Results   No results found for this or any previous visit (from the past 24 hours).     Allergies  Patient has no known allergies.  Medications  Scheduled medications  Scheduled Medications[1]  Continuous medications  Continuous Medications[2]  PRN medications  PRN Medications[3]     Assessment/Plan    Impending death  Right posterior descending artery STEMI s/p balloon angioplasty  Cardiogenic versus mixed shock  Mechanical circulatory device with Impella  Ventricular septal defect  Non sustained ventricular tachycardia  Premature ventricular complexes  2:1 AV block  Acute hypoxic respiratory failure status post endotracheal intubation and mechanical ventilation status post extubation  Ileus  Hyperbilirubinemia likely due to hemolysis associated to Impella  Anuric acute kidney injury  High anion gap metabolic acidosis  Volume overload  Thrombocytopenia  Anemia  Leukocytosis  Diabetes " mellitus type 2  Encephalopathy    Palliative Performance Scale (PPS): 10      I spent 20 minutes in providing separately identifiable ACP services with the patient and/or surrogate decision maker in a voluntary conversation discussing the patient's wishes and goals as detailed in the above note.       #Complex Medical Decision Making   #Goals of Care  - Goals are comfort and quality of life based  - Minimize symptom burden, optimize quality of remaining life, and allow for peaceful death.    #Advance Care Planning   - Code status: Transition to DNR comfort measures only  - Surrogate decision maker: Chao Elena    #Palliative Care Encounter.  Support and empathy was provided throughout the encounter. I Provided emotional support through reflective listening and presence for the patient and his family.    I revisited the patient 3 times through out the day to assist in titration and adjustment of comfort medications [facility].    At the end of the day I recommended to increase the fentanyl bolus from the bag to 50 mcg every 15 minutes as needed for pain and RDOS of 3 and above.    Later on lorazepam was increased to 1 mg every hour as needed for anxiety and agitation.  Impella pump was weaned all the way down to P1 and he was maintained on low-dose heparin infusion.        #Psychosocial Support  - Music Therapy  - Spiritual Care Support  - Art Therapy  - Palliative SW Support      Plan of Care discussed with: Updated MD Dr. Molina, Dr. Tidwell, Dr. Hooks and RN Abby and bedside RN on goals of care decision, medication adjustments, and code status     Medical Decision Making   - Impending death, cardiogenic shock, and respiratory failure posing threat to life and function   - I Reviewed external notes from cardiology critical care  - I Reviewed results from CMP and CBC   - I Recommended the following tests:   - I Discussed the management with medical team as noted  above  - Decision not to resuscitate or to de-escalate care because of poor prognosis: Decision was made to transition to DNR comfort care  - Parenteral controlled substances: fentanyl and lorazepam  - I spent 20 minutes in providing separately identifiable ACP services with the patient and/or surrogate decision maker in a voluntary conversation discussing the patient's wishes and goals as detailed in the above note.  - Total time spent providing patient care, counseling and supporting his family at bedside, and collaborating with the CICU medical and nursing team equals 80 minutes.    Thank you for allowing us to care for this patient. Palliative Team will continue to follow as needed. Please contact team with any questions or concerns.   Team pager [phone] (weekdays)    Antonino Fernández MD  Palliative Care Physician  Message: [employer] Secure chat  Team pager [phone]         [1] [2] [3]

## 2025-06-05 LAB
ATRIAL RATE: 66 BPM
P AXIS: 25 DEGREES
P OFFSET: 201 MS
P ONSET: 153 MS
PR INTERVAL: 124 MS
Q ONSET: 215 MS
QRS COUNT: 11 BEATS
QRS DURATION: 102 MS
QT INTERVAL: 526 MS
QTC CALCULATION(BAZETT): 551 MS
QTC FREDERICIA: 543 MS
R AXIS: 81 DEGREES
T AXIS: 67 DEGREES
T OFFSET: 478 MS
VENTRICULAR RATE: 66 BPM

## 2025-06-05 NOTE — SIGNIFICANT EVENT
Death Note    Date of Death:  06/04/25   Time of Death:  2020   Preliminary Cause of Death:  Multi-system organ failure     Notified by RN that was seen in asystole on the monitor.     On physical exam pt not responsive to stimuli.  Pupils fixed and dilated. Absent corneal reflex.  Absent cardiopulmonary sounds, auscultated for >60 seconds.  Absent pulses, palpated for >60 seconds.    Patients family present in room and condolences were provided. Attending physician Dr. Elkin Molina MD was notified of patients status.

## 2025-06-05 NOTE — DISCHARGE SUMMARY
Discharge Diagnosis  STEMI (ST elevation myocardial infarction) (Multi)    Issues Requiring Follow-Up    none    Test Results Pending At Discharge  Pending Labs       Order Current Status    Blood Gas Venous In process            Hospital Course  Alpesh Elena. This is a 45 year old male with a past medical history of tobacco abuse (30 pack years), submandibular abscess, and nephrolithiasis who presented to [facility] with a chief complaint of chest pain, found to have an inferior STEMI, now s/p coronary angiogram with balloon angioplasty to the PDA (right dominant system). His course was complicated by hemodynamic instability (tachycardia, hypotension) requiring vasopressor support and laboratory evidnce of end organ dysfunction. Due to concern for cardiogenic vs mixed shock, a shock call was intervened and recommended placement of  PA catheter for adjudication of hemodynamics and transfer to [facility] CICU for a higher level of care. He is now admitted to the CICU for further management.      On arrival to the CICU he is hypotensive with escalating pressor requirements. Bedside examination reveals a holosystolic murmur and serial mixed venous are revealing of a significant step up in SVO2 from the RA to PA concerning for the presence of a ischemic VSD, perhaps secondardy to a late presenting STEMI given troponin elevation to 24K on presentation. Re-conference with Shock team for consideration of Mechanical circulatory device given continued hemodynamic instability and now suspected VSD with cardiogenic shock. Shock team determined impella CP placement on 5/20 to offload LV given concern for STEMI-induced VSD/ Inferior LV wall rupture. Impella was upgraded to impella 5.5 on 5/28 due to evidence of hemolysis with Impella CP given high LDH, bilirubin, and low platelets. Patient's lactate normalized while on Impella CP.  However, still large difference between CVP and PA mixed venous, indicating ongoing shunting.   Distantly, was evidence of hemolysis with Impella CP given high LDH, bilirubin, and low platelets. Patient was upgraded to Impella 5.5 on 5/28 with CT surgery, with improvement in hemolysis. Hemolysis improved after the Impella change. TTE confirmed a large VSD in the the inferoseptum with new significantly reduced RV function and dilation. CT surgery was consulted for consideration of VSD repair but given that patient has a STS score of 44% mortality and 91% combined morbidity and mortality he remains a prohibitive risk for surgery. CT surgery team suggested comfort care and medical management.    Additionally, during hospital stay patient with persistent leukocytosis and concern for mixed septic shock.  Blood cultures pending but negative.  Patient placed on broad-spectrum with vancomycin, meropenem, and micafungin. ID was consulted and determined shock likely multifactorial given complicated hospital course. They recommended stopping micafungin and vancomycin and continuing meropenam. Stay further complicated by anuric renal failure. So he was started on CVVH per nephrology team.      Patient clinically declined and respiratory status and liver function worsened despite the above therapies. Palliative care was consulted given prohibitive risk for surgery, worsening of clinical status despite medical management, and ongoing poor prognosis due to multiorgan failure. After multiple discussions with the family, decision ultimately made to pursue comfort care. Patient was transitioned to comfort care on 6/4/2025.     Patient passed peacefully at approximately 820pm on 6/4/2025. Death exam completed and documented in chart. Family condolences provided.     Pertinent Physical Exam At Time of Discharge    See separate death exam documentation     Home Medications     Medication List      You have not been prescribed any medications.       Outpatient Follow-Up  No future appointments.    Johnathon Barrera MD

## 2025-06-22 LAB
ATRIAL RATE: 100 BPM
ATRIAL RATE: 127 BPM
P AXIS: 17 DEGREES
P AXIS: 52 DEGREES
P OFFSET: 196 MS
P OFFSET: 197 MS
P ONSET: 148 MS
P ONSET: 153 MS
PR INTERVAL: 124 MS
PR INTERVAL: 136 MS
Q ONSET: 215 MS
Q ONSET: 216 MS
QRS COUNT: 16 BEATS
QRS COUNT: 21 BEATS
QRS DURATION: 102 MS
QRS DURATION: 96 MS
QT INTERVAL: 318 MS
QT INTERVAL: 376 MS
QTC CALCULATION(BAZETT): 462 MS
QTC CALCULATION(BAZETT): 485 MS
QTC FREDERICIA: 408 MS
QTC FREDERICIA: 446 MS
R AXIS: 101 DEGREES
R AXIS: 112 DEGREES
T AXIS: -26 DEGREES
T AXIS: -34 DEGREES
T OFFSET: 375 MS
T OFFSET: 403 MS
VENTRICULAR RATE: 100 BPM
VENTRICULAR RATE: 127 BPM

## 2025-06-25 LAB
ATRIAL RATE: 48 BPM
ATRIAL RATE: 84 BPM
P AXIS: -18 DEGREES
P AXIS: 100 DEGREES
P OFFSET: 169 MS
P OFFSET: 194 MS
P ONSET: 123 MS
P ONSET: 148 MS
PR INTERVAL: 136 MS
PR INTERVAL: 178 MS
Q ONSET: 212 MS
Q ONSET: 216 MS
QRS COUNT: 14 BEATS
QRS COUNT: 8 BEATS
QRS DURATION: 104 MS
QRS DURATION: 94 MS
QT INTERVAL: 368 MS
QT INTERVAL: 640 MS
QTC CALCULATION(BAZETT): 434 MS
QTC CALCULATION(BAZETT): 571 MS
QTC FREDERICIA: 411 MS
QTC FREDERICIA: 594 MS
R AXIS: 256 DEGREES
R AXIS: 75 DEGREES
T AXIS: 19 DEGREES
T AXIS: 97 DEGREES
T OFFSET: 396 MS
T OFFSET: 536 MS
VENTRICULAR RATE: 48 BPM
VENTRICULAR RATE: 84 BPM

## (undated) DEVICE — TRAY, SURESTEP, URINE METER, 14FR, SILICONE

## (undated) DEVICE — GUIDEWIRE, INQWIRE, 3MM J, .035 X 210CM, FIXED

## (undated) DEVICE — DRESSING, MEPILEX, BORDER, SACRUM, 8.7 X 9.8 IN

## (undated) DEVICE — Device

## (undated) DEVICE — SPONGE, GAUZE, XRAY DECT, 16 PLY, 4 X 4, W/MASTER DMT,STERILE

## (undated) DEVICE — PAD, ELECTRODE DEFIB PADPRO ADULT STRL W/ADAPTER

## (undated) DEVICE — BLADE, SAW STERNUM, STERILE

## (undated) DEVICE — CATHETER, DRAINAGE, NASOGASTRIC, DOUBLE LUMEN, FUNNEL END, SUMP, SALEM, 18 FR, 48 IN, PVC, STERILE

## (undated) DEVICE — SUTURE, VICRYL, 4-0, 18 IN, PS-4C, UNDYED

## (undated) DEVICE — APPLICATOR, CHLORAPREP, W/ORANGE TINT, 26ML

## (undated) DEVICE — FLOSEAL, MATRIX, HEMOSTATIC, FULL STERILE PREP, 5ML

## (undated) DEVICE — MANIFOLD, 4 PORT NEPTUNE STANDARD

## (undated) DEVICE — WOUND SYSTEM, DEBRIDEMENT & CLEANING, O.R DUOPAK

## (undated) DEVICE — INTRODUCER SET, MICROPUNCTURE PED, 4FR 10CM, NITINOL WIRE W/PLAT TIP 21/4

## (undated) DEVICE — CATHETER, VISTA BRIGHT TIP, 6F, .070, 3 DRC, 100CM

## (undated) DEVICE — SUTURE, PROLENE, 5-0, 36 IN, RB1, DA, BLUE

## (undated) DEVICE — ELECTRODE, ELECTROSURGICAL, BLADE, INSULATED, ENT/IMA, STERILE

## (undated) DEVICE — GUIDEWIRE, INQWIRE, 3MM J, .035, 150

## (undated) DEVICE — SUTURE, SURGICAL STEEL, STERNUM 7, 18 IN, KV40, SINGL-WIRE

## (undated) DEVICE — SPONGE, LAP, XRAY DECT, 18IN X 18IN, W/LOOP, STERILE

## (undated) DEVICE — CATHETER, SUCTION, SAFE-T-VAC VALVE, COIL PACKED, 14 FR

## (undated) DEVICE — COVER, TABLE, 44 X 75 IN, DISPOSABLE, LF, STERILE

## (undated) DEVICE — SUTURE, CHROMIC, 3-0, 27 IN, PS2, BROWN

## (undated) DEVICE — TUBING, MANIFOLD, LOW PRESSURE

## (undated) DEVICE — COVER, CART, 45 X 27 X 48 IN, CLEAR

## (undated) DEVICE — SUTURE, PLAIN, 5-0, 18 IN, PC1, YELLOW

## (undated) DEVICE — TORQUE DEVICE, ACCOMODATES WIRES W/DIA .010 TO .038"."

## (undated) DEVICE — YANKAUER, RIGID, STRAIGHT, OPEN TIP, NO VENT

## (undated) DEVICE — SHEATH, PINNACLE, 10 CM,  6FR INTRODUCER, 6FR DIA, 2.5 CM DIALATOR

## (undated) DEVICE — GOWN, SURGICAL, SMARTGOWN, XLARGE, STERILE

## (undated) DEVICE — CANNULA, CARDIAC SUMP

## (undated) DEVICE — CLIP, LIGATING, W/ADHESIVE, WIDE SLOT, SMALL, TITANIUM

## (undated) DEVICE — ACCESS KIT, S-MAK MINI, 4FR 10CM 0.018IN 40CM, NT/PT, ECHO ENHANCE NEEDLE

## (undated) DEVICE — TUBING, SUCTION, CONNECTING, NON-CONDUCTIVE, SURE GRIP CONNECTORS, 3/16 X 18 IN, PVC

## (undated) DEVICE — SUTURE, MONOCRYL, 3-0, 18 IN, PS2, UNDYED

## (undated) DEVICE — INSERT, CLAMP, SURGICAL, SOFT/TRACTION, STEALTH, 1 MM

## (undated) DEVICE — CATHETER, ANGIO, IMPULSE, PIG 155, 6 FR X 110 CM

## (undated) DEVICE — SEALANT, HEMOSTATIC, FLOSEAL, 10 ML

## (undated) DEVICE — CONNECTOR, STRAIGHT, 0.375 X 0.375 IN

## (undated) DEVICE — SUTURE, PROLENE 4-0, TAPER POINT, SH-1 BLUE 30 INCH

## (undated) DEVICE — DRESSING, MEPILEX, BORDER, HEEL, 8.7 X 9.1 IN

## (undated) DEVICE — GOWN, ASTOUND, XL

## (undated) DEVICE — CATHETER, THORACIC, STRAIGHT, ADULT, 28 FR, PVC

## (undated) DEVICE — SUTURE, PROLENE, 4-0, 36 IN, RB1, DA, BLUE

## (undated) DEVICE — CATHETER, GUIDELINER, 5.5FR V2

## (undated) DEVICE — DRAPE, SHEET, CARDIOVASCULAR, ANTIMICROBIAL, W/ANESTHESIA SCREEN, IOBAN 2, STERI DRAPE, 107 X 133 IN, DISPOSABLE, FABRIC, BLUE, STERILE

## (undated) DEVICE — CLIPPER, SURGICAL BLADE ASSEMBLY, GENERAL PURPOSE, SINGLE USE

## (undated) DEVICE — TOWEL, SURGICAL, NEURO, O/R, 16 X 26, BLUE, STERILE

## (undated) DEVICE — SHEATH, PINNACLE, 10 CM,  4FR INTRODUCER, 4FR DIA, 2.5 CM DIALATOR

## (undated) DEVICE — DEVICE, CLOSURE, PERCLOSE, PROSTYLE

## (undated) DEVICE — SUCTION, VERSALIGHT MINI W/EXTENDED FRAZIER TIP

## (undated) DEVICE — SUTURE, ETHIBOND, XTRA, 30 IN, 0, CT-1, GREEN

## (undated) DEVICE — CLIP, LIGATING, W/ADHESIVE PAD, MEDIUM, TITANIUM

## (undated) DEVICE — COLLECTION UNIT, DRAINAGE, THORACIC, SINGLE TUBE, DRY SUCTION, ATS COMPATIBLE, OASIS 3600, LF

## (undated) DEVICE — SUTURE, PROLENE, 6-0, 30 IN, C-1, CV-11, BLUE

## (undated) DEVICE — CATHETER, BALLOON DILATION, EUPHORA SEMICOMPLIANT 2.0  X 12 MM X 142CM

## (undated) DEVICE — CATHETER, ACCU-VU SIZING, PIGTAIL, 5FR X 70CM 0.038IN, 20CM SEG

## (undated) DEVICE — REST, HEAD, BAGEL, 9 IN

## (undated) DEVICE — SUTURE, PROLENE, 3-0, 36 IN, SH, DA, BLUE

## (undated) DEVICE — LOOP, VESSEL, MAXI, RED

## (undated) DEVICE — CLOSURE DEVICE, VASCULAR, ANGIO-SEAL, VIP, 8FR

## (undated) DEVICE — GUIDEWIRE, STRAIGHT, HI-TORQUE BALANCE MIDDLEWEIGHT, 0.014 IN X 190 CM, HYDROCOAT

## (undated) DEVICE — CATHETER, OPTITORQUE, 5FR, TIG, 1H/100CM

## (undated) DEVICE — DRAPE, FLUID WARMER

## (undated) DEVICE — TIP, SUCTION, YANKAUER, BULB, ADULT

## (undated) DEVICE — GUIDEWIRE, ANGLE TIP,  .035 DIA, 180 CM, 3 CM TIP"

## (undated) DEVICE — SHEATH, GLIDESHEATH, SLENDER, 6FR 10CM

## (undated) DEVICE — DEVICE, INFLATION, BASIXSKY, 30ATM BAR 20ML, MAP802 PHD, INSERT TOOL TORQUE, METAL

## (undated) DEVICE — GUIDEWIRE, UNIVERSAL BALANCE MID WEIGHT, 190CM, STR

## (undated) DEVICE — MARKER, SKIN, DUAL TIP INK W/9 LABEL AND REMOVABLE TIME OUT SLEEVE

## (undated) DEVICE — GUIDEWIRE, INQWIRE, .035, 150CM, STRT TIP

## (undated) DEVICE — CATHETER, URETHRAL, PEZZER, 3 CM HEAD, 34 FR, LATEX

## (undated) DEVICE — TR BAND, RADIAL COMPRESSION, LONG, 29CM

## (undated) DEVICE — CATHETER, DIAGNOSTIC, 5FR,  PIG-145, 110CM, 6SH ANGLED

## (undated) DEVICE — NEEDLE, ELECTRODE, ELECTROSURGICAL, INSULATED

## (undated) DEVICE — LOOP, VESSEL, MAXI, BLUE

## (undated) DEVICE — SHEATH, PINNACLE, 10 CM,  9FR INTRODUCER, 9FR DIA, 2.5 CM DIALATOR